# Patient Record
Sex: FEMALE | Race: WHITE | Employment: UNEMPLOYED
[De-identification: names, ages, dates, MRNs, and addresses within clinical notes are randomized per-mention and may not be internally consistent; named-entity substitution may affect disease eponyms.]

---

## 2017-02-21 ENCOUNTER — OFFICE VISIT (OUTPATIENT)
Dept: OBGYN | Facility: CLINIC | Age: 37
End: 2017-02-21

## 2017-02-21 VITALS
DIASTOLIC BLOOD PRESSURE: 96 MMHG | RESPIRATION RATE: 16 BRPM | SYSTOLIC BLOOD PRESSURE: 145 MMHG | WEIGHT: 131 LBS | BODY MASS INDEX: 19.35 KG/M2 | HEART RATE: 92 BPM

## 2017-02-21 DIAGNOSIS — Z30.09 ENCOUNTER FOR OTHER GENERAL COUNSELING OR ADVICE ON CONTRACEPTION: ICD-10-CM

## 2017-02-21 DIAGNOSIS — Z30.42 ENCOUNTER FOR DEPO-PROVERA CONTRACEPTION: ICD-10-CM

## 2017-02-21 DIAGNOSIS — Z30.013 INITIATION OF DEPO PROVERA: Primary | ICD-10-CM

## 2017-02-21 PROCEDURE — G8427 DOCREV CUR MEDS BY ELIG CLIN: HCPCS | Performed by: SPECIALIST

## 2017-02-21 PROCEDURE — 4004F PT TOBACCO SCREEN RCVD TLK: CPT | Performed by: SPECIALIST

## 2017-02-21 PROCEDURE — G8484 FLU IMMUNIZE NO ADMIN: HCPCS | Performed by: SPECIALIST

## 2017-02-21 PROCEDURE — 99213 OFFICE O/P EST LOW 20 MIN: CPT | Performed by: SPECIALIST

## 2017-02-21 PROCEDURE — G8420 CALC BMI NORM PARAMETERS: HCPCS | Performed by: SPECIALIST

## 2017-02-21 RX ORDER — CARBAMAZEPINE 200 MG/1
TABLET, EXTENDED RELEASE ORAL
Refills: 1 | Status: ON HOLD | COMMUNITY
Start: 2017-02-15 | End: 2018-09-10 | Stop reason: DRUGHIGH

## 2017-02-21 RX ORDER — PNV NO.95/FERROUS FUM/FOLIC AC 28MG-0.8MG
TABLET ORAL
Refills: 8 | COMMUNITY
Start: 2017-02-15 | End: 2017-06-06 | Stop reason: ALTCHOICE

## 2017-02-21 RX ORDER — LURASIDONE HYDROCHLORIDE 40 MG/1
TABLET, FILM COATED ORAL
Refills: 1 | COMMUNITY
Start: 2017-02-15 | End: 2017-06-06 | Stop reason: ALTCHOICE

## 2017-02-21 RX ORDER — HYDROXYZINE HYDROCHLORIDE 25 MG/1
TABLET, FILM COATED ORAL
Refills: 2 | COMMUNITY
Start: 2016-11-23 | End: 2017-06-06 | Stop reason: ALTCHOICE

## 2017-02-21 RX ORDER — NIFEDIPINE 60 MG/1
TABLET, EXTENDED RELEASE ORAL
COMMUNITY
Start: 2016-10-14 | End: 2017-06-06 | Stop reason: ALTCHOICE

## 2017-02-21 RX ORDER — CARBAMAZEPINE 400 MG/1
TABLET, EXTENDED RELEASE ORAL
Refills: 1 | COMMUNITY
Start: 2017-02-15 | End: 2017-06-06 | Stop reason: ALTCHOICE

## 2017-02-21 RX ORDER — MEDROXYPROGESTERONE ACETATE 150 MG/ML
INJECTION, SUSPENSION INTRAMUSCULAR
Refills: 2 | COMMUNITY
Start: 2017-02-08 | End: 2017-06-06 | Stop reason: ALTCHOICE

## 2017-02-21 RX ORDER — MEDROXYPROGESTERONE ACETATE 150 MG/ML
150 INJECTION, SUSPENSION INTRAMUSCULAR ONCE
Status: COMPLETED | OUTPATIENT
Start: 2017-02-21 | End: 2017-02-21

## 2017-02-21 RX ADMIN — MEDROXYPROGESTERONE ACETATE 150 MG: 150 INJECTION, SUSPENSION INTRAMUSCULAR at 18:36

## 2017-02-21 ASSESSMENT — ENCOUNTER SYMPTOMS
COUGH: 0
EYE PAIN: 0
CONSTIPATION: 0
DIARRHEA: 0
NAUSEA: 0
APNEA: 0
VOMITING: 0
ABDOMINAL DISTENTION: 0
ABDOMINAL PAIN: 0

## 2017-02-22 ENCOUNTER — TELEPHONE (OUTPATIENT)
Dept: OBGYN | Facility: CLINIC | Age: 37
End: 2017-02-22

## 2017-05-02 ENCOUNTER — TELEPHONE (OUTPATIENT)
Dept: OBGYN CLINIC | Age: 37
End: 2017-05-02

## 2017-06-06 ENCOUNTER — OFFICE VISIT (OUTPATIENT)
Dept: OBGYN CLINIC | Age: 37
End: 2017-06-06
Payer: MEDICARE

## 2017-06-06 ENCOUNTER — HOSPITAL ENCOUNTER (OUTPATIENT)
Age: 37
Setting detail: SPECIMEN
Discharge: HOME | End: 2017-06-06
Payer: MEDICARE

## 2017-06-06 VITALS
HEIGHT: 68 IN | WEIGHT: 182 LBS | HEART RATE: 98 BPM | RESPIRATION RATE: 18 BRPM | DIASTOLIC BLOOD PRESSURE: 75 MMHG | BODY MASS INDEX: 27.58 KG/M2 | SYSTOLIC BLOOD PRESSURE: 114 MMHG

## 2017-06-06 DIAGNOSIS — Z72.51 HIGH RISK SEXUAL BEHAVIOR: ICD-10-CM

## 2017-06-06 DIAGNOSIS — Z32.02 NEGATIVE PREGNANCY TEST: ICD-10-CM

## 2017-06-06 DIAGNOSIS — N90.89 VULVAL LESION: ICD-10-CM

## 2017-06-06 DIAGNOSIS — Z30.430 ENCOUNTER FOR IUD INSERTION: Primary | ICD-10-CM

## 2017-06-06 PROCEDURE — 81025 URINE PREGNANCY TEST: CPT | Performed by: SPECIALIST

## 2017-06-06 PROCEDURE — 4004F PT TOBACCO SCREEN RCVD TLK: CPT | Performed by: SPECIALIST

## 2017-06-06 PROCEDURE — 58300 INSERT INTRAUTERINE DEVICE: CPT | Performed by: SPECIALIST

## 2017-06-06 RX ORDER — BUSPIRONE HYDROCHLORIDE 30 MG/1
30 TABLET ORAL 2 TIMES DAILY
Status: ON HOLD | COMMUNITY
End: 2018-09-15 | Stop reason: HOSPADM

## 2017-06-06 RX ORDER — ZIPRASIDONE HYDROCHLORIDE 80 MG/1
80 CAPSULE ORAL 2 TIMES DAILY WITH MEALS
Status: ON HOLD | COMMUNITY
End: 2018-09-15 | Stop reason: HOSPADM

## 2017-06-06 RX ORDER — AMLODIPINE BESYLATE 5 MG/1
5 TABLET ORAL
COMMUNITY
Start: 2017-06-01 | End: 2018-07-04

## 2017-06-06 RX ORDER — MELOXICAM 15 MG/1
15 TABLET ORAL
COMMUNITY
Start: 2017-06-01 | End: 2018-05-02 | Stop reason: SDUPTHER

## 2017-06-06 ASSESSMENT — ENCOUNTER SYMPTOMS
COUGH: 0
APNEA: 0
ABDOMINAL DISTENTION: 0
EYE PAIN: 0
DIARRHEA: 0
ABDOMINAL PAIN: 0
NAUSEA: 0
CONSTIPATION: 0
VOMITING: 0

## 2017-06-07 ENCOUNTER — TELEPHONE (OUTPATIENT)
Dept: OBGYN CLINIC | Age: 37
End: 2017-06-07

## 2017-06-07 LAB
C TRACH DNA GENITAL QL NAA+PROBE: NEGATIVE
N. GONORRHOEAE DNA: NEGATIVE

## 2017-07-21 ENCOUNTER — TELEPHONE (OUTPATIENT)
Dept: OBGYN CLINIC | Age: 37
End: 2017-07-21

## 2018-05-02 ENCOUNTER — OFFICE VISIT (OUTPATIENT)
Dept: FAMILY MEDICINE CLINIC | Age: 38
End: 2018-05-02
Payer: MEDICARE

## 2018-05-02 VITALS
BODY MASS INDEX: 23.89 KG/M2 | HEART RATE: 97 BPM | DIASTOLIC BLOOD PRESSURE: 81 MMHG | HEIGHT: 68 IN | WEIGHT: 157.6 LBS | SYSTOLIC BLOOD PRESSURE: 115 MMHG

## 2018-05-02 DIAGNOSIS — F31.60 BIPOLAR DISORDER, MIXED (HCC): Chronic | ICD-10-CM

## 2018-05-02 DIAGNOSIS — E78.5 DYSLIPIDEMIA: ICD-10-CM

## 2018-05-02 DIAGNOSIS — M25.541 ARTHRALGIA OF BOTH HANDS: ICD-10-CM

## 2018-05-02 DIAGNOSIS — R03.0 ELEVATED BLOOD PRESSURE READING WITHOUT DIAGNOSIS OF HYPERTENSION: ICD-10-CM

## 2018-05-02 DIAGNOSIS — F42.9 OBSESSIVE-COMPULSIVE DISORDER, UNSPECIFIED TYPE: ICD-10-CM

## 2018-05-02 DIAGNOSIS — R53.83 FATIGUE, UNSPECIFIED TYPE: ICD-10-CM

## 2018-05-02 DIAGNOSIS — F12.10 MARIJUANA ABUSE: ICD-10-CM

## 2018-05-02 DIAGNOSIS — M47.815 SPONDYLOSIS OF THORACOLUMBAR REGION WITHOUT MYELOPATHY OR RADICULOPATHY: ICD-10-CM

## 2018-05-02 DIAGNOSIS — R73.9 HYPERGLYCEMIA: ICD-10-CM

## 2018-05-02 DIAGNOSIS — R09.89 LABILE HYPERTENSION: Primary | ICD-10-CM

## 2018-05-02 DIAGNOSIS — M25.542 ARTHRALGIA OF BOTH HANDS: ICD-10-CM

## 2018-05-02 PROCEDURE — 93784 AMBL BP MNTR W/SOFTWARE: CPT | Performed by: FAMILY MEDICINE

## 2018-05-02 PROCEDURE — 4004F PT TOBACCO SCREEN RCVD TLK: CPT | Performed by: FAMILY MEDICINE

## 2018-05-02 PROCEDURE — G8427 DOCREV CUR MEDS BY ELIG CLIN: HCPCS | Performed by: FAMILY MEDICINE

## 2018-05-02 PROCEDURE — 99204 OFFICE O/P NEW MOD 45 MIN: CPT | Performed by: FAMILY MEDICINE

## 2018-05-02 PROCEDURE — G8420 CALC BMI NORM PARAMETERS: HCPCS | Performed by: FAMILY MEDICINE

## 2018-05-02 RX ORDER — MELOXICAM 15 MG/1
15 TABLET ORAL DAILY
Qty: 30 TABLET | Refills: 1 | Status: SHIPPED | OUTPATIENT
Start: 2018-05-02 | End: 2018-06-28 | Stop reason: SDUPTHER

## 2018-05-02 ASSESSMENT — PATIENT HEALTH QUESTIONNAIRE - PHQ9
1. LITTLE INTEREST OR PLEASURE IN DOING THINGS: 0
SUM OF ALL RESPONSES TO PHQ QUESTIONS 1-9: 0
SUM OF ALL RESPONSES TO PHQ9 QUESTIONS 1 & 2: 0
2. FEELING DOWN, DEPRESSED OR HOPELESS: 0

## 2018-05-02 ASSESSMENT — ENCOUNTER SYMPTOMS
SORE THROAT: 0
SHORTNESS OF BREATH: 1
BACK PAIN: 1
DIARRHEA: 0
TROUBLE SWALLOWING: 0
COUGH: 1
NAUSEA: 0
RHINORRHEA: 0
CHEST TIGHTNESS: 0
CONSTIPATION: 0
ABDOMINAL PAIN: 0

## 2018-06-28 DIAGNOSIS — M25.542 ARTHRALGIA OF BOTH HANDS: ICD-10-CM

## 2018-06-28 DIAGNOSIS — M47.815 SPONDYLOSIS OF THORACOLUMBAR REGION WITHOUT MYELOPATHY OR RADICULOPATHY: ICD-10-CM

## 2018-06-28 DIAGNOSIS — M25.541 ARTHRALGIA OF BOTH HANDS: ICD-10-CM

## 2018-06-30 RX ORDER — MELOXICAM 15 MG/1
15 TABLET ORAL DAILY
Qty: 30 TABLET | Refills: 0 | Status: SHIPPED | OUTPATIENT
Start: 2018-06-30 | End: 2018-07-29 | Stop reason: SDUPTHER

## 2018-07-04 ENCOUNTER — HOSPITAL ENCOUNTER (EMERGENCY)
Age: 38
Discharge: HOME | End: 2018-07-04
Attending: EMERGENCY MEDICINE
Payer: MEDICARE

## 2018-07-04 VITALS
HEIGHT: 68 IN | HEART RATE: 76 BPM | OXYGEN SATURATION: 95 % | SYSTOLIC BLOOD PRESSURE: 121 MMHG | BODY MASS INDEX: 24.17 KG/M2 | DIASTOLIC BLOOD PRESSURE: 70 MMHG | WEIGHT: 159.5 LBS | TEMPERATURE: 98.2 F | RESPIRATION RATE: 16 BRPM

## 2018-07-04 DIAGNOSIS — L30.9 FACIAL DERMATITIS: Primary | ICD-10-CM

## 2018-07-04 PROCEDURE — 6370000000 HC RX 637 (ALT 250 FOR IP)

## 2018-07-04 PROCEDURE — 6360000002 HC RX W HCPCS: Performed by: EMERGENCY MEDICINE

## 2018-07-04 PROCEDURE — 99283 EMERGENCY DEPT VISIT LOW MDM: CPT

## 2018-07-04 PROCEDURE — 96372 THER/PROPH/DIAG INJ SC/IM: CPT

## 2018-07-04 RX ORDER — METHYLPREDNISOLONE SODIUM SUCCINATE 125 MG/2ML
125 INJECTION, POWDER, LYOPHILIZED, FOR SOLUTION INTRAMUSCULAR; INTRAVENOUS ONCE
Status: COMPLETED | OUTPATIENT
Start: 2018-07-04 | End: 2018-07-04

## 2018-07-04 RX ORDER — PREDNISONE 10 MG/1
TABLET ORAL
Qty: 30 TABLET | Refills: 0 | Status: SHIPPED | OUTPATIENT
Start: 2018-07-04 | End: 2018-09-10

## 2018-07-04 RX ADMIN — FLUORESCEIN SODIUM 1 STRIP: 0.6 STRIP OPHTHALMIC at 08:17

## 2018-07-04 RX ADMIN — METHYLPREDNISOLONE SODIUM SUCCINATE 125 MG: 125 INJECTION, POWDER, FOR SOLUTION INTRAMUSCULAR; INTRAVENOUS at 08:24

## 2018-07-04 ASSESSMENT — ENCOUNTER SYMPTOMS
CONSTIPATION: 0
SHORTNESS OF BREATH: 0
PHOTOPHOBIA: 1
DIARRHEA: 0
FACIAL SWELLING: 0
VOMITING: 0
ABDOMINAL PAIN: 0
EYE DISCHARGE: 0
COLOR CHANGE: 0
EYE REDNESS: 1
EYE PAIN: 1
COUGH: 0

## 2018-07-04 ASSESSMENT — VISUAL ACUITY
OS: 20/30
OD: 20/25

## 2018-07-04 ASSESSMENT — PAIN SCALES - GENERAL
PAINLEVEL_OUTOF10: 9
PAINLEVEL_OUTOF10: 9

## 2018-07-04 NOTE — ED NOTES
ASSESSMENT:   Presents to ED per self per pvt auto with c/o bilat eye irritation, blurred vision and periorbital redness. Been going on for 1 1/2 weeks after going using a tanning bed. States used their adhesive eye protectors. Eye Dr told her she's probably allergic to them  Has been to her Eye Dr twice and Urgent care once. Using eye drops and a special cleansing solution and both not helping. States her vision is a little blurred.      Sancho Flores RN  07/04/18 2224

## 2018-07-04 NOTE — ED PROVIDER NOTES
[address] [place] [address] ED  eMERGENCY dEPARTMENT eNCOUnter      Pt Name: Lianne Davison  MRN: [id number]  [place] 1980  Date of evaluation: 2018  Provider: Davonte Olea MD    [address] [place] [address]       Chief Complaint   Patient presents with    Eye Pain     bilateral         HISTORY OF PRESENT ILLNESS  (Location/Symptom, Timing/Onset, Context/Setting, Quality, Duration, Modifying Factors, Severity.)   Lianne Davison is a 42 y.o. female who presents to the emergency department For irritation of the skin around her eyes. This began about a week and a half ago after using a tanning bed and using eye shields that were held placed by glue. She went to an eye doctor and they put her on some ketorolac drops but it didn't seem to help. Symptoms are continuous and she described the burning is a 9. Nursing Notes were reviewed. ALLERGIES     Lamictal [lamotrigine]    CURRENT MEDICATIONS       Previous Medications    BUSPIRONE (BUSPAR) 10 MG TABLET    Take 30 mg by mouth nightly     CARBAMAZEPINE (TEGRETOL XR) 200 MG EXTENDED RELEASE TABLET    TK 1 T PO QD    CLONAZEPAM (KLONOPIN) 0.5 MG TABLET    Take 1 mg by mouth nightly. .    MELOXICAM (MOBIC) 15 MG TABLET    TAKE 1 TABLET BY MOUTH DAILY    ZIPRASIDONE (GEODON) 80 MG CAPSULE    Take 160 mg by mouth nightly        PAST MEDICAL HISTORY         Diagnosis Date    Bipolar 1 disorder ([address]     Depression     Gestational diabetes 2016    OCD (obsessive compulsive disorder)        SURGICAL HISTORY           Procedure Laterality Date    ABDOMEN SURGERY       SECTION  2007    LAPAROSCOPY           FAMILY HISTORY       Family History   Problem Relation Age of Onset    Diabetes Sister     No Known Problems Mother     No Known Problems Father      Family Status   Relation Status    Sister (Not Specified)    Mother Alive    Father         SOCIAL HISTORY      reports that she has been smoking.   She has been smoking about 1.00 pack There is no tenderness. Musculoskeletal: Normal range of motion. Lymphadenopathy:     She has no cervical adenopathy. Neurological: She is alert and oriented to person, place, and time. Skin: Skin is warm and dry. No rash noted. She is not diaphoretic. No erythema. Psychiatric: She has a normal mood and affect. Her behavior is normal.   Vitals reviewed. DIAGNOSTIC RESULTS     EKG: All EKG's are interpreted by the Emergency Department Physician who either signs or Co-signs this chart in the absence of a cardiologist.    Not indicated    RADIOLOGY:   Non-plain film images such as CT, Ultrasound and MRI are read by the radiologist. Plain radiographic images are visualized and preliminarily interpreted by the emergency physician with the below findings:    Not indicated    Interpretation per the Radiologist below, if available at the time of this note:        LABS:  Labs Reviewed - No data to display    All other labs were within normal range or not returned as of this dictation. EMERGENCY DEPARTMENT COURSE and DIFFERENTIAL DIAGNOSIS/MDM:   Vitals:    Vitals:    07/04/18 0755   BP: 121/70   Pulse: 76   Resp: 16   Temp: 98.2 °F (36.8 °C)   SpO2: 95%   Weight: 159 lb 8 oz (72.3 kg)   Height: 5' 8\" (1.727 m)       Orders Placed This Encounter   Medications    fluorescein 0.6 MG ophthalmic strip     Alva Huertas: cabinet override    fluorescein ophthalmic strip 1 each    methylPREDNISolone sodium (SOLU-MEDROL) injection 125 mg    predniSONE (DELTASONE) 10 MG tablet     Sig: Take 4 by mouth daily for 3 days then  3 by mouth daily for 3 days then  2 by mouth daily for 3 days then  1 by mouth daily for 3 days     Dispense:  30 tablet     Refill:  0       Medical Decision Making: There is no evidence of UV keratitis. She will be treated with IM salmeterol and prescribed prednisone. She'll follow-up with her eye physician as needed. Treatment diagnosis and follow-up were discussed with the patient.

## 2018-07-06 ENCOUNTER — HOSPITAL ENCOUNTER (EMERGENCY)
Age: 38
Discharge: HOME | End: 2018-07-06
Attending: EMERGENCY MEDICINE
Payer: MEDICARE

## 2018-07-06 VITALS
SYSTOLIC BLOOD PRESSURE: 134 MMHG | HEART RATE: 81 BPM | HEIGHT: 68 IN | DIASTOLIC BLOOD PRESSURE: 79 MMHG | BODY MASS INDEX: 24.25 KG/M2 | WEIGHT: 160 LBS | RESPIRATION RATE: 18 BRPM | OXYGEN SATURATION: 92 % | TEMPERATURE: 98.4 F

## 2018-07-06 DIAGNOSIS — H10.13 ACUTE ATOPIC CONJUNCTIVITIS OF BOTH EYES: Primary | ICD-10-CM

## 2018-07-06 DIAGNOSIS — F31.9 BIPOLAR 1 DISORDER (HCC): ICD-10-CM

## 2018-07-06 PROCEDURE — 6370000000 HC RX 637 (ALT 250 FOR IP): Performed by: EMERGENCY MEDICINE

## 2018-07-06 PROCEDURE — 99283 EMERGENCY DEPT VISIT LOW MDM: CPT

## 2018-07-06 RX ORDER — NEOMYCIN SULFATE, POLYMYXIN B SULFATE, AND DEXAMETHASONE 3.5; 10000; 1 MG/G; [USP'U]/G; MG/G
OINTMENT OPHTHALMIC ONCE
Status: COMPLETED | OUTPATIENT
Start: 2018-07-06 | End: 2018-07-06

## 2018-07-06 RX ORDER — CLONAZEPAM 1 MG/1
1 TABLET ORAL 2 TIMES DAILY PRN
Qty: 14 TABLET | Refills: 3 | Status: ON HOLD | OUTPATIENT
Start: 2018-07-06 | End: 2018-09-15 | Stop reason: HOSPADM

## 2018-07-06 RX ADMIN — NEOMYCIN, POLYMYXIN B SULFATES, DEXAMETHASONE: 1; 3.5; 1 OINTMENT OPHTHALMIC at 06:03

## 2018-07-06 ASSESSMENT — ENCOUNTER SYMPTOMS
GASTROINTESTINAL NEGATIVE: 1
EYE REDNESS: 1
EYE ITCHING: 1
RESPIRATORY NEGATIVE: 1
EYE DISCHARGE: 1

## 2018-07-06 ASSESSMENT — PAIN SCALES - GENERAL: PAINLEVEL_OUTOF10: 6

## 2018-07-06 NOTE — ED NOTES
Pt presents to the ed via private auto c/o eye pain and headache. Pt eyes appear very red and inflamed pt reports a burning sensation she states that the eye irration started after she started tanning. No drainage noted, neuro checks MAGGIE davison.      Dixie Kimball RN  07/06/18 8133

## 2018-07-06 NOTE — ED PROVIDER NOTES
  SECTION  2007    LAPAROSCOPY           FAMILY HISTORY       Family History   Problem Relation Age of Onset    Diabetes Sister     No Known Problems Mother     No Known Problems Father      Family Status   Relation Status    Sister (Not Specified)    Mother Alive    Father         SOCIAL HISTORY      reports that she has been smoking. She has been smoking about 1.00 pack per day. She has never used smokeless tobacco. She reports that she uses drugs, including Marijuana. She reports that she does not drink alcohol. REVIEW OF SYSTEMS    (2-9 systems for level 4, 10 or more for level 5)     Review of Systems   HENT: Negative. Eyes: Positive for discharge, redness and itching. Negative for visual disturbance. Respiratory: Negative. Cardiovascular: Negative. Gastrointestinal: Negative. Musculoskeletal: Negative. Skin: Positive for rash. Neurological: Negative. Hematological: Negative. Psychiatric/Behavioral: Positive for agitation and sleep disturbance. The patient is nervous/anxious. Except as noted above the remainder of the review of systems was reviewed and negative. PHYSICAL EXAM    (up to 7 for level 4, 8 or more for level 5)     Vitals:    18 0538   BP: 134/79   Pulse: 81   Resp: 18   Temp: 98.4 °F (36.9 °C)   TempSrc: Oral   SpO2: 92%   Weight: 160 lb (72.6 kg)   Height: 5' 8\" (1.727 m)       Physical exam reflects a hyperverbal female. She is afebrile with stable vital signs to include pulse ox 96% on room air. She's not hypoxic. She is alert and conversive. She is bilateral scleral injection and watery discharge. She does have some slight erythema noted. Cranial margins are distinct funduscopic exam benign. No evidence of trauma to the eyes. No evidence of discrete periorbital infection. Oropharyngeal exam without lesion. No facial swelling. Heart regular rate and rhythm normal S1 and S2 no murmurs rubs gallops.   Lungs are clear to auscultation without wheezes rales or rhonchi. She is quite hyperverbal.  Although she is cogent of conversation    DIAGNOSTIC RESULTS       EMERGENCY DEPARTMENT COURSE and DIFFERENTIAL DIAGNOSIS/MDM:   Vitals:    Vitals:    07/06/18 0538   BP: 134/79   Pulse: 81   Resp: 18   Temp: 98.4 °F (36.9 °C)   TempSrc: Oral   SpO2: 92%   Weight: 160 lb (72.6 kg)   Height: 5' 8\" (1.727 m)     Patient is evaluated. Maxitrol ophthalmic ointment is added to her current treatment regimen. Her prednisone pulse and taper has exacerbated her underlying manic bipolar phase. She is supplied with increased on Klonopin over the next several days. She will follow-up with her treating care professionals. Presentation is consistent with reevaluation of atopic conjunctivitis and bipolar disorder    CONSULTS:  None    PROCEDURES:  None    FINAL IMPRESSION      1. Acute atopic conjunctivitis of both eyes    2. Bipolar 1 disorder [facility])          DISPOSITION/PLAN   DISPOSITION Decision To Discharge 07/06/2018 05:47:01 AM      PATIENT REFERRED TO:   Ede Bullock MD  [facility], [facility] [address]  [phone]    Call   If symptoms worsen    [facility] ED  [address] [facility]  [phone]    As needed, If symptoms worsen      DISCHARGE MEDICATIONS:     New Prescriptions    CLONAZEPAM (KLONOPIN) 1 MG TABLET    Take 1 tablet by mouth 2 times daily as needed for Anxiety for up to 7 days. .    NEOMYCIN-POLYMYXIN-DEXAMETH 0.1 % OINT    Apply 0.5 inches to eye 4 times daily       Controlled Substances Monitoring:     RX Monitoring 7/6/2018   Attestation The Prescription Monitoring Report for this patient was reviewed today. Documentation Obtaining appropriate analgesic effect of treatment.        (Please note that portions of this note were completed with a voice recognition program.  Efforts were made to edit the dictations but occasionally words are

## 2018-07-29 DIAGNOSIS — M47.815 SPONDYLOSIS OF THORACOLUMBAR REGION WITHOUT MYELOPATHY OR RADICULOPATHY: ICD-10-CM

## 2018-07-29 DIAGNOSIS — M25.542 ARTHRALGIA OF BOTH HANDS: ICD-10-CM

## 2018-07-29 DIAGNOSIS — M25.541 ARTHRALGIA OF BOTH HANDS: ICD-10-CM

## 2018-07-30 RX ORDER — MELOXICAM 15 MG/1
TABLET ORAL
Qty: 30 TABLET | Refills: 0 | Status: ON HOLD | OUTPATIENT
Start: 2018-07-30 | End: 2018-09-15 | Stop reason: HOSPADM

## 2018-09-06 ENCOUNTER — HOSPITAL ENCOUNTER (EMERGENCY)
Age: 38
Discharge: HOME | End: 2018-09-06
Attending: EMERGENCY MEDICINE
Payer: MEDICARE

## 2018-09-06 VITALS
SYSTOLIC BLOOD PRESSURE: 130 MMHG | RESPIRATION RATE: 22 BRPM | BODY MASS INDEX: 22.8 KG/M2 | DIASTOLIC BLOOD PRESSURE: 89 MMHG | WEIGHT: 153.9 LBS | HEART RATE: 86 BPM | OXYGEN SATURATION: 98 % | HEIGHT: 69 IN | TEMPERATURE: 98.2 F

## 2018-09-06 DIAGNOSIS — Z71.1 NO PROBLEM, FEARED COMPLAINT UNFOUNDED: Primary | ICD-10-CM

## 2018-09-06 PROCEDURE — 99282 EMERGENCY DEPT VISIT SF MDM: CPT

## 2018-09-06 ASSESSMENT — PAIN SCALES - GENERAL: PAINLEVEL_OUTOF10: 8

## 2018-09-06 NOTE — ED PROVIDER NOTES
[address] [place] [address] ED  eMERGENCY dEPARTMENT eNCOUnter      Pt Name: Patt Milian  MRN: [id number]  [place] 1980  Date of evaluation: 9/6/2018  Provider: Man Alfaro MD    CHIEF COMPLAINT       Chief Complaint   Patient presents with    Headache    Abdominal Pain         HISTORY OF PRESENT ILLNESS  (Location/Symptom, Timing/Onset, Context/Setting, Quality, Duration, Modifying Factors, Severity.)   Patt Milian is a 34 y.o. female who presents to the emergency department And she is concerned about her blood pressure. Apparently when she was incarcerated she has hypertension. She brings with her sheet of blood pressure measurements she's taken the home setting. Her home blood pressure measurements as well as her blood pressure arrival here are all quite normal.  She is sitting eating a meal when I enter the room. She really has no other physical complaints today. Patient is known to be bipolar. She continues to be hyperverbal.  She is on psychiatric medications      Nursing Notes were reviewed. ALLERGIES     Lamictal [lamotrigine]    CURRENT MEDICATIONS       Previous Medications    BUSPIRONE (BUSPAR) 10 MG TABLET    Take 30 mg by mouth nightly     CARBAMAZEPINE (TEGRETOL XR) 200 MG EXTENDED RELEASE TABLET    TK 1 T PO QD    CLONAZEPAM (KLONOPIN) 0.5 MG TABLET    Take 1 mg by mouth nightly. Mordecai Clatsop CLONAZEPAM (KLONOPIN) 1 MG TABLET    Take 1 tablet by mouth 2 times daily as needed for Anxiety for up to 7 days. .    MELOXICAM (MOBIC) 15 MG TABLET    TAKE 1 TABLET BY MOUTH EVERY DAY    NEOMYCIN-POLYMYXIN-DEXAMETH 0.1 % OINT    Apply 0.5 inches to eye 4 times daily    PREDNISONE (DELTASONE) 10 MG TABLET    Take 4 by mouth daily for 3 days then  3 by mouth daily for 3 days then  2 by mouth daily for 3 days then  1 by mouth daily for 3 days    ZIPRASIDONE (GEODON) 80 MG CAPSULE    Take 160 mg by mouth nightly        PAST MEDICAL HISTORY         Diagnosis Date    Bipolar 1 disorder ([facility]     Depression     Gestational diabetes 2016    OCD (obsessive compulsive disorder)        SURGICAL HISTORY           Procedure Laterality Date    ABDOMEN SURGERY       SECTION  2007    LAPAROSCOPY           FAMILY HISTORY       Family History   Problem Relation Age of Onset    Diabetes Sister     No Known Problems Mother     No Known Problems Father      Family Status   Relation Status    Sister (Not Specified)    Mother Alive    Father         SOCIAL HISTORY      reports that she has been smoking. She has been smoking about 1.00 pack per day. She has never used smokeless tobacco. She reports that she uses drugs, including Marijuana. She reports that she does not drink alcohol. REVIEW OF SYSTEMS    (2-9 systems for level 4, 10 or more for level 5)     Review of Systems   All other systems reviewed and are negative. Except as noted above the remainder of the review of systems was reviewed and negative. PHYSICAL EXAM    (up to 7 for level 4, 8 or more for level 5)     Vitals:    18 0629   BP: 130/89   Pulse: 86   Resp: 22   Temp: 98.2 °F (36.8 °C)   TempSrc: Oral   SpO2: 98%   Weight: 153 lb 14.4 oz (69.8 kg)   Height: 5' 9\" (1.753 m)       Physical exam reflects a well-nourished well-hydrated female who appears quite comfortable. She is seated at the exam table eating a full meal.  She is afebrile. Blood pressure is 130/89. Vital signs otherwise stable to include pulse ox 98% on room air. She's not hypoxic. She is alert and conversive. Integument warm and dry. Cranial nerves II through XII are grossly intact. Oropharyngeal exam without lesion. She has no difficulty breathing speaking or swallowing. Visualized integument without rash or lesion. She has no gross neurovascular deficits. Heart regular rate and rhythm. Lungs are clear to auscultation.       DIAGNOSTIC RESULTS       EMERGENCY DEPARTMENT COURSE and DIFFERENTIAL DIAGNOSIS/MDM:   Vitals: Vitals:    09/06/18 0629   BP: 130/89   Pulse: 86   Resp: 22   Temp: 98.2 °F (36.8 °C)   TempSrc: Oral   SpO2: 98%   Weight: 153 lb 14.4 oz (69.8 kg)   Height: 5' 9\" (1.753 m)     Patient is evaluated on arrival.  Her issue today is that of elevated blood pressure. She is concerned as she has had high blood pressure measurements when incarcerated in the past.  Her blood pressure measurements today are all without abnormality. She has no other medical complaint. she is discharged. She is advised to continue follow-up with her treating primary care and psychiatric professionals    CONSULTS:  None    PROCEDURES:  None    FINAL IMPRESSION      1.  No problem, feared complaint unfounded          DISPOSITION/PLAN   DISPOSITION Decision To Discharge 09/06/2018 06:38:04 AM      PATIENT REFERRED TO:   Joey Dinero MD  [facility], [facility] [address]  [phone]    Call         DISCHARGE MEDICATIONS:     New Prescriptions    No medications on file           (Please note that portions of this note were completed with a voice recognition program.  Efforts were made to edit the dictations but occasionally words are mis-transcribed.)    Eladio Montesinos MD  Attending Emergency Physician         Eladio Montesinos MD  09/06/18 0146

## 2018-09-09 ENCOUNTER — HOSPITAL ENCOUNTER (EMERGENCY)
Age: 38
Discharge: HOME | End: 2018-09-09
Attending: EMERGENCY MEDICINE
Payer: MEDICARE

## 2018-09-09 VITALS
OXYGEN SATURATION: 98 % | RESPIRATION RATE: 18 BRPM | BODY MASS INDEX: 22.73 KG/M2 | SYSTOLIC BLOOD PRESSURE: 123 MMHG | DIASTOLIC BLOOD PRESSURE: 76 MMHG | WEIGHT: 150 LBS | HEART RATE: 87 BPM | HEIGHT: 68 IN | TEMPERATURE: 98.4 F

## 2018-09-09 DIAGNOSIS — M25.541 ARTHRALGIA OF BOTH HANDS: ICD-10-CM

## 2018-09-09 DIAGNOSIS — M25.542 ARTHRALGIA OF BOTH HANDS: ICD-10-CM
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
## 2018-09-09 DIAGNOSIS — M47.815 SPONDYLOSIS OF THORACOLUMBAR REGION WITHOUT MYELOPATHY OR RADICULOPATHY: ICD-10-CM

## 2018-09-09 LAB
BILIRUBIN, POC: NORMAL
BLOOD URINE, POC: NORMAL
CHP ED QC CHECK: YES
CHP ED QC CHECK: YES
CLARITY, POC: CLEAR
COLOR, POC: YELLOW
GLUCOSE URINE, POC: NORMAL
HCG, PREGNANCY URINE (POC): NEGATIVE
KETONES, POC: NORMAL
LEUKOCYTE EST, POC: NORMAL
NITRITE, POC: NORMAL
PH, POC: 7
PREGNANCY TEST URINE, POC: NORMAL
PROTEIN, POC: NORMAL
SPECIFIC GRAVITY, POC: 1.01
UROBILINOGEN, POC: 0.2

## 2018-09-09 PROCEDURE — 84703 CHORIONIC GONADOTROPIN ASSAY: CPT

## 2018-09-09 PROCEDURE — 81003 URINALYSIS AUTO W/O SCOPE: CPT

## 2018-09-09 PROCEDURE — 6370000000 HC RX 637 (ALT 250 FOR IP): Performed by: EMERGENCY MEDICINE

## 2018-09-09 PROCEDURE — 99284 EMERGENCY DEPT VISIT MOD MDM: CPT

## 2018-09-09 RX ORDER — LORAZEPAM 2 MG/ML
INJECTION INTRAMUSCULAR
Status: DISCONTINUED
Start: 2018-09-09 | End: 2018-09-09 | Stop reason: WASHOUT

## 2018-09-09 RX ORDER — HYDROXYZINE PAMOATE 50 MG/1
50 CAPSULE ORAL NIGHTLY
Status: ON HOLD | COMMUNITY
End: 2018-09-15 | Stop reason: HOSPADM

## 2018-09-09 RX ORDER — IBUPROFEN 800 MG/1
800 TABLET ORAL ONCE
Status: COMPLETED | OUTPATIENT
Start: 2018-09-09 | End: 2018-09-09

## 2018-09-09 RX ORDER — IBUPROFEN 200 MG
600 TABLET ORAL EVERY 6 HOURS PRN
Qty: 25 TABLET | Refills: 0 | Status: SHIPPED | OUTPATIENT
Start: 2018-09-09 | End: 2018-09-10

## 2018-09-09 RX ADMIN — IBUPROFEN 800 MG: 800 TABLET, FILM COATED ORAL at 05:55

## 2018-09-09 ASSESSMENT — PAIN SCALES - GENERAL: PAINLEVEL_OUTOF10: 6

## 2018-09-09 ASSESSMENT — PAIN DESCRIPTION - PAIN TYPE: TYPE: ACUTE PAIN

## 2018-09-09 NOTE — ED NOTES
Patient walking around just rambling things that come to mind.      Gypsy Thornton RN  09/09/18 6199

## 2018-09-10 ENCOUNTER — HOSPITAL ENCOUNTER (INPATIENT)
Age: 38
LOS: 5 days | Discharge: HOME | DRG: 885 | End: 2018-09-15
Attending: EMERGENCY MEDICINE | Admitting: PSYCHIATRY & NEUROLOGY
Payer: MEDICARE

## 2018-09-10 DIAGNOSIS — F31.9 BIPOLAR 1 DISORDER (HCC): Primary | ICD-10-CM

## 2018-09-10 LAB
-: ABNORMAL
AMORPHOUS: ABNORMAL
AMPHETAMINE SCREEN URINE: NEGATIVE
BACTERIA: ABNORMAL
BARBITURATE SCREEN URINE: NEGATIVE
BENZODIAZEPINE SCREEN, URINE: POSITIVE
BILIRUBIN URINE: ABNORMAL
BUPRENORPHINE URINE: ABNORMAL
CANNABINOID SCREEN URINE: POSITIVE
CASTS UA: ABNORMAL /LPF
COCAINE METABOLITE, URINE: NEGATIVE
COLOR: ABNORMAL
COMMENT UA: ABNORMAL
CRYSTALS, UA: ABNORMAL /HPF
EPITHELIAL CELLS UA: ABNORMAL /HPF
GLUCOSE URINE: NEGATIVE
HCG(URINE) PREGNANCY TEST: NEGATIVE
KETONES, URINE: NEGATIVE
LEUKOCYTE ESTERASE, URINE: NEGATIVE
MDMA URINE: ABNORMAL
METHADONE SCREEN, URINE: NEGATIVE
METHAMPHETAMINE, URINE: ABNORMAL
MUCUS: ABNORMAL
NITRITE, URINE: NEGATIVE
OPIATES, URINE: NEGATIVE
OTHER OBSERVATIONS UA: ABNORMAL
OXYCODONE SCREEN URINE: NEGATIVE
PH UA: 6 (ref 5–8)
PHENCYCLIDINE, URINE: NEGATIVE
PROPOXYPHENE, URINE: ABNORMAL
PROTEIN UA: NEGATIVE
RBC UA: ABNORMAL /HPF
RENAL EPITHELIAL, UA: ABNORMAL /HPF
SPECIFIC GRAVITY UA: 1.01 (ref 1–1.03)
TEST INFORMATION: ABNORMAL
TRICHOMONAS: ABNORMAL
TRICYCLIC ANTIDEPRESSANTS, UR: ABNORMAL
TURBIDITY: ABNORMAL
URINE HGB: NEGATIVE
UROBILINOGEN, URINE: NORMAL
WBC UA: ABNORMAL /HPF
YEAST: ABNORMAL

## 2018-09-10 PROCEDURE — 6360000002 HC RX W HCPCS: Performed by: REGISTERED NURSE

## 2018-09-10 PROCEDURE — 80307 DRUG TEST PRSMV CHEM ANLYZR: CPT

## 2018-09-10 PROCEDURE — 84703 CHORIONIC GONADOTROPIN ASSAY: CPT

## 2018-09-10 PROCEDURE — 99285 EMERGENCY DEPT VISIT HI MDM: CPT

## 2018-09-10 PROCEDURE — 1240000000 HC EMOTIONAL WELLNESS R&B

## 2018-09-10 PROCEDURE — 6360000002 HC RX W HCPCS: Performed by: NURSE PRACTITIONER

## 2018-09-10 PROCEDURE — 6370000000 HC RX 637 (ALT 250 FOR IP): Performed by: EMERGENCY MEDICINE

## 2018-09-10 PROCEDURE — 81001 URINALYSIS AUTO W/SCOPE: CPT

## 2018-09-10 RX ORDER — NICOTINE 21 MG/24HR
1 PATCH, TRANSDERMAL 24 HOURS TRANSDERMAL DAILY
Status: DISCONTINUED | OUTPATIENT
Start: 2018-09-10 | End: 2018-09-15 | Stop reason: HOSPADM

## 2018-09-10 RX ORDER — CARBAMAZEPINE 200 MG/1
200 TABLET ORAL 3 TIMES DAILY
Status: ON HOLD | COMMUNITY
End: 2018-09-15 | Stop reason: HOSPADM

## 2018-09-10 RX ORDER — ACETAMINOPHEN 325 MG/1
650 TABLET ORAL EVERY 4 HOURS PRN
Status: DISCONTINUED | OUTPATIENT
Start: 2018-09-10 | End: 2018-09-15 | Stop reason: HOSPADM

## 2018-09-10 RX ORDER — DIPHENHYDRAMINE HYDROCHLORIDE 50 MG/ML
50 INJECTION INTRAMUSCULAR; INTRAVENOUS ONCE
Status: COMPLETED | OUTPATIENT
Start: 2018-09-10 | End: 2018-09-10

## 2018-09-10 RX ORDER — CLONAZEPAM 1 MG/1
2 TABLET ORAL ONCE
Status: COMPLETED | OUTPATIENT
Start: 2018-09-10 | End: 2018-09-10

## 2018-09-10 RX ORDER — HALOPERIDOL 5 MG
5 TABLET ORAL ONCE
Status: COMPLETED | OUTPATIENT
Start: 2018-09-10 | End: 2018-09-10

## 2018-09-10 RX ORDER — MAGNESIUM HYDROXIDE/ALUMINUM HYDROXICE/SIMETHICONE 120; 1200; 1200 MG/30ML; MG/30ML; MG/30ML
30 SUSPENSION ORAL EVERY 6 HOURS PRN
Status: DISCONTINUED | OUTPATIENT
Start: 2018-09-10 | End: 2018-09-15 | Stop reason: HOSPADM

## 2018-09-10 RX ORDER — HYDROXYZINE HYDROCHLORIDE 25 MG/1
50 TABLET, FILM COATED ORAL 3 TIMES DAILY PRN
Status: DISCONTINUED | OUTPATIENT
Start: 2018-09-10 | End: 2018-09-15 | Stop reason: HOSPADM

## 2018-09-10 RX ORDER — TRAZODONE HYDROCHLORIDE 50 MG/1
50 TABLET ORAL NIGHTLY PRN
Status: DISCONTINUED | OUTPATIENT
Start: 2018-09-10 | End: 2018-09-15 | Stop reason: HOSPADM

## 2018-09-10 RX ORDER — LORAZEPAM 2 MG/ML
1 INJECTION INTRAMUSCULAR EVERY 4 HOURS PRN
Status: DISCONTINUED | OUTPATIENT
Start: 2018-09-10 | End: 2018-09-15 | Stop reason: HOSPADM

## 2018-09-10 RX ORDER — HALOPERIDOL 5 MG/ML
5 INJECTION INTRAMUSCULAR EVERY 4 HOURS PRN
Status: DISCONTINUED | OUTPATIENT
Start: 2018-09-10 | End: 2018-09-15 | Stop reason: HOSPADM

## 2018-09-10 RX ORDER — BENZTROPINE MESYLATE 1 MG/ML
2 INJECTION INTRAMUSCULAR; INTRAVENOUS 2 TIMES DAILY PRN
Status: DISCONTINUED | OUTPATIENT
Start: 2018-09-10 | End: 2018-09-15 | Stop reason: HOSPADM

## 2018-09-10 RX ADMIN — LORAZEPAM 1 MG: 2 INJECTION INTRAMUSCULAR; INTRAVENOUS at 23:19

## 2018-09-10 RX ADMIN — HALOPERIDOL LACTATE 5 MG: 5 INJECTION, SOLUTION INTRAMUSCULAR at 23:19

## 2018-09-10 RX ADMIN — DIPHENHYDRAMINE HYDROCHLORIDE 50 MG: 50 INJECTION, SOLUTION INTRAMUSCULAR; INTRAVENOUS at 23:20

## 2018-09-10 RX ADMIN — HALOPERIDOL 5 MG: 5 TABLET ORAL at 17:58

## 2018-09-10 RX ADMIN — HALOPERIDOL LACTATE 5 MG: 5 INJECTION, SOLUTION INTRAMUSCULAR at 18:54

## 2018-09-10 RX ADMIN — LORAZEPAM 1 MG: 2 INJECTION INTRAMUSCULAR; INTRAVENOUS at 18:54

## 2018-09-10 RX ADMIN — CLONAZEPAM 2 MG: 1 TABLET ORAL at 17:58

## 2018-09-10 ASSESSMENT — SLEEP AND FATIGUE QUESTIONNAIRES
SLEEP PATTERN: DIFFICULTY FALLING ASLEEP;INSOMNIA
DIFFICULTY STAYING ASLEEP: YES
DO YOU HAVE DIFFICULTY SLEEPING: YES
DIFFICULTY ARISING: NO
DIFFICULTY FALLING ASLEEP: YES
DO YOU USE A SLEEP AID: YES
AVERAGE NUMBER OF SLEEP HOURS: 2
RESTFUL SLEEP: NO

## 2018-09-10 ASSESSMENT — LIFESTYLE VARIABLES: HISTORY_ALCOHOL_USE: NO

## 2018-09-10 NOTE — TELEPHONE ENCOUNTER
Last visit:05/02/2018  Last Med refill:07/30/2018    Next Visit Date:  No future appointments.     Health Maintenance   Topic Date Due    Pneumococcal med risk (1 of 1 - PPSV23) 07/14/1997    Flu vaccine (1) 09/01/2018    Cervical cancer screen  12/06/2019    Lipid screen  05/17/2021    DTaP/Tdap/Td vaccine (2 - Td) 08/19/2026    HIV screen  Completed       No results found for: LABA1C          ( goal A1C is < 7)   No results found for: LABMICR  LDL Cholesterol (mg/dL)   Date Value   05/17/2016 35       (goal LDL is <100)   AST (U/L)   Date Value   05/17/2016 17     ALT (U/L)   Date Value   05/17/2016 11     BUN (mg/dL)   Date Value   05/17/2016 8     BP Readings from Last 3 Encounters:   09/09/18 123/76   09/06/18 130/89   07/06/18 134/79          (goal 120/80)    All Future Testing planned in [facility]  Lab Frequency Next Occurrence   CBC Auto Differential Once 10/29/2018   Comprehensive Metabolic Panel Once 89/16/3186   Hemoglobin A1C Once 10/29/2018   Lipid Panel Once 10/29/2018   TSH without Reflex Once 10/29/2018   T4, Free Once 10/29/2018   C-Reactive Protein Once 10/29/2018   MARINA Once 10/29/2018               Patient Active Problem List:     Bipolar disorder, mixed ([facility] [address]     Pregnancy     Hx CS x1 (G2-twins,36wks)     H/O miscarriage X2     Insufficient prenatal care     AMA     Tobacco abuse     OCD     Marijuana abuse     Candida albicans infection     Twin gestation, dichorionic diamniotic     Cocaine abuse      Cocaine abuse complicating pregnancy     Fetal cardiac anomaly complicating pregnancy, antepartum     Chromosomal 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, affecting management of mother, antepartum     Poor fetal growth, affecting management of mother, antepartum condition or complication     Umbilical cord complication     Tobacco use disorder complicating pregnancy, childbirth, or puerperium, antepartum     Twin B-IUGR     Twin B-Fetal cardiomegaly, pericardial effusion, VSD     Polyhydramnios (twin A-8.6cm and twin B-8.7cm)     Depression     Twin B-MCAs wnl, absent UADs     Abnl Quad (1:50 T21), elev msAFP     Hx D&C (G3-8wks, G4-8wks)     Elev 1hr gtt, normal 3hr     GBS bacteriuria     Chlamydia infection (SPENSER neg)     FHx DM      Fetal pericardial effusion affecting management of mother     Polyhydramnios, antepartum complication     Polyhydramnios, antepartum complication     IUGR (intrauterine growth restriction) affecting care of mother     Umbilical cord complication     Suspected damage to fetus from other disease in mother, affecting management of mother, antepartum condition or complication     Suspected damage to fetus from other disease in mother, affecting management of mother, antepartum condition or complication     3/90/07 RLTCS @29w2d M APG Wt /M APG Wt     Hyperglycemia

## 2018-09-10 NOTE — ED NOTES
Pt received a dinner tray. Pt is un-trusting of the dinner tray and asked this writer if the cafeteria was clean. Pt reports a history of OCD.

## 2018-09-10 NOTE — TELEPHONE ENCOUNTER
Placed call to patient and left voicemail to schedule appt. Please approve or refuse medication refill.
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
## 2018-09-10 NOTE — BH NOTE
PRN Medications    Pt. Pacing desks, verbally attacking all staff and some patients, pt. Demanding, slamming fist on desk, pt. Threw phone. Pt. Accepted PRN Injection willing went to room for staff.

## 2018-09-11 PROCEDURE — 6360000002 HC RX W HCPCS: Performed by: REGISTERED NURSE

## 2018-09-11 PROCEDURE — 6370000000 HC RX 637 (ALT 250 FOR IP): Performed by: REGISTERED NURSE

## 2018-09-11 PROCEDURE — 6370000000 HC RX 637 (ALT 250 FOR IP): Performed by: PSYCHIATRY & NEUROLOGY

## 2018-09-11 PROCEDURE — 1240000000 HC EMOTIONAL WELLNESS R&B

## 2018-09-11 PROCEDURE — 99222 1ST HOSP IP/OBS MODERATE 55: CPT | Performed by: PSYCHIATRY & NEUROLOGY

## 2018-09-11 RX ORDER — MELOXICAM 15 MG/1
TABLET ORAL
Qty: 30 TABLET | Refills: 0 | OUTPATIENT
Start: 2018-09-11

## 2018-09-11 RX ORDER — BUSPIRONE HYDROCHLORIDE 15 MG/1
15 TABLET ORAL 3 TIMES DAILY
Status: DISCONTINUED | OUTPATIENT
Start: 2018-09-11 | End: 2018-09-15 | Stop reason: HOSPADM

## 2018-09-11 RX ORDER — ZIPRASIDONE HYDROCHLORIDE 80 MG/1
80 CAPSULE ORAL 2 TIMES DAILY WITH MEALS
Status: DISCONTINUED | OUTPATIENT
Start: 2018-09-11 | End: 2018-09-15 | Stop reason: HOSPADM

## 2018-09-11 RX ORDER — DIVALPROEX SODIUM 500 MG/1
500 TABLET, EXTENDED RELEASE ORAL 2 TIMES DAILY
Status: DISCONTINUED | OUTPATIENT
Start: 2018-09-11 | End: 2018-09-15 | Stop reason: HOSPADM

## 2018-09-11 RX ORDER — CLONAZEPAM 0.5 MG/1
0.5 TABLET ORAL 2 TIMES DAILY
Status: DISCONTINUED | OUTPATIENT
Start: 2018-09-11 | End: 2018-09-12

## 2018-09-11 RX ADMIN — DIVALPROEX SODIUM 500 MG: 500 TABLET, EXTENDED RELEASE ORAL at 14:56

## 2018-09-11 RX ADMIN — LORAZEPAM 1 MG: 2 INJECTION INTRAMUSCULAR; INTRAVENOUS at 13:40

## 2018-09-11 RX ADMIN — BUSPIRONE HYDROCHLORIDE 15 MG: 15 TABLET ORAL at 21:15

## 2018-09-11 RX ADMIN — CLONAZEPAM 0.5 MG: 0.5 TABLET ORAL at 21:15

## 2018-09-11 RX ADMIN — BUSPIRONE HYDROCHLORIDE 15 MG: 15 TABLET ORAL at 14:57

## 2018-09-11 RX ADMIN — HALOPERIDOL LACTATE 5 MG: 5 INJECTION, SOLUTION INTRAMUSCULAR at 13:40

## 2018-09-11 RX ADMIN — CLONAZEPAM 0.5 MG: 0.5 TABLET ORAL at 14:56

## 2018-09-11 RX ADMIN — ZIPRASIDONE HYDROCHLORIDE 80 MG: 80 CAPSULE ORAL at 17:32

## 2018-09-11 RX ADMIN — HYDROXYZINE HYDROCHLORIDE 50 MG: 25 TABLET, FILM COATED ORAL at 21:15

## 2018-09-11 RX ADMIN — DIVALPROEX SODIUM 500 MG: 500 TABLET, EXTENDED RELEASE ORAL at 21:15

## 2018-09-11 ASSESSMENT — LIFESTYLE VARIABLES: HISTORY_ALCOHOL_USE: NO

## 2018-09-11 NOTE — BH NOTE
PRN Note:   Pt agitated aeb yelling and throwing self on floor. Redirection and talk time offered by staff. Pt offered and accepted PRN medication ordered per Dr. Mcgarry Heading 5mg IM and Ativan 1mg IM given in L gluteal. Pt agrees to stay in behavioral control.  Will continue to monitor

## 2018-09-11 NOTE — CARE COORDINATION
[facility] Biopsychosocial Assessment    Current Level of Psychosocial Functioning     Independent x  Dependent    Minimal Assist     Comments:    Psychosocial High Risk Factors (check all that apply)    Unable to obtain meds x states  took meds when he left house   Chronic illness/pain    Substance abuse   Lack of Family Support   Financial stress   Isolation   Inadequate Community Resources  Suicide attempt(s)  Not taking medications  X 1day   Victim of crime   Developmental Delay  Unable to manage personal needs    Age 70 or older   Homeless  No transportation   Readmission within 30 days  Unemployment  Traumatic Event    Comments:   Psychiatric Advanced Directives: none reported     Family to Involve in Treatment:  Edvin Nuñez is ESL, please contact cousin  Alfredo Malik @ [phone]    Sexual Orientation:  Heterosexual     Patient Strengths: med compliant, stable income and stable housing     Patient Barriers: poor insight and problem solving       Opiate Education Provided:  KENYETTA      [facility]HC/mental health history: [facility]     Plan of Care   medication management, group/individual therapies, family meetings, psycho -education, treatment team meetings to assist with stabilization    Initial Discharge Plan:  Dc to home and schedule med somatic at [facility] with Dr. John Bowers Summary:    33 yo female voluntary admission brought to ED by TPD for manic and delusional statements, per charting pt reported possible bomb In her car. Pt voluntary admission. Pt assessed on this date, AOx4, hyperverbal, tangential and continued to voice delusions (\"my  is a terrosit\"; \"people put bomb in my car they are all terrosist and work together\"). Pt had mumbled speech, had been laying in bed and asked writer \"How long have I been asleep? \". Pt endorsed bipolar hx with prince, states she has \"been the best I've ever been past 2 years with Dr. Elliott Uriarte ([facility]).  Pt endorsed racing thoughts, poor sleep, depression and anxiety. Pt states she has been off medications 1 day. Pt denied A/V hallucinations and denied S/H ideations. Pt reports stable income and living. Pt denied AOD. Pt unable to focus throughout assessment, writer will continue to engage and build rapport as sx decrease.

## 2018-09-11 NOTE — BH NOTE
Department of Psychiatry  Attending History and Physical - Adult         CHIEF COMPLAINT:  Worsening behaviors and worsening psychoses    History obtained from:  patient    HISTORY OF PRESENT ILLNESS:          The patient is a 34 y.o. female with significant past medical history of Bipolar Disorder who presents with worsening agitation, confusion and paranoid thoughts. She has not slept in days and has been off her medications. She thinks that her  wants to kill her. She is seen at [facility]. PSYCHIATRIC HISTORY:      The patient is currently receiving care for the above psychiatric illness. Past mental health outpatient care includes:  1-5 treatment centers    Past mental health hospitalizations: 1-5 admissions    Lifetime Psychiatric Review of Systems         Micaela or Hypomania:  yes -      Panic Attacks:  yes -      Phobias:  no     Obsessions and Compulsions:  no     Body or Vocal Tics:  no     Hallucinations:  yes -      Delusions:  yes -     Past psychiatric medications include:  Geodon    Adverse reactions from psychotropic medications:  none    Past Medical History:        Diagnosis Date    Bipolar 1 disorder ([facility] [address]     Depression     Gestational diabetes 2016    OCD (obsessive compulsive disorder)      Past Surgical History:        Procedure Laterality Date    ABDOMEN SURGERY       SECTION      LAPAROSCOPY       Medications Prior to Admission:   Prescriptions Prior to Admission: hydrOXYzine (VISTARIL) 50 MG capsule, Take 50 mg by mouth nightly  meloxicam (MOBIC) 15 MG tablet, TAKE 1 TABLET BY MOUTH EVERY DAY  clonazePAM (KLONOPIN) 1 MG tablet, Take 1 tablet by mouth 2 times daily as needed for Anxiety for up to 7 days. .  busPIRone (BUSPAR) 30 MG tablet, Take 30 mg by mouth 2 times daily   ziprasidone (GEODON) 80 MG capsule, Take 80 mg by mouth 2 times daily (with meals)   carBAMazepine (TEGRETOL) 200 MG tablet, Take 200 mg by mouth 3 times daily  Allergies: Lamictal [lamotrigine] and Percocet [oxycodone-acetaminophen]      Family History:   Family History   Problem Relation Age of Onset    Diabetes Sister     No Known Problems Mother     No Known Problems Father      Psychiatric Family History  Patient is unable to give family history at this time. PHYSICAL EXAM:    Vitals:  /68   Pulse 90   Temp 97.3 °F (36.3 °C) (Oral)   Resp 14   Ht 5' 8\" (1.727 m)   Wt 143 lb (64.9 kg)   LMP 08/27/2018 (Within Weeks)   SpO2 99%   BMI 21.74 kg/m²     Mental Status Examination:  Level of consciousness: Moderate dysattention (reduced clarity of awareness with impaired ability to focus, sustain, or shift attention)  Appearance:  hospital attire  Behavior/Motor:  psychomotor agitation  Attitude toward examiner:  argumentative  Speech:  rapid, loud, hyperverbal and pressured  Mood:  manic  Affect:  mood congruent  Thought processes:  flight of ideas, illogical and loose associations  Thought content:  Homocidal ideation denies  Suicidal Ideation:  denies suicidal ideation  Delusions:  paranoid, persecutory and grandiose  Perceptual Disturbance:  denies any perceptual disturbance  Cognition:  disoriented  Concentration failed 5-letter word backwards  Memory impaired immediate recall  Insight:  poor  Judgment:  poor        DSM-IV DIAGNOSIS:    Impression    (Axis I):        Bipolar I disorder; manic episode and with psychotic features      ASSESSMENT AND PLAN:    Admit to psych unit  Labs and EKG  Resume and adjust medications accordingly  Milieu therapy

## 2018-09-12 LAB
ABSOLUTE EOS #: 0.4 K/UL (ref 0–0.4)
ABSOLUTE IMMATURE GRANULOCYTE: NORMAL K/UL (ref 0–0.3)
ABSOLUTE LYMPH #: 3.9 K/UL (ref 1–4.8)
ABSOLUTE MONO #: 0.7 K/UL (ref 0.1–1.3)
ALBUMIN SERPL-MCNC: 3.9 G/DL (ref 3.5–5.2)
ALBUMIN/GLOBULIN RATIO: ABNORMAL (ref 1–2.5)
ALP BLD-CCNC: 73 U/L (ref 35–104)
ALT SERPL-CCNC: 20 U/L (ref 5–33)
ANION GAP SERPL CALCULATED.3IONS-SCNC: 11 MMOL/L (ref 9–17)
AST SERPL-CCNC: 35 U/L
BASOPHILS # BLD: 1 % (ref 0–2)
BASOPHILS ABSOLUTE: 0.1 K/UL (ref 0–0.2)
BILIRUB SERPL-MCNC: 0.33 MG/DL (ref 0.3–1.2)
BUN BLDV-MCNC: 10 MG/DL (ref 6–20)
BUN/CREAT BLD: ABNORMAL (ref 9–20)
CALCIUM SERPL-MCNC: 8.7 MG/DL (ref 8.6–10.4)
CHLORIDE BLD-SCNC: 105 MMOL/L (ref 98–107)
CHOLESTEROL/HDL RATIO: 2.3
CHOLESTEROL: 101 MG/DL
CO2: 27 MMOL/L (ref 20–31)
CREAT SERPL-MCNC: 0.65 MG/DL (ref 0.5–0.9)
DIFFERENTIAL TYPE: NORMAL
EOSINOPHILS RELATIVE PERCENT: 4 % (ref 0–4)
ESTIMATED AVERAGE GLUCOSE: 105 MG/DL
GFR AFRICAN AMERICAN: >60 ML/MIN
GFR NON-AFRICAN AMERICAN: >60 ML/MIN
GFR SERPL CREATININE-BSD FRML MDRD: ABNORMAL ML/MIN/{1.73_M2}
GFR SERPL CREATININE-BSD FRML MDRD: ABNORMAL ML/MIN/{1.73_M2}
GLUCOSE BLD-MCNC: 99 MG/DL (ref 70–99)
HBA1C MFR BLD: 5.3 % (ref 4–6)
HCT VFR BLD CALC: 41.8 % (ref 36–46)
HDLC SERPL-MCNC: 44 MG/DL
HEMOGLOBIN: 14 G/DL (ref 12–16)
IMMATURE GRANULOCYTES: NORMAL %
LDL CHOLESTEROL: 42 MG/DL (ref 0–130)
LYMPHOCYTES # BLD: 39 % (ref 24–44)
MCH RBC QN AUTO: 28.8 PG (ref 26–34)
MCHC RBC AUTO-ENTMCNC: 33.5 G/DL (ref 31–37)
MCV RBC AUTO: 86.1 FL (ref 80–100)
MONOCYTES # BLD: 7 % (ref 1–7)
NRBC AUTOMATED: NORMAL PER 100 WBC
PDW BLD-RTO: 13.8 % (ref 11.5–14.9)
PLATELET # BLD: 216 K/UL (ref 150–450)
PLATELET ESTIMATE: NORMAL
PMV BLD AUTO: 9 FL (ref 6–12)
POTASSIUM SERPL-SCNC: 3.6 MMOL/L (ref 3.7–5.3)
RBC # BLD: 4.85 M/UL (ref 4–5.2)
RBC # BLD: NORMAL 10*6/UL
SEG NEUTROPHILS: 49 % (ref 36–66)
SEGMENTED NEUTROPHILS ABSOLUTE COUNT: 4.9 K/UL (ref 1.3–9.1)
SODIUM BLD-SCNC: 143 MMOL/L (ref 135–144)
THYROXINE, FREE: 2.3 NG/DL (ref 0.93–1.7)
TOTAL PROTEIN: 5.9 G/DL (ref 6.4–8.3)
TRIGL SERPL-MCNC: 75 MG/DL
TSH SERPL DL<=0.05 MIU/L-ACNC: 1.96 MIU/L (ref 0.3–5)
VLDLC SERPL CALC-MCNC: NORMAL MG/DL (ref 1–30)
WBC # BLD: 9.9 K/UL (ref 3.5–11)
WBC # BLD: NORMAL 10*3/UL

## 2018-09-12 PROCEDURE — 80061 LIPID PANEL: CPT

## 2018-09-12 PROCEDURE — 99232 SBSQ HOSP IP/OBS MODERATE 35: CPT | Performed by: PSYCHIATRY & NEUROLOGY

## 2018-09-12 PROCEDURE — 83036 HEMOGLOBIN GLYCOSYLATED A1C: CPT

## 2018-09-12 PROCEDURE — 84439 ASSAY OF FREE THYROXINE: CPT

## 2018-09-12 PROCEDURE — 80053 COMPREHEN METABOLIC PANEL: CPT

## 2018-09-12 PROCEDURE — 6370000000 HC RX 637 (ALT 250 FOR IP): Performed by: REGISTERED NURSE

## 2018-09-12 PROCEDURE — 36415 COLL VENOUS BLD VENIPUNCTURE: CPT

## 2018-09-12 PROCEDURE — 84443 ASSAY THYROID STIM HORMONE: CPT

## 2018-09-12 PROCEDURE — 6370000000 HC RX 637 (ALT 250 FOR IP): Performed by: PSYCHIATRY & NEUROLOGY

## 2018-09-12 PROCEDURE — 85025 COMPLETE CBC W/AUTO DIFF WBC: CPT

## 2018-09-12 PROCEDURE — 1240000000 HC EMOTIONAL WELLNESS R&B

## 2018-09-12 RX ORDER — CLONAZEPAM 1 MG/1
1 TABLET ORAL 2 TIMES DAILY
Status: DISCONTINUED | OUTPATIENT
Start: 2018-09-12 | End: 2018-09-15 | Stop reason: HOSPADM

## 2018-09-12 RX ADMIN — ACETAMINOPHEN 650 MG: 325 TABLET, FILM COATED ORAL at 16:43

## 2018-09-12 RX ADMIN — HYDROXYZINE HYDROCHLORIDE 50 MG: 25 TABLET, FILM COATED ORAL at 21:41

## 2018-09-12 RX ADMIN — CLONAZEPAM 1 MG: 1 TABLET ORAL at 21:41

## 2018-09-12 RX ADMIN — DIVALPROEX SODIUM 500 MG: 500 TABLET, EXTENDED RELEASE ORAL at 07:49

## 2018-09-12 RX ADMIN — ZIPRASIDONE HYDROCHLORIDE 80 MG: 80 CAPSULE ORAL at 18:22

## 2018-09-12 RX ADMIN — DIVALPROEX SODIUM 500 MG: 500 TABLET, EXTENDED RELEASE ORAL at 21:41

## 2018-09-12 RX ADMIN — BUSPIRONE HYDROCHLORIDE 15 MG: 15 TABLET ORAL at 07:49

## 2018-09-12 RX ADMIN — CLONAZEPAM 0.5 MG: 0.5 TABLET ORAL at 07:49

## 2018-09-12 RX ADMIN — ZIPRASIDONE HYDROCHLORIDE 80 MG: 80 CAPSULE ORAL at 07:49

## 2018-09-12 RX ADMIN — BUSPIRONE HYDROCHLORIDE 15 MG: 15 TABLET ORAL at 21:41

## 2018-09-12 RX ADMIN — BUSPIRONE HYDROCHLORIDE 15 MG: 15 TABLET ORAL at 14:39

## 2018-09-12 ASSESSMENT — PAIN SCALES - GENERAL
PAINLEVEL_OUTOF10: 3
PAINLEVEL_OUTOF10: 1

## 2018-09-12 NOTE — PLAN OF CARE
Problem: Altered Mood, Manic Behavior:  Goal: Able to verbalize decrease in frequency and intensity of racing thoughts  Able to verbalize decrease in frequency and intensity of racing thoughts   Outcome: Ongoing  Pt did not participate in Leisure Skills Group at 1100 despite staff encouragement.

## 2018-09-12 NOTE — PLAN OF CARE
Problem: Altered Mood, Manic Behavior:  Goal: Able to verbalize decrease in frequency and intensity of racing thoughts  Able to verbalize decrease in frequency and intensity of racing thoughts   Outcome: Ongoing  Psychoeducation Group Note    Date: 9/12/2018  Start Time: 1430  End Time: 1515    Number Participants in Group:  10    Goal:  Patient will demonstrate increased interpersonal interaction. Topic:  Mental Health Awareness / Symptoms / Coping Skills    Discipline Responsible:   OT  AT  [facility]. X RT  Other       Participation Level:     None x Minimal    Active Listener  Interactive    Monopolizing         Participation Quality:  x Appropriate  Inappropriate          Attentive x       Intrusive          Sharing x       Resistant          Supportive        Lethargic       Affective:    Congruent x Incongruent  Blunted  Flat    Constricted  Anxious  Elated  Angry    Labile  Depressed  Other  Bright       Cognitive:  x Alert x Oriented PPTP     Concentration  G  F x P   Attention Span  G  F x P   Short-Term Memory  G x F  P   Long-Term Memory  G  F  P   ProblemSolving/  Decision Making  G x F  P   Ability to Process  Information  G x F  P      Contributing Factors             Delusional             Hallucinating             Flight of Ideas             Other:       Modes of Intervention:  x Education x Support x Exploration   x Clarifying x Problem Solving x Confrontation   x Socialization x Limit Setting x Reality Testing   x Activity  Movement  Media    Other:            Response to Learning:  x Able to verbalize current knowledge/experience    Able to verbalize/acknowledge new learning    Able to retain information    Capable of insight    Able to change behavior   x Progressing to goal    Other:        Comments:  Patient was constantly in and out of the group room.  Patient needed frequent redirection and reiteration of group guidelines due to shouting out answers when she was told work with a team and not yell out answers. Every time patient was asked if she wanted to join a team she said \"No I just want to watch\" even thought she continued to give out answers.

## 2018-09-12 NOTE — PLAN OF CARE
Problem: Altered Mood, Manic Behavior:  Goal: Ability to disclose and discuss suicidal ideas will improve  Ability to disclose and discuss suicidal ideas will improve   Outcome: Ongoing  Psychoeducation Group Note    Date: 9/12/2018  Start Time: 0845  End Time: 0915    Number Participants in Group:  9    Goal:  Patient will demonstrate increased interpersonal interaction. Topic:  Goal Setting / Community Meeting    Discipline Responsible:   OT  AT  [facility]. X RT  Other       Participation Level:     None x Minimal    Active Listener  Interactive    Monopolizing         Participation Quality:  x Appropriate  Inappropriate          Attentive        Intrusive   x       Sharing        Resistant          Supportive        Lethargic       Affective:    Congruent  Incongruent  Blunted x Flat    Constricted  Anxious  Elated  Angry    Labile  Depressed  Other  Bright       Cognitive:  x Alert x Oriented PPTP     Concentration  G x F  P   Attention Span  G  F x P   Short-Term Memory x G  F  P   Long-Term Memory  G  F  P   ProblemSolving/  Decision Making  G x F  P   Ability to Process  Information x G  F  P      Contributing Factors             Delusional             Hallucinating             Flight of Ideas             Other:       Modes of Intervention:  x Education x Support x Exploration   x Clarifying x Problem Solving x Confrontation   x Socialization x Limit Setting x Reality Testing    Activity  Movement  Media    Other:            Response to Learning:  x Able to verbalize current knowledge/experience    Able to verbalize/acknowledge new learning   x Able to retain information   x Capable of insight    Able to change behavior   x Progressing to goal    Other:        Goal for today:  Talk with doctor about discharge or being transferred to state. Patient left group after only five minutes and did not return to the room.

## 2018-09-12 NOTE — H&P
AMORPHOUS 1+ 09/10/2018       PAST MEDICAL HISTORY     Past Medical History:   Diagnosis Date    Bipolar 1 disorder ([facility] [address]     Depression     Gestational diabetes 2016    OCD (obsessive compulsive disorder)      Pt denies any history of Diabetes mellitus type 2, hypertension, stroke, heart disease, COPD, Asthma, GERD, HLD, Cancer, Seizures,Thyroid disease, Kidney Disease, Hepatitis, TB.    SURGICAL HISTORY       Past Surgical History:   Procedure Laterality Date    ABDOMEN SURGERY       SECTION  2007    LAPAROSCOPY         FAMILY HISTORY       Family History   Problem Relation Age of Onset    Diabetes Sister     No Known Problems Mother     No Known Problems Father        SOCIAL HISTORY       Social History     Social History    Marital status: Legally      Spouse name: N/A    Number of children: N/A    Years of education: N/A     Social History Main Topics    Smoking status: Current Every Day Smoker     Packs/day: 1.00    Smokeless tobacco: Never Used      Comment: NO medication ordered d/t pregnancy     Alcohol use No    Drug use: Yes     Types: Marijuana    Sexual activity: Yes     Partners: Male     Other Topics Concern    None     Social History Narrative    None           REVIEW OF SYSTEMS      Allergies   Allergen Reactions    Lamictal [Lamotrigine] Hives    Percocet [Oxycodone-Acetaminophen]      Hives        No current facility-administered medications on file prior to encounter. Current Outpatient Prescriptions on File Prior to Encounter   Medication Sig Dispense Refill    hydrOXYzine (VISTARIL) 50 MG capsule Take 50 mg by mouth nightly      meloxicam (MOBIC) 15 MG tablet TAKE 1 TABLET BY MOUTH EVERY DAY 30 tablet 0    clonazePAM (KLONOPIN) 1 MG tablet Take 1 tablet by mouth 2 times daily as needed for Anxiety for up to 7 days. . 14 tablet 3    busPIRone (BUSPAR) 30 MG tablet Take 30 mg by mouth 2 times daily       ziprasidone (GEODON) 80 MG palpable Lymphadenopathy. LOCOMOTOR, BACK AND SPINE:  Mild tenderness to cervical spine and parapsinals. ROM WNL. Able to flex and extend with no pain. Able to move head laterally (bilaterally). EXTREMITIES:  Symmetrical, no pedal edema. No calf tenderness. No discoloration or ulcerations. NEUROLOGIC:  The patient is conscious, alert, oriented, Gait and balance WNL. No apparent focal sensory deficits. No motor deficits, muscle strength equal Miguel Angel. No facial droop, tongue protrudes centrally, no slurring of the speech. PROVISIONAL DIAGNOSES:      Active Problems:    Bipolar 1 disorder (HCC)  Resolved Problems:    * No resolved hospital problems.  *      Darylene Pilsner, APRN - CNP on 9/12/2018 at 7:07 AM

## 2018-09-12 NOTE — PLAN OF CARE
Poor Recent, Poor Remote  Insight and Judgment: No  Insight and Judgment: Poor Judgment, Poor Insight, Unrealistic  Present Suicidal Ideation: No  Present Homicidal Ideation: No    Daily Assessment Last Entry:   Daily Sleep (WDL): Exceptions to WDL         Patient Currently in Pain: No  Daily Nutrition (WDL): Within Defined Limits    Patient Monitoring:  Frequency of Checks: 4 times per hour, close    Psychiatric Symptoms:   Depression Symptoms  Depression Symptoms: Increased irritability, Impaired concentration  Anxiety Symptoms  Anxiety Symptoms: Generalized  Micaela Symptoms  Micaela Symptoms: Flight of ideas, Pressured speech     Psychosis Symptoms  Delusion Type: No problems reported or observed.     Suicide Risk CSSR-S:  1) Within the past month, have you wished you were dead or wished you could go to sleep and not wake up? : NO  2) Within the past month, have you actually had any thoughts of killing yourself? : NO  6)  Have you ever done anything, started to do anything, or prepared to do anything to end your life?: NO  Change in Result:  no Change in Plan of care:  no      EDUCATION:   Learner Progress Toward Treatment Goals:   Reviewed results and recommendations of this team, Reviewed group plan and strategies, Reviewed signs, symptoms and risk of self harm and violent behavior, Reviewed goals and plan of care    Method:  small group, individual verbal education    Outcome:   Verbalized by patient but needs reinforcement to obtain goals    PATIENT GOALS:  Short term:  Discharge planning  Long term:  Pt not able to identify at this time    PLAN/TREATMENT RECOMMENDATIONS UPDATE:  continue with group therapies, increased socialization, continue planning for after discharge goals, continue with medication compliance    SHORT-TERM GOALS UPDATE:   Time frame for Short-Term Goals:  5-7 days    LONG-TERM GOALS UPDATE:   Time frame for Long-Term Goals:  6 months    Members Present in Team Meeting:     Jolly Bloch JUANCARLOS Whitehead  Goal: Able to verbalize decrease in frequency and intensity of racing thoughts  Able to verbalize decrease in frequency and intensity of racing thoughts   Outcome: Ongoing    Goal: Ability to disclose and discuss suicidal ideas will improve  Ability to disclose and discuss suicidal ideas will improve   Outcome: Ongoing

## 2018-09-12 NOTE — PROGRESS NOTES
Department of Psychiatry  Attending Progress Note      SUBJECTIVE:  Found her very anxious and manic state. She is medication complaint and responding well. Slept better last night. At times she has paranoid delusions. OBJECTIVE    Physical  VITALS:  BP (!) 134/98   Pulse 116   Temp 98.1 °F (36.7 °C) (Oral)   Resp 14   Ht 5' 8\" (1.727 m)   Wt 143 lb (64.9 kg)   LMP 08/27/2018 (Within Weeks)   SpO2 99%   BMI 21.74 kg/m²       Mental Status Examination:  Level of consciousness:   Moderate dysattention (reduced clarity of awareness with impaired ability to focus, sustain, or shift attention)  Appearance:  hospital attire  Behavior/Motor:  psychomotor agitation  Attitude toward examiner:  argumentative  Speech:  rapid, loud, hyperverbal and pressured  Mood:  manic  Affect:  mood congruent  Thought processes:  flight of ideas, illogical and loose associations  Thought content:  Homocidal ideation denies  Suicidal Ideation:  denies suicidal ideation  Delusions:  paranoid, persecutory and grandiose  Perceptual Disturbance:  denies any perceptual disturbance  Cognition:  disoriented  Concentration failed 5-letter word backwards  Memory impaired immediate recall  Insight:  poor  Judgment:  poor         Medications  Current Facility-Administered Medications: clonazePAM (KLONOPIN) tablet 1 mg, 1 mg, Oral, BID  ziprasidone (GEODON) capsule 80 mg, 80 mg, Oral, BID WC  divalproex (DEPAKOTE ER) extended release tablet 500 mg, 500 mg, Oral, BID  busPIRone (BUSPAR) tablet 15 mg, 15 mg, Oral, TID  acetaminophen (TYLENOL) tablet 650 mg, 650 mg, Oral, Q4H PRN  hydrOXYzine (ATARAX) tablet 50 mg, 50 mg, Oral, TID PRN  traZODone (DESYREL) tablet 50 mg, 50 mg, Oral, Nightly PRN  benztropine mesylate (COGENTIN) injection 2 mg, 2 mg, Intramuscular, BID PRN  magnesium hydroxide (MILK OF MAGNESIA) 400 MG/5ML suspension 30 mL, 30 mL, Oral, Daily PRN  aluminum & magnesium hydroxide-simethicone (MAALOX) 200-200-20 MG/5ML suspension 30 mL, 30 mL, Oral, Q6H PRN  nicotine (NICODERM CQ) 14 MG/24HR 1 patch, 1 patch, Transdermal, Daily  nicotine polacrilex (NICORETTE) gum 2 mg, 2 mg, Oral, Q2H PRN  haloperidol lactate (HALDOL) injection 5 mg, 5 mg, Intramuscular, Q4H PRN  LORazepam (ATIVAN) injection 1 mg, 1 mg, Intramuscular, Q4H PRN    ASSESSMENT AND PLAN    Will continue to adjust medications accordingly

## 2018-09-13 PROCEDURE — 6370000000 HC RX 637 (ALT 250 FOR IP): Performed by: PSYCHIATRY & NEUROLOGY

## 2018-09-13 PROCEDURE — 99232 SBSQ HOSP IP/OBS MODERATE 35: CPT | Performed by: PSYCHIATRY & NEUROLOGY

## 2018-09-13 PROCEDURE — 1240000000 HC EMOTIONAL WELLNESS R&B

## 2018-09-13 PROCEDURE — 6370000000 HC RX 637 (ALT 250 FOR IP): Performed by: REGISTERED NURSE

## 2018-09-13 RX ADMIN — DIVALPROEX SODIUM 500 MG: 500 TABLET, EXTENDED RELEASE ORAL at 21:00

## 2018-09-13 RX ADMIN — BUSPIRONE HYDROCHLORIDE 15 MG: 15 TABLET ORAL at 21:00

## 2018-09-13 RX ADMIN — CLONAZEPAM 1 MG: 1 TABLET ORAL at 08:27

## 2018-09-13 RX ADMIN — BUSPIRONE HYDROCHLORIDE 15 MG: 15 TABLET ORAL at 13:23

## 2018-09-13 RX ADMIN — TRAZODONE HYDROCHLORIDE 50 MG: 50 TABLET ORAL at 22:58

## 2018-09-13 RX ADMIN — HYDROXYZINE HYDROCHLORIDE 50 MG: 25 TABLET, FILM COATED ORAL at 21:00

## 2018-09-13 RX ADMIN — ZIPRASIDONE HYDROCHLORIDE 80 MG: 80 CAPSULE ORAL at 08:27

## 2018-09-13 RX ADMIN — BUSPIRONE HYDROCHLORIDE 15 MG: 15 TABLET ORAL at 08:27

## 2018-09-13 RX ADMIN — DIVALPROEX SODIUM 500 MG: 500 TABLET, EXTENDED RELEASE ORAL at 08:27

## 2018-09-13 RX ADMIN — ZIPRASIDONE HYDROCHLORIDE 80 MG: 80 CAPSULE ORAL at 18:46

## 2018-09-13 RX ADMIN — MAGNESIUM HYDROXIDE 30 ML: 400 SUSPENSION ORAL at 21:07

## 2018-09-13 RX ADMIN — CLONAZEPAM 1 MG: 1 TABLET ORAL at 21:00

## 2018-09-13 NOTE — PLAN OF CARE
Problem: Altered Mood, Manic Behavior:  Goal: Able to sleep  Able to sleep   Outcome: Ongoing  Pt. Alert and oriented, able to make needs known, Pt.  Denies all, flight of ideas continue patient presents more controled, decreased hyper verbal, states her mood is better, attends some groups, Medication complaint, behavior controlled   Goal: Able to verbalize decrease in frequency and intensity of racing thoughts  Able to verbalize decrease in frequency and intensity of racing thoughts   Outcome: Ongoing  Ongoing     Goal: Ability to disclose and discuss suicidal ideas will improve  Ability to disclose and discuss suicidal ideas will improve   Outcome: Ongoing  Denies SI at this time

## 2018-09-13 NOTE — BH NOTE
Staff observed in day room that two of the phone cords are missing from the phone. All staff intervened, one phone cord retrieved, found on chair by TV. Other phone cord is missing. Pt denies taking phone cord from phone. Room has been searched, nothing found. Angelita Domínguez has been notified. Security has been notified.

## 2018-09-13 NOTE — PLAN OF CARE
Problem: Altered Mood, Manic Behavior:  Goal: Able to verbalize decrease in frequency and intensity of racing thoughts  Able to verbalize decrease in frequency and intensity of racing thoughts   Outcome: Ongoing  Psychoeducation Group Note    Date: 9/13/18  Start Time: 1100  End Time: 1140    Number Participants in Group:  8    Goal:  Patient will demonstrate increased interpersonal interaction.    Topic:  Leisure Skills Therapeutic Group    Discipline Responsible:   OT  AT  [facility]. X RT  Other       Participation Level:     None  Minimal   X Active Listener X Interactive    Monopolizing         Participation Quality:  X Appropriate  Inappropriate   X       Attentive        Intrusive   X       Sharing        Resistant          Supportive        Lethargic       Affective:   X Congruent  Incongruent  Blunted  Flat    Constricted  Anxious  Elated  Angry    Labile  Depressed  Other  Bright       Cognitive:  X Alert X Oriented PPTP     Concentration X G  F  P   Attention Span X G  F  P   Short-Term Memory X G  F  P   Long-Term Memory X G  F  P   ProblemSolving/  Decision Making X G  F  P   Ability to Process  Information X G  F  P      Contributing Factors             Delusional             Hallucinating             Flight of Ideas             Other:       Modes of Intervention:  X Education  Support X Exploration    Clarifying X Problem Solving  Confrontation   X Socialization X Limit Setting  Reality Testing   X Activity  Movement  Media    Other:            Response to Learning:  X Able to verbalize current knowledge/experience   X Able to verbalize/acknowledge new learning   X Able to retain information   X Capable of insight    Able to change behavior   X Progressing to goal    Other:        Comments:

## 2018-09-13 NOTE — PLAN OF CARE
Problem: Altered Mood, Manic Behavior:  Goal: Able to verbalize decrease in frequency and intensity of racing thoughts  Able to verbalize decrease in frequency and intensity of racing thoughts   Outcome: Ongoing  Psychoeducation Group Note    Date: 9/13/18  Start Time: 0845  End Time: 0920    Number Participants in Group:  9    Goal:  Patient will demonstrate increased interpersonal interaction. Topic:  Goal Setting and [employer]    Discipline Responsible:   OT  AT  [facility]. X RT  Other       Participation Level:     None  Minimal   X Active Listener X Interactive    Monopolizing         Participation Quality:  X Appropriate  Inappropriate   X       Attentive        Intrusive   X       Sharing        Resistant   X       Supportive        Lethargic       Affective:   X Congruent  Incongruent  Blunted  Flat    Constricted  Anxious  Elated  Angry    Labile  Depressed  Other  Bright       Cognitive:  X Alert X Oriented PPTP     Concentration X G  F  P   Attention Span X G  F  P   Short-Term Memory X G  F  P   Long-Term Memory X G  F  P   ProblemSolving/  Decision Making X G  F  P   Ability to Process  Information X G  F  P      Contributing Factors             Delusional             Hallucinating             Flight of Ideas             Other:       Modes of Intervention:  X Education X Support X Exploration   X Clarifying X Problem Solving  Confrontation    Socialization X Limit Setting  Reality Testing    Activity  Movement  Media    Other:            Response to Learning:  X Able to verbalize current knowledge/experience   X Able to verbalize/acknowledge new learning   X Able to retain information   X Capable of insight    Able to change behavior   X Progressing to goal    Other:        Comments: Pt developed daily goal to take a shower, talk to the doctor, make phone calls.

## 2018-09-13 NOTE — FLOWSHEET NOTE
*Patient participated in the [facility]       09/13/18 1525   Encounter Summary   Services provided to: Patient   Referral/Consult From: Rounding   Continue Visiting (9/13/18)   Complexity of Encounter Low   Length of Encounter 30 minutes   Spiritual Assessment Completed Yes   Spiritual/Latter-day   Type Spiritual support   Assessment Calm   Intervention Active listening   Outcome Receptive

## 2018-09-14 PROCEDURE — 99232 SBSQ HOSP IP/OBS MODERATE 35: CPT | Performed by: PSYCHIATRY & NEUROLOGY

## 2018-09-14 PROCEDURE — 6370000000 HC RX 637 (ALT 250 FOR IP): Performed by: PSYCHIATRY & NEUROLOGY

## 2018-09-14 PROCEDURE — 1240000000 HC EMOTIONAL WELLNESS R&B

## 2018-09-14 PROCEDURE — 6370000000 HC RX 637 (ALT 250 FOR IP): Performed by: REGISTERED NURSE

## 2018-09-14 RX ADMIN — CLONAZEPAM 1 MG: 1 TABLET ORAL at 20:26

## 2018-09-14 RX ADMIN — CLONAZEPAM 1 MG: 1 TABLET ORAL at 08:20

## 2018-09-14 RX ADMIN — DIVALPROEX SODIUM 500 MG: 500 TABLET, EXTENDED RELEASE ORAL at 20:26

## 2018-09-14 RX ADMIN — HYDROXYZINE HYDROCHLORIDE 50 MG: 25 TABLET, FILM COATED ORAL at 20:26

## 2018-09-14 RX ADMIN — BUSPIRONE HYDROCHLORIDE 15 MG: 15 TABLET ORAL at 08:20

## 2018-09-14 RX ADMIN — DIVALPROEX SODIUM 500 MG: 500 TABLET, EXTENDED RELEASE ORAL at 08:20

## 2018-09-14 RX ADMIN — BUSPIRONE HYDROCHLORIDE 15 MG: 15 TABLET ORAL at 14:37

## 2018-09-14 RX ADMIN — ZIPRASIDONE HYDROCHLORIDE 80 MG: 80 CAPSULE ORAL at 17:11

## 2018-09-14 RX ADMIN — ZIPRASIDONE HYDROCHLORIDE 80 MG: 80 CAPSULE ORAL at 08:20

## 2018-09-14 NOTE — PLAN OF CARE
Problem: Altered Mood, Manic Behavior:  Goal: Able to sleep  Able to sleep   Outcome: Ongoing  Pt states sleep is improving and has only been up once throughout the night and was able to fall back to sleep. Goal: Able to verbalize decrease in frequency and intensity of racing thoughts  Able to verbalize decrease in frequency and intensity of racing thoughts   Outcome: Ongoing  Pt states she is feeling better after having a few nights of sleep. PT has flat affect but is social with select peers on unit. PT states she feels she is thinking clearer and jokes about some of the things she believed prior to coming in. Pt is taking ordered medications and is focused on being able to medications on discharge. Pt is worried that she has been kicked out of [facility] because she liked her doctor there. PT denies any thoughts of self harm. PT maintained on 15 min safety checks and rounds at irregular intervals.

## 2018-09-14 NOTE — PROGRESS NOTES
Department of Psychiatry  Attending Progress Note      SUBJECTIVE:  Found her much calmer and reported her mood fluctuations to be less intense. She was interviewed in the day area. She is medication complaint and responding well. Slept better last night. Still intermittently exhibits paranoid delusional thinking that has been decreasing in intensity and frequency. Social work and nursing are working on discharge disposition and followups    OBJECTIVE    Physical  VITALS:  BP (!) 126/98   Pulse 107   Temp 97.3 °F (36.3 °C) (Oral)   Resp 16   Ht 5' 8\" (1.727 m)   Wt 143 lb (64.9 kg)   LMP 08/27/2018 (Within Weeks)   SpO2 98%   BMI 21.74 kg/m²       Mental Status Examination:  Level of consciousness:   Moderate dysattention (reduced clarity of awareness with impaired ability to focus, sustain, or shift attention)  Appearance:  hospital attire  Behavior/Motor:  psychomotor normal  Attitude toward examiner:  attentive  Speech:  wnl  Mood:  fluctuating  Affect:  mood congruent  Thought processes:  goal orientated  Thought content:  Homicidal ideation denies  Suicidal Ideation:  denies suicidal ideation  Delusions:  paranoid( less intense)  Perceptual Disturbance:  denies any perceptual disturbance  Cognition:  disoriented  Concentration failed 5-letter word backwards  Memory impaired immediate recall  Insight:  poor  Judgment:  poor         Medications  Current Facility-Administered Medications: clonazePAM (KLONOPIN) tablet 1 mg, 1 mg, Oral, BID  ziprasidone (GEODON) capsule 80 mg, 80 mg, Oral, BID WC  divalproex (DEPAKOTE ER) extended release tablet 500 mg, 500 mg, Oral, BID  busPIRone (BUSPAR) tablet 15 mg, 15 mg, Oral, TID  acetaminophen (TYLENOL) tablet 650 mg, 650 mg, Oral, Q4H PRN  hydrOXYzine (ATARAX) tablet 50 mg, 50 mg, Oral, TID PRN  traZODone (DESYREL) tablet 50 mg, 50 mg, Oral, Nightly PRN  benztropine mesylate (COGENTIN) injection 2 mg, 2 mg, Intramuscular, BID PRN  magnesium hydroxide (MILK OF MAGNESIA) 400 MG/5ML suspension 30 mL, 30 mL, Oral, Daily PRN  aluminum & magnesium hydroxide-simethicone (MAALOX) 200-200-20 MG/5ML suspension 30 mL, 30 mL, Oral, Q6H PRN  nicotine (NICODERM CQ) 14 MG/24HR 1 patch, 1 patch, Transdermal, Daily  nicotine polacrilex (NICORETTE) gum 2 mg, 2 mg, Oral, Q2H PRN  haloperidol lactate (HALDOL) injection 5 mg, 5 mg, Intramuscular, Q4H PRN  LORazepam (ATIVAN) injection 1 mg, 1 mg, Intramuscular, Q4H PRN    ASSESSMENT AND PLAN    Will continue to adjust medications accordingly.   Unit milieu, group psychotherapy and discharge planning

## 2018-09-14 NOTE — PLAN OF CARE
Problem: Altered Mood, Manic Behavior:  Goal: Able to verbalize decrease in frequency and intensity of racing thoughts  Able to verbalize decrease in frequency and intensity of racing thoughts   Outcome: Ongoing  Psychoeducation Group Note    Date: 9/14/2018  Start Time: 1100  End Time: 6902    Number Participants in Group:  12    Goal:  Patient will demonstrate increased interpersonal interaction.    Topic:  Relating to Others / Group Discussion / Communication Skills    Discipline Responsible:   OT  AT  [facility]. X RT  Other       Participation Level:     None  Minimal   x Active Listener x Interactive    Monopolizing         Participation Quality:  x Appropriate  Inappropriate   x       Attentive        Intrusive   x       Sharing        Resistant   x       Supportive        Lethargic       Affective:   x Congruent  Incongruent  Blunted  Flat    Constricted  Anxious  Elated  Angry    Labile  Depressed  Other  Bright       Cognitive:  x Alert x Oriented PPTP     Concentration x G  F  P   Attention Span x G  F  P   Short-Term Memory x G  F  P   Long-Term Memory  G  F  P   ProblemSolving/  Decision Making x G  F  P   Ability to Process  Information x G  F  P      Contributing Factors             Delusional             Hallucinating             Flight of Ideas             Other:       Modes of Intervention:  x Education x Support x Exploration   x Clarifying x Problem Solving x Confrontation   x Socialization x Limit Setting x Reality Testing   x Activity  Movement  Media    Other:            Response to Learning:  x Able to verbalize current knowledge/experience   x Able to verbalize/acknowledge new learning   x Able to retain information   x Capable of insight    Able to change behavior   x Progressing to goal    Other:        Comments:

## 2018-09-14 NOTE — PLAN OF CARE
Problem: Altered Mood, Manic Behavior:  Goal: Ability to disclose and discuss suicidal ideas will improve  Ability to disclose and discuss suicidal ideas will improve   Outcome: Ongoing  Patient denies any thoughts of suicide at this time

## 2018-09-14 NOTE — CARE COORDINATION
Bridge Appointment completed: Reviewed Discharge Instructions with patient. Patient verbalizes understanding and agreement with the discharge plan using the teachback method.        Discharge Arrangements: Pt will be discharged with a month of medications and follow up with [address] notified: N/A  Discharge destination/address:   [address] [place] [id number]       Transported by:  Self, patient drove her own car to hospital.

## 2018-09-14 NOTE — PLAN OF CARE
Problem: Altered Mood, Manic Behavior:  Goal: Able to verbalize decrease in frequency and intensity of racing thoughts  Able to verbalize decrease in frequency and intensity of racing thoughts   Outcome: Ongoing  PSYCHOTHERAPY GROUP NOTE    Date: 9/14/18  Start Time: 10 AM  End Time: 1045 AM    Number Participants in Group:  8    Goal:  Patient will demonstrate increased interpersonal interaction   Topic: Support    Discipline Responsible:   OT  AT x SW  Nsg.  RT MHP Other       Participation Level:     None  Minimal    Active Listener x Interactive    Monopolizing         Participation Quality:  x Appropriate  Inappropriate          Attentive        Intrusive   x       Sharing        Resistant          Supportive        Lethargic       Affective:   x Congruent  Incongruent  Blunted  Flat    Constricted  Anxious  Elated  Angry    Labile  Depressed  Other         Cognitive:  x Alert x Oriented PPTP     Concentration  G x F  P   Attention Span  G x F  P   Short-Term Memory  G x F  P   Long-Term Memory  G x F  P   ProblemSolving/  Decision Making  G x F  P   Ability to Process  Information  G x F  P      Contributing Factors             Delusional             Hallucinating             Flight of Ideas             Other:       Modes of Intervention:   Education x Support  Exploration    Clarifying  Problem Solving  Confrontation    Socialization  Limit Setting  Reality Testing    Activity  Movement  Media    Other:            Response to Learning:  x Able to verbalize current knowledge/experience    Able to verbalize/acknowledge new learning    Able to retain information    Capable of insight    Able to change behavior    Progressing to goal    Other:        Comments:

## 2018-09-14 NOTE — PLAN OF CARE
Problem: Altered Mood, Manic Behavior:  Goal: Able to verbalize decrease in frequency and intensity of racing thoughts  Able to verbalize decrease in frequency and intensity of racing thoughts   Outcome: Ongoing  Psychoeducation Group Note    Date: 9/14/2018  Start Time: 1430  End Time: 1515    Number Participants in Group:  11    Goal:  Patient will demonstrate increased interpersonal interaction.    Topic:  Benefits of Listening to Music / Positive Coping Skill / Therapeutic Activity    Discipline Responsible:   OT  AT  [facility]. X RT  Other       Participation Level:     None  Minimal   x Active Listener x Interactive    Monopolizing         Participation Quality:  x Appropriate  Inappropriate   x       Attentive        Intrusive   x       Sharing        Resistant   x       Supportive        Lethargic       Affective:   x Congruent  Incongruent  Blunted  Flat    Constricted  Anxious  Elated  Angry    Labile  Depressed  Other  Bright       Cognitive:  x Alert x Oriented PPTP     Concentration x G  F  P   Attention Span x G  F  P   Short-Term Memory x G  F  P   Long-Term Memory  G  F  P   ProblemSolving/  Decision Making x G  F  P   Ability to Process  Information x G  F  P      Contributing Factors             Delusional             Hallucinating             Flight of Ideas             Other:       Modes of Intervention:  x Education x Support x Exploration   x Clarifying x Problem Solving x Confrontation   x Socialization x Limit Setting x Reality Testing   x Activity  Movement  Media    Other:            Response to Learning:  x Able to verbalize current knowledge/experience   x Able to verbalize/acknowledge new learning   x Able to retain information   x Capable of insight    Able to change behavior   x Progressing to goal    Other:        Comments:

## 2018-09-14 NOTE — PLAN OF CARE
Problem: Altered Mood, Manic Behavior:  Goal: Able to verbalize decrease in frequency and intensity of racing thoughts  Able to verbalize decrease in frequency and intensity of racing thoughts   Outcome: Ongoing  Patient continues to have difficulty organizing her thoughts  Speech is repetitive and focused on her job and follow up

## 2018-09-15 VITALS
OXYGEN SATURATION: 98 % | SYSTOLIC BLOOD PRESSURE: 125 MMHG | HEART RATE: 84 BPM | RESPIRATION RATE: 14 BRPM | HEIGHT: 68 IN | DIASTOLIC BLOOD PRESSURE: 79 MMHG | BODY MASS INDEX: 21.67 KG/M2 | TEMPERATURE: 97.3 F | WEIGHT: 143 LBS

## 2018-09-15 PROCEDURE — 99239 HOSP IP/OBS DSCHRG MGMT >30: CPT | Performed by: PSYCHIATRY & NEUROLOGY

## 2018-09-15 PROCEDURE — 6370000000 HC RX 637 (ALT 250 FOR IP): Performed by: PSYCHIATRY & NEUROLOGY

## 2018-09-15 PROCEDURE — 5130000000 HC BRIDGE APPOINTMENT

## 2018-09-15 RX ORDER — CLONAZEPAM 1 MG/1
1 TABLET ORAL 2 TIMES DAILY
Qty: 14 TABLET | Refills: 0 | Status: ON HOLD | OUTPATIENT
Start: 2018-09-15 | End: 2019-06-25

## 2018-09-15 RX ORDER — DIVALPROEX SODIUM 500 MG/1
500 TABLET, EXTENDED RELEASE ORAL 2 TIMES DAILY
Qty: 30 TABLET | Refills: 0 | Status: ON HOLD | OUTPATIENT
Start: 2018-09-15 | End: 2019-06-25 | Stop reason: HOSPADM

## 2018-09-15 RX ORDER — BUSPIRONE HYDROCHLORIDE 15 MG/1
15 TABLET ORAL 3 TIMES DAILY
Qty: 45 TABLET | Refills: 0 | Status: ON HOLD | OUTPATIENT
Start: 2018-09-15 | End: 2019-06-25 | Stop reason: HOSPADM

## 2018-09-15 RX ORDER — ZIPRASIDONE HYDROCHLORIDE 80 MG/1
80 CAPSULE ORAL 2 TIMES DAILY WITH MEALS
Qty: 60 CAPSULE | Refills: 0 | Status: ON HOLD | OUTPATIENT
Start: 2018-09-15 | End: 2019-06-25 | Stop reason: HOSPADM

## 2018-09-15 RX ADMIN — CLONAZEPAM 1 MG: 1 TABLET ORAL at 09:06

## 2018-09-15 RX ADMIN — BUSPIRONE HYDROCHLORIDE 15 MG: 15 TABLET ORAL at 09:06

## 2018-09-15 RX ADMIN — ZIPRASIDONE HYDROCHLORIDE 80 MG: 80 CAPSULE ORAL at 09:06

## 2018-09-15 RX ADMIN — DIVALPROEX SODIUM 500 MG: 500 TABLET, EXTENDED RELEASE ORAL at 09:06

## 2018-09-15 NOTE — PLAN OF CARE
Problem: Altered Mood, Manic Behavior:  Goal: Able to verbalize decrease in frequency and intensity of racing thoughts  Able to verbalize decrease in frequency and intensity of racing thoughts   Outcome: Ongoing  PSYCHOEDUCATION GROUP NOTE  Date: 9/15/2018 Start Time: 0900 End Time: 0930  Number Participants in Group: 10/22  Goal: Patient will demonstrate increased interpersonal interaction and cognition  Topic: community meeting/ goals group  Intervention: Goal Setting/ Trivia  Discipline Responsible:   OT  AT  [facility]. X RT MHP Other   ? Participation Level:    None  Minimal   X Active Listener X Interactive    Monopolizing     ? Participation Quality:  X Appropriate ? Inappropriate   X Attentive ? Intrusive   X Sharing ? Resistant   X Supportive ? Lethargic   ? Affective:   X Congruent ? Incongruent ? Blunted ? Flat   ? Constricted ? Anxious ? Elated ? Angry   ? Labile ? Depressed ? Other ? appropriate   ? Cognitive:  X Alert X Oriented PPTP     Concentration X G ? F ? P   Attention Span X G  F  P   Short-Term Memory ? G ? F ? P   Long-Term Memory ? G ? F ? P   ProblemSolving/  Decision Making X G ? F ? P   Ability to Process  Information X G ? F ? P     ? Contributing Factors   ? Delusional   ? Hallucinating   ? Flight of Ideas   ? Other:   ? Modes of Intervention:  x Education x Support x Exploration   ? Clarifying x Problem Solving ? Confrontation   x Socialization ? Limit Setting ? Reality Testing   x Activity ? Movement ? Media   ? Other:  ? ? ? ?   ? Response to Learning:  X Able to verbalize current knowledge/experience   X Able to verbalize/acknowledge new learning   X Able to retain information   X Capable of insight   ? Able to change behavior   X Progressing to goal   ? Other:    ?  Comments: PT STATED HER GOAL FOR THE DAY IS TO RELAX AFTER SHE DISCHARGES AND MAKE A TO DO LIST.

## 2018-09-15 NOTE — PLAN OF CARE
Problem: Altered Mood, Manic Behavior:  Goal: Able to verbalize decrease in frequency and intensity of racing thoughts  Able to verbalize decrease in frequency and intensity of racing thoughts   Outcome: Ongoing  PT is focused on  her  this evening and comes to desk frequently asking staff's advise on the matter. Pt did take ordered medications. PT remains flat and withdrawn. PT denies any thoughts of self harm or depression but is tearful at times. PT states she has nobody to talk to at home and is looking into starting group therapy once she leaves. PT maintained on 15 min safety checks and rounds at irregular intervals.

## 2018-09-15 NOTE — DISCHARGE SUMMARY
DISCHARGE SUMMARY  Patient ID:  Ashlie Garcia  [id number]  3 y.o.  1980    Admit date: 9/10/2018    Discharge date and time: 9/15/2018  9:59 AM     Admitting Physician: Tori Monsivais MD     Discharge Physician:  Jacy Zhang MD    Admission Diagnoses: Bipolar disorder with psychotic features [facility]) [F31.9]    Discharge Diagnoses:   Bipolar 1 disorder ([facility] [address] in early remission    Patient Active Problem List   Diagnosis Code    Pregnancy Z34.90    Hx CS x1 (G2-twins,36wks) Z98.891    H/O miscarriage X2 O09.299    Insufficient prenatal care O09.30    AMA O09.529    Tobacco abuse Z72.0    OCD F42.9    Candida albicans infection B37.9    Twin gestation, dichorionic diamniotic O30.46    Fetal cardiac anomaly complicating pregnancy, antepartum O35. 10XX0    Chromosomal 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, affecting management of mother, antepartum O35. 1XX0    Poor fetal growth, affecting management of mother, antepartum condition or complication Y47.3806    Umbilical cord complication M83. 9XX0    Tobacco use disorder complicating pregnancy, childbirth, or puerperium, antepartum O99.330    Twin B-IUGR O36.5920    Twin B-Fetal cardiomegaly, pericardial effusion, VSD O35. 8XX0    Polyhydramnios (twin A-8.6cm and twin B-8.7cm) O40. 2XX0    Depression F32.9    Twin B-MCAs wnl, absent UADs O69. 9XX0    Abnl Quad (1:50 T21), elev msAFP O35. 1XX0    Hx D&C (G3-8wks, G4-8wks) Z98.890    Elev 1hr gtt, normal 3hr R73.02    GBS bacteriuria R82.71    Chlamydia infection (SPENSER neg) O98.811, A74.9    FHx DM  Z84.89    Fetal pericardial effusion affecting management of mother S39.7197    Polyhydramnios, antepartum complication A60. 9XX0    Polyhydramnios, antepartum complication B17. 9XX0    IUGR (intrauterine growth restriction) affecting care of mother K57.2903    Umbilical cord complication U87. 9XX0    Suspected damage to fetus from other disease in mother, affecting management of mother, antepartum condition or

## 2018-09-15 NOTE — PROGRESS NOTES
Bridge Appointment completed: Reviewed Discharge Instructions with patient. Patient verbalizes understanding and agreement with the discharge plan using the teachback method.        Discharge Arrangements:  [facility] [place]  [address], [facility], [facility]  Phone: [phone]  Fax: [phone]  10/3/2018  Medication Management with Obadiah Pain at 11:00am    Guardian notified: N/A    Discharge destination/address:  [address] [place] [address]       Transported by:  Self (pt's car on premises)

## 2019-01-02 ENCOUNTER — HOSPITAL ENCOUNTER (EMERGENCY)
Age: 39
Discharge: HOME | End: 2019-01-02
Attending: EMERGENCY MEDICINE
Payer: COMMERCIAL

## 2019-01-02 VITALS
RESPIRATION RATE: 17 BRPM | DIASTOLIC BLOOD PRESSURE: 83 MMHG | HEIGHT: 68 IN | OXYGEN SATURATION: 94 % | SYSTOLIC BLOOD PRESSURE: 126 MMHG | TEMPERATURE: 98 F | BODY MASS INDEX: 21.67 KG/M2 | HEART RATE: 115 BPM | WEIGHT: 143 LBS

## 2019-01-02 DIAGNOSIS — F31.9 BIPOLAR 1 DISORDER (HCC): Primary | ICD-10-CM

## 2019-01-02 PROCEDURE — 99283 EMERGENCY DEPT VISIT LOW MDM: CPT

## 2019-01-02 PROCEDURE — 6370000000 HC RX 637 (ALT 250 FOR IP): Performed by: EMERGENCY MEDICINE

## 2019-01-02 RX ORDER — LORAZEPAM 1 MG/1
1 TABLET ORAL ONCE
Status: COMPLETED | OUTPATIENT
Start: 2019-01-02 | End: 2019-01-02

## 2019-01-02 RX ORDER — LANOLIN ALCOHOL/MO/W.PET/CERES
10 CREAM (GRAM) TOPICAL NIGHTLY PRN
Status: ON HOLD | COMMUNITY

## 2019-01-02 RX ORDER — IBUPROFEN 800 MG/1
800 TABLET ORAL ONCE
Status: COMPLETED | OUTPATIENT
Start: 2019-01-02 | End: 2019-01-02

## 2019-01-02 RX ADMIN — LORAZEPAM 1 MG: 1 TABLET ORAL at 01:56

## 2019-01-02 RX ADMIN — IBUPROFEN 800 MG: 800 TABLET ORAL at 02:26

## 2019-01-02 ASSESSMENT — SLEEP AND FATIGUE QUESTIONNAIRES
DO YOU HAVE DIFFICULTY SLEEPING: YES
DO YOU USE A SLEEP AID: YES
DIFFICULTY FALLING ASLEEP: YES
DIFFICULTY ARISING: NO
RESTFUL SLEEP: NO
DIFFICULTY STAYING ASLEEP: YES
SLEEP PATTERN: INSOMNIA

## 2019-01-02 ASSESSMENT — ENCOUNTER SYMPTOMS
ABDOMINAL PAIN: 0
SHORTNESS OF BREATH: 0
COUGH: 0

## 2019-01-02 ASSESSMENT — PAIN SCALES - GENERAL: PAINLEVEL_OUTOF10: 6

## 2019-01-05 ENCOUNTER — APPOINTMENT (OUTPATIENT)
Dept: GENERAL RADIOLOGY | Age: 39
End: 2019-01-05
Payer: COMMERCIAL

## 2019-01-05 ENCOUNTER — HOSPITAL ENCOUNTER (EMERGENCY)
Age: 39
Discharge: HOME | End: 2019-01-05
Attending: EMERGENCY MEDICINE
Payer: COMMERCIAL

## 2019-01-05 VITALS
TEMPERATURE: 99 F | SYSTOLIC BLOOD PRESSURE: 155 MMHG | HEART RATE: 102 BPM | BODY MASS INDEX: 21.98 KG/M2 | HEIGHT: 68 IN | WEIGHT: 145 LBS | RESPIRATION RATE: 18 BRPM | DIASTOLIC BLOOD PRESSURE: 97 MMHG | OXYGEN SATURATION: 97 %

## 2019-01-05 DIAGNOSIS — Z00.8 MEDICAL CLEARANCE FOR PSYCHIATRIC ADMISSION: Primary | ICD-10-CM

## 2019-01-05 LAB
ABSOLUTE EOS #: 0.25 K/UL (ref 0–0.44)
ABSOLUTE IMMATURE GRANULOCYTE: 0.03 K/UL (ref 0–0.3)
ABSOLUTE LYMPH #: 3.63 K/UL (ref 1.1–3.7)
ABSOLUTE MONO #: 0.94 K/UL (ref 0.1–1.2)
ACETAMINOPHEN LEVEL: <5 UG/ML (ref 10–30)
ALBUMIN SERPL-MCNC: 4.4 G/DL (ref 3.5–5.2)
ALBUMIN/GLOBULIN RATIO: 1.7 (ref 1–2.5)
ALP BLD-CCNC: 85 U/L (ref 35–104)
ALT SERPL-CCNC: <5 U/L (ref 5–33)
AMPHETAMINE SCREEN URINE: NEGATIVE
ANION GAP SERPL CALCULATED.3IONS-SCNC: 15 MMOL/L (ref 9–17)
AST SERPL-CCNC: 20 U/L
BARBITURATE SCREEN URINE: NEGATIVE
BASOPHILS # BLD: 0 % (ref 0–2)
BASOPHILS ABSOLUTE: 0.04 K/UL (ref 0–0.2)
BENZODIAZEPINE SCREEN, URINE: NEGATIVE
BILIRUB SERPL-MCNC: 0.37 MG/DL (ref 0.3–1.2)
BILIRUBIN URINE: NEGATIVE
BUN BLDV-MCNC: 11 MG/DL (ref 6–20)
BUN/CREAT BLD: ABNORMAL (ref 9–20)
BUPRENORPHINE URINE: ABNORMAL
CALCIUM SERPL-MCNC: 9.1 MG/DL (ref 8.6–10.4)
CANNABINOID SCREEN URINE: POSITIVE
CHLORIDE BLD-SCNC: 102 MMOL/L (ref 98–107)
CO2: 23 MMOL/L (ref 20–31)
COCAINE METABOLITE, URINE: NEGATIVE
COLOR: YELLOW
COMMENT UA: ABNORMAL
CREAT SERPL-MCNC: 0.51 MG/DL (ref 0.5–0.9)
DIFFERENTIAL TYPE: ABNORMAL
EKG ATRIAL RATE: 73 BPM
EKG P AXIS: 54 DEGREES
EKG P-R INTERVAL: 130 MS
EKG Q-T INTERVAL: 382 MS
EKG QRS DURATION: 92 MS
EKG QTC CALCULATION (BAZETT): 420 MS
EKG R AXIS: 58 DEGREES
EKG T AXIS: 63 DEGREES
EKG VENTRICULAR RATE: 73 BPM
EOSINOPHILS RELATIVE PERCENT: 2 % (ref 1–4)
ETHANOL PERCENT: <0.01 %
ETHANOL: <10 MG/DL
GFR AFRICAN AMERICAN: >60 ML/MIN
GFR NON-AFRICAN AMERICAN: >60 ML/MIN
GFR SERPL CREATININE-BSD FRML MDRD: ABNORMAL ML/MIN/{1.73_M2}
GFR SERPL CREATININE-BSD FRML MDRD: ABNORMAL ML/MIN/{1.73_M2}
GLUCOSE BLD-MCNC: 98 MG/DL (ref 70–99)
GLUCOSE URINE: NEGATIVE
HCG(URINE) PREGNANCY TEST: NEGATIVE
HCT VFR BLD CALC: 40.7 % (ref 36.3–47.1)
HEMOGLOBIN: 13.7 G/DL (ref 11.9–15.1)
IMMATURE GRANULOCYTES: 0 %
KETONES, URINE: ABNORMAL
LEUKOCYTE ESTERASE, URINE: NEGATIVE
LYMPHOCYTES # BLD: 31 % (ref 24–43)
MCH RBC QN AUTO: 28.1 PG (ref 25.2–33.5)
MCHC RBC AUTO-ENTMCNC: 33.7 G/DL (ref 28.4–34.8)
MCV RBC AUTO: 83.6 FL (ref 82.6–102.9)
MDMA URINE: ABNORMAL
METHADONE SCREEN, URINE: NEGATIVE
METHAMPHETAMINE, URINE: ABNORMAL
MONOCYTES # BLD: 8 % (ref 3–12)
NITRITE, URINE: NEGATIVE
NRBC AUTOMATED: 0 PER 100 WBC
OPIATES, URINE: NEGATIVE
OXYCODONE SCREEN URINE: NEGATIVE
PDW BLD-RTO: 13 % (ref 11.8–14.4)
PH UA: 6.5 (ref 5–8)
PHENCYCLIDINE, URINE: NEGATIVE
PLATELET # BLD: 314 K/UL (ref 138–453)
PLATELET ESTIMATE: ABNORMAL
PMV BLD AUTO: 10.9 FL (ref 8.1–13.5)
POTASSIUM SERPL-SCNC: 3.8 MMOL/L (ref 3.7–5.3)
PROPOXYPHENE, URINE: ABNORMAL
PROTEIN UA: NEGATIVE
RBC # BLD: 4.87 M/UL (ref 3.95–5.11)
RBC # BLD: ABNORMAL 10*6/UL
SALICYLATE LEVEL: <1 MG/DL (ref 3–10)
SEG NEUTROPHILS: 59 % (ref 36–65)
SEGMENTED NEUTROPHILS ABSOLUTE COUNT: 6.88 K/UL (ref 1.5–8.1)
SODIUM BLD-SCNC: 140 MMOL/L (ref 135–144)
SPECIFIC GRAVITY UA: 1 (ref 1–1.03)
TEST INFORMATION: ABNORMAL
TOTAL PROTEIN: 7 G/DL (ref 6.4–8.3)
TOXIC TRICYCLIC SC,BLOOD: NEGATIVE
TRICYCLIC ANTIDEPRESSANTS, UR: ABNORMAL
TURBIDITY: CLEAR
URINE HGB: NEGATIVE
UROBILINOGEN, URINE: NORMAL
WBC # BLD: 11.8 K/UL (ref 3.5–11.3)
WBC # BLD: ABNORMAL 10*3/UL

## 2019-01-05 PROCEDURE — 80307 DRUG TEST PRSMV CHEM ANLYZR: CPT

## 2019-01-05 PROCEDURE — 84703 CHORIONIC GONADOTROPIN ASSAY: CPT

## 2019-01-05 PROCEDURE — 80053 COMPREHEN METABOLIC PANEL: CPT

## 2019-01-05 PROCEDURE — 71046 X-RAY EXAM CHEST 2 VIEWS: CPT

## 2019-01-05 PROCEDURE — G0480 DRUG TEST DEF 1-7 CLASSES: HCPCS

## 2019-01-05 PROCEDURE — 93005 ELECTROCARDIOGRAM TRACING: CPT

## 2019-01-05 PROCEDURE — 85025 COMPLETE CBC W/AUTO DIFF WBC: CPT

## 2019-01-05 PROCEDURE — 99284 EMERGENCY DEPT VISIT MOD MDM: CPT

## 2019-01-05 PROCEDURE — 81003 URINALYSIS AUTO W/O SCOPE: CPT

## 2019-01-05 ASSESSMENT — PAIN DESCRIPTION - FREQUENCY: FREQUENCY: CONTINUOUS

## 2019-01-05 ASSESSMENT — ENCOUNTER SYMPTOMS
DIARRHEA: 0
NAUSEA: 0
COUGH: 0
SORE THROAT: 0
ABDOMINAL PAIN: 0
BACK PAIN: 0
VOMITING: 0
SHORTNESS OF BREATH: 0

## 2019-01-05 ASSESSMENT — PAIN DESCRIPTION - LOCATION: LOCATION: GENERALIZED

## 2019-01-05 ASSESSMENT — PAIN SCALES - GENERAL: PAINLEVEL_OUTOF10: 6

## 2019-01-05 ASSESSMENT — PAIN DESCRIPTION - DESCRIPTORS: DESCRIPTORS: SORE

## 2019-01-05 ASSESSMENT — PAIN DESCRIPTION - PAIN TYPE: TYPE: ACUTE PAIN

## 2019-06-21 ENCOUNTER — HOSPITAL ENCOUNTER (INPATIENT)
Age: 39
LOS: 4 days | Discharge: HOME | DRG: 885 | End: 2019-06-25
Attending: PSYCHIATRY & NEUROLOGY | Admitting: PSYCHIATRY & NEUROLOGY
Payer: MEDICAID

## 2019-06-21 DIAGNOSIS — F31.9 BIPOLAR 1 DISORDER (HCC): ICD-10-CM

## 2019-06-21 PROCEDURE — 1240000000 HC EMOTIONAL WELLNESS R&B

## 2019-06-21 RX ORDER — TRAZODONE HYDROCHLORIDE 50 MG/1
50 TABLET ORAL NIGHTLY PRN
Status: DISCONTINUED | OUTPATIENT
Start: 2019-06-21 | End: 2019-06-23

## 2019-06-21 ASSESSMENT — SLEEP AND FATIGUE QUESTIONNAIRES
DO YOU USE A SLEEP AID: NO
AVERAGE NUMBER OF SLEEP HOURS: 6
DO YOU HAVE DIFFICULTY SLEEPING: NO

## 2019-06-21 ASSESSMENT — PAIN SCALES - GENERAL: PAINLEVEL_OUTOF10: 0

## 2019-06-21 ASSESSMENT — LIFESTYLE VARIABLES: HISTORY_ALCOHOL_USE: NO

## 2019-06-22 LAB
AMPHETAMINE SCREEN URINE: NEGATIVE
BARBITURATE SCREEN URINE: NEGATIVE
BENZODIAZEPINE SCREEN, URINE: NEGATIVE
BUPRENORPHINE URINE: ABNORMAL
CANNABINOID SCREEN URINE: POSITIVE
COCAINE METABOLITE, URINE: NEGATIVE
HCG(URINE) PREGNANCY TEST: NEGATIVE
MDMA URINE: ABNORMAL
METHADONE SCREEN, URINE: NEGATIVE
METHAMPHETAMINE, URINE: ABNORMAL
OPIATES, URINE: NEGATIVE
OXYCODONE SCREEN URINE: NEGATIVE
PHENCYCLIDINE, URINE: NEGATIVE
PROPOXYPHENE, URINE: ABNORMAL
TEST INFORMATION: ABNORMAL
TRICYCLIC ANTIDEPRESSANTS, UR: ABNORMAL

## 2019-06-22 PROCEDURE — 90792 PSYCH DIAG EVAL W/MED SRVCS: CPT | Performed by: NURSE PRACTITIONER

## 2019-06-22 PROCEDURE — 6370000000 HC RX 637 (ALT 250 FOR IP): Performed by: PSYCHIATRY & NEUROLOGY

## 2019-06-22 PROCEDURE — 6370000000 HC RX 637 (ALT 250 FOR IP): Performed by: NURSE PRACTITIONER

## 2019-06-22 PROCEDURE — 81025 URINE PREGNANCY TEST: CPT

## 2019-06-22 PROCEDURE — 80307 DRUG TEST PRSMV CHEM ANLYZR: CPT

## 2019-06-22 PROCEDURE — 1240000000 HC EMOTIONAL WELLNESS R&B

## 2019-06-22 RX ORDER — MAGNESIUM HYDROXIDE/ALUMINUM HYDROXICE/SIMETHICONE 120; 1200; 1200 MG/30ML; MG/30ML; MG/30ML
30 SUSPENSION ORAL EVERY 6 HOURS PRN
Status: DISCONTINUED | OUTPATIENT
Start: 2019-06-22 | End: 2019-06-25 | Stop reason: HOSPADM

## 2019-06-22 RX ORDER — CLONAZEPAM 1 MG/1
1 TABLET ORAL 2 TIMES DAILY
Status: DISCONTINUED | OUTPATIENT
Start: 2019-06-22 | End: 2019-06-25 | Stop reason: HOSPADM

## 2019-06-22 RX ORDER — NICOTINE 21 MG/24HR
1 PATCH, TRANSDERMAL 24 HOURS TRANSDERMAL DAILY
Status: DISCONTINUED | OUTPATIENT
Start: 2019-06-22 | End: 2019-06-25 | Stop reason: HOSPADM

## 2019-06-22 RX ORDER — BENZTROPINE MESYLATE 1 MG/ML
2 INJECTION INTRAMUSCULAR; INTRAVENOUS 2 TIMES DAILY PRN
Status: DISCONTINUED | OUTPATIENT
Start: 2019-06-22 | End: 2019-06-25 | Stop reason: HOSPADM

## 2019-06-22 RX ADMIN — CARIPRAZINE 3 MG: 3 CAPSULE, GELATIN COATED ORAL at 08:42

## 2019-06-22 RX ADMIN — CLONAZEPAM 1 MG: 1 TABLET ORAL at 08:42

## 2019-06-22 RX ADMIN — CLONAZEPAM 1 MG: 1 TABLET ORAL at 20:35

## 2019-06-22 RX ADMIN — TRAZODONE HYDROCHLORIDE 50 MG: 50 TABLET ORAL at 21:47

## 2019-06-22 ASSESSMENT — LIFESTYLE VARIABLES: HISTORY_ALCOHOL_USE: NO

## 2019-06-22 ASSESSMENT — PAIN SCALES - GENERAL: PAINLEVEL_OUTOF10: 6

## 2019-06-22 NOTE — GROUP NOTE
Group Therapy Note    Date: June 22    Group Start Time: 1330  Group End Time: 1845  Group Topic: Cognitive Skills    [facility]    Chrissy Clifford, [address]      Group Therapy Note    Attendees: 10/18      Patient's Goal:  Improve coping skills /art as as coping skill     Notes:  Pt was pleasant      Status After Intervention:  unchanged    Participation Level:  Active Listener and Interactive    Participation Quality: Appropriate,       Speech:  Pressured, hyperverbal      Thought Process/Content: loose disorganized thoughts      Affective Functioning:restricted      Level of consciousness:  Alert,       Response to Learning: Able to verbalize current knowledge/experience, Able to verbalize/acknowledge new learning and Progressing to goal      Endings: None Reported    Modes of Intervention: Education, Support, Socialization and Problem-solving      Discipline Responsible: Psychoeducational Specialist      Signature:  Shelley Scherer

## 2019-06-22 NOTE — PLAN OF CARE
[facility]  Initial Interdisciplinary Treatment Plan NO      Original treatment plan Date & Time: 6/22/2019  0732    Admission Type:  Admission Type:  Involuntary    Reason for admission:   Reason for Admission: called 911, presented as manic, labile, & disorganized as well as agitated    Estimated Length of Stay:  5-7days  Estimated Discharge Date: to be determined by physician    PATIENT STRENGTHS:  Patient Strengths:Strengths: Communication, Connection to output provider  Patient Strengths and Limitations:Limitations: Inappropriate/potentially harmful leisure interests, Difficulty problem solving/relies on others to help solve problems  Addictive Behavior: Addictive Behavior  In the past 3 months, have you felt or has someone told you that you have a problem with:  : None  Do you have a history of Chemical Use?: No  Do you have a history of Alcohol Use?: No  Do you have a history of Street Drug Abuse?: Yes  Histroy of Prescripton Drug Abuse?: No  Medical Problems:  Past Medical History:   Diagnosis Date    Bipolar 1 disorder ([facility] [address] 1999    Depression     Gestational diabetes 7/22/2016    OCD (obsessive compulsive disorder) 1999    PTSD (post-traumatic stress disorder)      Status EXAM:Status and Exam  Normal: No  Facial Expression: Brightened, Worried, Exaggerated  Affect: Unstable  Level of Consciousness: Alert  Mood:Normal: No  Mood: Anxious  Motor Activity:Normal: Yes  Interview Behavior: Cooperative, Impulsive  Attention:Normal: No  Attention: Distractible, Unable to Concentrate  Thought Processes: Blocking, Loose Assoc., Tangential  Thought Content:Normal: No  Thought Content: Poverty of Content  Hallucinations: None  Delusions: Yes  Delusions: Grandeur  Memory:Normal: No  Memory: Confabulation  Insight and Judgment: No  Insight and Judgment: Poor Judgment, Poor Insight, Unrealistic  Present Suicidal Ideation: No  Present Homicidal Ideation: No    EDUCATION:   Learner Progress Toward Treatment

## 2019-06-22 NOTE — H&P
History     Socioeconomic History    Marital status: Legally      Spouse name: None    Number of children: None    Years of education: None    Highest education level: None   Occupational History    None   Social Needs    Financial resource strain: None    Food insecurity:     Worry: None     Inability: None    Transportation needs:     Medical: None     Non-medical: None   Tobacco Use    Smoking status: Current Every Day Smoker     Packs/day: 1.00    Smokeless tobacco: Never Used    Tobacco comment: NO medication ordered d/t pregnancy    Substance and Sexual Activity    Alcohol use: No     Alcohol/week: 0.0 oz    Drug use: Yes     Types: Marijuana    Sexual activity: Yes     Partners: Male   Lifestyle    Physical activity:     Days per week: None     Minutes per session: None    Stress: None   Relationships    Social connections:     Talks on phone: None     Gets together: None     Attends Buddhism service: None     Active member of club or organization: None     Attends meetings of clubs or organizations: None     Relationship status: None    Intimate partner violence:     Fear of current or ex partner: None     Emotionally abused: None     Physically abused: None     Forced sexual activity: None   Other Topics Concern    None   Social History Narrative    None           REVIEW OF SYSTEMS      Allergies   Allergen Reactions    Codeine Other (See Comments)     Micaela    Gabapentin     Lamictal [Lamotrigine] Hives    Percocet [Oxycodone-Acetaminophen]      Hives     Quetiapine Fumarate     Valproic Acid     Ziprasidone Hcl Other (See Comments)     Patient states tongue was numb and she was unable to speak. .  Patient states tongue was numb and she was unable to speak. .       No current facility-administered medications on file prior to encounter.       Current Outpatient Medications on File Prior to Encounter   Medication Sig Dispense Refill    cariprazine hcl (VRAYLAR) 3 MG CAPS

## 2019-06-22 NOTE — GROUP NOTE
Group Therapy Note    Date: June 22    Group Start Time: 0845  Group End Time: 0910  Group Topic: Community Meeting    JUANCARLOS Calle        Group Therapy Note    Attendees:7/18         Patient's Goal:  Improve decision making skills/ improve concentration      Status After Intervention:  unchanged    Participation Level: monopolizing    Participation Quality: needs reinforcement      Speech:  hyperverbal      Thought Process/Content: loose disorganized      Affective Functioning:blunted           Level of consciousness:  Alert, =      Response to Learning: needs much redirection      Endings: None Reported    Modes of Intervention: Education, Support, Socialization and Problem-solving      Discipline Responsible: Psychoeducational Specialist      Signature:  Bebeto Jackson

## 2019-06-22 NOTE — BH NOTE
Therapeutic support, empathetic care and psycho education provided greater than 20 minutes. At this time there is no safe alternative other than inpatient care. Past Psychiatric History   Patient reports current outpatient psychiatric linkage. . Reported history of psychiatric inpatient hospitalizations. Denied history of suicide attempts. History of Substance Abuse     Patient smokes 2 packs of cigarettes daily, smokes 1 pipe of marijuana daily and admits to marijuana dependency. She denies ETOH and street drugs. Family History of psychiatric disorders    Family history: positive for bipolar illness      Medical History   Allergies:  Codeine; Gabapentin; Lamictal [lamotrigine]; Percocet [oxycodone-acetaminophen]; Quetiapine fumarate; Valproic acid; and Ziprasidone hcl   Past Medical History:   Diagnosis Date    Bipolar 1 disorder ([facility] [address]     Depression     Gestational diabetes 2016    OCD (obsessive compulsive disorder)     PTSD (post-traumatic stress disorder)       Past Surgical History:   Procedure Laterality Date    ABDOMEN SURGERY       SECTION      LAPAROSCOPY         Neurologic Exam      Mental Status   Oriented to person, place, and time. Oriented to city, area, street and number. Oriented to country. Registration: recalls 3 of 3 objects. Recall at 5 minutes: recalls 3 of 3 objects. Follows 3 step commands. Attention: normal. Concentration: normal.   Speech: speech is normal   Level of consciousness: alert  Knowledge: good. Able to perform simple calculations. Able to name object. Able to read. Able to repeat. Able to write.  Normal comprehension.      Cranial Nerves   Cranial nerves II through XII intact.      Motor Exam   Muscle bulk: normal  Overall muscle tone: normal     Strength   Strength 5/5 throughout.      Sensory Exam   Light touch normal.      Gait, Coordination, and Reflexes      Normal     Coordination   Romberg: negative     Tremor   Resting tremor: absent  Intention tremor: absent  Action tremor: absent     Reflexes   Right brachioradialis: 2+  Left brachioradialis: 2+  Right biceps: 2+  Left biceps: 2+  Right triceps: 2+  Left triceps: 2+  Right patellar: 2+  Left patellar: 2+  Right achilles: 2+  Left achilles: 2+  Right : 2+  Left : 2+    SOCIAL HISTORY: Patient lives with her  and his friend. She is  and has 4 children ages 13, twin 6 y.o., and 2. The children live with Yani's family members. She has two years of college and is currently unemployed.   Social History     Socioeconomic History    Marital status: Legally      Spouse name: Not on file    Number of children: Not on file    Years of education: Not on file    Highest education level: Not on file   Occupational History    Not on file   Social Needs    Financial resource strain: Not on file    Food insecurity:     Worry: Not on file     Inability: Not on file    Transportation needs:     Medical: Not on file     Non-medical: Not on file   Tobacco Use    Smoking status: Current Every Day Smoker     Packs/day: 1.00    Smokeless tobacco: Never Used    Tobacco comment: NO medication ordered d/t pregnancy    Substance and Sexual Activity    Alcohol use: No     Alcohol/week: 0.0 oz    Drug use: Yes     Types: Marijuana    Sexual activity: Yes     Partners: Male   Lifestyle    Physical activity:     Days per week: Not on file     Minutes per session: Not on file    Stress: Not on file   Relationships    Social connections:     Talks on phone: Not on file     Gets together: Not on file     Attends Synagogue service: Not on file     Active member of club or organization: Not on file     Attends meetings of clubs or organizations: Not on file     Relationship status: Not on file    Intimate partner violence:     Fear of current or ex partner: Not on file     Emotionally abused: Not on file     Physically abused: Not on file     Forced sexual activity: Not on file   Other Topics Concern    Not on file   Social History Narrative    Not on file     Mental Status  Pt. was alert, fully oriented, and cooperative. Appearance and hygiene wereappropriate, well-groomed . Mood was euphoric. Affect was euphoric and manic Thought process was disorganized. Patient denied hallucinations and endorsed a history of paranoia. Patient denied suicidal ideations. Patient denied homicidal ideations . Patient's gross cognitive functions were intact. Insight and judgement were poor. Both recent and remote memory were intact. Psychomotor status was agitated     Diagnostic Impression    Bipolar I Disorder, Manic Episode      Medications   clonazePAM  1 mg Oral BID    cariprazine hcl  3 mg Oral Daily     benztropine mesylate, magnesium hydroxide, aluminum & magnesium hydroxide-simethicone, traZODone, nicotine polacrilex    Treatment Plan:  PLAN  · Continue inpatient psychiatric treatment  · Supportive therapy with medication management. Reviewed risks and benefits as well as potential side effects with patient. · Continue home medications. · New Order - Klonopin 1mg BID for 7 days beginning 6/22/19. · Review medications for efficacy and side effects. · Therapeutic support and empathetic care provided greater than 20 minutes. · Engage in therapeutic activities and groups. · Follow up at [facility] after symptoms stabilized.     Estimated length of stay: 5-7 days    MAURICE Bruner - ROSAS  Psychiatric Advanced Practice Nurse

## 2019-06-22 NOTE — GROUP NOTE
Group Therapy Note    Date: June 22    Group Start Time: 1430  Group End Time: 1500  Group Topic: Recreational    STCZ FELII A    Marlon Kirby LPN      Signature:  Marlon Kirby LPN

## 2019-06-22 NOTE — BH NOTE
Patient home medications verified via [employer] on [facility] ([place], [place]). Vraylar 3mg daily and Buspar 30MG bid are patient current medication prescribed.

## 2019-06-23 LAB
ABSOLUTE EOS #: 0.3 K/UL (ref 0–0.4)
ABSOLUTE IMMATURE GRANULOCYTE: ABNORMAL K/UL (ref 0–0.3)
ABSOLUTE LYMPH #: 2.7 K/UL (ref 1–4.8)
ABSOLUTE MONO #: 0.7 K/UL (ref 0.1–1.3)
ALBUMIN SERPL-MCNC: 3.7 G/DL (ref 3.5–5.2)
ALBUMIN/GLOBULIN RATIO: ABNORMAL (ref 1–2.5)
ALP BLD-CCNC: 83 U/L (ref 35–104)
ALT SERPL-CCNC: 13 U/L (ref 5–33)
ANION GAP SERPL CALCULATED.3IONS-SCNC: 12 MMOL/L (ref 9–17)
AST SERPL-CCNC: 25 U/L
BASOPHILS # BLD: 1 % (ref 0–2)
BASOPHILS ABSOLUTE: 0.1 K/UL (ref 0–0.2)
BILIRUB SERPL-MCNC: 0.16 MG/DL (ref 0.3–1.2)
BUN BLDV-MCNC: 13 MG/DL (ref 6–20)
BUN/CREAT BLD: ABNORMAL (ref 9–20)
CALCIUM SERPL-MCNC: 8.8 MG/DL (ref 8.6–10.4)
CHLORIDE BLD-SCNC: 106 MMOL/L (ref 98–107)
CHOLESTEROL/HDL RATIO: 2.5
CHOLESTEROL: 116 MG/DL
CO2: 24 MMOL/L (ref 20–31)
CREAT SERPL-MCNC: 0.61 MG/DL (ref 0.5–0.9)
DIFFERENTIAL TYPE: ABNORMAL
EOSINOPHILS RELATIVE PERCENT: 4 % (ref 0–4)
ESTIMATED AVERAGE GLUCOSE: 105 MG/DL
GFR AFRICAN AMERICAN: >60 ML/MIN
GFR NON-AFRICAN AMERICAN: >60 ML/MIN
GFR SERPL CREATININE-BSD FRML MDRD: ABNORMAL ML/MIN/{1.73_M2}
GFR SERPL CREATININE-BSD FRML MDRD: ABNORMAL ML/MIN/{1.73_M2}
GLUCOSE BLD-MCNC: 94 MG/DL (ref 70–99)
HBA1C MFR BLD: 5.3 % (ref 4–6)
HCT VFR BLD CALC: 43.6 % (ref 36–46)
HDLC SERPL-MCNC: 47 MG/DL
HEMOGLOBIN: 14.4 G/DL (ref 12–16)
IMMATURE GRANULOCYTES: ABNORMAL %
LDL CHOLESTEROL: 47 MG/DL (ref 0–130)
LYMPHOCYTES # BLD: 31 % (ref 24–44)
MCH RBC QN AUTO: 28 PG (ref 26–34)
MCHC RBC AUTO-ENTMCNC: 32.9 G/DL (ref 31–37)
MCV RBC AUTO: 85 FL (ref 80–100)
MONOCYTES # BLD: 8 % (ref 1–7)
NRBC AUTOMATED: ABNORMAL PER 100 WBC
PDW BLD-RTO: 14.4 % (ref 11.5–14.9)
PLATELET # BLD: 218 K/UL (ref 150–450)
PLATELET ESTIMATE: ABNORMAL
PMV BLD AUTO: 8.9 FL (ref 6–12)
POTASSIUM SERPL-SCNC: 4.1 MMOL/L (ref 3.7–5.3)
RBC # BLD: 5.12 M/UL (ref 4–5.2)
RBC # BLD: ABNORMAL 10*6/UL
SEG NEUTROPHILS: 56 % (ref 36–66)
SEGMENTED NEUTROPHILS ABSOLUTE COUNT: 4.8 K/UL (ref 1.3–9.1)
SODIUM BLD-SCNC: 142 MMOL/L (ref 135–144)
THYROXINE, FREE: 1.23 NG/DL (ref 0.93–1.7)
TOTAL PROTEIN: 6.1 G/DL (ref 6.4–8.3)
TRIGL SERPL-MCNC: 112 MG/DL
TSH SERPL DL<=0.05 MIU/L-ACNC: 0.92 MIU/L (ref 0.3–5)
VLDLC SERPL CALC-MCNC: NORMAL MG/DL (ref 1–30)
WBC # BLD: 8.6 K/UL (ref 3.5–11)
WBC # BLD: ABNORMAL 10*3/UL

## 2019-06-23 PROCEDURE — 80061 LIPID PANEL: CPT

## 2019-06-23 PROCEDURE — 6370000000 HC RX 637 (ALT 250 FOR IP): Performed by: NURSE PRACTITIONER

## 2019-06-23 PROCEDURE — 99232 SBSQ HOSP IP/OBS MODERATE 35: CPT | Performed by: PSYCHIATRY & NEUROLOGY

## 2019-06-23 PROCEDURE — 80053 COMPREHEN METABOLIC PANEL: CPT

## 2019-06-23 PROCEDURE — 84443 ASSAY THYROID STIM HORMONE: CPT

## 2019-06-23 PROCEDURE — 6370000000 HC RX 637 (ALT 250 FOR IP): Performed by: PSYCHIATRY & NEUROLOGY

## 2019-06-23 PROCEDURE — 36415 COLL VENOUS BLD VENIPUNCTURE: CPT

## 2019-06-23 PROCEDURE — 85025 COMPLETE CBC W/AUTO DIFF WBC: CPT

## 2019-06-23 PROCEDURE — 83036 HEMOGLOBIN GLYCOSYLATED A1C: CPT

## 2019-06-23 PROCEDURE — 1240000000 HC EMOTIONAL WELLNESS R&B

## 2019-06-23 PROCEDURE — 84439 ASSAY OF FREE THYROXINE: CPT

## 2019-06-23 RX ADMIN — Medication 2 MG: at 21:10

## 2019-06-23 RX ADMIN — CLONAZEPAM 1 MG: 1 TABLET ORAL at 08:36

## 2019-06-23 RX ADMIN — CARIPRAZINE 3 MG: 3 CAPSULE, GELATIN COATED ORAL at 08:35

## 2019-06-23 RX ADMIN — CLONAZEPAM 1 MG: 1 TABLET ORAL at 21:10

## 2019-06-23 RX ADMIN — MAGNESIUM HYDROXIDE 30 ML: 400 SUSPENSION ORAL at 14:21

## 2019-06-23 NOTE — GROUP NOTE
Group Therapy Note    Date: June 23    Group Start Time: 1330  Group End Time: 0866  Group Topic: Cognitive Skills    CZ JUANCARLOS Yanes    Patient's Goal:  Increase cognitive skills, demonstrate increased interpersonal interaction     Status After Intervention:  Improved    Participation Level:  Active Listener and Interactive    Participation Quality: Appropriate, Attentive and Sharing    Speech:  normal    Thought Process/Content: Logical    Affective Functioning: Congruent    Level of consciousness:  Alert, Oriented x4 and Attentive    Response to Learning: Able to verbalize current knowledge/experience, Capable of insight and Progressing to goal    Endings: None Reported    Modes of Intervention: Education, Socialization, Exploration and Activity    Discipline Responsible: Psychoeducational Specialist    Signature:  Cade Lei

## 2019-06-23 NOTE — PROGRESS NOTES
Writer faxed a letter reporting pt's admission to the hospital on 6/22/19 from her admission to the [facility] ED on 6/21/19 to [facility] ([facility]), and [facility] Monitoring; on behalf of pt as requested.

## 2019-06-23 NOTE — PLAN OF CARE
[facility]  Day 3 Interdisciplinary Treatment Plan NOTE    Review Date & Time: 6/23/19 0932    Admission Type:   Admission Type:  Involuntary    Reason for admission:  Reason for Admission: called 911, presented as manic, labile, & disorganized as well as agitated  Estimated Length of Stay:  5-7 days  Estimated Discharge Date Update:   to be determined by physician    PATIENT STRENGTHS:  Patient Strengths:Strengths: Connection to output provider, No significant Physical Illness, Medication Compliance  Patient Strengths and Limitations:Limitations: Difficulty problem solving/relies on others to help solve problems  Addictive Behavior:Addictive Behavior  In the past 3 months, have you felt or has someone told you that you have a problem with:  : None  Do you have a history of Chemical Use?: No  Do you have a history of Alcohol Use?: No  Do you have a history of Street Drug Abuse?: Yes  Histroy of Prescripton Drug Abuse?: No  Medical Problems:  Past Medical History:   Diagnosis Date    Bipolar 1 disorder ([facility] [address] 1999    Depression     Gestational diabetes 7/22/2016    OCD (obsessive compulsive disorder) 1999    PTSD (post-traumatic stress disorder)        Risk:  Fall RiskTotal: 53  Jairo Scale Jairo Scale Score: 22  BVC Total: 0  Change in scores:  No Changes to plan of Care:  No    Status EXAM:   Status and Exam  Normal: Yes  Facial Expression: Brightened  Affect: Appropriate  Level of Consciousness: Alert  Mood:Normal: Yes  Mood: Anxious  Motor Activity:Normal: Yes  Interview Behavior: Cooperative  Preception: Somers to Person, Somers to Time, Somers to Place, Somers to Situation  Attention:Normal: Yes  Attention: Distractible  Thought Processes: Tangential  Thought Content:Normal: Yes  Thought Content: (n/a )  Hallucinations: None  Delusions: No  Delusions: (n/a )  Memory:Normal: Yes  Memory: (n/a )  Insight and Judgment: No  Insight and Judgment: Poor Insight  Present Suicidal Ideation: No  Present

## 2019-06-23 NOTE — PLAN OF CARE
Problem: Altered Mood, Manic Behavior:  Goal: Able to verbalize decrease in frequency and intensity of racing thoughts  Description  Able to verbalize decrease in frequency and intensity of racing thoughts  6/22/2019 2043 by Ginger Schuler RN  Outcome: Ongoing   Thoughts have slowed considerably & she is calm & pleasant. Problem: Tobacco Use:  Goal: Inpatient tobacco use cessation counseling participation  Description  Inpatient tobacco use cessation counseling participation  6/22/2019 2043 by Ginger Schuler RN  Outcome: Ongoing   Declines, no intention of quitting @ this x. Problem: Pain:  Goal: Control of acute pain  Description  Control of acute pain  Outcome: Ongoing   Denies.

## 2019-06-24 PROCEDURE — 99232 SBSQ HOSP IP/OBS MODERATE 35: CPT | Performed by: PSYCHIATRY & NEUROLOGY

## 2019-06-24 PROCEDURE — 6370000000 HC RX 637 (ALT 250 FOR IP): Performed by: PSYCHIATRY & NEUROLOGY

## 2019-06-24 PROCEDURE — 6370000000 HC RX 637 (ALT 250 FOR IP): Performed by: NURSE PRACTITIONER

## 2019-06-24 PROCEDURE — 1240000000 HC EMOTIONAL WELLNESS R&B

## 2019-06-24 RX ORDER — IBUPROFEN 800 MG/1
800 TABLET ORAL NIGHTLY PRN
Status: DISCONTINUED | OUTPATIENT
Start: 2019-06-24 | End: 2019-06-25 | Stop reason: HOSPADM

## 2019-06-24 RX ORDER — IBUPROFEN 800 MG/1
800 TABLET ORAL NIGHTLY PRN
Status: DISCONTINUED | OUTPATIENT
Start: 2019-06-25 | End: 2019-06-24

## 2019-06-24 RX ADMIN — CARIPRAZINE 3 MG: 3 CAPSULE, GELATIN COATED ORAL at 09:06

## 2019-06-24 RX ADMIN — CLONAZEPAM 1 MG: 1 TABLET ORAL at 09:06

## 2019-06-24 RX ADMIN — MAGNESIUM HYDROXIDE 30 ML: 400 SUSPENSION ORAL at 17:41

## 2019-06-24 RX ADMIN — CLONAZEPAM 1 MG: 1 TABLET ORAL at 20:51

## 2019-06-24 RX ADMIN — Medication 2 MG: at 20:51

## 2019-06-24 RX ADMIN — IBUPROFEN 800 MG: 800 TABLET ORAL at 22:45

## 2019-06-24 ASSESSMENT — PAIN SCALES - GENERAL: PAINLEVEL_OUTOF10: 6

## 2019-06-24 NOTE — GROUP NOTE
Group Therapy Note    Date: June 24    Group Start Time: 1000  Group End Time: 0033  Group Topic: Psychotherapy    CZ BHI A    RHONDA Hinkle        Group Therapy Note    Attendees: 9/12         Patient's Goal:  Able to verbalize acceptance of life and situations over which he or she has no control. Focus on the here and now of their mental health. Status After Intervention:  Unchanged    Participation Level:  Active Listener and Interactive    Participation Quality: Appropriate, Attentive and Sharing      Speech:  normal      Thought Process/Content: Linear      Affective Functioning: Congruent      Mood: euthymic      Level of consciousness:  Alert, Oriented x4 and Attentive      Response to Learning: Progressing to goal      Endings: None Reported    Modes of Intervention: Education, Support, Socialization and Exploration      Discipline Responsible: /Counselor      Signature:  RHONDA Núñez

## 2019-06-24 NOTE — GROUP NOTE
Group Therapy Note    Date: June 24    Group Start Time: 2030  Group End Time: 2050  Group Topic: Wrap-Up    STCZ BHI A    [address], RN        Group Therapy Note    Attendees: 15                 Signature:  [address], RN

## 2019-06-24 NOTE — PLAN OF CARE
Pt denies SI/HI at this time. Pt denies A/V hallucinations at this time. Pt denies anxiety and depression. Encouraged to express feelings during 1:1 and as needed. Pt is med compliant. Pt attended group and participated appropriately. Pt eating appropriately. Pt sleeping appropriately. Appropriate ADLs. q15 minute safety checks maintained.

## 2019-06-25 VITALS
SYSTOLIC BLOOD PRESSURE: 125 MMHG | RESPIRATION RATE: 14 BRPM | DIASTOLIC BLOOD PRESSURE: 89 MMHG | HEIGHT: 68 IN | TEMPERATURE: 97.4 F | BODY MASS INDEX: 23.79 KG/M2 | HEART RATE: 94 BPM | WEIGHT: 157 LBS

## 2019-06-25 PROCEDURE — 6370000000 HC RX 637 (ALT 250 FOR IP): Performed by: PSYCHIATRY & NEUROLOGY

## 2019-06-25 PROCEDURE — 5130000000 HC BRIDGE APPOINTMENT

## 2019-06-25 PROCEDURE — 99239 HOSP IP/OBS DSCHRG MGMT >30: CPT | Performed by: PSYCHIATRY & NEUROLOGY

## 2019-06-25 PROCEDURE — 6370000000 HC RX 637 (ALT 250 FOR IP): Performed by: NURSE PRACTITIONER

## 2019-06-25 PROCEDURE — 1800000000 HC LEAVE OF ABSENCE

## 2019-06-25 RX ORDER — CLONAZEPAM 1 MG/1
1 TABLET ORAL 2 TIMES DAILY
Qty: 6 TABLET | Refills: 0 | Status: ON HOLD | OUTPATIENT
Start: 2019-06-25 | End: 2019-07-22 | Stop reason: HOSPADM

## 2019-06-25 RX ADMIN — CLONAZEPAM 1 MG: 1 TABLET ORAL at 08:00

## 2019-06-25 RX ADMIN — CARIPRAZINE 3 MG: 3 CAPSULE, GELATIN COATED ORAL at 08:00

## 2019-06-25 NOTE — PROGRESS NOTES
Department of Psychiatry  Attending Physician Progress Note    Chief Complaint: bipolar disorder, prince    SUBJECTIVE:     The patient was feeling less depressed. She continues to have racing thoughts but less than before. the pressured speech and irritability seem to be somewhat better today as well. She continues to have significant symptoms of prince and depression however. She reported sleeping better last night about 8 hours with melatonin. She asked for Motrin high-dose for back pain. The suicidal ideations are less. There was no major side effects. There is no safe alternative other than the hospital treatment at this time. OBJECTIVE    Physical  VITALS:    /88   Pulse 90   Temp 98.1 °F (36.7 °C) (Oral)   Resp 14   Ht 5' 8\" (1.727 m)   Wt 157 lb (71.2 kg)   BMI 23.87 kg/m²     Mental Status Examination:    Level of consciousness:  Within normal limits  Appearance: Street clothes, seated, with good grooming  Behavior/Motor: No abnormalities noted  Attitude toward examiner:  Cooperative, attentive, good eye contact  Speech:  spontaneous, normal rate, normal volume and well articulated  Mood:  Depressed   Affect:  Mood-congruent, constricted in range. Thought processes:  linear, goal-directed and coherent  Thought content:  denies homicidal ideation  Suicidal Ideation:  less suicidal ideation  Delusions:  no evidence of delusions  Perceptual Disturbance:  No visual or auditory hallucinations. Cognition:  Intact  Memory: grossly intact.   Insight & Judgement: partial       Medications  Current Facility-Administered Medications: melatonin ER tablet 2 mg, 2 mg, Oral, Nightly PRN  benztropine mesylate (COGENTIN) injection 2 mg, 2 mg, Intramuscular, BID PRN  magnesium hydroxide (MILK OF MAGNESIA) 400 MG/5ML suspension 30 mL, 30 mL, Oral, Daily PRN  aluminum & magnesium hydroxide-simethicone (MAALOX) 200-200-20 MG/5ML suspension 30 mL, 30 mL, Oral, Q6H PRN  clonazePAM (KLONOPIN) tablet 1 mg, 1 mg, Oral, BID  cariprazine hcl (VRAYLAR) capsule 3 mg, 3 mg, Oral, Daily  nicotine (NICODERM CQ) 21 MG/24HR 1 patch, 1 patch, Transdermal, Daily    ASSESSMENT     Active Problems:    Bipolar 1 disorder ([address]  Resolved Problems:    * No resolved hospital problems. *      PLAN    · Continue Cariprazine 3 mg daily to replace injectable long-acting aripiprazole. · Continue melatonin instead of trazodone. · Continue Klonopin 1 mg twice a day temporarily. · Motrin 800 mg once a day as needed for back pain  · Continue unit milieu and group psychotherapy.     Electronically Signed by Saad Jo MD , 6/24/2019 8:58 PM

## 2019-06-25 NOTE — PROGRESS NOTES
CLINICAL PHARMACY NOTE: MEDS TO [address] Select Patient?: No  Total # of Prescriptions Filled: 1   The following medications were delivered to the patient:  · Vraylar  ·   Total # of Interventions Completed: 0  Time Spent (min): 30    Additional Documentation:

## 2019-06-25 NOTE — CARE COORDINATION
Bridge Appointment completed: Reviewed Discharge Instructions with patient. Patient verbalizes understanding and agreement with the discharge plan using the teachback method.        Discharge Arrangements: Dr. Kati Robledo notified: NA   Discharge destination/address:[address] [place] [address]   Transported by:

## 2019-06-25 NOTE — DISCHARGE SUMMARY
other disease in mother, affecting management of mother, antepartum condition or complication V57. 8XX0    8/16/16 RLTCS @29w2d M APG Wt /M APG Wt Z98.891    Hyperglycemia R73.9    Bipolar 1 disorder ([facility] [address] F31.9        Admission Condition: poor    Discharged Condition: stable    Indication for Admission: threat to self    History of Present Illnes (present tense wording indicates findings from admission exam on 6/21/2019 and are not necessarily indicative of current findings):   Stella Goldsmith is a 34 y.o. female who was involuntarily admitted from the [address] [place] [address] for prince, delusions and loose associations. Patient recently had a manic episode and [employer] was called. During the event, she ran from police and was arrested. She currently has an ankle monitor and was supposed to appear in court yesterday but due to her continued prince, became anxious and called her psychiatrist for direction regarding her new medication - Vraylar. When he did not call back, she called 911 and was taken to [facility] is seen in the day room. She is dressed in hospital attire, is pacing the unit aimlessly. She has pressured speech, is hyper-verbal, loose associations, flight of ideas, poverty of thought and thought blocking. She reports racing thoughts, lack of sleep and trouble focusing. She is aware that she is having a manic episode. She reports that she saw her psychiatrist three days ago and he started Paz Melter for her current symptoms. Samples were provided. She reported as above regarding her ankle monitor and court date on 6/21/19; social work will need to call [employer] on Monday to inform them of her admission. She also has a court hearing on 7/19/19 for competency. It is unclear the full purpose of this hearing.     Rina Greene is cooperative however; her flight of ideas and lack of focus make our interview challenging. She is a fairly good historian.  Today she is denying SI, HI, hallucinations, depression and

## 2019-06-26 PROCEDURE — 1800000000 HC LEAVE OF ABSENCE

## 2019-06-27 ENCOUNTER — HOSPITAL ENCOUNTER (INPATIENT)
Age: 39
LOS: 25 days | Discharge: PSYCHIATRIC OR SUBSTANCE USE FACILITY | DRG: 885 | End: 2019-07-22
Attending: EMERGENCY MEDICINE | Admitting: PSYCHIATRY & NEUROLOGY
Payer: MEDICARE

## 2019-06-27 DIAGNOSIS — F31.9 BIPOLAR 1 DISORDER (HCC): ICD-10-CM

## 2019-06-27 DIAGNOSIS — F30.9 MANIA (HCC): Primary | ICD-10-CM

## 2019-06-27 PROBLEM — F31.12 BIPOLAR 1 DISORDER, MANIC, MODERATE (HCC): Status: ACTIVE | Noted: 2019-06-27

## 2019-06-27 PROCEDURE — 90792 PSYCH DIAG EVAL W/MED SRVCS: CPT | Performed by: PSYCHIATRY & NEUROLOGY

## 2019-06-27 PROCEDURE — 6370000000 HC RX 637 (ALT 250 FOR IP): Performed by: EMERGENCY MEDICINE

## 2019-06-27 PROCEDURE — 96372 THER/PROPH/DIAG INJ SC/IM: CPT

## 2019-06-27 PROCEDURE — 99285 EMERGENCY DEPT VISIT HI MDM: CPT

## 2019-06-27 PROCEDURE — 1240000000 HC EMOTIONAL WELLNESS R&B

## 2019-06-27 PROCEDURE — 81025 URINE PREGNANCY TEST: CPT

## 2019-06-27 PROCEDURE — 6360000002 HC RX W HCPCS: Performed by: EMERGENCY MEDICINE

## 2019-06-27 PROCEDURE — 6370000000 HC RX 637 (ALT 250 FOR IP): Performed by: PSYCHIATRY & NEUROLOGY

## 2019-06-27 PROCEDURE — 80307 DRUG TEST PRSMV CHEM ANLYZR: CPT

## 2019-06-27 RX ORDER — TRAZODONE HYDROCHLORIDE 50 MG/1
50 TABLET ORAL NIGHTLY PRN
Status: DISCONTINUED | OUTPATIENT
Start: 2019-06-27 | End: 2019-07-22 | Stop reason: HOSPADM

## 2019-06-27 RX ORDER — NICOTINE 21 MG/24HR
1 PATCH, TRANSDERMAL 24 HOURS TRANSDERMAL DAILY
Status: DISCONTINUED | OUTPATIENT
Start: 2019-06-27 | End: 2019-07-22 | Stop reason: HOSPADM

## 2019-06-27 RX ORDER — DIPHENHYDRAMINE HCL 25 MG
50 TABLET ORAL ONCE
Status: COMPLETED | OUTPATIENT
Start: 2019-06-27 | End: 2019-06-27

## 2019-06-27 RX ORDER — CLONAZEPAM 1 MG/1
1 TABLET ORAL 2 TIMES DAILY
Status: DISCONTINUED | OUTPATIENT
Start: 2019-06-27 | End: 2019-06-28

## 2019-06-27 RX ORDER — ACETAMINOPHEN 325 MG/1
650 TABLET ORAL EVERY 4 HOURS PRN
Status: DISCONTINUED | OUTPATIENT
Start: 2019-06-27 | End: 2019-07-22 | Stop reason: HOSPADM

## 2019-06-27 RX ORDER — LORAZEPAM 2 MG/ML
2 INJECTION INTRAMUSCULAR ONCE
Status: COMPLETED | OUTPATIENT
Start: 2019-06-27 | End: 2019-06-27

## 2019-06-27 RX ORDER — MAGNESIUM HYDROXIDE/ALUMINUM HYDROXICE/SIMETHICONE 120; 1200; 1200 MG/30ML; MG/30ML; MG/30ML
15 SUSPENSION ORAL EVERY 6 HOURS PRN
Status: DISCONTINUED | OUTPATIENT
Start: 2019-06-27 | End: 2019-07-22 | Stop reason: HOSPADM

## 2019-06-27 RX ORDER — LORAZEPAM 1 MG/1
2 TABLET ORAL ONCE
Status: COMPLETED | OUTPATIENT
Start: 2019-06-27 | End: 2019-06-27

## 2019-06-27 RX ORDER — LORAZEPAM 1 MG/1
2 TABLET ORAL ONCE
Status: DISCONTINUED | OUTPATIENT
Start: 2019-06-27 | End: 2019-06-28

## 2019-06-27 RX ORDER — BENZTROPINE MESYLATE 1 MG/ML
2 INJECTION INTRAMUSCULAR; INTRAVENOUS 2 TIMES DAILY PRN
Status: DISCONTINUED | OUTPATIENT
Start: 2019-06-27 | End: 2019-07-14

## 2019-06-27 RX ORDER — LORAZEPAM 2 MG/ML
2 INJECTION INTRAMUSCULAR ONCE
Status: DISCONTINUED | OUTPATIENT
Start: 2019-06-27 | End: 2019-06-28

## 2019-06-27 RX ADMIN — CARIPRAZINE 4.5 MG: 3 CAPSULE, GELATIN COATED ORAL at 11:29

## 2019-06-27 RX ADMIN — HYDROMORPHONE HYDROCHLORIDE 1 MG: 1 INJECTION, SOLUTION INTRAMUSCULAR; INTRAVENOUS; SUBCUTANEOUS at 03:01

## 2019-06-27 RX ADMIN — LORAZEPAM 2 MG: 1 TABLET ORAL at 02:32

## 2019-06-27 RX ADMIN — CLONAZEPAM 1 MG: 1 TABLET ORAL at 11:29

## 2019-06-27 RX ADMIN — DIPHENHYDRAMINE HCL 50 MG: 25 TABLET ORAL at 02:32

## 2019-06-27 RX ADMIN — Medication 1 MG: at 20:39

## 2019-06-27 RX ADMIN — Medication 1 MG: at 02:32

## 2019-06-27 RX ADMIN — CLONAZEPAM 1 MG: 1 TABLET ORAL at 20:39

## 2019-06-27 RX ADMIN — LORAZEPAM 2 MG: 2 INJECTION INTRAMUSCULAR; INTRAVENOUS at 03:01

## 2019-06-27 ASSESSMENT — SLEEP AND FATIGUE QUESTIONNAIRES
DIFFICULTY ARISING: NO
RESTFUL SLEEP: NO
DO YOU USE A SLEEP AID: YES
DO YOU HAVE DIFFICULTY SLEEPING: YES
DIFFICULTY STAYING ASLEEP: YES
SLEEP PATTERN: INSOMNIA
DIFFICULTY FALLING ASLEEP: YES

## 2019-06-27 ASSESSMENT — LIFESTYLE VARIABLES
HISTORY_ALCOHOL_USE: NO
HISTORY_ALCOHOL_USE: NO

## 2019-06-27 ASSESSMENT — PAIN SCALES - GENERAL
PAINLEVEL_OUTOF10: 0
PAINLEVEL_OUTOF10: 0
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
## 2019-06-27 NOTE — FLOWSHEET NOTE
Patient participated in [address] [place] [address] with Joselito Sol.     06/27/19 1604   Encounter Summary   Services provided to: Patient   Referral/Consult From: Rounding   Continue Visiting   (6/27/19)   Complexity of Encounter Low   Length of Encounter 30 minutes   Spiritual Assessment Completed Yes   Spiritual/Bahai   Type Spiritual support   Assessment Calm; Approachable   Intervention Active listening   Outcome Receptive

## 2019-06-27 NOTE — GROUP NOTE
Group Therapy Note    Date: June 27    Group Start Time: 1330  Group End Time: 5937  Group Topic: Group Therapy    ANTON Luna, CTRS    Patient did not attend [employer] and Cognitive Skills Group after encouragement from staff. 1:1 talk time offered but patient refused.       Signature:  Daily White

## 2019-06-27 NOTE — BH NOTE
Patient given tobacco quitline number 09952747912 at this time, refusing to call at this time, states \" I just dont want to quit now\"- patient given information as to the dangers of long term tobacco use. Continue to reinforce the importance of tobacco cessation.

## 2019-06-27 NOTE — ED PROVIDER NOTES
eMERGENCY dEPARTMENT eNCOUnter    Pt Name: Mary Dunlap  MRN: [id number]  [place] 1980  Date of evaluation: 6/27/19  CHIEF COMPLAINT       Chief Complaint   Patient presents with    Mental Health Problem     HISTORY OF PRESENT ILLNESS   HPI  HISTORY OF PRESENT ILLNESS:  Medical history of PTSD, bipolar, schizoaffective disorder presents for chief complaint of prince. Patient was found in someone's house that she does not belong in. Patient is uncooperative and has pressured speech and tangential speech. Severity is moderate. No aggravating or relieving factors. Timing is one day. Course is constant. Context is schizophrenia  -----------------------  -----------------------  REVIEW OF SYSTEMS  *see ED Caveat  ED Caveat: uncooperative  -----------------------  -----------------------  ALLERGIES  -per nursing records, reviewed    PAST MEDICAL HISTORY  -See HPI    SOCIAL HISTORY  -No daily drinking, no IV drugs  -----------------------  -----------------------  PHYSICAL EXAM  Gen: no acute distress  Skin: no rashes  Head: Normocephalic, atraumatic  Neck: no nuchal rigidity  Eye: PERRLA, normal conjunctiva  ENT: Mucous membranes moist  CV: Normal rate  Resp: Respirations unlabored  MSK: no large joint effusions  ABD: Non distended  Neuro: Alert and oriented, no focal neurological deficits observed  Psych: uncooperative, pressured speech, tangential speech  -----------------------  -----------------------  MEDICAL DECISION MAKING  Differential Diagnosis:  - Consideration is given for anti-and MDA receptor encephalitis, intoxication, sepsis, meningitis, pneumonia, uti, cellulitis, medication overdose, subarachnoid hemorrhage, hypoglycemia, electrolyte abnormality, ACS, CVA, withdrawl, cancer, rhabdo, thyroid disorder,      -  #Impression/Plan:  - Clinically patient's presentation is most consistent with prince.   Patient will be admitted for further stabilization  - ##Diagnosis:  -Micaela  -  -----------------------  -----------------------  Yenifer García MD, TATIANA  Emergency Medicine Attending  Questions? Please contact my cell phone anytime. [phone]  *This charting supersedes any ED resident or staff charting and was written using speech recognition software  PASTMEDICAL HISTORY     Past Medical History:   Diagnosis Date    Anxiety     Bipolar 1 disorder ([address]     Depression     Gestational diabetes 2016    OCD (obsessive compulsive disorder)     PTSD (post-traumatic stress disorder)     Rapid rate of speech     Schizoaffective disorder (HCC)      SURGICAL HISTORY       Past Surgical History:   Procedure Laterality Date    ABDOMEN SURGERY       SECTION      LAPAROSCOPY       CURRENT MEDICATIONS       Previous Medications    CARIPRAZINE HCL (VRAYLAR) 3 MG CAPS CAPSULE    Take 1 capsule by mouth daily    CLONAZEPAM (KLONOPIN) 1 MG TABLET    Take 1 tablet by mouth 2 times daily for 3 days. MELATONIN 3 MG TABS TABLET    Take 10 mg by mouth nightly as needed     ALLERGIES     is allergic to abilify [aripiprazole]; codeine; depakote er [divalproex sodium er]; gabapentin; haldol [haloperidol]; lamictal [lamotrigine]; percocet [oxycodone-acetaminophen]; quetiapine fumarate; tegretol [carbamazepine]; trileptal [oxcarbazepine]; valproic acid; ziprasidone hcl; and zyprexa [olanzapine]. FAMILY HISTORY     indicated that her mother is alive. She indicated that her father is . She indicated that the status of her sister is unknown.      SOCIAL HISTORY       Social History     Tobacco Use    Smoking status: Current Every Day Smoker     Packs/day: 1.00    Smokeless tobacco: Never Used    Tobacco comment: NO medication ordered d/t pregnancy    Substance Use Topics    Alcohol use: No     Alcohol/week: 0.0 oz    Drug use: Yes     Types: Marijuana     PHYSICAL EXAM     INITIAL VITALS: BP (!) 153/92   Pulse 102   Temp 98.1 °F (36.7 °C) (Oral)   Resp 16   Ht 5' 8\" (1.727 m)   Wt 157 lb (71.2 kg)   SpO2 98%   BMI 23.87 kg/m²    Physical Exam    MEDICAL DECISION MAKING:            Labs Reviewed   URINE DRUG SCREEN - Abnormal; Notable for the following components:       Result Value    Cannabinoid Scrn, Ur POSITIVE (*)     All other components within normal limits   PREGNANCY, URINE     EMERGENCY DEPARTMENTCOURSE:         Vitals:    Vitals:    06/27/19 0201   BP: (!) 153/92   Pulse: 102   Resp: 16   Temp: 98.1 °F (36.7 °C)   TempSrc: Oral   SpO2: 98%   Weight: 157 lb (71.2 kg)   Height: 5' 8\" (1.727 m)       The patient was given the following medications while in the emergency department:  Orders Placed This Encounter   Medications    LORazepam (ATIVAN) tablet 2 mg    diphenhydrAMINE (BENADRYL) tablet 50 mg    LORazepam (ATIVAN) tablet 2 mg    melatonin ER tablet 1 mg    LORazepam (ATIVAN) injection 2 mg    HYDROmorphone (DILAUDID) injection 1 mg    LORazepam (ATIVAN) injection 2 mg     CONSULTS:  None    FINAL IMPRESSION      1.  Micaela [facility])          DISPOSITION/PLAN   DISPOSITION Admitted 06/27/2019 03:53:20 AM      PATIENT REFERRED TO:  ANTIONETTE Albarran [facility]  [phone]          DISCHARGE MEDICATIONS:  New Prescriptions    No medications on file     Arianna Anton MD  Attending Emergency Physician                   Buck Ojeda MD  06/27/19 2409

## 2019-06-28 PROCEDURE — 1240000000 HC EMOTIONAL WELLNESS R&B

## 2019-06-28 PROCEDURE — 99232 SBSQ HOSP IP/OBS MODERATE 35: CPT | Performed by: PSYCHIATRY & NEUROLOGY

## 2019-06-28 PROCEDURE — 6370000000 HC RX 637 (ALT 250 FOR IP): Performed by: PSYCHIATRY & NEUROLOGY

## 2019-06-28 PROCEDURE — 6370000000 HC RX 637 (ALT 250 FOR IP): Performed by: INTERNAL MEDICINE

## 2019-06-28 PROCEDURE — 99222 1ST HOSP IP/OBS MODERATE 55: CPT | Performed by: INTERNAL MEDICINE

## 2019-06-28 PROCEDURE — 6360000002 HC RX W HCPCS

## 2019-06-28 RX ORDER — DIPHENHYDRAMINE HYDROCHLORIDE 50 MG/ML
50 INJECTION INTRAMUSCULAR; INTRAVENOUS ONCE
Status: COMPLETED | OUTPATIENT
Start: 2019-06-28 | End: 2019-06-28

## 2019-06-28 RX ORDER — DOCUSATE SODIUM 100 MG/1
100 CAPSULE, LIQUID FILLED ORAL 2 TIMES DAILY PRN
Status: DISCONTINUED | OUTPATIENT
Start: 2019-06-28 | End: 2019-07-22 | Stop reason: HOSPADM

## 2019-06-28 RX ORDER — HYDROCHLOROTHIAZIDE 25 MG/1
25 TABLET ORAL DAILY
Status: DISCONTINUED | OUTPATIENT
Start: 2019-06-28 | End: 2019-07-22 | Stop reason: HOSPADM

## 2019-06-28 RX ORDER — CLONAZEPAM 1 MG/1
1 TABLET ORAL 3 TIMES DAILY
Status: DISCONTINUED | OUTPATIENT
Start: 2019-06-28 | End: 2019-07-22 | Stop reason: HOSPADM

## 2019-06-28 RX ORDER — HALOPERIDOL 5 MG/ML
5 INJECTION INTRAMUSCULAR ONCE
Status: COMPLETED | OUTPATIENT
Start: 2019-06-28 | End: 2019-06-28

## 2019-06-28 RX ORDER — HALOPERIDOL 5 MG/ML
INJECTION INTRAMUSCULAR
Status: COMPLETED
Start: 2019-06-28 | End: 2019-06-28

## 2019-06-28 RX ORDER — DIPHENHYDRAMINE HYDROCHLORIDE 50 MG/ML
INJECTION INTRAMUSCULAR; INTRAVENOUS
Status: COMPLETED
Start: 2019-06-28 | End: 2019-06-28

## 2019-06-28 RX ADMIN — MAGNESIUM HYDROXIDE 30 ML: 400 SUSPENSION ORAL at 09:34

## 2019-06-28 RX ADMIN — CLONAZEPAM 1 MG: 1 TABLET ORAL at 08:00

## 2019-06-28 RX ADMIN — TRAZODONE HYDROCHLORIDE 50 MG: 50 TABLET ORAL at 21:40

## 2019-06-28 RX ADMIN — DIPHENHYDRAMINE HYDROCHLORIDE 50 MG: 50 INJECTION INTRAMUSCULAR; INTRAVENOUS at 03:56

## 2019-06-28 RX ADMIN — CLONAZEPAM 1 MG: 1 TABLET ORAL at 21:40

## 2019-06-28 RX ADMIN — HALOPERIDOL LACTATE 5 MG: 5 INJECTION INTRAMUSCULAR at 03:55

## 2019-06-28 RX ADMIN — DIPHENHYDRAMINE HYDROCHLORIDE 50 MG: 50 INJECTION, SOLUTION INTRAMUSCULAR; INTRAVENOUS at 03:56

## 2019-06-28 RX ADMIN — CARIPRAZINE 4.5 MG: 3 CAPSULE, GELATIN COATED ORAL at 08:00

## 2019-06-28 RX ADMIN — Medication 1 MG: at 21:40

## 2019-06-28 RX ADMIN — TRAZODONE HYDROCHLORIDE 50 MG: 50 TABLET ORAL at 03:16

## 2019-06-28 RX ADMIN — DOCUSATE SODIUM 100 MG: 100 CAPSULE, LIQUID FILLED ORAL at 21:40

## 2019-06-28 RX ADMIN — HYDROCHLOROTHIAZIDE 25 MG: 25 TABLET ORAL at 21:40

## 2019-06-28 RX ADMIN — ACETAMINOPHEN 650 MG: 325 TABLET, FILM COATED ORAL at 19:58

## 2019-06-28 RX ADMIN — ACETAMINOPHEN 650 MG: 325 TABLET, FILM COATED ORAL at 14:12

## 2019-06-28 RX ADMIN — ACETAMINOPHEN 650 MG: 325 TABLET, FILM COATED ORAL at 03:19

## 2019-06-28 RX ADMIN — DOCUSATE SODIUM 100 MG: 100 CAPSULE, LIQUID FILLED ORAL at 13:11

## 2019-06-28 RX ADMIN — ACETAMINOPHEN 650 MG: 325 TABLET, FILM COATED ORAL at 09:35

## 2019-06-28 RX ADMIN — HALOPERIDOL 5 MG: 5 INJECTION INTRAMUSCULAR at 03:55

## 2019-06-28 ASSESSMENT — PAIN DESCRIPTION - LOCATION
LOCATION: GENERALIZED
LOCATION: HEAD

## 2019-06-28 ASSESSMENT — PAIN SCALES - GENERAL
PAINLEVEL_OUTOF10: 6
PAINLEVEL_OUTOF10: 0
PAINLEVEL_OUTOF10: 3
PAINLEVEL_OUTOF10: 3
PAINLEVEL_OUTOF10: 8

## 2019-06-28 ASSESSMENT — PAIN DESCRIPTION - PAIN TYPE
TYPE: ACUTE PAIN
TYPE: ACUTE PAIN

## 2019-06-28 NOTE — GROUP NOTE
Group Therapy Note    Date: June 28    Group Start Time: 1000  Group End Time: 9189  Group Topic: Psychotherapy    [facility]    RHONDA Benavides        Group Therapy Note    Attendees: 3/8      Patient's Goal:  To increase interpersonal skills     Notes:  Pt attended group and was an active participant      Status After Intervention:  Unchanged     Participation Level: None     Participation Quality: Appropriate        Speech:  Pt did not speak out loud, murmured to herself        Thought Process/Content: Hallucinating        Affective Functioning: Flat        Mood: depressed        Level of consciousness:  Alert        Response to Learning: Progressing to goal        Endings: None Reported     Modes of Intervention: Support, Socialization, Exploration and Clarifying        Discipline Responsible: /Counselor        Signature:  RHONDA Benavides
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
## 2019-06-28 NOTE — PROGRESS NOTES
PRN  cariprazine hcl (VRAYLAR) capsule 4.5 mg, 4.5 mg, Oral, Daily  benztropine mesylate (COGENTIN) injection 2 mg, 2 mg, Intramuscular, BID PRN  magnesium hydroxide (MILK OF MAGNESIA) 400 MG/5ML suspension 30 mL, 30 mL, Oral, Daily PRN  aluminum & magnesium hydroxide-simethicone (MAALOX) 200-200-20 MG/5ML suspension 15 mL, 15 mL, Oral, Q6H PRN  acetaminophen (TYLENOL) tablet 650 mg, 650 mg, Oral, Q4H PRN    ASSESSMENT     Principal Problem:    Bipolar 1 disorder, manic, moderate (HCC)  Active Problems:    Bipolar 1 disorder (HCC)  Resolved Problems:    * No resolved hospital problems. *      PLAN    · We increased Cariprazine to 4,5 milligrams daily on admission. We will continue. · Increase clonazepam to 1 mg 3 times a day temporarily for restlessness and manic symptoms. · Continue unit milieu and group psychotherapy.     Electronically Signed by Lorena Fine MD , 6/28/2019 6:45 PM

## 2019-06-29 PROCEDURE — 6360000002 HC RX W HCPCS

## 2019-06-29 PROCEDURE — 6370000000 HC RX 637 (ALT 250 FOR IP): Performed by: INTERNAL MEDICINE

## 2019-06-29 PROCEDURE — 1240000000 HC EMOTIONAL WELLNESS R&B

## 2019-06-29 PROCEDURE — 6370000000 HC RX 637 (ALT 250 FOR IP): Performed by: PSYCHIATRY & NEUROLOGY

## 2019-06-29 PROCEDURE — 99232 SBSQ HOSP IP/OBS MODERATE 35: CPT | Performed by: NURSE PRACTITIONER

## 2019-06-29 RX ORDER — HYDROXYZINE 50 MG/1
50 TABLET, FILM COATED ORAL 3 TIMES DAILY PRN
Status: DISCONTINUED | OUTPATIENT
Start: 2019-06-29 | End: 2019-07-22 | Stop reason: HOSPADM

## 2019-06-29 RX ORDER — HALOPERIDOL 5 MG/ML
10 INJECTION INTRAMUSCULAR EVERY 12 HOURS PRN
Status: DISCONTINUED | OUTPATIENT
Start: 2019-06-29 | End: 2019-07-21

## 2019-06-29 RX ORDER — IBUPROFEN 400 MG/1
400 TABLET ORAL 3 TIMES DAILY PRN
Status: DISCONTINUED | OUTPATIENT
Start: 2019-06-29 | End: 2019-07-22 | Stop reason: HOSPADM

## 2019-06-29 RX ORDER — DIPHENHYDRAMINE HYDROCHLORIDE 50 MG/ML
INJECTION INTRAMUSCULAR; INTRAVENOUS
Status: COMPLETED
Start: 2019-06-29 | End: 2019-06-29

## 2019-06-29 RX ORDER — DIPHENHYDRAMINE HYDROCHLORIDE 50 MG/ML
50 INJECTION INTRAMUSCULAR; INTRAVENOUS EVERY 12 HOURS PRN
Status: DISCONTINUED | OUTPATIENT
Start: 2019-06-29 | End: 2019-07-21

## 2019-06-29 RX ORDER — HALOPERIDOL 5 MG/ML
INJECTION INTRAMUSCULAR
Status: COMPLETED
Start: 2019-06-29 | End: 2019-06-29

## 2019-06-29 RX ADMIN — DIPHENHYDRAMINE HYDROCHLORIDE 50 MG: 50 INJECTION, SOLUTION INTRAMUSCULAR; INTRAVENOUS at 23:53

## 2019-06-29 RX ADMIN — ACETAMINOPHEN 650 MG: 325 TABLET, FILM COATED ORAL at 21:50

## 2019-06-29 RX ADMIN — HALOPERIDOL LACTATE 10 MG: 5 INJECTION INTRAMUSCULAR at 23:53

## 2019-06-29 RX ADMIN — HYDROCHLOROTHIAZIDE 25 MG: 25 TABLET ORAL at 08:34

## 2019-06-29 RX ADMIN — Medication 1 MG: at 21:50

## 2019-06-29 RX ADMIN — HYDROXYZINE HYDROCHLORIDE 50 MG: 50 TABLET, FILM COATED ORAL at 19:23

## 2019-06-29 RX ADMIN — IBUPROFEN 400 MG: 400 TABLET ORAL at 19:23

## 2019-06-29 RX ADMIN — CLONAZEPAM 1 MG: 1 TABLET ORAL at 08:33

## 2019-06-29 RX ADMIN — ACETAMINOPHEN 650 MG: 325 TABLET, FILM COATED ORAL at 02:54

## 2019-06-29 RX ADMIN — TRAZODONE HYDROCHLORIDE 50 MG: 50 TABLET ORAL at 21:50

## 2019-06-29 RX ADMIN — CLONAZEPAM 1 MG: 1 TABLET ORAL at 21:50

## 2019-06-29 RX ADMIN — HYDROXYZINE HYDROCHLORIDE 50 MG: 50 TABLET, FILM COATED ORAL at 21:50

## 2019-06-29 RX ADMIN — CLONAZEPAM 1 MG: 1 TABLET ORAL at 14:50

## 2019-06-29 RX ADMIN — HYDROXYZINE HYDROCHLORIDE 50 MG: 50 TABLET, FILM COATED ORAL at 06:30

## 2019-06-29 RX ADMIN — CARIPRAZINE 4.5 MG: 3 CAPSULE, GELATIN COATED ORAL at 08:33

## 2019-06-29 RX ADMIN — ACETAMINOPHEN 650 MG: 325 TABLET, FILM COATED ORAL at 08:34

## 2019-06-29 ASSESSMENT — PAIN SCALES - GENERAL
PAINLEVEL_OUTOF10: 9
PAINLEVEL_OUTOF10: 3
PAINLEVEL_OUTOF10: 4
PAINLEVEL_OUTOF10: 3
PAINLEVEL_OUTOF10: 0
PAINLEVEL_OUTOF10: 3
PAINLEVEL_OUTOF10: 2
PAINLEVEL_OUTOF10: 0

## 2019-06-29 ASSESSMENT — PAIN DESCRIPTION - PAIN TYPE
TYPE: ACUTE PAIN

## 2019-06-29 ASSESSMENT — PAIN DESCRIPTION - LOCATION
LOCATION: NECK

## 2019-06-29 NOTE — CONSULTS
[facility]    CONSULTATION / HISTORY AND PHYSICAL EXAMINATION            Date:   2019  Patient name:  Rylee Mercedes  Date of admission:  2019  2:01 AM  MRN:   [id number]  Account:  [de-identified]  YOB: 1980  PCP:    Brook Palmer PA-C  Room:   Formerly named [facility]-  Code Status:    Full Code    Physician Requesting Consult: Rob Mortimer, MD    Reason for Consult:  HTN      Chief Complaint:     Chief Complaint   Patient presents with   [address] Problem       History Obtained From:     patient, electronic medical record    History of Present Illness:    history is very limited, patient has a recent thoughts and has tangential thinking,   patient admitted with bipolar disorder, she mentioned that she has history of hypertension, she claims that she was diagnosed with hypertension many years ago, was not taking any medication. She has blood pressure cuff at home, she checks her blood pressure multiple times a day, her blood pressure is bouncing up and down. Sometimes readings of blood pressure very high, she watches salt in the diet. She is a chronic smoker , has history of marijuana abuse. Past Medical History:     Past Medical History:   Diagnosis Date    Anxiety     Bipolar 1 disorder ([address]     Depression     Gestational diabetes 2016    OCD (obsessive compulsive disorder)     PTSD (post-traumatic stress disorder)     Rapid rate of speech     Schizoaffective disorder (HCC)         Past Surgical History:     Past Surgical History:   Procedure Laterality Date    ABDOMEN SURGERY       SECTION      LAPAROSCOPY          Medications Prior to Admission:     Prior to Admission medications    Medication Sig Start Date End Date Taking? Authorizing Provider   clonazePAM (KLONOPIN) 1 MG tablet Take 1 tablet by mouth 2 times daily for 3 days.  19 Yes Rob Mortimer, MD   cariprazine depression, anxiety    Physical Exam:     BP (!) 134/92   Pulse 92   Temp 98 °F (36.7 °C)   Resp 14   Ht 5' 8\" (1.727 m)   Wt 157 lb (71.2 kg)   SpO2 98%   BMI 23.87 kg/m²   Temp (24hrs), Av.6 °F (36.4 °C), Min:97.2 °F (36.2 °C), Max:98 °F (36.7 °C)    No results for input(s): POCGLU in the last 72 hours. No intake or output data in the 24 hours ending 19    General Appearance:  alert, well appearing, and in no acute distress  Mental status: oriented to person, place, and time with normal affect  Head:  normocephalic, atraumatic. Eye: no icterus, redness, pupils equal and reactive, extraocular eye movements intact, conjunctiva clear  Ear: normal external ear, no discharge, hearing intact  Nose:  no drainage noted  Mouth: mucous membranes moist  Neck: supple, no carotid bruits, thyroid not palpable  Lungs: Bilateral equal air entry, clear to ausculation, no wheezing, rales or rhonchi, normal effort  Cardiovascular: normal rate, regular rhythm, no murmur, gallop, rub. Abdomen: Soft, nontender, nondistended, normal bowel sounds, no hepatomegaly or splenomegaly  Neurologic: There are no new focal motor or sensory deficits, normal muscle tone and bulk, no abnormal sensation, normal speech, cranial nerves II through XII grossly intact  Skin: No gross lesions, rashes, bruising or bleeding on exposed skin area  Extremities:  peripheral pulses palpable, no pedal edema or calf pain with palpation  Psych: Has racing thoughts     Investigations:      Laboratory Testing:  No results found for this or any previous visit (from the past 24 hour(s)).     Imaging/Diagonstics:      Assessment :      Primary Problem  Bipolar 1 disorder, manic, moderate (HCC)    Active Hospital Problems    Diagnosis Date Noted    Depression [F32.9] 2016     Priority: Medium    Smoker [F17.200] 2016     Priority: Low    Bipolar 1 disorder, manic, moderate (HCC) [F31.12] 2019    Bipolar 1 disorder (HCC)

## 2019-06-30 PROCEDURE — 1240000000 HC EMOTIONAL WELLNESS R&B

## 2019-06-30 PROCEDURE — 6370000000 HC RX 637 (ALT 250 FOR IP): Performed by: INTERNAL MEDICINE

## 2019-06-30 PROCEDURE — 99232 SBSQ HOSP IP/OBS MODERATE 35: CPT | Performed by: NURSE PRACTITIONER

## 2019-06-30 PROCEDURE — 6370000000 HC RX 637 (ALT 250 FOR IP): Performed by: PSYCHIATRY & NEUROLOGY

## 2019-06-30 RX ADMIN — Medication 1 MG: at 22:32

## 2019-06-30 RX ADMIN — HYDROXYZINE HYDROCHLORIDE 50 MG: 50 TABLET, FILM COATED ORAL at 20:45

## 2019-06-30 RX ADMIN — CLONAZEPAM 1 MG: 1 TABLET ORAL at 14:35

## 2019-06-30 RX ADMIN — HYDROXYZINE HYDROCHLORIDE 50 MG: 50 TABLET, FILM COATED ORAL at 22:33

## 2019-06-30 RX ADMIN — HYDROCHLOROTHIAZIDE 25 MG: 25 TABLET ORAL at 08:35

## 2019-06-30 RX ADMIN — CARIPRAZINE 4.5 MG: 3 CAPSULE, GELATIN COATED ORAL at 08:35

## 2019-06-30 RX ADMIN — HYDROXYZINE HYDROCHLORIDE 50 MG: 50 TABLET, FILM COATED ORAL at 06:36

## 2019-06-30 RX ADMIN — CLONAZEPAM 1 MG: 1 TABLET ORAL at 08:35

## 2019-06-30 RX ADMIN — DOCUSATE SODIUM 100 MG: 100 CAPSULE, LIQUID FILLED ORAL at 14:50

## 2019-06-30 RX ADMIN — IBUPROFEN 400 MG: 400 TABLET ORAL at 07:36

## 2019-06-30 RX ADMIN — IBUPROFEN 400 MG: 400 TABLET ORAL at 20:45

## 2019-06-30 RX ADMIN — TRAZODONE HYDROCHLORIDE 50 MG: 50 TABLET ORAL at 22:32

## 2019-06-30 RX ADMIN — CLONAZEPAM 1 MG: 1 TABLET ORAL at 22:32

## 2019-06-30 RX ADMIN — MAGNESIUM HYDROXIDE 30 ML: 400 SUSPENSION ORAL at 14:49

## 2019-06-30 RX ADMIN — ACETAMINOPHEN 650 MG: 325 TABLET, FILM COATED ORAL at 09:43

## 2019-06-30 RX ADMIN — ACETAMINOPHEN 650 MG: 325 TABLET, FILM COATED ORAL at 22:33

## 2019-06-30 ASSESSMENT — PAIN DESCRIPTION - LOCATION: LOCATION: GENERALIZED

## 2019-06-30 ASSESSMENT — PAIN SCALES - GENERAL
PAINLEVEL_OUTOF10: 3
PAINLEVEL_OUTOF10: 2
PAINLEVEL_OUTOF10: 4
PAINLEVEL_OUTOF10: 4
PAINLEVEL_OUTOF10: 3
PAINLEVEL_OUTOF10: 9

## 2019-06-30 ASSESSMENT — PAIN DESCRIPTION - PAIN TYPE: TYPE: CHRONIC PAIN

## 2019-06-30 NOTE — BH NOTE
Ankle monitor person came out today to check her system- Monitor is functioning but not uploading d/t incomplete charge and poor signal. It is recommended when charging to sit for 1-1.5hrs BID to complete; however, if not possible, please have her sit/charge for 2 hrs prior to discharge. Tech person will pass info on to her .

## 2019-07-01 PROCEDURE — 1240000000 HC EMOTIONAL WELLNESS R&B

## 2019-07-01 PROCEDURE — 99232 SBSQ HOSP IP/OBS MODERATE 35: CPT | Performed by: PSYCHIATRY & NEUROLOGY

## 2019-07-01 PROCEDURE — 6370000000 HC RX 637 (ALT 250 FOR IP): Performed by: PSYCHIATRY & NEUROLOGY

## 2019-07-01 PROCEDURE — 6370000000 HC RX 637 (ALT 250 FOR IP): Performed by: INTERNAL MEDICINE

## 2019-07-01 RX ADMIN — IBUPROFEN 400 MG: 400 TABLET ORAL at 07:03

## 2019-07-01 RX ADMIN — DOCUSATE SODIUM 100 MG: 100 CAPSULE, LIQUID FILLED ORAL at 15:10

## 2019-07-01 RX ADMIN — Medication 1 MG: at 21:37

## 2019-07-01 RX ADMIN — HYDROXYZINE HYDROCHLORIDE 50 MG: 50 TABLET, FILM COATED ORAL at 07:03

## 2019-07-01 RX ADMIN — HYDROXYZINE HYDROCHLORIDE 50 MG: 50 TABLET, FILM COATED ORAL at 15:09

## 2019-07-01 RX ADMIN — CLONAZEPAM 1 MG: 1 TABLET ORAL at 15:10

## 2019-07-01 RX ADMIN — MAGNESIUM HYDROXIDE 30 ML: 400 SUSPENSION ORAL at 21:38

## 2019-07-01 RX ADMIN — HYDROCHLOROTHIAZIDE 25 MG: 25 TABLET ORAL at 09:25

## 2019-07-01 RX ADMIN — IBUPROFEN 400 MG: 400 TABLET ORAL at 15:09

## 2019-07-01 RX ADMIN — CARIPRAZINE 4.5 MG: 3 CAPSULE, GELATIN COATED ORAL at 09:25

## 2019-07-01 RX ADMIN — CLONAZEPAM 1 MG: 1 TABLET ORAL at 21:37

## 2019-07-01 RX ADMIN — CLONAZEPAM 1 MG: 1 TABLET ORAL at 09:25

## 2019-07-01 RX ADMIN — TRAZODONE HYDROCHLORIDE 50 MG: 50 TABLET ORAL at 21:37

## 2019-07-01 RX ADMIN — ACETAMINOPHEN 650 MG: 325 TABLET, FILM COATED ORAL at 19:24

## 2019-07-01 ASSESSMENT — PAIN SCALES - GENERAL
PAINLEVEL_OUTOF10: 3
PAINLEVEL_OUTOF10: 2
PAINLEVEL_OUTOF10: 2
PAINLEVEL_OUTOF10: 3

## 2019-07-01 ASSESSMENT — PAIN DESCRIPTION - PAIN TYPE
TYPE: CHRONIC PAIN
TYPE: ACUTE PAIN

## 2019-07-01 ASSESSMENT — PAIN DESCRIPTION - LOCATION
LOCATION: NECK
LOCATION: GENERALIZED

## 2019-07-01 NOTE — GROUP NOTE
Group Therapy Note    Date: July 1    Group Start Time: 1000  Group End Time: 2392  Group Topic: Psychotherapy    [facility]    RHONDA Jacobson        Group Therapy Note    Attendees: 8/11      Patient's Goal:  To increase interpersonal skills     Notes:  Pt attended group and was an active participant      Status After Intervention:  Unchanged     Participation Level: None     Participation Quality: Appropriate        Speech:  Pt did not speak out loud, murmured to herself        Thought Process/Content: Hallucinating        Affective Functioning: Flat        Mood: depressed        Level of consciousness:  Alert        Response to Learning: Progressing to goal        Endings: None Reported     Modes of Intervention: Support, Socialization, Exploration and Clarifying        Discipline Responsible: /Counselor        Signature:  RHONDA Jacobson

## 2019-07-02 PROCEDURE — 6370000000 HC RX 637 (ALT 250 FOR IP): Performed by: PSYCHIATRY & NEUROLOGY

## 2019-07-02 PROCEDURE — 6370000000 HC RX 637 (ALT 250 FOR IP): Performed by: INTERNAL MEDICINE

## 2019-07-02 PROCEDURE — 90833 PSYTX W PT W E/M 30 MIN: CPT | Performed by: NURSE PRACTITIONER

## 2019-07-02 PROCEDURE — 1240000000 HC EMOTIONAL WELLNESS R&B

## 2019-07-02 PROCEDURE — 99232 SBSQ HOSP IP/OBS MODERATE 35: CPT | Performed by: NURSE PRACTITIONER

## 2019-07-02 RX ADMIN — MAGNESIUM HYDROXIDE 30 ML: 400 SUSPENSION ORAL at 18:17

## 2019-07-02 RX ADMIN — IBUPROFEN 400 MG: 400 TABLET ORAL at 00:34

## 2019-07-02 RX ADMIN — CLONAZEPAM 1 MG: 1 TABLET ORAL at 14:08

## 2019-07-02 RX ADMIN — TRAZODONE HYDROCHLORIDE 50 MG: 50 TABLET ORAL at 22:35

## 2019-07-02 RX ADMIN — CLONAZEPAM 1 MG: 1 TABLET ORAL at 09:15

## 2019-07-02 RX ADMIN — CLONAZEPAM 1 MG: 1 TABLET ORAL at 22:34

## 2019-07-02 RX ADMIN — HYDROXYZINE HYDROCHLORIDE 50 MG: 50 TABLET, FILM COATED ORAL at 22:35

## 2019-07-02 RX ADMIN — ACETAMINOPHEN 650 MG: 325 TABLET, FILM COATED ORAL at 18:17

## 2019-07-02 RX ADMIN — Medication 1 MG: at 22:35

## 2019-07-02 RX ADMIN — IBUPROFEN 400 MG: 400 TABLET ORAL at 16:10

## 2019-07-02 RX ADMIN — HYDROXYZINE HYDROCHLORIDE 50 MG: 50 TABLET, FILM COATED ORAL at 00:34

## 2019-07-02 RX ADMIN — DOCUSATE SODIUM 100 MG: 100 CAPSULE, LIQUID FILLED ORAL at 22:39

## 2019-07-02 RX ADMIN — HYDROXYZINE HYDROCHLORIDE 50 MG: 50 TABLET, FILM COATED ORAL at 12:38

## 2019-07-02 RX ADMIN — HYDROCHLOROTHIAZIDE 25 MG: 25 TABLET ORAL at 09:15

## 2019-07-02 RX ADMIN — IBUPROFEN 400 MG: 400 TABLET ORAL at 22:35

## 2019-07-02 RX ADMIN — CARIPRAZINE 4.5 MG: 3 CAPSULE, GELATIN COATED ORAL at 09:14

## 2019-07-02 ASSESSMENT — PAIN SCALES - GENERAL
PAINLEVEL_OUTOF10: 1
PAINLEVEL_OUTOF10: 5
PAINLEVEL_OUTOF10: 7
PAINLEVEL_OUTOF10: 3
PAINLEVEL_OUTOF10: 7
PAINLEVEL_OUTOF10: 3

## 2019-07-02 ASSESSMENT — PAIN DESCRIPTION - PAIN TYPE: TYPE: CHRONIC PAIN

## 2019-07-02 ASSESSMENT — PAIN DESCRIPTION - LOCATION: LOCATION: GENERALIZED

## 2019-07-02 NOTE — GROUP NOTE
Group Therapy Note    Date: July 2    Group Start Time: 1600  Group End Time: 1630  Group Topic: Healthy Living/Wellness    [facility]    [facility]        Group Therapy Note    Attendees: 5/18         Patient's Goal:  Forming Good Habits    Notes:  Pt attended part of group was distracted, standing up and moving seats several time. Pt was more focused on coffee than video.  Pt was pulled out of group early by SW and did not return     Status After Intervention:  Unchanged    Participation Level: Minimal    Participation Quality: Inappropriate      Speech:  normal      Thought Process/Content: Flight of ideas      Affective Functioning: Blunted      Mood: anxious      Level of consciousness:  Preoccupied      Response to Learning: Resistant      Endings: None Reported    Modes of Intervention: Education      Discipline Responsible: [facility]      Signature:  Dariana Laguerre

## 2019-07-03 PROCEDURE — 6370000000 HC RX 637 (ALT 250 FOR IP): Performed by: INTERNAL MEDICINE

## 2019-07-03 PROCEDURE — 6370000000 HC RX 637 (ALT 250 FOR IP): Performed by: PSYCHIATRY & NEUROLOGY

## 2019-07-03 PROCEDURE — 6360000002 HC RX W HCPCS: Performed by: NURSE PRACTITIONER

## 2019-07-03 PROCEDURE — 87591 N.GONORRHOEAE DNA AMP PROB: CPT

## 2019-07-03 PROCEDURE — 6370000000 HC RX 637 (ALT 250 FOR IP): Performed by: NURSE PRACTITIONER

## 2019-07-03 PROCEDURE — 1240000000 HC EMOTIONAL WELLNESS R&B

## 2019-07-03 PROCEDURE — 90833 PSYTX W PT W E/M 30 MIN: CPT | Performed by: NURSE PRACTITIONER

## 2019-07-03 PROCEDURE — 87491 CHLMYD TRACH DNA AMP PROBE: CPT

## 2019-07-03 PROCEDURE — 99232 SBSQ HOSP IP/OBS MODERATE 35: CPT | Performed by: NURSE PRACTITIONER

## 2019-07-03 RX ORDER — ALPRAZOLAM 1 MG/1
2 TABLET ORAL ONCE
Status: COMPLETED | OUTPATIENT
Start: 2019-07-03 | End: 2019-07-03

## 2019-07-03 RX ADMIN — CLONAZEPAM 1 MG: 1 TABLET ORAL at 15:00

## 2019-07-03 RX ADMIN — HALOPERIDOL LACTATE 10 MG: 5 INJECTION INTRAMUSCULAR at 11:09

## 2019-07-03 RX ADMIN — HYDROXYZINE HYDROCHLORIDE 50 MG: 50 TABLET, FILM COATED ORAL at 21:52

## 2019-07-03 RX ADMIN — HYDROCHLOROTHIAZIDE 25 MG: 25 TABLET ORAL at 08:07

## 2019-07-03 RX ADMIN — CLONAZEPAM 1 MG: 1 TABLET ORAL at 21:52

## 2019-07-03 RX ADMIN — Medication 1 MG: at 21:52

## 2019-07-03 RX ADMIN — IBUPROFEN 400 MG: 400 TABLET ORAL at 22:43

## 2019-07-03 RX ADMIN — HYDROXYZINE HYDROCHLORIDE 50 MG: 50 TABLET, FILM COATED ORAL at 10:09

## 2019-07-03 RX ADMIN — ACETAMINOPHEN 650 MG: 325 TABLET, FILM COATED ORAL at 02:45

## 2019-07-03 RX ADMIN — ALPRAZOLAM 2 MG: 1 TABLET ORAL at 16:10

## 2019-07-03 RX ADMIN — CLONAZEPAM 1 MG: 1 TABLET ORAL at 08:08

## 2019-07-03 RX ADMIN — MAGNESIUM HYDROXIDE 30 ML: 400 SUSPENSION ORAL at 21:58

## 2019-07-03 RX ADMIN — CARIPRAZINE 6 MG: 3 CAPSULE, GELATIN COATED ORAL at 08:07

## 2019-07-03 RX ADMIN — IBUPROFEN 400 MG: 400 TABLET ORAL at 10:09

## 2019-07-03 RX ADMIN — DOCUSATE SODIUM 100 MG: 100 CAPSULE, LIQUID FILLED ORAL at 21:58

## 2019-07-03 RX ADMIN — DIPHENHYDRAMINE HYDROCHLORIDE 50 MG: 50 INJECTION, SOLUTION INTRAMUSCULAR; INTRAVENOUS at 11:09

## 2019-07-03 ASSESSMENT — PAIN SCALES - GENERAL
PAINLEVEL_OUTOF10: 4
PAINLEVEL_OUTOF10: 9
PAINLEVEL_OUTOF10: 2
PAINLEVEL_OUTOF10: 3

## 2019-07-03 ASSESSMENT — PAIN DESCRIPTION - LOCATION
LOCATION: HEAD
LOCATION: GENERALIZED

## 2019-07-03 ASSESSMENT — PAIN DESCRIPTION - PAIN TYPE
TYPE: CHRONIC PAIN
TYPE: ACUTE PAIN

## 2019-07-03 NOTE — PROGRESS NOTES
redirection  Attitude toward examiner:  Cooperative, attentive, intense eye contact  Speech:  Spontaneous, thought blocking, normal volume rapid. Mood:  Elevated, manic  Affect:  Mood-congruent, wide in range, tearful. Thought processes:  linear, goal-directed and coherent  Thought content:  denies homicidal ideation  Suicidal Ideation:  No suicidal ideation  Delusions:  no evidence of delusions  Perceptual Disturbance:  No visual or auditory hallucinations. Cognition:  Intact  Memory: grossly intact. Insight & Judgement: poor       Medications  Current Facility-Administered Medications: cariprazine hcl (VRAYLAR) capsule 6 mg, 6 mg, Oral, Daily  hydrOXYzine (ATARAX) tablet 50 mg, 50 mg, Oral, TID PRN  ibuprofen (ADVIL;MOTRIN) tablet 400 mg, 400 mg, Oral, TID PRN  diphenhydrAMINE (BENADRYL) injection 50 mg, 50 mg, Intramuscular, Q12H PRN **AND** haloperidol lactate (HALDOL) injection 10 mg, 10 mg, Intramuscular, Q12H PRN  docusate sodium (COLACE) capsule 100 mg, 100 mg, Oral, BID PRN  clonazePAM (KLONOPIN) tablet 1 mg, 1 mg, Oral, TID  hydrochlorothiazide (HYDRODIURIL) tablet 25 mg, 25 mg, Oral, Daily  traZODone (DESYREL) tablet 50 mg, 50 mg, Oral, Nightly PRN  nicotine (NICODERM CQ) 14 MG/24HR 1 patch, 1 patch, Transdermal, Daily  melatonin ER tablet 1 mg, 1 mg, Oral, Nightly PRN  benztropine mesylate (COGENTIN) injection 2 mg, 2 mg, Intramuscular, BID PRN  magnesium hydroxide (MILK OF MAGNESIA) 400 MG/5ML suspension 30 mL, 30 mL, Oral, Daily PRN  aluminum & magnesium hydroxide-simethicone (MAALOX) 200-200-20 MG/5ML suspension 15 mL, 15 mL, Oral, Q6H PRN  acetaminophen (TYLENOL) tablet 650 mg, 650 mg, Oral, Q4H PRN    ASSESSMENT     Principal Problem:    Bipolar 1 disorder, manic, moderate (HCC)  Active Problems:    Depression    Smoker    Bipolar 1 disorder (HCC)    Micaela (HCC)  Resolved Problems:    * No resolved hospital problems. *      PLAN  · Manic symptoms are slowly improving.   · Continue clonazepam 1 mg

## 2019-07-03 NOTE — GROUP NOTE
Group Start Time: 2035  Group End Time: 2100  Group Topic: Wrap-Up    ANTON Kim LPN        Group Therapy Note    Attendees: 7         Patient's Goal: discharge        Participation Level:  Active participation                 Signature:  Flynn Kim LPN                                                          Group Therapy

## 2019-07-04 PROCEDURE — 6370000000 HC RX 637 (ALT 250 FOR IP): Performed by: PSYCHIATRY & NEUROLOGY

## 2019-07-04 PROCEDURE — 99232 SBSQ HOSP IP/OBS MODERATE 35: CPT | Performed by: NURSE PRACTITIONER

## 2019-07-04 PROCEDURE — 6370000000 HC RX 637 (ALT 250 FOR IP): Performed by: INTERNAL MEDICINE

## 2019-07-04 PROCEDURE — 90833 PSYTX W PT W E/M 30 MIN: CPT | Performed by: NURSE PRACTITIONER

## 2019-07-04 PROCEDURE — 6370000000 HC RX 637 (ALT 250 FOR IP): Performed by: NURSE PRACTITIONER

## 2019-07-04 PROCEDURE — 1240000000 HC EMOTIONAL WELLNESS R&B

## 2019-07-04 RX ADMIN — CARIPRAZINE 6 MG: 3 CAPSULE, GELATIN COATED ORAL at 09:01

## 2019-07-04 RX ADMIN — Medication 1 MG: at 23:12

## 2019-07-04 RX ADMIN — HYDROXYZINE HYDROCHLORIDE 50 MG: 50 TABLET, FILM COATED ORAL at 13:10

## 2019-07-04 RX ADMIN — CLONAZEPAM 1 MG: 1 TABLET ORAL at 13:10

## 2019-07-04 RX ADMIN — CLONAZEPAM 1 MG: 1 TABLET ORAL at 23:12

## 2019-07-04 RX ADMIN — CLONAZEPAM 1 MG: 1 TABLET ORAL at 09:01

## 2019-07-04 RX ADMIN — HYDROCHLOROTHIAZIDE 25 MG: 25 TABLET ORAL at 09:01

## 2019-07-04 RX ADMIN — ACETAMINOPHEN 650 MG: 325 TABLET, FILM COATED ORAL at 02:14

## 2019-07-04 RX ADMIN — IBUPROFEN 400 MG: 400 TABLET ORAL at 23:16

## 2019-07-04 RX ADMIN — HYDROXYZINE HYDROCHLORIDE 50 MG: 50 TABLET, FILM COATED ORAL at 23:12

## 2019-07-04 ASSESSMENT — PAIN DESCRIPTION - LOCATION
LOCATION: HEAD
LOCATION: HEAD

## 2019-07-04 ASSESSMENT — PAIN SCALES - GENERAL
PAINLEVEL_OUTOF10: 0
PAINLEVEL_OUTOF10: 3

## 2019-07-04 ASSESSMENT — PAIN DESCRIPTION - PAIN TYPE
TYPE: ACUTE PAIN
TYPE: ACUTE PAIN

## 2019-07-04 ASSESSMENT — PAIN DESCRIPTION - DESCRIPTORS: DESCRIPTORS: ACHING

## 2019-07-04 NOTE — GROUP NOTE
Group Therapy Note    Date: July 4    Group Start Time: 1000  Group End Time: 1045  Group Topic: Psychoeducation    [facility]    RHONDA Nguyen        Group Therapy Note    Attendees: 11/21         Patient's Goal:  Pt participated with mental health question ball game. Was able to process questions and discuss with group.      Notes:  Pt continues to have flight of ideas, hard to redirect, intrusive     Status After Intervention:  Unchanged    Participation Level: Interactive and Monopolizing    Participation Quality: Intrusive      Speech:  pressured      Thought Process/Content: Linear  Flight of ideas      Affective Functioning: Exaggerated      Mood: anxious      Level of consciousness:  Oriented x4      Response to Learning: Progressing to goal      Endings: None Reported    Modes of Intervention: Education, Support and Socialization      Discipline Responsible: /Counselor      Signature:  RHONDA Nguyen

## 2019-07-04 NOTE — PROGRESS NOTES
Department of Psychiatry  Nurse Practitioner Progress Note    Chief Complaint: Bipolar 1 disorder, manic, moderate ([facility] [address]     SUBJECTIVE: Patient is seen in her room. She continues to be hyper-verbal and easily distracted but less than yesterday. She remains difficult to understand due to the volume and rapidness of her speech. She received Xanax 2mg PO yesterday with reduced symptoms. She is able to verbalize that she is not ready for discharge. She stated that she has significant medication allergies and remains focused on her allergy list as well as sexual dysfunction from medications. She denies SI, HI, hallucinations, depression and anxiety. She is very aware of her prince. Per social work note, [facility]  is seeking [address] placement due to history of arrests. It is felt does not feel pt is safe to dc to community and has already been found incompetent with most recent legal charges. To-date, we have not received an update. Currently patient does not have a Guardian. Patient is requesting STI testing. Order was placed on 7/3/19 but no results are available at this time. Patient denies any side effects of the medication. Chart and medications reviewed. Therapeutic support, empathetic care and psycho education provided greater than 20 minutes. At this time there is no safe alternative other than inpatient care. OBJECTIVE    Physical  VITALS:    /76   Pulse 103   Temp 98.3 °F (36.8 °C) (Oral)   Resp 14   Ht 5' 8\" (1.727 m)   Wt 157 lb (71.2 kg)   SpO2 98%   BMI 23.87 kg/m²     Mental Status Examination:    Level of consciousness:  Within normal limits  Appearance: Street clothes, seated, with good grooming  Behavior/Motor: Manic requiring redirection  Attitude toward examiner:  Cooperative, attentive, intense eye contact  Speech:  Spontaneous, thought blocking, normal volume rapid. Mood:  Elevated, manic  Affect:  Mood-congruent, wide in range, tearful.   Thought processes:  linear,

## 2019-07-05 LAB
C. TRACHOMATIS DNA ,URINE: NEGATIVE
N. GONORRHOEAE DNA, URINE: NEGATIVE
SPECIMEN DESCRIPTION: NORMAL

## 2019-07-05 PROCEDURE — 99232 SBSQ HOSP IP/OBS MODERATE 35: CPT | Performed by: NURSE PRACTITIONER

## 2019-07-05 PROCEDURE — 1240000000 HC EMOTIONAL WELLNESS R&B

## 2019-07-05 PROCEDURE — 6370000000 HC RX 637 (ALT 250 FOR IP): Performed by: PSYCHIATRY & NEUROLOGY

## 2019-07-05 PROCEDURE — 6370000000 HC RX 637 (ALT 250 FOR IP): Performed by: INTERNAL MEDICINE

## 2019-07-05 PROCEDURE — 6360000002 HC RX W HCPCS: Performed by: NURSE PRACTITIONER

## 2019-07-05 PROCEDURE — 6360000002 HC RX W HCPCS: Performed by: PSYCHIATRY & NEUROLOGY

## 2019-07-05 PROCEDURE — 6370000000 HC RX 637 (ALT 250 FOR IP): Performed by: NURSE PRACTITIONER

## 2019-07-05 RX ORDER — LORAZEPAM 2 MG/ML
2 INJECTION INTRAMUSCULAR EVERY 6 HOURS PRN
Status: DISCONTINUED | OUTPATIENT
Start: 2019-07-05 | End: 2019-07-06

## 2019-07-05 RX ADMIN — HALOPERIDOL LACTATE 10 MG: 5 INJECTION INTRAMUSCULAR at 17:03

## 2019-07-05 RX ADMIN — ACETAMINOPHEN 650 MG: 325 TABLET, FILM COATED ORAL at 08:21

## 2019-07-05 RX ADMIN — CLONAZEPAM 1 MG: 1 TABLET ORAL at 08:06

## 2019-07-05 RX ADMIN — Medication 1 MG: at 23:38

## 2019-07-05 RX ADMIN — DIPHENHYDRAMINE HYDROCHLORIDE 50 MG: 50 INJECTION, SOLUTION INTRAMUSCULAR; INTRAVENOUS at 17:04

## 2019-07-05 RX ADMIN — TRAZODONE HYDROCHLORIDE 50 MG: 50 TABLET ORAL at 23:58

## 2019-07-05 RX ADMIN — ACETAMINOPHEN 650 MG: 325 TABLET, FILM COATED ORAL at 22:50

## 2019-07-05 RX ADMIN — LORAZEPAM 2 MG: 2 INJECTION INTRAMUSCULAR at 19:43

## 2019-07-05 RX ADMIN — ACETAMINOPHEN 650 MG: 325 TABLET, FILM COATED ORAL at 15:06

## 2019-07-05 RX ADMIN — DIPHENHYDRAMINE HYDROCHLORIDE 50 MG: 50 INJECTION, SOLUTION INTRAMUSCULAR; INTRAVENOUS at 00:58

## 2019-07-05 RX ADMIN — CLONAZEPAM 1 MG: 1 TABLET ORAL at 22:50

## 2019-07-05 RX ADMIN — HYDROXYZINE HYDROCHLORIDE 50 MG: 50 TABLET, FILM COATED ORAL at 08:21

## 2019-07-05 RX ADMIN — DOCUSATE SODIUM 100 MG: 100 CAPSULE, LIQUID FILLED ORAL at 08:21

## 2019-07-05 RX ADMIN — CARIPRAZINE 6 MG: 3 CAPSULE, GELATIN COATED ORAL at 08:06

## 2019-07-05 RX ADMIN — HALOPERIDOL LACTATE 10 MG: 5 INJECTION INTRAMUSCULAR at 00:58

## 2019-07-05 RX ADMIN — CLONAZEPAM 1 MG: 1 TABLET ORAL at 14:52

## 2019-07-05 RX ADMIN — HYDROCHLOROTHIAZIDE 25 MG: 25 TABLET ORAL at 08:06

## 2019-07-05 RX ADMIN — HYDROXYZINE HYDROCHLORIDE 50 MG: 50 TABLET, FILM COATED ORAL at 23:38

## 2019-07-05 ASSESSMENT — PAIN DESCRIPTION - DESCRIPTORS
DESCRIPTORS: HEADACHE
DESCRIPTORS: ACHING;HEADACHE

## 2019-07-05 ASSESSMENT — PAIN SCALES - GENERAL
PAINLEVEL_OUTOF10: 3
PAINLEVEL_OUTOF10: 0
PAINLEVEL_OUTOF10: 3
PAINLEVEL_OUTOF10: 1
PAINLEVEL_OUTOF10: 3
PAINLEVEL_OUTOF10: 1

## 2019-07-05 ASSESSMENT — PAIN DESCRIPTION - PROGRESSION: CLINICAL_PROGRESSION: NOT CHANGED

## 2019-07-05 ASSESSMENT — PAIN DESCRIPTION - PAIN TYPE
TYPE: ACUTE PAIN
TYPE: ACUTE PAIN

## 2019-07-05 ASSESSMENT — PAIN DESCRIPTION - LOCATION
LOCATION: GENERALIZED
LOCATION: HEAD
LOCATION: HEAD

## 2019-07-05 NOTE — GROUP NOTE
Group Therapy Note    Date: July 5    Group Start Time: 1330  Group End Time: 1400  Group Topic: Cognitive Skills    CZ JARAD Prasad, CTRS        Group Therapy Note    Pt did not attend Therapeutic Recreation d/t resting in room despite staff invitation to attend. 1:1 talk time offered as alternative to group session, pt declined.
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
## 2019-07-05 NOTE — PROGRESS NOTES
Department of Psychiatry  Nurse Practitioner Progress Note    Chief Complaint: Bipolar 1 disorder, manic, moderate ([facility] [address]     SUBJECTIVE: Patient is seen in the day room today. She continues to be hyper-verbal and easily distracted with increased intrusive behaviors towards others. She is seen \"praying\" with one of the new patients not allowing him to not engage with her. She remains difficult to understand due to the volume and rapidness of her speech. She continues to have loose associations, flight of ideas and disorganized thoughts. She is able to verbalize that she is not ready for discharge. She denies SI, HI, hallucinations, depression and anxiety. She is very aware of her prince however; she believes that she is progressing in a positive manner and this is clearly not the case. Per social work note, [facility]  is seeking [address] placement due to history of arrests. It is felt does not feel pt is safe to dc to community and has already been found incompetent with most recent legal charges. To-date, we have not received an update and we are diligently trying to contact her  and/or . Currently patient does not have a Guardian. Patient denies any side effects of the medication. Chart and medications reviewed. Therapeutic support, empathetic care and psycho education provided. At this time there is no safe alternative other than inpatient care. OBJECTIVE    Physical  VITALS:    BP (!) 109/95   Pulse 113   Temp 98.2 °F (36.8 °C) (Oral)   Resp 13   Ht 5' 8\" (1.727 m)   Wt 157 lb (71.2 kg)   SpO2 98%   BMI 23.87 kg/m²     Mental Status Examination:    Level of consciousness:  Within normal limits  Appearance: Hospital clothes, standing, with fair grooming  Behavior/Motor: Manic requiring redirection  Attitude toward examiner:  Cooperative, attentive, intense eye contact  Speech:  Spontaneous, thought blocking, normal volume rapid.   Mood:  Elevated, manic  Affect:

## 2019-07-06 PROCEDURE — 6370000000 HC RX 637 (ALT 250 FOR IP): Performed by: INTERNAL MEDICINE

## 2019-07-06 PROCEDURE — 6360000002 HC RX W HCPCS: Performed by: PSYCHIATRY & NEUROLOGY

## 2019-07-06 PROCEDURE — 1240000000 HC EMOTIONAL WELLNESS R&B

## 2019-07-06 PROCEDURE — 6360000002 HC RX W HCPCS: Performed by: NURSE PRACTITIONER

## 2019-07-06 PROCEDURE — 6370000000 HC RX 637 (ALT 250 FOR IP): Performed by: NURSE PRACTITIONER

## 2019-07-06 PROCEDURE — 6370000000 HC RX 637 (ALT 250 FOR IP): Performed by: PSYCHIATRY & NEUROLOGY

## 2019-07-06 PROCEDURE — 99232 SBSQ HOSP IP/OBS MODERATE 35: CPT | Performed by: NURSE PRACTITIONER

## 2019-07-06 RX ORDER — HALOPERIDOL 5 MG/ML
10 INJECTION INTRAMUSCULAR ONCE
Status: COMPLETED | OUTPATIENT
Start: 2019-07-06 | End: 2019-07-06

## 2019-07-06 RX ORDER — LITHIUM CARBONATE 300 MG/1
300 TABLET, FILM COATED, EXTENDED RELEASE ORAL EVERY 12 HOURS SCHEDULED
Status: DISCONTINUED | OUTPATIENT
Start: 2019-07-06 | End: 2019-07-10

## 2019-07-06 RX ADMIN — CLONAZEPAM 1 MG: 1 TABLET ORAL at 22:15

## 2019-07-06 RX ADMIN — LITHIUM CARBONATE 300 MG: 300 TABLET, FILM COATED, EXTENDED RELEASE ORAL at 22:15

## 2019-07-06 RX ADMIN — HALOPERIDOL LACTATE 10 MG: 5 INJECTION INTRAMUSCULAR at 04:58

## 2019-07-06 RX ADMIN — HALOPERIDOL LACTATE 5 MG: 5 INJECTION INTRAMUSCULAR at 13:50

## 2019-07-06 RX ADMIN — CLONAZEPAM 1 MG: 1 TABLET ORAL at 14:14

## 2019-07-06 RX ADMIN — ACETAMINOPHEN 650 MG: 325 TABLET, FILM COATED ORAL at 12:53

## 2019-07-06 RX ADMIN — ACETAMINOPHEN 650 MG: 325 TABLET, FILM COATED ORAL at 18:14

## 2019-07-06 RX ADMIN — Medication 1 MG: at 22:15

## 2019-07-06 RX ADMIN — LORAZEPAM 2 MG: 2 INJECTION INTRAMUSCULAR at 01:40

## 2019-07-06 RX ADMIN — TRAZODONE HYDROCHLORIDE 50 MG: 50 TABLET ORAL at 23:19

## 2019-07-06 RX ADMIN — CARIPRAZINE 6 MG: 3 CAPSULE, GELATIN COATED ORAL at 08:33

## 2019-07-06 RX ADMIN — HYDROCHLOROTHIAZIDE 25 MG: 25 TABLET ORAL at 08:33

## 2019-07-06 RX ADMIN — HYDROXYZINE HYDROCHLORIDE 50 MG: 50 TABLET, FILM COATED ORAL at 22:15

## 2019-07-06 RX ADMIN — IBUPROFEN 400 MG: 400 TABLET ORAL at 23:05

## 2019-07-06 RX ADMIN — ACETAMINOPHEN 650 MG: 325 TABLET, FILM COATED ORAL at 23:06

## 2019-07-06 RX ADMIN — HYDROXYZINE HYDROCHLORIDE 50 MG: 50 TABLET, FILM COATED ORAL at 12:52

## 2019-07-06 RX ADMIN — DIPHENHYDRAMINE HYDROCHLORIDE 50 MG: 50 INJECTION, SOLUTION INTRAMUSCULAR; INTRAVENOUS at 04:58

## 2019-07-06 RX ADMIN — LORAZEPAM 2 MG: 2 INJECTION INTRAMUSCULAR at 13:40

## 2019-07-06 ASSESSMENT — PAIN SCALES - GENERAL
PAINLEVEL_OUTOF10: 0
PAINLEVEL_OUTOF10: 3
PAINLEVEL_OUTOF10: 7
PAINLEVEL_OUTOF10: 3
PAINLEVEL_OUTOF10: 9
PAINLEVEL_OUTOF10: 0
PAINLEVEL_OUTOF10: 5

## 2019-07-06 ASSESSMENT — PAIN DESCRIPTION - LOCATION: LOCATION: GENERALIZED

## 2019-07-06 NOTE — GROUP NOTE
Group Therapy Note    Date: July 6    Group Start Time: 1330  Group End Time: 0076  Group Topic: Healthy Living/Wellness    [address]        Group Therapy Note    Pt did not attend Therapeutic Recreation d/t resting in room despite staff invitation to attend. 1:1 talk time offered as alternative to group session, pt declined.

## 2019-07-06 NOTE — BH NOTE
Music Therapy Note     Date: July 6     Group Start Time: 1782  Group End Time: 4717  Group Topic: Music Therapy Group      [facility]    Josesito Ortez RN           Group Therapy Note     Attendees: 11/20           Patient's Goal: Patient will verbalize positive skills through music related to anxiety.      Notes:  Patient attended group and participated fully      Status After Intervention:  Improved     Participation Level:  Active Listener and Interactive     Participation Quality: Appropriate, Attentive and Sharing        Speech:  normal        Thought Process/Content: Logical        Affective Functioning: Congruent        Mood: euthymic        Level of consciousness:  Alert and Oriented x4        Response to Learning: Progressing to goal        Endings: None Reported     Modes of Intervention: Education, Socialization, Activity and Movement        Discipline Responsible: Registered Nurse         Signature:  Josesito Ortez RN
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
## 2019-07-07 PROCEDURE — 1240000000 HC EMOTIONAL WELLNESS R&B

## 2019-07-07 PROCEDURE — 6370000000 HC RX 637 (ALT 250 FOR IP): Performed by: PSYCHIATRY & NEUROLOGY

## 2019-07-07 PROCEDURE — 6370000000 HC RX 637 (ALT 250 FOR IP): Performed by: NURSE PRACTITIONER

## 2019-07-07 PROCEDURE — 6370000000 HC RX 637 (ALT 250 FOR IP): Performed by: INTERNAL MEDICINE

## 2019-07-07 PROCEDURE — 99232 SBSQ HOSP IP/OBS MODERATE 35: CPT | Performed by: NURSE PRACTITIONER

## 2019-07-07 RX ADMIN — HYDROXYZINE HYDROCHLORIDE 50 MG: 50 TABLET, FILM COATED ORAL at 22:39

## 2019-07-07 RX ADMIN — HYDROXYZINE HYDROCHLORIDE 50 MG: 50 TABLET, FILM COATED ORAL at 18:57

## 2019-07-07 RX ADMIN — LITHIUM CARBONATE 300 MG: 300 TABLET, FILM COATED, EXTENDED RELEASE ORAL at 08:24

## 2019-07-07 RX ADMIN — Medication 1 MG: at 22:36

## 2019-07-07 RX ADMIN — TRAZODONE HYDROCHLORIDE 50 MG: 50 TABLET ORAL at 22:37

## 2019-07-07 RX ADMIN — ACETAMINOPHEN 650 MG: 325 TABLET, FILM COATED ORAL at 22:37

## 2019-07-07 RX ADMIN — MAGNESIUM HYDROXIDE 30 ML: 400 SUSPENSION ORAL at 18:50

## 2019-07-07 RX ADMIN — DOCUSATE SODIUM 100 MG: 100 CAPSULE, LIQUID FILLED ORAL at 22:37

## 2019-07-07 RX ADMIN — HYDROCHLOROTHIAZIDE 25 MG: 25 TABLET ORAL at 08:24

## 2019-07-07 RX ADMIN — ACETAMINOPHEN 650 MG: 325 TABLET, FILM COATED ORAL at 10:27

## 2019-07-07 RX ADMIN — LITHIUM CARBONATE 300 MG: 300 TABLET, FILM COATED, EXTENDED RELEASE ORAL at 22:37

## 2019-07-07 RX ADMIN — CLONAZEPAM 1 MG: 1 TABLET ORAL at 08:24

## 2019-07-07 RX ADMIN — CLONAZEPAM 1 MG: 1 TABLET ORAL at 14:09

## 2019-07-07 RX ADMIN — CLONAZEPAM 1 MG: 1 TABLET ORAL at 22:37

## 2019-07-07 RX ADMIN — CARIPRAZINE 6 MG: 3 CAPSULE, GELATIN COATED ORAL at 08:24

## 2019-07-07 ASSESSMENT — PAIN SCALES - GENERAL
PAINLEVEL_OUTOF10: 2
PAINLEVEL_OUTOF10: 0
PAINLEVEL_OUTOF10: 9

## 2019-07-07 ASSESSMENT — PAIN DESCRIPTION - PAIN TYPE: TYPE: ACUTE PAIN

## 2019-07-07 ASSESSMENT — PAIN DESCRIPTION - PROGRESSION: CLINICAL_PROGRESSION: NOT CHANGED

## 2019-07-07 ASSESSMENT — PAIN DESCRIPTION - ONSET: ONSET: ON-GOING

## 2019-07-07 ASSESSMENT — PAIN DESCRIPTION - ORIENTATION: ORIENTATION: LEFT

## 2019-07-07 ASSESSMENT — PAIN DESCRIPTION - FREQUENCY: FREQUENCY: CONTINUOUS

## 2019-07-07 ASSESSMENT — PAIN DESCRIPTION - LOCATION: LOCATION: ANKLE

## 2019-07-07 ASSESSMENT — PAIN - FUNCTIONAL ASSESSMENT: PAIN_FUNCTIONAL_ASSESSMENT: PREVENTS OR INTERFERES SOME ACTIVE ACTIVITIES AND ADLS

## 2019-07-07 ASSESSMENT — PAIN DESCRIPTION - DESCRIPTORS: DESCRIPTORS: ACHING

## 2019-07-07 NOTE — PROGRESS NOTES
Department of Psychiatry  Nurse Practitioner Progress Note    Chief Complaint: Bipolar 1 disorder, manic, moderate ([address]     SUBJECTIVE:  Patient was seen 1:1 in day area and room. Continues to have some paranoid thoughts at times. Is continuing to be pressured speech, hyperverbal, often seemingly is distracted. Upon approach patient wanted to discuss multiple aspects of her previous lifestyle outside of hospital.    Patient is improving with the use of lithium. Is fearful of multiple medications due to previous perception that she had sexual side effects. Did discuss how this may be psychological but also if patient has history of hypersexuality and she believes that is normal she may not be having any sexual side effects but just a lower libido due to not being manic. Patient denies any suicidal or homicidal thoughts. Patient cannot be adequately maintained outside of controlled environment due to her inability to make appropriate choices in a less confined area/milieu    OBJECTIVE    Physical  VITALS:    /83   Pulse 109   Temp 97.8 °F (36.6 °C) (Oral)   Resp 14   Ht 5' 8\" (1.727 m)   Wt 157 lb (71.2 kg)   SpO2 98%   BMI 23.87 kg/m²     Mental Status Examination:    Level of consciousness:  Within normal limits  Appearance: Street clothes, standing, with fair grooming, spiked hair  Behavior/Motor: Manic requiring redirection  Attitude toward examiner:  Cooperative, attentive, intense eye contact  Speech:  thought blocking, volume soft, pressured, hyper-verbal  Mood:  Elevated, manic  Affect:  Mood-congruent, wide in range  Thought processes:  linear, goal-directed and coherent  Thought content:  denies homicidal ideation  Suicidal Ideation:  No suicidal ideation  Delusions:  no evidence of delusions  Perceptual Disturbance:  No visual or auditory hallucinations. Cognition:  Intact  Memory: grossly intact.   Insight & Judgement: poor       Medications  Current Facility-Administered Medications: lithium (LITHOBID) extended release tablet 300 mg, 300 mg, Oral, 2 times per day  cariprazine hcl (VRAYLAR) capsule 6 mg, 6 mg, Oral, Daily  hydrOXYzine (ATARAX) tablet 50 mg, 50 mg, Oral, TID PRN  ibuprofen (ADVIL;MOTRIN) tablet 400 mg, 400 mg, Oral, TID PRN  diphenhydrAMINE (BENADRYL) injection 50 mg, 50 mg, Intramuscular, Q12H PRN **AND** haloperidol lactate (HALDOL) injection 10 mg, 10 mg, Intramuscular, Q12H PRN  docusate sodium (COLACE) capsule 100 mg, 100 mg, Oral, BID PRN  clonazePAM (KLONOPIN) tablet 1 mg, 1 mg, Oral, TID  hydrochlorothiazide (HYDRODIURIL) tablet 25 mg, 25 mg, Oral, Daily  traZODone (DESYREL) tablet 50 mg, 50 mg, Oral, Nightly PRN  nicotine (NICODERM CQ) 14 MG/24HR 1 patch, 1 patch, Transdermal, Daily  melatonin ER tablet 1 mg, 1 mg, Oral, Nightly PRN  benztropine mesylate (COGENTIN) injection 2 mg, 2 mg, Intramuscular, BID PRN  magnesium hydroxide (MILK OF MAGNESIA) 400 MG/5ML suspension 30 mL, 30 mL, Oral, Daily PRN  aluminum & magnesium hydroxide-simethicone (MAALOX) 200-200-20 MG/5ML suspension 15 mL, 15 mL, Oral, Q6H PRN  acetaminophen (TYLENOL) tablet 650 mg, 650 mg, Oral, Q4H PRN    ASSESSMENT     Principal Problem:    Bipolar 1 disorder, manic, moderate (HCC)  Active Problems:    Depression    Smoker    Bipolar 1 disorder (HCC)    Micaela ([facility] [address]  Resolved Problems:    * No resolved hospital problems. *      PLAN  · Manic symptoms are slow to improve  · Continue clonazepam 1 mg 3 times a day temporarily. Continue to evaluate for titration. · Continue Vraylar to 6mg beginning 7/3/19. May need alternative antipsychotic  · One-time order for Haldol 10mg Inj. 7/6/19. For agitation. · New Order - Lithium 300mg BID beginning 7/6/19. · EKG ordered. TSH and kidney function tests completed 6/23/19. · Lithium Level ordered for 7/12/19. Level to be drawn every 7 days thereafter. · Continue unit milieu and group psychotherapy.   · Continue to coordinate discharge with social work and probation

## 2019-07-08 PROBLEM — F31.12 BIPOLAR 1 DISORDER, MANIC, MODERATE (HCC): Chronic | Status: ACTIVE | Noted: 2019-06-27

## 2019-07-08 PROCEDURE — 6370000000 HC RX 637 (ALT 250 FOR IP): Performed by: NURSE PRACTITIONER

## 2019-07-08 PROCEDURE — 99232 SBSQ HOSP IP/OBS MODERATE 35: CPT | Performed by: PSYCHIATRY & NEUROLOGY

## 2019-07-08 PROCEDURE — 6370000000 HC RX 637 (ALT 250 FOR IP): Performed by: INTERNAL MEDICINE

## 2019-07-08 PROCEDURE — 6370000000 HC RX 637 (ALT 250 FOR IP): Performed by: PSYCHIATRY & NEUROLOGY

## 2019-07-08 PROCEDURE — 1240000000 HC EMOTIONAL WELLNESS R&B

## 2019-07-08 RX ADMIN — TRAZODONE HYDROCHLORIDE 50 MG: 50 TABLET ORAL at 22:44

## 2019-07-08 RX ADMIN — IBUPROFEN 400 MG: 400 TABLET ORAL at 23:19

## 2019-07-08 RX ADMIN — CARIPRAZINE 6 MG: 3 CAPSULE, GELATIN COATED ORAL at 08:23

## 2019-07-08 RX ADMIN — CLONAZEPAM 1 MG: 1 TABLET ORAL at 14:24

## 2019-07-08 RX ADMIN — LITHIUM CARBONATE 300 MG: 300 TABLET, FILM COATED, EXTENDED RELEASE ORAL at 22:45

## 2019-07-08 RX ADMIN — HYDROXYZINE HYDROCHLORIDE 50 MG: 50 TABLET, FILM COATED ORAL at 12:28

## 2019-07-08 RX ADMIN — Medication 1 MG: at 22:45

## 2019-07-08 RX ADMIN — ACETAMINOPHEN 650 MG: 325 TABLET, FILM COATED ORAL at 07:10

## 2019-07-08 RX ADMIN — CLONAZEPAM 1 MG: 1 TABLET ORAL at 22:45

## 2019-07-08 RX ADMIN — ACETAMINOPHEN 650 MG: 325 TABLET, FILM COATED ORAL at 19:41

## 2019-07-08 RX ADMIN — DOCUSATE SODIUM 100 MG: 100 CAPSULE, LIQUID FILLED ORAL at 23:19

## 2019-07-08 RX ADMIN — HYDROCHLOROTHIAZIDE 25 MG: 25 TABLET ORAL at 08:23

## 2019-07-08 RX ADMIN — CLONAZEPAM 1 MG: 1 TABLET ORAL at 08:23

## 2019-07-08 RX ADMIN — LITHIUM CARBONATE 300 MG: 300 TABLET, FILM COATED, EXTENDED RELEASE ORAL at 08:23

## 2019-07-08 RX ADMIN — HYDROXYZINE HYDROCHLORIDE 50 MG: 50 TABLET, FILM COATED ORAL at 19:41

## 2019-07-08 RX ADMIN — ACETAMINOPHEN 650 MG: 325 TABLET, FILM COATED ORAL at 14:25

## 2019-07-08 RX ADMIN — HYDROXYZINE HYDROCHLORIDE 50 MG: 50 TABLET, FILM COATED ORAL at 22:44

## 2019-07-08 ASSESSMENT — PAIN DESCRIPTION - LOCATION
LOCATION: BACK
LOCATION: BACK

## 2019-07-08 ASSESSMENT — PAIN SCALES - GENERAL
PAINLEVEL_OUTOF10: 3
PAINLEVEL_OUTOF10: 0
PAINLEVEL_OUTOF10: 3

## 2019-07-08 ASSESSMENT — PAIN DESCRIPTION - PAIN TYPE: TYPE: ACUTE PAIN

## 2019-07-08 NOTE — GROUP NOTE
Group Therapy Note    Date: July 8    Group Start Time: 1330  Group End Time: 1415  Group Topic: Recreational    [facility] [place], [address]    Attendees: 12         Patient's Goal:  To demonstrate increased interpersonal skills. Notes:  Patient attended group and actively participated in task at hand. Patient conversed appropriately with peers and  Marking. Status After Intervention:  Improved    Participation Level:  Active Listener and Interactive    Participation Quality: Appropriate, Attentive and Sharing      Speech:  normal      Thought Process/Content: Logical      Affective Functioning: Congruent      Mood: euthymic      Level of consciousness:  Alert, Oriented x4 and Attentive      Response to Learning: Able to verbalize current knowledge/experience, Able to verbalize/acknowledge new learning, Able to retain information, Capable of insight and Progressing to goal      Endings: None Reported       Modes of Intervention: Socialization, Exploration, Clarifying, Problem-solving, Activity, Limit-setting and Reality-testing      Discipline Responsible: Psychoeducational Specialist      Signature:  Sherrilee Boxer

## 2019-07-08 NOTE — GROUP NOTE
Group Therapy Note    Date: July 7    Group Start Time: 0800  Group End Time: 0845  Group Topic: Wrap-Up    [facility]    Albert Oliva LPN    Patient actively participated in 2000 Wrap-up group session.      Group Therapy Note    Attendees: 16               Signature:  Albert Oliva LPN

## 2019-07-09 PROCEDURE — 1240000000 HC EMOTIONAL WELLNESS R&B

## 2019-07-09 PROCEDURE — 6370000000 HC RX 637 (ALT 250 FOR IP): Performed by: PSYCHIATRY & NEUROLOGY

## 2019-07-09 PROCEDURE — 6370000000 HC RX 637 (ALT 250 FOR IP): Performed by: INTERNAL MEDICINE

## 2019-07-09 PROCEDURE — 6370000000 HC RX 637 (ALT 250 FOR IP): Performed by: NURSE PRACTITIONER

## 2019-07-09 PROCEDURE — 99232 SBSQ HOSP IP/OBS MODERATE 35: CPT | Performed by: PSYCHIATRY & NEUROLOGY

## 2019-07-09 RX ADMIN — HYDROXYZINE HYDROCHLORIDE 50 MG: 50 TABLET, FILM COATED ORAL at 22:55

## 2019-07-09 RX ADMIN — MAGNESIUM HYDROXIDE 30 ML: 400 SUSPENSION ORAL at 02:56

## 2019-07-09 RX ADMIN — CLONAZEPAM 1 MG: 1 TABLET ORAL at 08:10

## 2019-07-09 RX ADMIN — LITHIUM CARBONATE 300 MG: 300 TABLET, FILM COATED, EXTENDED RELEASE ORAL at 22:55

## 2019-07-09 RX ADMIN — IBUPROFEN 400 MG: 400 TABLET ORAL at 22:55

## 2019-07-09 RX ADMIN — HYDROXYZINE HYDROCHLORIDE 50 MG: 50 TABLET, FILM COATED ORAL at 18:43

## 2019-07-09 RX ADMIN — ACETAMINOPHEN 650 MG: 325 TABLET, FILM COATED ORAL at 20:20

## 2019-07-09 RX ADMIN — TRAZODONE HYDROCHLORIDE 50 MG: 50 TABLET ORAL at 22:55

## 2019-07-09 RX ADMIN — DOCUSATE SODIUM 100 MG: 100 CAPSULE, LIQUID FILLED ORAL at 22:55

## 2019-07-09 RX ADMIN — CLONAZEPAM 1 MG: 1 TABLET ORAL at 22:55

## 2019-07-09 RX ADMIN — CARIPRAZINE 6 MG: 3 CAPSULE, GELATIN COATED ORAL at 08:11

## 2019-07-09 RX ADMIN — Medication 1 MG: at 22:54

## 2019-07-09 RX ADMIN — CLONAZEPAM 1 MG: 1 TABLET ORAL at 13:58

## 2019-07-09 RX ADMIN — HYDROXYZINE HYDROCHLORIDE 50 MG: 50 TABLET, FILM COATED ORAL at 08:12

## 2019-07-09 RX ADMIN — HYDROCHLOROTHIAZIDE 25 MG: 25 TABLET ORAL at 08:16

## 2019-07-09 RX ADMIN — LITHIUM CARBONATE 300 MG: 300 TABLET, FILM COATED, EXTENDED RELEASE ORAL at 08:12

## 2019-07-09 ASSESSMENT — PAIN SCALES - GENERAL
PAINLEVEL_OUTOF10: 7
PAINLEVEL_OUTOF10: 3
PAINLEVEL_OUTOF10: 3
PAINLEVEL_OUTOF10: 0
PAINLEVEL_OUTOF10: 6
PAINLEVEL_OUTOF10: 0

## 2019-07-09 ASSESSMENT — PAIN DESCRIPTION - PAIN TYPE
TYPE: ACUTE PAIN

## 2019-07-09 ASSESSMENT — PAIN DESCRIPTION - LOCATION
LOCATION: LEG
LOCATION: GENERALIZED
LOCATION: GENERALIZED

## 2019-07-09 NOTE — BH NOTE
- CNP   6 mg at 07/09/19 0811    hydrOXYzine (ATARAX) tablet 50 mg  50 mg Oral TID PRN Lorena Fine MD   50 mg at 07/09/19 0812    ibuprofen (ADVIL;MOTRIN) tablet 400 mg  400 mg Oral TID PRN Neal Delgado MD   400 mg at 07/08/19 2319    diphenhydrAMINE (BENADRYL) injection 50 mg  50 mg Intramuscular Q12H PRN Senora Dole, APRN - CNP   50 mg at 07/06/19 0458    And    haloperidol lactate (HALDOL) injection 10 mg  10 mg Intramuscular Q12H PRN Senora Dole, APRN - CNP   10 mg at 07/06/19 0458    docusate sodium (COLACE) capsule 100 mg  100 mg Oral BID PRN Lorena Fine MD   100 mg at 07/08/19 2319    clonazePAM (KLONOPIN) tablet 1 mg  1 mg Oral TID Lorena Fine MD   1 mg at 07/09/19 0810    hydrochlorothiazide (HYDRODIURIL) tablet 25 mg  25 mg Oral Daily Neal Delgado MD   25 mg at 07/09/19 0816    traZODone (DESYREL) tablet 50 mg  50 mg Oral Nightly PRN Lorena Fine MD   50 mg at 07/08/19 2244    nicotine (NICODERM CQ) 14 MG/24HR 1 patch  1 patch Transdermal Daily Lorena Fine MD   1 patch at 07/09/19 0813    melatonin ER tablet 1 mg  1 mg Oral Nightly PRN Lorena Fine MD   1 mg at 07/08/19 2245    benztropine mesylate (COGENTIN) injection 2 mg  2 mg Intramuscular BID PRN Lorena Fine MD        magnesium hydroxide (MILK OF MAGNESIA) 400 MG/5ML suspension 30 mL  30 mL Oral Daily PRN Lorena Fine MD   30 mL at 07/09/19 0256    aluminum & magnesium hydroxide-simethicone (MAALOX) 200-200-20 MG/5ML suspension 15 mL  15 mL Oral Q6H PRN Lorena Fine MD        acetaminophen (TYLENOL) tablet 650 mg  650 mg Oral Q4H PRN Lorena Fine MD   650 mg at 07/08/19 1941         lithium  300 mg Oral 2 times per day    cariprazine hcl  6 mg Oral Daily    clonazePAM  1 mg Oral TID    hydrochlorothiazide  25 mg Oral Daily    nicotine  1 patch Transdermal Daily       ASSESSMENT  Bipolar 1 disorder, manic, moderate (HCC)     Patient's Response to

## 2019-07-10 LAB
LITHIUM DATE LAST DOSE: ABNORMAL
LITHIUM DOSE AMOUNT: ABNORMAL
LITHIUM DOSE TIME: 2255
LITHIUM LEVEL: 0.4 MMOL/L (ref 0.6–1.2)

## 2019-07-10 PROCEDURE — 99232 SBSQ HOSP IP/OBS MODERATE 35: CPT | Performed by: PSYCHIATRY & NEUROLOGY

## 2019-07-10 PROCEDURE — 80178 ASSAY OF LITHIUM: CPT

## 2019-07-10 PROCEDURE — 6370000000 HC RX 637 (ALT 250 FOR IP): Performed by: PSYCHIATRY & NEUROLOGY

## 2019-07-10 PROCEDURE — 1240000000 HC EMOTIONAL WELLNESS R&B

## 2019-07-10 PROCEDURE — 36415 COLL VENOUS BLD VENIPUNCTURE: CPT

## 2019-07-10 PROCEDURE — 6370000000 HC RX 637 (ALT 250 FOR IP): Performed by: INTERNAL MEDICINE

## 2019-07-10 PROCEDURE — 6370000000 HC RX 637 (ALT 250 FOR IP): Performed by: NURSE PRACTITIONER

## 2019-07-10 RX ORDER — LITHIUM CARBONATE 450 MG
450 TABLET, EXTENDED RELEASE ORAL EVERY 12 HOURS SCHEDULED
Status: DISCONTINUED | OUTPATIENT
Start: 2019-07-10 | End: 2019-07-22 | Stop reason: HOSPADM

## 2019-07-10 RX ADMIN — CLONAZEPAM 1 MG: 1 TABLET ORAL at 14:18

## 2019-07-10 RX ADMIN — CLONAZEPAM 1 MG: 1 TABLET ORAL at 22:34

## 2019-07-10 RX ADMIN — LITHIUM CARBONATE 300 MG: 300 TABLET, FILM COATED, EXTENDED RELEASE ORAL at 09:36

## 2019-07-10 RX ADMIN — ACETAMINOPHEN 650 MG: 325 TABLET, FILM COATED ORAL at 09:36

## 2019-07-10 RX ADMIN — DOCUSATE SODIUM 100 MG: 100 CAPSULE, LIQUID FILLED ORAL at 22:34

## 2019-07-10 RX ADMIN — HYDROXYZINE HYDROCHLORIDE 50 MG: 50 TABLET, FILM COATED ORAL at 22:34

## 2019-07-10 RX ADMIN — Medication 1 MG: at 22:34

## 2019-07-10 RX ADMIN — HYDROXYZINE HYDROCHLORIDE 50 MG: 50 TABLET, FILM COATED ORAL at 06:29

## 2019-07-10 RX ADMIN — CLONAZEPAM 1 MG: 1 TABLET ORAL at 09:28

## 2019-07-10 RX ADMIN — DOCUSATE SODIUM 100 MG: 100 CAPSULE, LIQUID FILLED ORAL at 14:18

## 2019-07-10 RX ADMIN — CARIPRAZINE 6 MG: 3 CAPSULE, GELATIN COATED ORAL at 09:28

## 2019-07-10 RX ADMIN — HYDROCHLOROTHIAZIDE 25 MG: 25 TABLET ORAL at 09:28

## 2019-07-10 RX ADMIN — HYDROXYZINE HYDROCHLORIDE 50 MG: 50 TABLET, FILM COATED ORAL at 17:21

## 2019-07-10 RX ADMIN — LITHIUM CARBONATE 450 MG: 450 TABLET, EXTENDED RELEASE ORAL at 22:34

## 2019-07-10 RX ADMIN — TRAZODONE HYDROCHLORIDE 50 MG: 50 TABLET ORAL at 22:34

## 2019-07-10 RX ADMIN — ACETAMINOPHEN 650 MG: 325 TABLET, FILM COATED ORAL at 23:55

## 2019-07-10 ASSESSMENT — PAIN SCALES - GENERAL
PAINLEVEL_OUTOF10: 3
PAINLEVEL_OUTOF10: 4
PAINLEVEL_OUTOF10: 3

## 2019-07-10 ASSESSMENT — PAIN DESCRIPTION - PAIN TYPE: TYPE: ACUTE PAIN

## 2019-07-10 ASSESSMENT — PAIN DESCRIPTION - LOCATION
LOCATION: GENERALIZED
LOCATION: GENERALIZED

## 2019-07-10 NOTE — BH NOTE
Department of Psychiatry  Attending Progress Note  Chief Complaint: Bipolar 1 disorder, manic, moderate ([address]     SUBJECTIVE:    She feels that people are trying to kill her. She believes that everybody is against her and they are trying to poison her. She has pressured speech. She has flight of ideas. She had delusions of grandeur. She is restless and anxious and agitated. She has no insight. Her judgment is impaired. OBJECTIVE    Physical  BP (!) 120/90   Pulse 108   Temp 97.5 °F (36.4 °C) (Oral)   Resp 14   Ht 5' 8\" (1.727 m)   Wt 157 lb (71.2 kg)   SpO2 98%   BMI 23.87 kg/m²      Mental Status Evaluation:  Patient has a rambling speech. She is talking about several things at one time. Patient has thought blocking and thought withdrawal.    Patient has restricted mood and affect. She has poor eye contact. She has lose associations. She has derailment. She has delusions of persecution and delusions of referenece. She is responding actively to auditory hallucinations and talking back to the voices. She has poor impulse control. She has suicidal thoughts and plans to overdose on medications and die. She has no insight. Her judgement is impaired.          Recent Results (from the past 72 hour(s))   LITHIUM LEVEL    Collection Time: 07/10/19  8:35 AM   Result Value Ref Range    Lithium Lvl 0.4 (L) 0.6 - 1.2 mmol/L    Lithium Dose Amount 300 MG     Lithium Date Last Dose 014121     [place] Dose Time 2,255      Review of systems  Constitutional:  negative for chills, fevers, sweats  Respiratory:  negative for cough, dyspnea on exertion, hemoptysis, shortness of breath, wheezing  Cardiovascular:  negative for chest pain, chest pressure/discomfort, lower extremity edema, palpitations  Gastrointestinal:  negative for abdominal pain, constipation, diarrhea, nausea, vomiting  Neurological:  negative for dizziness, headache  Medications  Current Facility-Administered Medications Medication Dose Route Frequency Provider Last Rate Last Dose    lithium (LITHOBID) extended release tablet 300 mg  300 mg Oral 2 times per day MAURICE Haskins - CNP   300 mg at 07/10/19 8029    cariprazine hcl (VRAYLAR) capsule 6 mg  6 mg Oral Daily David Priscillar, APRN - CNP   6 mg at 07/10/19 0005    hydrOXYzine (ATARAX) tablet 50 mg  50 mg Oral TID PRN Douglas Augustine MD   50 mg at 07/10/19 0629    ibuprofen (ADVIL;MOTRIN) tablet 400 mg  400 mg Oral TID PRN Bindu Degroot MD   400 mg at 07/09/19 2255    diphenhydrAMINE (BENADRYL) injection 50 mg  50 mg Intramuscular Q12H PRN David Wellsr, APRN - CNP   50 mg at 07/06/19 0458    And    haloperidol lactate (HALDOL) injection 10 mg  10 mg Intramuscular Q12H PRN David Curry, APRN - CNP   10 mg at 07/06/19 0458    docusate sodium (COLACE) capsule 100 mg  100 mg Oral BID PRN Douglas Augustine MD   100 mg at 07/09/19 2255    clonazePAM (KLONOPIN) tablet 1 mg  1 mg Oral TID Douglas Augustine MD   1 mg at 07/10/19 0928    hydrochlorothiazide (HYDRODIURIL) tablet 25 mg  25 mg Oral Daily Bindu Degroot MD   25 mg at 07/10/19 0928    traZODone (DESYREL) tablet 50 mg  50 mg Oral Nightly PRN Douglas Augustine MD   50 mg at 07/09/19 2255    nicotine (NICODERM CQ) 14 MG/24HR 1 patch  1 patch Transdermal Daily Douglas Augustine MD   1 patch at 07/10/19 0932    melatonin ER tablet 1 mg  1 mg Oral Nightly PRN Douglas Augustine MD   1 mg at 07/09/19 2254    benztropine mesylate (COGENTIN) injection 2 mg  2 mg Intramuscular BID PRN Douglas Augustine MD        magnesium hydroxide (MILK OF MAGNESIA) 400 MG/5ML suspension 30 mL  30 mL Oral Daily PRN Douglas Augustine MD   30 mL at 07/09/19 0256    aluminum & magnesium hydroxide-simethicone (MAALOX) 200-200-20 MG/5ML suspension 15 mL  15 mL Oral Q6H PRN Douglas Augustine MD        acetaminophen (TYLENOL) tablet 650 mg  650 mg Oral Q4H PRN Douglas Augustine MD   650 mg at 07/10/19 5185         lithium  300 mg Oral 2 times per day    cariprazine hcl  6 mg Oral Daily    clonazePAM  1 mg Oral TID    hydrochlorothiazide  25 mg Oral Daily    nicotine  1 patch Transdermal Daily       ASSESSMENT  Bipolar 1 disorder, manic, moderate (HCC)     Patient's Response to Treatment: negative    PLAN  Dragon voice recognition software used in portions of this document. To continue current management. I am going to increase her lithium to 450 mg p.o. twice daily at the lithium levels are lower.

## 2019-07-10 NOTE — GROUP NOTE
Group Therapy Note    Date: July 10    Group Start Time: 1000  Group End Time: 1045  Group Topic: Psychotherapy    [facility]    RHONDA Vasquez      Group Therapy Note     Attendees: 5/8     Patient's Goal:  Pt will increase interpersonal skills and communicate emotions effectively     Notes:  Pt attended and actively participated in group     Status After Intervention:  Unchanged     Participation Level: None     Participation Quality: Appropriate        Speech:  Pt did not speak out loud, murmured to herself        Thought Process/Content: Hallucinating        Affective Functioning: Flat        Mood: Stable        Level of consciousness:  Alert        Response to Learning: Progressing to goal        Endings: None Reported        Modes of Intervention: Support, Socialization, Exploration and Clarifying        Discipline Responsible: /Counselor        Signature:  RHONDA Vasquez

## 2019-07-10 NOTE — GROUP NOTE
Group Therapy Note    Date: July 10    Group Start Time: 0920  Group End Time: 0935  Group Topic: Community Meeting    [facility]EDA    Terrial Nares, [place]    Attendees: 7         Patient's Goal for Today:  Discharge planning. Take medications. Notes:  Patient was intrusive multiple times throughout group requiring frequent redirection due to interrupting and talking over others. Patient was only present in group for approximately five minutes before leaving the room and not returning. Patient did not respond to redirection given.      Status After Intervention:  Unchanged    Participation Level: Interactive    Participation Quality: Sharing and Intrusive      Speech:  Slurred, rapid      Thought Process/Content: Flight of ideas      Affective Functioning: Blunted      Mood: anxious      Level of consciousness:  Alert and Oriented x4      Response to Learning: Able to verbalize current knowledge/experience and Capable of insight      Endings: None Reported       Modes of Intervention: Education, Support, Socialization, Exploration, Clarifying, Problem-solving, Confrontation, Limit-setting and Reality-testing      Discipline Responsible: Psychoeducational Specialist      Signature:  Courtney Lester

## 2019-07-11 PROCEDURE — 6370000000 HC RX 637 (ALT 250 FOR IP): Performed by: PSYCHIATRY & NEUROLOGY

## 2019-07-11 PROCEDURE — 6370000000 HC RX 637 (ALT 250 FOR IP): Performed by: NURSE PRACTITIONER

## 2019-07-11 PROCEDURE — 6370000000 HC RX 637 (ALT 250 FOR IP): Performed by: INTERNAL MEDICINE

## 2019-07-11 PROCEDURE — 99232 SBSQ HOSP IP/OBS MODERATE 35: CPT | Performed by: NURSE PRACTITIONER

## 2019-07-11 PROCEDURE — 1240000000 HC EMOTIONAL WELLNESS R&B

## 2019-07-11 PROCEDURE — 6360000002 HC RX W HCPCS: Performed by: NURSE PRACTITIONER

## 2019-07-11 RX ADMIN — CLONAZEPAM 1 MG: 1 TABLET ORAL at 08:46

## 2019-07-11 RX ADMIN — HYDROXYZINE HYDROCHLORIDE 50 MG: 50 TABLET, FILM COATED ORAL at 06:27

## 2019-07-11 RX ADMIN — HYDROCHLOROTHIAZIDE 25 MG: 25 TABLET ORAL at 08:47

## 2019-07-11 RX ADMIN — ACETAMINOPHEN 650 MG: 325 TABLET, FILM COATED ORAL at 21:21

## 2019-07-11 RX ADMIN — HYDROXYZINE HYDROCHLORIDE 50 MG: 50 TABLET, FILM COATED ORAL at 12:45

## 2019-07-11 RX ADMIN — LITHIUM CARBONATE 450 MG: 450 TABLET, EXTENDED RELEASE ORAL at 21:21

## 2019-07-11 RX ADMIN — IBUPROFEN 400 MG: 400 TABLET ORAL at 02:38

## 2019-07-11 RX ADMIN — HYDROXYZINE HYDROCHLORIDE 50 MG: 50 TABLET, FILM COATED ORAL at 21:21

## 2019-07-11 RX ADMIN — ACETAMINOPHEN 650 MG: 325 TABLET, FILM COATED ORAL at 14:15

## 2019-07-11 RX ADMIN — LITHIUM CARBONATE 450 MG: 450 TABLET, EXTENDED RELEASE ORAL at 08:46

## 2019-07-11 RX ADMIN — CARIPRAZINE 6 MG: 3 CAPSULE, GELATIN COATED ORAL at 08:46

## 2019-07-11 RX ADMIN — CLONAZEPAM 1 MG: 1 TABLET ORAL at 21:21

## 2019-07-11 RX ADMIN — DIPHENHYDRAMINE HYDROCHLORIDE 50 MG: 50 INJECTION, SOLUTION INTRAMUSCULAR; INTRAVENOUS at 14:58

## 2019-07-11 RX ADMIN — DOCUSATE SODIUM 100 MG: 100 CAPSULE, LIQUID FILLED ORAL at 08:48

## 2019-07-11 RX ADMIN — HALOPERIDOL LACTATE 10 MG: 5 INJECTION INTRAMUSCULAR at 14:57

## 2019-07-11 RX ADMIN — TRAZODONE HYDROCHLORIDE 50 MG: 50 TABLET ORAL at 21:21

## 2019-07-11 RX ADMIN — CLONAZEPAM 1 MG: 1 TABLET ORAL at 14:14

## 2019-07-11 RX ADMIN — Medication 2 MG: at 21:21

## 2019-07-11 ASSESSMENT — PAIN SCALES - GENERAL
PAINLEVEL_OUTOF10: 0
PAINLEVEL_OUTOF10: 3
PAINLEVEL_OUTOF10: 2
PAINLEVEL_OUTOF10: 9
PAINLEVEL_OUTOF10: 0

## 2019-07-11 ASSESSMENT — PAIN DESCRIPTION - DESCRIPTORS: DESCRIPTORS: ACHING

## 2019-07-11 ASSESSMENT — PAIN DESCRIPTION - LOCATION
LOCATION: GENERALIZED
LOCATION: HEAD

## 2019-07-11 ASSESSMENT — PAIN DESCRIPTION - PAIN TYPE: TYPE: ACUTE PAIN

## 2019-07-11 NOTE — BH NOTE
Pt is agitated as evidence by pacing, fidgeting, rapid breathing, crying, Staff attempted to relieve the distress by talking to pt, refocusing on new activity,administered  PRN 50 mg of Atarax Pt is currently walking around in group room.  Will continue to monitor for safety Q 15 min

## 2019-07-11 NOTE — GROUP NOTE
Group Therapy Note    Date: July 11    Group Start Time: 1000  Group End Time: 1045  Group Topic: Psychotherapy    [facility]    RHONDA Rome    Group Therapy Note     Attendees: 3/7     Patient's Goal:  Pt will increase interpersonal skills and communicate emotions effectively     Notes:  Pt attended and actively participated in group     Status After Intervention:  Unchanged     Participation Level: None     Participation Quality: Appropriate        Speech:  Pt did not speak out loud, murmured to herself        Thought Process/Content: Hallucinating        Affective Functioning: Flat        Mood: Stable        Level of consciousness:  Alert        Response to Learning: Progressing to goal        Endings: None Reported        Modes of Intervention: Support, Socialization, Exploration and Clarifying        Discipline Responsible: /Counselor        Signature:  RHONDA Rome

## 2019-07-11 NOTE — BH NOTE
patient refused to attend wellness group at 1600 after encouragement from staff.   1:1 talk time provided as alternative to group session

## 2019-07-12 LAB
LITHIUM DATE LAST DOSE: NORMAL
LITHIUM DOSE AMOUNT: 450
LITHIUM DOSE TIME: 2121
LITHIUM LEVEL: 0.6 MMOL/L (ref 0.6–1.2)

## 2019-07-12 PROCEDURE — 36415 COLL VENOUS BLD VENIPUNCTURE: CPT

## 2019-07-12 PROCEDURE — 99232 SBSQ HOSP IP/OBS MODERATE 35: CPT | Performed by: PSYCHIATRY & NEUROLOGY

## 2019-07-12 PROCEDURE — 6370000000 HC RX 637 (ALT 250 FOR IP): Performed by: PSYCHIATRY & NEUROLOGY

## 2019-07-12 PROCEDURE — 1240000000 HC EMOTIONAL WELLNESS R&B

## 2019-07-12 PROCEDURE — 6370000000 HC RX 637 (ALT 250 FOR IP): Performed by: NURSE PRACTITIONER

## 2019-07-12 PROCEDURE — 6370000000 HC RX 637 (ALT 250 FOR IP): Performed by: INTERNAL MEDICINE

## 2019-07-12 PROCEDURE — 80178 ASSAY OF LITHIUM: CPT

## 2019-07-12 RX ADMIN — LITHIUM CARBONATE 450 MG: 450 TABLET, EXTENDED RELEASE ORAL at 22:08

## 2019-07-12 RX ADMIN — CLONAZEPAM 1 MG: 1 TABLET ORAL at 13:40

## 2019-07-12 RX ADMIN — DOCUSATE SODIUM 100 MG: 100 CAPSULE, LIQUID FILLED ORAL at 12:07

## 2019-07-12 RX ADMIN — HYDROXYZINE HYDROCHLORIDE 50 MG: 50 TABLET, FILM COATED ORAL at 13:40

## 2019-07-12 RX ADMIN — CLONAZEPAM 1 MG: 1 TABLET ORAL at 22:08

## 2019-07-12 RX ADMIN — LITHIUM CARBONATE 450 MG: 450 TABLET, EXTENDED RELEASE ORAL at 09:59

## 2019-07-12 RX ADMIN — HYDROXYZINE HYDROCHLORIDE 50 MG: 50 TABLET, FILM COATED ORAL at 22:08

## 2019-07-12 RX ADMIN — CARIPRAZINE 6 MG: 3 CAPSULE, GELATIN COATED ORAL at 08:59

## 2019-07-12 RX ADMIN — MAGNESIUM HYDROXIDE 30 ML: 400 SUSPENSION ORAL at 23:12

## 2019-07-12 RX ADMIN — HYDROCHLOROTHIAZIDE 25 MG: 25 TABLET ORAL at 08:59

## 2019-07-12 RX ADMIN — HYDROXYZINE HYDROCHLORIDE 50 MG: 50 TABLET, FILM COATED ORAL at 06:49

## 2019-07-12 RX ADMIN — IBUPROFEN 400 MG: 400 TABLET ORAL at 11:50

## 2019-07-12 RX ADMIN — CLONAZEPAM 1 MG: 1 TABLET ORAL at 08:59

## 2019-07-12 RX ADMIN — ACETAMINOPHEN 650 MG: 325 TABLET, FILM COATED ORAL at 23:09

## 2019-07-12 RX ADMIN — ACETAMINOPHEN 650 MG: 325 TABLET, FILM COATED ORAL at 10:00

## 2019-07-12 RX ADMIN — Medication 2 MG: at 22:07

## 2019-07-12 ASSESSMENT — PAIN DESCRIPTION - PAIN TYPE: TYPE: ACUTE PAIN

## 2019-07-12 ASSESSMENT — PAIN DESCRIPTION - ORIENTATION: ORIENTATION: LEFT

## 2019-07-12 ASSESSMENT — PAIN SCALES - GENERAL
PAINLEVEL_OUTOF10: 7
PAINLEVEL_OUTOF10: 4
PAINLEVEL_OUTOF10: 8
PAINLEVEL_OUTOF10: 1
PAINLEVEL_OUTOF10: 9

## 2019-07-12 ASSESSMENT — PAIN DESCRIPTION - LOCATION: LOCATION: ANKLE

## 2019-07-12 ASSESSMENT — PAIN DESCRIPTION - PROGRESSION: CLINICAL_PROGRESSION: NOT CHANGED

## 2019-07-12 ASSESSMENT — PAIN - FUNCTIONAL ASSESSMENT: PAIN_FUNCTIONAL_ASSESSMENT: PREVENTS OR INTERFERES SOME ACTIVE ACTIVITIES AND ADLS

## 2019-07-12 ASSESSMENT — PAIN DESCRIPTION - FREQUENCY: FREQUENCY: CONTINUOUS

## 2019-07-12 ASSESSMENT — PAIN DESCRIPTION - ONSET: ONSET: GRADUAL

## 2019-07-12 ASSESSMENT — PAIN DESCRIPTION - DESCRIPTORS: DESCRIPTORS: ACHING

## 2019-07-12 NOTE — GROUP NOTE
Group Therapy Note    Date: July 12    Group Start Time: 1100  Group End Time: 1140  Group Topic: Recreational    [facility] Rosario    Patient's Goal: To demonstrate increased interpersonal interaction     Notes:      Status After Intervention:  Unchanged    Participation Level: Minimal    Participation Quality: Appropriate and Sharing      Speech:  pressured      Thought Process/Content: Flight of ideas      Affective Functioning: Incongruent      Mood: anxious      Level of consciousness:  Alert, Oriented x4 and Inattentive      Response to Learning: Able to verbalize current knowledge/experience, Able to verbalize/acknowledge new learning, Able to retain information and Progressing to goal      Endings: None Reported    Modes of Intervention: Education, Support, Socialization, Exploration, Clarifying, Problem-solving and Activity      Discipline Responsible: Psychoeducational Specialist      Signature:  Yari Rosario

## 2019-07-12 NOTE — BH NOTE
hydroxide (MILK OF MAGNESIA) 400 MG/5ML suspension 30 mL  30 mL Oral Daily PRN Jaswant Hogan MD   30 mL at 07/09/19 0256    aluminum & magnesium hydroxide-simethicone (MAALOX) 200-200-20 MG/5ML suspension 15 mL  15 mL Oral Q6H PRN Jaswant Hogan MD        acetaminophen (TYLENOL) tablet 650 mg  650 mg Oral Q4H PRN Jaswant Hogan MD   650 mg at 07/12/19 1000         lithium  450 mg Oral 2 times per day    cariprazine hcl  6 mg Oral Daily    clonazePAM  1 mg Oral TID    hydrochlorothiazide  25 mg Oral Daily    nicotine  1 patch Transdermal Daily       ASSESSMENT  Bipolar 1 disorder, manic, moderate (HCC)     Patient's Response to Treatment: negative    PLAN  Dragon voice recognition software used in portions of this document. To continue current management. Add Latuda 40 mg with dinner.

## 2019-07-13 PROCEDURE — 99232 SBSQ HOSP IP/OBS MODERATE 35: CPT | Performed by: PSYCHIATRY & NEUROLOGY

## 2019-07-13 PROCEDURE — 6370000000 HC RX 637 (ALT 250 FOR IP): Performed by: NURSE PRACTITIONER

## 2019-07-13 PROCEDURE — 6370000000 HC RX 637 (ALT 250 FOR IP): Performed by: INTERNAL MEDICINE

## 2019-07-13 PROCEDURE — 6370000000 HC RX 637 (ALT 250 FOR IP): Performed by: PSYCHIATRY & NEUROLOGY

## 2019-07-13 PROCEDURE — 1240000000 HC EMOTIONAL WELLNESS R&B

## 2019-07-13 RX ADMIN — DOCUSATE SODIUM 100 MG: 100 CAPSULE, LIQUID FILLED ORAL at 08:29

## 2019-07-13 RX ADMIN — CARIPRAZINE 6 MG: 3 CAPSULE, GELATIN COATED ORAL at 08:30

## 2019-07-13 RX ADMIN — HYDROXYZINE HYDROCHLORIDE 50 MG: 50 TABLET, FILM COATED ORAL at 06:24

## 2019-07-13 RX ADMIN — LURASIDONE HYDROCHLORIDE 40 MG: 40 TABLET, FILM COATED ORAL at 17:37

## 2019-07-13 RX ADMIN — IBUPROFEN 400 MG: 400 TABLET ORAL at 02:35

## 2019-07-13 RX ADMIN — CLONAZEPAM 1 MG: 1 TABLET ORAL at 14:34

## 2019-07-13 RX ADMIN — TRAZODONE HYDROCHLORIDE 50 MG: 50 TABLET ORAL at 22:23

## 2019-07-13 RX ADMIN — CLONAZEPAM 1 MG: 1 TABLET ORAL at 08:30

## 2019-07-13 RX ADMIN — CLONAZEPAM 1 MG: 1 TABLET ORAL at 21:22

## 2019-07-13 RX ADMIN — DOCUSATE SODIUM 100 MG: 100 CAPSULE, LIQUID FILLED ORAL at 22:23

## 2019-07-13 RX ADMIN — HYDROCHLOROTHIAZIDE 25 MG: 25 TABLET ORAL at 08:30

## 2019-07-13 RX ADMIN — HYDROXYZINE HYDROCHLORIDE 50 MG: 50 TABLET, FILM COATED ORAL at 21:22

## 2019-07-13 RX ADMIN — LITHIUM CARBONATE 450 MG: 450 TABLET, EXTENDED RELEASE ORAL at 08:30

## 2019-07-13 RX ADMIN — LITHIUM CARBONATE 450 MG: 450 TABLET, EXTENDED RELEASE ORAL at 21:22

## 2019-07-13 RX ADMIN — HYDROXYZINE HYDROCHLORIDE 50 MG: 50 TABLET, FILM COATED ORAL at 13:05

## 2019-07-13 ASSESSMENT — PAIN DESCRIPTION - PROGRESSION
CLINICAL_PROGRESSION: NOT CHANGED
CLINICAL_PROGRESSION: GRADUALLY WORSENING

## 2019-07-13 ASSESSMENT — PAIN SCALES - GENERAL
PAINLEVEL_OUTOF10: 9
PAINLEVEL_OUTOF10: 10
PAINLEVEL_OUTOF10: 0

## 2019-07-13 ASSESSMENT — PAIN - FUNCTIONAL ASSESSMENT
PAIN_FUNCTIONAL_ASSESSMENT: PREVENTS OR INTERFERES SOME ACTIVE ACTIVITIES AND ADLS
PAIN_FUNCTIONAL_ASSESSMENT: PREVENTS OR INTERFERES SOME ACTIVE ACTIVITIES AND ADLS

## 2019-07-13 ASSESSMENT — PAIN DESCRIPTION - PAIN TYPE
TYPE: ACUTE PAIN
TYPE: ACUTE PAIN

## 2019-07-13 ASSESSMENT — PAIN DESCRIPTION - FREQUENCY
FREQUENCY: CONTINUOUS
FREQUENCY: CONTINUOUS

## 2019-07-13 ASSESSMENT — PAIN DESCRIPTION - ONSET
ONSET: PROGRESSIVE
ONSET: ON-GOING

## 2019-07-13 ASSESSMENT — PAIN DESCRIPTION - DESCRIPTORS
DESCRIPTORS: ACHING
DESCRIPTORS: ACHING

## 2019-07-13 ASSESSMENT — PAIN DESCRIPTION - LOCATION
LOCATION: ANKLE
LOCATION: ANKLE

## 2019-07-13 ASSESSMENT — PAIN DESCRIPTION - ORIENTATION
ORIENTATION: LEFT
ORIENTATION: LEFT

## 2019-07-13 NOTE — GROUP NOTE
Group Therapy Note    Date: July 13    Group Start Time: 1030  Group End Time: 1050  Group Topic: Healthy Living/Wellness    [facility]    Radha Richmond LPN        Group Therapy Note    Attendees: 9         Patient's Goal:  To Reduce Stress through Exercise    Notes:      Status After Intervention:  Improved    Participation Level: Interactive    Participation Quality: Appropriate      Speech:  normal      Thought Process/Content: Flight of ideas      Affective Functioning: Flat      Mood: anxious      Level of consciousness:  Oriented x4      Response to Learning: Able to verbalize current knowledge/experience      Endings: Suicidality and None Reported    Modes of Intervention: Socialization      Discipline Responsible: Licensed Practical Nurse      Signature:  Inez Sommers LPN

## 2019-07-14 PROCEDURE — 6370000000 HC RX 637 (ALT 250 FOR IP): Performed by: PSYCHIATRY & NEUROLOGY

## 2019-07-14 PROCEDURE — 1240000000 HC EMOTIONAL WELLNESS R&B

## 2019-07-14 PROCEDURE — 6370000000 HC RX 637 (ALT 250 FOR IP): Performed by: NURSE PRACTITIONER

## 2019-07-14 PROCEDURE — 6370000000 HC RX 637 (ALT 250 FOR IP): Performed by: INTERNAL MEDICINE

## 2019-07-14 PROCEDURE — 99232 SBSQ HOSP IP/OBS MODERATE 35: CPT | Performed by: PSYCHIATRY & NEUROLOGY

## 2019-07-14 RX ORDER — BENZTROPINE MESYLATE 1 MG/1
1 TABLET ORAL 2 TIMES DAILY
Status: DISCONTINUED | OUTPATIENT
Start: 2019-07-14 | End: 2019-07-22 | Stop reason: HOSPADM

## 2019-07-14 RX ORDER — METRONIDAZOLE 500 MG/1
500 TABLET ORAL EVERY 12 HOURS SCHEDULED
Status: COMPLETED | OUTPATIENT
Start: 2019-07-14 | End: 2019-07-21

## 2019-07-14 RX ADMIN — Medication 2 MG: at 22:29

## 2019-07-14 RX ADMIN — BENZTROPINE MESYLATE 1 MG: 1 TABLET ORAL at 22:29

## 2019-07-14 RX ADMIN — HYDROXYZINE HYDROCHLORIDE 50 MG: 50 TABLET, FILM COATED ORAL at 14:31

## 2019-07-14 RX ADMIN — TRAZODONE HYDROCHLORIDE 50 MG: 50 TABLET ORAL at 22:30

## 2019-07-14 RX ADMIN — CARIPRAZINE 6 MG: 3 CAPSULE, GELATIN COATED ORAL at 08:18

## 2019-07-14 RX ADMIN — CLONAZEPAM 1 MG: 1 TABLET ORAL at 14:29

## 2019-07-14 RX ADMIN — LITHIUM CARBONATE 450 MG: 450 TABLET, EXTENDED RELEASE ORAL at 08:18

## 2019-07-14 RX ADMIN — ACETAMINOPHEN 650 MG: 325 TABLET, FILM COATED ORAL at 03:45

## 2019-07-14 RX ADMIN — METRONIDAZOLE 500 MG: 500 TABLET ORAL at 22:33

## 2019-07-14 RX ADMIN — DOCUSATE SODIUM 100 MG: 100 CAPSULE, LIQUID FILLED ORAL at 22:30

## 2019-07-14 RX ADMIN — HYDROXYZINE HYDROCHLORIDE 50 MG: 50 TABLET, FILM COATED ORAL at 22:30

## 2019-07-14 RX ADMIN — ACETAMINOPHEN 650 MG: 325 TABLET, FILM COATED ORAL at 22:29

## 2019-07-14 RX ADMIN — LITHIUM CARBONATE 450 MG: 450 TABLET, EXTENDED RELEASE ORAL at 22:29

## 2019-07-14 RX ADMIN — DOCUSATE SODIUM 100 MG: 100 CAPSULE, LIQUID FILLED ORAL at 08:18

## 2019-07-14 RX ADMIN — CLONAZEPAM 1 MG: 1 TABLET ORAL at 08:18

## 2019-07-14 RX ADMIN — LURASIDONE HYDROCHLORIDE 40 MG: 40 TABLET, FILM COATED ORAL at 18:30

## 2019-07-14 RX ADMIN — HYDROXYZINE HYDROCHLORIDE 50 MG: 50 TABLET, FILM COATED ORAL at 06:23

## 2019-07-14 RX ADMIN — HYDROCHLOROTHIAZIDE 25 MG: 25 TABLET ORAL at 08:18

## 2019-07-14 RX ADMIN — CLONAZEPAM 1 MG: 1 TABLET ORAL at 22:30

## 2019-07-14 ASSESSMENT — PAIN DESCRIPTION - PAIN TYPE: TYPE: ACUTE PAIN

## 2019-07-14 ASSESSMENT — PAIN SCALES - GENERAL
PAINLEVEL_OUTOF10: 0
PAINLEVEL_OUTOF10: 3
PAINLEVEL_OUTOF10: 2
PAINLEVEL_OUTOF10: 1

## 2019-07-14 ASSESSMENT — PAIN DESCRIPTION - DESCRIPTORS: DESCRIPTORS: ACHING

## 2019-07-14 ASSESSMENT — PAIN DESCRIPTION - LOCATION: LOCATION: HEAD

## 2019-07-14 NOTE — GROUP NOTE
Group Therapy Note    Date: July 14    Group Start Time: 1100  Group End Time: 1140  Group Topic: Healthy Living/Wellness    [facility] ANGELA    Jaci Nails    patient refused to attend Guided Meditation/Relaxation group at 1100 after encouragement from staff.   1:1 talk time provided as alternative to group session    Signature:  Jaci Jacques

## 2019-07-14 NOTE — GROUP NOTE
Group Therapy Note    Date: July 14    Group Start Time: 0900  Group End Time: 0920  Group Topic: Community Meeting    JUANCARLOS Cotto    Patient's Goal:  Identify daily goal, review community rules and expectations, demonstrate increased interpersonal interaction     Notes:  Pt developed daily goal to charge ankle monitor, spend time in [employer], rest.       Status After Intervention:  Improved    Participation Level:  Active Listener and Interactive    Participation Quality: Appropriate, Attentive, Sharing and Supportive    Speech:  normal    Thought Process/Content: Logical    Affective Functioning: Congruent    Level of consciousness:  Alert, Oriented x4 and Attentive    Response to Learning: Able to verbalize current knowledge/experience, Able to verbalize/acknowledge new learning, Able to retain information, Capable of insight and Progressing to goal    Endings: None Reported    Modes of Intervention: Education, Socialization, Exploration, Clarifying, Problem-solving and Limit-setting    Discipline Responsible: Psychoeducational Specialist    Signature:  Linda Rivera
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
## 2019-07-14 NOTE — GROUP NOTE
Group Therapy Note    Date: July 13    Group Start Time: 2030  Group End Time: 2105  Group Topic: Wrap-Up    ANTON Blanco        Group Therapy Note    Attendees: 15+       Patient's Goal:  b-day tomorrow     Notes:  Wants to celebrate at home    Status After Intervention:  Unchanged    Participation Level:  Active Listener    Participation Quality: Supportive      Speech:  pressured      Thought Process/Content: Flight of ideas      Affective Functioning: Blunted      Mood: anxious      Level of consciousness:  Oriented x4      Response to Learning: Resistant      Endings: None Reported    Modes of Intervention: Problem-solving      Discipline Responsible: [address]      Signature:  Marianna Blanco

## 2019-07-15 PROCEDURE — 6370000000 HC RX 637 (ALT 250 FOR IP): Performed by: PSYCHIATRY & NEUROLOGY

## 2019-07-15 PROCEDURE — 6370000000 HC RX 637 (ALT 250 FOR IP): Performed by: NURSE PRACTITIONER

## 2019-07-15 PROCEDURE — 1240000000 HC EMOTIONAL WELLNESS R&B

## 2019-07-15 PROCEDURE — 99232 SBSQ HOSP IP/OBS MODERATE 35: CPT | Performed by: PSYCHIATRY & NEUROLOGY

## 2019-07-15 PROCEDURE — 6370000000 HC RX 637 (ALT 250 FOR IP): Performed by: INTERNAL MEDICINE

## 2019-07-15 RX ADMIN — DOCUSATE SODIUM 100 MG: 100 CAPSULE, LIQUID FILLED ORAL at 08:05

## 2019-07-15 RX ADMIN — BENZTROPINE MESYLATE 1 MG: 1 TABLET ORAL at 08:05

## 2019-07-15 RX ADMIN — METRONIDAZOLE 500 MG: 500 TABLET ORAL at 08:05

## 2019-07-15 RX ADMIN — Medication 2 MG: at 23:01

## 2019-07-15 RX ADMIN — HYDROXYZINE HYDROCHLORIDE 50 MG: 50 TABLET, FILM COATED ORAL at 23:01

## 2019-07-15 RX ADMIN — LITHIUM CARBONATE 450 MG: 450 TABLET, EXTENDED RELEASE ORAL at 23:01

## 2019-07-15 RX ADMIN — LURASIDONE HYDROCHLORIDE 80 MG: 80 TABLET, FILM COATED ORAL at 17:40

## 2019-07-15 RX ADMIN — MAGNESIUM HYDROXIDE 30 ML: 400 SUSPENSION ORAL at 03:52

## 2019-07-15 RX ADMIN — HYDROXYZINE HYDROCHLORIDE 50 MG: 50 TABLET, FILM COATED ORAL at 14:29

## 2019-07-15 RX ADMIN — METRONIDAZOLE 500 MG: 500 TABLET ORAL at 23:01

## 2019-07-15 RX ADMIN — LITHIUM CARBONATE 450 MG: 450 TABLET, EXTENDED RELEASE ORAL at 08:05

## 2019-07-15 RX ADMIN — CARIPRAZINE 6 MG: 3 CAPSULE, GELATIN COATED ORAL at 08:05

## 2019-07-15 RX ADMIN — HYDROXYZINE HYDROCHLORIDE 50 MG: 50 TABLET, FILM COATED ORAL at 06:46

## 2019-07-15 RX ADMIN — BENZTROPINE MESYLATE 1 MG: 1 TABLET ORAL at 23:01

## 2019-07-15 RX ADMIN — CLONAZEPAM 1 MG: 1 TABLET ORAL at 13:10

## 2019-07-15 RX ADMIN — ACETAMINOPHEN 650 MG: 325 TABLET, FILM COATED ORAL at 13:12

## 2019-07-15 RX ADMIN — ACETAMINOPHEN 650 MG: 325 TABLET, FILM COATED ORAL at 23:01

## 2019-07-15 RX ADMIN — CLONAZEPAM 1 MG: 1 TABLET ORAL at 23:01

## 2019-07-15 RX ADMIN — CLONAZEPAM 1 MG: 1 TABLET ORAL at 08:06

## 2019-07-15 RX ADMIN — ACETAMINOPHEN 650 MG: 325 TABLET, FILM COATED ORAL at 06:45

## 2019-07-15 RX ADMIN — DOCUSATE SODIUM 100 MG: 100 CAPSULE, LIQUID FILLED ORAL at 23:01

## 2019-07-15 RX ADMIN — HYDROCHLOROTHIAZIDE 25 MG: 25 TABLET ORAL at 08:05

## 2019-07-15 RX ADMIN — IBUPROFEN 400 MG: 400 TABLET ORAL at 03:04

## 2019-07-15 RX ADMIN — TRAZODONE HYDROCHLORIDE 50 MG: 50 TABLET ORAL at 23:01

## 2019-07-15 ASSESSMENT — PAIN SCALES - GENERAL
PAINLEVEL_OUTOF10: 3
PAINLEVEL_OUTOF10: 3
PAINLEVEL_OUTOF10: 0
PAINLEVEL_OUTOF10: 3
PAINLEVEL_OUTOF10: 7

## 2019-07-15 ASSESSMENT — PAIN DESCRIPTION - PAIN TYPE: TYPE: ACUTE PAIN

## 2019-07-15 ASSESSMENT — PAIN DESCRIPTION - LOCATION
LOCATION: ANKLE
LOCATION: ANKLE

## 2019-07-15 ASSESSMENT — PAIN DESCRIPTION - ORIENTATION: ORIENTATION: LEFT

## 2019-07-15 NOTE — GROUP NOTE
Group Therapy Note    Date: July 15    Group Start Time: 1330  Group End Time: 1410  Group Topic: Cognitive Skills    STCZ BHJUANCARLOS Cates    Patient's Goal:  Increase cognitive skills, demonstrate increased interpersonal interaction    Notes:  Pt attended and participated in cognitive skills activity. Pt interacted with staff and peers appropriately. Status After Intervention:  Improved    Participation Level:  Active Listener and Interactive    Participation Quality: Appropriate, Attentive and Sharing    Speech:  normal    Thought Process/Content: Logical    Affective Functioning: Congruent    Level of consciousness:  Alert, Oriented x4 and Attentive    Response to Learning: Able to verbalize current knowledge/experience, Able to verbalize/acknowledge new learning, Able to retain information, Capable of insight and Progressing to goal    Endings: None Reported    Modes of Intervention: Education, Socialization, Exploration, Problem-solving, Activity and Limit-setting    Discipline Responsible: Psychoeducational Specialist    Signature:  Ac Tolbert [place]

## 2019-07-15 NOTE — GROUP NOTE
Group Therapy Note    Date: July 15    Group Start Time: 1100  Group End Time: 3088  Group Topic: Recreational    ANTON Ellison    patient refused to attend recreational therapy group at 1100 after encouragement from staff.   1:1 talk time provided as alternative to group session        Signature:  Omer Ellison

## 2019-07-15 NOTE — BH NOTE
Facility-Administered Medications   Medication Dose Route Frequency Provider Last Rate Last Dose    [START ON 7/16/2019] cariprazine hcl (VRAYLAR) capsule 4.5 mg  4.5 mg Oral Daily Kirby SHANKAR MD        lurasidone (LATUDA) tablet 80 mg  80 mg Oral Dinner Kirby SHANKAR MD        metroNIDAZOLE (FLAGYL) tablet 500 mg  500 mg Oral 2 times per day Iraida Medina MD   500 mg at 07/15/19 0805    benztropine (COGENTIN) tablet 1 mg  1 mg Oral BID Iraida Medina MD   1 mg at 07/15/19 0805    melatonin ER tablet 2 mg  2 mg Oral Nightly PRN MAURICE Hdz CNP   2 mg at 07/14/19 2229    lithium (ESKALITH) extended release tablet 450 mg  450 mg Oral 2 times per day Carlitos Anne MD   450 mg at 07/15/19 0805    hydrOXYzine (ATARAX) tablet 50 mg  50 mg Oral TID PRN Zara Angulo MD   50 mg at 07/15/19 0646    ibuprofen (ADVIL;MOTRIN) tablet 400 mg  400 mg Oral TID PRN Jr Colón MD   400 mg at 07/15/19 0304    diphenhydrAMINE (BENADRYL) injection 50 mg  50 mg Intramuscular Q12H PRN Carynnupur Ocampo, APRN - CNP   50 mg at 07/11/19 1458    And    haloperidol lactate (HALDOL) injection 10 mg  10 mg Intramuscular Q12H PRN Caryn Damaris, APRN - CNP   10 mg at 07/11/19 1457    docusate sodium (COLACE) capsule 100 mg  100 mg Oral BID PRN Zara Angulo MD   100 mg at 07/15/19 0805    clonazePAM (KLONOPIN) tablet 1 mg  1 mg Oral TID Zara Angulo MD   1 mg at 07/15/19 0806    hydrochlorothiazide (HYDRODIURIL) tablet 25 mg  25 mg Oral Daily Jr Colón MD   25 mg at 07/15/19 0805    traZODone (DESYREL) tablet 50 mg  50 mg Oral Nightly PRN Zara Angulo MD   50 mg at 07/14/19 2230    nicotine (NICODERM CQ) 14 MG/24HR 1 patch  1 patch Transdermal Daily Timmothy Adele MD   1 patch at 07/15/19 0805    magnesium hydroxide (MILK OF MAGNESIA) 400 MG/5ML suspension 30 mL  30 mL Oral Daily PRN Zara Angulo MD   30 mL at 07/15/19 0352    aluminum &

## 2019-07-15 NOTE — PROGRESS NOTES
PRN Lorena Fine MD   50 mg at 07/14/19 2230    nicotine (NICODERM CQ) 14 MG/24HR 1 patch  1 patch Transdermal Daily Lorena Fine MD   1 patch at 07/15/19 0805    magnesium hydroxide (MILK OF MAGNESIA) 400 MG/5ML suspension 30 mL  30 mL Oral Daily PRN Lorena Fine MD   30 mL at 07/15/19 0352    aluminum & magnesium hydroxide-simethicone (MAALOX) 200-200-20 MG/5ML suspension 15 mL  15 mL Oral Q6H PRN Lorena Fine MD        acetaminophen (TYLENOL) tablet 650 mg  650 mg Oral Q4H PRN Lorena Fine MD   650 mg at 07/15/19 0645         metroNIDAZOLE  500 mg Oral 2 times per day    benztropine  1 mg Oral BID    lurasidone  40 mg Oral Dinner    lithium  450 mg Oral 2 times per day    cariprazine hcl  6 mg Oral Daily    clonazePAM  1 mg Oral TID    hydrochlorothiazide  25 mg Oral Daily    nicotine  1 patch Transdermal Daily       ASSESSMENT  Bipolar 1 disorder, manic, moderate (HCC)     Patient's Response to Treatment: Slow    PLAN:  1. Continue inpatient treatment  2. Medication management  3. Participation in groups and therapeutic milieu  4. Lithium increased to 450 BID 7/11; lithium level 0.6 on 7/12  5. Pt received first dose of Latuda 40 mg with dinner yesterday (7/13)  6. Will start a trial of Cogentin 1 mg BID, but advised pt that it would be good to reevaluate in the future whether it is helpful (in an effort to minimize number of meds pt is taking). 7. Flagyl 500 mg BID x 10 days for presumptive bacterial vaginosis  8. Discharge planning with social work. Per note, patient is to be held until court date on 7/19 and transported by police to [facility].      Maribeth Tinajero MD  Psychiatry      Electronically signed by Bam Khan MD on 7/15/2019 at 8:31 AM.

## 2019-07-16 PROCEDURE — 6370000000 HC RX 637 (ALT 250 FOR IP): Performed by: PSYCHIATRY & NEUROLOGY

## 2019-07-16 PROCEDURE — 6360000002 HC RX W HCPCS: Performed by: NURSE PRACTITIONER

## 2019-07-16 PROCEDURE — 6370000000 HC RX 637 (ALT 250 FOR IP): Performed by: INTERNAL MEDICINE

## 2019-07-16 PROCEDURE — 99232 SBSQ HOSP IP/OBS MODERATE 35: CPT | Performed by: PSYCHIATRY & NEUROLOGY

## 2019-07-16 PROCEDURE — 1240000000 HC EMOTIONAL WELLNESS R&B

## 2019-07-16 PROCEDURE — 6370000000 HC RX 637 (ALT 250 FOR IP): Performed by: NURSE PRACTITIONER

## 2019-07-16 RX ADMIN — LITHIUM CARBONATE 450 MG: 450 TABLET, EXTENDED RELEASE ORAL at 23:00

## 2019-07-16 RX ADMIN — CARIPRAZINE 4.5 MG: 3 CAPSULE, GELATIN COATED ORAL at 08:55

## 2019-07-16 RX ADMIN — MAGNESIUM HYDROXIDE 30 ML: 400 SUSPENSION ORAL at 23:15

## 2019-07-16 RX ADMIN — BENZTROPINE MESYLATE 1 MG: 1 TABLET ORAL at 08:55

## 2019-07-16 RX ADMIN — DIPHENHYDRAMINE HYDROCHLORIDE 50 MG: 50 INJECTION, SOLUTION INTRAMUSCULAR; INTRAVENOUS at 23:51

## 2019-07-16 RX ADMIN — HYDROXYZINE HYDROCHLORIDE 50 MG: 50 TABLET, FILM COATED ORAL at 22:59

## 2019-07-16 RX ADMIN — TRAZODONE HYDROCHLORIDE 50 MG: 50 TABLET ORAL at 22:59

## 2019-07-16 RX ADMIN — METRONIDAZOLE 500 MG: 500 TABLET ORAL at 08:55

## 2019-07-16 RX ADMIN — BENZTROPINE MESYLATE 1 MG: 1 TABLET ORAL at 23:00

## 2019-07-16 RX ADMIN — CLONAZEPAM 1 MG: 1 TABLET ORAL at 13:10

## 2019-07-16 RX ADMIN — DOCUSATE SODIUM 100 MG: 100 CAPSULE, LIQUID FILLED ORAL at 22:59

## 2019-07-16 RX ADMIN — HYDROXYZINE HYDROCHLORIDE 50 MG: 50 TABLET, FILM COATED ORAL at 12:22

## 2019-07-16 RX ADMIN — ACETAMINOPHEN 650 MG: 325 TABLET, FILM COATED ORAL at 13:33

## 2019-07-16 RX ADMIN — HALOPERIDOL LACTATE 10 MG: 5 INJECTION INTRAMUSCULAR at 23:51

## 2019-07-16 RX ADMIN — HYDROCHLOROTHIAZIDE 25 MG: 25 TABLET ORAL at 08:55

## 2019-07-16 RX ADMIN — HYDROXYZINE HYDROCHLORIDE 50 MG: 50 TABLET, FILM COATED ORAL at 20:27

## 2019-07-16 RX ADMIN — CLONAZEPAM 1 MG: 1 TABLET ORAL at 23:00

## 2019-07-16 RX ADMIN — CLONAZEPAM 1 MG: 1 TABLET ORAL at 08:55

## 2019-07-16 RX ADMIN — LURASIDONE HYDROCHLORIDE 80 MG: 80 TABLET, FILM COATED ORAL at 17:29

## 2019-07-16 RX ADMIN — METRONIDAZOLE 500 MG: 500 TABLET ORAL at 23:00

## 2019-07-16 RX ADMIN — Medication 2 MG: at 23:00

## 2019-07-16 RX ADMIN — LITHIUM CARBONATE 450 MG: 450 TABLET, EXTENDED RELEASE ORAL at 08:55

## 2019-07-16 ASSESSMENT — PAIN SCALES - GENERAL
PAINLEVEL_OUTOF10: 3
PAINLEVEL_OUTOF10: 2
PAINLEVEL_OUTOF10: 3

## 2019-07-16 NOTE — GROUP NOTE
Group Therapy Note    Date: July 16    Group Start Time: 3294  Group End Time: 0920  Group Topic: Community Meeting    JUANCARLOS Aguayo    Patient's Goal:  Identify daily goal, review unit rules and expectations, demonstrate increased interpersonal interaction     Notes:  Pt developed daily goal to change monitor, go to groups. Status After Intervention:  Improved    Participation Level:  Active Listener and Interactive    Participation Quality: Appropriate, Attentive and Sharing    Speech:  normal    Thought Process/Content: Logical    Affective Functioning: Congruent    Level of consciousness:  Alert, Oriented x4 and Attentive    Response to Learning: Able to verbalize current knowledge/experience, Able to verbalize/acknowledge new learning, Able to retain information, Capable of insight and Progressing to goal    Endings: None Reported    Modes of Intervention: Education, Socialization, Exploration, Problem-solving and Limit-setting    Discipline Responsible: Psychoeducational Specialist    Signature:  Ac Leung [place]

## 2019-07-16 NOTE — BH NOTE
hydroxide (MILK OF MAGNESIA) 400 MG/5ML suspension 30 mL  30 mL Oral Daily PRN Wille Babinski, MD   30 mL at 07/15/19 0352    aluminum & magnesium hydroxide-simethicone (MAALOX) 200-200-20 MG/5ML suspension 15 mL  15 mL Oral Q6H PRN Wille Babinski, MD        acetaminophen (TYLENOL) tablet 650 mg  650 mg Oral Q4H PRN Wille Babinski, MD   650 mg at 07/15/19 2301         cariprazine hcl  4.5 mg Oral Daily    lurasidone  80 mg Oral Dinner    metroNIDAZOLE  500 mg Oral 2 times per day    benztropine  1 mg Oral BID    lithium  450 mg Oral 2 times per day    clonazePAM  1 mg Oral TID    hydrochlorothiazide  25 mg Oral Daily    nicotine  1 patch Transdermal Daily       ASSESSMENT  Bipolar 1 disorder, manic, moderate (HCC)     Patient's Response to Treatment: negative    PLAN  Dragon voice recognition software used in portions of this document. To continue current management.

## 2019-07-16 NOTE — GROUP NOTE
Group Therapy Note    Date: July 16    Group Start Time: 1100  Group End Time: 5469  Group Topic: Recreational    STCZ B[facility] A    JUANCARLOS Medina    Patient's Goal:  Demonstrate increased interpersonal interaction     Notes:  Pt attended group and participated in activity. Status After Intervention:  Improved    Participation Level:  Active Listener and Interactive    Participation Quality: Appropriate, Attentive, Sharing and Supportive    Speech:  normal    Thought Process/Content: Logical    Affective Functioning: Congruent    Level of consciousness:  Alert, Oriented x4 and Attentive    Response to Learning: Able to verbalize current knowledge/experience, Able to verbalize/acknowledge new learning, Able to retain information, Capable of insight and Progressing to goal    Endings: None Reported    Modes of Intervention: Education, Support, Socialization, Problem-solving, Activity and Limit-setting    Discipline Responsible: Psychoeducational Specialist    Signature:  Ac Medina

## 2019-07-17 PROCEDURE — 99232 SBSQ HOSP IP/OBS MODERATE 35: CPT | Performed by: PSYCHIATRY & NEUROLOGY

## 2019-07-17 PROCEDURE — 6370000000 HC RX 637 (ALT 250 FOR IP): Performed by: PSYCHIATRY & NEUROLOGY

## 2019-07-17 PROCEDURE — 6360000002 HC RX W HCPCS: Performed by: PSYCHIATRY & NEUROLOGY

## 2019-07-17 PROCEDURE — 6370000000 HC RX 637 (ALT 250 FOR IP): Performed by: NURSE PRACTITIONER

## 2019-07-17 PROCEDURE — 2580000003 HC RX 258: Performed by: PSYCHIATRY & NEUROLOGY

## 2019-07-17 PROCEDURE — 6370000000 HC RX 637 (ALT 250 FOR IP): Performed by: INTERNAL MEDICINE

## 2019-07-17 PROCEDURE — 1240000000 HC EMOTIONAL WELLNESS R&B

## 2019-07-17 RX ORDER — ZIPRASIDONE MESYLATE 20 MG/ML
20 INJECTION, POWDER, LYOPHILIZED, FOR SOLUTION INTRAMUSCULAR EVERY 12 HOURS PRN
Status: DISCONTINUED | OUTPATIENT
Start: 2019-07-17 | End: 2019-07-22 | Stop reason: HOSPADM

## 2019-07-17 RX ADMIN — METRONIDAZOLE 500 MG: 500 TABLET ORAL at 09:26

## 2019-07-17 RX ADMIN — DOCUSATE SODIUM 100 MG: 100 CAPSULE, LIQUID FILLED ORAL at 09:32

## 2019-07-17 RX ADMIN — HYDROXYZINE HYDROCHLORIDE 50 MG: 50 TABLET, FILM COATED ORAL at 07:02

## 2019-07-17 RX ADMIN — Medication 2 MG: at 21:30

## 2019-07-17 RX ADMIN — BENZTROPINE MESYLATE 1 MG: 1 TABLET ORAL at 21:30

## 2019-07-17 RX ADMIN — HYDROXYZINE HYDROCHLORIDE 50 MG: 50 TABLET, FILM COATED ORAL at 17:00

## 2019-07-17 RX ADMIN — CLONAZEPAM 1 MG: 1 TABLET ORAL at 21:30

## 2019-07-17 RX ADMIN — ACETAMINOPHEN 650 MG: 325 TABLET, FILM COATED ORAL at 21:30

## 2019-07-17 RX ADMIN — LURASIDONE HYDROCHLORIDE 80 MG: 80 TABLET, FILM COATED ORAL at 17:38

## 2019-07-17 RX ADMIN — CLONAZEPAM 1 MG: 1 TABLET ORAL at 09:26

## 2019-07-17 RX ADMIN — HYDROXYZINE HYDROCHLORIDE 50 MG: 50 TABLET, FILM COATED ORAL at 19:40

## 2019-07-17 RX ADMIN — HYDROCHLOROTHIAZIDE 25 MG: 25 TABLET ORAL at 09:26

## 2019-07-17 RX ADMIN — CARIPRAZINE 4.5 MG: 3 CAPSULE, GELATIN COATED ORAL at 09:26

## 2019-07-17 RX ADMIN — DOCUSATE SODIUM 100 MG: 100 CAPSULE, LIQUID FILLED ORAL at 21:30

## 2019-07-17 RX ADMIN — ACETAMINOPHEN 650 MG: 325 TABLET, FILM COATED ORAL at 17:38

## 2019-07-17 RX ADMIN — WATER 1.2 ML: 1 INJECTION, SOLUTION INTRAVENOUS at 20:14

## 2019-07-17 RX ADMIN — LITHIUM CARBONATE 450 MG: 450 TABLET, EXTENDED RELEASE ORAL at 09:26

## 2019-07-17 RX ADMIN — TRAZODONE HYDROCHLORIDE 50 MG: 50 TABLET ORAL at 21:30

## 2019-07-17 RX ADMIN — METRONIDAZOLE 500 MG: 500 TABLET ORAL at 21:30

## 2019-07-17 RX ADMIN — CLONAZEPAM 1 MG: 1 TABLET ORAL at 14:27

## 2019-07-17 RX ADMIN — BENZTROPINE MESYLATE 1 MG: 1 TABLET ORAL at 09:26

## 2019-07-17 RX ADMIN — ZIPRASIDONE MESYLATE 20 MG: 20 INJECTION, POWDER, LYOPHILIZED, FOR SOLUTION INTRAMUSCULAR at 20:15

## 2019-07-17 RX ADMIN — LITHIUM CARBONATE 450 MG: 450 TABLET, EXTENDED RELEASE ORAL at 21:30

## 2019-07-17 ASSESSMENT — PAIN DESCRIPTION - LOCATION: LOCATION: GENERALIZED

## 2019-07-17 ASSESSMENT — PAIN SCALES - GENERAL
PAINLEVEL_OUTOF10: 2
PAINLEVEL_OUTOF10: 0
PAINLEVEL_OUTOF10: 2
PAINLEVEL_OUTOF10: 3

## 2019-07-17 ASSESSMENT — PAIN DESCRIPTION - PAIN TYPE: TYPE: ACUTE PAIN

## 2019-07-17 NOTE — GROUP NOTE
Group Therapy Note    Date: July 17    Group Start Time: 1430  Group End Time: 1510  Group Topic: Recreational    STCZ FELII A    Brittni Olguin    Patient refused to attend cognitive skills group at 1430 after encouragement from staff. 1:1 talk time provided as alternative to group session.      Signature:  Brittni Olguin

## 2019-07-17 NOTE — GROUP NOTE
Group Therapy Note    Date: July 17    Group Start Time: 0845  Group End Time: 9704  Group Topic: [id number] [place], [place]    Patient refused to attend community meeting and goal setting group at [address] [place] [address] after encouragement from staff. 1:1 talk time offered as alternative to group session.

## 2019-07-17 NOTE — GROUP NOTE
Group Therapy Note    Date: July 17    Group Start Time: 1330  Group End Time: 4186  Group Topic: Cognitive Skills    ANTON Garcia [place], [place]    Patient's Goal:  To demonstrate increased interpersonal interaction     Notes:  PT attended and participated in group     Status After Intervention:  Improved    Participation Level:  Active Listener and Interactive    Participation Quality: Appropriate, Attentive and Sharing    Speech:  normal    Thought Process/Content: Logical    Affective Functioning: Congruent    Mood: euthymic    Level of consciousness:  Alert and Attentive    Response to Learning: Progressing to goal    Endings: None Reported    Modes of Intervention: Support, Socialization, Problem-solving, Activity, Media and Reality-testing    Discipline Responsible: Psychoeducational Specialist      Signature:  Thony Lozada

## 2019-07-18 PROCEDURE — 6370000000 HC RX 637 (ALT 250 FOR IP): Performed by: NURSE PRACTITIONER

## 2019-07-18 PROCEDURE — 6360000002 HC RX W HCPCS: Performed by: PSYCHIATRY & NEUROLOGY

## 2019-07-18 PROCEDURE — 6370000000 HC RX 637 (ALT 250 FOR IP): Performed by: PSYCHIATRY & NEUROLOGY

## 2019-07-18 PROCEDURE — 99232 SBSQ HOSP IP/OBS MODERATE 35: CPT | Performed by: PSYCHIATRY & NEUROLOGY

## 2019-07-18 PROCEDURE — 2580000003 HC RX 258: Performed by: PSYCHIATRY & NEUROLOGY

## 2019-07-18 PROCEDURE — 1240000000 HC EMOTIONAL WELLNESS R&B

## 2019-07-18 PROCEDURE — 6370000000 HC RX 637 (ALT 250 FOR IP): Performed by: INTERNAL MEDICINE

## 2019-07-18 PROCEDURE — 6360000002 HC RX W HCPCS: Performed by: NURSE PRACTITIONER

## 2019-07-18 RX ORDER — SENNA PLUS 8.6 MG/1
1 TABLET ORAL DAILY
Status: DISPENSED | OUTPATIENT
Start: 2019-07-18 | End: 2019-07-20

## 2019-07-18 RX ADMIN — CLONAZEPAM 1 MG: 1 TABLET ORAL at 21:24

## 2019-07-18 RX ADMIN — LITHIUM CARBONATE 450 MG: 450 TABLET, EXTENDED RELEASE ORAL at 21:25

## 2019-07-18 RX ADMIN — CLONAZEPAM 1 MG: 1 TABLET ORAL at 14:57

## 2019-07-18 RX ADMIN — ACETAMINOPHEN 650 MG: 325 TABLET, FILM COATED ORAL at 02:25

## 2019-07-18 RX ADMIN — BENZTROPINE MESYLATE 1 MG: 1 TABLET ORAL at 21:25

## 2019-07-18 RX ADMIN — HYDROXYZINE HYDROCHLORIDE 50 MG: 50 TABLET, FILM COATED ORAL at 02:29

## 2019-07-18 RX ADMIN — HYDROCHLOROTHIAZIDE 25 MG: 25 TABLET ORAL at 08:19

## 2019-07-18 RX ADMIN — CARIPRAZINE 6 MG: 3 CAPSULE, GELATIN COATED ORAL at 08:19

## 2019-07-18 RX ADMIN — LITHIUM CARBONATE 450 MG: 450 TABLET, EXTENDED RELEASE ORAL at 08:19

## 2019-07-18 RX ADMIN — BENZTROPINE MESYLATE 1 MG: 1 TABLET ORAL at 08:19

## 2019-07-18 RX ADMIN — ZIPRASIDONE MESYLATE 20 MG: 20 INJECTION, POWDER, LYOPHILIZED, FOR SOLUTION INTRAMUSCULAR at 09:19

## 2019-07-18 RX ADMIN — DIPHENHYDRAMINE HYDROCHLORIDE 50 MG: 50 INJECTION, SOLUTION INTRAMUSCULAR; INTRAVENOUS at 02:49

## 2019-07-18 RX ADMIN — Medication 2 MG: at 21:24

## 2019-07-18 RX ADMIN — METRONIDAZOLE 500 MG: 500 TABLET ORAL at 21:24

## 2019-07-18 RX ADMIN — TRAZODONE HYDROCHLORIDE 50 MG: 50 TABLET ORAL at 21:25

## 2019-07-18 RX ADMIN — HALOPERIDOL LACTATE 10 MG: 5 INJECTION INTRAMUSCULAR at 02:49

## 2019-07-18 RX ADMIN — METRONIDAZOLE 500 MG: 500 TABLET ORAL at 08:19

## 2019-07-18 RX ADMIN — LURASIDONE HYDROCHLORIDE 80 MG: 80 TABLET, FILM COATED ORAL at 18:18

## 2019-07-18 RX ADMIN — CLONAZEPAM 1 MG: 1 TABLET ORAL at 08:18

## 2019-07-18 RX ADMIN — HYDROXYZINE HYDROCHLORIDE 50 MG: 50 TABLET, FILM COATED ORAL at 09:06

## 2019-07-18 RX ADMIN — HYDROXYZINE HYDROCHLORIDE 50 MG: 50 TABLET, FILM COATED ORAL at 20:05

## 2019-07-18 RX ADMIN — ZIPRASIDONE MESYLATE 20 MG: 20 INJECTION, POWDER, LYOPHILIZED, FOR SOLUTION INTRAMUSCULAR at 21:46

## 2019-07-18 RX ADMIN — WATER 1.2 ML: 1 INJECTION, SOLUTION INTRAVENOUS at 21:46

## 2019-07-18 ASSESSMENT — PAIN SCALES - GENERAL
PAINLEVEL_OUTOF10: 3
PAINLEVEL_OUTOF10: 2

## 2019-07-18 ASSESSMENT — PAIN DESCRIPTION - LOCATION: LOCATION: HEAD

## 2019-07-18 ASSESSMENT — PAIN DESCRIPTION - PAIN TYPE: TYPE: ACUTE PAIN

## 2019-07-18 NOTE — GROUP NOTE
Group Therapy Note    Attendees: 8/21         Patient's Goal:  Communication Skills    Notes:  Pt was interactive and attentive through out group    Status After Intervention:  Improved    Participation Level:  Active Listener and Interactive    Participation Quality: Appropriate, Attentive and Supportive      Speech:  normal      Thought Process/Content: Logical      Affective Functioning: Congruent      Mood: Appropriate      Level of consciousness:  Alert, Oriented x4 and Attentive      Response to Learning: Progressing to goal      Endings: None Reported    Modes of Intervention: Support, Socialization and Activity      Discipline Responsible: [employer]      Signature:  Umang Barnhart
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
## 2019-07-18 NOTE — GROUP NOTE
Group Therapy Note    Date: July 18    Group Start Time: 1330  Group End Time: 3256  Group Topic: Cognitive Skills    CZ JUANCARLOS Zurita    Patient's Goal:  Improve cognitive skills, demonstrate increased interpersonal interaction     Notes:  Pt interacted with staff and peers and participated in cognitive skills activity group. Status After Intervention:  Improved    Participation Level:  Active Listener and Interactive    Participation Quality: Appropriate, Attentive and Sharing    Speech:  normal    Thought Process/Content: Logical    Affective Functioning: Congruent    Level of consciousness:  Alert, Oriented x4 and Attentive    Response to Learning: Able to verbalize current knowledge/experience, Able to verbalize/acknowledge new learning, Able to retain information, Capable of insight and Progressing to goal    Endings: None Reported    Modes of Intervention: Education, Socialization, Exploration, Problem-solving, Activity and Limit-setting    Discipline Responsible: Psychoeducational Specialist    Signature:  Ac Johns

## 2019-07-19 LAB
ABSOLUTE EOS #: 0.4 K/UL (ref 0–0.4)
ABSOLUTE IMMATURE GRANULOCYTE: ABNORMAL K/UL (ref 0–0.3)
ABSOLUTE LYMPH #: 2.3 K/UL (ref 1–4.8)
ABSOLUTE MONO #: 0.7 K/UL (ref 0.1–1.3)
ALBUMIN SERPL-MCNC: 4.1 G/DL (ref 3.5–5.2)
ALBUMIN/GLOBULIN RATIO: ABNORMAL (ref 1–2.5)
ALP BLD-CCNC: 64 U/L (ref 35–104)
ALT SERPL-CCNC: 26 U/L (ref 5–33)
ANION GAP SERPL CALCULATED.3IONS-SCNC: 9 MMOL/L (ref 9–17)
AST SERPL-CCNC: 28 U/L
BASOPHILS # BLD: 0 % (ref 0–2)
BASOPHILS ABSOLUTE: 0 K/UL (ref 0–0.2)
BILIRUB SERPL-MCNC: 0.22 MG/DL (ref 0.3–1.2)
BUN BLDV-MCNC: 20 MG/DL (ref 6–20)
BUN/CREAT BLD: ABNORMAL (ref 9–20)
CALCIUM SERPL-MCNC: 9.3 MG/DL (ref 8.6–10.4)
CHLORIDE BLD-SCNC: 103 MMOL/L (ref 98–107)
CO2: 27 MMOL/L (ref 20–31)
CREAT SERPL-MCNC: 0.81 MG/DL (ref 0.5–0.9)
DIFFERENTIAL TYPE: ABNORMAL
EOSINOPHILS RELATIVE PERCENT: 4 % (ref 0–4)
GFR AFRICAN AMERICAN: >60 ML/MIN
GFR NON-AFRICAN AMERICAN: >60 ML/MIN
GFR SERPL CREATININE-BSD FRML MDRD: ABNORMAL ML/MIN/{1.73_M2}
GFR SERPL CREATININE-BSD FRML MDRD: ABNORMAL ML/MIN/{1.73_M2}
GLUCOSE BLD-MCNC: 103 MG/DL (ref 70–99)
HCT VFR BLD CALC: 40.6 % (ref 36–46)
HEMOGLOBIN: 13.2 G/DL (ref 12–16)
IMMATURE GRANULOCYTES: ABNORMAL %
LITHIUM DATE LAST DOSE: NORMAL
LITHIUM DOSE AMOUNT: 450
LITHIUM DOSE TIME: 2125
LITHIUM LEVEL: 1 MMOL/L (ref 0.6–1.2)
LYMPHOCYTES # BLD: 21 % (ref 24–44)
MCH RBC QN AUTO: 27.8 PG (ref 26–34)
MCHC RBC AUTO-ENTMCNC: 32.6 G/DL (ref 31–37)
MCV RBC AUTO: 85.2 FL (ref 80–100)
MONOCYTES # BLD: 6 % (ref 1–7)
NRBC AUTOMATED: ABNORMAL PER 100 WBC
PDW BLD-RTO: 13.8 % (ref 11.5–14.9)
PLATELET # BLD: 275 K/UL (ref 150–450)
PLATELET ESTIMATE: ABNORMAL
PMV BLD AUTO: 8.3 FL (ref 6–12)
POTASSIUM SERPL-SCNC: 4.1 MMOL/L (ref 3.7–5.3)
RBC # BLD: 4.76 M/UL (ref 4–5.2)
RBC # BLD: ABNORMAL 10*6/UL
SEG NEUTROPHILS: 69 % (ref 36–66)
SEGMENTED NEUTROPHILS ABSOLUTE COUNT: 7.7 K/UL (ref 1.3–9.1)
SODIUM BLD-SCNC: 139 MMOL/L (ref 135–144)
TOTAL PROTEIN: 6.4 G/DL (ref 6.4–8.3)
WBC # BLD: 11.2 K/UL (ref 3.5–11)
WBC # BLD: ABNORMAL 10*3/UL

## 2019-07-19 PROCEDURE — 6370000000 HC RX 637 (ALT 250 FOR IP): Performed by: PSYCHIATRY & NEUROLOGY

## 2019-07-19 PROCEDURE — 1240000000 HC EMOTIONAL WELLNESS R&B

## 2019-07-19 PROCEDURE — 6360000002 HC RX W HCPCS: Performed by: PSYCHIATRY & NEUROLOGY

## 2019-07-19 PROCEDURE — 80178 ASSAY OF LITHIUM: CPT

## 2019-07-19 PROCEDURE — 99232 SBSQ HOSP IP/OBS MODERATE 35: CPT | Performed by: PSYCHIATRY & NEUROLOGY

## 2019-07-19 PROCEDURE — 6370000000 HC RX 637 (ALT 250 FOR IP): Performed by: INTERNAL MEDICINE

## 2019-07-19 PROCEDURE — 2580000003 HC RX 258: Performed by: PSYCHIATRY & NEUROLOGY

## 2019-07-19 PROCEDURE — 6370000000 HC RX 637 (ALT 250 FOR IP): Performed by: NURSE PRACTITIONER

## 2019-07-19 PROCEDURE — 36415 COLL VENOUS BLD VENIPUNCTURE: CPT

## 2019-07-19 PROCEDURE — 80053 COMPREHEN METABOLIC PANEL: CPT

## 2019-07-19 PROCEDURE — 85025 COMPLETE CBC W/AUTO DIFF WBC: CPT

## 2019-07-19 RX ORDER — ZIPRASIDONE HYDROCHLORIDE 20 MG/1
20 CAPSULE ORAL 2 TIMES DAILY WITH MEALS
Status: DISCONTINUED | OUTPATIENT
Start: 2019-07-19 | End: 2019-07-21

## 2019-07-19 RX ADMIN — CLONAZEPAM 1 MG: 1 TABLET ORAL at 14:31

## 2019-07-19 RX ADMIN — ACETAMINOPHEN 650 MG: 325 TABLET, FILM COATED ORAL at 23:01

## 2019-07-19 RX ADMIN — HYDROCHLOROTHIAZIDE 25 MG: 25 TABLET ORAL at 08:25

## 2019-07-19 RX ADMIN — LITHIUM CARBONATE 450 MG: 450 TABLET, EXTENDED RELEASE ORAL at 08:25

## 2019-07-19 RX ADMIN — ZIPRASIDONE HYDROCHLORIDE 20 MG: 20 CAPSULE ORAL at 21:59

## 2019-07-19 RX ADMIN — CLONAZEPAM 1 MG: 1 TABLET ORAL at 08:25

## 2019-07-19 RX ADMIN — LITHIUM CARBONATE 450 MG: 450 TABLET, EXTENDED RELEASE ORAL at 21:59

## 2019-07-19 RX ADMIN — SENNOSIDES 8.6 MG: 8.6 TABLET, FILM COATED ORAL at 08:24

## 2019-07-19 RX ADMIN — HYDROXYZINE HYDROCHLORIDE 50 MG: 50 TABLET, FILM COATED ORAL at 07:24

## 2019-07-19 RX ADMIN — LURASIDONE HYDROCHLORIDE 80 MG: 80 TABLET, FILM COATED ORAL at 17:59

## 2019-07-19 RX ADMIN — CLONAZEPAM 1 MG: 1 TABLET ORAL at 21:59

## 2019-07-19 RX ADMIN — TRAZODONE HYDROCHLORIDE 50 MG: 50 TABLET ORAL at 21:59

## 2019-07-19 RX ADMIN — CARIPRAZINE 6 MG: 3 CAPSULE, GELATIN COATED ORAL at 08:24

## 2019-07-19 RX ADMIN — METRONIDAZOLE 500 MG: 500 TABLET ORAL at 21:59

## 2019-07-19 RX ADMIN — ZIPRASIDONE MESYLATE 20 MG: 20 INJECTION, POWDER, LYOPHILIZED, FOR SOLUTION INTRAMUSCULAR at 10:20

## 2019-07-19 RX ADMIN — WATER 1.2 ML: 1 INJECTION, SOLUTION INTRAVENOUS at 10:20

## 2019-07-19 RX ADMIN — ZIPRASIDONE MESYLATE 20 MG: 20 INJECTION, POWDER, LYOPHILIZED, FOR SOLUTION INTRAMUSCULAR at 23:19

## 2019-07-19 RX ADMIN — WATER 1.2 ML: 1 INJECTION, SOLUTION INTRAVENOUS at 23:19

## 2019-07-19 RX ADMIN — BENZTROPINE MESYLATE 1 MG: 1 TABLET ORAL at 08:25

## 2019-07-19 RX ADMIN — HYDROXYZINE HYDROCHLORIDE 50 MG: 50 TABLET, FILM COATED ORAL at 15:17

## 2019-07-19 RX ADMIN — BENZTROPINE MESYLATE 1 MG: 1 TABLET ORAL at 22:00

## 2019-07-19 RX ADMIN — METRONIDAZOLE 500 MG: 500 TABLET ORAL at 08:24

## 2019-07-19 RX ADMIN — Medication 2 MG: at 21:59

## 2019-07-19 ASSESSMENT — PAIN SCALES - GENERAL
PAINLEVEL_OUTOF10: 4
PAINLEVEL_OUTOF10: 0

## 2019-07-19 NOTE — GROUP NOTE
Group Therapy Note    Date: July 19    Group Start Time: 1100  Group End Time: 1150  Group Topic: Recreational    ST[facility] BHI A    JUANCARLOS Aguayo    Patient's Goal:  Participate in creative expression/leisure skills group, demonstrate increased interpersonal interaction     Notes:  Pt attended group, participated in activity and interacted with staff/peers. Status After Intervention:  Improved    Participation Level:  Active Listener and Interactive    Participation Quality: Appropriate, Attentive, Sharing and Supportive    Speech:  normal    Thought Process/Content: Logical    Affective Functioning: Congruent    Level of consciousness:  Alert, Oriented x4 and Attentive    Response to Learning: Able to verbalize current knowledge/experience, Able to verbalize/acknowledge new learning, Able to retain information, Capable of insight and Progressing to goal    Endings: None Reported    Modes of Intervention: Education, Support, Socialization, Exploration, Activity, Media and Limit-setting    Discipline Responsible: Psychoeducational Specialist    Signature:  Brendan Maki, [address]

## 2019-07-19 NOTE — GROUP NOTE
Group Therapy Note    Date: July 19    Group Start Time: 1430  Group End Time: 0593  Group Topic: Healthy Living/Wellness    ANTON Cerda, CTRS    Patient's Goal:  Participate in group discussion about mental health benefits of exercise, identify one mental health benefit of exercise, demonstrate increased interpersonal interaction     Notes:  Pt attended group and participated in group discussion, pt identified one mental health benefit of exercise. Pt interacted with staff and peers. Status After Intervention:  Improved    Participation Level:  Active Listener and Interactive    Participation Quality: Appropriate, Attentive and Sharing    Speech:  normal    Thought Process/Content: Logical    Affective Functioning: Congruent     Level of consciousness:  Alert, Oriented x4 and Attentive    Response to Learning: Able to verbalize current knowledge/experience, Able to verbalize/acknowledge new learning, Able to retain information, Capable of insight and Progressing to goal    Endings: None Reported    Modes of Intervention: Education, Socialization, Exploration, Clarifying and Movement    Discipline Responsible: Psychoeducational Specialist    Signature:  Eamon Cerda, [address]

## 2019-07-19 NOTE — BH NOTE
Medications   Medication Dose Route Frequency Provider Last Rate Last Dose    [START ON 7/20/2019] lurasidone (LATUDA) tablet 40 mg  40 mg Oral Dinner Savannah José MD        ziprasidone (GEODON) capsule 20 mg  20 mg Oral BID WC Savannah José MD        senna (SENOKOT) tablet 8.6 mg  1 tablet Oral Daily Savannah José MD   8.6 mg at 07/19/19 0824    cariprazine hcl (VRAYLAR) capsule 6 mg  6 mg Oral Daily Savannah José MD   6 mg at 07/19/19 0824    ziprasidone (GEODON) injection 20 mg  20 mg Intramuscular Q12H PRN Lowella Av SHANKAR MD   20 mg at 07/19/19 1020    And    sterile water injection 1.2 mL  1.2 mL Intramuscular Q12H PRN Kirby SHANKAR MD   1.2 mL at 07/19/19 1020    metroNIDAZOLE (FLAGYL) tablet 500 mg  500 mg Oral 2 times per day Fidelina Morales MD   500 mg at 07/19/19 2562    benztropine (COGENTIN) tablet 1 mg  1 mg Oral BID Fidelina Morales MD   1 mg at 07/19/19 0825    melatonin ER tablet 2 mg  2 mg Oral Nightly PRN MAURICE Burgos CNP   2 mg at 07/18/19 2124    lithium (ESKALITH) extended release tablet 450 mg  450 mg Oral 2 times per day Gabby French MD   450 mg at 07/19/19 0825    hydrOXYzine (ATARAX) tablet 50 mg  50 mg Oral TID PRN Savannah José MD   50 mg at 07/19/19 1517    ibuprofen (ADVIL;MOTRIN) tablet 400 mg  400 mg Oral TID PRN Rommel Barroso MD   400 mg at 07/15/19 0304    diphenhydrAMINE (BENADRYL) injection 50 mg  50 mg Intramuscular Q12H PRN MAURICE Kirkpatrick CNP   50 mg at 07/18/19 0249    And    haloperidol lactate (HALDOL) injection 10 mg  10 mg Intramuscular Q12H PRN MAURICE Kirkpatrick CNP   10 mg at 07/18/19 0249    docusate sodium (COLACE) capsule 100 mg  100 mg Oral BID PRN Savannah José MD   100 mg at 07/17/19 2130    clonazePAM (KLONOPIN) tablet 1 mg  1 mg Oral TID Savannah José MD   1 mg at 07/19/19 1431    hydrochlorothiazide (HYDRODIURIL) tablet 25 mg  25 mg Oral Daily Delvis Connell,

## 2019-07-19 NOTE — PROGRESS NOTES
Spoke with Den Shrestha at [facility]. Bed will probably not be available until late afternoon today. Discharge plan will be for Monday provided that bed is still available. Updated SW and asked that staff be updated as well.
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
## 2019-07-20 PROCEDURE — 1240000000 HC EMOTIONAL WELLNESS R&B

## 2019-07-20 PROCEDURE — 6370000000 HC RX 637 (ALT 250 FOR IP): Performed by: PSYCHIATRY & NEUROLOGY

## 2019-07-20 PROCEDURE — 6370000000 HC RX 637 (ALT 250 FOR IP): Performed by: NURSE PRACTITIONER

## 2019-07-20 PROCEDURE — 99232 SBSQ HOSP IP/OBS MODERATE 35: CPT | Performed by: NURSE PRACTITIONER

## 2019-07-20 PROCEDURE — 6370000000 HC RX 637 (ALT 250 FOR IP): Performed by: INTERNAL MEDICINE

## 2019-07-20 RX ORDER — CLONIDINE HYDROCHLORIDE 0.1 MG/1
0.1 TABLET ORAL NIGHTLY
Status: DISCONTINUED | OUTPATIENT
Start: 2019-07-20 | End: 2019-07-22 | Stop reason: HOSPADM

## 2019-07-20 RX ADMIN — HYDROXYZINE HYDROCHLORIDE 50 MG: 50 TABLET, FILM COATED ORAL at 22:52

## 2019-07-20 RX ADMIN — CARIPRAZINE 6 MG: 3 CAPSULE, GELATIN COATED ORAL at 08:22

## 2019-07-20 RX ADMIN — TRAZODONE HYDROCHLORIDE 50 MG: 50 TABLET ORAL at 23:31

## 2019-07-20 RX ADMIN — METRONIDAZOLE 500 MG: 500 TABLET ORAL at 22:51

## 2019-07-20 RX ADMIN — CLONAZEPAM 1 MG: 1 TABLET ORAL at 14:28

## 2019-07-20 RX ADMIN — CLONAZEPAM 1 MG: 1 TABLET ORAL at 08:22

## 2019-07-20 RX ADMIN — ZIPRASIDONE HYDROCHLORIDE 20 MG: 20 CAPSULE ORAL at 18:01

## 2019-07-20 RX ADMIN — CLONIDINE HYDROCHLORIDE 0.1 MG: 0.1 TABLET ORAL at 22:52

## 2019-07-20 RX ADMIN — LURASIDONE HYDROCHLORIDE 40 MG: 40 TABLET, FILM COATED ORAL at 18:01

## 2019-07-20 RX ADMIN — LITHIUM CARBONATE 450 MG: 450 TABLET, EXTENDED RELEASE ORAL at 22:51

## 2019-07-20 RX ADMIN — Medication 2 MG: at 22:51

## 2019-07-20 RX ADMIN — ACETAMINOPHEN 650 MG: 325 TABLET, FILM COATED ORAL at 23:08

## 2019-07-20 RX ADMIN — LITHIUM CARBONATE 450 MG: 450 TABLET, EXTENDED RELEASE ORAL at 08:22

## 2019-07-20 RX ADMIN — BENZTROPINE MESYLATE 1 MG: 1 TABLET ORAL at 08:22

## 2019-07-20 RX ADMIN — HYDROXYZINE HYDROCHLORIDE 50 MG: 50 TABLET, FILM COATED ORAL at 06:15

## 2019-07-20 RX ADMIN — METRONIDAZOLE 500 MG: 500 TABLET ORAL at 08:22

## 2019-07-20 RX ADMIN — CLONAZEPAM 1 MG: 1 TABLET ORAL at 22:51

## 2019-07-20 RX ADMIN — ZIPRASIDONE HYDROCHLORIDE 20 MG: 20 CAPSULE ORAL at 08:22

## 2019-07-20 RX ADMIN — HYDROXYZINE HYDROCHLORIDE 50 MG: 50 TABLET, FILM COATED ORAL at 16:26

## 2019-07-20 RX ADMIN — BENZTROPINE MESYLATE 1 MG: 1 TABLET ORAL at 22:52

## 2019-07-20 RX ADMIN — HYDROCHLOROTHIAZIDE 25 MG: 25 TABLET ORAL at 08:22

## 2019-07-20 ASSESSMENT — PAIN DESCRIPTION - PAIN TYPE: TYPE: ACUTE PAIN

## 2019-07-20 ASSESSMENT — PAIN SCALES - GENERAL
PAINLEVEL_OUTOF10: 3
PAINLEVEL_OUTOF10: 2

## 2019-07-20 ASSESSMENT — PAIN DESCRIPTION - LOCATION: LOCATION: GENERALIZED

## 2019-07-20 NOTE — GROUP NOTE
Group Therapy Note    Date: July 20    Group Start Time: 1100  Group End Time: 1446  Group Topic: Psychoeducation    [facility]    Jaci Jacques        Group Therapy Note    Attendees: 8/20         Patient's Goal:  Participate appropriately in Psychoeducational group \"My past and future holds\" to improve insight and positive coping skills. Notes:  Pt remains pressured, manic and disorganized, needs constant redirection to stay  On group topic.     Status After Intervention:  Unchanged    Participation Level: Monopolizing    Participation Quality: Intrusive      Speech:  pressured      Thought Process/Content: Flight of ideas      Affective Functioning: Blunted      Mood: anxious      Level of consciousness:  Alert and Oriented x4      Response to Learning: Able to verbalize current knowledge/experience      Endings: None Reported    Modes of Intervention: Education, Support and Activity      Discipline Responsible: Behavorial [employer]      Signature:  Jaci Jacques

## 2019-07-21 PROCEDURE — 6370000000 HC RX 637 (ALT 250 FOR IP): Performed by: NURSE PRACTITIONER

## 2019-07-21 PROCEDURE — 6370000000 HC RX 637 (ALT 250 FOR IP): Performed by: PSYCHIATRY & NEUROLOGY

## 2019-07-21 PROCEDURE — 6370000000 HC RX 637 (ALT 250 FOR IP): Performed by: INTERNAL MEDICINE

## 2019-07-21 PROCEDURE — 99232 SBSQ HOSP IP/OBS MODERATE 35: CPT | Performed by: NURSE PRACTITIONER

## 2019-07-21 PROCEDURE — 1240000000 HC EMOTIONAL WELLNESS R&B

## 2019-07-21 RX ORDER — ZIPRASIDONE HYDROCHLORIDE 40 MG/1
40 CAPSULE ORAL 2 TIMES DAILY WITH MEALS
Status: DISCONTINUED | OUTPATIENT
Start: 2019-07-21 | End: 2019-07-22 | Stop reason: HOSPADM

## 2019-07-21 RX ADMIN — CLONAZEPAM 1 MG: 1 TABLET ORAL at 08:12

## 2019-07-21 RX ADMIN — CLONAZEPAM 1 MG: 1 TABLET ORAL at 22:45

## 2019-07-21 RX ADMIN — HYDROXYZINE HYDROCHLORIDE 50 MG: 50 TABLET, FILM COATED ORAL at 07:09

## 2019-07-21 RX ADMIN — HYDROXYZINE HYDROCHLORIDE 50 MG: 50 TABLET, FILM COATED ORAL at 14:02

## 2019-07-21 RX ADMIN — TRAZODONE HYDROCHLORIDE 50 MG: 50 TABLET ORAL at 22:44

## 2019-07-21 RX ADMIN — HYDROXYZINE HYDROCHLORIDE 50 MG: 50 TABLET, FILM COATED ORAL at 19:43

## 2019-07-21 RX ADMIN — LURASIDONE HYDROCHLORIDE 40 MG: 40 TABLET, FILM COATED ORAL at 17:05

## 2019-07-21 RX ADMIN — LITHIUM CARBONATE 450 MG: 450 TABLET, EXTENDED RELEASE ORAL at 08:12

## 2019-07-21 RX ADMIN — IBUPROFEN 400 MG: 400 TABLET ORAL at 09:25

## 2019-07-21 RX ADMIN — METRONIDAZOLE 500 MG: 500 TABLET ORAL at 08:12

## 2019-07-21 RX ADMIN — CLONAZEPAM 1 MG: 1 TABLET ORAL at 13:30

## 2019-07-21 RX ADMIN — ZIPRASIDONE HYDROCHLORIDE 40 MG: 40 CAPSULE ORAL at 17:05

## 2019-07-21 RX ADMIN — BENZTROPINE MESYLATE 1 MG: 1 TABLET ORAL at 08:13

## 2019-07-21 RX ADMIN — BENZTROPINE MESYLATE 1 MG: 1 TABLET ORAL at 22:45

## 2019-07-21 RX ADMIN — HYDROCHLOROTHIAZIDE 25 MG: 25 TABLET ORAL at 08:13

## 2019-07-21 RX ADMIN — LITHIUM CARBONATE 450 MG: 450 TABLET, EXTENDED RELEASE ORAL at 22:49

## 2019-07-21 RX ADMIN — ZIPRASIDONE HYDROCHLORIDE 20 MG: 20 CAPSULE ORAL at 08:13

## 2019-07-21 RX ADMIN — CLONIDINE HYDROCHLORIDE 0.1 MG: 0.1 TABLET ORAL at 22:45

## 2019-07-21 RX ADMIN — CARIPRAZINE 6 MG: 3 CAPSULE, GELATIN COATED ORAL at 08:12

## 2019-07-21 RX ADMIN — Medication 2 MG: at 22:45

## 2019-07-21 ASSESSMENT — PAIN SCALES - GENERAL: PAINLEVEL_OUTOF10: 5

## 2019-07-22 VITALS
HEIGHT: 68 IN | HEART RATE: 85 BPM | SYSTOLIC BLOOD PRESSURE: 111 MMHG | BODY MASS INDEX: 23.79 KG/M2 | OXYGEN SATURATION: 98 % | WEIGHT: 157 LBS | TEMPERATURE: 97.6 F | DIASTOLIC BLOOD PRESSURE: 72 MMHG | RESPIRATION RATE: 16 BRPM

## 2019-07-22 PROCEDURE — 6370000000 HC RX 637 (ALT 250 FOR IP): Performed by: PSYCHIATRY & NEUROLOGY

## 2019-07-22 PROCEDURE — 5130000000 HC BRIDGE APPOINTMENT

## 2019-07-22 PROCEDURE — 6370000000 HC RX 637 (ALT 250 FOR IP): Performed by: INTERNAL MEDICINE

## 2019-07-22 PROCEDURE — 99239 HOSP IP/OBS DSCHRG MGMT >30: CPT | Performed by: PSYCHIATRY & NEUROLOGY

## 2019-07-22 PROCEDURE — 6370000000 HC RX 637 (ALT 250 FOR IP): Performed by: NURSE PRACTITIONER

## 2019-07-22 RX ORDER — CLONIDINE HYDROCHLORIDE 0.1 MG/1
0.1 TABLET ORAL NIGHTLY
Qty: 60 TABLET | Refills: 0 | Status: ON HOLD
Start: 2019-07-22

## 2019-07-22 RX ORDER — LITHIUM CARBONATE 450 MG
450 TABLET, EXTENDED RELEASE ORAL EVERY 12 HOURS SCHEDULED
Qty: 60 TABLET | Refills: 0 | Status: ON HOLD
Start: 2019-07-22

## 2019-07-22 RX ORDER — BENZTROPINE MESYLATE 1 MG/1
1 TABLET ORAL 2 TIMES DAILY
Qty: 60 TABLET | Refills: 0 | Status: ON HOLD
Start: 2019-07-22

## 2019-07-22 RX ORDER — CLONAZEPAM 1 MG/1
1 TABLET ORAL 3 TIMES DAILY
Qty: 60 TABLET | Refills: 0 | Status: ON HOLD
Start: 2019-07-22 | End: 2019-08-01

## 2019-07-22 RX ORDER — HYDROCHLOROTHIAZIDE 25 MG/1
25 TABLET ORAL DAILY
Qty: 30 TABLET | Refills: 0 | Status: ON HOLD
Start: 2019-07-23

## 2019-07-22 RX ORDER — TRAZODONE HYDROCHLORIDE 50 MG/1
50 TABLET ORAL NIGHTLY PRN
Qty: 30 TABLET | Refills: 0 | Status: ON HOLD
Start: 2019-07-22

## 2019-07-22 RX ORDER — ZIPRASIDONE HYDROCHLORIDE 40 MG/1
40 CAPSULE ORAL 2 TIMES DAILY WITH MEALS
Qty: 60 CAPSULE | Refills: 0 | Status: ON HOLD
Start: 2019-07-22

## 2019-07-22 RX ADMIN — HYDROCHLOROTHIAZIDE 25 MG: 25 TABLET ORAL at 08:55

## 2019-07-22 RX ADMIN — ZIPRASIDONE HYDROCHLORIDE 40 MG: 40 CAPSULE ORAL at 08:55

## 2019-07-22 RX ADMIN — CLONAZEPAM 1 MG: 1 TABLET ORAL at 08:55

## 2019-07-22 RX ADMIN — LITHIUM CARBONATE 450 MG: 450 TABLET, EXTENDED RELEASE ORAL at 08:55

## 2019-07-22 RX ADMIN — BENZTROPINE MESYLATE 1 MG: 1 TABLET ORAL at 08:55

## 2019-07-22 RX ADMIN — HYDROXYZINE HYDROCHLORIDE 50 MG: 50 TABLET, FILM COATED ORAL at 06:39

## 2019-07-22 RX ADMIN — CARIPRAZINE 6 MG: 3 CAPSULE, GELATIN COATED ORAL at 08:55

## 2019-07-22 RX ADMIN — ACETAMINOPHEN 650 MG: 325 TABLET, FILM COATED ORAL at 04:41

## 2019-07-22 ASSESSMENT — PAIN DESCRIPTION - PAIN TYPE: TYPE: CHRONIC PAIN

## 2019-07-22 ASSESSMENT — PAIN DESCRIPTION - LOCATION: LOCATION: GENERALIZED

## 2019-07-22 ASSESSMENT — PAIN SCALES - GENERAL
PAINLEVEL_OUTOF10: 2
PAINLEVEL_OUTOF10: 3

## 2021-08-03 ENCOUNTER — TELEPHONE (OUTPATIENT)
Dept: OBGYN CLINIC | Age: 41
End: 2021-08-03

## 2022-08-17 ENCOUNTER — HOSPITAL ENCOUNTER (OUTPATIENT)
Dept: PHYSICAL THERAPY | Age: 42
Setting detail: THERAPIES SERIES
Discharge: HOME | End: 2022-08-17
Payer: MEDICARE

## 2022-08-17 PROCEDURE — 97110 THERAPEUTIC EXERCISES: CPT

## 2022-08-17 PROCEDURE — 97162 PT EVAL MOD COMPLEX 30 MIN: CPT
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
## 2022-08-25 NOTE — FLOWSHEET NOTE
[x] [facility]) [facility] &  Therapy  [address]  [phone]  F: [phone] [] [address]  [place] [address]   [address]  P: [phone]  F: [phone] [] [address]  [address]  P: [phone]  F: [phone] [] [address]  P: [phone]  F: [phone] [] [address]  [facility]   [address]   [place]: [phone]  F: [phone]      Physical Therapy Daily Treatment Note    Date:  2022  Patient Name:  Nicolas Dee    :  1980  MRN: [id number]  Physician: Bill Zimmerman CNP                      Insurance: [facility] Medicare BMN   Medical Diagnosis: Myalgia M79.10                          Rehab Codes: M54.59, M54.2, M25.521, M25.522, M25.531, M25.532  Onset Date: 8/10/22                                 Next 's appt: TBD  Visit# / total visits: ; Progress note for Medicare patient due at visit 10     Cancels/No Shows: 0/1    Subjective:    Pain:  [x] Yes  [] No Location: low back  Pain Rating: (0-10 scale) 4-5/10  Pain altered Tx:  [] No  [] Yes  Action:  Comments: Pt reported pain in low back, stated shoulders are feeling ok.      Objective:  Modalities:   Precautions:  Exercises:  Exercise Reps/ Time Weight/ Level Comments   Nustep 5 min  Level 4          Standing      Cervical retraction 15x     Shoulder rolls 20x     Bicep curls 20x 4 lbs    Elbow extension 2x10 blue          Supine      TA isos  15x 2-3\"    TA isos w/ marching 20x ea     Glute sets 20x 2-3\"    Bridge 20x  With glute set   LTR 5x ea     SLR 2x 10 ea     Straight leg knee press 2x10           Side lying      Hip abduction 15x ea  Add weights   Shoulder- ER 15x ea 4 lbs    Abduction 15x ea 4 lbs     HAB 15x ea 4 lbs/2 lbs Flexion 15x ea 4 lbs/2 lbs          Other:    Specific Instructions for next treatment[de-identified] Core strength include glutes and scapular stabilizers wrist and elbow strengthening. Treatment Charges: Mins Units   []  Modalities     []  Ther Exercise 55 4   []  Manual Therapy     []  Ther Activities     []  Aquatics     []  Vasocompression     []  Other     Total Treatment time 55 4       Assessment: [x] Progressing toward goals. Pt initiated treatment on nustep to increase endurance. Added standing, supine and sidelying activities to improve UE and LE strength. Pt with good tolerance to all additional exercises. In side lying pt began activities with 4 lb weight and self reduced to 2 lb weight as needed. [] No change. [] Other:  [x] Patient would continue to benefit from skilled physical therapy services in order to: improve posture, core strength,a nd joint stability to tolerate ADLs. STG/LTG  STG: (to be met in 8 treatments)  ? Pain: 4/10 neck and back pain with ADLs. ? Strength: 5/5 B elbow and wrist flexion and extension to improve joint stability. ? Function: Optimal score of 30% impaired or better to improve ADLs. Independent with Home Exercise Programs     LTG: (to be met in 12 treatments)  Patient is barbara to perform work duties without neck pain. Patient is barbara to perform static sitting for one hour without low back pain. Patient goals: Reduce body pain and imporve daily activity, learn exercise to get stronger. Pt. Education:  [x] Yes  [] No  [x] Reviewed Prior HEP/Ed  Method of Education: [x] Verbal  [x] Demo  [x] Written  Comprehension of Education:  [x] Verbalizes understanding. [x] Demonstrates understanding. [] Needs review. [x] Demonstrates/verbalizes HEP/Ed previously given. Access Code: [email or web address]  URL: [email or web address]. com/  Date: 08/17/2022  Prepared by: Phillip Molina     Exercises  Standing Cervical Retraction - 1 x daily - 4 x weekly - 3 sets - 10 reps  Standing Backward Shoulder Rolls - 1 x daily - 4 x weekly - 3 sets - 10 reps  Supine Bridge - 1 x daily - 4 x weekly - 3 sets - 10 reps  Supine Lower Trunk Rotation - 1 x daily - 4 x weekly - 3 sets - 10 reps  Standing Bicep Curls with Resistance - 1 x daily - 4 x weekly - 3 sets - 10 reps  Standing Elbow Extension with Self-Anchored Resistance - 1 x daily - 4 x weekly - 3 sets - 10 reps     Plan: [x] Continue current frequency toward long and short term goals.     [x] Specific Instructions for subsequent treatments: above      Time In:1005            Time Out: 7328    Electronically signed by:  Chance De La Rosa PTA

## 2022-08-26 ENCOUNTER — HOSPITAL ENCOUNTER (OUTPATIENT)
Dept: PHYSICAL THERAPY | Age: 42
Setting detail: THERAPIES SERIES
Discharge: HOME | End: 2022-08-26
Payer: MEDICARE

## 2022-08-26 PROCEDURE — 97110 THERAPEUTIC EXERCISES: CPT

## 2022-08-29 ENCOUNTER — HOSPITAL ENCOUNTER (OUTPATIENT)
Dept: PHYSICAL THERAPY | Age: 42
Setting detail: THERAPIES SERIES
Discharge: HOME | End: 2022-08-29
Payer: MEDICARE

## 2022-08-29 PROCEDURE — 97110 THERAPEUTIC EXERCISES: CPT

## 2022-08-29 NOTE — FLOWSHEET NOTE
[x] [facility] &  Therapy  [address]  [phone]  F: [phone] [] [address]  [facility]   [address]  P: [phone]  F: [phone] [] [address]  [address]  P: [phone]  F: [phone] [] [address]  P: [phone]  F: [phone] [] [address]  [facility]   [address]   [place]: [phone]  F: [phone]      Physical Therapy Daily Treatment Note    Date:  2022  Patient Name:  Bony Joseph    :  1980  MRN: [id number]  Physician: Darnella Curling, CNP                      Insurance: [facility] Medicare BMN   Medical Diagnosis: Myalgia M79.10                          Rehab Codes: M54.59, M54.2, M25.521, M25.522, M25.531, M25.532  Onset Date: 8/10/22                                 Next 's appt: TBD  Visit# / total visits: 3/12; Progress note for Medicare patient due at visit 10     Cancels/No Shows: 0/1    Subjective:    Pain:  [x] Yes  [] No Location: low back,neck,, nestor shldrs ,gluteals   Pain Rating: (0-10 scale) 4/10 LB/bils shldr, 8/10 neck,6/10 gluteals  Pain altered Tx:  [x] No  [] Yes  Action:  Comments:  Reports pain in multiple areas as listed above. Addressing HEP. Mild soreness after prior session>     Objective:  Modalities:   Precautions:  Exercises:  Exercise  LOW BACK Reps/ Time Weight/ Level Comments   Nustep 5 min  Level 4 UE also. Education  x  Lumbar roll, slight scap retraction with sitting, cell phone more at eye level with thick pillow under elbows. Cervical roll with one head bed pillow.   Changing positions often, not over 2 hres for one position. -   Standing      Cervical retraction 10x5\"     Cervical ROM 3x10\"  Rotation , SB: good ROM pt can just continue HEP and D/C in clinic moving forward. HEP    Retro Shoulder rolls 2x10     Wrist flex/ext stretch 3x15\"  Miguel Angel, added 8/29   Wrist PRE all planes add     Bicep curls 20x 4 lbs    tband       Triceps  2x10 blue     rows 15x blue Added 8/29   ER 15x lime Added 8/29   Hip abd w/ iso abdom 15x 1.5 lbs Miguel Angel,Added 8/29   Hip ext 15x 1.5 lbs Miguel Angel, Added 8/29         Sitting piriformis stretch. Miguel Angel  3x20\"  Added 8/29         Supine      TA isos  10x 2-3\"    TA isos w/ marching 20x ea 1.5 lbs  Added wt 8/29   Glute sets 20x 2-3\" 10x 8/29   Bridge 2x10  With glute set   LTR       SLR 2x 10 ea 1.5 lbs Added wt 8/29   Straight leg knee press             Side lying   Held all side lying 8/29 due to time constraints, will restart next session. Hip abduction    Add weights   Shoulder- ER 15x ea 4 lbs    Abduction 15x ea 4 lbs     HAB 15x ea 4 lbs/2 lbs    Flexion 15x ea 4 lbs/2 lbs          Other:    Specific Instructions for next treatment[de-identified] Core strength include glutes and scapular stabilizers wrist and elbow strengthening. Treatment Charges: Mins Units   []  Modalities     [x]  Ther Exercise  49 3   []  Manual Therapy     []  Ther Activities     []  Aquatics     []  Vasocompression     []  Other     Total Treatment time 49 3       Assessment: [x] Progressing toward goals Addressed posture/positioning with sitting and during use of cell phone (pt reports she is on phone a lot) also added education on  sleeping positions and body mechanics with supine to side ling to sitting due to poor mechanics previously observed this date. Added 2 way hip for gluteal strengthening and added weight with core strengthening. Other additions also addressed today for miguel angel wrist and periscapular strengthening as charted. Pt demonstrated fairly good understanding of full programs. Issued add'l written HEP. [] No change.      [] Other:  [x] Patient would continue to benefit from skilled physical therapy services in order to: improve posture, core strength,a nd joint stability to tolerate ADLs. STG/LTG  STG: (to be met in 8 treatments)  ? Pain: 4/10 neck and back pain with ADLs. ? Strength: 5/5 B elbow and wrist flexion and extension to improve joint stability. ? Function: Optimal score of 30% impaired or better to improve ADLs. Independent with Home Exercise Programs     LTG: (to be met in 12 treatments)  Patient is barbara to perform work duties without neck pain. Patient is barbara to perform static sitting for one hour without low back pain. Patient goals: Reduce body pain and imporve daily activity, learn exercise to get stronger. Pt. Education:  [x] Yes  [] No  [x] Reviewed Prior HEP/Ed  Method of Education: [x] Verbal  [x] Demo  [x] Written  Comprehension of Education:  [x] Verbalizes understanding. [x] Demonstrates understanding. [] Needs review. [x] Demonstrates/verbalizes HEP/Ed previously given. Access Code: [email or web address]  URL: [email or web address]. com/  Date: 08/17/2022  Prepared by: Katherine Rodriguez     Exercises  Standing Cervical Retraction - 1 x daily - 4 x weekly - 3 sets - 10 reps  Standing Backward Shoulder Rolls - 1 x daily - 4 x weekly - 3 sets - 10 reps  Supine Bridge - 1 x daily - 4 x weekly - 3 sets - 10 reps  Supine Lower Trunk Rotation - 1 x daily - 4 x weekly - 3 sets - 10 reps  Standing Bicep Curls with Resistance - 1 x daily - 4 x weekly - 3 sets - 10 reps  Standing Elbow Extension with Self-Anchored Resistance - 1 x daily - 4 x weekly - 3 sets - 10 reps  Blue tband. Access Code: [email or web address]  URL: [email or web address]/  Date: 08/29/2022  Prepared by: Dauna Reed    Exercises  Standing Wrist Flexor Stretch with Arm Bent - 2 x daily - 7 x weekly - 1 sets - 3 reps - 15 hold  Seated Wrist Extension Stretch - 2 x daily - 7 x weekly - 1 sets - 3 reps - 15 hold  Seated Cervical Rotation AROM - 2 x daily - 7 x weekly - 1 sets - 5 reps - 10 hold  Seated Cervical Sidebending Stretch - 2-3 x daily - 7 x weekly - 1 sets - 3 reps - 15 hold  Seated Table Piriformis Stretch - 2-3 x daily - 7 x weekly - 1 sets - 3 reps - 30 hold      Plan: [x] Continue current frequency toward long and short term goals.     [x] Specific Instructions for subsequent treatments: above      Time In: 9385          Time Out:  8167    Electronically signed by:  Sushil Dorman PTA

## 2022-09-06 ENCOUNTER — HOSPITAL ENCOUNTER (OUTPATIENT)
Age: 42
Setting detail: SPECIMEN
Discharge: HOME | End: 2022-09-06

## 2022-09-06 LAB
ABSOLUTE EOS #: 0.37 K/UL (ref 0–0.44)
ABSOLUTE IMMATURE GRANULOCYTE: 0.03 K/UL (ref 0–0.3)
ABSOLUTE LYMPH #: 1.76 K/UL (ref 1.1–3.7)
ABSOLUTE MONO #: 0.54 K/UL (ref 0.1–1.2)
ALT SERPL-CCNC: 22 U/L (ref 5–33)
AST SERPL-CCNC: 23 U/L
BASOPHILS # BLD: 1 % (ref 0–2)
BASOPHILS ABSOLUTE: 0.05 K/UL (ref 0–0.2)
BUN BLDV-MCNC: 9 MG/DL (ref 6–20)
CARBAMAZEPINE LEVEL: 5 UG/ML (ref 4–12)
CREAT SERPL-MCNC: 0.75 MG/DL (ref 0.5–0.9)
EOSINOPHILS RELATIVE PERCENT: 5 % (ref 1–4)
GFR AFRICAN AMERICAN: >60 ML/MIN
GFR NON-AFRICAN AMERICAN: >60 ML/MIN
GFR SERPL CREATININE-BSD FRML MDRD: NORMAL ML/MIN/{1.73_M2}
HCT VFR BLD CALC: 44.8 % (ref 36.3–47.1)
HEMOGLOBIN: 14 G/DL (ref 11.9–15.1)
IMMATURE GRANULOCYTES: 0 %
LITHIUM LEVEL: 1.1 MMOL/L (ref 0.6–1.2)
LYMPHOCYTES # BLD: 23 % (ref 24–43)
MCH RBC QN AUTO: 29.8 PG (ref 25.2–33.5)
MCHC RBC AUTO-ENTMCNC: 31.3 G/DL (ref 28.4–34.8)
MCV RBC AUTO: 95.3 FL (ref 82.6–102.9)
MONOCYTES # BLD: 7 % (ref 3–12)
NRBC AUTOMATED: 0 PER 100 WBC
PDW BLD-RTO: 12.9 % (ref 11.8–14.4)
PLATELET # BLD: 243 K/UL (ref 138–453)
PMV BLD AUTO: 11.5 FL (ref 8.1–13.5)
RBC # BLD: 4.7 M/UL (ref 3.95–5.11)
SEG NEUTROPHILS: 64 % (ref 36–65)
SEGMENTED NEUTROPHILS ABSOLUTE COUNT: 4.9 K/UL (ref 1.5–8.1)
TSH SERPL DL<=0.05 MIU/L-ACNC: 1.09 UIU/ML (ref 0.3–5)
WBC # BLD: 7.7 K/UL (ref 3.5–11.3)

## 2022-09-09 ENCOUNTER — HOSPITAL ENCOUNTER (OUTPATIENT)
Dept: PHYSICAL THERAPY | Age: 42
Setting detail: THERAPIES SERIES
Discharge: HOME | End: 2022-09-09
Payer: MEDICARE

## 2022-09-09 PROCEDURE — 97110 THERAPEUTIC EXERCISES: CPT

## 2022-09-09 NOTE — FLOWSHEET NOTE
[x] [facility]) - [facility] TWEL[facility]STEP [facility] &  Therapy  [address]  [phone]  F: [phone] [] [address]  [place] [address]   [address]  P: [phone]  F: [phone] [] [address]  [address]  P: [phone]  F: [phone] [] [address]  P: [phone]  F: [phone] [] [address]  [facility]   [address]   [place]: [phone]  F: [phone]      Physical Therapy Daily Treatment Note    Date:  2022  Patient Name:  Bessy Vazquez    :  1980  MRN: [id number]  Physician: Wilton David CNP                      Insurance: [facility] Medicare BMN   Medical Diagnosis: Myalgia M79.10                          Rehab Codes: M54.59, M54.2, M25.521, M25.522, M25.531, M25.532  Onset Date: 8/10/22                                 Next 's appt: TBD  Visit# / total visits: ; Progress note for Medicare patient due at visit 10     Cancels/No Shows: 0/3    Subjective:    Pain:  [x] Yes  [] No Location: neck, low back. Pain Rating: (0-10 scale) Neck: 8/10. Low back 6/10  Pain altered Tx:  [x] No  [] Yes  Action:  Comments:  Reports carrying three 12 pack cases of pop earlier today for 2 blocks. Stated she was tired afterward. Apologized for missing last 2 appointments - had medication adjustment done. Interested in getting started exercising at home - educated to take it slow and to build up. States she has 8 lb UE weights at home she uses without issue. Objective:  Modalities:   Precautions:  Exercises:  Exercise  LOW BACK Reps/ Time Weight/ Level Completed 22  Comments   Nustep 5 min  Level 4 x UE also.     Education  x   Lumbar roll, slight scap retraction with sitting, cell phone more at eye level with thick pillow under elbows. Cervical roll with one head bed pillow. Changing positions often, not over 2 hres for one position. -8/29   Standing       Cervical retraction 10x5\"  x    Cervical ROM 5 x  5 sec X - reviewed 9/9/22 Rotation , SB: good ROM pt can just continue HEP and D/C in clinic moving forward. HEP    Retro Shoulder rolls 2x10      Wrist flex/ext stretch 3x15\"  x Miguel Angel, added 8/29   Wrist PRE all planes add      Bicep curls 20x 4 lbs x           Resistance Band       Triceps  15 x [place] x    Lat pull down 15 x [place] x HEP 9/9/22   Overhead row 15 x [place] x HEP 9/9/22   Horizontal abduction 15 x [place] x HEP 9/9/22   Rows 15 x [place] x HEP 9/9/22   ER 15 x [place] x Added 8/29. HEP 9/9/22          Hip abd w/ iso abdom 15x [place] x Miguel Angel,Added 8/29   Hip ext 15x [place] x Miguel Angel, Added 8/29          Sitting piriformis stretch. Miguel Angel  3x20\"   Added 8/29          Supine       TA isos  10x 2-3\" x    TA isos w/ marching 20x ea 2 lb x Added wt 8/29   Glute sets 20x 2-3\" x 10x 8/29   Bridge 20 x  x With glute set   LTR    x    SLR 2x 10 ea 2 lb x Added wt 8/29   Straight leg knee press               Side lying       Hip abduction 15 x 2 lb x    Shoulder- ER 15x ea 4 lbs x    Abduction 15x ea 4 lbs  x    HAB 15x ea 4 lbs x    Flexion 15 x R  10 x L 4 lbs x 10 x left arm on 9/9/22 due to soreness/pain   Hip abd 15 x  2 lb x    Other:    Specific Instructions for next treatment[de-identified] Core strength include glutes and scapular stabilizers wrist and elbow strengthening. Treatment Charges: Mins Units   []  Modalities     [x]  Ther Exercise 55 4   []  Manual Therapy     []  Ther Activities     []  Aquatics     []  Vasocompression     []  Other     Total Treatment time 55        Assessment: [x] Progressing toward goals -- Added exs as noted with fair tolerance for all. Gave HEP of UE exs with new resistance band for LE hip abd/ext. The only exs patient struggled with was 4 lb side shoulder flexion- able to complete only 10 reps. Patient required verbal cues for which exs to complete, demo for how to complete exs, and tactile cues for positions, posture, and how to relax shoulder elevation while doing exs. [] No change. [] Other:  [x] Patient would continue to benefit from skilled physical therapy services in order to: improve posture, core strength,a nd joint stability to tolerate ADLs. STG/LTG  STG: (to be met in 8 treatments)  ? Pain: 4/10 neck and back pain with ADLs. ? Strength: 5/5 B elbow and wrist flexion and extension to improve joint stability. ? Function: Optimal score of 30% impaired or better to improve ADLs. Independent with Home Exercise Programs     LTG: (to be met in 12 treatments)  Patient is barbara to perform work duties without neck pain. Patient is barbara to perform static sitting for one hour without low back pain. Patient goals: Reduce body pain and imporve daily activity, learn exercise to get stronger. Pt. Education:  [x] Yes  [] No  [x] Reviewed Prior HEP/Ed  Method of Education: [x] Verbal  [x] Demo  [x] Written  Comprehension of Education:  [x] Verbalizes understanding. [x] Demonstrates understanding. [] Needs review. [x] Demonstrates/verbalizes HEP/Ed previously given. Access Code: [email or web address]  URL: [email or web address]/  Date: 08/17/2022  Prepared by: Vivien Mireles     Exercises  Standing Cervical Retraction - 1 x daily - 4 x weekly - 3 sets - 10 reps  Standing Backward Shoulder Rolls - 1 x daily - 4 x weekly - 3 sets - 10 reps  Supine Bridge - 1 x daily - 4 x weekly - 3 sets - 10 reps  Supine Lower Trunk Rotation - 1 x daily - 4 x weekly - 3 sets - 10 reps  Standing Bicep Curls with Resistance - 1 x daily - 4 x weekly - 3 sets - 10 reps  Standing Elbow Extension with Self-Anchored Resistance - 1 x daily - 4 x weekly - 3 sets - 10 reps  Blue tband. Access Code: [email or web address]  URL: [email or web address]/  Date: 08/29/2022  Prepared by: Francisca Romero    Exercises  Standing Wrist Flexor Stretch with Arm Bent - 2 x daily - 7 x weekly - 1 sets - 3 reps - 15 hold  Seated Wrist Extension Stretch - 2 x daily - 7 x weekly - 1 sets - 3 reps - 15 hold  Seated Cervical Rotation AROM - 2 x daily - 7 x weekly - 1 sets - 5 reps - 10 hold  Seated Cervical Sidebending Stretch - 2-3 x daily - 7 x weekly - 1 sets - 3 reps - 15 hold  Seated Table Piriformis Stretch - 2-3 x daily - 7 x weekly - 1 sets - 3 reps - 30 hold    Lat pull down HEP 9/9/22   Overhead row HEP 9/9/22   Horizontal abduction HEP 9/9/22   Rows HEP 9/9/22   ER HEP 9/9/22           Plan: [x] Continue current frequency toward long and short term goals.     [x] Specific Instructions for subsequent treatments: above      Time In: 1493         Time Out:  8293    Electronically signed by:  Stuart Andrade, PT

## 2022-09-13 ENCOUNTER — HOSPITAL ENCOUNTER (OUTPATIENT)
Dept: PHYSICAL THERAPY | Age: 42
Setting detail: THERAPIES SERIES
Discharge: HOME | End: 2022-09-13
Payer: MEDICARE

## 2022-09-13 PROCEDURE — 97110 THERAPEUTIC EXERCISES: CPT

## 2022-09-13 NOTE — FLOWSHEET NOTE
[x] [facility]) [facility] &  Therapy  [address]  [phone]  F: [phone] [] [address]  [place] [address]   [address]  P: [phone]  F: [phone] [] [address]  [address]  P: [phone]  F: [phone] [] [address]  P: [phone]  F: [phone] [] [address]  [facility]   [address]   [place]: [phone]  F: [phone]      Physical Therapy Daily Treatment Note    Date:  2022  Patient Name:  Hunter Jean Baptiste    :  1980  MRN: [id number]  Physician: Sravanthi Bird CNP                      Insurance: [facility] Medicare BMN   Medical Diagnosis: Myalgia M79.10                          Rehab Codes: M54.59, M54.2, M25.521, M25.522, M25.531, M25.532  Onset Date: 8/10/22                                 Next 's appt: TBD  Visit# / total visits: ; Progress note for Medicare patient due at visit 10     Cancels/No Shows: 0/3    Subjective:    Pain:  [x] Yes  [] No Location: neck, low back. Pain Rating: (0-10 scale) Neck: 2/10. Low back 2/10  Pain altered Tx:  [x] No  [] Yes  Action:  Comments:    Reports LB was bad 1st thing in AM but pain has decreased. (Sleep on stomach)  Addressing HEP. Objective:  Modalities:   Precautions:  Exercises:  Exercise  LB/neck/periscapular +wrist  Reps/ Time Weight/ Level Completed 22  Comments   Nustep 6 min  Level 4 x UE also. Education  x   Lumbar roll, slight scap retraction with sitting, cell phone more at eye level with thick pillow under elbows. Cervical roll with one head bed pillow.   Changing positions often, not over 2 hres for one position. -   Standing       Cervical retraction 10x5\"  x Towel behind neck   Cervical ROM  HEP Rotation , SB:      Retro Shoulder rolls 10x1 3 lbs x Added wt 9/13   Shrugs  10x1 3 lbs x Added 9/13   Shdlr abd 10x1 2 lbs x Added 9/13, incr. Wt next session   Shdlr flexion  10x1 2 lbs x Added 9/13,incr. Wt next session. Wrist flex/ext stretch 1x20\"ea  x Miguel Angel, reviewed on tech. 9/13    Wrist PRE all planes 1x10  x Added all planes 9/13, miguel angel    Bicep curls 2x15x 4 lbs x Incr. Sets 9/13          Resistance Band       Triceps  15 x [place] x    Lat pull down 15 x [place] x HEP 9/9/22   Overhead row 15 x [place]   HEP 9/9/22   Horizontal abduction 15 x [place] x HEP 9/9/22   Rows 15 x [place] x HEP 9/9/22   ER 15 x [place] x HEP 9/9/22          Hip abd w/ iso abdom 15x [place] x Miguel Angel,    Hip ext 15x [place] x Miguel Angel,           Sitting piriformis stretch. Miguel Angel  3x20\"   Verbal reminders to address w/ HEP 9/13          Supine       LTR 5x10\"  x    DKTC 3x20\"  x Added for HEP due to subjective cleopatra   TA isos  10x 3\" x    TA isos w/ marching 20x ea 2 lbs x Added wt 8/29   Dead bug 10x 2 lbs U/LE x Added 9/13   Glute sets 20x 2-3\"       Bridge 20x  x With glute set   SLR 2x 10 ea 2 lb x Added wt 8/29                  Side lying       Hip abduction 15 x 2 lb      Shoulder- ER 15x ea 4 lbs     Abduction 15x ea 4 lbs  x    HAB 15x ea 4 lbs x    Flexion 15 x R  10 x L 4 lbs      Hip abd 15 x  2 lb x    Other: alternating exercises due to volume. Specific Instructions for next treatment[de-identified] Core strength include glutes and scapular stabilizers wrist and elbow strengthening. Treatment Charges: Mins Units   []  Modalities     [x]  Ther Exercise  53 4   []  Manual Therapy     []  Ther Activities     []  Aquatics     []  Vasocompression     []  Other     Total Treatment time 53 4       Assessment: [x] Progressing toward goals Continue core, gluteal,periscapular strengthening exercises and added wrist PRE as charted. Also added standing UE PRE as charted for strengthening.   Intermittent vc for technique and cervical posture with tband exercises. No reports of increased pain during session. [] No change. [] Other:  [x] Patient would continue to benefit from skilled physical therapy services in order to: improve posture, core strength,a nd joint stability to tolerate ADLs. STG/LTG  STG: (to be met in 8 treatments)  ? Pain: 4/10 neck and back pain with ADLs. ? Strength: 5/5 B elbow and wrist flexion and extension to improve joint stability. ? Function: Optimal score of 30% impaired or better to improve ADLs. Independent with Home Exercise Programs     LTG: (to be met in 12 treatments)  Patient is barbara to perform work duties without neck pain. Patient is barbara to perform static sitting for one hour without low back pain. Patient goals: Reduce body pain and imporve daily activity, learn exercise to get stronger. Pt. Education:  [x] Yes  [] No  [x] Reviewed Prior HEP/Ed  Method of Education: [x] Verbal  [x] Demo  [x] Written  Comprehension of Education:  [x] Verbalizes understanding. [x] Demonstrates understanding. [] Needs review. [x] Demonstrates/verbalizes HEP/Ed previously given. Access Code: [email or web address]  URL: [email or web address] com/  Date: 08/17/2022  Prepared by: Timmy Combs     Exercises  Standing Cervical Retraction - 1 x daily - 4 x weekly - 3 sets - 10 reps  Standing Backward Shoulder Rolls - 1 x daily - 4 x weekly - 3 sets - 10 reps  Supine Bridge - 1 x daily - 4 x weekly - 3 sets - 10 reps  Supine Lower Trunk Rotation - 1 x daily - 4 x weekly - 3 sets - 10 reps  Standing Bicep Curls with Resistance - 1 x daily - 4 x weekly - 3 sets - 10 reps  Standing Elbow Extension with Self-Anchored Resistance - 1 x daily - 4 x weekly - 3 sets - 10 reps  Blue tband. Access Code: [email or web address]  URL: [email or web address]/  Date: 08/29/2022  Prepared by: Leni Bradley    Exercises  Standing Wrist Flexor Stretch with Arm Bent - 2 x daily - 7 x weekly - 1 sets - 3 reps - 15 hold  Seated Wrist Extension Stretch - 2 x daily - 7 x weekly - 1 sets - 3 reps - 15 hold  Seated Cervical Rotation AROM - 2 x daily - 7 x weekly - 1 sets - 5 reps - 10 hold  Seated Cervical Sidebending Stretch - 2-3 x daily - 7 x weekly - 1 sets - 3 reps - 15 hold  Seated Table Piriformis Stretch - 2-3 x daily - 7 x weekly - 1 sets - 3 reps - 30 hold    Lat pull down HEP 9/9/22   Overhead row HEP 9/9/22   Horizontal abduction HEP 9/9/22   Rows HEP 9/9/22   ER HEP 9/9/22           Plan: [x] Continue current frequency toward long and short term goals.     [x] Specific Instructions for subsequent treatments: above      Time In:   1003     Time Out:   5778    Electronically signed by:  Etta Mchugh PTA

## 2022-09-16 ENCOUNTER — HOSPITAL ENCOUNTER (OUTPATIENT)
Dept: PHYSICAL THERAPY | Age: 42
Setting detail: THERAPIES SERIES
Discharge: HOME | End: 2022-09-16
Payer: MEDICARE

## 2022-09-16 PROCEDURE — 97110 THERAPEUTIC EXERCISES: CPT

## 2022-09-16 NOTE — FLOWSHEET NOTE
Towel behind neck - performed in seated 9/16   Cervical ROM  HEP     Rotation , SB:      Retro Shoulder rolls 10x2 3 lbs x    Shrugs  10x2 3 lbs x    Shdlr abd 15x 3 lbs x Increased weight 9/16   Shdlr flexion  15x 3 lbs x Increased weight 9/16   Wrist flex/ext stretch 1x20\"ea  x Miguel Angel, reviewed on tech. 9/13    Wrist PRE all planes 1x10  x Added all planes 9/13, miguel angel    Bicep curls 2x15x 4 lbs x           Resistance Band       Triceps  15 x [place] x    Lat pull down 15 x [place] x HEP 9/9/22   Overhead row 15 x [place]   HEP 9/9/22   Horizontal abduction 15 x [place] x HEP 9/9/22   Rows 15 x [place] x HEP 9/9/22   ER 15 x [place] x HEP 9/9/22          3 way hip 15x [place] x Miguel Angel, Added flexion 9/16          Sitting piriformis stretch. Miguel Angel  3x20\"   Verbal reminders to address w/ HEP 9/13          Supine       LTR 5x10\"  x    DKTC 3x20\"  x Added for HEP    TA isos  10x 3\"     TA isos w/ marching 20x ea 2 lbs x Added wt 8/29   Dead bug 10x 2 lbs U/LE x Added 9/13   Glute sets 20x 2-3\"       Bridge 20x  x With glute set   SLR 2x 10 ea 2 lb  Added wt 8/29                  Side lying       Hip abduction 15 x 2 lb      Shoulder- ER 15x ea 4 lbs     Abduction 15x ea 4 lbs  x    HAB 15x ea 4 lbs x    Flexion 15 x R  10 x L 4 lbs      Hip abd 15 x  2 lb x    Other: alternating exercises due to volume. Specific Instructions for next treatment[de-identified] Core strength include glutes and scapular stabilizers wrist and elbow strengthening. Treatment Charges: Mins Units   []  Modalities     [x]  Ther Exercise  45 3   []  Manual Therapy     []  Ther Activities     []  Aquatics     []  Vasocompression     []  Other     Total Treatment time 45 3       Assessment: [x] Progressing toward goals Minimal progressions as noted with focus on periscapular and shoulder strengthening with good tolerance. Patient demonstrates good strength with progressions. No pain or symptoms noted throughout, minimal fatigue at conclusion of treatment. [] No change. [] Other:  [x] Patient would continue to benefit from skilled physical therapy services in order to: improve posture, core strength,a nd joint stability to tolerate ADLs. STG: (to be met in 8 treatments)  ? Pain: 4/10 neck and back pain with ADLs. ? Strength: 5/5 B elbow and wrist flexion and extension to improve joint stability. ? Function: Optimal score of 30% impaired or better to improve ADLs. Independent with Home Exercise Programs     LTG: (to be met in 12 treatments)  Patient is barbara to perform work duties without neck pain. Patient is barbara to perform static sitting for one hour without low back pain. Patient goals: Reduce body pain and imporve daily activity, learn exercise to get stronger. Pt. Education:  [x] Yes  [] No  [x] Reviewed Prior HEP/Ed  Method of Education: [x] Verbal  [x] Demo  [x] Written  Comprehension of Education:  [x] Verbalizes understanding. [x] Demonstrates understanding. [] Needs review. [x] Demonstrates/verbalizes HEP/Ed previously given. Access Code: [email or web address]  URL: [email or web address]  Date: 08/17/2022  Prepared by: Mark Ochoa     Exercises  Standing Cervical Retraction - 1 x daily - 4 x weekly - 3 sets - 10 reps  Standing Backward Shoulder Rolls - 1 x daily - 4 x weekly - 3 sets - 10 reps  Supine Bridge - 1 x daily - 4 x weekly - 3 sets - 10 reps  Supine Lower Trunk Rotation - 1 x daily - 4 x weekly - 3 sets - 10 reps  Standing Bicep Curls with Resistance - 1 x daily - 4 x weekly - 3 sets - 10 reps  Standing Elbow Extension with Self-Anchored Resistance - 1 x daily - 4 x weekly - 3 sets - 10 reps  Blue tband. Access Code: [email or web address]  URL: [email or web address]  Date: 08/29/2022  Prepared by: Vivienne Blount    Exercises  Standing Wrist Flexor Stretch with Arm Bent - 2 x daily - 7 x weekly - 1 sets - 3 reps - 15 hold  Seated Wrist Extension Stretch - 2 x daily - 7 x weekly - 1 sets - 3 reps - 15 hold  Seated Cervical Rotation AROM - 2 x daily - 7 x weekly - 1 sets - 5 reps - 10 hold  Seated Cervical Sidebending Stretch - 2-3 x daily - 7 x weekly - 1 sets - 3 reps - 15 hold  Seated Table Piriformis Stretch - 2-3 x daily - 7 x weekly - 1 sets - 3 reps - 30 hold    Lat pull down HEP 9/9/22   Overhead row HEP 9/9/22   Horizontal abduction HEP 9/9/22   Rows HEP 9/9/22   ER HEP 9/9/22           Plan: [x] Continue current frequency toward long and short term goals.     [x] Specific Instructions for subsequent treatments: above      Time In:   1000     Time Out:   1050 am    Electronically signed by:  Annette Bone PTA

## 2022-09-19 ENCOUNTER — HOSPITAL ENCOUNTER (OUTPATIENT)
Dept: PHYSICAL THERAPY | Age: 42
Setting detail: THERAPIES SERIES
Discharge: HOME | End: 2022-09-19
Payer: MEDICARE

## 2022-09-19 PROCEDURE — 97110 THERAPEUTIC EXERCISES: CPT

## 2022-09-19 NOTE — FLOWSHEET NOTE
[x] [facility]  [address]  [phone]  F: [phone] [] [address]  [place] [address]   [address]  P: [phone]  F: [phone] [] [address]  [address]  P: [phone]  F: [phone] [] [address]  P: [phone]  F: [phone] [] [address]  [facility]   [address]   [place]: [phone]  F: [phone]      Physical Therapy Daily Treatment Note    Date:  2022  Patient Name:  Lane Collins    :  1980  MRN: [id number]  Physician: Mauro Collazo CNP                      Insurance: [facility] Medicare BMN   Medical Diagnosis: Myalgia M79.10                          Rehab Codes: M54.59, M54.2, M25.521, M25.522, M25.531, M25.532  Onset Date: 8/10/22                                 Next 's appt: TBD  Visit# / total visits: ; Progress note for Medicare patient due at visit 10     Cancels/No Shows: 0/3    Subjective:    Pain:  [x] Yes  [] No Location: neck, low back. Pain Rating: (0-10 scale) Neck: 2310. Low back 3/10  Pain altered Tx:  [x] No  [] Yes  Action:  Comments:    Reports neck and LB pain was 8/10 in AM but it decreased as she gets moving. Objective:  Modalities:   Precautions:  Exercises:  Exercise  LB/neck/periscapular +wrist  Reps/ Time Weight/ Level Completed 22  Comments   Nustep 5  min  Level 4 x UE also. Education  x   Lumbar roll, slight scap retraction with sitting, cell phone more at eye level with thick pillow under elbows. Cervical roll with one head bed pillow. Changing positions often, not over 2 hres for one position. -   Cervical roll in lorie.  Education on posture, cervical spine neutral vs flexion and slight scapular toward goals  Added stretching for UT and levator muscles and progressed core strengthening due to pt complaints. Education on posture and positioning throughout session with activity and issued additional written HEP. Education again on lumbar roll and cervical roll in pillow. Good cervical AROM and stretching ROM demonstrated. [] No change. [x] Other:forward posture and periscapular weakness most likely cause of pain. [x] Patient would continue to benefit from skilled physical therapy services in order to: improve posture, core strength,a nd joint stability to tolerate ADLs. STG: (to be met in 8 treatments)  ? Pain: 4/10 neck and back pain with ADLs. ? Strength: 5/5 B elbow and wrist flexion and extension to improve joint stability. ? Function: Optimal score of 30% impaired or better to improve ADLs. Independent with Home Exercise Programs     LTG: (to be met in 12 treatments)  Patient is barbara to perform work duties without neck pain. Patient is barbara to perform static sitting for one hour without low back pain. Patient goals: Reduce body pain and imporve daily activity, learn exercise to get stronger. Pt. Education:  [x] Yes  [] No  [x] Reviewed Prior HEP/Ed  Method of Education: [x] Verbal  [x] Demo  [x] Written  Comprehension of Education:  [x] Verbalizes understanding. [x] Demonstrates understanding. [] Needs review. [x] Demonstrates/verbalizes HEP/Ed previously given. Access Code: [email or web address]  URL: [email or web address]  Date: 08/17/2022  Prepared by: Phillip Molina     Exercises  Standing Cervical Retraction - 1 x daily - 4 x weekly - 3 sets - 10 reps  Standing Backward Shoulder Rolls - 1 x daily - 4 x weekly - 3 sets - 10 reps  Supine Bridge - 1 x daily - 4 x weekly - 3 sets - 10 reps  Supine Lower Trunk Rotation - 1 x daily - 4 x weekly - 3 sets - 10 reps  Standing Bicep Curls with Resistance - 1 x daily - 4 x weekly - 3 sets - 10 reps  Standing Elbow Extension with Self-Anchored Resistance - 1 x daily - 4 x weekly - 3 sets - 10 reps  Blue tband. Access Code: [email or web address]  URL: [email or web address]  Date: 08/29/2022  Prepared by: Lurena Juliannele    Exercises  Standing Wrist Flexor Stretch with Arm Bent - 2 x daily - 7 x weekly - 1 sets - 3 reps - 15 hold  Seated Wrist Extension Stretch - 2 x daily - 7 x weekly - 1 sets - 3 reps - 15 hold  Seated Cervical Rotation AROM - 2 x daily - 7 x weekly - 1 sets - 5 reps - 10 hold  Seated Cervical Sidebending Stretch - 2-3 x daily - 7 x weekly - 1 sets - 3 reps - 15 hold  Seated Table Piriformis Stretch - 2-3 x daily - 7 x weekly - 1 sets - 3 reps - 30 hold    Lat pull down HEP 9/9/22   Overhead row HEP 9/9/22   Horizontal abduction HEP 9/9/22   Rows HEP 9/9/22   ER HEP 9/9/22     Access Code: [email or web address]  URL: [email or web address]  Date: 09/19/2022  Prepared by: Lurena Pickle    Exercises  Seated Cervical Sidebending Stretch - 2 x daily - 7 x weekly - 1 sets - 10 reps - 20 hold  Gentle Levator Scapulae Stretch - 2 x daily - 7 x weekly - 1 sets - 10 reps - 20 hold  Seated Scapular Retraction - 2 x daily - 7 x weekly - 2 sets - 10 reps - 5 hold        Plan: [x] Continue current frequency toward long and short term goals.     [x] Specific Instructions for subsequent treatments: above      Time In:   0900      Time Out:  1003    Electronically signed by:  Gilford Etienne, PTA

## 2022-10-30 ENCOUNTER — HOSPITAL ENCOUNTER (INPATIENT)
Age: 42
LOS: 8 days | Discharge: HOME | DRG: 885 | End: 2022-11-08
Attending: EMERGENCY MEDICINE | Admitting: PSYCHIATRY & NEUROLOGY
Payer: COMMERCIAL

## 2022-10-30 DIAGNOSIS — F29 PSYCHOSIS, UNSPECIFIED PSYCHOSIS TYPE (HCC): Primary | ICD-10-CM

## 2022-10-30 LAB
ABSOLUTE EOS #: 0.2 K/UL (ref 0–0.4)
ABSOLUTE LYMPH #: 1.5 K/UL (ref 1–4.8)
ABSOLUTE MONO #: 1 K/UL (ref 0.1–1.3)
AMORPHOUS: ABNORMAL
AMPHETAMINE SCREEN URINE: NEGATIVE
ANION GAP SERPL CALCULATED.3IONS-SCNC: 10 MMOL/L (ref 9–17)
BACTERIA: ABNORMAL
BARBITURATE SCREEN URINE: NEGATIVE
BASOPHILS # BLD: 1 % (ref 0–2)
BASOPHILS ABSOLUTE: 0.1 K/UL (ref 0–0.2)
BENZODIAZEPINE SCREEN, URINE: NEGATIVE
BILIRUBIN URINE: NEGATIVE
BUN BLDV-MCNC: 7 MG/DL (ref 6–20)
CALCIUM SERPL-MCNC: 10.3 MG/DL (ref 8.6–10.4)
CANNABINOID SCREEN URINE: POSITIVE
CASTS UA: ABNORMAL /LPF
CHLORIDE BLD-SCNC: 106 MMOL/L (ref 98–107)
CO2: 25 MMOL/L (ref 20–31)
COCAINE METABOLITE, URINE: NEGATIVE
COLOR: YELLOW
CREAT SERPL-MCNC: 0.96 MG/DL (ref 0.5–0.9)
EOSINOPHILS RELATIVE PERCENT: 1 % (ref 0–4)
EPITHELIAL CELLS UA: ABNORMAL /HPF
ETHANOL PERCENT: <0.01 %
ETHANOL: <10 MG/DL
FENTANYL URINE: NEGATIVE
GFR SERPL CREATININE-BSD FRML MDRD: >60 ML/MIN/1.73M2
GLUCOSE BLD-MCNC: 123 MG/DL (ref 70–99)
GLUCOSE URINE: NEGATIVE
HCG QUANTITATIVE: <1 MIU/ML
HCT VFR BLD CALC: 42.1 % (ref 36–46)
HEMOGLOBIN: 14.1 G/DL (ref 12–16)
KETONES, URINE: NEGATIVE
LEUKOCYTE ESTERASE, URINE: NEGATIVE
LYMPHOCYTES # BLD: 13 % (ref 24–44)
MCH RBC QN AUTO: 28.1 PG (ref 26–34)
MCHC RBC AUTO-ENTMCNC: 33.5 G/DL (ref 31–37)
MCV RBC AUTO: 83.9 FL (ref 80–100)
METHADONE SCREEN, URINE: NEGATIVE
MONOCYTES # BLD: 8 % (ref 1–7)
MUCUS: ABNORMAL
NITRITE, URINE: NEGATIVE
OPIATES, URINE: NEGATIVE
OXYCODONE SCREEN URINE: NEGATIVE
PDW BLD-RTO: 13.1 % (ref 11.5–14.9)
PH UA: 6 (ref 5–8)
PHENCYCLIDINE, URINE: NEGATIVE
PLATELET # BLD: 277 K/UL (ref 150–450)
PMV BLD AUTO: 8.3 FL (ref 6–12)
POTASSIUM SERPL-SCNC: 3.5 MMOL/L (ref 3.7–5.3)
PROTEIN UA: ABNORMAL
RBC # BLD: 5.02 M/UL (ref 4–5.2)
RBC UA: ABNORMAL /HPF
SEG NEUTROPHILS: 77 % (ref 36–66)
SEGMENTED NEUTROPHILS ABSOLUTE COUNT: 9.5 K/UL (ref 1.3–9.1)
SODIUM BLD-SCNC: 141 MMOL/L (ref 135–144)
SPECIFIC GRAVITY UA: 1.01 (ref 1–1.03)
TEST INFORMATION: ABNORMAL
TURBIDITY: CLEAR
URINE HGB: NEGATIVE
UROBILINOGEN, URINE: NORMAL
WBC # BLD: 12.3 K/UL (ref 3.5–11)
WBC UA: ABNORMAL /HPF

## 2022-10-30 PROCEDURE — 99285 EMERGENCY DEPT VISIT HI MDM: CPT

## 2022-10-30 PROCEDURE — 85025 COMPLETE CBC W/AUTO DIFF WBC: CPT

## 2022-10-30 PROCEDURE — 81001 URINALYSIS AUTO W/SCOPE: CPT

## 2022-10-30 PROCEDURE — 96374 THER/PROPH/DIAG INJ IV PUSH: CPT

## 2022-10-30 PROCEDURE — 6360000002 HC RX W HCPCS: Performed by: EMERGENCY MEDICINE

## 2022-10-30 PROCEDURE — 84702 CHORIONIC GONADOTROPIN TEST: CPT

## 2022-10-30 PROCEDURE — G0480 DRUG TEST DEF 1-7 CLASSES: HCPCS

## 2022-10-30 PROCEDURE — 6370000000 HC RX 637 (ALT 250 FOR IP): Performed by: EMERGENCY MEDICINE

## 2022-10-30 PROCEDURE — 36415 COLL VENOUS BLD VENIPUNCTURE: CPT

## 2022-10-30 PROCEDURE — 96372 THER/PROPH/DIAG INJ SC/IM: CPT

## 2022-10-30 PROCEDURE — 80048 BASIC METABOLIC PNL TOTAL CA: CPT

## 2022-10-30 PROCEDURE — 80307 DRUG TEST PRSMV CHEM ANLYZR: CPT

## 2022-10-30 RX ORDER — LORAZEPAM 2 MG/ML
2 INJECTION INTRAMUSCULAR ONCE
Status: COMPLETED | OUTPATIENT
Start: 2022-10-30 | End: 2022-10-30

## 2022-10-30 RX ORDER — HALOPERIDOL 5 MG/ML
5 INJECTION INTRAMUSCULAR ONCE
Status: COMPLETED | OUTPATIENT
Start: 2022-10-30 | End: 2022-10-30

## 2022-10-30 RX ORDER — DIPHENHYDRAMINE HCL 25 MG
50 TABLET ORAL ONCE
Status: COMPLETED | OUTPATIENT
Start: 2022-10-30 | End: 2022-10-30

## 2022-10-30 RX ORDER — DIPHENHYDRAMINE HCL 25 MG
25 TABLET ORAL ONCE
Status: DISCONTINUED | OUTPATIENT
Start: 2022-10-30 | End: 2022-10-30

## 2022-10-30 RX ADMIN — Medication 2 MG: at 17:37

## 2022-10-30 RX ADMIN — HALOPERIDOL 5 MG: 5 INJECTION INTRAMUSCULAR at 17:36

## 2022-10-30 RX ADMIN — LORAZEPAM 2 MG: 2 INJECTION INTRAMUSCULAR; INTRAVENOUS at 21:28

## 2022-10-30 RX ADMIN — DIPHENHYDRAMINE HCL 50 MG: 25 TABLET ORAL at 17:01

## 2022-10-30 RX ADMIN — HALOPERIDOL LACTATE 5 MG: 5 INJECTION, SOLUTION INTRAMUSCULAR at 21:29

## 2022-10-30 SDOH — ECONOMIC STABILITY: FOOD INSECURITY: WITHIN THE PAST 12 MONTHS, THE FOOD YOU BOUGHT JUST DIDN'T LAST AND YOU DIDN'T HAVE MONEY TO GET MORE.: NEVER TRUE

## 2022-10-30 ASSESSMENT — LIFESTYLE VARIABLES
HOW MANY STANDARD DRINKS CONTAINING ALCOHOL DO YOU HAVE ON A TYPICAL DAY: PATIENT DOES NOT DRINK
HOW MANY STANDARD DRINKS CONTAINING ALCOHOL DO YOU HAVE ON A TYPICAL DAY: PATIENT DOES NOT DRINK
HOW OFTEN DO YOU HAVE A DRINK CONTAINING ALCOHOL: NEVER
HOW OFTEN DO YOU HAVE A DRINK CONTAINING ALCOHOL: NEVER

## 2022-10-30 ASSESSMENT — ENCOUNTER SYMPTOMS
COLOR CHANGE: 0
TROUBLE SWALLOWING: 0
ABDOMINAL PAIN: 0
VOICE CHANGE: 0
BACK PAIN: 0
EYE PAIN: 0
VOMITING: 0
CHEST TIGHTNESS: 0
PHOTOPHOBIA: 0
SHORTNESS OF BREATH: 0
NAUSEA: 0
FACIAL SWELLING: 0

## 2022-10-30 ASSESSMENT — PATIENT HEALTH QUESTIONNAIRE - PHQ9: SUM OF ALL RESPONSES TO PHQ QUESTIONS 1-9: 15

## 2022-10-30 ASSESSMENT — PAIN - FUNCTIONAL ASSESSMENT: PAIN_FUNCTIONAL_ASSESSMENT: NONE - DENIES PAIN

## 2022-10-30 NOTE — ED NOTES
Pt being load standing on recliner touching camera on ceiling. When told she cant do that Pt started spitting at [facility]. Security called and PRN IM haldol 5mg with IM Ativan 2mg given.       Fabiano Menjivar LPN  11/68/99 7899
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
## 2022-10-30 NOTE — ED NOTES
Patient is pacing. Pacing shouted out \"fuck this. \" Patient then comes up to the desk stating \"I'm going to get violent. Is that what I have to do? \"     This writer asks patient what she needs. Patient states \"I want to sleep and the only way to do that is to get violent. \"     Writer attempts to discuss this with patient who then walks away stating \"can someone get me food. \"

## 2022-10-30 NOTE — ED NOTES
One on one and snacks provided, Pt currently sitting up at table calmly eating snacks     Marya Evans LPN  78/14/76 4626

## 2022-10-30 NOTE — ED PROVIDER NOTES
EMERGENCY DEPARTMENT ENCOUNTER    Pt Name: Adan Dee  MRN: [id number]  [place] 1980  Date of evaluation: 10/30/22  CHIEF COMPLAINT       Chief Complaint   Patient presents with    Suicidal    Homicidal     HISTORY OF PRESENT ILLNESS   64-year-old female presents the ER after being brought in by police. The patient has an underlying history of bipolar disorder. Patient denies any suicidal or homicidal ideations and states her  was trying to hurt her. Per police, the patient's  called police after she showed up to his job and was causing a scene. By the time they arrived to the area the patient was in the middle of the road where there were moving vehicles. The history is provided by the patient. Mental Health Problem  Presenting symptoms: agitation and bizarre behavior    Presenting symptoms: no hallucinations and no suicidal thoughts    Patient accompanied by:  Law enforcement  Degree of incapacity (severity): Moderate  Associated symptoms: no abdominal pain, no appetite change, no chest pain, no fatigue and no headaches          REVIEW OF SYSTEMS     Review of Systems   Constitutional:  Negative for activity change, appetite change and fatigue. HENT:  Negative for facial swelling, trouble swallowing and voice change. Eyes:  Negative for photophobia and pain. Respiratory:  Negative for chest tightness and shortness of breath. Cardiovascular:  Negative for chest pain and palpitations. Gastrointestinal:  Negative for abdominal pain, nausea and vomiting. Genitourinary:  Negative for dysuria and urgency. Musculoskeletal:  Negative for arthralgias and back pain. Skin:  Negative for color change and rash. Neurological:  Negative for dizziness, syncope and headaches. Psychiatric/Behavioral:  Positive for agitation. Negative for behavioral problems, hallucinations and suicidal ideas. The patient is hyperactive.     PASTMEDICAL HISTORY     Past Medical History: Diagnosis Date    Anxiety     Bipolar 1 disorder ([facility] [address] 1999    Depression     Gestational diabetes 7/22/2016    OCD (obsessive compulsive disorder) 1999    PTSD (post-traumatic stress disorder)     Rapid rate of speech     Schizoaffective disorder [facility])      Past Problem List  Patient Active Problem List   Diagnosis Code    Pregnancy Z34.90    Hx CS x1 (G2-twins,36wks) Z98.891    H/O miscarriage X2 O09.299    Insufficient prenatal care O09.30    AMA O09.529    Smoker F17.200    OCD F42.9    Candida albicans infection B37.9    Twin gestation, 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 BXX0    Chromosomal 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, affecting management of mother, antepartum O35.10X0    Poor fetal growth, affecting management of mother, antepartum condition or complication N75.9640    Umbilical cord complication H90. 9XX0    Tobacco use disorder complicating pregnancy, childbirth, or puerperium, antepartum O99.330    Twin B-IUGR O36.5920    Twin B-Fetal cardiomegaly, pericardial effusion, VSD O35. BXX0    Polyhydramnios (twin A-8.6cm and twin B-8.7cm) O40. 2XX0    Depression F32. A    Twin B-MCAs wnl, absent UADs O69. 9XX0    Abnl Quad (1:50 T21), elev msAFP O35.10X0    Hx D&C (G3-8wks, G4-8wks) Z98.890    Elev 1hr gtt, normal 3hr R73.09    GBS bacteriuria R82.71    Chlamydia infection (SPENSER neg) O98.811, A74.9    FHx DM  Z83.3    Fetal pericardial effusion affecting management of mother O36.8990    Polyhydramnios, antepartum complication O09. 9XX0    Polyhydramnios, antepartum complication M64. 9XX0    IUGR (intrauterine growth restriction) affecting care of mother D98.4458    Umbilical cord complication P16. 9XX0    Suspected damage to fetus from other disease in mother, affecting management of mother, antepartum condition or complication A50. 8XX0    Suspected damage to fetus from other disease in mother, affecting management of mother, antepartum condition or complication O35. 8XX0    16 ELDON @29w2d M APG Wt /M AP Wt Z98.891    Hyperglycemia R73.9    Bipolar 1 disorder (HCC) F31.9    Bipolar 1 disorder, manic, moderate (HCC) F31.12    Micaela ([address] F30.9     SURGICAL HISTORY       Past Surgical History:   Procedure Laterality Date    ABDOMINAL SURGERY       SECTION      LAPAROSCOPY       CURRENT MEDICATIONS       Previous Medications    BENZTROPINE (COGENTIN) 1 MG TABLET    Take 1 tablet by mouth 2 times daily    CARIPRAZINE HCL 6 MG CAPS    Take 6 mg by mouth daily    CLONAZEPAM (KLONOPIN) 1 MG TABLET    Take 1 tablet by mouth 3 times daily for 10 days. CLONIDINE (CATAPRES) 0.1 MG TABLET    Take 1 tablet by mouth nightly    HYDROCHLOROTHIAZIDE (HYDRODIURIL) 25 MG TABLET    Take 1 tablet by mouth daily    LITHIUM (ESKALITH) 450 MG EXTENDED RELEASE TABLET    Take 1 tablet by mouth every 12 hours    MELATONIN 3 MG TABS TABLET    Take 10 mg by mouth nightly as needed    TRAZODONE (DESYREL) 50 MG TABLET    Take 1 tablet by mouth nightly as needed (insomnia)    ZIPRASIDONE (GEODON) 40 MG CAPSULE    Take 1 capsule by mouth 2 times daily (with meals)     ALLERGIES     is allergic to abilify [aripiprazole], codeine, depakote er [divalproex sodium er], gabapentin, lamictal [lamotrigine], percocet [oxycodone-acetaminophen], quetiapine fumarate, tegretol [carbamazepine], trileptal [oxcarbazepine], valproic acid, ziprasidone hcl, and zyprexa [olanzapine]. FAMILY HISTORY     She indicated that her mother is alive. She indicated that her father is . She indicated that the status of her sister is unknown. SOCIAL HISTORY       Social History     Tobacco Use    Smoking status: Every Day     Packs/day: 1.00     Types: Cigarettes    Smokeless tobacco: Never    Tobacco comments:     NO medication ordered d/t pregnancy    Vaping Use    Vaping Use: Former    Substances: Always   Substance Use Topics    Alcohol use: No     Alcohol/week: 0.0 standard drinks    Drug use:  Yes Types: Marijuana (Weed)     PHYSICAL EXAM     INITIAL VITALS: BP (!) 137/90   Pulse 80   Temp 98.5 °F (36.9 °C) (Oral)   Resp 18   Ht 5' 8\" (1.727 m)   Wt 180 lb (81.6 kg)   SpO2 98%   BMI 27.37 kg/m²    Physical Exam  Vitals reviewed. Constitutional:       General: She is not in acute distress. Appearance: Normal appearance. She is not ill-appearing or toxic-appearing. HENT:      Head: Normocephalic and atraumatic. Right Ear: External ear normal.      Left Ear: External ear normal.      Nose: No congestion or rhinorrhea. Eyes:      Extraocular Movements: Extraocular movements intact. Pupils: Pupils are equal, round, and reactive to light. Cardiovascular:      Rate and Rhythm: Normal rate and regular rhythm. Pulses: Normal pulses. Heart sounds: Normal heart sounds. Pulmonary:      Effort: Pulmonary effort is normal. No respiratory distress. Breath sounds: Normal breath sounds. No wheezing. Abdominal:      General: Bowel sounds are normal. There is no distension. Palpations: Abdomen is soft. Tenderness: There is no abdominal tenderness. Musculoskeletal:         General: No deformity or signs of injury. Normal range of motion. Cervical back: No rigidity or tenderness. Skin:     General: Skin is warm and dry. Neurological:      Mental Status: She is alert and oriented to person, place, and time. Mental status is at baseline. Psychiatric:         Mood and Affect: Mood is anxious. Speech: Speech is rapid and pressured. Behavior: Behavior normal. Behavior is cooperative. MEDICAL DECISION MAKING:   Patient in an agitated state. Patient with an underlying history of bipolar disorder. Lab work in the ER did not display any signs of acute or normality and the patient was medically cleared for psychiatric assessment. The patient was provided with Ativan as well as Benadryl to help calm her agitation.   The patient was spitting on others in the ER. The patient was admitted after speaking with the admitting team for psychiatric assessment. Patient understands the plan. CRITICAL CARE:       PROCEDURES:    Procedures    DIAGNOSTIC RESULTS   EKG:All EKG's are interpreted by the Emergency Department Physician who either signs or Co-signs this chart in the absence of a cardiologist.        RADIOLOGY:All plain film, CT, MRI, and formal ultrasound images (except ED bedside ultrasound) are read by the radiologist, see reports below, unless otherwisenoted in MDM or here. No orders to display     LABS: All lab results were reviewed by myself, and all abnormals are listed below.   Labs Reviewed   URINALYSIS WITH REFLEX TO CULTURE - Abnormal; Notable for the following components:       Result Value    Protein, UA 1+ (*)     All other components within normal limits   URINE DRUG SCREEN - Abnormal; Notable for the following components:    Cannabinoid Scrn, Ur POSITIVE (*)     All other components within normal limits   BASIC METABOLIC PANEL - Abnormal; Notable for the following components:    Glucose 123 (*)     Creatinine 0.96 (*)     Potassium 3.5 (*)     All other components within normal limits   CBC WITH AUTO DIFFERENTIAL - Abnormal; Notable for the following components:    WBC 12.3 (*)     Seg Neutrophils 77 (*)     Lymphocytes 13 (*)     Monocytes 8 (*)     Segs Absolute 9.50 (*)     All other components within normal limits   MICROSCOPIC URINALYSIS - Abnormal; Notable for the following components:    Bacteria, UA FEW (*)     Mucus, UA 1+ (*)     Amorphous, UA 1+ (*)     All other components within normal limits   ETHANOL   HCG, QUANTITATIVE, PREGNANCY       EMERGENCY DEPARTMENTCOURSE:         Vitals:    Vitals:    10/30/22 1605   BP: (!) 137/90   Pulse: 80   Resp: 18   Temp: 98.5 °F (36.9 °C)   TempSrc: Oral   SpO2: 98%   Weight: 180 lb (81.6 kg)   Height: 5' 8\" (1.727 m)       The patient was given the following medications while in the emergency department:  Orders Placed This Encounter   Medications    DISCONTD: diphenhydrAMINE (BENADRYL) tablet 25 mg    diphenhydrAMINE (BENADRYL) tablet 50 mg    LORazepam (ATIVAN) injection 2 mg    haloperidol lactate (HALDOL) injection 5 mg     CONSULTS:  None    FINAL IMPRESSION      1. Psychosis, unspecified psychosis type [facility])          DISPOSITION/PLAN   DISPOSITION Decision To Admit 10/30/2022 05:23:45 PM      PATIENT REFERRED TO:  No follow-up provider specified. DISCHARGE MEDICATIONS:  New Prescriptions    No medications on file     The care is provided during an unprecedented national emergency due to the novel coronavirus, COVID 19.   DO Aldair Martínez DO  10/30/22 2005

## 2022-10-30 NOTE — ED NOTES
PT continues to be erratic and yelling profanity as loud as she can. Pt just spit on the floor again.      Ernesto Rosales LPN  38/12/43 [id number]

## 2022-10-31 PROBLEM — F23 ACUTE PSYCHOSIS (HCC): Status: ACTIVE | Noted: 2022-10-31

## 2022-10-31 PROBLEM — F31.64 BIPOLAR AFFECTIVE DISORDER, MIXED, SEVERE, WITH PSYCHOTIC BEHAVIOR (HCC): Status: ACTIVE | Noted: 2022-10-31

## 2022-10-31 PROCEDURE — 6370000000 HC RX 637 (ALT 250 FOR IP): Performed by: PSYCHIATRY & NEUROLOGY

## 2022-10-31 PROCEDURE — 6360000002 HC RX W HCPCS: Performed by: EMERGENCY MEDICINE

## 2022-10-31 PROCEDURE — APPSS60 APP SPLIT SHARED TIME 46-60 MINUTES: Performed by: PSYCHIATRY & NEUROLOGY

## 2022-10-31 PROCEDURE — 6370000000 HC RX 637 (ALT 250 FOR IP): Performed by: EMERGENCY MEDICINE

## 2022-10-31 PROCEDURE — 1240000000 HC EMOTIONAL WELLNESS R&B

## 2022-10-31 RX ORDER — IBUPROFEN 400 MG/1
400 TABLET ORAL EVERY 6 HOURS PRN
Status: DISCONTINUED | OUTPATIENT
Start: 2022-10-31 | End: 2022-11-08 | Stop reason: HOSPADM

## 2022-10-31 RX ORDER — HALOPERIDOL 5 MG/ML
5 INJECTION INTRAMUSCULAR EVERY 6 HOURS PRN
Status: DISCONTINUED | OUTPATIENT
Start: 2022-10-31 | End: 2022-11-08 | Stop reason: HOSPADM

## 2022-10-31 RX ORDER — LITHIUM CARBONATE 450 MG
450 TABLET, EXTENDED RELEASE ORAL EVERY 12 HOURS SCHEDULED
Status: DISCONTINUED | OUTPATIENT
Start: 2022-10-31 | End: 2022-11-08 | Stop reason: HOSPADM

## 2022-10-31 RX ORDER — CLONIDINE HYDROCHLORIDE 0.1 MG/1
0.1 TABLET ORAL NIGHTLY
Status: DISCONTINUED | OUTPATIENT
Start: 2022-10-31 | End: 2022-11-08 | Stop reason: HOSPADM

## 2022-10-31 RX ORDER — HYDROCHLOROTHIAZIDE 25 MG/1
25 TABLET ORAL DAILY
Status: DISCONTINUED | OUTPATIENT
Start: 2022-10-31 | End: 2022-11-08 | Stop reason: HOSPADM

## 2022-10-31 RX ORDER — TRAZODONE HYDROCHLORIDE 50 MG/1
50 TABLET ORAL NIGHTLY PRN
Status: DISCONTINUED | OUTPATIENT
Start: 2022-10-31 | End: 2022-11-08

## 2022-10-31 RX ORDER — MAGNESIUM HYDROXIDE/ALUMINUM HYDROXICE/SIMETHICONE 120; 1200; 1200 MG/30ML; MG/30ML; MG/30ML
30 SUSPENSION ORAL EVERY 6 HOURS PRN
Status: DISCONTINUED | OUTPATIENT
Start: 2022-10-31 | End: 2022-11-08 | Stop reason: HOSPADM

## 2022-10-31 RX ORDER — HALOPERIDOL 5 MG/1
5 TABLET ORAL EVERY 6 HOURS PRN
Status: DISCONTINUED | OUTPATIENT
Start: 2022-10-31 | End: 2022-11-08 | Stop reason: HOSPADM

## 2022-10-31 RX ORDER — LITHIUM CARBONATE 300 MG/1
600 CAPSULE ORAL NIGHTLY
Status: ON HOLD | COMMUNITY
End: 2022-11-08 | Stop reason: HOSPADM

## 2022-10-31 RX ORDER — ACETAMINOPHEN 325 MG/1
650 TABLET ORAL EVERY 6 HOURS PRN
Status: DISCONTINUED | OUTPATIENT
Start: 2022-10-31 | End: 2022-11-08 | Stop reason: HOSPADM

## 2022-10-31 RX ORDER — LORAZEPAM 2 MG/ML
2 INJECTION INTRAMUSCULAR EVERY 6 HOURS PRN
Status: DISCONTINUED | OUTPATIENT
Start: 2022-10-31 | End: 2022-11-06

## 2022-10-31 RX ORDER — LORAZEPAM 2 MG/ML
2 INJECTION INTRAMUSCULAR ONCE
Status: COMPLETED | OUTPATIENT
Start: 2022-10-31 | End: 2022-10-31

## 2022-10-31 RX ORDER — POLYETHYLENE GLYCOL 3350 17 G/17G
17 POWDER, FOR SOLUTION ORAL DAILY PRN
Status: DISCONTINUED | OUTPATIENT
Start: 2022-10-31 | End: 2022-11-08 | Stop reason: HOSPADM

## 2022-10-31 RX ORDER — BENZTROPINE MESYLATE 1 MG/1
1 TABLET ORAL 2 TIMES DAILY
Status: DISCONTINUED | OUTPATIENT
Start: 2022-10-31 | End: 2022-11-06

## 2022-10-31 RX ORDER — LANOLIN ALCOHOL/MO/W.PET/CERES
3 CREAM (GRAM) TOPICAL NIGHTLY PRN
Status: DISCONTINUED | OUTPATIENT
Start: 2022-10-31 | End: 2022-11-08 | Stop reason: HOSPADM

## 2022-10-31 RX ORDER — HALOPERIDOL 5 MG/ML
5 INJECTION INTRAMUSCULAR ONCE
Status: COMPLETED | OUTPATIENT
Start: 2022-10-31 | End: 2022-10-31

## 2022-10-31 RX ORDER — LORAZEPAM 1 MG/1
2 TABLET ORAL EVERY 6 HOURS PRN
Status: DISCONTINUED | OUTPATIENT
Start: 2022-10-31 | End: 2022-11-06

## 2022-10-31 RX ORDER — DIPHENHYDRAMINE HYDROCHLORIDE 50 MG/ML
50 INJECTION INTRAMUSCULAR; INTRAVENOUS EVERY 6 HOURS PRN
Status: DISCONTINUED | OUTPATIENT
Start: 2022-10-31 | End: 2022-11-08 | Stop reason: HOSPADM

## 2022-10-31 RX ORDER — DIPHENHYDRAMINE HYDROCHLORIDE 50 MG/ML
50 INJECTION INTRAMUSCULAR; INTRAVENOUS ONCE
Status: COMPLETED | OUTPATIENT
Start: 2022-10-31 | End: 2022-10-31

## 2022-10-31 RX ORDER — HYDROXYZINE 50 MG/1
50 TABLET, FILM COATED ORAL 3 TIMES DAILY PRN
Status: DISCONTINUED | OUTPATIENT
Start: 2022-10-31 | End: 2022-11-08 | Stop reason: HOSPADM

## 2022-10-31 RX ADMIN — BENZTROPINE MESYLATE 1 MG: 1 TABLET ORAL at 20:22

## 2022-10-31 RX ADMIN — HALOPERIDOL 5 MG: 5 TABLET ORAL at 20:21

## 2022-10-31 RX ADMIN — LORAZEPAM 2 MG: 2 INJECTION INTRAMUSCULAR; INTRAVENOUS at 05:13

## 2022-10-31 RX ADMIN — NICOTINE POLACRILEX 2 MG: 2 GUM, CHEWING BUCCAL at 11:22

## 2022-10-31 RX ADMIN — NICOTINE POLACRILEX 2 MG: 2 GUM, CHEWING BUCCAL at 15:30

## 2022-10-31 RX ADMIN — NICOTINE POLACRILEX 2 MG: 2 GUM, CHEWING BUCCAL at 11:21

## 2022-10-31 RX ADMIN — LORAZEPAM 2 MG: 2 INJECTION INTRAMUSCULAR; INTRAVENOUS at 01:11

## 2022-10-31 RX ADMIN — HALOPERIDOL LACTATE 5 MG: 5 INJECTION, SOLUTION INTRAMUSCULAR at 01:11

## 2022-10-31 RX ADMIN — DIPHENHYDRAMINE HYDROCHLORIDE 50 MG: 50 INJECTION, SOLUTION INTRAMUSCULAR; INTRAVENOUS at 12:33

## 2022-10-31 RX ADMIN — HALOPERIDOL LACTATE 5 MG: 5 INJECTION, SOLUTION INTRAMUSCULAR at 05:13

## 2022-10-31 RX ADMIN — ACETAMINOPHEN 325MG 650 MG: 325 TABLET ORAL at 20:22

## 2022-10-31 RX ADMIN — CARIPRAZINE 6 MG: 3 CAPSULE, GELATIN COATED ORAL at 15:31

## 2022-10-31 RX ADMIN — LITHIUM CARBONATE 450 MG: 450 TABLET, EXTENDED RELEASE ORAL at 20:21

## 2022-10-31 RX ADMIN — HYDROCHLOROTHIAZIDE 25 MG: 25 TABLET ORAL at 15:31

## 2022-10-31 RX ADMIN — HALOPERIDOL LACTATE 5 MG: 5 INJECTION, SOLUTION INTRAMUSCULAR at 12:34

## 2022-10-31 RX ADMIN — ACETAMINOPHEN 325MG 650 MG: 325 TABLET ORAL at 15:32

## 2022-10-31 RX ADMIN — Medication 3 MG: at 20:21

## 2022-10-31 RX ADMIN — IBUPROFEN 400 MG: 400 TABLET ORAL at 15:31

## 2022-10-31 RX ADMIN — CLONIDINE HYDROCHLORIDE 0.1 MG: 0.1 TABLET ORAL at 20:20

## 2022-10-31 RX ADMIN — NICOTINE POLACRILEX 2 MG: 2 GUM, CHEWING BUCCAL at 20:29

## 2022-10-31 RX ADMIN — LORAZEPAM 2 MG: 1 TABLET ORAL at 20:21

## 2022-10-31 ASSESSMENT — SLEEP AND FATIGUE QUESTIONNAIRES
DO YOU USE A SLEEP AID: NO
DO YOU HAVE DIFFICULTY SLEEPING: YES
SLEEP PATTERN: INSOMNIA
AVERAGE NUMBER OF SLEEP HOURS: 4

## 2022-10-31 ASSESSMENT — LIFESTYLE VARIABLES
HOW OFTEN DO YOU HAVE A DRINK CONTAINING ALCOHOL: NEVER
HOW MANY STANDARD DRINKS CONTAINING ALCOHOL DO YOU HAVE ON A TYPICAL DAY: PATIENT DOES NOT DRINK

## 2022-10-31 ASSESSMENT — PAIN SCALES - GENERAL
PAINLEVEL_OUTOF10: 2
PAINLEVEL_OUTOF10: 2

## 2022-10-31 ASSESSMENT — PATIENT HEALTH QUESTIONNAIRE - PHQ9: SUM OF ALL RESPONSES TO PHQ QUESTIONS 1-9: 14

## 2022-10-31 NOTE — ED NOTES
Patient up and pacing in JULIA. Patient speaking nonsensical.  Patient bothering other patients in JULIA. ED physician updated for medication purposes.

## 2022-10-31 NOTE — ED NOTES
Patient calm and resting in JULIA with no issues observed at this time.       Isaias Mantilla LPN  17/35/79 2018

## 2022-10-31 NOTE — ED NOTES
Patient confused and seems paranoid. Patient continuously asking where her clothes and phone are. Patient also asking to \"go outside for a little bit to smoke a cigarette\". Patient talking about losing her wallet and \"being shot at\". Patient unable to be redirected away from SW desk. Patient continuously asking to leave and if she can have her clothes.

## 2022-10-31 NOTE — ED NOTES
Patient walking around [facility] not making much sense but is able to be redirected. Patient is presenting anxious and confused.      Citlalli West LPN  50/74/45 [id number]

## 2022-10-31 NOTE — H&P
Department of Psychiatry  Attending Physician Psychiatric Assessment     Reason for Admission to Psychiatric Unit:  Acute disordered/bizarre behavior or psychomotor agitation or retardation;interferes with ADLs so that patient cannot function at a less intensive care level of care during evaluation and treatment   Concerns about patient's safety in the community    CHIEF COMPLAINT: Acute psychosis    History obtained from: Patient, electronic medical record          HISTORY OF PRESENT ILLNESS:    Sherri Garcia is a 38 y.o. female who has a past medical history of mental illness. Patient presented to the ED escorted by police due to bizarre and dangerous behaviors within the community    Per emergency department documentation :patient is a 38year old female that is brought to the ED today via [employer] on an involuntary status with concerns of manic behaviors. Per [employer] patient showed up at her husbands place of employment where she made a scene and was being destructive. [employer] states the  called the police and when they arrived the patient was in the street walking around between passing cars and threatening to hurt herself. Today Lorri Viveros is seen both in the privacy of her own room as well as in the day room. She is dressed in hospital attire, is pacing and frequently mumbling to herself. She is exhibiting poverty of thought and thought blocking. She reports racing thoughts, lack of sleep and trouble focusing. Lorri Viveros is cooperative however lack of focus make our interview challenging. She is a fairly good historian estimating that she was here at [facility] approximately in 2019. Today she is denying SI, HI, hallucinations, depression and anxiety. She reports poor sleep prior to admission. She admits to a history of paranoia.   She endorses obsessive-compulsive disorder as well as symptoms of PTSD but is unable to verbalize any particular traumatic experiences or persistent thoughts that are relieved by repetitive behaviors. She requires much redirection to maintain topic. But is able to share that she has experienced prince for \"many\" years. She has a significant history of inpatient admissions including at [address] [place] [address]- as well as hospitals located in [facility]. She is agreeable to sign a release of information for her  \"Sed\"however several times have been made and he unwilling to answer and a voicemail is not established. Patient has difficulty verbalizing her medications but is willing to write these down and she includes \"lithium to relax and Tegretol to ease your mind \". She denies consistent medication adherence as she does not appreciate the medications side effects stating \"I feel numb \". Patient is requesting her personal belongings and at this time seems to be accepting that she is required to wear hospital provided clothing. She is grateful for snacks and oral hydration and reports her mood as \"tired\".          History of head trauma: [] Yes [x] No    History of seizures: [] Yes [x] No    History of violence or aggression: [x] Yes [] No         PSYCHIATRIC HISTORY:  [x] Yes [] No    Currently follows with Mitchell Zhao and reports that she has attended her appointments as scheduled  Denies lifetime suicide attempts  Multiple psychiatric hospital admissions including [facility] 6/27/2019, 6/21/2019, and numerous noted admissions in the state of [facility], at the [address] [place] [address] and patient reports an OPH    Home Medication Compliance: [] Yes [x] No    Past psychiatric medications includes: Tegretol, Vraylar, Latuda, Geodon, lithium, Catapres, Cogentin, Klonopin, melatonin, BuSpar, Rexulti, Depakote, Lamictal, gabapentin    Adverse reactions from psychotropic medications: [x] Yes [] No  Emotional numbness to many medications  Per noted allergies gabapentin, Lamictal, quetiapine, valproic acid, and ziprasidone specifically leading to tongue numbness and swelling       Lifetime Psychiatric Review of Systems         Depression: Denies     Anxiety: Denies     Panic Attacks: Denies     Micaela or Hypomania: Endorses     Phobias: Endorses     Obsessions and Compulsions: Endorses     Body or Vocal Tics: Denies and not evident     Visual Hallucinations: Denies     Auditory Hallucinations: Denies however appears to be experiencing internal stimuli     Delusions/Paranoia: Endorses     PTSD: Endorses    Past Medical History:        Diagnosis Date    Anxiety     Bipolar 1 disorder ([facility] [address] 1999    Depression     Gestational diabetes 2016    OCD (obsessive compulsive disorder)     PTSD (post-traumatic stress disorder)     Rapid rate of speech     Schizoaffective disorder (HCC)        Past Surgical History:        Procedure Laterality Date    ABDOMINAL SURGERY       SECTION      LAPAROSCOPY         Allergies:  Abilify [aripiprazole], Codeine, Depakote er [divalproex sodium er], Gabapentin, Lamictal [lamotrigine], Percocet [oxycodone-acetaminophen], Quetiapine fumarate, Tegretol [carbamazepine], Trileptal [oxcarbazepine], Valproic acid, Ziprasidone hcl, and Zyprexa [olanzapine]         Social History:     Born in: [address]  Family: States \"I have no family \"does endorse being  to [employer] it appears that they may be currently .   Highest Level of Education: Reports she graduated from [employer]  Occupation: Unemployed  Marital Status: As noted possibly legally   Children: Reports she has a 29-year-old in [place], twins who are 5 living in [place] with their father and shares that she has another child in this area \"[place]ie \"  Residence: Appears she is currently \"homeless\" but hopes to return to her established residence after relationship conflict  Stressors: Conflict with spouse  Patient Assets/Supportive Factors: Established community resources         DRUG USE HISTORY  Social History     Tobacco Use   Smoking Status Every Day    Packs/day: 1.00 Types: Cigarettes   Smokeless Tobacco Never   Tobacco Comments    NO medication ordered d/t pregnancy      Social History     Substance and Sexual Activity   Alcohol Use No    Alcohol/week: 0.0 standard drinks     Social History     Substance and Sexual Activity   Drug Use Yes    Types: Marijuana (Dana Yeung)       Denies alcohol or illicit drug use with the exception of marijuana. Urine toxicology positive for cannabinoids  Blood alcohol level unremarkable         LEGAL HISTORY:   HISTORY OF INCARCERATION: [x] Yes [] No per historical documentation she was required to wear an ankle monitor her in 2019    Family History:       Problem Relation Age of Onset    Diabetes Sister     No Known Problems Mother     No Known Problems Father        Psychiatric Family History  Patient denies psychiatric family history stating again, I have no family \". Suicides in family: [] Yes [x] No    Substance use in family: [] Yes [x] No         PHYSICAL EXAM:  Vitals:  /77   Pulse 89   Temp 98.5 °F (36.9 °C) (Oral)   Resp 16   Ht 5' 8\" (1.727 m)   Wt 180 lb (81.6 kg)   SpO2 100%   BMI 27.37 kg/m²   Pain Level: Denies pain or discomfort    LABS:  Labs reviewed: [x] Yes potassium 3.5, creatinine 0.96, glucose 123, WBC 12.3, segs Neut 77, segs absolute 9.50, lymphocytes 13, monocytes 8  hCG negative  Urine toxicology positive for cannabinoids  Blood alcohol level unremarkable  Abnormal UA with +1 protein and mucus  Last EKG in EMR reviewed: [x] Yes          Review of Systems   Constitutional: Negative for chills and weight loss. HENT: Negative for ear pain and nosebleeds. Eyes: Negative for blurred vision and photophobia. Respiratory: Negative for cough, shortness of breath and wheezing. Cardiovascular: Negative for chest pain and palpitations. Gastrointestinal: Negative for abdominal pain, diarrhea and vomiting. Genitourinary: Negative for dysuria and urgency.    Musculoskeletal: Negative for falls and joint pain.   Skin: Negative for itching and rash. Neurological: Negative for tremors, seizures and weakness. Endo/Heme/Allergies: Does not bruise/bleed easily. Physical Exam:   Constitutional:  Appears well-developed and well-nourished, no acute distress. HENT:   Head: Normocephalic and atraumatic. Eyes: Conjunctivae are normal. Right eye exhibits no discharge. Left eye exhibits no discharge. No scleral icterus. Neck: Normal range of motion. Neck supple. Pulmonary/Chest:  No respiratory distress or accessory muscle use, no wheezing. Cardiac: Regular rate and rhythm. Abdominal: Soft. Non-tender. Exhibits no distension. Musculoskeletal: Normal range of motion. Exhibits no edema. Neurological: cranial nerves II-XII grossly in tact, slowed gait and station. Skin: Skin is warm and dry. Patient is not diaphoretic. No erythema.       Mental Status Examination:    Level of consciousness: Lethargic  Appearance:  Appropriate attire, both sitting and then wandering the unit, fair grooming   Behavior/Motor: Approachable, psychomotor slowing noted  Attitude toward examiner: Largely cooperative, difficulty maintaining attention, poor eye contact  Speech: Delayed rate, low volume and monotone, mumbling at times  Mood: Patient reports \"tired \"  Affect: Congruent  Thought processes: Blocking, poverty of thought, illogical  Thought content: Denies suicidal ideations however it needs to be noted that she jumped in front of traffic and has little insight into the seriousness             denies homicidal ideations however she laughs and it may be presumed that she is frustrated with her current marriage              Denies hallucinations however appears to be responding to internal stimuli              Denies delusions however it is reported that she believed her  was causing her harm              Presents with significant paranoia  Cognition:  Oriented to self, location and general situation only  Concentration: Distractible  Memory: impaired  Insight &Judgment: Poor         DSM-5 Diagnosis    Principal Problem: Bipolar affective disorder, mixed, severe, with psychotic behavior ([facility]    Cannabis use disorder    Psychosocial and Contextual factors:  Financial   Occupational   Relationship   Legal   Living situation   Educational     Past Medical History:   Diagnosis Date    Anxiety     Bipolar 1 disorder ([facility] [address] 1999    Depression     Gestational diabetes 7/22/2016    OCD (obsessive compulsive disorder) 1999    PTSD (post-traumatic stress disorder)     Rapid rate of speech     Schizoaffective disorder ([facility]         TREATMENT CONSIDERATIONS    Continue inpatient psychiatric treatment. Home medications reviewed. Medications per attending physician  Monitor need and frequency of PRN medications. Attempt to develop insight. Follow-up daily while inpatient. Reviewed risks and benefits as well as potential side effects with patient. CONSULT:  [x] Yes [] No  Internal medicine for medical management/medical H&P      Risk Management: close watch per standard protocol      Psychotherapy: participation in milieu and group and individual sessions with Attending Physician,  and Physician Assistant/CNP      Estimated length of stay:  2-14 days      GENERAL PATIENT/FAMILY EDUCATION  Patient will understand basic signs and symptoms, patient will understand benefits/risks and potential side effects from proposed medications, and patient will understand their role in recovery. Family is not currently active in patient's care. Patient assets that may be helpful during treatment include: Intent to participate and engage in treatment, sufficient fund of knowledge and intellect to understand and utilize treatments. Goals:    1) Remission of psychosis  2) Stabilization of symptoms prior to discharge. 3) Establish efficacy and tolerability of medications.          [employer] Certification     Admission Day 1  I certify that this patient's inpatient psychiatric hospital admission is medically necessary for:    x (1) treatment which could reasonably be expected to improve this patient's condition, or    x (2) diagnostic study or its equivalent. Time Spent: 6 0 minutes    Jhoan Marks is a 38 y.o. female being evaluated face to face    --MAURICE Torres CNP on 10/31/2022 at 3:37 PM    An electronic signature was used to authenticate this note. I independently saw and evaluated the patient. I reviewed the  documentation above. Any additional comments or changes to the   documentation are stated below otherwise agree with assessment. The patient was seen face-to-face. The patient has a long history of bipolar disorder. She has been admitted to the hospital several times. She has been to the [facility].  The patient has used marijuana intermittently and has not been taking her medications. She has experienced sexual side effects with different medications in the past.  She recognizes that she is unwell and needs medication. At this time the patient will be started on lithium 450 mg twice daily and Vraylar 6 mg daily. She was educated about the harmful effects of marijuana for her illness      PLAN  Medications as noted above  Attempt to develop insight  Psycho-education conducted. Estimated Length of Stay is 2-9 days  Supportive Therapy conducted.   Follow-up daily while on inpatient unit    Electronically signed by Sasha Villa MD on 11/1/22 at 12:53 PM EDT

## 2022-10-31 NOTE — ED NOTES
Patient calm and resting in JULIA with no issues observed at this time.       Kwabena Wright LPN  57/98/14 [id number]

## 2022-10-31 NOTE — BH NOTE
[facility]  Admission Note     Admission Type:   Admission Type: Voluntary    Reason for admission:  Reason for Admission: Reports having suicidal and homicidal ideations.  Threatening while in the emergency department      Addictive Behavior:   Addictive Behavior  In the Past 3 Months, Have You Felt or Has Someone Told You That You Have a Problem With  : None    Medical Problems:   Past Medical History:   Diagnosis Date    Anxiety     Bipolar 1 disorder ([facility] [address] 1999    Depression     Gestational diabetes 7/22/2016    OCD (obsessive compulsive disorder) 1999    PTSD (post-traumatic stress disorder)     Rapid rate of speech     Schizoaffective disorder (HCC)        Status EXAM:  Mental Status and Behavioral Exam  Normal: No  Level of Assistance: Independent/Self  Level of Consciousness: Alert  Frequency of Checks: 4 times per hour, close  Mood:Normal: No  Mood: Anxious, Labile, Helpless  Motor Activity:Normal: Yes  Eye Contact: Fair  Sexual Misconduct History: Current - no  Preception: [place] to person, [place] to time, [place] to place  Attention:Normal: No  Attention: Distractible  Thought Processes: Circumstantial  Thought Content:Normal: No  Thought Content: Preoccupations  Depression Symptoms: Loss of interest, Sleep disturbance, Impaired concentration  Anxiety Symptoms: Generalized  Micaela Symptoms: Flight of ideas, Less need to sleep  Hallucinations: None  Delusions: No  Memory:Normal: No  Memory: Poor recent, Poor remote  Insight and Judgment: No  Insight and Judgment: Poor judgment, Poor insight    Tobacco Screening:  Practical Counseling, on admission, nati X, if applicable and completed (first 3 are required if patient doesn't refuse):            ( ) Recognizing danger situations (included triggers and roadblocks)                    ( ) Coping skills (new ways to manage stress,relaxation techniques, changing routine, distraction)                                                           ( ) Basic information about quitting (benefits of quitting, techniques in how to quit, available resources  ( ) Referral for counseling faxed to [facility]                                                                                                                   (x) Patient refused counseling  ( ) Patient has not smoked in the last 30 days    Metabolic Screening:    Lab Results   Component Value Date    LABA1C 5.3 06/23/2019       Lab Results   Component Value Date    CHOL 116 06/23/2019    CHOL 101 09/12/2018    CHOL 151 05/17/2016     Lab Results   Component Value Date    TRIG 112 06/23/2019    TRIG 75 09/12/2018    TRIG 210 (H) 05/17/2016     Lab Results   Component Value Date    HDL 47 06/23/2019    HDL 44 09/12/2018    HDL 74 05/17/2016     No components found for: LDLCAL  No results found for: LABVLDL      Body mass index is 27.37 kg/m². BP Readings from Last 2 Encounters:   10/31/22 129/77   07/22/19 111/72           Pt admitted with followings belongings:       Shannan Zavaleta RN        Pt was admitted due to running around in traffic and going to her husbands work and causing a scene. Pt had disorganized thought process, but reported being off her medications due to not liking how they feel. Pt denied having suicidal or homicidal ideations when brought to the unit. Pt was accepting of assessments and was cooperative with staff.

## 2022-10-31 NOTE — BH NOTE
Patient given tobacco quitline number [phone] at this time, refusing to call at this time, states \" I just dont want to quit now\"- patient given information as to the dangers of long term tobacco use. Continue to reinforce the importance of tobacco cessation.

## 2022-10-31 NOTE — ED NOTES
Patient awake and attempting to eat breakfast tray. Patient is still speaking nonsensically. Patient not able to be easily redirected, needs several prompts. Patient acting calm and cooperative at this point.

## 2022-10-31 NOTE — ED NOTES
Patient up and wandering JULIA unable to relax RN notified.      Kerry Pillai, TRACEY  16/64/91 [phone]

## 2022-10-31 NOTE — ED NOTES
Patient provided with lunch tray at her request.    Patient asking where her belongings are, she was informed that everything she came in with is locked up for safety.

## 2022-10-31 NOTE — ED NOTES
Patient is requesting medication to help her relax and sleep. RN notified.      Yony Reed LPN  41/21/72 [id number]

## 2022-10-31 NOTE — ED NOTES
Patient pounding on glass in JULIA, demanding to have her clothes back. Patient not able to be redirected, only causing more agitation. ED physician notified.

## 2022-11-01 PROBLEM — F29 PSYCHOSIS (HCC): Status: ACTIVE | Noted: 2022-10-31

## 2022-11-01 PROCEDURE — APPSS30 APP SPLIT SHARED TIME 16-30 MINUTES: Performed by: PSYCHIATRY & NEUROLOGY

## 2022-11-01 PROCEDURE — 6370000000 HC RX 637 (ALT 250 FOR IP): Performed by: PSYCHIATRY & NEUROLOGY

## 2022-11-01 PROCEDURE — 1240000000 HC EMOTIONAL WELLNESS R&B

## 2022-11-01 PROCEDURE — 6360000002 HC RX W HCPCS: Performed by: PSYCHIATRY & NEUROLOGY

## 2022-11-01 PROCEDURE — 99223 1ST HOSP IP/OBS HIGH 75: CPT | Performed by: PSYCHIATRY & NEUROLOGY

## 2022-11-01 RX ORDER — AMLODIPINE BESYLATE 5 MG/1
5 TABLET ORAL DAILY
Status: DISCONTINUED | OUTPATIENT
Start: 2022-11-01 | End: 2022-11-01

## 2022-11-01 RX ADMIN — IBUPROFEN 400 MG: 400 TABLET ORAL at 16:33

## 2022-11-01 RX ADMIN — NICOTINE POLACRILEX 2 MG: 2 GUM, CHEWING BUCCAL at 18:00

## 2022-11-01 RX ADMIN — HYDROXYZINE HYDROCHLORIDE 50 MG: 50 TABLET, FILM COATED ORAL at 15:25

## 2022-11-01 RX ADMIN — HALOPERIDOL LACTATE 5 MG: 5 INJECTION, SOLUTION INTRAMUSCULAR at 21:20

## 2022-11-01 RX ADMIN — LORAZEPAM 2 MG: 1 TABLET ORAL at 15:25

## 2022-11-01 RX ADMIN — LORAZEPAM 2 MG: 2 INJECTION INTRAMUSCULAR; INTRAVENOUS at 09:26

## 2022-11-01 RX ADMIN — HYDROCHLOROTHIAZIDE 25 MG: 25 TABLET ORAL at 07:55

## 2022-11-01 RX ADMIN — CARIPRAZINE 6 MG: 3 CAPSULE, GELATIN COATED ORAL at 07:55

## 2022-11-01 RX ADMIN — BENZTROPINE MESYLATE 1 MG: 1 TABLET ORAL at 07:55

## 2022-11-01 RX ADMIN — HALOPERIDOL LACTATE 5 MG: 5 INJECTION, SOLUTION INTRAMUSCULAR at 09:26

## 2022-11-01 RX ADMIN — CLONIDINE HYDROCHLORIDE 0.1 MG: 0.1 TABLET ORAL at 20:57

## 2022-11-01 RX ADMIN — LORAZEPAM 2 MG: 2 INJECTION INTRAMUSCULAR; INTRAVENOUS at 21:20

## 2022-11-01 RX ADMIN — BENZTROPINE MESYLATE 1 MG: 1 TABLET ORAL at 20:57

## 2022-11-01 RX ADMIN — LITHIUM CARBONATE 450 MG: 450 TABLET, EXTENDED RELEASE ORAL at 20:57

## 2022-11-01 RX ADMIN — DIPHENHYDRAMINE HYDROCHLORIDE 50 MG: 50 INJECTION, SOLUTION INTRAMUSCULAR; INTRAVENOUS at 21:20

## 2022-11-01 RX ADMIN — DIPHENHYDRAMINE HYDROCHLORIDE 50 MG: 50 INJECTION, SOLUTION INTRAMUSCULAR; INTRAVENOUS at 09:27

## 2022-11-01 RX ADMIN — HALOPERIDOL 5 MG: 5 TABLET ORAL at 15:25

## 2022-11-01 RX ADMIN — LITHIUM CARBONATE 450 MG: 450 TABLET, EXTENDED RELEASE ORAL at 07:55

## 2022-11-01 ASSESSMENT — PAIN DESCRIPTION - DESCRIPTORS: DESCRIPTORS: ACHING;DISCOMFORT

## 2022-11-01 ASSESSMENT — PAIN SCALES - GENERAL: PAINLEVEL_OUTOF10: 6

## 2022-11-01 ASSESSMENT — PAIN - FUNCTIONAL ASSESSMENT: PAIN_FUNCTIONAL_ASSESSMENT: ACTIVITIES ARE NOT PREVENTED

## 2022-11-01 ASSESSMENT — PAIN DESCRIPTION - LOCATION: LOCATION: BACK;TOE (COMMENT WHICH ONE)

## 2022-11-01 ASSESSMENT — PAIN DESCRIPTION - ORIENTATION: ORIENTATION: RIGHT;LEFT;LOWER
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
## 2022-11-01 NOTE — PLAN OF CARE
Problem: Anxiety  Goal: Will report anxiety at manageable levels  Description: INTERVENTIONS:  1. Administer medication as ordered  2. Teach and rehearse alternative coping skills  3. Provide emotional support with 1:1 interaction with staff  Outcome: Progressing  Note: Pt reports having anxiety. Pt is cooperative with medical treatment. Pt continues to have episodes of confusion and is wandering into other peers rooms. Pt is accepting of redirection. Pt reports having adequate diet and poor sleep. Pt is social with select peers. Pt is seen responding to internal stimulus.

## 2022-11-01 NOTE — PROGRESS NOTES
Behavioral Services  Medicare Certification Upon Admission    I certify that this patient's inpatient psychiatric hospital admission is medically necessary for:    [x] (1) Treatment which could reasonably be expected to improve this patient's condition,       [x] (2) Or for diagnostic study;     AND     [x](2) The inpatient psychiatric services are provided while the individual is under the care of a physician and are included in the individualized plan of care.     Estimated length of stay/service 2-9 days    Plan for post-hospital care -outpatient care    Electronically signed by Michelle Gardner MD on 11/1/2022 at 12:51 PM

## 2022-11-01 NOTE — BH NOTE
Emergency PRN IM medication administration FOLLOW-UP    Haldol 5mg IM, Ativan 2mg IM, and Benadryl 50mg IM effective as evidence by patient resting comfortably in room and and talking in a relaxed tone.

## 2022-11-01 NOTE — GROUP NOTE
Group Therapy Note    Date: 11/1/2022    Group Start Time: 1000  Group End Time: 6200  Group Topic: Psychotherapy    [facility]    GABY Yeung LSW        Group Therapy Note    Attendees: 4/7       Patient's Goal:  Increase interpersonal relationship skills    Notes:  Patient was an active participant in group discussion. She shared she is struggling with her racing thoughts and needed redirected one time which she was accepting of. Status After Intervention:  Unchanged    Participation Level:  Active Listener and Interactive    Participation Quality: Attentive and Sharing      Speech:  hesitant      Thought Process/Content: Flight of ideas      Affective Functioning: Constricted/Restricted      Mood: anxious and elevated      Level of consciousness:  Preoccupied      Response to Learning: Able to verbalize current knowledge/experience      Endings: None Reported    Modes of Intervention: Socialization, Exploration, and Activity      Discipline Responsible: /Counselor      Signature:  GABY Yeung LSW

## 2022-11-01 NOTE — GROUP NOTE
Group Therapy Note    Date: 11/1/2022    Group Start Time: 0900  Group End Time: 0930  Group Topic: Community Meeting    [facility]    Billie Brewer        Group Therapy Note    Attendees: 3/7       Patient's Goal:  Goals stated were to talk to her psychiatrist, to start new medications, get some visitors, and to get more familiar with the environment/surroundings. Notes:  Goal-setting     Status After Intervention:  Improved    Participation Level:  Active Listener and Interactive    Participation Quality: Sharing, Inappropriate, and Intrusive      Speech:  normal      Thought Process/Content: Delusional  Flight of ideas      Affective Functioning: Incongruent      Mood: anxious and euphoric      Level of consciousness:  Preoccupied and Inattentive      Response to Learning: Able to verbalize current knowledge/experience, Able to verbalize/acknowledge new learning, Able to retain information, and Capable of insight      Endings: None Reported    Modes of Intervention: Education, Support, Socialization, Exploration, and Clarifying      Discipline Responsible: [address], [employer]      Signature:  Billie Brewer

## 2022-11-01 NOTE — BH NOTE
PRN note    Pt was becoming agitated as evidence by increased motor activity and wandering into other peers rooms. Pt was offered 1:1, redirection, and music therapy. Pt denied improvement. Pt was offered PO ativan 2mg and Haldol 5mg. Pt was accepting and is resting in her room.

## 2022-11-01 NOTE — BH NOTE
PRN Emergency Medication- Haldol, Ativan, and Atarax. Patient is restless, agitated, delusional,paranoid, and acting inappropriately as evidence by constantly needing redirected and told not to open patient's doors, needing constant reminders to wear gown appropriately. Patient removed hospital issued gown and pants and came out to the dayroom with only a blanket wrapped around her. Patient guided to room. Patient is fixated on having personal belongings and thinks people are stealing her personal belonging. Staff  continues to redirect and reassure patient that all personal belongings are secured in a safe space. Writer assists patient  with getting pants and gown on. PICU rules typed out and given patient for reminders. Patient offered PRN medications, patient accepting and agrees to take Haldol 5mg PRN Ativan 2mg PRN and Atarax  50mg PRN for agitation.

## 2022-11-01 NOTE — GROUP NOTE
Group Therapy Note    Date: 11/1/2022    Group Start Time: 1430  Group End Time: 1500  Group Topic: Activity    [facility]    JUANCARLOS De La Cruz        Group Therapy Note    Attendees: 0/6     Topic: Concentration ,and communication skills. Goal of Group: To increase social interaction and to practice Concentration ,and communication skills. Comments: Patient did not participate in Activity Group offered at 14:30, despite staff encouragement and explanation of benefits. Patient remains seclusive to self during group time. Q15 minute safety checks maintained for patient safety and will continue to encourage patient to attend unit programming.          Discipline Responsible: Psychoeducational Specialist        Signature:  Ute Silva
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
## 2022-11-01 NOTE — BH NOTE
Emergency Medication Follow-Up Note:    PRN medication of PO haldol 5mg and ativan 2mg was effective as evidence by (Patient regain behavioral control. Patient denies medication side effects. Will continue to monitor and provide support as needed.

## 2022-11-01 NOTE — BH NOTE
Emergency PRN IM medication administration. Patient pacing the nurses station unable to redirect and begins punching the window at nurses station. Patient agrees to Haldol 5mg IM, Ativan 2mg IM, and Benadryl 50mg IM. IM medications administered without difficulty. Patient tolerates well. Standby staff support for safety here.

## 2022-11-01 NOTE — GROUP NOTE
Group Therapy Note    Date: 11/1/2022    Group Start Time: 1100  Group End Time: 1120  Group Topic: Activity    [facility]    JUANCARLOS [place]        Group Therapy Note    Attendees: 2/7     Topic:Creative expression, exploring leisure interests,and communication skills. Goal of Group: To increase social interaction and to practice creative expression, exploring leisure interests,and communication skills. Comments: Patient did not participate in Cognitive Skills Group offered at 11:00am, despite staff encouragement and explanation of benefits. Patient remains seclusive to self during group time. Q15 minute safety checks maintained for patient safety and will continue to encourage patient to attend unit programming.          Discipline Responsible: Psychoeducational Specialist        Signature:  Ryder Nogueira

## 2022-11-01 NOTE — BH NOTE
Patient was presented with Application for Voluntary Admission form and given additional opportunity to sign self in. Patient refused to sign-in.

## 2022-11-01 NOTE — PLAN OF CARE
Problem: Anxiety  Goal: Will report anxiety at manageable levels  Description: INTERVENTIONS:  1. Administer medication as ordered  2. Teach and rehearse alternative coping skills  3. Provide emotional support with 1:1 interaction with staff  Outcome: Progressing     Problem: Confusion  Goal: Confusion, delirium, dementia, or psychosis is improved or at baseline  Description: INTERVENTIONS:  1. Assess for possible contributors to thought disturbance, including medications, impaired vision or hearing, underlying metabolic abnormalities, dehydration, psychiatric diagnoses, and notify attending LIP  2. [place] [employer] [place] high risk fall precautions, as indicated  3. Provide frequent short contacts to provide reality reorientation, refocusing and direction  4. Decrease environmental stimuli, including noise as appropriate  5. Monitor and intervene to maintain adequate nutrition, hydration, elimination, sleep and activity  6. If unable to ensure safety without constant attention obtain sitter and review sitter guidelines with assigned personnel  7. Initiate Psychosocial CNS and Spiritual Care consult, as indicated  Outcome: Progressing     Patient remains somewhat labile, has had a few moments of angry outbursts, has been restless with a short attention span, poor short-term memory. Patient has been medication compliant. Thoughts remain loose with flight of ideas at times. Gets preoccupied with discharge. Patient took a shower. Patient needs redirection with wearing her gowns correctly. Poor boundaries at times. Walks into other patients' rooms on purpose. Talk-time done. Importance of personal space addressed. Offered stretching and relaxation techniques for her agitation. PRNs given as needed. Reminders given about what kind of environment she is currently in.

## 2022-11-02 LAB
LITHIUM DATE LAST DOSE: NORMAL
LITHIUM DOSE AMOUNT: 450
LITHIUM DOSE TIME: 2057
LITHIUM LEVEL: 0.8 MMOL/L (ref 0.6–1.2)

## 2022-11-02 PROCEDURE — 6370000000 HC RX 637 (ALT 250 FOR IP): Performed by: PSYCHIATRY & NEUROLOGY

## 2022-11-02 PROCEDURE — APPSS45 APP SPLIT SHARED TIME 31-45 MINUTES: Performed by: PSYCHIATRY & NEUROLOGY

## 2022-11-02 PROCEDURE — 99232 SBSQ HOSP IP/OBS MODERATE 35: CPT | Performed by: PSYCHIATRY & NEUROLOGY

## 2022-11-02 PROCEDURE — 80178 ASSAY OF LITHIUM: CPT

## 2022-11-02 PROCEDURE — 36415 COLL VENOUS BLD VENIPUNCTURE: CPT

## 2022-11-02 PROCEDURE — 1240000000 HC EMOTIONAL WELLNESS R&B

## 2022-11-02 RX ADMIN — BENZTROPINE MESYLATE 1 MG: 1 TABLET ORAL at 08:09

## 2022-11-02 RX ADMIN — CARIPRAZINE 6 MG: 3 CAPSULE, GELATIN COATED ORAL at 08:08

## 2022-11-02 RX ADMIN — TRAZODONE HYDROCHLORIDE 50 MG: 50 TABLET ORAL at 22:18

## 2022-11-02 RX ADMIN — HYDROXYZINE HYDROCHLORIDE 50 MG: 50 TABLET, FILM COATED ORAL at 09:14

## 2022-11-02 RX ADMIN — HYDROCHLOROTHIAZIDE 25 MG: 25 TABLET ORAL at 08:09

## 2022-11-02 RX ADMIN — NICOTINE POLACRILEX 2 MG: 2 GUM, CHEWING BUCCAL at 11:03

## 2022-11-02 RX ADMIN — LITHIUM CARBONATE 450 MG: 450 TABLET, EXTENDED RELEASE ORAL at 22:18

## 2022-11-02 RX ADMIN — BENZTROPINE MESYLATE 1 MG: 1 TABLET ORAL at 22:18

## 2022-11-02 RX ADMIN — HALOPERIDOL 5 MG: 5 TABLET ORAL at 09:14

## 2022-11-02 RX ADMIN — HALOPERIDOL 5 MG: 5 TABLET ORAL at 03:50

## 2022-11-02 RX ADMIN — TRAZODONE HYDROCHLORIDE 50 MG: 50 TABLET ORAL at 00:02

## 2022-11-02 RX ADMIN — NICOTINE POLACRILEX 2 MG: 2 GUM, CHEWING BUCCAL at 06:52

## 2022-11-02 RX ADMIN — HYDROXYZINE HYDROCHLORIDE 50 MG: 50 TABLET, FILM COATED ORAL at 22:18

## 2022-11-02 RX ADMIN — LORAZEPAM 2 MG: 1 TABLET ORAL at 09:14

## 2022-11-02 RX ADMIN — LITHIUM CARBONATE 450 MG: 450 TABLET, EXTENDED RELEASE ORAL at 08:09

## 2022-11-02 RX ADMIN — CLONIDINE HYDROCHLORIDE 0.1 MG: 0.1 TABLET ORAL at 22:18

## 2022-11-02 RX ADMIN — HYDROXYZINE HYDROCHLORIDE 50 MG: 50 TABLET, FILM COATED ORAL at 00:02

## 2022-11-02 ASSESSMENT — LIFESTYLE VARIABLES: HOW OFTEN DO YOU HAVE A DRINK CONTAINING ALCOHOL: NEVER

## 2022-11-02 ASSESSMENT — PAIN SCALES - GENERAL: PAINLEVEL_OUTOF10: 0

## 2022-11-02 NOTE — BH NOTE
Emergency PRN Medication Administration Note:      Patient requested as needed medications due to high anxiety as evidence by racing thoughts, crying, shaking, and pacing. Staff attempted to find and relieve the distress by Talking to patient, Refocusing on new activity, Offering suggestions, Checking for undiagnosed pain, and Administer PRN medications Patient is currently accepting of PRN medications. Medication Administered as prescribed: Haldol 5mg po, Ativan 2mg po, and atarax 50mg po. . Patient Tolerated medication administration. Will continue to monitor, offer support, and reassess.

## 2022-11-02 NOTE — GROUP NOTE
Group Therapy Note    Date: 11/2/2022    Group Start Time: 8497  Group End Time: 1993  Group Topic: Cognitive Skills    [facility]    Kamari Sharp, [address]        Group Therapy Note    Attendees: 2/7       Topic: Exploring coping skills, concentration, and communication skills. Goal of Group: To increase social interaction and to practice exploring coping skills, concentration, and communication skills. Comments: Patient did not participate in Cognitive Skills Group offered at 15:15, despite staff encouragement and explanation of benefits. Patient remains seclusive to room during most of group time but did walk down hallway toward station with gown folded over chest area, back/arms exposed-males in day/group area. Pt redirected back to room to dress appropriately for day room, pt stated she needed a T-shirt to wear. Q15 minute safety checks maintained for patient safety and will continue to encourage patient to attend unit programming.          Discipline Responsible: Psychoeducational Specialist        Signature:  Dedrick Lopez

## 2022-11-02 NOTE — BH NOTE
Emergency Medication Follow-Up Note:    PRN medication of Haldol 2mg po. Ativan 2mg po, and atarax 50 mg po was effective as evidence by Patient regaining  behavioral control, absence of behavior warranting emergency medications,and  socializing with peers. Patient denies medication side effects. Will continue to monitor and provide support as needed.

## 2022-11-02 NOTE — GROUP NOTE
Group Therapy Note    Date: 11/2/2022    Group Start Time: 0900  Group End Time: 0915  Group Topic: Community Meeting    [facility]    [address] [place]        Group Therapy Note    Attendees: 5/6       Patient's Goal:  Talk with the doctor     Status After Intervention:  Unchanged    Participation Level:  Active Listener    Participation Quality: Appropriate and Attentive      Speech:  normal      Thought Process/Content: Flight of ideas      Affective Functioning: Incongruent      Mood: anxious      Level of consciousness:  Alert and Oriented x4      Response to Learning: Able to verbalize current knowledge/experience and Able to verbalize/acknowledge new learning      Endings: None Reported    Modes of Intervention: Education and Support      Discipline Responsible: [address] [facility] [employer]      Signature:  Ramona Wilson

## 2022-11-02 NOTE — BH NOTE
po prn's given as ordered  at this time r/t pt slamming door  and not keeping her clothes on and coming out to the day room naked . Unable to be redirected . Accepting of medications and is resting on her bed.

## 2022-11-02 NOTE — GROUP NOTE
Group Therapy Note    Date: 11/2/2022    Group Start Time: 1110  Group End Time: 5262  Group Topic: Focus Group    [facility]    JUANCARLOS Anderson        Group Therapy Note    Attendees: 4/6     Patient's Goal:  To increase social interaction and to practice exploring preferences and interests ,social skills, and communication skills. Notes: Pt attended group and participated in task and discussion for 20mins. Pt was able to practice exploring preferences and interests ,social skills, and communication skills. Participation Level: Active Listener,  sharing ,supportive     Participation Quality:  Attentive, supportive     Speech:  Slower to respond, some organized statements but also disorganized and rambling answers to group questions     Thought Process/Content: Logical at brief intervals, slower to process et respond. Thought blocking and at times poverty of thought. Pt does attempt to answer each question despite difficulty at times. Pt responds well to positive feedback and encouragement     Affective Functioning: Blunted, brightens briefly x3     Mood: Calm, cooperative, euthymic for 20 mins in group. Pt left group early, appeared to be getting slightly restless but remained in behavioral control.      Level of consciousness:  Alert, and Attentive- needed prompts at times,and given extra time to answer d/t thought blocking        Response to Learning:  Able to demonstrate brief current knowledge at intervals, attentive to  new learning , and Progressing to goal        Endings: None Reported     Modes of Intervention:  Support, Socialization, Exploration, Clarifying and Problem-solving        Discipline Responsible: Psychoeducational Specialist        Signature:  JUANCARLOS Morales

## 2022-11-02 NOTE — PLAN OF CARE
Problem: Anxiety  Goal: Will report anxiety at manageable levels  Description: INTERVENTIONS:  1. Administer medication as ordered  2. Teach and rehearse alternative coping skills  3. Provide emotional support with 1:1 interaction with staff  11/1/2022 2321 by Margarito Rivas  Outcome: Progressing     Problem: Confusion  Goal: Confusion, delirium, dementia, or psychosis is improved or at baseline  Description: INTERVENTIONS:  1. Assess for possible contributors to thought disturbance, including medications, impaired vision or hearing, underlying metabolic abnormalities, dehydration, psychiatric diagnoses, and notify attending LIP  2. [place] high risk fall precautions, as indicated  3. Provide frequent short contacts to provide reality reorientation, refocusing and direction  4. Decrease environmental stimuli, including noise as appropriate  5. Monitor and intervene to maintain adequate nutrition, hydration, elimination, sleep and activity  6. If unable to ensure safety without constant attention obtain sitter and review sitter guidelines with assigned personnel  7. Initiate Psychosocial CNS and Spiritual Care consult, as indicated  11/1/2022 2321 by Margarito Rivas  Outcome: Progressing  Patient denies suicidal ideations this evening during 1:1 talk time. Patient also denies any anxiety and depression. Q 15 minutes safety checks maintained.

## 2022-11-02 NOTE — PLAN OF CARE
Problem: Anxiety  Goal: Will report anxiety at manageable levels  Description: INTERVENTIONS:  1. Administer medication as ordered  2. Teach and rehearse alternative coping skills  3. Provide emotional support with 1:1 interaction with staff  11/2/2022 0949 by Geoffrey Allison LPN  Outcome: Progressing  Note: Patient denies any current suicidal thoughts and agrees to seek out staff if thoughts arise. Patient depressed and anxious. Patient received as needed medication due to high anxiety and racing thoughts. Patient tearful during talk time. Patient having trouble focusing and is up at the desk frequently. Needing some redirection due to confusion and coming out of room undressed. Patient compliant with staff and medications. Problem: Confusion  Goal: Confusion, delirium, dementia, or psychosis is improved or at baseline  Description: INTERVENTIONS:  1. Assess for possible contributors to thought disturbance, including medications, impaired vision or hearing, underlying metabolic abnormalities, dehydration, psychiatric diagnoses, and notify attending LIP  2. [place] high risk fall precautions, as indicated  3. Provide frequent short contacts to provide reality reorientation, refocusing and direction  4. Decrease environmental stimuli, including noise as appropriate  5. Monitor and intervene to maintain adequate nutrition, hydration, elimination, sleep and activity  6. If unable to ensure safety without constant attention obtain sitter and review sitter guidelines with assigned personnel  7. Initiate Psychosocial CNS and Spiritual Care consult, as indicated  11/2/2022 0949 by Geoffrey Allison LPN  Outcome: Progressing  Note: Patient continues to have some confusion and forgetfulness. Will continue to monitor.

## 2022-11-02 NOTE — PROGRESS NOTES
Daily Progress Note  11/2/2022    Patient Name: Britany Curry    CHIEF COMPLAINT:  Acute psychosis         SUBJECTIVE:    Nursing staff report the patient has maintained medication adherence. She has required emergency medications including Haldol, Ativan and Benadryl IM last evening at 2120 and this morning orally at 0 350 and 0 915. Per nursing documentation \"Patient requested as needed medications due to high anxiety as evidence by racing thoughts, crying, shaking, and pacing. \"  She is agreeable to assessment the privacy of the exam room today where she remains extremely bizarre and disorganized. She believes that she is at a local [employer] house having breakfast and at other times with family members drinking \"pop\". She agrees to participate in [facility] cognitive assessment and has much difficulty with memory recall, mathematics and facts of date time and place. She believes the year is 2024 and it is April. She scores 13/30 indicating neurocognitive concerns. Patient remains very disoriented with inability to provide family contact information. She has signed a release of release information for her sister and a message has been left. Her Sister Curly Waters is emergency contact and also has custody of patient's children. Due to patient's inability to maintain behavioral control her presentation warrants intensive psychiatric care unit at this time. , The patient is not appropriate for a lower level of care. There is risk of decompensation and patient warrants further hospitalization for safety and stabilization. Appetite:  [] Normal/Adequate/Unchanged  [] Increased  [x] Decreased      Sleep:       [] Normal/Adequate/Unchanged  [x] Fair(estimates she had not slept in more than a week prior to admission)  [] Poor      Group Attendance on Unit:   [] Yes  [] Selectively    [x] No    Medication Side Effects: Patient denies any medication side effects at the time of assessment. Mental Status Exam  Level of consciousness: Alert and awake. Appearance: Appropriate attire for setting, seated on bed, with poor grooming and hygiene. Behavior/Motor: Approachable, bizarre, some psychomotor slowing  Attitude toward examiner: Cooperative, attentive, good eye contact. Speech delayed rate, low volume, normal tone. Somewhat mumbling at times  Mood:  Patient reports \"not great\". Affect: Labile  Thought processes: slow, flight of ideas, and illogical.   Thought content: Denies homicidal ideation. Suicidal Ideation: Denies suicidal ideations however patient was walking in traffic prior to admission  Delusions: Patient presents with delusions. Endorses paranoia. Perceptual Disturbance: Patient does appear to be responding to internal stimuli. Denies auditory hallucinations. Denies visual hallucinations. Cognition: Oriented to self location and general situation. Memory: Impaired. Insight & Judgement: Poor. Data   height is 5' 8\" (1.727 m) and weight is 180 lb (81.6 kg). Her oral temperature is 98 °F (36.7 °C). Her blood pressure is 102/68 and her pulse is 76. Her respiration is 14 and oxygen saturation is 100%.    Labs:   Admission on 10/30/2022   Component Date Value Ref Range Status    Color, UA 10/30/2022 Yellow  Yellow Final    Turbidity UA 10/30/2022 Clear  Clear Final    Glucose, Ur 10/30/2022 NEGATIVE  NEGATIVE Final    Bilirubin Urine 10/30/2022 NEGATIVE  NEGATIVE Final    Ketones, Urine 10/30/2022 NEGATIVE  NEGATIVE Final    Specific [place], UA 10/30/2022 1.012  1.000 - 1.030 Final    Urine Hgb 10/30/2022 NEGATIVE  NEGATIVE Final    pH, UA 10/30/2022 6.0  5.0 - 8.0 Final    Protein, UA 10/30/2022 1+ (A)  NEGATIVE Final    Urobilinogen, Urine 10/30/2022 Normal  Normal Final    Nitrite, Urine 10/30/2022 NEGATIVE  NEGATIVE Final    Leukocyte Esterase, Urine 10/30/2022 NEGATIVE  NEGATIVE Final    Amphetamine Screen, Ur 10/30/2022 NEGATIVE  NEGATIVE Final    Comment: (Positive cutoff 1000 ng/mL)                  Barbiturate Screen, Ur 10/30/2022 NEGATIVE  NEGATIVE Final    Comment:       (Positive cutoff 200 ng/mL)                  Benzodiazepine Screen, Urine 10/30/2022 NEGATIVE  NEGATIVE Final    Comment:       (Positive cutoff 200 ng/mL)                  Cocaine Metabolite, Urine 10/30/2022 NEGATIVE  NEGATIVE Final    Comment:       (Positive cutoff 300 ng/mL)                  Methadone Screen, Urine 10/30/2022 NEGATIVE  NEGATIVE Final    Comment:       (Positive cutoff 300 ng/mL)                  Opiates, Urine 10/30/2022 NEGATIVE  NEGATIVE Final    Comment:       (Positive cutoff 300 ng/mL)                  Phencyclidine, Urine 10/30/2022 NEGATIVE  NEGATIVE Final    Comment:       (Positive cutoff 25 ng/mL)                  Cannabinoid Scrn, Ur 10/30/2022 POSITIVE (A)  NEGATIVE Final    Comment:       (Positive cutoff 50 ng/mL)                  Oxycodone Screen, Ur 10/30/2022 NEGATIVE  NEGATIVE Final    Comment:       (Positive cutoff 100 ng/mL)                  Fentanyl, Ur 10/30/2022 NEGATIVE  NEGATIVE Final    Comment:       (Positive cutoff  5 ng/ml)            Test Information 10/30/2022 Assay provides medical screening only. The absence of expected drug(s) and/or metabolite(s) may indicate diluted or adulterated urine, limitations of testing or timing of collection. Final    Comment: Testing for legal purposes should be confirmed by another method. To request confirmation   of test result, please call the lab within 7 days of sample submission. Ethanol 10/30/2022 <10  <10 mg/dL Final    Ethanol percent 10/30/2022 <0.010  % Final    hCG Quant 10/30/2022 <1  <5 mIU/mL Final    Comment:    Non-preg premeno   <=5  Postmeno           <=8  Male               <=3  If HCG results do not concur with clinical observations, additional testing to confirm   results is recommended.       Glucose 10/30/2022 123 (A)  70 - 99 mg/dL Final    BUN 10/30/2022 7  6 - 20 mg/dL Final    Creatinine 10/30/2022 0.96 (A)  0.50 - 0.90 mg/dL Final    Est, Glom Filt Rate 10/30/2022 >60  >60 mL/min/1.73m2 Final    Comment:       Effective Oct 3, 2022        These results are not intended for use in patients <25years of age. eGFR results are calculated without a race factor using the 2021 CKD-EPI equation. Careful clinical correlation is recommended, particularly when comparing to results   calculated using previous equations. The CKD-EPI equation is less accurate in patients with extremes of muscle mass, extra-renal   metabolism of creatine, excessive creatine ingestion, or following therapy that affects   renal tubular secretion.       Calcium 10/30/2022 10.3  8.6 - 10.4 mg/dL Final    Sodium 10/30/2022 141  135 - 144 mmol/L Final    Potassium 10/30/2022 3.5 (A)  3.7 - 5.3 mmol/L Final    Chloride 10/30/2022 106  98 - 107 mmol/L Final    CO2 10/30/2022 25  20 - 31 mmol/L Final    Anion Gap 10/30/2022 10  9 - 17 mmol/L Final    WBC 10/30/2022 12.3 (A)  3.5 - 11.0 k/uL Final    RBC 10/30/2022 5.02  4.0 - 5.2 m/uL Final    Hemoglobin 10/30/2022 14.1  12.0 - 16.0 g/dL Final    Hematocrit 10/30/2022 42.1  36 - 46 % Final    MCV 10/30/2022 83.9  80 - 100 fL Final    MCH 10/30/2022 28.1  26 - 34 pg Final    MCHC 10/30/2022 33.5  31 - 37 g/dL Final    RDW 10/30/2022 13.1  11.5 - 14.9 % Final    Platelets 12/68/6724 277  150 - 450 k/uL Final    MPV 10/30/2022 8.3  6.0 - 12.0 fL Final    Seg Neutrophils 10/30/2022 77 (A)  36 - 66 % Final    Lymphocytes 10/30/2022 13 (A)  24 - 44 % Final    Monocytes 10/30/2022 8 (A)  1 - 7 % Final    Eosinophils % 10/30/2022 1  0 - 4 % Final    Basophils 10/30/2022 1  0 - 2 % Final    Segs Absolute 10/30/2022 9.50 (A)  1.3 - 9.1 k/uL Final    Absolute Lymph # 10/30/2022 1.50  1.0 - 4.8 k/uL Final    Absolute Mono # 10/30/2022 1.00  0.1 - 1.3 k/uL Final    Absolute Eos # 10/30/2022 0.20  0.0 - 0.4 k/uL Final    Basophils Absolute 10/30/2022 0.10  0.0 - 0.2 k/uL Final    WBC, UA 10/30/2022 3 to 5  /HPF Final    RBC, UA 10/30/2022 0 TO 2  /HPF Final    Casts UA 10/30/2022 HYALINE  /LPF Final    Casts UA 10/30/2022 3 to 5  /LPF Final    Casts UA 10/30/2022 6 TO 9  /LPF Final    Casts UA 10/30/2022 COARSELY GRANULAR  /LPF Final    Casts UA 10/30/2022 3 to 5  /LPF Final    Epithelial Cells UA 10/30/2022 6 TO 9  /HPF Final    Bacteria, UA 10/30/2022 FEW (A)  None Final    Mucus, UA 10/30/2022 1+ (A)  None Final    Amorphous, UA 10/30/2022 1+ (A)  None Final    Lithium Lvl 11/02/2022 0.8  0.6 - 1.2 mmol/L Final    Lithium Dose Amount 11/02/2022 450   Final    Lithium Date Last Dose 11/02/2022 182893   Final    Lithium Dose Time 11/02/2022 2057   Final         Reviewed patient's current plan of care and vital signs with nursing staff.     Labs reviewed: [x] Yes  Last EKG in EMR reviewed: [x] Yes  QTc: 420    Medications  Current Facility-Administered Medications: benztropine (COGENTIN) tablet 1 mg, 1 mg, Oral, BID  cloNIDine (CATAPRES) tablet 0.1 mg, 0.1 mg, Oral, Nightly  hydroCHLOROthiazide (HYDRODIURIL) tablet 25 mg, 25 mg, Oral, Daily  lithium (ESKALITH) extended release tablet 450 mg, 450 mg, Oral, 2 times per day  melatonin tablet 3 mg, 3 mg, Oral, Nightly PRN  cariprazine hcl (VRAYLAR) capsule 6 mg, 6 mg, Oral, Daily  haloperidol lactate (HALDOL) injection 5 mg, 5 mg, IntraMUSCular, Q6H PRN **AND** LORazepam (ATIVAN) injection 2 mg, 2 mg, IntraMUSCular, Q6H PRN **AND** diphenhydrAMINE (BENADRYL) injection 50 mg, 50 mg, IntraMUSCular, Q6H PRN  acetaminophen (TYLENOL) tablet 650 mg, 650 mg, Oral, Q6H PRN  ibuprofen (ADVIL;MOTRIN) tablet 400 mg, 400 mg, Oral, Q6H PRN  hydrOXYzine HCl (ATARAX) tablet 50 mg, 50 mg, Oral, TID PRN  traZODone (DESYREL) tablet 50 mg, 50 mg, Oral, Nightly PRN  polyethylene glycol (GLYCOLAX) packet 17 g, 17 g, Oral, Daily PRN  aluminum & magnesium hydroxide-simethicone (MAALOX) 200-200-20 MG/5ML suspension 30 mL, 30 mL, Oral, Q6H PRN  nicotine polacrilex (NICORETTE) gum 2 mg, 2 mg, Oral, Q2H PRN  haloperidol (HALDOL) tablet 5 mg, 5 mg, Oral, Q6H PRN **AND** LORazepam (ATIVAN) tablet 2 mg, 2 mg, Oral, Q6H PRN    ASSESSMENT  Bipolar affective disorder, mixed, severe, with psychotic behavior ([facility] [address]         HANDOFF  Patient symptoms remain unstable and meet criteria for needed intensive care psychiatric monitoring  MoCA complete 13/30  Medications as determined by attending physician consider discontinuing Ativan as patient has been utilizing emergency medications for \"anxiety\"  Encourage participation in groups and milieu. Probable discharge is to be determined by MD    Electronically signed by MAURICE Cosby CNP on 11/2/2022 at 6:44 PM    **This report has been created using voice recognition software. It may contain minor errors which are inherent in voice recognition technology. **  I independently saw and evaluated the patient. I reviewed the  documentation above. Any additional comments or changes to the    documentation are stated below otherwise agree with assessment.      -The patient has received as needed medications last night for behavioral problems. The patient's low Score was noted above. The patient is taking high dose of regular. We will continue to monitor. No changes made to her medications today      PLAN  Medications as noted above  Attempt to develop insight  Expected Length of Stay is 2-5 days. Psycho-education conducted. Supportive Therapy conducted.   Follow-up daily while on inpatient unit    Electronically signed by Uday Coello MD on 11/2/22 at 9:34 PM EDT

## 2022-11-02 NOTE — BH NOTE
IM prn's given as ordered  at this time r/t pt going into others rooms and not keeping her clothes on and coming out to the day room naked . Unable to be redirected . Accepting of medications and is resting on her bed.

## 2022-11-03 PROCEDURE — 1240000000 HC EMOTIONAL WELLNESS R&B

## 2022-11-03 PROCEDURE — 6370000000 HC RX 637 (ALT 250 FOR IP): Performed by: PSYCHIATRY & NEUROLOGY

## 2022-11-03 PROCEDURE — 99232 SBSQ HOSP IP/OBS MODERATE 35: CPT | Performed by: PSYCHIATRY & NEUROLOGY

## 2022-11-03 PROCEDURE — 6360000002 HC RX W HCPCS: Performed by: PSYCHIATRY & NEUROLOGY

## 2022-11-03 PROCEDURE — APPSS30 APP SPLIT SHARED TIME 16-30 MINUTES: Performed by: NURSE PRACTITIONER

## 2022-11-03 RX ORDER — PALIPERIDONE 3 MG/1
3 TABLET, EXTENDED RELEASE ORAL DAILY
Status: DISCONTINUED | OUTPATIENT
Start: 2022-11-04 | End: 2022-11-04

## 2022-11-03 RX ORDER — HALOPERIDOL 5 MG/ML
5 INJECTION INTRAMUSCULAR ONCE
Status: COMPLETED | OUTPATIENT
Start: 2022-11-03 | End: 2022-11-03

## 2022-11-03 RX ORDER — LORAZEPAM 2 MG/ML
2 INJECTION INTRAMUSCULAR ONCE
Status: COMPLETED | OUTPATIENT
Start: 2022-11-03 | End: 2022-11-03

## 2022-11-03 RX ORDER — HALOPERIDOL 5 MG/ML
5 INJECTION INTRAMUSCULAR ONCE
Status: DISCONTINUED | OUTPATIENT
Start: 2022-11-03 | End: 2022-11-03

## 2022-11-03 RX ORDER — DIPHENHYDRAMINE HYDROCHLORIDE 50 MG/ML
50 INJECTION INTRAMUSCULAR; INTRAVENOUS ONCE
Status: DISCONTINUED | OUTPATIENT
Start: 2022-11-03 | End: 2022-11-03

## 2022-11-03 RX ORDER — DIPHENHYDRAMINE HYDROCHLORIDE 50 MG/ML
50 INJECTION INTRAMUSCULAR; INTRAVENOUS ONCE
Status: COMPLETED | OUTPATIENT
Start: 2022-11-03 | End: 2022-11-03

## 2022-11-03 RX ADMIN — HYDROCHLOROTHIAZIDE 25 MG: 25 TABLET ORAL at 08:31

## 2022-11-03 RX ADMIN — HYDROXYZINE HYDROCHLORIDE 50 MG: 50 TABLET, FILM COATED ORAL at 20:28

## 2022-11-03 RX ADMIN — LITHIUM CARBONATE 450 MG: 450 TABLET, EXTENDED RELEASE ORAL at 08:31

## 2022-11-03 RX ADMIN — CARIPRAZINE 6 MG: 3 CAPSULE, GELATIN COATED ORAL at 08:31

## 2022-11-03 RX ADMIN — DIPHENHYDRAMINE HYDROCHLORIDE 50 MG: 50 INJECTION, SOLUTION INTRAMUSCULAR; INTRAVENOUS at 19:07

## 2022-11-03 RX ADMIN — Medication 3 MG: at 20:28

## 2022-11-03 RX ADMIN — LITHIUM CARBONATE 450 MG: 450 TABLET, EXTENDED RELEASE ORAL at 20:28

## 2022-11-03 RX ADMIN — NICOTINE POLACRILEX 2 MG: 2 GUM, CHEWING BUCCAL at 13:10

## 2022-11-03 RX ADMIN — HALOPERIDOL 5 MG: 5 INJECTION INTRAMUSCULAR at 19:06

## 2022-11-03 RX ADMIN — IBUPROFEN 400 MG: 400 TABLET ORAL at 19:26

## 2022-11-03 RX ADMIN — LORAZEPAM 2 MG: 1 TABLET ORAL at 14:46

## 2022-11-03 RX ADMIN — BENZTROPINE MESYLATE 1 MG: 1 TABLET ORAL at 20:28

## 2022-11-03 RX ADMIN — TRAZODONE HYDROCHLORIDE 50 MG: 50 TABLET ORAL at 20:28

## 2022-11-03 RX ADMIN — IBUPROFEN 400 MG: 400 TABLET ORAL at 10:47

## 2022-11-03 RX ADMIN — ACETAMINOPHEN 325MG 650 MG: 325 TABLET ORAL at 13:13

## 2022-11-03 RX ADMIN — NICOTINE POLACRILEX 2 MG: 2 GUM, CHEWING BUCCAL at 08:40

## 2022-11-03 RX ADMIN — CLONIDINE HYDROCHLORIDE 0.1 MG: 0.1 TABLET ORAL at 20:27

## 2022-11-03 RX ADMIN — LORAZEPAM 2 MG: 2 INJECTION INTRAMUSCULAR; INTRAVENOUS at 19:06

## 2022-11-03 RX ADMIN — HALOPERIDOL 5 MG: 5 TABLET ORAL at 14:46

## 2022-11-03 RX ADMIN — BENZTROPINE MESYLATE 1 MG: 1 TABLET ORAL at 08:31

## 2022-11-03 RX ADMIN — NICOTINE POLACRILEX 2 MG: 2 GUM, CHEWING BUCCAL at 14:12

## 2022-11-03 ASSESSMENT — PAIN DESCRIPTION - ORIENTATION
ORIENTATION: LOWER
ORIENTATION: LOWER

## 2022-11-03 ASSESSMENT — PAIN SCALES - GENERAL
PAINLEVEL_OUTOF10: 4
PAINLEVEL_OUTOF10: 2
PAINLEVEL_OUTOF10: 6

## 2022-11-03 ASSESSMENT — PAIN DESCRIPTION - DESCRIPTORS
DESCRIPTORS: ACHING
DESCRIPTORS: ACHING

## 2022-11-03 ASSESSMENT — PAIN DESCRIPTION - LOCATION
LOCATION: BACK
LOCATION: BACK

## 2022-11-03 NOTE — GROUP NOTE
Group Therapy Note    Date: 11/3/2022    Group Start Time: 1430  Group End Time: 1500  Group Topic: Cognitive Skills    [facility]    JUANCARLOS Jeronimo        Group Therapy Note    Attendees: 2/5     Patient's Goal:  To increase social interaction and to practice decision making, concentration, and communication skills. Notes: Pt attended group and initially participated in task . Pt was able to practice decision making, concentration, and communication skills for a brief time. However, due to peer in day room yelling angrily, and then peer in group raising voice that his eardrums would burst, pt became frustrated by noise level. Pt appropriately attempted to explain to peer in group that when he raised his voice it made peer in day room also yell more loudly. Pt asked peer to talk very quietly, asked him \"Can you whisper? \". RT also attempted to redirect peer in group but he continued to repeat loudly that his ears would burst.     Pt left group and went to room stating she needed quiet. RT checked on pt and she was just sitting on bed calmly and told RT, I just need some quiet. Pt was offered support and positive feedback r/t appropriate communication to noisy peer in group. Pt was encouraged to take time if needed, and if able return to group when ready. Pt stated \"I could walk back and forth and if I see peer being quiet then I can come back. Pt walked for several minutes and returned to group but peer started disagreeing with RT and pt about simple rule. Pt again calmly left group. Participation Level: Active Listener,  sharing        Participation Quality:  Attentive, sharing -supportive r/t appropriate feedback to noisy peer     Speech:  Normal     Thought Process/Content: Logical , linear r/t task. However, pt had difficulty tolerating excessive noise from 2 peers, and child like behavior of 1 peer.  Pt appropriately coped with stress and communicated calmly with RT.      Affective Functioning: Blunted,brightened     Mood: Euthymic initially, remained calm but irritated/stressed by noisy peers     Level of consciousness:  Alert, and Attentive        Response to Learning:  Able to demonstrate current knowledge , able to verbalize/acknowledge  new learning , and Progressing to goal        Endings: None Reported     Modes of Intervention:  Support, Socialization, Exploration, Clarifying and Problem-solving        Discipline Responsible: Psychoeducational Specialist        Signature:  JACQUES HinojosaS

## 2022-11-03 NOTE — GROUP NOTE
Group Therapy Note    Date: 11/3/2022    Group Start Time: 1100  Group End Time: 1150  Group Topic: Cognitive Skills    [facility] [place], [address]        Group Therapy Note    Attendees: 4/7     Patient's Goal:  To increase social interaction and to practice exploring coping skills, concentration, and communication skills. Notes: Pt attended group and immediately showed interest in task. After listening to directions pt clarified verbally and started to think about positive and negative factors. Pt participated in task and discussion . Pt was able to practice exploring coping skills, concentration, and communication skills. Peers in group were loud, and at times intrusive but pt persevered with her list-at times pt had some difficulty expressing an idea clearly but would state idea to RT and then work on focusing and organizing thought more clearly herself. Pt was encouraged to identify positive things they can do to work on wellness, as well as things to stay away from, that have a negative effect. Pt had a theme of increasing her independence in self care/taking meds, and on building a healthy support system and her self esteem. Participation Level: Active Listener,  sharing      Participation Quality:  Attentive, sharing     Speech:  Normal tone, hesitant, thought blocking at times but works on organizing her thoughts verbally to RT and by herself. Thought Process/Content: Logical ideas r/t task but initially some disorganization in expression of clear ,complete thought-some thought blocking. Pt thoughts become more organized as pt verbalizes and receives positive feed back from RT. Pt identified some specific coping skills to practice at home, and negative elements to stay away from. Pt was attentive to suggestions from RT .      Affective Functioning: Blunted,brightened     Mood: Euthymic, brightens with positive feedback. Pt does not become irritated with intrusive peer (redirected by RT as needed). Pt was motivated to engage in task.      Level of consciousness:  Alert, and Attentive        Response to Learning:  Able to demonstrate current knowledge , able to verbalize/acknowledge  new learning , and Progressing to goal        Endings: None Reported     Modes of Intervention:  Support, Socialization, Exploration, Clarifying and Problem-solving        Discipline Responsible: Psychoeducational Specialist        Signature:  JACQUES ValenciaS

## 2022-11-03 NOTE — GROUP NOTE
Condition update, plan to take back to [facility] soon   Group Therapy Note    Date: 11/3/2022    Group Start Time: 9184  Group End Time: 0768  Group Topic: Group Documentation    [facility]    Bee Eaton RN        Group Therapy Note    Attendees: 4/7    Community Meeting Group Note        Date: November 3, 2022 Start Time:  8:55am   End Time:  9:09am      Number of Participants in Group & Unit Census:  4/7    Topic: Goals group    Goal of Group: To establish attainable daily goal(s)      Comments:     Patient did not participate in [employer] group, despite staff encouragement and explanation of benefits. Patient remain seclusive to self. Q15 minute safety checks maintained for patient safety and will continue to encourage patient to attend unit programming.

## 2022-11-03 NOTE — PROGRESS NOTES
Daily Progress Note  11/3/2022    Patient Name: Jhoan Marks    CHIEF COMPLAINT:  Acute psychosis         SUBJECTIVE:      Staff report that patient has required oral emergency medications in the past 24 hours for agitation. Patient has been compliant with her scheduled psychotropic medications including cariprazine, clonidine, benztropine and lithium. Patient lithium level obtained yesterday 0.08 and within therapeutic level. This is writer's first interaction with patient. She is mostly cooperative with discussion. She states that she is felt anxious and on edge today. Has difficulty engaging in linear conversation. Thought process is disorganized and thought blocking observed. She does appear to be frustrated at times with her difficulty in thought organization. We reviewed her current psychotropic regimen and she denies any side effects. Patient states that she feels overstimulated at times on this unit. At times appears internally preoccupied but denies any perceptual disturbances. Patient showing modest improvement in symptoms but continues to require emergency medications and is yet to demonstrate stability. Appetite:  [] Normal/Adequate/Unchanged  [] Increased  [x] Decreased      Sleep:       [] Normal/Adequate/Unchanged  [x] Fair(estimates she had not slept in more than a week prior to admission)  [] Poor      Group Attendance on Unit:   [] Yes  [] Selectively    [x] No    Medication Side Effects: Patient denies any medication side effects at the time of assessment. Mental Status Exam  Level of consciousness: Alert and awake. Appearance: Appropriate attire for setting, seated on bed, with poor grooming and hygiene. Behavior/Motor: Approachable, bizarre, some psychomotor slowing  Attitude toward examiner: Cooperative, attentive, good eye contact. Speech delayed rate, low volume, normal tone. Somewhat mumbling at times  Mood:  Patient reports \" overstimulated\". Affect: Labile  Thought processes: Disorganization, thought blocking  Thought content: Denies homicidal ideation. Suicidal Ideation: Denies suicidal ideations however patient was walking in traffic prior to admission  Delusions: Patient presents with delusions. Endorses paranoia. Perceptual Disturbance: Patient does appear to be responding to internal stimuli. Denies auditory hallucinations. Denies visual hallucinations. Cognition: Oriented to self location and general situation. Memory: Impaired. Insight & Judgement: Poor. Data   height is 5' 8\" (1.727 m) and weight is 180 lb (81.6 kg). Her oral temperature is 97.4 °F (36.3 °C). Her blood pressure is 104/70 and her pulse is 67. Her respiration is 14 and oxygen saturation is 100%.    Labs:   Admission on 10/30/2022   Component Date Value Ref Range Status    Color, UA 10/30/2022 Yellow  Yellow Final    Turbidity UA 10/30/2022 Clear  Clear Final    Glucose, Ur 10/30/2022 NEGATIVE  NEGATIVE Final    Bilirubin Urine 10/30/2022 NEGATIVE  NEGATIVE Final    Ketones, Urine 10/30/2022 NEGATIVE  NEGATIVE Final    Specific [employer], UA 10/30/2022 1.012  1.000 - 1.030 Final    Urine Hgb 10/30/2022 NEGATIVE  NEGATIVE Final    pH, UA 10/30/2022 6.0  5.0 - 8.0 Final    Protein, UA 10/30/2022 1+ (A)  NEGATIVE Final    Urobilinogen, Urine 10/30/2022 Normal  Normal Final    Nitrite, Urine 10/30/2022 NEGATIVE  NEGATIVE Final    Leukocyte Esterase, Urine 10/30/2022 NEGATIVE  NEGATIVE Final    Amphetamine Screen, Ur 10/30/2022 NEGATIVE  NEGATIVE Final    Comment:       (Positive cutoff 1000 ng/mL)                  Barbiturate Screen, Ur 10/30/2022 NEGATIVE  NEGATIVE Final    Comment:       (Positive cutoff 200 ng/mL)                  Benzodiazepine Screen, Urine 10/30/2022 NEGATIVE  NEGATIVE Final    Comment:       (Positive cutoff 200 ng/mL)                  Cocaine Metabolite, Urine 10/30/2022 NEGATIVE  NEGATIVE Final    Comment:       (Positive cutoff 300 ng/mL) Methadone Screen, Urine 10/30/2022 NEGATIVE  NEGATIVE Final    Comment:       (Positive cutoff 300 ng/mL)                  Opiates, Urine 10/30/2022 NEGATIVE  NEGATIVE Final    Comment:       (Positive cutoff 300 ng/mL)                  Phencyclidine, Urine 10/30/2022 NEGATIVE  NEGATIVE Final    Comment:       (Positive cutoff 25 ng/mL)                  Cannabinoid Scrn, Ur 10/30/2022 POSITIVE (A)  NEGATIVE Final    Comment:       (Positive cutoff 50 ng/mL)                  Oxycodone Screen, Ur 10/30/2022 NEGATIVE  NEGATIVE Final    Comment:       (Positive cutoff 100 ng/mL)                  Fentanyl, Ur 10/30/2022 NEGATIVE  NEGATIVE Final    Comment:       (Positive cutoff  5 ng/ml)            Test Information 10/30/2022 Assay provides medical screening only. The absence of expected drug(s) and/or metabolite(s) may indicate diluted or adulterated urine, limitations of testing or timing of collection. Final    Comment: Testing for legal purposes should be confirmed by another method. To request confirmation   of test result, please call the lab within 7 days of sample submission. Ethanol 10/30/2022 <10  <10 mg/dL Final    Ethanol percent 10/30/2022 <0.010  % Final    hCG Quant 10/30/2022 <1  <5 mIU/mL Final    Comment:    Non-preg premeno   <=5  Postmeno           <=8  Male               <=3  If HCG results do not concur with clinical observations, additional testing to confirm   results is recommended. Glucose 10/30/2022 123 (A)  70 - 99 mg/dL Final    BUN 10/30/2022 7  6 - 20 mg/dL Final    Creatinine 10/30/2022 0.96 (A)  0.50 - 0.90 mg/dL Final    Est, Glom Filt Rate 10/30/2022 >60  >60 mL/min/1.73m2 Final    Comment:       Effective Oct 3, 2022        These results are not intended for use in patients <25years of age. eGFR results are calculated without a race factor using the 2021 CKD-EPI equation.   Careful clinical correlation is recommended, particularly when comparing to results calculated using previous equations. The CKD-EPI equation is less accurate in patients with extremes of muscle mass, extra-renal   metabolism of creatine, excessive creatine ingestion, or following therapy that affects   renal tubular secretion.       Calcium 10/30/2022 10.3  8.6 - 10.4 mg/dL Final    Sodium 10/30/2022 141  135 - 144 mmol/L Final    Potassium 10/30/2022 3.5 (A)  3.7 - 5.3 mmol/L Final    Chloride 10/30/2022 106  98 - 107 mmol/L Final    CO2 10/30/2022 25  20 - 31 mmol/L Final    Anion Gap 10/30/2022 10  9 - 17 mmol/L Final    WBC 10/30/2022 12.3 (A)  3.5 - 11.0 k/uL Final    RBC 10/30/2022 5.02  4.0 - 5.2 m/uL Final    Hemoglobin 10/30/2022 14.1  12.0 - 16.0 g/dL Final    Hematocrit 10/30/2022 42.1  36 - 46 % Final    MCV 10/30/2022 83.9  80 - 100 fL Final    MCH 10/30/2022 28.1  26 - 34 pg Final    MCHC 10/30/2022 33.5  31 - 37 g/dL Final    RDW 10/30/2022 13.1  11.5 - 14.9 % Final    Platelets 45/86/4648 277  150 - 450 k/uL Final    MPV 10/30/2022 8.3  6.0 - 12.0 fL Final    Seg Neutrophils 10/30/2022 77 (A)  36 - 66 % Final    Lymphocytes 10/30/2022 13 (A)  24 - 44 % Final    Monocytes 10/30/2022 8 (A)  1 - 7 % Final    Eosinophils % 10/30/2022 1  0 - 4 % Final    Basophils 10/30/2022 1  0 - 2 % Final    Segs Absolute 10/30/2022 9.50 (A)  1.3 - 9.1 k/uL Final    Absolute Lymph # 10/30/2022 1.50  1.0 - 4.8 k/uL Final    Absolute Mono # 10/30/2022 1.00  0.1 - 1.3 k/uL Final    Absolute Eos # 10/30/2022 0.20  0.0 - 0.4 k/uL Final    Basophils Absolute 10/30/2022 0.10  0.0 - 0.2 k/uL Final    WBC, UA 10/30/2022 3 to 5  /HPF Final    RBC, UA 10/30/2022 0 TO 2  /HPF Final    Casts UA 10/30/2022 HYALINE  /LPF Final    Casts UA 10/30/2022 3 to 5  /LPF Final    Casts UA 10/30/2022 6 TO 9  /LPF Final    Casts UA 10/30/2022 COARSELY GRANULAR  /LPF Final    Casts UA 10/30/2022 3 to 5  /LPF Final    Epithelial Cells UA 10/30/2022 6 TO 9  /HPF Final    Bacteria, UA 10/30/2022 FEW (A)  None Final    Mucus, UA 10/30/2022 1+ (A)  None Final    Amorphous, UA 10/30/2022 1+ (A)  None Final    Lithium Lvl 11/02/2022 0.8  0.6 - 1.2 mmol/L Final    Lithium Dose Amount 11/02/2022 450   Final    Lithium Date Last Dose 11/02/2022 [id number]   Final    Lithium Dose Time 11/02/2022 2057   Final         Reviewed patient's current plan of care and vital signs with nursing staff.     Labs reviewed: [x] Yes  Last EKG in EMR reviewed: [x] Yes  QTc: 420    Medications  Current Facility-Administered Medications: benztropine (COGENTIN) tablet 1 mg, 1 mg, Oral, BID  cloNIDine (CATAPRES) tablet 0.1 mg, 0.1 mg, Oral, Nightly  hydroCHLOROthiazide (HYDRODIURIL) tablet 25 mg, 25 mg, Oral, Daily  lithium (ESKALITH) extended release tablet 450 mg, 450 mg, Oral, 2 times per day  melatonin tablet 3 mg, 3 mg, Oral, Nightly PRN  cariprazine hcl (VRAYLAR) capsule 6 mg, 6 mg, Oral, Daily  haloperidol lactate (HALDOL) injection 5 mg, 5 mg, IntraMUSCular, Q6H PRN **AND** LORazepam (ATIVAN) injection 2 mg, 2 mg, IntraMUSCular, Q6H PRN **AND** diphenhydrAMINE (BENADRYL) injection 50 mg, 50 mg, IntraMUSCular, Q6H PRN  acetaminophen (TYLENOL) tablet 650 mg, 650 mg, Oral, Q6H PRN  ibuprofen (ADVIL;MOTRIN) tablet 400 mg, 400 mg, Oral, Q6H PRN  hydrOXYzine HCl (ATARAX) tablet 50 mg, 50 mg, Oral, TID PRN  traZODone (DESYREL) tablet 50 mg, 50 mg, Oral, Nightly PRN  polyethylene glycol (GLYCOLAX) packet 17 g, 17 g, Oral, Daily PRN  aluminum & magnesium hydroxide-simethicone (MAALOX) 200-200-20 MG/5ML suspension 30 mL, 30 mL, Oral, Q6H PRN  nicotine polacrilex (NICORETTE) gum 2 mg, 2 mg, Oral, Q2H PRN  haloperidol (HALDOL) tablet 5 mg, 5 mg, Oral, Q6H PRN **AND** LORazepam (ATIVAN) tablet 2 mg, 2 mg, Oral, Q6H PRN    ASSESSMENT  Bipolar affective disorder, mixed, severe, with psychotic behavior ([facility] [address]         HANDOFF  Patient symptoms remain unstable and meet criteria for PICU level care  Medications as determined by attending physician  Encourage participation in groups and milieu. Probable discharge is to be determined by psychiatrist    Electronically signed by MAURICE Daly CNP on 11/3/2022 at 6:17 PM    **This report has been created using voice recognition software. It may contain minor errors which are inherent in voice recognition technology. **    I independently saw and evaluated the patient. I reviewed the  documentation above. Any additional comments or changes to the    documentation are stated below otherwise agree with assessment. The patient has continued to require as needed medications. She was calm and cooperative but without insight of the time of assessment. She is minimizing symptoms and wants to get help. The patient Cespedes Kenning has been discontinued and Invega 3 mg daily has been ordered. This could be titrated up  PLAN  Medications as noted above  Attempt to develop insight  Expected Length of Stay is 3-5 days. Psycho-education conducted. Supportive Therapy conducted.   Follow-up daily while on inpatient unit    Electronically signed by Kim Young MD on 11/3/22 at 9:09 PM EDT

## 2022-11-03 NOTE — PLAN OF CARE
Problem: Anxiety  Goal: Will report anxiety at manageable levels  Description: INTERVENTIONS:  1. Administer medication as ordered  2. Teach and rehearse alternative coping skills  3. Provide emotional support with 1:1 interaction with staff  Outcome: Progressing     Problem: Confusion  Goal: Confusion, delirium, dementia, or psychosis is improved or at baseline  Description: INTERVENTIONS:  1. Assess for possible contributors to thought disturbance, including medications, impaired vision or hearing, underlying metabolic abnormalities, dehydration, psychiatric diagnoses, and notify attending LIP  2. [place] high risk fall precautions, as indicated  3. Provide frequent short contacts to provide reality reorientation, refocusing and direction  4. Decrease environmental stimuli, including noise as appropriate  5. Monitor and intervene to maintain adequate nutrition, hydration, elimination, sleep and activity  6. If unable to ensure safety without constant attention obtain sitter and review sitter guidelines with assigned personnel  7. Initiate Psychosocial CNS and Spiritual Care consult, as indicated  Outcome: Progressing   Reports continued anxiety. Less confusion noted , still needs reoriented to her situation and where her room is .

## 2022-11-03 NOTE — BH NOTE
Emergency Medication Follow-Up Note:    PRN medication of Haldol 5 mg and Ativan 2 mg oral was effective as evidence by Patient regain behavioral control, absence of behavior warranting emergency medication, socializing with peers. Patient denies medication side effects. Will continue to monitor and provide support as needed.

## 2022-11-03 NOTE — BH NOTE
Emergency PRN Medication Administration Note:      Patient is Agitated as evidence by complaints of agitation, irritated. Staff attempted to find and relieve the distress by Talking to patient, Refocusing on new activity, and Offering suggestions Patient is currently  continuing to escalate, accepted PRN medications Haldol 5 mg, and Ativan 2 mg oral. Patient Tolerated medication administration. Will continue to monitor, offer support, and reassess.

## 2022-11-03 NOTE — PLAN OF CARE
Problem: Anxiety  Goal: Will report anxiety at manageable levels  Description: INTERVENTIONS:  1. Administer medication as ordered  2. Teach and rehearse alternative coping skills  3. Provide emotional support with 1:1 interaction with staff  11/3/2022 1014 by Sidra Gonzalez RN  Outcome: Progressing     Problem: Confusion  Goal: Confusion, delirium, dementia, or psychosis is improved or at baseline  11/3/2022 1014 by Sidra Gonzalez RN  Outcome: Progressing     Patient is open to 1:1 talk time with staff. Patient denies depression, anxiety, suicidal ideations, homicidal ideations, visual hallucinations, and auditory hallucinations. Patient is out and selectively social with peers. Patient mostly attends groups and is medication compliant.  Patient eats all of her meals

## 2022-11-04 ENCOUNTER — APPOINTMENT (OUTPATIENT)
Dept: GENERAL RADIOLOGY | Age: 42
DRG: 885 | End: 2022-11-04
Payer: COMMERCIAL

## 2022-11-04 PROCEDURE — 1240000000 HC EMOTIONAL WELLNESS R&B

## 2022-11-04 PROCEDURE — 6370000000 HC RX 637 (ALT 250 FOR IP): Performed by: PSYCHIATRY & NEUROLOGY

## 2022-11-04 PROCEDURE — 99222 1ST HOSP IP/OBS MODERATE 55: CPT | Performed by: INTERNAL MEDICINE

## 2022-11-04 PROCEDURE — APPSS30 APP SPLIT SHARED TIME 16-30 MINUTES: Performed by: NURSE PRACTITIONER

## 2022-11-04 PROCEDURE — 99232 SBSQ HOSP IP/OBS MODERATE 35: CPT | Performed by: PSYCHIATRY & NEUROLOGY

## 2022-11-04 PROCEDURE — 73630 X-RAY EXAM OF FOOT: CPT

## 2022-11-04 RX ORDER — SALIVA STIMULANT COMB. NO.3
SPRAY, NON-AEROSOL (ML) MUCOUS MEMBRANE PRN
Status: DISCONTINUED | OUTPATIENT
Start: 2022-11-04 | End: 2022-11-04 | Stop reason: RX

## 2022-11-04 RX ADMIN — HALOPERIDOL 5 MG: 5 TABLET ORAL at 00:49

## 2022-11-04 RX ADMIN — NICOTINE POLACRILEX 2 MG: 2 GUM, CHEWING BUCCAL at 14:07

## 2022-11-04 RX ADMIN — BENZTROPINE MESYLATE 1 MG: 1 TABLET ORAL at 08:12

## 2022-11-04 RX ADMIN — CLONIDINE HYDROCHLORIDE 0.1 MG: 0.1 TABLET ORAL at 21:16

## 2022-11-04 RX ADMIN — TRAZODONE HYDROCHLORIDE 50 MG: 50 TABLET ORAL at 21:16

## 2022-11-04 RX ADMIN — ACETAMINOPHEN 325MG 650 MG: 325 TABLET ORAL at 10:41

## 2022-11-04 RX ADMIN — LORAZEPAM 2 MG: 1 TABLET ORAL at 00:49

## 2022-11-04 RX ADMIN — LORAZEPAM 2 MG: 1 TABLET ORAL at 21:16

## 2022-11-04 RX ADMIN — LITHIUM CARBONATE 450 MG: 450 TABLET, EXTENDED RELEASE ORAL at 08:12

## 2022-11-04 RX ADMIN — HYDROCHLOROTHIAZIDE 25 MG: 25 TABLET ORAL at 08:12

## 2022-11-04 RX ADMIN — NICOTINE POLACRILEX 2 MG: 2 GUM, CHEWING BUCCAL at 17:51

## 2022-11-04 RX ADMIN — HYDROXYZINE HYDROCHLORIDE 50 MG: 50 TABLET, FILM COATED ORAL at 21:16

## 2022-11-04 RX ADMIN — HALOPERIDOL 5 MG: 5 TABLET ORAL at 21:16

## 2022-11-04 RX ADMIN — Medication 3 MG: at 21:16

## 2022-11-04 RX ADMIN — NICOTINE POLACRILEX 2 MG: 2 GUM, CHEWING BUCCAL at 10:41

## 2022-11-04 RX ADMIN — NICOTINE POLACRILEX 2 MG: 2 GUM, CHEWING BUCCAL at 15:57

## 2022-11-04 RX ADMIN — LITHIUM CARBONATE 450 MG: 450 TABLET, EXTENDED RELEASE ORAL at 21:16

## 2022-11-04 RX ADMIN — BENZTROPINE MESYLATE 1 MG: 1 TABLET ORAL at 21:16

## 2022-11-04 RX ADMIN — CARIPRAZINE 6 MG: 3 CAPSULE, GELATIN COATED ORAL at 14:26

## 2022-11-04 ASSESSMENT — PAIN SCALES - GENERAL: PAINLEVEL_OUTOF10: 3

## 2022-11-04 ASSESSMENT — PAIN DESCRIPTION - LOCATION: LOCATION: BACK

## 2022-11-04 NOTE — GROUP NOTE
Group Therapy Note    Date: 11/4/2022    Group Start Time: 0900  Group End Time: 0930  Group Topic: Community Meeting    [facility]    Ryder Cavazos        Group Therapy Note    Attendees: 5/7       Patient's Goal:  Speak with psychiatrist, Speak with at least two of her contacts on the phone today. Notes:  Goal-Setting    Status After Intervention:  Unchanged    Participation Level:  Active Listener and Interactive    Participation Quality: Appropriate      Speech:  normal and hesitant      Thought Process/Content: Logical      Affective Functioning: Congruent and Flat      Mood: anxious      Level of consciousness:  Alert and Preoccupied      Response to Learning: Able to verbalize current knowledge/experience and Able to verbalize/acknowledge new learning      Endings: None Reported    Modes of Intervention: Education, Support, Socialization, Exploration, and Clarifying      Discipline Responsible: [address]      Signature:  Ryder Cavazos

## 2022-11-04 NOTE — PLAN OF CARE
Problem: Anxiety  Goal: Will report anxiety at manageable levels  Description: INTERVENTIONS:  1. Administer medication as ordered  2. Teach and rehearse alternative coping skills  3. Provide emotional support with 1:1 interaction with staff  11/4/2022 1929 by Amirah Sierra  Outcome: Progressing       Problem: Confusion  Goal: Confusion, delirium, dementia, or psychosis is improved or at baseline  Description: INTERVENTIONS:  1. Assess for possible contributors to thought disturbance, including medications, impaired vision or hearing, underlying metabolic abnormalities, dehydration, psychiatric diagnoses, and notify attending LIP  2. [place] high risk fall precautions, as indicated  3. Provide frequent short contacts to provide reality reorientation, refocusing and direction  4. Decrease environmental stimuli, including noise as appropriate  5. Monitor and intervene to maintain adequate nutrition, hydration, elimination, sleep and activity  6. If unable to ensure safety without constant attention obtain sitter and review sitter guidelines with assigned personnel  7. Initiate Psychosocial CNS and Spiritual Care consult, as indicated  11/4/2022 1929 by Amirah Sierra  Outcome: Progressing  Patient denies suicidal ideations this evening. Patient also denies any depression and anxiety. Patient have been calm, cooperative and have been isolative to room. Q 15 minutes safety checks maintained.

## 2022-11-04 NOTE — H&P
[facility]    CONSULTATION / HISTORY AND PHYSICAL EXAMINATION            Date:   2022  Patient name:  Orly Warner  Date of admission:  10/30/2022  3:51 PM  MRN:   [id number]  Account:  [de-identified]  YOB: 1980  PCP:    Dee Sullivan PA-C  Room:   [facility]0113-01  Code Status:    Full Code    Physician Requesting Consult: Johnny Royal MD    Reason for Consult:  medical management    Chief Complaint:     Chief Complaint   Patient presents with    Suicidal    Homicidal       History Obtained From:     Patient medical record nursing staff    History of Present Illness:   Patient past medical history of bipolar disorder, hypertension, generalized artery disorder, OCD, admitted to [facility] floor with acute psychosis  Patient is complaining of pain in left foot, she told me her  stump over her foot and she is having pain ever since  No complaints of chest pain, shortness of breath, abdominal pain      Past Medical History:     Past Medical History:   Diagnosis Date    Anxiety     Bipolar 1 disorder ([facility] [address]     Depression     Gestational diabetes 2016    OCD (obsessive compulsive disorder)     PTSD (post-traumatic stress disorder)     Rapid rate of speech     Schizoaffective disorder ([facility] [address]         Past Surgical History:     Past Surgical History:   Procedure Laterality Date    ABDOMEN SURGERY       SECTION  2007    LAPAROSCOPY          Medications Prior to Admission:     Prior to Admission medications    Medication Sig Start Date End Date Taking? Authorizing Provider   lithium 300 MG capsule Take 600 mg by mouth at bedtime   Yes Historical Provider, MD   lurasidone (LATUDA) 40 MG TABS tablet Take 40 mg by mouth in the morning and at bedtime Ordered on 10/20/2022. Take 1 tablet by mouth twice daily for two days, then 1 tablet every morning and 2 tablets every night at bedtime.    Yes Historical Provider, MD   clonazePAM (KLONOPIN) 1 MG tablet Take 1 tablet by mouth 3 times daily for 10 days. 7/22/19 8/1/19  Mak Floyd MD   traZODone (DESYREL) 50 MG tablet Take 1 tablet by mouth nightly as needed (insomnia) 7/22/19   Mak Floyd MD   cloNIDine (CATAPRES) 0.1 MG tablet Take 1 tablet by mouth nightly 7/22/19   Mak Floyd MD   benztropine (COGENTIN) 1 MG tablet Take 1 tablet by mouth 2 times daily 7/22/19   Mak Floyd MD   cariprazine hcl 6 MG CAPS Take 6 mg by mouth daily 7/23/19   Mak Floyd MD   lithium (ESKALITH) 450 MG extended release tablet Take 1 tablet by mouth every 12 hours  Patient taking differently: Take 450 mg by mouth daily 7/22/19   Mak Floyd MD   ziprasidone (GEODON) 40 MG capsule Take 1 capsule by mouth 2 times daily (with meals) 7/22/19   Mak Floyd MD   hydrochlorothiazide (HYDRODIURIL) 25 MG tablet Take 1 tablet by mouth daily 7/23/19   Mak Floyd MD   melatonin 3 MG TABS tablet Take 10 mg by mouth nightly as needed    Historical Provider, MD        Allergies:     Abilify [aripiprazole], Codeine, Depakote er [divalproex sodium er], Gabapentin, Lamictal [lamotrigine], Percocet [oxycodone-acetaminophen], Quetiapine fumarate, Tegretol [carbamazepine], Trileptal [oxcarbazepine], Valproic acid, Ziprasidone hcl, and Zyprexa [olanzapine]    Social History:     Tobacco:    reports that she has been smoking. She has been smoking an average of 1 pack per day. She has never used smokeless tobacco.  Alcohol:      reports no history of alcohol use. Drug Use:  reports current drug use. Drug: Marijuana Riaz Lo). Family History:     Family History   Problem Relation Age of Onset    Diabetes Sister     No Known Problems Mother     No Known Problems Father        Review of Systems:     Positive and Negative as described in HPI.     CONSTITUTIONAL:  negative for fevers, chills, sweats, fatigue, weight loss  HEENT:  negative for vision, hearing changes, runny nose, throat pain  RESPIRATORY:  negative for shortness of breath, cough, congestion, wheezing. CARDIOVASCULAR:  negative for chest pain, palpitations. GASTROINTESTINAL:  negative for nausea, vomiting, diarrhea, constipation, change in bowel habits, abdominal pain   GENITOURINARY:  negative for difficulty of urination, burning with urination, frequency   INTEGUMENT:  negative for rash, skin lesions, easy bruising   HEMATOLOGIC/LYMPHATIC:  negative for swelling/edema   ALLERGIC/IMMUNOLOGIC:  negative for urticaria , itching  ENDOCRINE:  negative increase in drinking, increase in urination, hot or cold intolerance  MUSCULOSKELETAL: Pain of left foot  NEUROLOGICAL:  negative for headaches, dizziness, lightheadedness, numbness, pain, tingling extremities  BEHAVIOR/PSYCH:      Physical Exam:     /62   Pulse 71   Temp 97.4 °F (36.3 °C) (Oral)   Resp 14   Ht 5' 8\" (1.727 m)   Wt 180 lb (81.6 kg)   SpO2 100%   BMI 27.37 kg/m²   Temp (24hrs), Av.4 °F (36.3 °C), Min:97.4 °F (36.3 °C), Max:97.4 °F (36.3 °C)    No results for input(s): POCGLU in the last 72 hours. No intake or output data in the 24 hours ending 22 1520    General Appearance:  alert, well appearing, and in no acute distress  Mental status: oriented to person, place, and time with normal affect  Head:  normocephalic, atraumatic. Eye: no icterus, redness, pupils equal and reactive, extraocular eye movements intact, conjunctiva clear  Ear: normal external ear, no discharge, hearing intact  Nose:  no drainage noted  Mouth: mucous membranes moist  Neck: supple, no carotid bruits, thyroid not palpable  Lungs: Bilateral equal air entry, clear to ausculation, no wheezing, rales or rhonchi, normal effort  Cardiovascular: normal rate, regular rhythm, no murmur, gallop, rub.   Abdomen: Soft, nontender, nondistended, normal bowel sounds, no hepatomegaly or splenomegaly  Neurologic: There are no new focal motor or sensory deficits, normal muscle tone and bulk, no abnormal sensation, normal speech, cranial nerves II through XII grossly intact  Skin: No gross lesions, rashes, bruising or bleeding on exposed skin area  Extremities: Tenderness of left great toe, nail fungus present  Psych: Investigations:      Laboratory Testing:  No results found for this or any previous visit (from the past 24 hour(s)). Consultations:   IP CONSULT TO INTERNAL MEDICINE  IP CONSULT TO INTERNAL MEDICINE  Assessment :      Primary Problem  Bipolar affective disorder, mixed, severe, with psychotic behavior [facility])    Active Hospital Problems    Diagnosis Date Noted    Psychosis ([facility] [address] Rickey Keller 10/31/2022     Priority: Medium    Bipolar affective disorder, mixed, severe, with [facility] behavior [facility] [F31.64] 10/31/2022     Priority: Medium       Plan:     Patient experiencing pain in left foot, with tenderness, will order x-ray left foot to rule out fracture  Hypertension, restarted hydrochlorothiazide, blood pressure controlled  Patient has hypokalemia, slightly elevated creatinine, was on lithium, will order Sen Fonseca MD  11/4/2022  3:20 PM    Copy sent to Dr. Luzma Wisdom, PALillyC    Please note that this chart was generated using voice recognition Dragon dictation software. Although every effort was made to ensure the accuracy of this automated transcription, some errors in transcription may have occurred.

## 2022-11-04 NOTE — PROGRESS NOTES
Daily Progress Note  11/4/2022    Patient Name: Shirley Escalera    CHIEF COMPLAINT:  Acute psychosis         SUBJECTIVE:      Staff reports that patient required oral emergency medications haloperidol and lorazepam earlier this morning for agitation. At time of assessment, patient is in the day area and is cooperative with discussion. She is able to engage in somewhat linear conversation but responses are slow she appears internally preoccupied. Patient refused her oral paliperidone this morning. Explored patient's concerns. She states that she has had sexual side effects with this medication in the past and the only antipsychotic medication that she is agreeable to taking is Leola Bertha, as she believes that I will have less sexual side effects. Patient states that she wants to get some enjoyment out of life and that she does not want to be a \"zombie\". Did explain that patient was on this medication prior to admission to [facility] and was not stable in the community. Patient is argumentative and lacks insight. Reviewed other antipsychotic medications and patient verbalizes sexual side effects to all of them. Does state that she believes she did fairly well on risperidone but states that she does not like that it is \"old medication\". Staff report that patient has been fairly more redirectable today but has continued to require emergency medications and has yet to demonstrate stability. She is illogical and presents risk to self if not admitted to the hospital.  Requires inpatient hospitalization for safety and PICU level care needed for agitation.     Appetite:  [] Normal/Adequate/Unchanged  [] Increased  [x] Decreased      Sleep:       [] Normal/Adequate/Unchanged  [x] Fair(estimates she had not slept in more than a week prior to admission)  [] Poor      Group Attendance on Unit:   [] Yes  [] Selectively    [x] No    Medication Side Effects: Patient denies any medication side effects at the time of assessment. Mental Status Exam  Level of consciousness: Alert and awake. Appearance: Appropriate attire for setting, seated on bed, with poor grooming and hygiene. Behavior/Motor: Approachable, bizarre, some psychomotor slowing  Attitude toward examiner: semi-cooperative, attempts to be attentive, good eye contact. Speech delayed rate, low volume, irritable tone  Mood:  Patient reports \"fine\". Affect: incongruent, irritable  Thought processes: Somewhat more linear, illogical  Thought content: Denies homicidal ideation. Suicidal Ideation: Does not provide appropriate response  Delusions: Patient presents with delusions. Endorses paranoia. Perceptual Disturbance: Patient does appear to be responding to internal stimuli. Denies auditory hallucinations. Denies visual hallucinations. Cognition: Oriented to self location and general situation. Memory: Impaired. Insight & Judgement: Poor. Data   height is 5' 8\" (1.727 m) and weight is 180 lb (81.6 kg). Her oral temperature is 97.4 °F (36.3 °C). Her blood pressure is 101/62 and her pulse is 71. Her respiration is 14 and oxygen saturation is 100%.    Labs:   Admission on 10/30/2022   Component Date Value Ref Range Status    Color, UA 10/30/2022 Yellow  Yellow Final    Turbidity UA 10/30/2022 Clear  Clear Final    Glucose, Ur 10/30/2022 NEGATIVE  NEGATIVE Final    Bilirubin Urine 10/30/2022 NEGATIVE  NEGATIVE Final    Ketones, Urine 10/30/2022 NEGATIVE  NEGATIVE Final    Specific Igo, UA 10/30/2022 1.012  1.000 - 1.030 Final    Urine Hgb 10/30/2022 NEGATIVE  NEGATIVE Final    pH, UA 10/30/2022 6.0  5.0 - 8.0 Final    Protein, UA 10/30/2022 1+ (A)  NEGATIVE Final    Urobilinogen, Urine 10/30/2022 Normal  Normal Final    Nitrite, Urine 10/30/2022 NEGATIVE  NEGATIVE Final    Leukocyte Esterase, Urine 10/30/2022 NEGATIVE  NEGATIVE Final    Amphetamine Screen, Ur 10/30/2022 NEGATIVE  NEGATIVE Final    Comment:       (Positive cutoff 1000 ng/mL) Barbiturate Screen, Ur 10/30/2022 NEGATIVE  NEGATIVE Final    Comment:       (Positive cutoff 200 ng/mL)                  Benzodiazepine Screen, Urine 10/30/2022 NEGATIVE  NEGATIVE Final    Comment:       (Positive cutoff 200 ng/mL)                  Cocaine Metabolite, Urine 10/30/2022 NEGATIVE  NEGATIVE Final    Comment:       (Positive cutoff 300 ng/mL)                  Methadone Screen, Urine 10/30/2022 NEGATIVE  NEGATIVE Final    Comment:       (Positive cutoff 300 ng/mL)                  Opiates, Urine 10/30/2022 NEGATIVE  NEGATIVE Final    Comment:       (Positive cutoff 300 ng/mL)                  Phencyclidine, Urine 10/30/2022 NEGATIVE  NEGATIVE Final    Comment:       (Positive cutoff 25 ng/mL)                  Cannabinoid Scrn, Ur 10/30/2022 POSITIVE (A)  NEGATIVE Final    Comment:       (Positive cutoff 50 ng/mL)                  Oxycodone Screen, Ur 10/30/2022 NEGATIVE  NEGATIVE Final    Comment:       (Positive cutoff 100 ng/mL)                  Fentanyl, Ur 10/30/2022 NEGATIVE  NEGATIVE Final    Comment:       (Positive cutoff  5 ng/ml)            Test Information 10/30/2022 Assay provides medical screening only. The absence of expected drug(s) and/or metabolite(s) may indicate diluted or adulterated urine, limitations of testing or timing of collection. Final    Comment: Testing for legal purposes should be confirmed by another method. To request confirmation   of test result, please call the lab within 7 days of sample submission. Ethanol 10/30/2022 <10  <10 mg/dL Final    Ethanol percent 10/30/2022 <0.010  % Final    hCG Quant 10/30/2022 <1  <5 mIU/mL Final    Comment:    Non-preg premeno   <=5  Postmeno           <=8  Male               <=3  If HCG results do not concur with clinical observations, additional testing to confirm   results is recommended.       Glucose 10/30/2022 123 (A)  70 - 99 mg/dL Final    BUN 10/30/2022 7  6 - 20 mg/dL Final    Creatinine 10/30/2022 0.96 (A) 0.50 - 0.90 mg/dL Final    Est, Glom Filt Rate 10/30/2022 >60  >60 mL/min/1.73m2 Final    Comment:       Effective Oct 3, 2022        These results are not intended for use in patients <25years of age. eGFR results are calculated without a race factor using the 2021 CKD-EPI equation. Careful clinical correlation is recommended, particularly when comparing to results   calculated using previous equations. The CKD-EPI equation is less accurate in patients with extremes of muscle mass, extra-renal   metabolism of creatine, excessive creatine ingestion, or following therapy that affects   renal tubular secretion.       Calcium 10/30/2022 10.3  8.6 - 10.4 mg/dL Final    Sodium 10/30/2022 141  135 - 144 mmol/L Final    Potassium 10/30/2022 3.5 (A)  3.7 - 5.3 mmol/L Final    Chloride 10/30/2022 106  98 - 107 mmol/L Final    CO2 10/30/2022 25  20 - 31 mmol/L Final    Anion Gap 10/30/2022 10  9 - 17 mmol/L Final    WBC 10/30/2022 12.3 (A)  3.5 - 11.0 k/uL Final    RBC 10/30/2022 5.02  4.0 - 5.2 m/uL Final    Hemoglobin 10/30/2022 14.1  12.0 - 16.0 g/dL Final    Hematocrit 10/30/2022 42.1  36 - 46 % Final    MCV 10/30/2022 83.9  80 - 100 fL Final    MCH 10/30/2022 28.1  26 - 34 pg Final    MCHC 10/30/2022 33.5  31 - 37 g/dL Final    RDW 10/30/2022 13.1  11.5 - 14.9 % Final    Platelets 28/50/3549 277  150 - 450 k/uL Final    MPV 10/30/2022 8.3  6.0 - 12.0 fL Final    Seg Neutrophils 10/30/2022 77 (A)  36 - 66 % Final    Lymphocytes 10/30/2022 13 (A)  24 - 44 % Final    Monocytes 10/30/2022 8 (A)  1 - 7 % Final    Eosinophils % 10/30/2022 1  0 - 4 % Final    Basophils 10/30/2022 1  0 - 2 % Final    Segs Absolute 10/30/2022 9.50 (A)  1.3 - 9.1 k/uL Final    Absolute Lymph # 10/30/2022 1.50  1.0 - 4.8 k/uL Final    Absolute Mono # 10/30/2022 1.00  0.1 - 1.3 k/uL Final    Absolute Eos # 10/30/2022 0.20  0.0 - 0.4 k/uL Final    Basophils Absolute 10/30/2022 0.10  0.0 - 0.2 k/uL Final    WBC, UA 10/30/2022 3 to 5  /HPF Final    RBC, UA 10/30/2022 0 TO 2  /HPF Final    Casts UA 10/30/2022 HYALINE  /LPF Final    Casts UA 10/30/2022 3 to 5  /LPF Final    Casts UA 10/30/2022 6 TO 9  /LPF Final    Casts UA 10/30/2022 COARSELY GRANULAR  /LPF Final    Casts UA 10/30/2022 3 to 5  /LPF Final    Epithelial Cells UA 10/30/2022 6 TO 9  /HPF Final    Bacteria, UA 10/30/2022 FEW (A)  None Final    Mucus, UA 10/30/2022 1+ (A)  None Final    Amorphous, UA 10/30/2022 1+ (A)  None Final    Lithium Lvl 11/02/2022 0.8  0.6 - 1.2 mmol/L Final    Lithium Dose Amount 11/02/2022 450   Final    Lithium Date Last Dose 11/02/2022 149157   Final    Lithium Dose Time 11/02/2022 2057   Final         Reviewed patient's current plan of care and vital signs with nursing staff.     Labs reviewed: [x] Yes  Last EKG in EMR reviewed: [x] Yes  QTc: 420    Medications  Current Facility-Administered Medications: paliperidone (INVEGA) extended release tablet 3 mg, 3 mg, Oral, Daily  benztropine (COGENTIN) tablet 1 mg, 1 mg, Oral, BID  cloNIDine (CATAPRES) tablet 0.1 mg, 0.1 mg, Oral, Nightly  hydroCHLOROthiazide (HYDRODIURIL) tablet 25 mg, 25 mg, Oral, Daily  lithium (ESKALITH) extended release tablet 450 mg, 450 mg, Oral, 2 times per day  melatonin tablet 3 mg, 3 mg, Oral, Nightly PRN  haloperidol lactate (HALDOL) injection 5 mg, 5 mg, IntraMUSCular, Q6H PRN **AND** LORazepam (ATIVAN) injection 2 mg, 2 mg, IntraMUSCular, Q6H PRN **AND** diphenhydrAMINE (BENADRYL) injection 50 mg, 50 mg, IntraMUSCular, Q6H PRN  acetaminophen (TYLENOL) tablet 650 mg, 650 mg, Oral, Q6H PRN  ibuprofen (ADVIL;MOTRIN) tablet 400 mg, 400 mg, Oral, Q6H PRN  hydrOXYzine HCl (ATARAX) tablet 50 mg, 50 mg, Oral, TID PRN  traZODone (DESYREL) tablet 50 mg, 50 mg, Oral, Nightly PRN  polyethylene glycol (GLYCOLAX) packet 17 g, 17 g, Oral, Daily PRN  aluminum & magnesium hydroxide-simethicone (MAALOX) 200-200-20 MG/5ML suspension 30 mL, 30 mL, Oral, Q6H PRN  nicotine polacrilex (NICORETTE) gum 2 mg, 2

## 2022-11-04 NOTE — PLAN OF CARE
Problem: Chronic Conditions and Co-morbidities  Goal: Patient's chronic conditions and co-morbidity symptoms are monitored and maintained or improved  11/3/2022 2136 by Rani Henley RN  Outcome: Not Progressing  11/3/2022 0957 by Shreya Benitez RN  Outcome: Progressing     Problem: Anxiety  Goal: Will report anxiety at manageable levels  Description: INTERVENTIONS:  1. Administer medication as ordered  2. Teach and rehearse alternative coping skills  3. Provide emotional support with 1:1 interaction with staff  11/3/2022 2136 by Rani Henley RN  Outcome: Not Progressing  11/3/2022 1014 by Heidi Ventura RN  Outcome: Progressing  11/3/2022 0957 by Shreya Benitez RN  Outcome: Progressing     Problem: Confusion  Goal: Confusion, delirium, dementia, or psychosis is improved or at baseline  Description: INTERVENTIONS:  1. Assess for possible contributors to thought disturbance, including medications, impaired vision or hearing, underlying metabolic abnormalities, dehydration, psychiatric diagnoses, and notify attending LIP  2. [place] high risk fall precautions, as indicated  3. Provide frequent short contacts to provide reality reorientation, refocusing and direction  4. Decrease environmental stimuli, including noise as appropriate  5. Monitor and intervene to maintain adequate nutrition, hydration, elimination, sleep and activity  6. If unable to ensure safety without constant attention obtain sitter and review sitter guidelines with assigned personnel  7.  Initiate Psychosocial CNS and Spiritual Care consult, as indicated  11/3/2022 2136 by Rani Henley RN  Outcome: Not Progressing  11/3/2022 1014 by Heidi Ventura RN  Outcome: Progressing  11/3/2022 0957 by Shreya Benitez RN  Outcome: Progressing     Problem: Chronic Conditions and Co-morbidities  Goal: Patient's chronic conditions and co-morbidity symptoms are monitored and maintained or improved  11/3/2022 2136 by Rani Henley RN  Outcome: Not Progressing  11/3/2022 0957 by Ana Jackson RN  Outcome: Progressing     Problem: Anxiety  Goal: Will report anxiety at manageable levels  Description: INTERVENTIONS:  1. Administer medication as ordered  2. Teach and rehearse alternative coping skills  3. Provide emotional support with 1:1 interaction with staff  11/3/2022 2136 by Pearletha Gilford, RN  Outcome: Not Progressing  11/3/2022 1014 by Bee Eaton RN  Outcome: Progressing  11/3/2022 0957 by Ana Jackson RN  Outcome: Progressing     Problem: Confusion  Goal: Confusion, delirium, dementia, or psychosis is improved or at baseline  Description: INTERVENTIONS:  1. Assess for possible contributors to thought disturbance, including medications, impaired vision or hearing, underlying metabolic abnormalities, dehydration, psychiatric diagnoses, and notify attending LIP  2. [place] high risk fall precautions, as indicated  3. Provide frequent short contacts to provide reality reorientation, refocusing and direction  4. Decrease environmental stimuli, including noise as appropriate  5. Monitor and intervene to maintain adequate nutrition, hydration, elimination, sleep and activity  6. If unable to ensure safety without constant attention obtain sitter and review sitter guidelines with assigned personnel  7.  Initiate Psychosocial CNS and Spiritual Care consult, as indicated  11/3/2022 2136 by Pearletha Gilford, RN  Outcome: Not Progressing  11/3/2022 1014 by Bee Eaton RN  Outcome: Progressing  11/3/2022 0957 by Ana Jackson RN  Outcome: Progressing

## 2022-11-04 NOTE — PLAN OF CARE
Problem: Anxiety  Goal: Will report anxiety at manageable levels  Description: INTERVENTIONS:  1. Administer medication as ordered  2. Teach and rehearse alternative coping skills  3. Provide emotional support with 1:1 interaction with staff  Outcome: Progressing     Problem: Confusion  Goal: Confusion, delirium, dementia, or psychosis is improved or at baseline  Description: INTERVENTIONS:  1. Assess for possible contributors to thought disturbance, including medications, impaired vision or hearing, underlying metabolic abnormalities, dehydration, psychiatric diagnoses, and notify attending LIP  2. [place] high risk fall precautions, as indicated  3. Provide frequent short contacts to provide reality reorientation, refocusing and direction  4. Decrease environmental stimuli, including noise as appropriate  5. Monitor and intervene to maintain adequate nutrition, hydration, elimination, sleep and activity  6. If unable to ensure safety without constant attention obtain sitter and review sitter guidelines with assigned personnel  7. Initiate Psychosocial CNS and Spiritual Care consult, as indicated  Outcome: Progressing     Patient still has moments of poor memory and confusion. Patient denies hearing voices or having hallucinations, and denies any suicidal or homicidal ideation. Patient has been medication compliant, completed her hygiene, and has eaten her meals independently. Patient has been talkative, friendly, and cooperative. Patient still displays moments of restlessness and short attention span, but said moments are an improvement compared to previous incidents.

## 2022-11-04 NOTE — GROUP NOTE
Group Therapy Note    Date: 11/4/2022    Group Start Time: 1100  Group End Time: 1150  Group Topic: Relaxation    ANTON EDMONDI JACQUES HayesS        Group Therapy Note    Attendees: 3/7     Patient's Goal: To increase social interaction and to practice relaxation - and explore choices r/t calm vs chaos r/t art/environment/and selective interaction, concentration, and communication skills. Notes: Pt was able to practice relaxation - and explore choices r/t calm vs chaos r/t art/environment/and selective interaction, concentration, and communication skills. Participation Level: Active Listener,  sharing ,supportive     Participation Quality:  Attentive, sharing ,supportive     Speech:  Normal      Thought Process/Content: Logical ideas r/t task and able to make positive decisions r/t moving away from agitated peers (chaos), engaging in art work and positive discussion with RT and peers (calm). Pt expressed taking art classes in community, specifically [employer] (RT provided information). Pt also discussed other mental health support art groups, and non focused art groups. Pt identifies \"I agree, my therapist has told me that it can be good to do some things that I just enjoy , rather than everything being about my mental health\". Affective Functioning: Blunted,brightened     Mood: Euthymic, brightens . Pt is aware of, and respectful of peers' space and mood.     Level of consciousness:  Alert, and Attentive        Response to Learning:  Able to demonstrate current knowledge, able to verbalize/acknowledge new learning , and Progressing to goal        Endings: None Reported     Modes of Intervention:  Support, Socialization, Exploration, Clarifying and Problem-solving        Discipline Responsible: Psychoeducational Specialist        Signature:  JACQUES SimmonsS

## 2022-11-05 PROCEDURE — 6370000000 HC RX 637 (ALT 250 FOR IP): Performed by: PSYCHIATRY & NEUROLOGY

## 2022-11-05 PROCEDURE — 99232 SBSQ HOSP IP/OBS MODERATE 35: CPT | Performed by: NURSE PRACTITIONER

## 2022-11-05 PROCEDURE — 1240000000 HC EMOTIONAL WELLNESS R&B

## 2022-11-05 RX ORDER — FLUTICASONE PROPIONATE 50 MCG
1 SPRAY, SUSPENSION (ML) NASAL DAILY
Status: DISCONTINUED | OUTPATIENT
Start: 2022-11-06 | End: 2022-11-08 | Stop reason: HOSPADM

## 2022-11-05 RX ADMIN — HYDROXYZINE HYDROCHLORIDE 50 MG: 50 TABLET, FILM COATED ORAL at 20:51

## 2022-11-05 RX ADMIN — TRAZODONE HYDROCHLORIDE 50 MG: 50 TABLET ORAL at 20:52

## 2022-11-05 RX ADMIN — Medication 3 MG: at 20:51

## 2022-11-05 RX ADMIN — HALOPERIDOL 5 MG: 5 TABLET ORAL at 10:25

## 2022-11-05 RX ADMIN — CARIPRAZINE 6 MG: 3 CAPSULE, GELATIN COATED ORAL at 07:32

## 2022-11-05 RX ADMIN — IBUPROFEN 400 MG: 400 TABLET ORAL at 05:09

## 2022-11-05 RX ADMIN — BENZTROPINE MESYLATE 1 MG: 1 TABLET ORAL at 20:51

## 2022-11-05 RX ADMIN — POLYETHYLENE GLYCOL 3350 17 G: 17 POWDER, FOR SOLUTION ORAL at 06:10

## 2022-11-05 RX ADMIN — HYDROCHLOROTHIAZIDE 25 MG: 25 TABLET ORAL at 07:32

## 2022-11-05 RX ADMIN — LITHIUM CARBONATE 450 MG: 450 TABLET, EXTENDED RELEASE ORAL at 20:51

## 2022-11-05 RX ADMIN — NICOTINE POLACRILEX 2 MG: 2 GUM, CHEWING BUCCAL at 07:32

## 2022-11-05 RX ADMIN — LITHIUM CARBONATE 450 MG: 450 TABLET, EXTENDED RELEASE ORAL at 07:32

## 2022-11-05 RX ADMIN — LORAZEPAM 2 MG: 1 TABLET ORAL at 10:25

## 2022-11-05 RX ADMIN — BENZTROPINE MESYLATE 1 MG: 1 TABLET ORAL at 07:32

## 2022-11-05 RX ADMIN — CLONIDINE HYDROCHLORIDE 0.1 MG: 0.1 TABLET ORAL at 20:51

## 2022-11-05 ASSESSMENT — PAIN SCALES - GENERAL: PAINLEVEL_OUTOF10: 1

## 2022-11-05 NOTE — BH NOTE
Emergency Medication Follow-Up Note:    PRN medication of Ativan 2mg PO, Haldol 5mg PO was effective as evidence by patient regaining behavioral control. Patient denies medication side effects. Will continue to monitor and provide support as needed.

## 2022-11-05 NOTE — BH NOTE
Emergency PRN Medication Administration Note:      Patient is Agitated as evidence by Patient verbalizing racing thoughts and pressured speech. Staff attempted to find and relieve the distress by Administer PRN medications Patient is currently in behavioral control, accepted PRN medications. Medication Administered as prescribed: Haldol 5 mg P.O. and Ativan 2 mg P.O. Patient Tolerated medication administration. Will continue to monitor, offer support, and reassess.

## 2022-11-05 NOTE — BH NOTE
Emergency PRN Medication Administration Note:      Patient is Agitated and Disruptive as evidence by escalating behaviors, and patient stating she is having extreme anxiety and agitation. Staff attempted to find and relieve the distress by Talking to patient, Refocusing on new activity, and Administer PRN medications Patient is currently   accepting of prn medications. Medication Administered as prescribed: PO Ativan 2mg, Haldol 5mg. Patient Tolerated medication administration. Will continue to monitor, offer support, and reassess.

## 2022-11-05 NOTE — GROUP NOTE
Group Therapy Note    Date: 11/5/2022    Group Start Time: 1430  Group End Time: 1500  Group Topic: Recreational    [facility]    Emily Ortez        Group Therapy Note    Attendees: 2/7       Patient's Goal:  Open recreation and 1:1 activities offered. Demonstrate positive use of time; Increase rapport with staff; Increase sense of community; Increase socialization;     Notes:  Patient declined group, but asked for individual leisure activities like books, easy sodoku puzzles, and a guide to drawing different kinds of flowers. Patient was pleasant.  Provided requested materials and patient was thankful    Signature:  Emily Ortez

## 2022-11-05 NOTE — PLAN OF CARE
Problem: Anxiety  Goal: Will report anxiety at manageable levels  Description: INTERVENTIONS:  1. Administer medication as ordered  2. Teach and rehearse alternative coping skills  3. Provide emotional support with 1:1 interaction with staff  11/5/2022 0843 by Trish Santos LPN  Outcome: Progressing   Patient is medication compliant. Patient maintains behavioral control. Problem: Confusion  Goal: Confusion, delirium, dementia, or psychosis is improved or at baseline  Description: INTERVENTIONS:  1. Assess for possible contributors to thought disturbance, including medications, impaired vision or hearing, underlying metabolic abnormalities, dehydration, psychiatric diagnoses, and notify attending LIP  2. [place] high risk fall precautions, as indicated  3. Provide frequent short contacts to provide reality reorientation, refocusing and direction  4. Decrease environmental stimuli, including noise as appropriate  5. Monitor and intervene to maintain adequate nutrition, hydration, elimination, sleep and activity  6. If unable to ensure safety without constant attention obtain sitter and review sitter guidelines with assigned personnel  7. Initiate Psychosocial CNS and Spiritual Care consult, as indicated  11/5/2022 0843 by Trish Santos LPN  Outcome: Progressing   Every 15 min checks maintained for pt safety.

## 2022-11-06 LAB
ANION GAP SERPL CALCULATED.3IONS-SCNC: 9 MMOL/L (ref 9–17)
BUN BLDV-MCNC: 15 MG/DL (ref 6–20)
CALCIUM SERPL-MCNC: 9.8 MG/DL (ref 8.6–10.4)
CHLORIDE BLD-SCNC: 104 MMOL/L (ref 98–107)
CO2: 24 MMOL/L (ref 20–31)
CREAT SERPL-MCNC: 0.71 MG/DL (ref 0.5–0.9)
GFR SERPL CREATININE-BSD FRML MDRD: >60 ML/MIN/1.73M2
GLUCOSE BLD-MCNC: 108 MG/DL (ref 70–99)
POTASSIUM SERPL-SCNC: 4.2 MMOL/L (ref 3.7–5.3)
SODIUM BLD-SCNC: 137 MMOL/L (ref 135–144)

## 2022-11-06 PROCEDURE — 80048 BASIC METABOLIC PNL TOTAL CA: CPT

## 2022-11-06 PROCEDURE — 6370000000 HC RX 637 (ALT 250 FOR IP): Performed by: PSYCHIATRY & NEUROLOGY

## 2022-11-06 PROCEDURE — 36415 COLL VENOUS BLD VENIPUNCTURE: CPT

## 2022-11-06 PROCEDURE — 6370000000 HC RX 637 (ALT 250 FOR IP): Performed by: NURSE PRACTITIONER

## 2022-11-06 PROCEDURE — 1240000000 HC EMOTIONAL WELLNESS R&B

## 2022-11-06 RX ORDER — BENZTROPINE MESYLATE 0.5 MG/1
0.5 TABLET ORAL 2 TIMES DAILY
Status: DISCONTINUED | OUTPATIENT
Start: 2022-11-06 | End: 2022-11-08 | Stop reason: HOSPADM

## 2022-11-06 RX ORDER — NICOTINE 21 MG/24HR
1 PATCH, TRANSDERMAL 24 HOURS TRANSDERMAL DAILY
Status: DISCONTINUED | OUTPATIENT
Start: 2022-11-06 | End: 2022-11-08 | Stop reason: HOSPADM

## 2022-11-06 RX ADMIN — TRAZODONE HYDROCHLORIDE 50 MG: 50 TABLET ORAL at 23:03

## 2022-11-06 RX ADMIN — ACETAMINOPHEN 325MG 650 MG: 325 TABLET ORAL at 05:55

## 2022-11-06 RX ADMIN — CLONIDINE HYDROCHLORIDE 0.1 MG: 0.1 TABLET ORAL at 21:51

## 2022-11-06 RX ADMIN — HYDROXYZINE HYDROCHLORIDE 50 MG: 50 TABLET, FILM COATED ORAL at 21:51

## 2022-11-06 RX ADMIN — CARIPRAZINE 6 MG: 3 CAPSULE, GELATIN COATED ORAL at 07:24

## 2022-11-06 RX ADMIN — BENZTROPINE MESYLATE 1 MG: 1 TABLET ORAL at 07:24

## 2022-11-06 RX ADMIN — BENZTROPINE MESYLATE 0.5 MG: 0.5 TABLET ORAL at 21:51

## 2022-11-06 RX ADMIN — HYDROXYZINE HYDROCHLORIDE 50 MG: 50 TABLET, FILM COATED ORAL at 07:24

## 2022-11-06 RX ADMIN — ACETAMINOPHEN 325MG 650 MG: 325 TABLET ORAL at 21:54

## 2022-11-06 RX ADMIN — HYDROCHLOROTHIAZIDE 25 MG: 25 TABLET ORAL at 07:24

## 2022-11-06 RX ADMIN — LITHIUM CARBONATE 450 MG: 450 TABLET, EXTENDED RELEASE ORAL at 07:24

## 2022-11-06 RX ADMIN — LITHIUM CARBONATE 450 MG: 450 TABLET, EXTENDED RELEASE ORAL at 21:51

## 2022-11-06 RX ADMIN — FLUTICASONE PROPIONATE 1 SPRAY: 50 SPRAY, METERED NASAL at 07:24

## 2022-11-06 ASSESSMENT — PAIN DESCRIPTION - LOCATION
LOCATION: BACK;FOOT;NECK
LOCATION: HEAD

## 2022-11-06 ASSESSMENT — PAIN SCALES - GENERAL
PAINLEVEL_OUTOF10: 3
PAINLEVEL_OUTOF10: 6

## 2022-11-06 ASSESSMENT — PAIN DESCRIPTION - DESCRIPTORS
DESCRIPTORS: ACHING;THROBBING
DESCRIPTORS: ACHING

## 2022-11-06 NOTE — BH NOTE
Transfer Note:         Pt transferred to stepdown unit per Dr. Miles Nuñez. Belongings sent with patient  Report called to RN, stepdown staff. Pt oriented to unit policies and procedures. Pt denies thoughts of self harm at time of transfer and verbalizes understanding of transfer.

## 2022-11-06 NOTE — PLAN OF CARE
Problem: Chronic Conditions and Co-morbidities  Goal: Patient's chronic conditions and co-morbidity symptoms are monitored and maintained or improved  Outcome: Progressing  Note: Patient is up at the desk constantly. She is focused on discharge and where she will be living. She is very anxious and needs redirection at times. She denies hallucination and any thoughts of self harm. She is encouraged to utilized her coping skills. Problem: Anxiety  Goal: Will report anxiety at manageable levels  Description: INTERVENTIONS:  1. Administer medication as ordered  2. Teach and rehearse alternative coping skills  3. Provide emotional support with 1:1 interaction with staff  Outcome: Progressing  Flowsheets (Taken 11/6/2022 4784)  Will report anxiety at manageable levels:   Administer medication as ordered   Provide emotional support with 1:1 interaction with staff   Teach and rehearse alternative coping skills  Note: Patient is up at the desk constantly. She is focused on discharge and where she will be living. She is very anxious and needs redirection at times. She denies hallucination and any thoughts of self harm. She is encouraged to utilized her coping skills. Problem: Confusion  Goal: Confusion, delirium, dementia, or psychosis is improved or at baseline  Description: INTERVENTIONS:  1. Assess for possible contributors to thought disturbance, including medications, impaired vision or hearing, underlying metabolic abnormalities, dehydration, psychiatric diagnoses, and notify attending LIP  2. [place] high risk fall precautions, as indicated  3. Provide frequent short contacts to provide reality reorientation, refocusing and direction  4. Decrease environmental stimuli, including noise as appropriate  5. Monitor and intervene to maintain adequate nutrition, hydration, elimination, sleep and activity  6.  If unable to ensure safety without constant attention obtain sitter and review sitter guidelines with assigned personnel  7. Initiate Psychosocial CNS and Spiritual Care consult, as indicated  Outcome: Progressing  Flowsheets (Taken 11/6/2022 4938)  Effect of thought disturbance (confusion, delirium, dementia, or psychosis) are managed with adequate functional status:   Assess for contributors to thought disturbance, including medications, impaired vision or hearing, underlying metabolic abnormalities, dehydration, psychiatric diagnoses, notify [place] high risk fall precautions, as indicated  Note: Patient is up at the desk constantly. She is focused on discharge and where she will be living. She is very anxious and needs redirection at times. She denies hallucination and any thoughts of self harm. She is encouraged to utilized her coping skills.

## 2022-11-06 NOTE — BH NOTE
SAFETY DRILL completed on unit. All areas of unit checked. No safety issues noted in pts room. Pt denies any safety concerns at this time.

## 2022-11-06 NOTE — PROGRESS NOTES
Daily Progress Note  11/6/2022    Patient Name: Kinza Freeman    CHIEF COMPLAINT:  Acute psychosis         SUBJECTIVE:    Nursing staff report the patient has maintained medication adherence and did require emergency medications last evening without noted documentation. Patient has consistently been utilizing Haldol and Ativan however staff feels that it may be related to peers on the unit that are causing excessive anxiety. Delfina Armendariz has shown ability to maintain behaviors today and has been transferred off the psychiatric intensive care unit. She is agreeable to assessment in her room where she presents more logically and is able to provide day, date and year. She continues to report some blurred vision and we discuss decreasing Cogentin. Writer encouraged patient to attend groups on the unit. At this time, the patient is not appropriate for a lower level of care. There is risk of decompensation and patient warrants further hospitalization for safety and stabilization. Appetite:  [x] Normal/Adequate/Unchanged  [] Increased  [] Decreased      Sleep:       [] Normal/Adequate/Unchanged  [x] Fair  [] Poor      Group Attendance on Unit:   [] Yes  [x] Selectively    [] No    Medication Side Effects: Patient has reported some visual changes. Mental Status Exam  Level of consciousness: Alert and awake. Appearance: Appropriate attire for setting, seated on bed, with fair  grooming and hygiene. Behavior/Motor: Approachable, no psychomotor abnormalities. Attitude toward examiner: Cooperative, attentive, good eye contact. Speech: Normal rate, normal volume, normal tone. Mood:  Patient reports \" a little better\". Affect: Blunted  Thought processes: Linear, coherent, and slow. Thought content: Denies homicidal ideation. Suicidal Ideation: Denies suicidal ideations  Delusions: Patient presents with delusions. Endorses paranoia.   Perceptual Disturbance: Patient does appear to be responding to internal stimuli. Denies auditory hallucinations. Denies visual hallucinations. Cognition: Oriented to self, situation day and year which is improved from reporting it was 2024 several days ago  Memory: Showing improvement. Insight & Judgement: Poor. Data   height is 5' 8\" (1.727 m) and weight is 180 lb (81.6 kg). Her temperature is 97.3 °F (36.3 °C). Her blood pressure is 117/71 and her pulse is 115 (abnormal). Her respiration is 14 and oxygen saturation is 100%.    Labs:   Admission on 10/30/2022   Component Date Value Ref Range Status    Color, UA 10/30/2022 Yellow  Yellow Final    Turbidity UA 10/30/2022 Clear  Clear Final    Glucose, Ur 10/30/2022 NEGATIVE  NEGATIVE Final    Bilirubin Urine 10/30/2022 NEGATIVE  NEGATIVE Final    Ketones, Urine 10/30/2022 NEGATIVE  NEGATIVE Final    Specific [place], UA 10/30/2022 1.012  1.000 - 1.030 Final    Urine Hgb 10/30/2022 NEGATIVE  NEGATIVE Final    pH, UA 10/30/2022 6.0  5.0 - 8.0 Final    Protein, UA 10/30/2022 1+ (A)  NEGATIVE Final    Urobilinogen, Urine 10/30/2022 Normal  Normal Final    Nitrite, Urine 10/30/2022 NEGATIVE  NEGATIVE Final    Leukocyte Esterase, Urine 10/30/2022 NEGATIVE  NEGATIVE Final    Amphetamine Screen, Ur 10/30/2022 NEGATIVE  NEGATIVE Final    Comment:       (Positive cutoff 1000 ng/mL)                  Barbiturate Screen, Ur 10/30/2022 NEGATIVE  NEGATIVE Final    Comment:       (Positive cutoff 200 ng/mL)                  Benzodiazepine Screen, Urine 10/30/2022 NEGATIVE  NEGATIVE Final    Comment:       (Positive cutoff 200 ng/mL)                  Cocaine Metabolite, Urine 10/30/2022 NEGATIVE  NEGATIVE Final    Comment:       (Positive cutoff 300 ng/mL)                  Methadone Screen, Urine 10/30/2022 NEGATIVE  NEGATIVE Final    Comment:       (Positive cutoff 300 ng/mL)                  Opiates, Urine 10/30/2022 NEGATIVE  NEGATIVE Final    Comment:       (Positive cutoff 300 ng/mL)                  Phencyclidine, Urine 10/30/2022 NEGATIVE  NEGATIVE Final    Comment:       (Positive cutoff 25 ng/mL)                  Cannabinoid Scrn, Ur 10/30/2022 POSITIVE (A)  NEGATIVE Final    Comment:       (Positive cutoff 50 ng/mL)                  Oxycodone Screen, Ur 10/30/2022 NEGATIVE  NEGATIVE Final    Comment:       (Positive cutoff 100 ng/mL)                  Fentanyl, Ur 10/30/2022 NEGATIVE  NEGATIVE Final    Comment:       (Positive cutoff  5 ng/ml)            Test Information 10/30/2022 Assay provides medical screening only. The absence of expected drug(s) and/or metabolite(s) may indicate diluted or adulterated urine, limitations of testing or timing of collection. Final    Comment: Testing for legal purposes should be confirmed by another method. To request confirmation   of test result, please call the lab within 7 days of sample submission. Ethanol 10/30/2022 <10  <10 mg/dL Final    Ethanol percent 10/30/2022 <0.010  % Final    hCG Quant 10/30/2022 <1  <5 mIU/mL Final    Comment:    Non-preg premeno   <=5  Postmeno           <=8  Male               <=3  If HCG results do not concur with clinical observations, additional testing to confirm   results is recommended. Glucose 10/30/2022 123 (A)  70 - 99 mg/dL Final    BUN 10/30/2022 7  6 - 20 mg/dL Final    Creatinine 10/30/2022 0.96 (A)  0.50 - 0.90 mg/dL Final    Est, Glom Filt Rate 10/30/2022 >60  >60 mL/min/1.73m2 Final    Comment:       Effective Oct 3, 2022        These results are not intended for use in patients <25years of age. eGFR results are calculated without a race factor using the 2021 CKD-EPI equation. Careful clinical correlation is recommended, particularly when comparing to results   calculated using previous equations. The CKD-EPI equation is less accurate in patients with extremes of muscle mass, extra-renal   metabolism of creatine, excessive creatine ingestion, or following therapy that affects   renal tubular secretion.       Calcium 10/30/2022 10.3  8.6 - 10.4 mg/dL Final    Sodium 10/30/2022 141  135 - 144 mmol/L Final    Potassium 10/30/2022 3.5 (A)  3.7 - 5.3 mmol/L Final    Chloride 10/30/2022 106  98 - 107 mmol/L Final    CO2 10/30/2022 25  20 - 31 mmol/L Final    Anion Gap 10/30/2022 10  9 - 17 mmol/L Final    WBC 10/30/2022 12.3 (A)  3.5 - 11.0 k/uL Final    RBC 10/30/2022 5.02  4.0 - 5.2 m/uL Final    Hemoglobin 10/30/2022 14.1  12.0 - 16.0 g/dL Final    Hematocrit 10/30/2022 42.1  36 - 46 % Final    MCV 10/30/2022 83.9  80 - 100 fL Final    MCH 10/30/2022 28.1  26 - 34 pg Final    MCHC 10/30/2022 33.5  31 - 37 g/dL Final    RDW 10/30/2022 13.1  11.5 - 14.9 % Final    Platelets 60/61/5406 277  150 - 450 k/uL Final    MPV 10/30/2022 8.3  6.0 - 12.0 fL Final    Seg Neutrophils 10/30/2022 77 (A)  36 - 66 % Final    Lymphocytes 10/30/2022 13 (A)  24 - 44 % Final    Monocytes 10/30/2022 8 (A)  1 - 7 % Final    Eosinophils % 10/30/2022 1  0 - 4 % Final    Basophils 10/30/2022 1  0 - 2 % Final    Segs Absolute 10/30/2022 9.50 (A)  1.3 - 9.1 k/uL Final    Absolute Lymph # 10/30/2022 1.50  1.0 - 4.8 k/uL Final    Absolute Mono # 10/30/2022 1.00  0.1 - 1.3 k/uL Final    Absolute Eos # 10/30/2022 0.20  0.0 - 0.4 k/uL Final    Basophils Absolute 10/30/2022 0.10  0.0 - 0.2 k/uL Final    WBC, UA 10/30/2022 3 to 5  /HPF Final    RBC, UA 10/30/2022 0 TO 2  /HPF Final    Casts UA 10/30/2022 HYALINE  /LPF Final    Casts UA 10/30/2022 3 to 5  /LPF Final    Casts UA 10/30/2022 6 TO 9  /LPF Final    Casts UA 10/30/2022 COARSELY GRANULAR  /LPF Final    Casts UA 10/30/2022 3 to 5  /LPF Final    Epithelial Cells UA 10/30/2022 6 TO 9  /HPF Final    Bacteria, UA 10/30/2022 FEW (A)  None Final    Mucus, UA 10/30/2022 1+ (A)  None Final    Amorphous, UA 10/30/2022 1+ (A)  None Final    Lithium Lvl 11/02/2022 0.8  0.6 - 1.2 mmol/L Final    Lithium Dose Amount 11/02/2022 450   Final    Lithium Date Last Dose 11/02/2022 990146   Final    Lithium Dose Time 11/02/2022 205 Final    Glucose 11/06/2022 108 (A)  70 - 99 mg/dL Final    BUN 11/06/2022 15  6 - 20 mg/dL Final    Creatinine 11/06/2022 0.71  0.50 - 0.90 mg/dL Final    Est, Glom Filt Rate 11/06/2022 >60  >60 mL/min/1.73m2 Final    Comment:       Effective Oct 3, 2022        These results are not intended for use in patients <25years of age. eGFR results are calculated without a race factor using the 2021 CKD-EPI equation. Careful clinical correlation is recommended, particularly when comparing to results   calculated using previous equations. The CKD-EPI equation is less accurate in patients with extremes of muscle mass, extra-renal   metabolism of creatine, excessive creatine ingestion, or following therapy that affects   renal tubular secretion. Calcium 11/06/2022 9.8  8.6 - 10.4 mg/dL Final    Sodium 11/06/2022 137  135 - 144 mmol/L Final    Potassium 11/06/2022 4.2  3.7 - 5.3 mmol/L Final    Chloride 11/06/2022 104  98 - 107 mmol/L Final    CO2 11/06/2022 24  20 - 31 mmol/L Final    Anion Gap 11/06/2022 9  9 - 17 mmol/L Final         Reviewed patient's current plan of care and vital signs with nursing staff.     Labs reviewed: [x] Yes  Last EKG in EMR reviewed: [x] Yes  QTc: 420    Medications  Current Facility-Administered Medications: benztropine (COGENTIN) tablet 0.5 mg, 0.5 mg, Oral, BID  nicotine (NICODERM CQ) 14 MG/24HR 1 patch, 1 patch, TransDERmal, Daily  fluticasone (FLONASE) 50 MCG/ACT nasal spray 1 spray, 1 spray, Each Nostril, Daily  cariprazine hcl (VRAYLAR) capsule 6 mg, 6 mg, Oral, Daily  cloNIDine (CATAPRES) tablet 0.1 mg, 0.1 mg, Oral, Nightly  hydroCHLOROthiazide (HYDRODIURIL) tablet 25 mg, 25 mg, Oral, Daily  lithium (ESKALITH) extended release tablet 450 mg, 450 mg, Oral, 2 times per day  melatonin tablet 3 mg, 3 mg, Oral, Nightly PRN  haloperidol lactate (HALDOL) injection 5 mg, 5 mg, IntraMUSCular, Q6H PRN **AND** LORazepam (ATIVAN) injection 2 mg, 2 mg, IntraMUSCular, Q6H PRN **AND** diphenhydrAMINE (BENADRYL) injection 50 mg, 50 mg, IntraMUSCular, Q6H PRN  acetaminophen (TYLENOL) tablet 650 mg, 650 mg, Oral, Q6H PRN  ibuprofen (ADVIL;MOTRIN) tablet 400 mg, 400 mg, Oral, Q6H PRN  hydrOXYzine HCl (ATARAX) tablet 50 mg, 50 mg, Oral, TID PRN  traZODone (DESYREL) tablet 50 mg, 50 mg, Oral, Nightly PRN  polyethylene glycol (GLYCOLAX) packet 17 g, 17 g, Oral, Daily PRN  aluminum & magnesium hydroxide-simethicone (MAALOX) 200-200-20 MG/5ML suspension 30 mL, 30 mL, Oral, Q6H PRN  haloperidol (HALDOL) tablet 5 mg, 5 mg, Oral, Q6H PRN **AND** LORazepam (ATIVAN) tablet 2 mg, 2 mg, Oral, Q6H PRN    ASSESSMENT  Bipolar affective disorder, mixed, severe, with psychotic behavior ([facility] [address]         PLAN:  Patient symptoms are: Showing modest improvement  Discontinue Ativan as it appears patient may be over utilizing emergency medications  Titrate Cogentin 0.5 mg twice daily  Encourage participation in groups and milieu. Probable discharge is to be determined by MD    Electronically signed by MAURICE Flores CNP on 11/6/2022 at 1:25 PM    **This report has been created using voice recognition software. It may contain minor errors which are inherent in voice recognition technology. **

## 2022-11-06 NOTE — GROUP NOTE
Group Therapy Note    Date: 11/5/2022    Group Start Time: 2030  Group End Time: 2050  Group Topic: Wrap-Up    ANTON Busch RN        Group Therapy Note    Attendees: 3/7       Patient's Goal:  Go to new unit        Status After Intervention:  Improved    Participation Level: Minimal    Participation Quality: Intrusive      Speech:  pressured      Thought Process/Content: Logical      Affective Functioning: Flat      Mood: anxious and depressed      Level of consciousness:  Alert      Response to Learning: Able to verbalize current knowledge/experience      Endings: None Reported    Modes of Intervention: Education and Support      Discipline Responsible: Registered Nurse      Signature:  Hortensia Vaz RN

## 2022-11-06 NOTE — PROGRESS NOTES
Daily Progress Note  11/5/2022    Patient Name: Yesenia Mars    CHIEF COMPLAINT:  Acute psychosis         SUBJECTIVE:      Staff reports that patient required oral emergency medications haloperidol and lorazepam earlier this morning for agitation. Noted that there was a disturbance on the unit and other patients Felicity Nelson exhibiting agitation and this was overwhelming for SAV Coolbrenda. She requested oral as needed emergency medications to help her calm down. At time of assessment, patient is cooperative. Does appear somewhat on edge, but states that the acuity of the unit is stressful for her. She has been displaying better behavioral control today and expressed possibility of transitioning to stepdown unit if able to maintain stability through tomorrow. Patient started on Vraylar 6 mg yesterday and denies any side effects or concerns. Tolerating medication well. She remains hyperverbal but thought processes more linear. She describes her mood is \"really anxious\". She also notes concern regarding \"memory loss\". She describes difficulty engaging in conversation and believes that she loses her train of thought, often forgetting what she is talking about. Did explain that this is a symptom of psychosis called thought blocking. We will continue to monitor for improvement. Patient accepting of this education and is hopeful that she sees reduction in thought blocking with use of Vraylar. Patient has yet to demonstrate stability and requires inpatient hospitalization at this time. Appetite:  [] Normal/Adequate/Unchanged  [] Increased  [x] Decreased      Sleep:       [] Normal/Adequate/Unchanged  [x] Fair(estimates she had not slept in more than a week prior to admission)  [] Poor      Group Attendance on Unit:   [] Yes  [] Selectively    [x] No    Medication Side Effects: Patient denies any medication side effects at the time of assessment.          Mental Status Exam  Level of consciousness: Alert and awake. Appearance: Appropriate attire for setting, seated on bed, with poor grooming and hygiene. Behavior/Motor: Approachable, bizarre  Attitude toward examiner: cooperative, attempts to be attentive, good eye contact. Speech normal rate, rapid at times, normal volume and good articulation  Mood:  Patient reports \"really anxious\". Affect: mood-congruent  Thought processes: Somewhat more linear, rapid, thought blocking  Thought content: Denies homicidal ideation. Suicidal Ideation: Denies suicidal ideation  Delusions: Patient presents with delusions. Endorses paranoia. Perceptual Disturbance: Patient does appear to be responding to internal stimuli. Denies auditory hallucinations. Denies visual hallucinations. Cognition: Oriented to self location and general situation. Memory: Impaired. Insight & Judgement:poor but improving    Data   height is 5' 8\" (1.727 m) and weight is 180 lb (81.6 kg). Her oral temperature is 97.5 °F (36.4 °C). Her blood pressure is 122/78 and her pulse is 107 (abnormal). Her respiration is 14 and oxygen saturation is 100%.    Labs:   Admission on 10/30/2022   Component Date Value Ref Range Status    Color, UA 10/30/2022 Yellow  Yellow Final    Turbidity UA 10/30/2022 Clear  Clear Final    Glucose, Ur 10/30/2022 NEGATIVE  NEGATIVE Final    Bilirubin Urine 10/30/2022 NEGATIVE  NEGATIVE Final    Ketones, Urine 10/30/2022 NEGATIVE  NEGATIVE Final    Specific [place], UA 10/30/2022 1.012  1.000 - 1.030 Final    Urine Hgb 10/30/2022 NEGATIVE  NEGATIVE Final    pH, UA 10/30/2022 6.0  5.0 - 8.0 Final    Protein, UA 10/30/2022 1+ (A)  NEGATIVE Final    Urobilinogen, Urine 10/30/2022 Normal  Normal Final    Nitrite, Urine 10/30/2022 NEGATIVE  NEGATIVE Final    Leukocyte Esterase, Urine 10/30/2022 NEGATIVE  NEGATIVE Final    Amphetamine Screen, Ur 10/30/2022 NEGATIVE  NEGATIVE Final    Comment:       (Positive cutoff 1000 ng/mL)                  Barbiturate Screen, Ur 10/30/2022 NEGATIVE NEGATIVE Final    Comment:       (Positive cutoff 200 ng/mL)                  Benzodiazepine Screen, Urine 10/30/2022 NEGATIVE  NEGATIVE Final    Comment:       (Positive cutoff 200 ng/mL)                  Cocaine Metabolite, Urine 10/30/2022 NEGATIVE  NEGATIVE Final    Comment:       (Positive cutoff 300 ng/mL)                  Methadone Screen, Urine 10/30/2022 NEGATIVE  NEGATIVE Final    Comment:       (Positive cutoff 300 ng/mL)                  Opiates, Urine 10/30/2022 NEGATIVE  NEGATIVE Final    Comment:       (Positive cutoff 300 ng/mL)                  Phencyclidine, Urine 10/30/2022 NEGATIVE  NEGATIVE Final    Comment:       (Positive cutoff 25 ng/mL)                  Cannabinoid Scrn, Ur 10/30/2022 POSITIVE (A)  NEGATIVE Final    Comment:       (Positive cutoff 50 ng/mL)                  Oxycodone Screen, Ur 10/30/2022 NEGATIVE  NEGATIVE Final    Comment:       (Positive cutoff 100 ng/mL)                  Fentanyl, Ur 10/30/2022 NEGATIVE  NEGATIVE Final    Comment:       (Positive cutoff  5 ng/ml)            Test Information 10/30/2022 Assay provides medical screening only. The absence of expected drug(s) and/or metabolite(s) may indicate diluted or adulterated urine, limitations of testing or timing of collection. Final    Comment: Testing for legal purposes should be confirmed by another method. To request confirmation   of test result, please call the lab within 7 days of sample submission. Ethanol 10/30/2022 <10  <10 mg/dL Final    Ethanol percent 10/30/2022 <0.010  % Final    hCG Quant 10/30/2022 <1  <5 mIU/mL Final    Comment:    Non-preg premeno   <=5  Postmeno           <=8  Male               <=3  If HCG results do not concur with clinical observations, additional testing to confirm   results is recommended.       Glucose 10/30/2022 123 (A)  70 - 99 mg/dL Final    BUN 10/30/2022 7  6 - 20 mg/dL Final    Creatinine 10/30/2022 0.96 (A)  0.50 - 0.90 mg/dL Final    Est, Glom Filt Rate 10/30/2022 >60  >60 mL/min/1.73m2 Final    Comment:       Effective Oct 3, 2022        These results are not intended for use in patients <25years of age. eGFR results are calculated without a race factor using the 2021 CKD-EPI equation. Careful clinical correlation is recommended, particularly when comparing to results   calculated using previous equations. The CKD-EPI equation is less accurate in patients with extremes of muscle mass, extra-renal   metabolism of creatine, excessive creatine ingestion, or following therapy that affects   renal tubular secretion.       Calcium 10/30/2022 10.3  8.6 - 10.4 mg/dL Final    Sodium 10/30/2022 141  135 - 144 mmol/L Final    Potassium 10/30/2022 3.5 (A)  3.7 - 5.3 mmol/L Final    Chloride 10/30/2022 106  98 - 107 mmol/L Final    CO2 10/30/2022 25  20 - 31 mmol/L Final    Anion Gap 10/30/2022 10  9 - 17 mmol/L Final    WBC 10/30/2022 12.3 (A)  3.5 - 11.0 k/uL Final    RBC 10/30/2022 5.02  4.0 - 5.2 m/uL Final    Hemoglobin 10/30/2022 14.1  12.0 - 16.0 g/dL Final    Hematocrit 10/30/2022 42.1  36 - 46 % Final    MCV 10/30/2022 83.9  80 - 100 fL Final    MCH 10/30/2022 28.1  26 - 34 pg Final    MCHC 10/30/2022 33.5  31 - 37 g/dL Final    RDW 10/30/2022 13.1  11.5 - 14.9 % Final    Platelets 14/93/7178 277  150 - 450 k/uL Final    MPV 10/30/2022 8.3  6.0 - 12.0 fL Final    Seg Neutrophils 10/30/2022 77 (A)  36 - 66 % Final    Lymphocytes 10/30/2022 13 (A)  24 - 44 % Final    Monocytes 10/30/2022 8 (A)  1 - 7 % Final    Eosinophils % 10/30/2022 1  0 - 4 % Final    Basophils 10/30/2022 1  0 - 2 % Final    Segs Absolute 10/30/2022 9.50 (A)  1.3 - 9.1 k/uL Final    Absolute Lymph # 10/30/2022 1.50  1.0 - 4.8 k/uL Final    Absolute Mono # 10/30/2022 1.00  0.1 - 1.3 k/uL Final    Absolute Eos # 10/30/2022 0.20  0.0 - 0.4 k/uL Final    Basophils Absolute 10/30/2022 0.10  0.0 - 0.2 k/uL Final    WBC, UA 10/30/2022 3 to 5  /HPF Final    RBC, UA 10/30/2022 0 TO 2  /HPF Final Casts UA 10/30/2022 HYALINE  /LPF Final    Casts UA 10/30/2022 3 to 5  /LPF Final    Casts UA 10/30/2022 6 TO 9  /LPF Final    Casts UA 10/30/2022 COARSELY GRANULAR  /LPF Final    Casts UA 10/30/2022 3 to 5  /LPF Final    Epithelial Cells UA 10/30/2022 6 TO 9  /HPF Final    Bacteria, UA 10/30/2022 FEW (A)  None Final    Mucus, UA 10/30/2022 1+ (A)  None Final    Amorphous, UA 10/30/2022 1+ (A)  None Final    Lithium Lvl 11/02/2022 0.8  0.6 - 1.2 mmol/L Final    Lithium Dose Amount 11/02/2022 450   Final    Lithium Date Last Dose 11/02/2022 202399   Final    [place] Dose Time 11/02/2022 2057   Final         Reviewed patient's current plan of care and vital signs with nursing staff.     Labs reviewed: [x] Yes  Last EKG in EMR reviewed: [x] Yes  QTc: 420    Medications  Current Facility-Administered Medications: [START ON 11/6/2022] fluticasone (FLONASE) 50 MCG/ACT nasal spray 1 spray, 1 spray, Each Nostril, Daily  cariprazine hcl (VRAYLAR) capsule 6 mg, 6 mg, Oral, Daily  benztropine (COGENTIN) tablet 1 mg, 1 mg, Oral, BID  cloNIDine (CATAPRES) tablet 0.1 mg, 0.1 mg, Oral, Nightly  hydroCHLOROthiazide (HYDRODIURIL) tablet 25 mg, 25 mg, Oral, Daily  lithium (ESKALITH) extended release tablet 450 mg, 450 mg, Oral, 2 times per day  melatonin tablet 3 mg, 3 mg, Oral, Nightly PRN  haloperidol lactate (HALDOL) injection 5 mg, 5 mg, IntraMUSCular, Q6H PRN **AND** LORazepam (ATIVAN) injection 2 mg, 2 mg, IntraMUSCular, Q6H PRN **AND** diphenhydrAMINE (BENADRYL) injection 50 mg, 50 mg, IntraMUSCular, Q6H PRN  acetaminophen (TYLENOL) tablet 650 mg, 650 mg, Oral, Q6H PRN  ibuprofen (ADVIL;MOTRIN) tablet 400 mg, 400 mg, Oral, Q6H PRN  hydrOXYzine HCl (ATARAX) tablet 50 mg, 50 mg, Oral, TID PRN  traZODone (DESYREL) tablet 50 mg, 50 mg, Oral, Nightly PRN  polyethylene glycol (GLYCOLAX) packet 17 g, 17 g, Oral, Daily PRN  aluminum & magnesium hydroxide-simethicone (MAALOX) 200-200-20 MG/5ML suspension 30 mL, 30 mL, Oral, Q6H PRN  nicotine polacrilex (NICORETTE) gum 2 mg, 2 mg, Oral, Q2H PRN  haloperidol (HALDOL) tablet 5 mg, 5 mg, Oral, Q6H PRN **AND** LORazepam (ATIVAN) tablet 2 mg, 2 mg, Oral, Q6H PRN    ASSESSMENT  Bipolar affective disorder, mixed, severe, with psychotic behavior ([facility] [address]         PLAN  Patient symptoms remain unstable and meet criteria for PICU level care  Medication changes: Observe on recent changes  Determine need for as needed emergency medications  Supportive therapy conducted  Encourage participation in groups and milieu.   Probable discharge is undetermined at this time  Follow-up daily while inpatient    Electronically signed by MAURICE Diamond CNP on 11/5/2022 at 9:21 PM

## 2022-11-07 LAB
LITHIUM DATE LAST DOSE: NORMAL
LITHIUM DOSE AMOUNT: 450
LITHIUM DOSE TIME: 2151
LITHIUM LEVEL: 0.9 MMOL/L (ref 0.6–1.2)

## 2022-11-07 PROCEDURE — 36415 COLL VENOUS BLD VENIPUNCTURE: CPT

## 2022-11-07 PROCEDURE — 99232 SBSQ HOSP IP/OBS MODERATE 35: CPT | Performed by: PSYCHIATRY & NEUROLOGY

## 2022-11-07 PROCEDURE — 80178 ASSAY OF LITHIUM: CPT

## 2022-11-07 PROCEDURE — APPSS30 APP SPLIT SHARED TIME 16-30 MINUTES: Performed by: PSYCHIATRY & NEUROLOGY

## 2022-11-07 PROCEDURE — 6370000000 HC RX 637 (ALT 250 FOR IP): Performed by: PSYCHIATRY & NEUROLOGY

## 2022-11-07 PROCEDURE — 1240000000 HC EMOTIONAL WELLNESS R&B

## 2022-11-07 PROCEDURE — 99232 SBSQ HOSP IP/OBS MODERATE 35: CPT | Performed by: INTERNAL MEDICINE

## 2022-11-07 RX ADMIN — ACETAMINOPHEN 325MG 650 MG: 325 TABLET ORAL at 23:02

## 2022-11-07 RX ADMIN — HYDROCHLOROTHIAZIDE 25 MG: 25 TABLET ORAL at 08:25

## 2022-11-07 RX ADMIN — CLONIDINE HYDROCHLORIDE 0.1 MG: 0.1 TABLET ORAL at 22:03

## 2022-11-07 RX ADMIN — Medication 3 MG: at 22:03

## 2022-11-07 RX ADMIN — BENZTROPINE MESYLATE 0.5 MG: 0.5 TABLET ORAL at 08:25

## 2022-11-07 RX ADMIN — LITHIUM CARBONATE 450 MG: 450 TABLET, EXTENDED RELEASE ORAL at 08:25

## 2022-11-07 RX ADMIN — HYDROXYZINE HYDROCHLORIDE 50 MG: 50 TABLET, FILM COATED ORAL at 22:03

## 2022-11-07 RX ADMIN — TRAZODONE HYDROCHLORIDE 50 MG: 50 TABLET ORAL at 22:02

## 2022-11-07 RX ADMIN — LITHIUM CARBONATE 450 MG: 450 TABLET, EXTENDED RELEASE ORAL at 22:03

## 2022-11-07 RX ADMIN — FLUTICASONE PROPIONATE 1 SPRAY: 50 SPRAY, METERED NASAL at 08:25

## 2022-11-07 RX ADMIN — BENZTROPINE MESYLATE 0.5 MG: 0.5 TABLET ORAL at 22:03

## 2022-11-07 RX ADMIN — CARIPRAZINE 6 MG: 3 CAPSULE, GELATIN COATED ORAL at 08:25

## 2022-11-07 ASSESSMENT — PAIN DESCRIPTION - LOCATION: LOCATION: FOOT

## 2022-11-07 ASSESSMENT — PAIN DESCRIPTION - ORIENTATION: ORIENTATION: RIGHT;LEFT

## 2022-11-07 ASSESSMENT — PAIN SCALES - GENERAL: PAINLEVEL_OUTOF10: 10

## 2022-11-07 ASSESSMENT — PAIN DESCRIPTION - DESCRIPTORS: DESCRIPTORS: ACHING

## 2022-11-07 NOTE — PROGRESS NOTES
Pharmacy Med Education Group Note    Date: 11/7/22  Start Time: 1430  End Time: 1500    Number Participants in Group:  6    Goal:  Patient will demonstrate an understanding of the medications intended purpose and possible adverse effects  Topic: [place] for Pharmacy Med Ed Group    Discipline Responsible:     OT  AT  [facility].  RT     X Other       Participation Level:     None  Minimal      X Active Listener    X Interactive    Monopolizing         Participation Quality:    X Appropriate  Inappropriate     X       Attentive        Intrusive          Sharing        Resistant          Supportive        Lethargic       Affective:     X Congruent  Incongruent  Blunted  Flat    Constricted  Anxious  Elated  Angry    Labile  Depressed  Other         Cognitive:    X Alert  Oriented PPTP     Concentration   X G  F  P   Attention Span   X G  F  P   Short-Term Memory   X G  F  P   Long-Term Memory  G  F  P   ProblemSolving/  Decision Making  G  F  P   Ability to Process  Information   X G  F  P      Contributing Factors             Delusional             Hallucinating             Flight of Ideas             Other:       Modes of Intervention:    X Education   X Support  Exploration    Clarifying  Problem Solving  Confrontation    Socialization  Limit Setting  Reality Testing    Activity  Movement  Media    Other:            Response to Learning:    X Able to verbalize current knowledge/experience    Able to verbalize/acknowledge new learning    Able to retain information    Capable of insight    Able to change behavior    Progressing to goal    Other:        Comments:     Stephany Barroso, Nahid, BCPS, BCPP  11/7/2022 3:10 PM  [phone]

## 2022-11-07 NOTE — GROUP NOTE
Group Therapy Note    Date: 11/7/2022    Group Start Time: 1330  Group End Time: 1646  Group Topic: Psychoeducation    ANTON Conroy, CTRS        Group Therapy Note    Attendees: 6/13       Patient's Goal:  To increase interpersonal interactions with peers. To communicate appropriately with peers. To increase self- awareness    Notes:  Patient attended group and actively participated. Patient communicated with peers appropriately. Patient was able to increase self-awareness through answering questions about themself.     Status After Intervention:  Improved    Participation Level: Interactive    Participation Quality: Appropriate      Speech:  hyperverbal, fast talking      Thought Process/Content: Flight of ideas, difficulty focusing      Affective Functioning: Flat      Mood: anxious      Level of consciousness:  Alert and Attentive      Response to Learning: Progressing to goal      Endings: None Reported    Modes of Intervention: Socialization, Exploration, Clarifying, and Reality-testing      Discipline Responsible: Psychoeducational Specialist      Signature:  Griselda Cam

## 2022-11-07 NOTE — GROUP NOTE
Group Therapy Note    Date: 11/7/2022    Group Start Time: 1000  Group End Time: 3932  Group Topic: Psychotherapy    ANTON SAUCEDA    GABY Fajardo Cap, LSW        Group Therapy Note    Attendees: 7/12       Patient's Goal:  Increase interpersonal relationship skills    Notes:  Patient was an active participant in group discussion    Status After Intervention:  Improved    Participation Level:  Active Listener and Interactive    Participation Quality: Appropriate, Attentive, and Sharing      Speech:  normal      Thought Process/Content: Logical  Flight of ideas      Affective Functioning: Congruent      Mood: euthymic      Level of consciousness:  Alert, Oriented x4, and Attentive      Response to Learning: Able to verbalize current knowledge/experience, Able to verbalize/acknowledge new learning, and Able to retain information      Endings: None Reported    Modes of Intervention: Support, Socialization, and Exploration      Discipline Responsible: /Counselor      Signature:  GABY Fajardo Cap, LSW

## 2022-11-07 NOTE — GROUP NOTE
Group Therapy Note    Date: 11/7/2022    Group Start Time: 1100  Group End Time: 1150  Group Topic: Cognitive Skills    Ac Ashley    Cognitive Skills Group Note        Date: November 7, 2022 Start Time: 11am End Time:  1150 am      Number of Participants in Group & Unit Census:  8/13    Topic: Cognitive skills, leisure    Goal of Group: To increase cognitive thinking through decision making and turn taking abilities. To increase concentration and focus to task. Comments:     Patient did not participate in Cognitive Skills group, despite staff encouragement and explanation of benefits. Patient remain seclusive to self. Q15 minute safety checks maintained for patient safety and will continue to encourage patient to attend unit programming.         Signature:  Ac Cerda

## 2022-11-07 NOTE — PLAN OF CARE
Problem: Chronic Conditions and Co-morbidities  Goal: Patient's chronic conditions and co-morbidity symptoms are monitored and maintained or improved  11/6/2022 2301 by Chandler Hutson LPN  Outcome: Progressing     Problem: Anxiety  Goal: Will report anxiety at manageable levels  Description: INTERVENTIONS:  1. Administer medication as ordered  2. Teach and rehearse alternative coping skills  3. Provide emotional support with 1:1 interaction with staff  11/6/2022 2301 by Chandler Hutson LPN  Outcome: Progressing  Note: Patient admits to anxiety this shift. Patient out pacing unit this shift. Patient has many requests for staff, coming up to nurses station multiple times through out shift. Patient educated and agrees to come to staff if needed this shift. Patient monitored every 15 minutes with environmental safety checks. Problem: Confusion  Goal: Confusion, delirium, dementia, or psychosis is improved or at baseline  Description: INTERVENTIONS:  1. Assess for possible contributors to thought disturbance, including medications, impaired vision or hearing, underlying metabolic abnormalities, dehydration, psychiatric diagnoses, and notify attending LIP  2. [place] high risk fall precautions, as indicated  3. Provide frequent short contacts to provide reality reorientation, refocusing and direction  4. Decrease environmental stimuli, including noise as appropriate  5. Monitor and intervene to maintain adequate nutrition, hydration, elimination, sleep and activity  6. If unable to ensure safety without constant attention obtain sitter and review sitter guidelines with assigned personnel  7. Initiate Psychosocial CNS and Spiritual Care consult, as indicated  11/6/2022 2301 by Chandler Hutson LPN  Outcome: Progressing  Note: Patient confused and disoriented this shift. Patient up at nurses station with many requests this shift. Patient needs redirection. Patient educated to come to staff if needed this shift.  Patient monitored every 15 minutes with environmental safety checks.
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
## 2022-11-07 NOTE — PROGRESS NOTES
[facility] Internal Medicine    CONSULTATION / HISTORY AND PHYSICAL EXAMINATION            Date:   2022  Patient name:  Kinza Freeman  Date of admission:  10/30/2022  3:51 PM  MRN:   [id number]  Account:  [de-identified]  YOB: 1980  PCP:    Alex Gonzales PA-C  Room:   01070107-01  Code Status:    Full Code    Physician Requesting Consult: Silva Wood MD    Reason for Consult:  medical management    Chief Complaint:     Chief Complaint   Patient presents with    Suicidal    Homicidal       History Obtained From:     Patient medical record nursing staff    History of Present Illness:   Patient past medical history of bipolar disorder, hypertension, generalized artery disorder, OCD, admitted to [facility] floor with acute psychosis  Patient is complaining of pain in left foot, she told me her  stump over her foot and she is having pain ever since  No complaints of chest pain, shortness of breath, abdominal pain      Past Medical History:     Past Medical History:   Diagnosis Date    Anxiety     Bipolar 1 disorder ([facility] [address]     Depression     Gestational diabetes 2016    OCD (obsessive compulsive disorder)     PTSD (post-traumatic stress disorder)     Rapid rate of speech     Schizoaffective disorder ([facility] [address]         Past Surgical History:     Past Surgical History:   Procedure Laterality Date    ABDOMEN SURGERY       SECTION  2007    LAPAROSCOPY          Medications Prior to Admission:     Prior to Admission medications    Medication Sig Start Date End Date Taking? Authorizing Provider   lithium 300 MG capsule Take 600 mg by mouth at bedtime   Yes Historical Provider, MD   lurasidone (LATUDA) 40 MG TABS tablet Take 40 mg by mouth in the morning and at bedtime Ordered on 10/20/2022. Take 1 tablet by mouth twice daily for two days, then 1 tablet every morning and 2 tablets every night at bedtime.    Yes Historical Provider, MD   clonazePAM tone and bulk, no abnormal sensation, normal speech, cranial nerves II through XII grossly intact  Skin: No gross lesions, rashes, bruising or bleeding on exposed skin area  Extremities: Tenderness of left great toe, nail fungus present  Psych: Investigations:      Laboratory Testing:  Recent Results (from the past 24 hour(s))   Lithium Level    Collection Time: 11/07/22  6:49 AM   Result Value Ref Range    Lithium Lvl 0.9 0.6 - 1.2 mmol/L    Lithium Dose Amount 450     Lithium Date Last Dose 204841     Lithium Dose Time 2151            Consultations:   [address] [place] [address] CONSULT TO INTERNAL MEDICINE  IP CONSULT TO PODIATRY  Assessment :      Primary Problem  Bipolar affective disorder, mixed, severe, with psychotic behavior [facility])    Active Hospital Problems    Diagnosis Date Noted    Psychosis ([facility] [address] Rickey Krishna 10/31/2022     Priority: Medium    Bipolar affective disorder, mixed, severe, with psychotic behavior ([facility] [address] [F31.64] 10/31/2022     Priority: Medium       Plan:     Patient experiencing pain in left foot, with tenderness, will order x-ray left foot to rule out fracture  Hypertension, restarted hydrochlorothiazide, blood pressure controlled  Patient has hypokalemia, slightly elevated creatinine, was on lithium, will order BMP    11/7   X-ray foot is negative  Potassium is improved  Slightly elevated blood pressure readings  Changing diet to low-salt diet  We will sign off, please call with questions      Anne Worthington MD  11/7/2022  3:02 PM    Copy sent to SUSIE KlineC    Please note that this chart was generated using voice recognition Dragon dictation software. Although every effort was made to ensure the accuracy of this automated transcription, some errors in transcription may have occurred.

## 2022-11-08 VITALS
BODY MASS INDEX: 27.28 KG/M2 | DIASTOLIC BLOOD PRESSURE: 82 MMHG | HEIGHT: 68 IN | SYSTOLIC BLOOD PRESSURE: 128 MMHG | WEIGHT: 180 LBS | RESPIRATION RATE: 14 BRPM | TEMPERATURE: 98.2 F | HEART RATE: 100 BPM | OXYGEN SATURATION: 100 %

## 2022-11-08 PROCEDURE — 99239 HOSP IP/OBS DSCHRG MGMT >30: CPT | Performed by: PSYCHIATRY & NEUROLOGY

## 2022-11-08 PROCEDURE — 6370000000 HC RX 637 (ALT 250 FOR IP): Performed by: PSYCHIATRY & NEUROLOGY

## 2022-11-08 RX ORDER — BENZTROPINE MESYLATE 0.5 MG/1
0.5 TABLET ORAL 2 TIMES DAILY
Qty: 60 TABLET | Refills: 3 | Status: ON HOLD | OUTPATIENT
Start: 2022-11-08 | End: 2022-11-21 | Stop reason: HOSPADM

## 2022-11-08 RX ORDER — HYDROCHLOROTHIAZIDE 25 MG/1
25 TABLET ORAL DAILY
Qty: 30 TABLET | Refills: 0 | Status: ON HOLD | OUTPATIENT
Start: 2022-11-08

## 2022-11-08 RX ORDER — CLONIDINE HYDROCHLORIDE 0.1 MG/1
0.1 TABLET ORAL NIGHTLY
Qty: 60 TABLET | Refills: 0 | Status: ON HOLD | OUTPATIENT
Start: 2022-11-08 | End: 2022-11-21 | Stop reason: HOSPADM

## 2022-11-08 RX ORDER — HYDROXYZINE 50 MG/1
50 TABLET, FILM COATED ORAL 3 TIMES DAILY PRN
Qty: 30 TABLET | Refills: 0 | Status: ON HOLD | OUTPATIENT
Start: 2022-11-08 | End: 2022-11-21

## 2022-11-08 RX ORDER — LITHIUM CARBONATE 450 MG
450 TABLET, EXTENDED RELEASE ORAL EVERY 12 HOURS SCHEDULED
Qty: 60 TABLET | Refills: 0 | Status: ON HOLD | OUTPATIENT
Start: 2022-11-08 | End: 2022-11-21 | Stop reason: HOSPADM

## 2022-11-08 RX ORDER — FLUTICASONE PROPIONATE 50 MCG
1 SPRAY, SUSPENSION (ML) NASAL DAILY
Qty: 16 G | Refills: 3 | Status: ON HOLD | OUTPATIENT
Start: 2022-11-09

## 2022-11-08 RX ADMIN — POLYETHYLENE GLYCOL 3350 17 G: 17 POWDER, FOR SOLUTION ORAL at 00:21

## 2022-11-08 RX ADMIN — LITHIUM CARBONATE 450 MG: 450 TABLET, EXTENDED RELEASE ORAL at 09:18

## 2022-11-08 RX ADMIN — BENZTROPINE MESYLATE 0.5 MG: 0.5 TABLET ORAL at 09:18

## 2022-11-08 RX ADMIN — HYDROXYZINE HYDROCHLORIDE 50 MG: 50 TABLET, FILM COATED ORAL at 14:50

## 2022-11-08 RX ADMIN — ACETAMINOPHEN 325MG 650 MG: 325 TABLET ORAL at 11:33

## 2022-11-08 RX ADMIN — FLUTICASONE PROPIONATE 1 SPRAY: 50 SPRAY, METERED NASAL at 09:17

## 2022-11-08 RX ADMIN — HYDROXYZINE HYDROCHLORIDE 50 MG: 50 TABLET, FILM COATED ORAL at 03:20

## 2022-11-08 RX ADMIN — HYDROCHLOROTHIAZIDE 25 MG: 25 TABLET ORAL at 09:18

## 2022-11-08 RX ADMIN — CARIPRAZINE 6 MG: 3 CAPSULE, GELATIN COATED ORAL at 09:18

## 2022-11-08 ASSESSMENT — PAIN DESCRIPTION - LOCATION: LOCATION: FOOT

## 2022-11-08 ASSESSMENT — PAIN SCALES - GENERAL: PAINLEVEL_OUTOF10: 6

## 2022-11-08 ASSESSMENT — PAIN DESCRIPTION - ORIENTATION: ORIENTATION: LEFT;RIGHT

## 2022-11-08 ASSESSMENT — PAIN DESCRIPTION - DESCRIPTORS: DESCRIPTORS: DISCOMFORT

## 2022-11-08 ASSESSMENT — PAIN - FUNCTIONAL ASSESSMENT: PAIN_FUNCTIONAL_ASSESSMENT: ACTIVITIES ARE NOT PREVENTED

## 2022-11-08 NOTE — GROUP NOTE
Group Therapy Note    Date: 11/8/2022    Group Start Time: 1010  Group End Time: 9387  Group Topic: Psychotherapy    [facility]    SOM Yeung        Group Therapy Note    Attendees: 6/8       Patient's Goal:  Increase interpersonal relationship skills    Notes:  Patient initially sat down for group then when she saw the group topic \"relationship green flags\" she stated \"oh I can't do this\" and immediately left group. Later she arrived back to join group and finished the remainder of the time. Status After Intervention:  Improved    Participation Level:  Active Listener and Interactive    Participation Quality: Appropriate, Attentive, and Sharing      Speech:  normal      Thought Process/Content: Logical  Linear      Affective Functioning: Congruent      Mood: euthymic      Level of consciousness:  Alert, Oriented x4, and Attentive      Response to Learning: Able to verbalize current knowledge/experience and Able to verbalize/acknowledge new learning      Endings: None Reported    Modes of Intervention: Support, Socialization, and Exploration      Discipline Responsible: /Counselor      Signature:  SOM Yeung

## 2022-11-08 NOTE — PLAN OF CARE
Problem: Chronic Conditions and Co-morbidities  Goal: Patient's chronic conditions and co-morbidity symptoms are monitored and maintained or improved  Outcome: Progressing     Problem: Anxiety  Goal: Will report anxiety at manageable levels  Description: INTERVENTIONS:  1. Administer medication as ordered  2. Teach and rehearse alternative coping skills  3. Provide emotional support with 1:1 interaction with staff  Outcome: Progressing  Note:    Patient anxiety 8/10 at this time. Patient educated and agrees to come to staff with any concerns. Patient continues to be monitored q15 minutes per environmental safety check protocol. Problem: Confusion  Goal: Confusion, delirium, dementia, or psychosis is improved or at baseline  Description: INTERVENTIONS:  1. Assess for possible contributors to thought disturbance, including medications, impaired vision or hearing, underlying metabolic abnormalities, dehydration, psychiatric diagnoses, and notify attending LIP  2. [place] high risk fall precautions, as indicated  3. Provide frequent short contacts to provide reality reorientation, refocusing and direction  4. Decrease environmental stimuli, including noise as appropriate  5. Monitor and intervene to maintain adequate nutrition, hydration, elimination, sleep and activity  6. If unable to ensure safety without constant attention obtain sitter and review sitter guidelines with assigned personnel  7. Initiate Psychosocial CNS and Spiritual Care consult, as indicated  Outcome: Progressing  Note: Patient does has flights of ideas. Loose association when  talking with staff. Pleasant and cooperative during conversation. Patient educated and agrees to come to staff with any concerns. Patient monitored q15 minutes per environmental safety checks.

## 2022-11-08 NOTE — BH NOTE
Patient given tobacco quitline number [phone] at this time, refusing to call at this time, states \" I just dont want to quit now\"- patient given information as to the dangers of long term tobacco use. Continue to reinforce the importance of tobacco cessation.

## 2022-11-08 NOTE — BH NOTE
Emergency Medication Follow-Up Note:    PRN medication of Atarax 50mg PO was effective as evidence by Patient regain behavioral control, absence of behavior warranting emergency medication, resting quietly in room. Patient denies medication side effects. Will continue to monitor and provide support as needed.

## 2022-11-08 NOTE — BH NOTE
Information:  Medications:   Medication summary provided   I understand that I should take only the medications on my list.     -why and when I need to take each medicine.     -which side effects to watch for.     -that I should carry my medication information at all times in case of     Emergency situations. I will take all of my medicines to follow up appointments.     -check with my physician or pharmacist before taking any new    Medication, over the counter product or drink alcohol.    -Ask about food, drug or dietary supplement interactions.    -discard old lists and update records with medication providers. Notify Physician:  Notify physician if you notice:   Always call 911 if you feel your life is in danger  In case of an emergency call 911 immediately! If 911 is not available call your local emergency medical system for help    Behavioral Health Follow Up:  Original Referral Source:JULIA  Discharge Diagnosis: Acute psychosis ([facility] [F23]  Psychosis, unspecified psychosis type ([facility] [F29]  Recommendations for Level of Care: continuation of medications and follow-up with community behavioral health center  Patient status at discharge: stable,  patient denies suicidal and homicidal ideations. [facility]   Aftercare plan faxed: Yes   -faxed by: Nurse   -date: 11/8   -time: 1700  Prescriptions: [facility] on [address] and [address]. PATIENT VERBALIZES UNDERSTAND OF AVS, FOLLOW-UP APPOINTMENT, AND WHAT PHARMACY TO PICK-UP MEDICATIONS. PATIENT IS TRANSPORTED BY [employer] TO PHARMACY AND THEN TO HOME. PATIENT DENIES SUICIDAL AND HOMICIDAL IDEATIONS UPON DISCHARGE.        General Information:   Questions regarding your bill: Call HELP program [phone]     [facility] Hotline ([facility])  ([phone]      Recovery Help line- [phone]      To obtain results of pending studies call Medical Records at: [phone]     For emergencies and 24 hour/7 days a week contact information:  [phone]

## 2022-11-08 NOTE — DISCHARGE INSTRUCTIONS
Information:  Medications:   Medication summary provided   I understand that I should take only the medications on my list.     -why and when I need to take each medicine.     -which side effects to watch for.     -that I should carry my medication information at all times in case of     Emergency situations. I will take all of my medicines to follow up appointments.     -check with my physician or pharmacist before taking any new    Medication, over the counter product or drink alcohol.    -Ask about food, drug or dietary supplement interactions.    -discard old lists and update records with medication providers. Notify Physician:  Notify physician if you notice:   Always call 911 if you feel your life is in danger  In case of an emergency call 911 immediately! If 911 is not available call your local emergency medical system for help    Behavioral Health Follow Up:  [facility]/Progress toward goals: medications and group therapy   Recommendations for Level of Care: continuation of medications and follow-up with community behavioral health center   Patient status at discharge: stable, patient denies thoughts of suicidal and homicidal ideations. My hospital  was: [facility]  Aftercare plan faxed:    -faxed by: Nurse   -date: 11/8   -time: [id number]  Prescriptions: [facility] on [address]     Smoking: Quit Smoking. Call the [employer]'s smoking quitline at 5-736-55C-QUIT  Know the signs of a heart attack   If you have any of the following symptoms call 911 immediately, do not wait more    Than five minutes. 1. Pressure, fullness and/ or squeezing in the center of the chest spreading to    The jaw, neck or shoulder. 2. Chest discomfort with light headedness, fainting, sweating, nausea or    Shortness of breath. 3. Upper abdominal pressure or discomfort. 4. Lower chest pain, back pain, unusual fatigue, shortness of breath, nausea   Or dizziness.      General Information:   Questions regarding your bill: Call HELP program [phone]     Suicide Hotline (rescue crisis) (667) [phone]

## 2022-11-08 NOTE — PLAN OF CARE
Problem: Chronic Conditions and Co-morbidities  Goal: Patient's chronic conditions and co-morbidity symptoms are monitored and maintained or improved  Outcome: Completed     Problem: Anxiety  Goal: Will report anxiety at manageable levels  Description: INTERVENTIONS:  1. Administer medication as ordered  2. Teach and rehearse alternative coping skills  3. Provide emotional support with 1:1 interaction with staff  Outcome: Completed     Problem: Confusion  Goal: Confusion, delirium, dementia, or psychosis is improved or at baseline  Description: INTERVENTIONS:  1. Assess for possible contributors to thought disturbance, including medications, impaired vision or hearing, underlying metabolic abnormalities, dehydration, psychiatric diagnoses, and notify attending LIP  2. [place] high risk fall precautions, as indicated  3. Provide frequent short contacts to provide reality reorientation, refocusing and direction  4. Decrease environmental stimuli, including noise as appropriate  5. Monitor and intervene to maintain adequate nutrition, hydration, elimination, sleep and activity  6. If unable to ensure safety without constant attention obtain sitter and review sitter guidelines with assigned personnel  7.  Initiate Psychosocial CNS and Spiritual Care consult, as indicated  Outcome: Completed

## 2022-11-08 NOTE — BH NOTE
Writer went over patient belongings form with patient. Writer stated to patient that she came in with cross earrings and 1 matt earring. Patient stated, \"I don't have those anymore. I traded them for these (patient pointed to 2 silicone bracelets on wrist) when I was on the other side (PICU). \"

## 2022-11-08 NOTE — PROGRESS NOTES
Behavioral Services                                              Medicare Re-Certification    I certify that the inpatient psychiatric hospital services furnished since the previous certification/re-certification were, and continue to be, medically necessary for;    [x] (1) Treatment which could reasonably be expected to improve the patient's condition,    [x] (2) Or for diagnostic study. Estimated length of stay/service 1 to 3 days    Plan for post-hospital care outpatient care    This patient continues to need, on a daily basis, active treatment furnished directly by or requiring the supervision of inpatient psychiatric personnel.     Electronically signed by Jerome Morel MD on 11/7/2022 at 9:06 PM

## 2022-11-08 NOTE — PROGRESS NOTES
11/08/22 1459   Encounter Summary   Encounter Overview/Reason  Spiritual/Emotional Needs   Service Provided For: Patient   Referral/Consult From: Satish   Last Encounter  11/08/22   Complexity of Encounter Moderate   Begin Time 0130   End Time  0215   Total Time Calculated 45 min   Spiritual/Emotional needs   Type Spiritual Support   Behavioral Health    Type  Spirituality Group   Assessment/Intervention/Outcome   Assessment Calm   Intervention Active listening;Sustaining Presence/Ministry of presence;Nurtured Hope   Outcome Receptive; Expressed Gratitude;Engaged in conversation

## 2022-11-08 NOTE — BH NOTE
Emergency PRN Medication Administration Note:      Patient is agitated, disruptive, and anxious as evidence by pacing room, restlessness, and increased anxiety. Staff attempted to find and relieve the distress by Talking to patient, Offering suggestions, and Administer PRN medications Patient is currently continuing to escalate, unable to redirect, and accepting of PRN medications. Medication Administered as prescribed: Atarax 50mg. Patient Tolerated medication administration. Will continue to monitor, offer support, and reassess.

## 2022-11-08 NOTE — DISCHARGE SUMMARY
DISCHARGE SUMMARY      Patient ID:  Gabby Baig  [id number]  83 y.o.  1980    Admit date: 10/30/2022    Discharge date and time: 11/8/2022    Disposition: Home      Admitting Physician: Nichole Grijalva MD     Discharge Physician: Dr Wing Pauline SUE    Admission Diagnoses: Acute psychosis ([facility] [F23]  Psychosis, unspecified psychosis type ([facility] [F29]    Admission Condition: poor    Discharged Condition: stable    Admission Circumstance:  Gabby Baig is a 38 y.o. female who has a past medical history of mental illness. Patient presented to the ED escorted by police due to bizarre and dangerous behaviors within the community     Per emergency department documentation :patient is a 38year old female that is brought to the ED today via [employer] on an involuntary status with concerns of manic behaviors. Per [employer] patient showed up at her husbands place of employment where she made a scene and was being destructive. [employer] states the  called the police and when they arrived the patient was in the street walking around between passing cars and threatening to hurt herself. Today Uil Fry is seen both in the privacy of her own room as well as in the day room. She is dressed in hospital attire, is pacing and frequently mumbling to herself. She is exhibiting poverty of thought and thought blocking. She reports racing thoughts, lack of sleep and trouble focusing. Uli Fry is cooperative however lack of focus make our interview challenging. She is a fairly good historian estimating that she was here at [facility] approximately in 2019. Today she is denying SI, HI, hallucinations, depression and anxiety. She reports poor sleep prior to admission. She admits to a history of paranoia. She endorses obsessive-compulsive disorder as well as symptoms of PTSD but is unable to verbalize any particular traumatic experiences or persistent thoughts that are relieved by repetitive behaviors.   She requires much redirection to maintain topic. But is able to share that she has experienced prince for \"many\" years. She has a significant history of inpatient admissions including at [address] [place] [address]- as well as hospitals located in [facility]. She is agreeable to sign a release of information for her  \"Sed\"however several times have been made and he unwilling to answer and a voicemail is not established. Patient has difficulty verbalizing her medications but is willing to write these down and she includes \"lithium to relax and Tegretol to ease your mind \". She denies consistent medication adherence as she does not appreciate the medications side effects stating \"I feel numb \". Patient is requesting her personal belongings and at this time seems to be accepting that she is required to wear hospital provided clothing. She is grateful for snacks and oral hydration and reports her mood as \"tired\". PAST MEDICAL/PSYCHIATRIC HISTORY:   Past Medical History:   Diagnosis Date    Anxiety     Bipolar 1 disorder ([facility] 1999    Depression     Gestational diabetes 7/22/2016    OCD (obsessive compulsive disorder) 1999    PTSD (post-traumatic stress disorder)     Rapid rate of speech     Schizoaffective disorder (HCC)        FAMILY/SOCIAL HISTORY:  Family History   Problem Relation Age of Onset    Diabetes Sister     No Known Problems Mother     No Known Problems Father      Social History     Socioeconomic History    Marital status: Legally      Spouse name: Not on file    Number of children: Not on file    Years of education: Not on file    Highest education level: Not on file   Occupational History    Not on file   Tobacco Use    Smoking status: Every Day     Packs/day: 1.00     Types: Cigarettes    Smokeless tobacco: Never    Tobacco comments:     NO medication ordered d/t pregnancy    Vaping Use    Vaping Use: Former    Substances: Always   Substance and Sexual Activity    Alcohol use:  No Alcohol/week: 0.0 standard drinks    Drug use: Yes     Types: Marijuana Marie Almaguer    Sexual activity: Yes     Partners: Male   Other Topics Concern    Not on file   Social History Narrative    Not on file     Social Determinants of Health     Financial Resource Strain: Not on file   Food Insecurity: Not on file   Transportation Needs: Not on file   Physical Activity: Not on file   Stress: Not on file   Social Connections: Not on file   Intimate Partner Violence: Not on file   Housing Stability: Not on file       MEDICATIONS:    Current Facility-Administered Medications:     benztropine (COGENTIN) tablet 0.5 mg, 0.5 mg, Oral, BID, Daily Farris, APRN - CNP, 0.5 mg at 11/08/22 0918    nicotine (NICODERM CQ) 14 MG/24HR 1 patch, 1 patch, TransDERmal, Daily, Lizandro Schumacher MD, 1 patch at 11/08/22 0918    fluticasone (FLONASE) 50 MCG/ACT nasal spray 1 spray, 1 spray, Each Nostril, Daily, Denzel Tan APRN - CNP, 1 spray at 11/08/22 0639    cariprazine hcl (VRAYLAR) capsule 6 mg, 6 mg, Oral, Daily, Lizandro Schumacher MD, 6 mg at 11/08/22 3385    cloNIDine (CATAPRES) tablet 0.1 mg, 0.1 mg, Oral, Nightly, Lizandro Schumacher MD, 0.1 mg at 11/07/22 2203    hydroCHLOROthiazide (HYDRODIURIL) tablet 25 mg, 25 mg, Oral, Daily, Lizandro Schumacher MD, 25 mg at 11/08/22 0918    lithium (ESKALITH) extended release tablet 450 mg, 450 mg, Oral, 2 times per day, Lizandro Schumacher MD, 450 mg at 11/08/22 0918    melatonin tablet 3 mg, 3 mg, Oral, Nightly PRN, Lizandro Schumacher MD, 3 mg at 11/07/22 2203    haloperidol lactate (HALDOL) injection 5 mg, 5 mg, IntraMUSCular, Q6H PRN, 5 mg at 11/01/22 2120 **AND** [DISCONTINUED] LORazepam (ATIVAN) injection 2 mg, 2 mg, IntraMUSCular, Q6H PRN, 2 mg at 11/01/22 2120 **AND** diphenhydrAMINE (BENADRYL) injection 50 mg, 50 mg, IntraMUSCular, Q6H PRN, Lizandro Schumacher MD, 50 mg at 11/01/22 2120    acetaminophen (TYLENOL) tablet 650 mg, 650 mg, Oral, Q6H PRN, Lizandro Schumacher MD, 650 mg at 11/07/22 2302    ibuprofen (ADVIL;MOTRIN) tablet 400 mg, 400 mg, Oral, Q6H PRN, Lizandro Schumacher MD, 400 mg at 11/05/22 0509    hydrOXYzine HCl (ATARAX) tablet 50 mg, 50 mg, Oral, TID PRN, Lizandro Schumacher MD, 50 mg at 11/08/22 0320    polyethylene glycol (GLYCOLAX) packet 17 g, 17 g, Oral, Daily PRN, Lizandro Schumacher MD, 17 g at 11/08/22 0021    aluminum & magnesium hydroxide-simethicone (MAALOX) 200-200-20 MG/5ML suspension 30 mL, 30 mL, Oral, Q6H PRN, Lizandro Schumacher MD    haloperidol (HALDOL) tablet 5 mg, 5 mg, Oral, Q6H PRN, 5 mg at 11/05/22 1025 **AND** [DISCONTINUED] LORazepam (ATIVAN) tablet 2 mg, 2 mg, Oral, Q6H PRN, Lizandro Schumacher MD, 2 mg at 11/05/22 1025    Examination:  BP (!) 148/101   Pulse 100   Temp 98.2 °F (36.8 °C)   Resp 14   Ht 5' 8\" (1.727 m)   Wt 180 lb (81.6 kg)   SpO2 100%   BMI 27.37 kg/m²   Gait - steady    HOSPITAL COURSE[de-identified]  Following admission to the hospital, patient had a complete physical exam and blood work up. The patient was referred to Internal Medicine. Patient was monitored closely with suicide precaution  The patient was started on Brenda Hedger which is her preferred medication along with lithium which she claims to have been taking prior to admission. The patient declines other medications including Abilify or Latuda and Invega because of a history of sexual side effects on different medications. The patient maintained behavioral control when she was seen but required as needed medications several times. Towards the end of her admission she was using hydroxyzine. Her as needed trazodone was discontinued for concerns that it may be overstimulating. Was encouraged to participate in group and other milieu activity  Patient started to feel better with this combination of treatment. Significant progress in the symptoms since admission.     Mood is improved  The patient denies AVH or paranoid thoughts  The patient denies any hopelessness or worthlessness  No active SI/HI  Appetite:  [x] Normal  [] Increased [] Decreased    Sleep:       [x] Normal  [] Fair       [] Poor            Energy:    [x] Normal  [] Increased  [] Decreased     SI [] Present  [x] Absent  HI  []Present  [x] Absent   Aggression:  [] yes  [] no  Patient is [x] able  [] unable to CONTRACT FOR SAFETY   Medication side effects(SE):  [x] None(Psych. Meds.) [] Other      Mental Status Examination on discharge:    Level of consciousness:  within normal limits   Appearance:  well-appearing  Behavior/Motor:  no abnormalities noted  Attitude toward examiner:  attentive and good eye contact  Speech:  spontaneous, normal rate and normal volume   Mood: euthymic  Affect:  mood congruent  Thought processes:  linear, goal directed, and coherent   Thought content:  Suicidal Ideation:  denies suicidal ideation  Delusions:  no evidence of delusions  Perceptual Disturbance:  denies any perceptual disturbance  Cognition:  oriented to person, place, and time   Concentration intact  Memory intact  Insight partial  Judgement fair   Fund of Knowledge adequate      ASSESSMENT:  Patient symptoms are:  [x] Well controlled  [x] Improving  [] Worsening  [] No change      Diagnosis:  Principal Problem:    Bipolar affective disorder, mixed, severe, with psychotic behavior ([facility]  Active Problems:    [facility]  Resolved Problems:    * No resolved hospital problems. *      LABS:    No results for input(s): WBC, HGB, PLT in the last 72 hours. Recent Labs     11/06/22  0635      K 4.2      CO2 24   BUN 15   CREATININE 0.71   GLUCOSE 108*     No results for input(s): BILITOT, ALKPHOS, AST, ALT in the last 72 hours.   Lab Results   Component Value Date/Time    BARBSCNU NEGATIVE 10/30/2022 04:28 PM    LABBENZ NEGATIVE 10/30/2022 04:28 PM    LABMETH NEGATIVE 10/30/2022 04:28 PM    PPXUR NOT REPORTED 06/27/2019 03:38 AM     Lab Results   Component Value Date/Time    TSH 1.09 09/06/2022 10:39 AM     Lab Results   Component Value Date    LITHIUM 0.9 11/07/2022     Lab Results   Component Value Date    CBMZ 5.0 09/06/2022       RISK ASSESSMENT AT DISCHARGE: Low risk for suicide and homicide. Treatment Plan:  Reviewed current Medications with the patient. Education provided on the complaince with treatment. Risks, benefits, side effects, drug-to-drug interactions and alternatives to treatment were discussed. Encourage patient to attend outpatient follow up appointment and therapy. Patient was advised to call the outpatient provider, visit the nearest ED or call 911 if symptoms are not manageable. Medication List        START taking these medications      fluticasone 50 MCG/ACT nasal spray  Commonly known as: FLONASE  1 spray by Each Nostril route daily  Start taking on: November 9, 2022     hydrOXYzine HCl 50 MG tablet  Commonly known as: ATARAX  Take 1 tablet by mouth 3 times daily as needed for Anxiety            CHANGE how you take these medications      benztropine 0.5 MG tablet  Commonly known as: COGENTIN  Take 1 tablet by mouth 2 times daily  What changed:   medication strength  how much to take     lithium 450 MG extended release tablet  Commonly known as: ESKALITH  Take 1 tablet by mouth every 12 hours  What changed:   when to take this  Another medication with the same name was removed. Continue taking this medication, and follow the directions you see here.             CONTINUE taking these medications      Cariprazine HCl 6 MG Caps capsule  Commonly known as: VRAYLAR  Take 1 capsule by mouth daily  Start taking on: November 9, 2022     cloNIDine 0.1 MG tablet  Commonly known as: CATAPRES  Take 1 tablet by mouth nightly     hydroCHLOROthiazide 25 MG tablet  Commonly known as: HYDRODIURIL  Take 1 tablet by mouth daily     melatonin 3 MG Tabs tablet            STOP taking these medications      clonazePAM 1 MG tablet  Commonly known as: KLONOPIN     lurasidone 40 MG Tabs tablet  Commonly known as: LATUDA     traZODone 50 MG tablet  Commonly known as: DESYREL     ziprasidone 40 MG capsule  Commonly known as: GEODON               Where to Get Your Medications        These medications were sent to [facility] [address], [address] [place] [phone] - F [phone]  [place] [address]      Phone: [phone]   benztropine 0.5 MG tablet  Cariprazine HCl 6 MG Caps capsule  cloNIDine 0.1 MG tablet  fluticasone 50 MCG/ACT nasal spray  hydroCHLOROthiazide 25 MG tablet  hydrOXYzine HCl 50 MG tablet  lithium 450 MG extended release tablet               Core Measures statement:   Not applicable      TIME SPENT - 35 MINUTES TO COMPLETE THE EVALUATION, DISCHARGE SUMMARY, MEDICATION RECONCILIATION AND FOLLOW UP CARE                                         Neyda Amaya is a 38 y.o. female being evaluated Mirza Stockton MD on 11/8/2022 at 10:22 AM    An electronic signature was used to authenticate this note. **This report has been created using voice recognition software. It may contain minor errors which are inherent in voice recognition technology. **

## 2022-11-08 NOTE — GROUP NOTE
Group Therapy Note    Date: 11/8/2022    Group Start Time: 1100  Group End Time: 1130  Group Topic: Cognitive Skills    Ac Krishnamurthy    Cognitive Skills Group Note        Date: November 8, 2022 Start Time: 11am End Time: 11:30am      Number of Participants in Group & Unit Census:  3/9    Topic: Cognitive skills, leisure exploration    Goal of Group: To increase cognitive thinking through concentration and turn taking. To increase interpersonal interactions with peers. Comments:     Patient did not participate in Cognitive Skills group, despite staff encouragement and explanation of benefits. Patient remain seclusive to self. Q15 minute safety checks maintained for patient safety and will continue to encourage patient to attend unit programming.         Signature:  Ac Kennedy

## 2022-11-09 NOTE — CARE COORDINATION
Name: Roxanne Lane    : 1980    Discharge Date: 22    Primary Auth/Cert #: [id number]    Destination: Private residence    Discharge Medications:      Medication List        START taking these medications      fluticasone 50 MCG/ACT nasal spray  Commonly known as: FLONASE  1 spray by Each Nostril route daily  Notes to patient: Take as directed     hydrOXYzine HCl 50 MG tablet  Commonly known as: ATARAX  Take 1 tablet by mouth 3 times daily as needed for Anxiety  Notes to patient: For anxiety            CHANGE how you take these medications      benztropine 0.5 MG tablet  Commonly known as: COGENTIN  Take 1 tablet by mouth 2 times daily  What changed:   medication strength  how much to take  Notes to patient: Improve thoughts     lithium 450 MG extended release tablet  Commonly known as: ESKALITH  Take 1 tablet by mouth every 12 hours  What changed:   when to take this  Another medication with the same name was removed. Continue taking this medication, and follow the directions you see here.   Notes to patient: Improve thoughts/mood            CONTINUE taking these medications      Cariprazine HCl 6 MG Caps capsule  Commonly known as: VRAYLAR  Take 1 capsule by mouth daily  Notes to patient: Improve thoughts/mood     cloNIDine 0.1 MG tablet  Commonly known as: CATAPRES  Take 1 tablet by mouth nightly  Notes to patient: As directed     hydroCHLOROthiazide 25 MG tablet  Commonly known as: HYDRODIURIL  Take 1 tablet by mouth daily  Notes to patient: Take as directed     melatonin 3 MG Tabs tablet  Notes to patient: For sleep            STOP taking these medications      clonazePAM 1 MG tablet  Commonly known as: KLONOPIN     lurasidone 40 MG Tabs tablet  Commonly known as: LATUDA     traZODone 50 MG tablet  Commonly known as: DESYREL     ziprasidone 40 MG capsule  Commonly known as: GEODON               Where to Get Your Medications        These medications were sent to [facility] #[id number] [facility] [address] [place] [address],  [address] Neel Crandall [phone]  [address] [address]      Phone: [phone]   benztropine 0.5 MG tablet  Cariprazine HCl 6 MG Caps capsule  cloNIDine 0.1 MG tablet  fluticasone 50 MCG/ACT nasal spray  hydroCHLOROthiazide 25 MG tablet  hydrOXYzine HCl 50 MG tablet  lithium 450 MG extended release tablet         Follow Up Appointment: [address] [place] [address] [address] [facility] [address] [phone]    Go on 11/9/2022  You have an appointment at 2pm    Please Discharge patient to:  [address]    [facility] [address]  Follow up on 11/8/2022  If patient does not have a ride please send her by [facility]

## 2022-11-12 ENCOUNTER — HOSPITAL ENCOUNTER (INPATIENT)
Age: 42
LOS: 10 days | Discharge: HOME | DRG: 885 | End: 2022-11-22
Attending: EMERGENCY MEDICINE | Admitting: PSYCHIATRY & NEUROLOGY
Payer: COMMERCIAL

## 2022-11-12 DIAGNOSIS — F32.A DEPRESSION WITH SUICIDAL IDEATION: Primary | ICD-10-CM

## 2022-11-12 DIAGNOSIS — R45.851 DEPRESSION WITH SUICIDAL IDEATION: Primary | ICD-10-CM

## 2022-11-12 LAB
ABSOLUTE EOS #: 0.5 K/UL (ref 0–0.4)
ABSOLUTE LYMPH #: 3.1 K/UL (ref 1–4.8)
ABSOLUTE MONO #: 0.9 K/UL (ref 0.1–1.3)
AMPHETAMINE SCREEN URINE: NEGATIVE
ANION GAP SERPL CALCULATED.3IONS-SCNC: 12 MMOL/L (ref 9–17)
BACTERIA: ABNORMAL
BARBITURATE SCREEN URINE: NEGATIVE
BASOPHILS # BLD: 1 % (ref 0–2)
BASOPHILS ABSOLUTE: 0.1 K/UL (ref 0–0.2)
BENZODIAZEPINE SCREEN, URINE: NEGATIVE
BILIRUBIN URINE: NEGATIVE
BUN BLDV-MCNC: 8 MG/DL (ref 6–20)
CALCIUM SERPL-MCNC: 9.3 MG/DL (ref 8.6–10.4)
CANNABINOID SCREEN URINE: POSITIVE
CASTS UA: ABNORMAL /LPF
CHLORIDE BLD-SCNC: 104 MMOL/L (ref 98–107)
CO2: 23 MMOL/L (ref 20–31)
COCAINE METABOLITE, URINE: NEGATIVE
COLOR: YELLOW
CREAT SERPL-MCNC: 0.66 MG/DL (ref 0.5–0.9)
EOSINOPHILS RELATIVE PERCENT: 4 % (ref 0–4)
EPITHELIAL CELLS UA: ABNORMAL /HPF
FENTANYL URINE: NEGATIVE
GFR SERPL CREATININE-BSD FRML MDRD: >60 ML/MIN/1.73M2
GLUCOSE BLD-MCNC: 116 MG/DL (ref 70–99)
GLUCOSE URINE: NEGATIVE
HCG(URINE) PREGNANCY TEST: NEGATIVE
HCT VFR BLD CALC: 41.5 % (ref 36–46)
HEMOGLOBIN: 13.5 G/DL (ref 12–16)
KETONES, URINE: NEGATIVE
LEUKOCYTE ESTERASE, URINE: ABNORMAL
LITHIUM DATE LAST DOSE: NORMAL
LITHIUM DOSE AMOUNT: NORMAL
LITHIUM DOSE TIME: NORMAL
LITHIUM LEVEL: 0.6 MMOL/L (ref 0.6–1.2)
LYMPHOCYTES # BLD: 26 % (ref 24–44)
MAGNESIUM: 2 MG/DL (ref 1.6–2.6)
MCH RBC QN AUTO: 27.2 PG (ref 26–34)
MCHC RBC AUTO-ENTMCNC: 32.4 G/DL (ref 31–37)
MCV RBC AUTO: 84.1 FL (ref 80–100)
METHADONE SCREEN, URINE: NEGATIVE
MONOCYTES # BLD: 7 % (ref 1–7)
NITRITE, URINE: NEGATIVE
OPIATES, URINE: NEGATIVE
OXYCODONE SCREEN URINE: NEGATIVE
PDW BLD-RTO: 13 % (ref 11.5–14.9)
PH UA: 6.5 (ref 5–8)
PHENCYCLIDINE, URINE: NEGATIVE
PLATELET # BLD: 311 K/UL (ref 150–450)
PMV BLD AUTO: 8.3 FL (ref 6–12)
POTASSIUM SERPL-SCNC: 3.4 MMOL/L (ref 3.7–5.3)
PROTEIN UA: NEGATIVE
RBC # BLD: 4.94 M/UL (ref 4–5.2)
RBC UA: ABNORMAL /HPF
REASON FOR REJECTION: NORMAL
SEG NEUTROPHILS: 62 % (ref 36–66)
SEGMENTED NEUTROPHILS ABSOLUTE COUNT: 7.4 K/UL (ref 1.3–9.1)
SODIUM BLD-SCNC: 139 MMOL/L (ref 135–144)
SPECIFIC GRAVITY UA: 1.01 (ref 1–1.03)
TEST INFORMATION: ABNORMAL
TURBIDITY: CLEAR
URINE HGB: NEGATIVE
UROBILINOGEN, URINE: NORMAL
WBC # BLD: 11.9 K/UL (ref 3.5–11)
WBC UA: ABNORMAL /HPF
ZZ NTE CLEAN UP: ORDERED TEST: NORMAL
ZZ NTE WITH NAME CLEAN UP: SPECIMEN SOURCE: NORMAL

## 2022-11-12 PROCEDURE — 36415 COLL VENOUS BLD VENIPUNCTURE: CPT

## 2022-11-12 PROCEDURE — 80307 DRUG TEST PRSMV CHEM ANLYZR: CPT

## 2022-11-12 PROCEDURE — 80178 ASSAY OF LITHIUM: CPT

## 2022-11-12 PROCEDURE — 81025 URINE PREGNANCY TEST: CPT

## 2022-11-12 PROCEDURE — 1240000000 HC EMOTIONAL WELLNESS R&B

## 2022-11-12 PROCEDURE — 6370000000 HC RX 637 (ALT 250 FOR IP)

## 2022-11-12 PROCEDURE — 99223 1ST HOSP IP/OBS HIGH 75: CPT | Performed by: INTERNAL MEDICINE

## 2022-11-12 PROCEDURE — 83735 ASSAY OF MAGNESIUM: CPT

## 2022-11-12 PROCEDURE — 80048 BASIC METABOLIC PNL TOTAL CA: CPT

## 2022-11-12 PROCEDURE — 6370000000 HC RX 637 (ALT 250 FOR IP): Performed by: PSYCHIATRY & NEUROLOGY

## 2022-11-12 PROCEDURE — 6360000002 HC RX W HCPCS

## 2022-11-12 PROCEDURE — 6370000000 HC RX 637 (ALT 250 FOR IP): Performed by: INTERNAL MEDICINE

## 2022-11-12 PROCEDURE — 81001 URINALYSIS AUTO W/SCOPE: CPT

## 2022-11-12 PROCEDURE — 99285 EMERGENCY DEPT VISIT HI MDM: CPT

## 2022-11-12 PROCEDURE — 85025 COMPLETE CBC W/AUTO DIFF WBC: CPT

## 2022-11-12 PROCEDURE — APPSS60 APP SPLIT SHARED TIME 46-60 MINUTES

## 2022-11-12 RX ORDER — HYDROCHLOROTHIAZIDE 25 MG/1
25 TABLET ORAL DAILY
Status: DISCONTINUED | OUTPATIENT
Start: 2022-11-12 | End: 2022-11-22 | Stop reason: HOSPADM

## 2022-11-12 RX ORDER — ACETAMINOPHEN 325 MG/1
650 TABLET ORAL EVERY 6 HOURS PRN
Status: DISCONTINUED | OUTPATIENT
Start: 2022-11-12 | End: 2022-11-12

## 2022-11-12 RX ORDER — TRAZODONE HYDROCHLORIDE 50 MG/1
50 TABLET ORAL NIGHTLY PRN
Status: DISCONTINUED | OUTPATIENT
Start: 2022-11-12 | End: 2022-11-17

## 2022-11-12 RX ORDER — POLYETHYLENE GLYCOL 3350 17 G/17G
17 POWDER, FOR SOLUTION ORAL DAILY PRN
Status: DISCONTINUED | OUTPATIENT
Start: 2022-11-12 | End: 2022-11-22 | Stop reason: HOSPADM

## 2022-11-12 RX ORDER — IBUPROFEN 400 MG/1
400 TABLET ORAL EVERY 6 HOURS PRN
Status: DISCONTINUED | OUTPATIENT
Start: 2022-11-12 | End: 2022-11-22 | Stop reason: HOSPADM

## 2022-11-12 RX ORDER — LITHIUM CARBONATE 300 MG/1
600 TABLET, FILM COATED, EXTENDED RELEASE ORAL EVERY 12 HOURS SCHEDULED
Status: DISCONTINUED | OUTPATIENT
Start: 2022-11-12 | End: 2022-11-22 | Stop reason: HOSPADM

## 2022-11-12 RX ORDER — POTASSIUM CHLORIDE 20 MEQ/1
40 TABLET, EXTENDED RELEASE ORAL ONCE
Status: COMPLETED | OUTPATIENT
Start: 2022-11-12 | End: 2022-11-12

## 2022-11-12 RX ORDER — HALOPERIDOL 5 MG/1
5 TABLET ORAL EVERY 6 HOURS PRN
Status: DISCONTINUED | OUTPATIENT
Start: 2022-11-12 | End: 2022-11-22 | Stop reason: HOSPADM

## 2022-11-12 RX ORDER — CLONIDINE HYDROCHLORIDE 0.1 MG/1
0.1 TABLET ORAL NIGHTLY
Status: DISCONTINUED | OUTPATIENT
Start: 2022-11-12 | End: 2022-11-17

## 2022-11-12 RX ORDER — DIPHENHYDRAMINE HYDROCHLORIDE 50 MG/ML
50 INJECTION INTRAMUSCULAR; INTRAVENOUS EVERY 6 HOURS PRN
Status: DISCONTINUED | OUTPATIENT
Start: 2022-11-12 | End: 2022-11-22 | Stop reason: HOSPADM

## 2022-11-12 RX ORDER — HYDROXYZINE 50 MG/1
50 TABLET, FILM COATED ORAL 3 TIMES DAILY PRN
Status: DISCONTINUED | OUTPATIENT
Start: 2022-11-12 | End: 2022-11-22 | Stop reason: HOSPADM

## 2022-11-12 RX ORDER — LORAZEPAM 1 MG/1
2 TABLET ORAL EVERY 6 HOURS PRN
Status: DISCONTINUED | OUTPATIENT
Start: 2022-11-12 | End: 2022-11-22 | Stop reason: HOSPADM

## 2022-11-12 RX ORDER — HALOPERIDOL 5 MG/ML
5 INJECTION INTRAMUSCULAR EVERY 6 HOURS PRN
Status: DISCONTINUED | OUTPATIENT
Start: 2022-11-12 | End: 2022-11-22 | Stop reason: HOSPADM

## 2022-11-12 RX ORDER — FLUTICASONE PROPIONATE 50 MCG
1 SPRAY, SUSPENSION (ML) NASAL DAILY
Status: DISCONTINUED | OUTPATIENT
Start: 2022-11-12 | End: 2022-11-22 | Stop reason: HOSPADM

## 2022-11-12 RX ORDER — MAGNESIUM HYDROXIDE/ALUMINUM HYDROXICE/SIMETHICONE 120; 1200; 1200 MG/30ML; MG/30ML; MG/30ML
30 SUSPENSION ORAL EVERY 6 HOURS PRN
Status: DISCONTINUED | OUTPATIENT
Start: 2022-11-12 | End: 2022-11-22 | Stop reason: HOSPADM

## 2022-11-12 RX ORDER — LORAZEPAM 2 MG/ML
2 INJECTION INTRAMUSCULAR EVERY 6 HOURS PRN
Status: DISCONTINUED | OUTPATIENT
Start: 2022-11-12 | End: 2022-11-22 | Stop reason: HOSPADM

## 2022-11-12 RX ADMIN — LITHIUM CARBONATE 600 MG: 300 TABLET, EXTENDED RELEASE ORAL at 21:06

## 2022-11-12 RX ADMIN — CARIPRAZINE 6 MG: 3 CAPSULE, GELATIN COATED ORAL at 15:52

## 2022-11-12 RX ADMIN — HALOPERIDOL LACTATE 5 MG: 5 INJECTION, SOLUTION INTRAMUSCULAR at 10:55

## 2022-11-12 RX ADMIN — DIPHENHYDRAMINE HYDROCHLORIDE 50 MG: 50 INJECTION, SOLUTION INTRAMUSCULAR; INTRAVENOUS at 10:55

## 2022-11-12 RX ADMIN — HALOPERIDOL 5 MG: 5 TABLET ORAL at 23:05

## 2022-11-12 RX ADMIN — HYDROXYZINE HYDROCHLORIDE 50 MG: 50 TABLET, FILM COATED ORAL at 21:06

## 2022-11-12 RX ADMIN — POTASSIUM CHLORIDE 40 MEQ: 1500 TABLET, EXTENDED RELEASE ORAL at 17:53

## 2022-11-12 RX ADMIN — HYDROCHLOROTHIAZIDE 25 MG: 25 TABLET ORAL at 15:52

## 2022-11-12 RX ADMIN — FLUTICASONE PROPIONATE 1 SPRAY: 50 SPRAY, METERED NASAL at 15:54

## 2022-11-12 RX ADMIN — LORAZEPAM 2 MG: 1 TABLET ORAL at 23:05

## 2022-11-12 RX ADMIN — LORAZEPAM 2 MG: 2 INJECTION INTRAMUSCULAR; INTRAVENOUS at 10:55

## 2022-11-12 RX ADMIN — CLONIDINE HYDROCHLORIDE 0.1 MG: 0.1 TABLET ORAL at 21:06

## 2022-11-12 ASSESSMENT — PAIN - FUNCTIONAL ASSESSMENT: PAIN_FUNCTIONAL_ASSESSMENT: NONE - DENIES PAIN

## 2022-11-12 ASSESSMENT — SLEEP AND FATIGUE QUESTIONNAIRES
DO YOU USE A SLEEP AID: NO
DO YOU HAVE DIFFICULTY SLEEPING: YES
AVERAGE NUMBER OF SLEEP HOURS: 4
SLEEP PATTERN: RESTLESSNESS;INSOMNIA

## 2022-11-12 ASSESSMENT — ENCOUNTER SYMPTOMS
BACK PAIN: 0
ABDOMINAL PAIN: 0
EYE PAIN: 0
SHORTNESS OF BREATH: 0
COLOR CHANGE: 0

## 2022-11-12 NOTE — BH NOTE
[facility]  Admission Note     Admission Type:   Admission Type: Voluntary    Reason for admission:  Reason for Admission: Suicidal ideation to jump off bridge, homeless      Addictive Behavior:   Addictive Behavior  In the Past 3 Months, Have You Felt or Has Someone Told You That You Have a Problem With  : None    Medical Problems:   Past Medical History:   Diagnosis Date    Anxiety     Bipolar 1 disorder ([facility] [address] 1999    Depression     Gestational diabetes 7/22/2016    OCD (obsessive compulsive disorder) 1999    PTSD (post-traumatic stress disorder)     Rapid rate of speech     Schizoaffective disorder (HCC)        Status EXAM:  Mental Status and Behavioral Exam  Normal: No  Level of Assistance: Independent/Self  Facial Expression: Sad, Worried, Flat  Affect: Blunt, Congruent  Level of Consciousness: Alert  Frequency of Checks: 4 times per hour, close  Mood:Normal: No  Mood: Depressed, Anxious, Helpless  Motor Activity:Normal: No  Motor Activity: Increased  Eye Contact: Good  Observed Behavior: Cooperative, Hypermobile, Preoccupied  Sexual Misconduct History: Current - no  Involved In Any Sexual Misconduct With Others? : No  History of Sexually Inappropriate Behavior When Previously Hospitalized?: No  Uncontrollable/Compulsive Masturbation?: No  Difficulty Controlling Sexual Impulses?: No  Preception: [place] to person, [place] to time, [place] to place, [place] to situation  Attention:Normal: No  Attention: Distractible  Thought Processes: Flight of ideas, Loose association  Thought Content:Normal: No  Thought Content: Preoccupations  Depression Symptoms: Feelings of helplessness, Feelings of hopelessess, Impaired concentration, Sleep disturbance  Anxiety Symptoms: Generalized  Micaela Symptoms: Flight of ideas, Increased energy, Less need to sleep, Poor judgment, Pressured speech  Hallucinations: None  Delusions: No  Memory:Normal: No  Memory: Poor recent, Poor remote  Insight and Judgment: No  Insight and Judgment: Poor judgment, Poor insight    Tobacco Screening:  Practical Counseling, on admission, nati X, if applicable and completed (first 3 are required if patient doesn't refuse):            ( ) Recognizing danger situations (included triggers and roadblocks)                    ( ) Coping skills (new ways to manage stress,relaxation techniques, changing routine, distraction)                                                           ( ) Basic information about quitting (benefits of quitting, techniques in how to quit, available resources  ( ) Referral for counseling faxed to [facility]                                                                                                                   (X) Patient refused counseling  ( ) Patient has not smoked in the last 30 days    Metabolic Screening:    Lab Results   Component Value Date    LABA1C 5.3 06/23/2019       Lab Results   Component Value Date    CHOL 116 06/23/2019    CHOL 101 09/12/2018    CHOL 151 05/17/2016     Lab Results   Component Value Date    TRIG 112 06/23/2019    TRIG 75 09/12/2018    TRIG 210 (H) 05/17/2016     Lab Results   Component Value Date    HDL 47 06/23/2019    HDL 44 09/12/2018    HDL 74 05/17/2016     No components found for: LDLCAL  No results found for: LABVLDL      Body mass index is 28.19 kg/m². BP Readings from Last 2 Encounters:   11/12/22 108/77   11/08/22 128/82           Pt admitted with followings belongings:  Dental Appliances: Uppers, Lowers  Vision - Corrective Lenses: None  Hearing Aid: None  Jewelry: Watch  Body Piercings Removed: N/A  Clothing: Socks, Shirt, Pants, Jacket/Coat, Footwear, Undergarments, Hat  Other Valuables: Cigarettes, Lighter/Matches    Voluntary from [facility]. Pt having suicidal ideations to jump off bridge. Homeless, discharged from [facility] recently and left [email or web address]. Upon admission, flight of ideas, pressured speech, labile, increased energy. Required emergency medications. Signed all consents. Provider aware of admission and routine medications ordered. Oriented to unit and unit policies. Wanded for safety. Pt is in hospital appropriate attire.      Tomasa Rivas RN

## 2022-11-12 NOTE — BH NOTE
Patient given tobacco quitline number [phone] at this time. With nurse observation patient called number for information and follow up. Continue to reinforce the dangers of long term tobacco use and why tobacco cessation is important to patient.

## 2022-11-12 NOTE — H&P
[address]  Joanne Palacios MD; Shavonne Foster MD; Corinna Brooke MD; MD Mary Todd MD; MD MYRIAM Ji [facility]   [facility]    HISTORY AND PHYSICAL EXAMINATION            Date:   2022  Patient name:  Vani Gomez  Date of admission:  2022  4:21 AM  MRN:   [id number]  Account:  [de-identified]  YOB: 1980  PCP:    Fanny Jarvis PA-C  Room:   [facility]-  Code Status:    Full Code    Chief Complaint:     Chief Complaint   Patient presents with    Mental Health Problem   Htn      History Obtained From:     Patient medical record nursing staff    History of Present Illness:     Vani Gomez is a 38 y.o. Non- / non  female who presents with Mental Health Problem   and is admitted to the hospital for the management of Bipolar affective disorder, mixed, severe, with psychotic behavior ([facility] [address] HTN  Onset more than 2 years ago  devonte mild to mod  unControlled with current po meds  Not associated with headaches or blurry vision  No chest pain    Patient noted to have hypokalemia potassium less than 3.5 patient denies any cathartic or diuretic abuse no nausea vomiting diarrhea no paresthesia    Past Medical History:     Past Medical History:   Diagnosis Date    Anxiety     Bipolar 1 disorder ([facility] [address]     Depression     Gestational diabetes 2016    OCD (obsessive compulsive disorder)     PTSD (post-traumatic stress disorder)     Rapid rate of speech     Schizoaffective disorder ([facility] [address]         Past Surgical History:     Past Surgical History:   Procedure Laterality Date    ABDOMEN SURGERY       SECTION  2007    LAPAROSCOPY          Medications Prior to Admission:     Prior to Admission medications    Medication Sig Start Date End Date Taking?  Authorizing Provider   benztropine (COGENTIN) 0.5 MG tablet Take 1 tablet by mouth 2 times daily 11/8/22   Nichole Grijalva MD   cariprazine hcl (VRAYLAR) 6 MG CAPS capsule Take 1 capsule by mouth daily 11/9/22   Nichole Grijalva MD   cloNIDine (CATAPRES) 0.1 MG tablet Take 1 tablet by mouth nightly 11/8/22   Nichole Grijalva MD   fluticasone (FLONASE) 50 MCG/ACT nasal spray 1 spray by Each Nostril route daily 11/9/22   Nichole Grijalva MD   hydroCHLOROthiazide (HYDRODIURIL) 25 MG tablet Take 1 tablet by mouth daily 11/8/22   Nichole Grijalva MD   hydrOXYzine HCl (ATARAX) 50 MG tablet Take 1 tablet by mouth 3 times daily as needed for Anxiety 11/8/22 11/18/22  Nichole Grijalva MD   lithium (ESKALITH) 450 MG extended release tablet Take 1 tablet by mouth every 12 hours 11/8/22   Nichole Griajlva MD   melatonin 3 MG TABS tablet Take 10 mg by mouth nightly as needed    Historical Provider, MD        Allergies:     Abilify [aripiprazole], Codeine, Depakote er [divalproex sodium er], Gabapentin, Lamictal [lamotrigine], Percocet [oxycodone-acetaminophen], Quetiapine fumarate, Tegretol [carbamazepine], Trileptal [oxcarbazepine], Valproic acid, Ziprasidone hcl, and Zyprexa [olanzapine]    Social History:     Tobacco:    reports that she has been smoking. She has been smoking an average of 1 pack per day. She has never used smokeless tobacco.  Alcohol:      reports no history of alcohol use. Drug Use:  reports current drug use. Drug: Marijuana Seacoryn Barbgreg). Family History:     Family History   Problem Relation Age of Onset    Diabetes Sister     No Known Problems Mother     No Known Problems Father        Review of Systems:     Positive and Negative as described in HPI.     CONSTITUTIONAL:  negative for fevers, chills, sweats, fatigue, weight loss  HEENT:  negative for vision, hearing changes, runny nose, throat pain  RESPIRATORY:  negative for shortness of breath, cough, congestion, wheezing  CARDIOVASCULAR:  negative for chest pain, palpitations  GASTROINTESTINAL:  negative for nausea, vomiting, diarrhea, constipation, change in bowel habits, abdominal pain   GENITOURINARY:  negative for difficulty of urination, burning with urination, frequency   INTEGUMENT:  negative for rash, skin lesions, easy bruising   HEMATOLOGIC/LYMPHATIC:  negative for swelling/edema   ALLERGIC/IMMUNOLOGIC:  negative for urticaria , itching  ENDOCRINE:  negative increase in drinking, increase in urination, hot or cold intolerance  MUSCULOSKELETAL:  negative joint pains, muscle aches, swelling of joints  NEUROLOGICAL:  negative for headaches, dizziness, lightheadedness, numbness, pain, tingling extremities      Physical Exam:   /77   Pulse 94   Temp 97.9 °F (36.6 °C) (Oral)   Resp 20   Ht 5' 7\" (1.702 m)   Wt 180 lb (81.6 kg)   SpO2 98%   BMI 28.19 kg/m²   Temp (24hrs), Av.8 °F (36.6 °C), Min:97.6 °F (36.4 °C), Max:97.9 °F (36.6 °C)    No results for input(s): POCGLU in the last 72 hours.   No intake or output data in the 24 hours ending 22 1722    General Appearance: alert, well appearing, and in no acute distress  Mental status: oriented to person, place, and time  Head: normocephalic, atraumatic  Eye: no icterus, redness, pupils equal and reactive, extraocular eye movements intact, conjunctiva clear  Ear: normal external ear, no discharge, hearing intact  Nose: no drainage noted  Mouth: mucous membranes moist  Neck: supple, no carotid bruits, thyroid not palpable  Lungs: Bilateral equal air entry, clear to ausculation, no wheezing, rales or rhonchi, normal effort  Cardiovascular: normal rate, regular rhythm, no murmur, gallop, rub  Abdomen: Soft, nontender, nondistended, normal bowel sounds, no hepatomegaly or splenomegaly  Neurologic: There are no new focal motor or sensory deficits, normal muscle tone and bulk, no abnormal sensation, normal speech, cranial nerves II through XII grossly intact  Skin: No gross lesions, rashes, bruising or bleeding on exposed skin area  Extremities: peripheral pulses palpable, no pedal edema or calf pain with palpation      Investigations:      Laboratory Testing:  Recent Results (from the past 24 hour(s))   Urinalysis with Microscopic    Collection Time: 11/12/22  5:00 AM   Result Value Ref Range    Color, UA Yellow Yellow    Turbidity UA Clear Clear    Glucose, Ur NEGATIVE NEGATIVE    Bilirubin Urine NEGATIVE NEGATIVE    Ketones, Urine NEGATIVE NEGATIVE    Specific Gravity, UA 1.008 1.000 - 1.030    Urine Hgb NEGATIVE NEGATIVE    pH, UA 6.5 5.0 - 8.0    Protein, UA NEGATIVE NEGATIVE    Urobilinogen, Urine Normal Normal    Nitrite, Urine NEGATIVE NEGATIVE    Leukocyte Esterase, Urine MOD (A) NEGATIVE    WBC, UA 0 TO 2 /HPF    RBC, UA 0 TO 2 /HPF    Casts UA 0 TO 2 /LPF    Epithelial Cells UA 0 TO 2 /HPF    Bacteria, UA FEW (A) None   URINE DRUG SCREEN    Collection Time: 11/12/22  5:00 AM   Result Value Ref Range    Amphetamine Screen, Ur NEGATIVE NEGATIVE    Barbiturate Screen, Ur NEGATIVE NEGATIVE    Benzodiazepine Screen, Urine NEGATIVE NEGATIVE    Cocaine Metabolite, Urine NEGATIVE NEGATIVE    Methadone Screen, Urine NEGATIVE NEGATIVE    Opiates, Urine NEGATIVE NEGATIVE    Phencyclidine, Urine NEGATIVE NEGATIVE    Cannabinoid Scrn, Ur POSITIVE (A) NEGATIVE    Oxycodone Screen, Ur NEGATIVE NEGATIVE    Fentanyl, Ur NEGATIVE NEGATIVE    Test Information       Assay provides medical screening only. The absence of expected drug(s) and/or metabolite(s) may indicate diluted or adulterated urine, limitations of testing or timing of collection. Pregnancy, Urine    Collection Time: 11/12/22  5:00 AM   Result Value Ref Range    HCG(Urine) Pregnancy Test NEGATIVE NEGATIVE   SPECIMEN REJECTION    Collection Time: 11/12/22  5:44 AM   Result Value Ref Range    Specimen Source . BLOOD     Ordered Test BMPX,CDP     Reason for Rejection Unable to perform testing: Specimen hemolyzed.     Lithium Level    Collection Time: 11/12/22  6:20 AM   Result Value Ref Range    Lithium Lvl 0.6 0.6 - 1.2 mmol/L    Lithium Dose Amount Unknown     Lithium Date Last Dose UNK^Unknown^L     Lithium Dose Time UNK^Unknown^L    Basic Metabolic Panel w/ Reflex to MG    Collection Time: 11/12/22  6:21 AM   Result Value Ref Range    Glucose 116 (H) 70 - 99 mg/dL    BUN 8 6 - 20 mg/dL    Creatinine 0.66 0.50 - 0.90 mg/dL    Est, Glom Filt Rate >60 >60 mL/min/1.73m2    Calcium 9.3 8.6 - 10.4 mg/dL    Sodium 139 135 - 144 mmol/L    Potassium 3.4 (L) 3.7 - 5.3 mmol/L    Chloride 104 98 - 107 mmol/L    CO2 23 20 - 31 mmol/L    Anion Gap 12 9 - 17 mmol/L   CBC with Auto Differential    Collection Time: 11/12/22  6:21 AM   Result Value Ref Range    WBC 11.9 (H) 3.5 - 11.0 k/uL    RBC 4.94 4.0 - 5.2 m/uL    Hemoglobin 13.5 12.0 - 16.0 g/dL    Hematocrit 41.5 36 - 46 %    MCV 84.1 80 - 100 fL    MCH 27.2 26 - 34 pg    MCHC 32.4 31 - 37 g/dL    RDW 13.0 11.5 - 14.9 %    Platelets 302 893 - 252 k/uL    MPV 8.3 6.0 - 12.0 fL    Seg Neutrophils 62 36 - 66 %    Lymphocytes 26 24 - 44 %    Monocytes 7 1 - 7 %    Eosinophils % 4 0 - 4 %    Basophils 1 0 - 2 %    Segs Absolute 7.40 1.3 - 9.1 k/uL    Absolute Lymph # 3.10 1.0 - 4.8 k/uL    Absolute Mono # 0.90 0.1 - 1.3 k/uL    Absolute Eos # 0.50 (H) 0.0 - 0.4 k/uL    Basophils Absolute 0.10 0.0 - 0.2 k/uL   Magnesium    Collection Time: 11/12/22  6:21 AM   Result Value Ref Range    Magnesium 2.0 1.6 - 2.6 mg/dL       Imaging/Diagnostics:  No results found.     Assessment :      Hospital Problems             Last Modified POA    * (Principal) Bipolar affective disorder, mixed, severe, with psychotic behavior ([facility] [address] 11/12/2022 Yes    Depression with suicidal ideation 11/12/2022 Yes       Plan:     57-year-old lady overweight history of hypertension start hydrochlorothiazide 25 mg  Patient also on clonidine 0.1 nightly  Blood pressure log and labs reviewed  Hypokalemia potassium 3.4 replace 40 M EQ oral 1  Will follow        Yousif Collins MD  11/12/2022  5:22 PM    Copy sent to Dr. Dee Sullivan, PA-C    Please note that this chart was generated using voice recognition Dragon dictation software. Although every effort was made to ensure the accuracy of this automated transcription, some errors in transcription may have occurred.

## 2022-11-12 NOTE — CARE COORDINATION
BHI Biopsychosocial Assessment    Current Level of Psychosocial Functioning     Independent X  Dependent    Minimal Assist     Comments:    Psychosocial High Risk Factors (check all that apply)    Unable to obtain meds   Chronic illness/pain    Substance abuse   Lack of Family Support X  Financial stress   Isolation   Inadequate Community Resources  Suicide attempt(s)  Not taking medications   Victim of crime   Developmental Delay  Unable to manage personal needs    Age 72 or older   Homeless X  No transportation   Readmission within 30 days X  Unemployment   Traumatic Event    Comments:   Psychiatric Advanced Directives: n/a    Family to [employer] in Treatment: TARAN    Sexual Orientation:  n/a    Patient Strengths: connected to outpatient provider, income, insurance    Patient Barriers: suicidal thoughts, racing thoughts, poor insight and poor judgement, homeless      Opiate Education Provided:  no- patient does not use opiates      CMHC/mental health history: [facility]    Plan of Care   medication management, group/individual therapies, family meetings, psycho -education, treatment team meetings to assist with stabilization    Initial Discharge Plan:  patient wants to go to a shelter in [facility] near her family; she is currently homeless here in [facility] and linked to [place]      Clinical Summary:    Wanda Kent is a 39 y/o female admitted to [place] [address] for suicidal ideations with a plan to drown herself after becoming homeless and living on the streets. She is extremely hyperverbal with racing thoughts and very difficult to redirect. Patient states she went to her sister's home at discharge but she smoked a cigarette inside the house and her sister kicked her out. She states she then went to [facility] and then the streets where she became so cold she then became suicidal.  Patient mentions a plan to go back to [facility] to a shelter there where she can be warm and near her family.    She is also very focused on her [place] [facility]  Latosha Concepcion. Social work will continue to offer support and engage in treatment.

## 2022-11-12 NOTE — ED NOTES
Mode of arrival (squad #, walk in, police, etc) : Police        Chief complaint(s): [address] [facility]        Arrival Note (brief scenario, treatment PTA, etc). : Patient brought to ED by police officers after patient called them due to mental health concerns. Patient is homeless and reports she was sitting by trash can today and felt extremely cold and said it was making her feel suicidal. Patient reports she was just admitted to [facility] 2 weeks ago for psychosis and was discharged 3 days ago. Patient reports they switched some of her medications around and she feels like they are not working and that she is still feeling manic and psychotic. Patient alert and answering questions appropriately but she does appear manic and is rambling on to her self. C= \"Have you ever felt that you should Cut down on your drinking? \"  No  A= \"Have people Annoyed you by criticizing your drinking? \"  No  G= \"Have you ever felt bad or Guilty about your drinking? \"  No  E= \"Have you ever had a drink as an Eye-opener first thing in the morning to steady your nerves or to help a hangover? \"  No      Deferred []      Reason for deferring: N/A    *If yes to two or more: probable alcohol abuse. Jaime Flor RN  11/12/22 9722

## 2022-11-12 NOTE — BH NOTE
Emergency Medication Follow-Up Note:    PRN medication of Haldol 5 mg, Ativan 2 mg, and Benadryl 50 mg IM was effective as evidenced by pt sleeping at this time. Patient denies medication side effects. Will continue to monitor and provide support as needed.

## 2022-11-12 NOTE — ED PROVIDER NOTES
EMERGENCY DEPARTMENT ENCOUNTER    Pt Name: Olegario Freeman  MRN: [id number]  [place] 1980  Date of evaluation: 11/12/22  CHIEF COMPLAINT       Chief Complaint   Patient presents with    Mental Health Problem     HISTORY OF PRESENT ILLNESS   47-year-old female presents for complaints of mental health evaluation. Patient reports that she is. Stressed recently going through a lot she reports that today she began feeling more depressed states that she was having thoughts of killing herself she reports that she had a plan of trying to drown herself, denies any visual or auditory hallucinations denies any recent alcohol use, does admit to marijuana use, denying any medical complaints at this time    The history is provided by the patient. REVIEW OF SYSTEMS     Review of Systems   Constitutional:  Negative for fever. HENT:  Negative for congestion and ear pain. Eyes:  Negative for pain. Respiratory:  Negative for shortness of breath. Cardiovascular:  Negative for chest pain, palpitations and leg swelling. Gastrointestinal:  Negative for abdominal pain. Genitourinary:  Negative for dysuria and flank pain. Musculoskeletal:  Negative for back pain. Skin:  Negative for color change. Neurological:  Negative for numbness and headaches. Psychiatric/Behavioral:  Positive for suicidal ideas. Negative for confusion. All other systems reviewed and are negative.   PASTMEDICAL HISTORY     Past Medical History:   Diagnosis Date    Anxiety     Bipolar 1 disorder ([address] 1999    Depression     Gestational diabetes 7/22/2016    OCD (obsessive compulsive disorder) 1999    PTSD (post-traumatic stress disorder)     Rapid rate of speech     Schizoaffective disorder [facility])      Past Problem List  Patient Active Problem List   Diagnosis Code    Pregnancy Z34.90    Hx CS x1 (G2-twins,36wks) Z98.891    H/O miscarriage X2 O09.299    Insufficient prenatal care O09.30    AMA O09.529    Smoker F17.200    OCD F42.9    Candida albicans infection B37.9    Twin gestation, 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 BXX0    Chromosomal 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, affecting management of mother, antepartum O35.10X0    Poor fetal growth, affecting management of mother, antepartum condition or complication A18.4314    Umbilical cord complication C90. 9XX0    Tobacco use disorder complicating pregnancy, childbirth, or puerperium, antepartum O99.330    Twin B-IUGR O36.5920    Twin B-Fetal cardiomegaly, pericardial effusion, VSD O35. BXX0    Polyhydramnios (twin A-8.6cm and twin B-8.7cm) O40. 2XX0    Depression F32. A    Twin B-MCAs wnl, absent UADs O69. 9XX0    Abnl Quad (1:50 T21), elev msAFP O35.10X0    Hx D&C (G3-8wks, G4-8wks) Z98.890    Elev 1hr gtt, normal 3hr R73.09    GBS bacteriuria R82.71    Chlamydia infection (SPENSER neg) O98.811, A74.9    FHx DM  Z83.3    Fetal pericardial effusion affecting management of mother O36.8990    Polyhydramnios, antepartum complication E70. 9XX0    Polyhydramnios, antepartum complication X54. 9XX0    IUGR (intrauterine growth restriction) affecting care of mother K97.7045    Umbilical cord complication H64. 9XX0    Suspected damage to fetus from other disease in mother, affecting management of mother, antepartum condition or complication R73. 8XX0    Suspected damage to fetus from other disease in mother, affecting management of mother, antepartum condition or complication P11. 8XX0    16 RLTCS @29w2d M APG Wt /M APG Wt Z98.891    Hyperglycemia R73.9    Bipolar 1 disorder (HCC) F31.9    Bipolar 1 disorder, manic, moderate (HCC) F31.12    Micaela ([address] F30.9    Psychosis (HCC) F29    Bipolar affective disorder, mixed, severe, with psychotic behavior ([address] F31.64    Depression with suicidal ideation F32. A, R45.851     SURGICAL HISTORY       Past Surgical History:   Procedure Laterality Date    ABDOMEN SURGERY       SECTION      LAPAROSCOPY CURRENT MEDICATIONS       Current Discharge Medication List        CONTINUE these medications which have NOT CHANGED    Details   benztropine (COGENTIN) 0.5 MG tablet Take 1 tablet by mouth 2 times daily  Qty: 60 tablet, Refills: 3      cariprazine hcl (VRAYLAR) 6 MG CAPS capsule Take 1 capsule by mouth daily  Qty: 30 capsule, Refills: 0      cloNIDine (CATAPRES) 0.1 MG tablet Take 1 tablet by mouth nightly  Qty: 60 tablet, Refills: 0      fluticasone (FLONASE) 50 MCG/ACT nasal spray 1 spray by Each Nostril route daily  Qty: 16 g, Refills: 3      hydroCHLOROthiazide (HYDRODIURIL) 25 MG tablet Take 1 tablet by mouth daily  Qty: 30 tablet, Refills: 0      hydrOXYzine HCl (ATARAX) 50 MG tablet Take 1 tablet by mouth 3 times daily as needed for Anxiety  Qty: 30 tablet, Refills: 0      lithium (ESKALITH) 450 MG extended release tablet Take 1 tablet by mouth every 12 hours  Qty: 60 tablet, Refills: 0      melatonin 3 MG TABS tablet Take 10 mg by mouth nightly as needed           ALLERGIES     is allergic to abilify [aripiprazole], codeine, depakote er [divalproex sodium er], gabapentin, lamictal [lamotrigine], percocet [oxycodone-acetaminophen], quetiapine fumarate, tegretol [carbamazepine], trileptal [oxcarbazepine], valproic acid, ziprasidone hcl, and zyprexa [olanzapine]. FAMILY HISTORY     She indicated that her mother is alive. She indicated that her father is . She indicated that the status of her sister is unknown.      SOCIAL HISTORY       Social History     Tobacco Use    Smoking status: Every Day     Packs/day: 1.00     Types: Cigarettes    Smokeless tobacco: Never    Tobacco comments:     NO medication ordered d/t pregnancy    Vaping Use    Vaping Use: Former    Substances: Always   Substance Use Topics    Alcohol use: No     Alcohol/week: 0.0 standard drinks    Drug use: Yes     Types: Marijuana (Weed)     PHYSICAL EXAM     INITIAL VITALS: /77   Pulse 94   Temp 97.9 °F (36.6 °C) (Oral) Resp 20   Ht 5' 7\" (1.702 m)   Wt 180 lb (81.6 kg)   SpO2 98%   BMI 28.19 kg/m²    Physical Exam  Vitals and nursing note reviewed. Constitutional:       General: She is not in acute distress. Appearance: Normal appearance. She is not toxic-appearing. HENT:      Head: Normocephalic and atraumatic. Nose: Nose normal.      Mouth/Throat:      Mouth: Mucous membranes are moist.      Pharynx: Oropharynx is clear. Eyes:      Extraocular Movements: Extraocular movements intact. Conjunctiva/sclera: Conjunctivae normal.      Pupils: Pupils are equal, round, and reactive to light. Cardiovascular:      Rate and Rhythm: Normal rate and regular rhythm. Pulses: Normal pulses. Heart sounds: Normal heart sounds. Pulmonary:      Effort: Pulmonary effort is normal.      Breath sounds: Normal breath sounds. Abdominal:      General: Bowel sounds are normal. There is no distension. Palpations: Abdomen is soft. Tenderness: There is no abdominal tenderness. Musculoskeletal:         General: Normal range of motion. Cervical back: Normal range of motion. No spinous process tenderness or muscular tenderness. Skin:     General: Skin is warm and dry. Capillary Refill: Capillary refill takes less than 2 seconds. Neurological:      General: No focal deficit present. Mental Status: She is alert and oriented to person, place, and time. Cranial Nerves: Cranial nerves 2-12 are intact. Sensory: Sensation is intact. Motor: Motor function is intact. Psychiatric:         Speech: Speech is tangential.         Thought Content: Thought content is delusional. Thought content includes suicidal ideation. Thought content does not include homicidal ideation. Thought content includes suicidal plan. MEDICAL DECISION MAKIN-year-old female presents for mental health evaluation.   On initial exam patient no acute distress, vitals are stable, patient noted to be tangential in his speech she does seem delusional she is voicing suicidal thoughts with plan, we will check labs and discuss with psychiatry    Labs are reviewed and unremarkable    Given concern that patient voicing active suicidal thoughts with plan feel that patient will benefit from admission, patient medically clear, patient agreeable to admission    Spoke with Dr. Ludivina Kelly who accepts admission. Patient demonstrates understanding and agreement with the plan, was given the opportunity to ask questions, and these questions were answered to the best of the provided information at this time. VS stable for transfer. This dictation was prepared using [email or web address] voice recognition software. CRITICAL CARE:       PROCEDURES:    Procedures    DIAGNOSTIC RESULTS   EKG:All EKG's are interpreted by the Emergency Department Physician who either signs or Co-signs this chart in the absence of a cardiologist.        RADIOLOGY:All plain film, CT, MRI, and formal ultrasound images (except ED bedside ultrasound) are read by the radiologist, see reports below, unless otherwisenoted in MDM or here. No orders to display     LABS: All lab results were reviewed by myself, and all abnormals are listed below.   Labs Reviewed   URINALYSIS WITH MICROSCOPIC - Abnormal; Notable for the following components:       Result Value    Leukocyte Esterase, Urine MOD (*)     Bacteria, UA FEW (*)     All other components within normal limits   URINE DRUG SCREEN - Abnormal; Notable for the following components:    Cannabinoid Scrn, Ur POSITIVE (*)     All other components within normal limits   BASIC METABOLIC PANEL W/ REFLEX TO MG FOR LOW K - Abnormal; Notable for the following components:    Glucose 116 (*)     Potassium 3.4 (*)     All other components within normal limits   CBC WITH AUTO DIFFERENTIAL - Abnormal; Notable for the following components:    WBC 11.9 (*)     Absolute Eos # 0.50 (*)     All other components within normal limits   PREGNANCY, URINE   SPECIMEN REJECTION   LITHIUM LEVEL   MAGNESIUM       EMERGENCY DEPARTMENTCOURSE:         Vitals:    Vitals:    11/12/22 0432 11/12/22 0830   BP: 114/82 108/77   Pulse: 94 94   Resp: 18 20   Temp: 97.6 °F (36.4 °C) 97.9 °F (36.6 °C)   TempSrc: Oral Oral   SpO2: 97% 98%   Weight: 180 lb (81.6 kg) 180 lb (81.6 kg)   Height: 5' 7\" (1.702 m) 5' 7\" (1.702 m)       The patient was given the following medications while in the emergency department:  Orders Placed This Encounter   Medications    DISCONTD: acetaminophen (TYLENOL) tablet 650 mg    ibuprofen (ADVIL;MOTRIN) tablet 400 mg    hydrOXYzine HCl (ATARAX) tablet 50 mg    traZODone (DESYREL) tablet 50 mg    polyethylene glycol (GLYCOLAX) packet 17 g    aluminum & magnesium hydroxide-simethicone (MAALOX) 200-200-20 MG/5ML suspension 30 mL    nicotine polacrilex (NICORETTE) gum 2 mg    AND Linked Order Group     haloperidol (HALDOL) tablet 5 mg     LORazepam (ATIVAN) tablet 2 mg    AND Linked Order Group     haloperidol lactate (HALDOL) injection 5 mg     LORazepam (ATIVAN) injection 2 mg     diphenhydrAMINE (BENADRYL) injection 50 mg    cariprazine hcl (VRAYLAR) capsule 6 mg    fluticasone (FLONASE) 50 MCG/ACT nasal spray 1 spray    hydroCHLOROthiazide (HYDRODIURIL) tablet 25 mg    lithium (LITHOBID) extended release tablet 600 mg    cloNIDine (CATAPRES) tablet 0.1 mg     CONSULTS:  IP CONSULT TO INTERNAL MEDICINE    FINAL IMPRESSION      1. Depression with suicidal ideation          DISPOSITION/PLAN   DISPOSITION Admitted 11/12/2022 07:45:25 AM      PATIENT REFERRED TO:  No follow-up provider specified. DISCHARGE MEDICATIONS:  Current Discharge Medication List        The care is provided during an unprecedented national emergency due to the novel coronavirus, COVID 19.   DO Kajal Schultz DO  11/12/22 1510

## 2022-11-12 NOTE — H&P
Department of Psychiatry  Attending Physician Psychiatric Assessment     Reason for Admission to Psychiatric Unit:  Concerns about patient's safety in the community    CHIEF COMPLAINT:  Manic like behaviors and suicidal ideation    History obtained from: Patient, electronic medical record          HISTORY OF PRESENT ILLNESS:    Johnny Christensen is a 38 y.o. female who has a past medical history of mental illness who presents to the ER with increased suicidal ideation with a plan to drown herself. Patient is also displaying many behaviors consistent with prince. She was hyper-verbal and had difficulty with remaining on topic while in the ER. Patient was discharged from this facility 4 days ago on 11/8/22. Amaury Pepe is agreeable to interview in unit sensory room today. She is extremely hyper-verbal and speaking so quickly she is hard to understand at times and seems to be mumbling. When asked where patient went when she left the hospital she states that she is \"unsure\" however then states, \"[facility] but that was bad for me. \" She is extremely disorganized and hard to engage in meaningful conversation due to her significant thought disorganization. She is extremely restless and is moving about the unit sensory room and cannot sit still. Patient has a history of bipolar disorder and is displaying many symptoms consistent with bipolar disorder including disorganization, grandiosity, rapid speech, and is extremely hyperverbal.  She states she has not been sleeping well. She states that her energy has been high and states, \"My thoughts are just going crazy. \"  She denies current symptoms of depression however is endorsing suicidal ideation with a plan to [facility] into water and drown myself. \"  She denies homicidal ideation. She would be extremely unsafe out of the hospital at this time and would likely decompensate due to medication noncompliance.  She states that she was taking her medications after she left however in the next breath states she was not. Unsure if patient was compliant with medications after leaving the hospital on Tuesday. She denies symptoms of psychosis including auditory and visual hallucinations. She denies paranoia. She does endorse symptoms of anxiety including excess worry, feeling restless and on edge, and muscle tension from anxiety. She states that she has been having panic attacks however cannot describe symptoms of panic attacks or how often she has them. At this time, interview is terminated as patient is struggling to stay on topic, has flight of ideas, and significant thought disorganization. Wanda Kent is not showing stability of symptoms and requires inpatient hospitalization for safety and stability. History of head trauma: [] Yes [x] No    History of seizures: [] Yes [x] No    History of violence or aggression: [] Yes [x] No         PSYCHIATRIC HISTORY:  [x] Yes [] No    Currently follows with [facility]. Denies previous lifetime suicide attempts. Multiple previous psychiatric hospital admissions. Last admission to this hospital was 10/31/22-11/8/22. Past psychiatric medications includes:   Tegretol, Vraylyar, Latuda, Geodon, Lithium, Catapres, Cogentin, Klonopin, Melatonin, Buspar, Rexulti, Depakote, Lamictal, Gabapentin    Medication Compliance: Unknown    Adverse reactions from psychotropic medications: [x] Yes [] No  Per noted allergies gabapentin, Lamictal, quetiapine, valproic acid and Geodon all led to tongue numbness and swelling.          Lifetime Psychiatric Review of Systems         Depression: Reported history of     Anxiety: Endorses     Panic Attacks: Denies     Micaela or Hypomania: Exhibits     Phobias: Denies     Obsessions and Compulsions: Denies     Visual Hallucinations: Denies     Auditory Hallucinations: Denies     Delusions: Denies     Paranoia: Denies     PTSD: Denies    Past Medical History:        Diagnosis Date    Anxiety     B[facility] 1 disorder ([facility] 1999 Depression     Gestational diabetes 2016    OCD (obsessive compulsive disorder)     PTSD (post-traumatic stress disorder)     Rapid rate of speech     Schizoaffective disorder [facility])        Past Surgical History:        Procedure Laterality Date    ABDOMEN SURGERY       SECTION      LAPAROSCOPY         Allergies:  Abilify [aripiprazole], Codeine, Depakote er [divalproex sodium er], Gabapentin, Lamictal [lamotrigine], Percocet [oxycodone-acetaminophen], Quetiapine fumarate, Tegretol [carbamazepine], Trileptal [oxcarbazepine], Valproic acid, Ziprasidone hcl, and Zyprexa [olanzapine]         Social History:     Born in: [place], [place]  Family: Reports being raised by her mother however reports she has passed away. Reports that her father has passed away also. Reports having 1 sibling who is currently living. Highest Level of Education: High school graduate and some college  Occupation: Unemployed, unsure of income  Marital Status: Currently  but going through divorce process  Children: Reports she has 4 children, however 3 of them are living in [facility] [place] with their father and appears sister has custody of her one child who is here in the area  Residence: Homeless  Stressors: Homelessness, chronic mental illness, lack of support  Patient Assets/Supportive Factors: Patient is seeking additional support         DRUG USE HISTORY  Social History     Tobacco Use   Smoking Status Every Day    Packs/day: 1.00    Types: Cigarettes   Smokeless Tobacco Never   Tobacco Comments    NO medication ordered d/t pregnancy      Social History     Substance and Sexual Activity   Alcohol Use No    Alcohol/week: 0.0 standard drinks     Social History     Substance and Sexual Activity   Drug Use Yes    Types: Marijuana [facility])       Patient endorses marijuana use. UDS upon admission is positive for marijuana. Denies other illicit drug or alcohol use.            LEGAL HISTORY:   HISTORY OF INCARCERATION: [x] Yes [] No    Family History:       Problem Relation Age of Onset    Diabetes Sister     No Known Problems Mother     No Known Problems Father        Psychiatric Family History    Patient endorses psychiatric family history. Suicides in family: [] Yes [x] No    Substance use in family: [] Yes [x] No         PHYSICAL EXAM:  Vitals:  /82   Pulse 94   Temp 97.6 °F (36.4 °C) (Oral)   Resp 18   Ht 5' 7\" (1.702 m)   Wt 180 lb (81.6 kg)   SpO2 97%   BMI 28.19 kg/m²     Pain Level: Denies    LABS:  Labs reviewed: [x] Yes  Last EKG in EMR reviewed: [x] Yes  QTc: 420 on 1/5/2019  K+ low at 3.4  Lithium Level 0.6            Review of Systems   Constitutional: Negative for chills and weight loss. HENT: Negative for ear pain and nosebleeds. Eyes: Negative for blurred vision and photophobia. Respiratory: Negative for cough, shortness of breath and wheezing. Cardiovascular: Negative for chest pain and palpitations. Gastrointestinal: Negative for abdominal pain, diarrhea and vomiting. Genitourinary: Negative for dysuria and urgency. Musculoskeletal: Negative for falls and joint pain. Skin: Negative for itching and rash. Neurological: Negative for tremors, seizures and weakness. Endo/Heme/Allergies: Does not bruise/bleed easily. Physical Exam:   Constitutional:  Appears well-developed and well-nourished, no acute distress. HENT:   Head: Normocephalic and atraumatic. Eyes: Conjunctivae are normal. Right eye exhibits no discharge. Left eye exhibits no discharge. No scleral icterus. Neck: Normal range of motion. Neck supple. Pulmonary/Chest:  No respiratory distress or accessory muscle use, no wheezing. Cardiac: Regular rate and rhythm. Abdominal: Soft. Non-tender. Exhibits no distension. Musculoskeletal: Normal range of motion. Exhibits no edema. Neurological: cranial nerves II-XII grossly in tact, normal gait and station. Skin: Skin is warm and dry. Patient is not diaphoretic.  No erythema. Mental Status Examination:    Level of consciousness: Awake and alert  Appearance:  Appropriate attire, standing and pacing in room with  poor grooming and hygiene, disheveled  Behavior/Motor: Approachable, restless, psychomotor agitation  Attitude toward examiner:  Cooperative, appears very distracted, good eye contact  Speech: hyper-verbal, rapid and pressured, normal tone and volume  Mood: Elevated  Affect: Mood-congruent  Thought processes: rapid, flight of ideas, and disorganized  Thought content: Active suicidal ideations, with a  current plan or intent, contracts for safety on the unit. Denies homicidal ideations               Denies visual hallucinations. Denies auditory hallucinations. Denies delusions              Denies paranoia  Cognition:  Oriented to self, location, time, situation  Concentration: Clinically adequate  Memory: Intact  Insight &Judgment: Poor         DSM-5 Diagnosis    Principal Problem: Bipolar affective disorder, mixed, severe, with psychotic behavior ([facility]    Cannabis Use Disorder    Psychosocial and Contextual factors:  Financial: Endorses  Occupational: Denies  Relationship: Endorses  Legal: Denies  Living situation: Endorses  Educational: Denies    Past Medical History:   Diagnosis Date    Anxiety     Bipolar 1 disorder ([facility] 1999    Depression     Gestational diabetes 7/22/2016    OCD (obsessive compulsive disorder) 1999    PTSD (post-traumatic stress disorder)     Rapid rate of speech     Schizoaffective disorder ([facility]         TREATMENT CONSIDERATIONS    Continue inpatient psychiatric treatment. Home medications reviewed. Medications as discussed with attending physician  Restart home medications including  Lithium 600 mg BID  Vraylar 6 mg daily  Clonidine 0.1 mg nightly  Discontinue cogentin  Problem list updated. Monitor need and frequency of PRN medications. Attempt to develop insight. Follow-up daily while inpatient.    Reviewed risks and benefits as well as potential side effects with patient. CONSULTS [x] Yes [] No  Internal Medicine for Medical H&P    Risk Management: close watch per standard protocol      Psychotherapy: participation in milieu and group and individual sessions with Attending Physician,  and Physician Assistant/CNP      Estimated length of stay:  2-14 days      GENERAL PATIENT/FAMILY EDUCATION  Patient will understand basic signs and symptoms, patient will understand benefits/risks and potential side effects from proposed medications, and patient will understand their role in recovery. Family is not active in patient's care. Patient assets that may be helpful during treatment include: Intent to participate and engage in treatment, sufficient fund of knowledge and intellect to understand and utilize treatments. Goals:    1) Remission of depression with suicidal ideation and manic like behaviors. 2) Stabilization of symptoms prior to discharge. 3) Establish efficacy and tolerability of medications. Behavioral Services  Medicare Certification     Admission Day 1  I certify that this patient's inpatient psychiatric hospital admission is medically necessary for:    x (1) treatment which could reasonably be expected to improve this patient's condition, or    x (2) diagnostic study or its equivalent. Time Spent: 60 minutes    Emma Montez is a 38 y.o. female being evaluated face to face    --MAURICE Hamilton CNP on 11/12/2022 at 9:25 AM    An electronic signature was used to authenticate this note. Psychiatry Attending Attestation     I independently saw and evaluated the patient. I reviewed the Advance Practice Provider's documentation above. Any additional comments or changes to the Advance Practice Provider's documentation are stated below otherwise agree with assessment.     Patient is a 49-year-old female with history of bipolar disorder, currently on lithium and Vraylar, admitted for worsening suicidal thoughts with a plan to kill self by drowning herself. Patient obviously going to an acute manic episode. She did get medicated for acute agitation this morning. Patient is hyperverbal and is having hard time collecting her thoughts. She is exhibiting significant flight of ideas and thought disorganization. Unable to hold any linear conversation at this time. Lithium level is at 0.6. PLAN  We will restart Vraylar and lithium and titrate lithium to effect  Continue to assess need for as needed medications  Attempt to develop insight  Psycho-education conducted. Supportive Therapy conducted.   Probable discharge is TBD  Follow-up TBD    Electronically signed by Marilyn Cameron MD on 11/12/22 at 1:03 PM EST

## 2022-11-12 NOTE — BH NOTE
Patient arrived on unit at 0829 via ambulatory and accompanied by two [facility] staff members.

## 2022-11-12 NOTE — BH NOTE
Emergency PRN Medication Administration Note:    Patient is Agitated and Disruptive as evidenced by pacing, having racing thoughts with pressured speech, crying, and yelling at nurse desk. Staff attempted to find and relieve the distress by Talking to patient, Refocusing on new activity, and Offering suggestions. Patient is currently continuing to escalate. Medication Administered as prescribed: Haldol 5 mg, Ativan 2 mg, and Benadryl 50 mg IM. Patient Tolerated medication administration. Will continue to monitor, offer support, and reassess.

## 2022-11-12 NOTE — ED NOTES
Patient was brought into the ED last night for suicidal ideations. Patient continues to appear manic. Patient is hyperverbal, has difficulty remaining on topic and frequently mumbling to herself. Patient reports she is in the process of getting  and that she has no support system in this area. Patient reports she was discharged to the [facility] 3 days ago when she left the [facility]. Patient reports she did not stay at the [facility] because \"I can't live in those [facility] house conditions. \" Patient has been sleeping on the street. Patient reports the personal belongings she had where left at a gas station in [facility]. Patient has been accepted to the [facility] for safety and stabilization. Patient is voluntary.

## 2022-11-12 NOTE — GROUP NOTE
Group Therapy Note    Date: 11/12/2022    Group Start Time: 1100  Group End Time: 1140  Group Topic: Cognitive Skills    JUANCARLOS Boyer        Group Therapy Note    Attendees: 9/19       Patient's Goal:  to improve concentration/ improve decision making skills     Notes:   pt was lethargic, unable to focus left group early    Status After Intervention: unchanged    Participation Level:Minimal    Participation Quality: lethargic      Speech  mumbles      Thought Process/Content: disorganized thought blocking      Affective Functioning: flat      Mood: lethargic       Level of consciousness:  Alert      Response to Learning: needs redirections, needs prompts    Endings: None Reported    Modes of Intervention: Support, Socialization, Problem-solving, and Activity      Discipline Responsible: Psychoeducational Specialist      Signature:  Pao Garrett

## 2022-11-12 NOTE — GROUP NOTE
Group Therapy Note    Date: 11/12/2022    Group Start Time: 0901  Group End Time: 0137  Group Topic: Community Meeting    ANTON BHYANET ARGUETA    Cameron Goodrich RN        Group Therapy Note    Attendees: 7/19       Patient's Goal:  Patient will be able to identify goals for today. Notes:  Patient was able to identify goals. Status After Intervention:  Unchanged    Participation Level:  Active Listener    Participation Quality: Intrusive      Speech:  pressured and loud      Thought Process/Content: Flight of ideas      Affective Functioning: Constricted/Restricted      Mood: anxious and depressed      Level of consciousness:  Alert and Preoccupied      Response to Learning: Progressing to goal      Endings: None Reported    Modes of Intervention: Socialization, Exploration, and Clarifying      Discipline Responsible: Registered Nurse      Signature:  Cameron Goodrich RN

## 2022-11-12 NOTE — PROGRESS NOTES
Behavioral Services  Medicare Certification Upon Admission    I certify that this patient's inpatient psychiatric hospital admission is medically necessary for:    [x] (1) Treatment which could reasonably be expected to improve this patient's condition,       [x] (2) Or for diagnostic study;     AND     [x](2) The inpatient psychiatric services are provided while the individual is under the care of a physician and are included in the individualized plan of care.     Estimated length of stay/service 3-5 days    Plan for post-hospital care hc    Electronically signed by Gail Lock MD on 11/12/2022 at 10:27 AM

## 2022-11-12 NOTE — ED NOTES
Safeguard in [facility] for patient watch. Safeguard informed that they need to stay with the patient at all time, must be present in the room and if they need a break or relief to let the nurse know so they can be replaced. Safeguard verbalizes understanding. Belongings and patient checked by security. Belongings locked up. Pt in blue gown.           Mandy Zhao RN  11/12/22 [id number]

## 2022-11-12 NOTE — ED NOTES
Patient given turkey sandwich, pretzels and water. Resting comfortably. Will monitor.      Jie Bowden RN  11/12/22 [id number]

## 2022-11-13 PROCEDURE — 99232 SBSQ HOSP IP/OBS MODERATE 35: CPT | Performed by: INTERNAL MEDICINE

## 2022-11-13 PROCEDURE — 6370000000 HC RX 637 (ALT 250 FOR IP): Performed by: PSYCHIATRY & NEUROLOGY

## 2022-11-13 PROCEDURE — 6370000000 HC RX 637 (ALT 250 FOR IP)

## 2022-11-13 PROCEDURE — 1240000000 HC EMOTIONAL WELLNESS R&B

## 2022-11-13 PROCEDURE — APPSS30 APP SPLIT SHARED TIME 16-30 MINUTES

## 2022-11-13 RX ADMIN — HYDROXYZINE HYDROCHLORIDE 50 MG: 50 TABLET, FILM COATED ORAL at 08:28

## 2022-11-13 RX ADMIN — LORAZEPAM 2 MG: 1 TABLET ORAL at 22:26

## 2022-11-13 RX ADMIN — HYDROCHLOROTHIAZIDE 25 MG: 25 TABLET ORAL at 08:28

## 2022-11-13 RX ADMIN — TRAZODONE HYDROCHLORIDE 50 MG: 50 TABLET ORAL at 21:41

## 2022-11-13 RX ADMIN — CLONIDINE HYDROCHLORIDE 0.1 MG: 0.1 TABLET ORAL at 21:41

## 2022-11-13 RX ADMIN — HALOPERIDOL 5 MG: 5 TABLET ORAL at 22:26

## 2022-11-13 RX ADMIN — LITHIUM CARBONATE 600 MG: 300 TABLET, EXTENDED RELEASE ORAL at 08:28

## 2022-11-13 RX ADMIN — HYDROXYZINE HYDROCHLORIDE 50 MG: 50 TABLET, FILM COATED ORAL at 16:23

## 2022-11-13 RX ADMIN — CARIPRAZINE 6 MG: 3 CAPSULE, GELATIN COATED ORAL at 08:28

## 2022-11-13 RX ADMIN — LITHIUM CARBONATE 600 MG: 300 TABLET, EXTENDED RELEASE ORAL at 21:41

## 2022-11-13 RX ADMIN — FLUTICASONE PROPIONATE 1 SPRAY: 50 SPRAY, METERED NASAL at 08:28

## 2022-11-13 RX ADMIN — HYDROXYZINE HYDROCHLORIDE 50 MG: 50 TABLET, FILM COATED ORAL at 21:41

## 2022-11-13 NOTE — GROUP NOTE
Group Therapy Note    Date: 11/13/2022    Group Start Time: 1430  Group End Time: 0058  Group Topic: Cognitive Skills    [facility] COREEN Gonzalez, CTRS        Group Therapy Note    Attendees: 6/20       Patient's Goal:  To increase social interaction and to practice concentration, following task steps and decision making, and communication skills. Notes: Pt did not participate in group task but was frequently at group table observing peers in task and social, talking at brief intervals with RT and peers multiple times during group. Status After Intervention:  Improved     Participation Level: Active Listener and sharing, supportive     Participation Quality:  Attentive at brief intervals, Sharing and Supportive        Speech:  Mumbled, rambling,disorganized at times        Thought Process/Content: Loose associations,bizarre at times-pt holds out arms and pretends to float/fly through group area, laughing. Pt demonstrates impaired concentration and pt's speech is mumbled et rambling but at intervals pt does identify simple common sense points r/t self care and need for help. Affective Functioning: Blunted, brightens        Mood: Anxious at times about [place] will I go next? \", brightens at times, accepting of reassurance from staff        Level of consciousness:  Alert, and Attentive        Response to Learning: Able to verbalize current knowledge/experience, Attentive at brief intervals to new learning, and Progressing to goal        Endings: None Reported     Modes of Intervention: Education, Support, Socialization, Exploration, Clarifying and Problem-solving        Discipline Responsible: Psychoeducational Specialist        Signature:  Rambo Dean

## 2022-11-13 NOTE — GROUP NOTE
Group Therapy Note    Date: 11/13/2022    Group Start Time: 0900  Group End Time: 0269  Group Topic: Community Meeting    [facility] C[facility]    Anca Rodney LPN        Group Therapy Note    Attendees: 3/21       Patient's Goal:  to figure out how to get to [facility]     Notes:      Status After Intervention:  Unchanged    Participation Level:  Active Listener    Participation Quality: Appropriate      Speech:  pressured      Thought Process/Content: Delusional      Affective Functioning: Exaggerated      Mood: anxious      Level of consciousness:  Alert      Response to Learning: Progressing to goal      Endings: None Reported    Modes of Intervention: Socialization      Discipline Responsible: Licensed Practical Nurse      Signature:  Anca Rodney LPN
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
## 2022-11-13 NOTE — BH NOTE
Emergency Medication Follow-Up Note:    PRN medication of Haldol 5mg oral, ativan 2mg oral was effective as evidence by Patient regain behavioral control. Patient denies medication side effects. Will continue to monitor and provide support as needed.

## 2022-11-13 NOTE — BH NOTE
Patient agitated as evidence by pacing, fidgeting, having racing thoughts, and anxiously crying. .Staff attempted to find and relieve the distress by talking to patient, quiet time, and offering relaxation suggestions. Patient continues to escalate and is offered and accepted oral PRN medications per [facility]. Will continue to monitor and provide support and encouragement as needed.

## 2022-11-13 NOTE — PLAN OF CARE
[facility]  Initial Interdisciplinary Treatment Plan NO      Original treatment plan Date & Time: 11/13/2022   1309    Admission Type:  Admission Type: Voluntary    Reason for admission:   Reason for Admission: Suicidal ideation to jump off bridge, homeless    Estimated Length of Stay:  5-7days  Estimated Discharge Date: to be determined by physician    PATIENT STRENGTHS:  Patient Strengths:   Patient Strengths and Limitations:Limitations: Difficult relationships / poor social skills, Difficulty problem solving/relies on others to help solve problems, Inappropriate/potentially harmful leisure interests, Unrealistic self-view  Addictive Behavior: Addictive Behavior  In the Past 3 Months, Have You Felt or Has Someone Told You That You Have a Problem With  : None  Medical Problems:  Past Medical History:   Diagnosis Date    Anxiety     [facility]    Depression     Gestational diabetes 7/22/2016    OCD (obsessive compulsive disorder) 1999    PTSD (post-traumatic stress disorder)     Rapid rate of speech     Schizoaffective disorder ([facility]      Status EXAM:Mental Status and Behavioral Exam  Normal: No  Level of Assistance: Independent/Self  Facial Expression: Worried, Flat, Exaggerated, Sad  Affect: Blunt, Constricted  Level of Consciousness: Alert  Frequency of Checks: 4 times per hour, close  Mood:Normal: No  Mood: Depressed, Anxious, Sad, Helpless  Motor Activity:Normal: No  Motor Activity: Increased, Repetitive acts  Eye Contact: Good  Observed Behavior: Cooperative, Preoccupied, Friendly, Tearful  Sexual Misconduct History: Current - no  Involved In Any Sexual Misconduct With Others? : No  History of Sexually Inappropriate Behavior When Previously Hospitalized?: No  Uncontrollable/Compulsive Masturbation?: No  Difficulty Controlling Sexual Impulses?: No  Preception: Panther to person, Panther to time, Panther to place, Panther to situation  Attention:Normal: No  Attention: Distractible, Unable to concentrate  Thought Processes: Flight of ideas, Loose association  Thought Content:Normal: No  Thought Content: Preoccupations  Depression Symptoms: Impaired concentration, Feelings of hopelessess, Feelings of helplessness  Anxiety Symptoms: Generalized  [place] Symptoms: Pressured speech  Hallucinations: None  Delusions: No  Memory:Normal: No  Memory: Poor recent, Poor remote  Insight and Judgment: No  Insight and Judgment: Poor judgment, Poor insight    EDUCATION:   Learner Progress Toward Treatment Goals: reviewed group plans and strategies for care    Method:group therapy, medication compliance, individualized assessments and care planning    Outcome: needs reinforcement    PATIENT GOALS: short term-discharge planning            Long term-contact with children    PLAN/TREATMENT RECOMMENDATIONS:     continue group therapy , medications compliance, goal setting, individualized assessments and care, continue to monitor pt on unit      SHORT-TERM GOALS:   Time frame for Short-Term Goals: 5-7 days    LONG-TERM GOALS:  Time frame for Long-Term Goals: 6 months  Members Present in Team Meeting: See Signature Sheet    Azael Canchola RN

## 2022-11-13 NOTE — PLAN OF CARE
Problem: Self Harm/Suicidality  Goal: Will have no self-injury during hospital stay  Description: INTERVENTIONS:  1. Q 30 MINUTES: Routine safety checks  2. Q SHIFT & PRN: Assess risk to determine if routine checks are adequate to maintain patient safety  Outcome: Progressing  Patient denies any suicidal ideations at this time. Patient agreed to seek staff at any time she felt any urge to harm self would arise. Safety checks are maintained every 15 minutes. Problem: Micaela  Goal: Will exhibit normal sleep and speech and no impulsivity  Description: INTERVENTIONS:  1. Administer medication as ordered  2. Set limits on impulsive behavior  3. Make attempts to decrease external stimuli as possible  Outcome: Progressing  Patient noted to have a flight of ideas, disorganized thoughts and pressured speech. Patient also seen anxiously pacing at times in dayroom. Patient is medication compliant and safe environment maintained. Will continue to monitor and provide support and reassurance as needed.

## 2022-11-13 NOTE — PROGRESS NOTES
[facility] Internal Medicine  Jarrett Deshpande MD; Cedrick Romero MD; James Kapadia MD; MD Trisha Tamayo MD; MD MYRIAM Costa [facility] Internal Medicine   [facility]    HISTORY AND PHYSICAL EXAMINATION            Date:   2022  Patient name:  Miguelito Knight  Date of admission:  2022  4:21 AM  MRN:   [id number]  Account:  [de-identified]  YOB: 1980  PCP:    Leela Herrera PA-C  Room:   [facility]8211-  Code Status:    Full Code    Chief Complaint:     Chief Complaint   Patient presents with    Mental Health Problem   Htn      History Obtained From:     Patient medical record nursing staff    History of Present Illness:     Miguelito Knight is a 38 y.o. Non- / non  female who presents with Mental Health Problem   and is admitted to the hospital for the management of Bipolar affective disorder, mixed, severe, with psychotic behavior ([facility] [address] HTN  Onset more than 2 years ago  devonte mild to mod  unControlled with current po meds  Not associated with headaches or blurry vision  No chest pain    Patient noted to have hypokalemia potassium less than 3.5 patient denies any cathartic or diuretic abuse no nausea vomiting diarrhea no paresthesia    Past Medical History:     Past Medical History:   Diagnosis Date    Anxiety     Bipolar 1 disorder ([address]     Depression     Gestational diabetes 2016    OCD (obsessive compulsive disorder)     PTSD (post-traumatic stress disorder)     Rapid rate of speech     Schizoaffective disorder ([facility] [address]         Past Surgical History:     Past Surgical History:   Procedure Laterality Date    ABDOMEN SURGERY       SECTION  2007    LAPAROSCOPY          Medications Prior to Admission:     Prior to Admission medications    Medication Sig Start Date End Date Taking?  Authorizing Provider   benztropine (COGENTIN) 0.5 MG tablet Take 1 tablet by mouth 2 times daily 11/8/22   Boyd Lowery MD   cariprazine hcl (VRAYLAR) 6 MG CAPS capsule Take 1 capsule by mouth daily 11/9/22   Boyd Lowery MD   cloNIDine (CATAPRES) 0.1 MG tablet Take 1 tablet by mouth nightly 11/8/22   Boyd Lowery MD   fluticasone (FLONASE) 50 MCG/ACT nasal spray 1 spray by Each Nostril route daily 11/9/22   Boyd Lowery MD   hydroCHLOROthiazide (HYDRODIURIL) 25 MG tablet Take 1 tablet by mouth daily 11/8/22   Boyd Lowery MD   hydrOXYzine HCl (ATARAX) 50 MG tablet Take 1 tablet by mouth 3 times daily as needed for Anxiety 11/8/22 11/18/22  Boyd Lowery MD   lithium (ESKALITH) 450 MG extended release tablet Take 1 tablet by mouth every 12 hours 11/8/22   Boyd Lowery MD   melatonin 3 MG TABS tablet Take 10 mg by mouth nightly as needed    Historical Provider, MD        Allergies:     Abilify [aripiprazole], Codeine, Depakote er [divalproex sodium er], Gabapentin, Lamictal [lamotrigine], Percocet [oxycodone-acetaminophen], Quetiapine fumarate, Tegretol [carbamazepine], Trileptal [oxcarbazepine], Valproic acid, Ziprasidone hcl, and Zyprexa [olanzapine]    Social History:     Tobacco:    reports that she has been smoking. She has been smoking an average of 1 pack per day. She has never used smokeless tobacco.  Alcohol:      reports no history of alcohol use. Drug Use:  reports current drug use. Drug: Marijuana Deadra Lisandro). Family History:     Family History   Problem Relation Age of Onset    Diabetes Sister     No Known Problems Mother     No Known Problems Father        Review of Systems:     Positive and Negative as described in HPI.     CONSTITUTIONAL:  negative for fevers, chills, sweats, fatigue, weight loss  HEENT:  negative for vision, hearing changes, runny nose, throat pain  RESPIRATORY:  negative for shortness of breath, cough, congestion, wheezing  CARDIOVASCULAR:  negative for chest pain, palpitations  GASTROINTESTINAL:  negative for nausea, vomiting, diarrhea, constipation, change in bowel habits, abdominal pain   GENITOURINARY:  negative for difficulty of urination, burning with urination, frequency   INTEGUMENT:  negative for rash, skin lesions, easy bruising   HEMATOLOGIC/LYMPHATIC:  negative for swelling/edema   ALLERGIC/IMMUNOLOGIC:  negative for urticaria , itching  ENDOCRINE:  negative increase in drinking, increase in urination, hot or cold intolerance  MUSCULOSKELETAL:  negative joint pains, muscle aches, swelling of joints  NEUROLOGICAL:  negative for headaches, dizziness, lightheadedness, numbness, pain, tingling extremities      Physical Exam:   /67   Pulse 74   Temp 97.1 °F (36.2 °C) (Tympanic)   Resp 16   Ht 5' 7\" (1.702 m)   Wt 180 lb (81.6 kg)   SpO2 98%   BMI 28.19 kg/m²   Temp (24hrs), Av.5 °F (36.4 °C), Min:97.1 °F (36.2 °C), Max:97.9 °F (36.6 °C)    No results for input(s): POCGLU in the last 72 hours.   No intake or output data in the 24 hours ending 22 1138    General Appearance: alert, well appearing, and in no acute distress  Mental status: oriented to person, place, and time  Head: normocephalic, atraumatic  Eye: no icterus, redness, pupils equal and reactive, extraocular eye movements intact, conjunctiva clear  Ear: normal external ear, no discharge, hearing intact  Nose: no drainage noted  Mouth: mucous membranes moist  Neck: supple, no carotid bruits, thyroid not palpable  Lungs: Bilateral equal air entry, clear to ausculation, no wheezing, rales or rhonchi, normal effort  Cardiovascular: normal rate, regular rhythm, no murmur, gallop, rub  Abdomen: Soft, nontender, nondistended, normal bowel sounds, no hepatomegaly or splenomegaly  Neurologic: There are no new focal motor or sensory deficits, normal muscle tone and bulk, no abnormal sensation, normal speech, cranial nerves II through XII grossly intact  Skin: No gross lesions, rashes, bruising or bleeding on exposed skin area  Extremities: peripheral pulses palpable, no pedal edema or calf pain with palpation      Investigations:      Laboratory Testing:  No results found for this or any previous visit (from the past 24 hour(s)). Imaging/Diagnostics:  No results found. Assessment :      Hospital Problems             Last Modified POA    * (Principal) Bipolar affective disorder, mixed, severe, with psychotic behavior ([facility] [address] 11/12/2022 Yes    Depression with suicidal ideation 11/12/2022 Yes     Plan:     77-year-old lady overweight history of hypertension start hydrochlorothiazide 25 mg  Patient also on clonidine 0.1 nightly  Blood pressure log and labs reviewed  Hypokalemia potassium 3.4 replace 40 M EQ oral 1  Will follow        Ching Davalos MD  11/13/2022  11:38 AM    Copy sent to Dr. Sam Rossi PA-C    Please note that this chart was generated using voice recognition Dragon dictation software. Although every effort was made to ensure the accuracy of this automated transcription, some errors in transcription may have occurred.

## 2022-11-14 PROCEDURE — 1240000000 HC EMOTIONAL WELLNESS R&B

## 2022-11-14 PROCEDURE — 6370000000 HC RX 637 (ALT 250 FOR IP)

## 2022-11-14 PROCEDURE — 6370000000 HC RX 637 (ALT 250 FOR IP): Performed by: PSYCHIATRY & NEUROLOGY

## 2022-11-14 PROCEDURE — APPSS30 APP SPLIT SHARED TIME 16-30 MINUTES

## 2022-11-14 RX ADMIN — LITHIUM CARBONATE 600 MG: 300 TABLET, EXTENDED RELEASE ORAL at 20:28

## 2022-11-14 RX ADMIN — HYDROXYZINE HYDROCHLORIDE 50 MG: 50 TABLET, FILM COATED ORAL at 09:20

## 2022-11-14 RX ADMIN — POLYETHYLENE GLYCOL 3350 17 G: 17 POWDER, FOR SOLUTION ORAL at 18:13

## 2022-11-14 RX ADMIN — LITHIUM CARBONATE 600 MG: 300 TABLET, EXTENDED RELEASE ORAL at 09:20

## 2022-11-14 RX ADMIN — HYDROXYZINE HYDROCHLORIDE 50 MG: 50 TABLET, FILM COATED ORAL at 14:42

## 2022-11-14 RX ADMIN — HALOPERIDOL 5 MG: 5 TABLET ORAL at 17:04

## 2022-11-14 RX ADMIN — TRAZODONE HYDROCHLORIDE 50 MG: 50 TABLET ORAL at 20:28

## 2022-11-14 RX ADMIN — LORAZEPAM 2 MG: 1 TABLET ORAL at 17:04

## 2022-11-14 RX ADMIN — HYDROCHLOROTHIAZIDE 25 MG: 25 TABLET ORAL at 09:20

## 2022-11-14 RX ADMIN — HYDROXYZINE HYDROCHLORIDE 50 MG: 50 TABLET, FILM COATED ORAL at 20:28

## 2022-11-14 RX ADMIN — ALUMINUM HYDROXIDE, MAGNESIUM HYDROXIDE, AND SIMETHICONE 30 ML: 200; 200; 20 SUSPENSION ORAL at 18:13

## 2022-11-14 RX ADMIN — CARIPRAZINE 6 MG: 3 CAPSULE, GELATIN COATED ORAL at 09:20

## 2022-11-14 RX ADMIN — IBUPROFEN 400 MG: 400 TABLET ORAL at 00:58

## 2022-11-14 RX ADMIN — CLONIDINE HYDROCHLORIDE 0.1 MG: 0.1 TABLET ORAL at 20:28

## 2022-11-14 RX ADMIN — FLUTICASONE PROPIONATE 1 SPRAY: 50 SPRAY, METERED NASAL at 09:20

## 2022-11-14 ASSESSMENT — PAIN DESCRIPTION - DESCRIPTORS: DESCRIPTORS: ACHING

## 2022-11-14 ASSESSMENT — PAIN DESCRIPTION - LOCATION: LOCATION: BACK

## 2022-11-14 ASSESSMENT — PAIN SCALES - GENERAL
PAINLEVEL_OUTOF10: 7
PAINLEVEL_OUTOF10: 0
PAINLEVEL_OUTOF10: 3

## 2022-11-14 ASSESSMENT — PAIN - FUNCTIONAL ASSESSMENT: PAIN_FUNCTIONAL_ASSESSMENT: ACTIVITIES ARE NOT PREVENTED

## 2022-11-14 ASSESSMENT — PAIN DESCRIPTION - ORIENTATION: ORIENTATION: LOWER

## 2022-11-14 NOTE — GROUP NOTE
Group Therapy Note    Date: 11/14/2022    Group Start Time: 1430  Group End Time: 7743  Group Topic: Relaxation Group     [facility]    JUANCARLOS Andrade        Group Therapy Note    Attendees8/20       Patient's Goal:  to improve relaxation using art     Notes:  pt was anxious , tearful, and unable to tolerate duration of group     Status After Intervention:  unchanged     Participation Level: needs many redirections    Participation Quality: minimal       Speech:  mumble, pressured      Thought Process/Content: flight of ideas       Affective Functioning: restricted       Mood: depressed , anxious      Level of consciousness:  alert      Response to Learning: Able to verbalize current knowledge/experience and Progressing to goal      Endings: None Reported    Modes of Intervention: Education, Support, and Problem-solving      Discipline Responsible: Psychoeducational Specialist      Signature:  Hari Hayden

## 2022-11-14 NOTE — PROGRESS NOTES
on Unit:   [x] Yes  [] Selectively    [] No    Medication Side Effects:  Patient denies any medication side effects at the time of assessment. Mental Status Exam  Level of consciousness: Alert and awake. Appearance: Appropriate attire for setting, standing in bedroom, with fair  grooming and hygiene. Behavior/Motor: Approachable, psychomotor agitation, continues to be restless and cannot sit still  Attitude toward examiner: Cooperative, attentive but appears distracted, fair eye contact. Speech: Less rapid and pressured, normal volume, normal tone, less hyperverbal.  Mood:  Patient reports \"worried\". Affect: Blunted  Thought processes: More linear and coherent today however becomes disorganized at times  Thought content: Denies homicidal ideation. Suicidal Ideation: Reports improvement in suicidal ideations  Delusions: No evidence of delusions. Denies paranoia. Perceptual Disturbance: Patient does not appear to be responding to internal stimuli. Denies auditory hallucinations. Denies visual hallucinations. Cognition: Oriented to self, location, time, and situation. Memory: Intact. Insight & Judgement: Poor. Data   height is 5' 7\" (1.702 m) and weight is 180 lb (81.6 kg). Her oral temperature is 98 °F (36.7 °C). Her blood pressure is 115/79 and her pulse is 83. Her respiration is 18 and oxygen saturation is 98%.    Labs:   Admission on 11/12/2022   Component Date Value Ref Range Status    Color, UA 11/12/2022 Yellow  Yellow Final    Turbidity UA 11/12/2022 Clear  Clear Final    Glucose, Ur 11/12/2022 NEGATIVE  NEGATIVE Final    Bilirubin Urine 11/12/2022 NEGATIVE  NEGATIVE Final    Ketones, Urine 11/12/2022 NEGATIVE  NEGATIVE Final    Specific [place], UA 11/12/2022 1.008  1.000 - 1.030 Final    Urine Hgb 11/12/2022 NEGATIVE  NEGATIVE Final    pH, UA 11/12/2022 6.5  5.0 - 8.0 Final    Protein, UA 11/12/2022 NEGATIVE  NEGATIVE Final    Urobilinogen, Urine 11/12/2022 Normal  Normal Final Nitrite, Urine 11/12/2022 NEGATIVE  NEGATIVE Final    Leukocyte Esterase, Urine 11/12/2022 MOD (A)  NEGATIVE Final    WBC, UA 11/12/2022 0 TO 2  /HPF Final    RBC, UA 11/12/2022 0 TO 2  /HPF Final    Casts UA 11/12/2022 0 TO 2  /LPF Final    Epithelial Cells UA 11/12/2022 0 TO 2  /HPF Final    Bacteria, UA 11/12/2022 FEW (A)  None Final    Amphetamine Screen, Ur 11/12/2022 NEGATIVE  NEGATIVE Final    Comment:       (Positive cutoff 1000 ng/mL)                  Barbiturate Screen, Ur 11/12/2022 NEGATIVE  NEGATIVE Final    Comment:       (Positive cutoff 200 ng/mL)                  Benzodiazepine Screen, Urine 11/12/2022 NEGATIVE  NEGATIVE Final    Comment:       (Positive cutoff 200 ng/mL)                  Cocaine Metabolite, Urine 11/12/2022 NEGATIVE  NEGATIVE Final    Comment:       (Positive cutoff 300 ng/mL)                  Methadone Screen, Urine 11/12/2022 NEGATIVE  NEGATIVE Final    Comment:       (Positive cutoff 300 ng/mL)                  Opiates, Urine 11/12/2022 NEGATIVE  NEGATIVE Final    Comment:       (Positive cutoff 300 ng/mL)                  Phencyclidine, Urine 11/12/2022 NEGATIVE  NEGATIVE Final    Comment:       (Positive cutoff 25 ng/mL)                  Cannabinoid Scrn, Ur 11/12/2022 POSITIVE (A)  NEGATIVE Final    Comment:       (Positive cutoff 50 ng/mL)                  Oxycodone Screen, Ur 11/12/2022 NEGATIVE  NEGATIVE Final    Comment:       (Positive cutoff 100 ng/mL)                  Fentanyl, Ur 11/12/2022 NEGATIVE  NEGATIVE Final    Comment:       (Positive cutoff  5 ng/ml)            Test Information 11/12/2022 Assay provides medical screening only. The absence of expected drug(s) and/or metabolite(s) may indicate diluted or adulterated urine, limitations of testing or timing of collection. Final    Comment: Testing for legal purposes should be confirmed by another method. To request confirmation   of test result, please call the lab within 7 days of sample submission. HCG(Urine) Pregnancy Test 11/12/2022 NEGATIVE  NEGATIVE Final    Comment: Specimens with hCG levels near the threshold of the test (25 mIU/mL) may give a negative or   indeterminate result. In such cases, another test should be performed with a new specimen   in 48-72 hours. If early pregnancy is suspected clinically in this setting, correlation   with quantitative serum b-hCG level is suggested. Specimen Source 11/12/2022 . BLOOD   Final    Ordered Test 11/12/2022 BMPX,CDP   Final    Reason for Rejection 11/12/2022 Unable to perform testing: Specimen hemolyzed. Final    Unable to perform testing: Specimen quantity not sufficient. Glucose 11/12/2022 116 (A)  70 - 99 mg/dL Final    BUN 11/12/2022 8  6 - 20 mg/dL Final    Creatinine 11/12/2022 0.66  0.50 - 0.90 mg/dL Final    Est, Glom Filt Rate 11/12/2022 >60  >60 mL/min/1.73m2 Final    Comment:       Effective Oct 3, 2022        These results are not intended for use in patients <25years of age. eGFR results are calculated without a race factor using the 2021 CKD-EPI equation. Careful clinical correlation is recommended, particularly when comparing to results   calculated using previous equations. The CKD-EPI equation is less accurate in patients with extremes of muscle mass, extra-renal   metabolism of creatine, excessive creatine ingestion, or following therapy that affects   renal tubular secretion.       Calcium 11/12/2022 9.3  8.6 - 10.4 mg/dL Final    Sodium 11/12/2022 139  135 - 144 mmol/L Final    Potassium 11/12/2022 3.4 (A)  3.7 - 5.3 mmol/L Final    Chloride 11/12/2022 104  98 - 107 mmol/L Final    CO2 11/12/2022 23  20 - 31 mmol/L Final    Anion Gap 11/12/2022 12  9 - 17 mmol/L Final    WBC 11/12/2022 11.9 (A)  3.5 - 11.0 k/uL Final    RBC 11/12/2022 4.94  4.0 - 5.2 m/uL Final    Hemoglobin 11/12/2022 13.5  12.0 - 16.0 g/dL Final    Hematocrit 11/12/2022 41.5  36 - 46 % Final    MCV 11/12/2022 84.1  80 - 100 fL Final    [facility] 11/12/2022 27.2  26 - 34 pg Final    MCHC 11/12/2022 32.4  31 - 37 g/dL Final    RDW 11/12/2022 13.0  11.5 - 14.9 % Final    Platelets 70/61/2329 311  150 - 450 k/uL Final    MPV 11/12/2022 8.3  6.0 - 12.0 fL Final    Seg Neutrophils 11/12/2022 62  36 - 66 % Final    Lymphocytes 11/12/2022 26  24 - 44 % Final    Monocytes 11/12/2022 7  1 - 7 % Final    Eosinophils % 11/12/2022 4  0 - 4 % Final    Basophils 11/12/2022 1  0 - 2 % Final    Segs Absolute 11/12/2022 7.40  1.3 - 9.1 k/uL Final    Absolute Lymph # 11/12/2022 3.10  1.0 - 4.8 k/uL Final    Absolute Mono # 11/12/2022 0.90  0.1 - 1.3 k/uL Final    Absolute Eos # 11/12/2022 0.50 (A)  0.0 - 0.4 k/uL Final    Basophils Absolute 11/12/2022 0.10  0.0 - 0.2 k/uL Final    Lithium Lvl 11/12/2022 0.6  0.6 - 1.2 mmol/L Final    Lithium Dose Amount 11/12/2022 Unknown   Final    Lithium Date Last Dose 11/12/2022 UNK^Unknown^L   Final    Lithium Dose Time 11/12/2022 UNK^Unknown^L   Final    Magnesium 11/12/2022 2.0  1.6 - 2.6 mg/dL Final         Reviewed patient's current plan of care and vital signs with nursing staff.     Labs reviewed: [x] Yes  Last EKG in EMR reviewed: [x] Yes  QTc: 420    Medications  Current Facility-Administered Medications: ibuprofen (ADVIL;MOTRIN) tablet 400 mg, 400 mg, Oral, Q6H PRN  hydrOXYzine HCl (ATARAX) tablet 50 mg, 50 mg, Oral, TID PRN  traZODone (DESYREL) tablet 50 mg, 50 mg, Oral, Nightly PRN  polyethylene glycol (GLYCOLAX) packet 17 g, 17 g, Oral, Daily PRN  aluminum & magnesium hydroxide-simethicone (MAALOX) 200-200-20 MG/5ML suspension 30 mL, 30 mL, Oral, Q6H PRN  nicotine polacrilex (NICORETTE) gum 2 mg, 2 mg, Oral, Q2H PRN  haloperidol (HALDOL) tablet 5 mg, 5 mg, Oral, Q6H PRN **AND** LORazepam (ATIVAN) tablet 2 mg, 2 mg, Oral, Q6H PRN  haloperidol lactate (HALDOL) injection 5 mg, 5 mg, IntraMUSCular, Q6H PRN **AND** LORazepam (ATIVAN) injection 2 mg, 2 mg, IntraMUSCular, Q6H PRN **AND** diphenhydrAMINE (BENADRYL) injection 50 mg, 50 mg, IntraMUSCular, Q6H PRN  cariprazine hcl (VRAYLAR) capsule 6 mg, 6 mg, Oral, Daily  fluticasone (FLONASE) 50 MCG/ACT nasal spray 1 spray, 1 spray, Each Nostril, Daily  hydroCHLOROthiazide (HYDRODIURIL) tablet 25 mg, 25 mg, Oral, Daily  lithium (LITHOBID) extended release tablet 600 mg, 600 mg, Oral, 2 times per day  cloNIDine (CATAPRES) tablet 0.1 mg, 0.1 mg, Oral, Nightly    ASSESSMENT  Bipolar affective disorder, mixed, severe, with psychotic behavior ([facility] [address]         HANDOFF  Patient symptoms are: Remains Unstable. Medications as determined by attending physician  Consider obtaining Lithium level in the morning  Monitor need and frequency of PRN medications. Encourage participation in groups and milieu. Probable discharge is to be determined by MD.     Electronically signed by MAURICE Ramsey CNP on 11/14/2022 at 2:25 PM    **This report has been created using voice recognition software. It may contain minor errors which are inherent in voice recognition technology. **                                         Psychiatry Attending Attestation     I independently saw and evaluated the patient. I reviewed the Advance Practice Provider's documentation above. Any additional comments or changes to the Advance Practice Provider's documentation are stated below otherwise agree with assessment. Minimal improvement noticed. Continues to have trouble falling asleep and staying asleep. Continues to exhibit rambling speech with flight of ideas. Appears to be in a manic state at this time. Continues to report suicidal thoughts and feeling very helpless and hopeless. We will consider adding Zyprexa after getting a lithium level tomorrow     PLAN  Patient s symptoms show no change  Will obtain Li level surekha and plan on adding zyprexa  Attempt to develop insight  Psycho-education conducted. Supportive Therapy conducted.   Probable discharge is tbd  Follow-up TBD    Electronically signed by hTea Lira MD on 11/14/22 at 4:43 PM EST

## 2022-11-14 NOTE — PROGRESS NOTES
11/14/22 1521   Encounter Summary   Encounter Overview/Reason  Spiritual/Emotional Needs   Service Provided For: Patient   Referral/Consult From: Satish   Last Encounter  11/14/22   Complexity of Encounter Moderate   Begin Time 0130   End Time  0200   Total Time Calculated 30 min   Spiritual/Emotional needs   Type Spiritual Support   Behavioral Health    Type  Spirituality Group   Assessment/Intervention/Outcome   Assessment Anxious; Compromised coping;Concerns with suffering; Despair;Fearful;Loneliness; Powerlessness;Sad;Stress overload;Social isolation   Intervention Active listening;Sustaining Presence/Ministry of presence; Explored/Affirmed feelings, thoughts, concerns; Discussed belief system/Episcopal practices/bishop;Discussed relationship with God   Outcome Acceptance;Comfort; Connection/Belonging;Engaged in conversation;Expressed feelings, needs, and concerns;Receptive;Venting emotion  (Patient was sad and somewhat engaged.)

## 2022-11-14 NOTE — GROUP NOTE
Group Therapy Note    Date: 11/14/2022    Group Start Time: 0900  Group End Time: 0915  Group Topic: Group Therapy    [facility]    Tessa Canseco LPN        Group Therapy Note    Attendees: 3       Patient's Goal:  to have a better day then yesterday    Notes:  Brightened, and support of peers     Status After Intervention:  Improved    Participation Level:  Active Listener    Participation Quality: Attentive, Sharing, and Supportive      Speech:  hesitant      Thought Process/Content: Linear      Affective Functioning: Blunted      Mood: elevated      Level of consciousness:  Alert      Response to Learning: Able to verbalize current knowledge/experience and Progressing to goal      Endings: None Reported    Modes of Intervention: Support and Activity      Discipline Responsible: Licensed Practical Nurse      Signature:  Tessa Canseco LPN Hyperlipidemia

## 2022-11-14 NOTE — PLAN OF CARE
Problem: Self Harm/Suicidality  Goal: Will have no self-injury during hospital stay  Description: INTERVENTIONS:  1. Q 15 MINUTES: Routine safety checks  2. Q SHIFT & PRN: Assess risk to determine if routine checks are adequate to maintain patient safety  Outcome: Progressing  Note: No violent or negative behaviors noted at this time. Patient denies having thoughts of self harm, no thoughts of harming others. Patient reports that she wishes she was doing better so that she can be with family. Instructed on seeking help from staff when needed. Instructed on participating in programming. Support and reassurance given as needed. Will cont to provide safe, calm, environment and use verbal de-escalation techniques when needed. Problem: Micaela  Goal: Will exhibit normal sleep and speech and no impulsivity  Description: INTERVENTIONS:  1. Administer medication as ordered  2. Set limits on impulsive behavior  3. Make attempts to decrease external stimuli as possible  Outcome: Not Progressing  Note: Patient reports having trouble sleeping, reports having dreams that wake her up at intervals. Patient is thought blocked and preoccupied. Patient has poor impulse control, increase activity, paces the unit, and requires redirections at times.

## 2022-11-14 NOTE — PLAN OF CARE
Problem: Self Harm/Suicidality  Goal: Will have no self-injury during hospital stay  Description: INTERVENTIONS:  1. Q 30 MINUTES: Routine safety checks  2. Q SHIFT & PRN: Assess risk to determine if routine checks are adequate to maintain patient safety  11/13/2022 2056 by Jaye Infante RN  Outcome: Progressing     Problem: Micaela  Goal: Will exhibit normal sleep and speech and no impulsivity  Description: INTERVENTIONS:  1. Administer medication as ordered  2. Set limits on impulsive behavior  3. Make attempts to decrease external stimuli as possible  11/13/2022 2056 by Jaye Infante RN  Outcome: Progressing     Patient denies suicidal ideations. Patient encouraged to seek out staff if thoughts of self harm arise. A safe environment and Q 15 min checks maintained. Patient remains in behavorial control. Patient is coherent but illogical/disorganized and tangential thought process. Patient encouraged to participate in unit programming. Writer will continue to provide emotional support, presence and reassurance.

## 2022-11-14 NOTE — GROUP NOTE
Group Therapy Note    Date: 11/14/2022    Group Start Time: 4681  Group End Time: 8024  Group Topic: Psychoeducation    [facility]    Adelso Muse        Group Therapy Note    Attendees 5/23       Patient's Goal:   PT will demonstrate increased interpersonal interaction and a clear understanding on multiple types of coping skills relating to the here-and-now therapeutic practice. Notes: Pt had difficulty focusing and left group early. Status After Intervention:  Unchanged    Participation Level: Minimal    Participation Quality: Resistant      Speech:  normal      Thought Process/Content: Flight of ideas      Affective Functioning: Flat      Mood: anxious      Level of consciousness:  Alert and Preoccupied      Response to Learning: Progressing to goal      Endings: None Reported    Modes of Intervention: Support, Socialization, Exploration, Clarifying, and Problem-solving      Discipline Responsible: /Counselor      Signature:   Adelso Muse

## 2022-11-14 NOTE — BH NOTE
Emergency Medication Follow-Up Note:    PRN medication of oral Haldol 5 mg and Ativan 2 mg was effective as evidence by Patient regain behavioral control, socializing with peers. Patient denies medication side effects. Will continue to monitor and provide support as needed.

## 2022-11-14 NOTE — BH NOTE
Emergency PRN Medication Administration Note:      Patient is Agitated as evidence by pacing the unit, slamming things in room, patient needs frequent redirection, unable to maintain behavior control . Staff attempted to find and relieve the distress by Talking to patient, Refocusing on new activity, and Offering suggestions Patient is currently  tearful, stating \" why isn't my medication not working, I can't take it\". Patient offered PRN medication, accepted PRN medications as ordered. Haldol 5mg, ativan 2 oral.  Medication Administered as prescribed. Patient Tolerated medication administration. Will continue to monitor, offer support, and reassess.

## 2022-11-14 NOTE — GROUP NOTE
Group Therapy Note    Date: 11/14/2022    Group Start Time: 2501  Group End Time: 5727  Group Topic: Psychoeducation    [facility]    Rachelle Caldwell        Group Therapy Note    Attendees: 5/23     Patient refused to attend psychotherapy group after encouragement from staff. 1:1 talk time offered but refused. Signature:   Rachelle Caldwell

## 2022-11-14 NOTE — GROUP NOTE
Group Therapy Note    Date: 11/14/2022    Group Start Time: 1100  Group End Time: 3524  Group Topic: Cognitive Skills    [facility]    Myrna Colon, CTRS    Cognitive Skills Group Note        Date: November 14, 2022 Start Time: 11am  End Time:  1140am      Number of Participants in Group & Unit Census:  5/23    Topic: cognitive skills     Goal of Group: to improve decision making skills, improve socialization       Comments:     Patient did not participate in Cognitive Skills group, despite staff encouragement and explanation of benefits. Patient remain seclusive to self. Q15 minute safety checks maintained for patient safety and will continue to encourage patient to attend unit programming.    1140a,         Signature:  Rayna Price

## 2022-11-14 NOTE — BH NOTE
Emergency PRN Medication Administration Note:      Patient is Agitated as evidence by racing thoughts, crying, fidgeting, pacing and anxious. Staff attempted to find and relieve the distress by Talking to patient, Refocusing on new activity, Offering suggestions, and Checking for undiagnosed pain. Patient is currently continuing to escalate and accepting of PRN medications. Medication Administered as prescribed: Haldol 5 mg PO and Ativan 2 mg PO. Patient Tolerated medication administration. Will continue to monitor, offer support, and reassess.

## 2022-11-14 NOTE — BH NOTE
Emergency Medication Follow-Up Note:    PRN medication of Haldol 5mg, ativan 2mg oral was effective as evidence by patient in room resting. Absence of behavior warranting emergency medication. Patient denies medication side effects. Will continue to monitor and provide support as needed.

## 2022-11-15 PROCEDURE — 6370000000 HC RX 637 (ALT 250 FOR IP)

## 2022-11-15 PROCEDURE — APPSS30 APP SPLIT SHARED TIME 16-30 MINUTES

## 2022-11-15 PROCEDURE — 6370000000 HC RX 637 (ALT 250 FOR IP): Performed by: PSYCHIATRY & NEUROLOGY

## 2022-11-15 PROCEDURE — 1240000000 HC EMOTIONAL WELLNESS R&B

## 2022-11-15 RX ADMIN — LORAZEPAM 2 MG: 1 TABLET ORAL at 02:07

## 2022-11-15 RX ADMIN — CARIPRAZINE 6 MG: 3 CAPSULE, GELATIN COATED ORAL at 08:03

## 2022-11-15 RX ADMIN — HYDROXYZINE HYDROCHLORIDE 50 MG: 50 TABLET, FILM COATED ORAL at 20:46

## 2022-11-15 RX ADMIN — LITHIUM CARBONATE 600 MG: 300 TABLET, EXTENDED RELEASE ORAL at 20:46

## 2022-11-15 RX ADMIN — LITHIUM CARBONATE 600 MG: 300 TABLET, EXTENDED RELEASE ORAL at 08:03

## 2022-11-15 RX ADMIN — HALOPERIDOL 5 MG: 5 TABLET ORAL at 02:08

## 2022-11-15 RX ADMIN — FLUTICASONE PROPIONATE 1 SPRAY: 50 SPRAY, METERED NASAL at 08:04

## 2022-11-15 RX ADMIN — LURASIDONE HYDROCHLORIDE 20 MG: 40 TABLET, FILM COATED ORAL at 18:44

## 2022-11-15 RX ADMIN — TRAZODONE HYDROCHLORIDE 50 MG: 50 TABLET ORAL at 20:46

## 2022-11-15 RX ADMIN — IBUPROFEN 400 MG: 400 TABLET ORAL at 03:40

## 2022-11-15 RX ADMIN — CLONIDINE HYDROCHLORIDE 0.1 MG: 0.1 TABLET ORAL at 20:46

## 2022-11-15 RX ADMIN — HYDROCHLOROTHIAZIDE 25 MG: 25 TABLET ORAL at 08:02

## 2022-11-15 RX ADMIN — IBUPROFEN 400 MG: 400 TABLET ORAL at 13:08

## 2022-11-15 RX ADMIN — BISACODYL 5 MG: 5 TABLET, COATED ORAL at 21:06

## 2022-11-15 ASSESSMENT — PAIN SCALES - GENERAL: PAINLEVEL_OUTOF10: 4

## 2022-11-15 NOTE — PLAN OF CARE
Problem: Micaela  Goal: Will exhibit normal sleep and speech and no impulsivity  Description: INTERVENTIONS:  1. Administer medication as ordered  2. Set limits on impulsive behavior  3. Make attempts to decrease external stimuli as possible  11/14/2022 2019 by Fuentes Boo  Outcome: Progressing     Problem: Self Harm/Suicidality  Goal: Will have no self-injury during hospital stay  Description: INTERVENTIONS:  1. Q 30 MINUTES: Routine safety checks  2. Q SHIFT & PRN: Assess risk to determine if routine checks are adequate to maintain patient safety  11/14/2022 2019 by Fuentes Boo  Outcome: Progressing     Problem: Micaela  Goal: Will exhibit normal sleep and speech and no impulsivity  Description: INTERVENTIONS:  1. Administer medication as ordered  2. Set limits on impulsive behavior  3. Make attempts to decrease external stimuli as possible  11/14/2022 2019 by Fuentes Boo  Outcome: Progressing  11/14/2022 1112 by Sofía May LPN  Outcome: Not Progressing  Note: Patient reports having trouble sleeping, reports having dreams that wake her up at intervals. Patient is thought blocked and preoccupied. Patient has poor impulse control, increase activity, paces the unit, and requires redirections at times. Patient present in dayroom. Patient is cooperative with vitals and interview. Patient is out in dayroom and social with peers. Patient denies all thoughts of self-harm, suicidal ideation, homicidal ideation, or hallucinations. Patient denies depression and anxiety. Patient denies any specific triggers or stressors for their depression and anxiety. Patient stated she hasn't been getting enough sleep and is going to try to sleep tonight. Support and active listening provided to patient. Patient agrees to seek out staff should thoughts of self-harm start to arise. Safety maintained with 15-minute purposeful rounds and additional checks and monitoring as needed.

## 2022-11-15 NOTE — GROUP NOTE
Group Therapy Note    Date: 11/15/2022    Group Start Time: 0900  Group End Time: 2917  Group Topic: Group Documentation    [facility]    Radha aMck RN        Group Therapy Note    Attendees: 5/20       Patient's Goal:  Get sleep because she is very tired today, talk to the doctor when he rounds    Notes:  Goals group    Status After Intervention:  Unchanged    Participation Level: Interactive    Participation Quality: Appropriate, Attentive, and Sharing      Speech:  slurred      Thought Process/Content: Flight of ideas      Affective Functioning: Congruent      Mood: anxious      Level of consciousness:  Alert, attentive, drowsy      Response to Learning: Able to verbalize current knowledge/experience and Able to verbalize/acknowledge new learning      Endings: None Reported    Modes of Intervention: Support, Socialization, and Exploration      Discipline Responsible: Behavorial Health Tech      Signature:  Radha Mack RN

## 2022-11-15 NOTE — PLAN OF CARE
[address] [place] [address]  Day 3 Interdisciplinary Treatment Plan NOTE    Review Date & Time: 11/15/2022   1:35 pm    Admission Type:   Admission Type: Voluntary    Reason for admission:  Reason for Admission: Suicidal ideation to jump off bridge, homeless  Estimated Length of Stay: 5-7 days  Estimated Discharge Date Update: to be determined by physician    PATIENT STRENGTHS:  Patient Strengths    Patient Strengths and Limitations:Limitations: Difficult relationships / poor social skills, Difficulty problem solving/relies on others to help solve problems, Inappropriate/potentially harmful leisure interests, Unrealistic self-view  Addictive Behavior:Addictive Behavior  In the Past 3 Months, Have You Felt or Has Someone Told You That You Have a Problem With  : None  Medical Problems:  Past Medical History:   Diagnosis Date    Anxiety     Bipolar 1 disorder ([facility] 1999    Depression     Gestational diabetes 7/22/2016    OCD (obsessive compulsive disorder) 1999    PTSD (post-traumatic stress disorder)     Rapid rate of speech     [facility]        Risk:  Fall Risk   Jairo Scale Jairo Scale Score: 22  BVC    Change in scores no Changes to plan of Care no    Status EXAM:   Mental Status and Behavioral Exam  Normal: No  Level of Assistance: Independent/Self  Facial Expression: Flat, Expressionless  Affect: Blunt  Level of Consciousness: Alert  Frequency of Checks: 4 times per hour, close  Mood:Normal: No  Mood: Anxious  Motor Activity:Normal: Yes  Motor Activity: Decreased  Eye Contact: Fair  Observed Behavior: Friendly, Cooperative  Sexual Misconduct History: Current - no  Involved In Any Sexual Misconduct With Others? : No  History of Sexually Inappropriate Behavior When Previously Hospitalized?: No  Uncontrollable/Compulsive Masturbation?: No  Difficulty Controlling Sexual Impulses?: No  Preception: [place] to person, [place] to time, [place] to place, [place] to situation  Attention:Normal: No  Attention: Distractible  Thought Processes: Flight of ideas, Loose association, Tangential  Thought Content:Normal: No  Thought Content: Preoccupations  Depression Symptoms: No problems reported or observed. Anxiety Symptoms: Generalized  Micaela Symptoms: No problems reported or observed. Hallucinations: None  Delusions: No  Memory:Normal: Yes  Memory: Poor recent, Poor remote  Insight and Judgment: No  Insight and Judgment: Poor judgment, Poor insight    Daily Assessment Last Entry:   Daily Sleep (WDL): Exceptions to WDL (poor sleep)            Daily Nutrition (WDL): Within Defined Limits  Level of Assistance: Independent/Self    Patient Monitoring:  Frequency of Checks: 4 times per hour, close    Psychiatric Symptoms:   Depression Symptoms  Depression Symptoms: No problems reported or observed. Anxiety Symptoms  Anxiety Symptoms: Generalized  Micaela Symptoms  Micaela Symptoms: No problems reported or observed. Suicide Risk CSSR-S:  1) Within the past month, have you wished you were dead or wished you could go to sleep and not wake up? : Yes  2) Have you actually had any thoughts of killing yourself? : Yes  3) Have you been thinking about how you might kill yourself? : Yes  5) Have you started to work out or worked out the details of how to kill yourself?  Do you intend to carry out this plan? : No  6) Have you ever done anything, started to do anything, or prepared to do anything to end your life?: No  Change in Result no Change in Plan of care no      EDUCATION:   EDUCATION:   Learner Progress Toward Treatment Goals: Reviewed results and recommendations of this team, Reviewed group plan and strategies, Reviewed signs, symptoms and risk of self harm and violent behavior, Reviewed goals and plan of care    Method:small group, individual verbal education    Outcome:verbalized by patient, but needs reinforcement to obtain goals    PATIENT GOALS:  Short term: Medication adjustments for sleep  Long term: Stay compliant with medication and get a job.      PLAN/TREATMENT RECOMMENDATIONS UPDATE: continue with group therapies, increased socialization, continue planning for after discharge goals, continue with medication compliance    SHORT-TERM GOALS UPDATE:   Time frame for Short-Term Goals: 5-7 days    LONG-TERM GOALS UPDATE:   Time frame for Long-Term Goals: 6 months  Members Present in Team Meeting: See Signature Sheet    Zenobia Wood RN

## 2022-11-15 NOTE — BH NOTE
Patient came out talking in slurred and rapid speech. Patient states she is coming more and more anxious because of her mind going through rapid and a lot of thoughts. Patient given talk time, as well as offered a snack and some pages to stay busy. Patient just kept focusing back to listening to music. Explained to patient that we will put it on for her at [place].  Patient then called and said she cant calm down and then was offered medication. Patient took willingly.

## 2022-11-15 NOTE — GROUP NOTE
Group Therapy Note    Date: 11/15/2022    Group Start Time: 1100  Group End Time: 6891  Group Topic: Cognitive Skills    [facility] Hathaway, CTRS        Group Therapy Note    Attendees: 8/20     Patient's Goal:  To increase social interaction and to practice concentration, problem solving,and communication skills. Notes: Pt attended group briefly (10 mins) but became anxious and appeared overwhelmed by talking r/t task and left group early. Status After Intervention:  Unchanged     Participation Level: Active Listener briefly     Participation Quality: Attentive briefly       Speech:  minimal ,apologized for leaving group        Thought Process/Content: Impaired concentration, appeared overwhelmed by talking (volume/amount) r/t task in group.      Affective Functioning: Blunted, anxious, holding ears        Mood: anxious        Level of consciousness:  Alert, and Attentive briefly        Response to Learning: Attentive to new learning- briefly, and Progressing to goal        Endings: None Reported     Modes of Intervention: Education, Support, Socialization, Exploration, Clarifying and Problem-solving        Discipline Responsible: Psychoeducational Specialist        Signature:  Angely Foster

## 2022-11-15 NOTE — PLAN OF CARE
Problem: Chronic Conditions and Co-morbidities  Goal: Patient's chronic conditions and co-morbidity symptoms are monitored and maintained or improved  11/15/2022 1649 by Janice Owusu  Outcome: Progressing  Note: Pt was med compliant and behavioral controlled. 11/15/2022 1334 by Kae Petersen RN  Outcome: Progressing     Problem: Self Harm/Suicidality  Goal: Will have no self-injury during hospital stay  Description: INTERVENTIONS:  1. Q 30 MINUTES: Routine safety checks  2. Q SHIFT & PRN: Assess risk to determine if routine checks are adequate to maintain patient safety  11/15/2022 1651 by Janice Owusu  Note: Pt denies SI or self harm. Pt was happy to talk about discharge and showed improved mood when discussing plans to go to [facility]. 11/15/2022 1649 by Janice Owusu  Outcome: Progressing  11/15/2022 1334 by Kae Petersen RN  Outcome: Progressing     Problem: Micaela  Goal: Will exhibit normal sleep and speech and no impulsivity  Description: INTERVENTIONS:  1. Administer medication as ordered  2. Set limits on impulsive behavior  3. Make attempts to decrease external stimuli as possible  11/15/2022 1651 by Janice Owusu  Note: Pt did not exhibit any manic symptoms for the duration of the shift. 11/15/2022 1649 by Janice Owusu  Outcome: Progressing  11/15/2022 1334 by Kae Petersen RN  Outcome: Progressing     Problem: Pain  Goal: Verbalizes/displays adequate comfort level or baseline comfort level  11/15/2022 1651 by Janice Owusu  Note: Pt expressed abdominal discomfort and has not had a bowel movement since admission. Note was sent to doctor for review.    11/15/2022 1649 by Janice Owusu  Outcome: Progressing  11/15/2022 1334 by Kae Petersen RN  Outcome: Progressing

## 2022-11-15 NOTE — BH NOTE
Patient still up with racing thoughts at this time. Medication does not seem to be working at this time.

## 2022-11-15 NOTE — GROUP NOTE
Group Therapy Note    Date: 11/15/2022    Group Start Time: 3009  Group End Time: 0082  Group Topic: Cognitive Skills    [facility] COREEN Stevenson, CTRS        Group Therapy Note    Attendees: 7/20     Topic: Exploring positive personal growth in 6 life domains , sharing feelings, and communication skills. Goal of Group: To increase social interaction and to explore positive personal growth in 6 life domains , sharing feelings, and communication skills through creative expression and individual and group discussion. Comments: Patient came to group and gathered materials to engage in task but did not stay and returned to room. Pt did not participate in Cognitive Skills Group offered at 13:40 , despite staff encouragement and explanation of benefits. Patient remains seclusive to self during group time. Q15 minute safety checks maintained for patient safety and will continue to encourage patient to attend unit programming.          Discipline Responsible: Psychoeducational Specialist        Signature:  Prabhu Benz

## 2022-11-15 NOTE — PROGRESS NOTES
Daily Progress Note  11/15/2022    Patient Name: Ham Singh    CHIEF COMPLAINT:  Manic like behaviors and suicidal ideation          SUBJECTIVE:      Patient is seen today for a follow up assessment. Jori Galindo is compliant with scheduled medications. She continues to require as needed oral emergency Haldol and Ativan and has received it twice in the past 24 hours. She last received it early this morning 11/15 around 2 AM.  Per nursing documentation patient came out talking in slurred and rapid speech. She states she is becoming more anxious because of her mind going through rapid and a lot of thoughts. Jori Galindo is agreeable to assessment in unit sensory room today. She does appear much less rapid and pressured in her speech today. She reports that she feels her mood is \"in the middle. \"  She does continue to report racing thoughts, especially at night and states that she \"kept waking up\" last night. She is not sleeping well and this is evident by receiving emergency medications at 2 am. She reports improvement in suicidal ideation. She denies homicidal ideation. She denies perceptual disturbances however continues to have racing thoughts. She does continue to be disorganized at times. She continues to seem to lack insight into the severity of her mental illness and at this time would be unsafe out of the hospital.  She continues to report that she wants to go to [facility] however states she would like to return to a shelter first to get her plans into order with her  and payee. She denies any medication side effects at this time. Appetite:  [x] Normal/Adequate/Unchanged  [] Increased  [] Decreased      Sleep:       [] Normal/Adequate/Unchanged  [] Fair  [x] Poor      Group Attendance on Unit:   [x] Yes  [] Selectively    [] No    Medication Side Effects:  Patient denies any medication side effects at the time of assessment.          Mental Status Exam  Level of consciousness: Alert and awake. Appearance: Appropriate attire for setting, seated in chair, with fair  grooming and hygiene. Behavior/Motor: Approachable, restless  Attitude toward examiner: Cooperative, mostly attentive, fair eye contact. Speech: Less rapid and pressured, normal volume, normal tone  Mood:  Patient reports \"in the middle\". Affect: Blunted  Thought processes: More linear and coherent today however becomes disorganized at times  Thought content: Denies homicidal ideation. Suicidal Ideation: Reports improvement in suicidal ideations  Delusions: No evidence of delusions. Denies paranoia. Perceptual Disturbance: Patient does not appear to be responding to internal stimuli. Denies auditory hallucinations. Denies visual hallucinations. Cognition: Oriented to self, location, time, and situation. Memory: Intact. Insight & Judgement: Poor. Data   height is 5' 7\" (1.702 m) and weight is 180 lb (81.6 kg). Her temporal temperature is 97.7 °F (36.5 °C). Her blood pressure is 115/73 and her pulse is 80. Her respiration is 14 and oxygen saturation is 98%.    Labs:   Admission on 11/12/2022   Component Date Value Ref Range Status    Color, UA 11/12/2022 Yellow  Yellow Final    Turbidity UA 11/12/2022 Clear  Clear Final    Glucose, Ur 11/12/2022 NEGATIVE  NEGATIVE Final    Bilirubin Urine 11/12/2022 NEGATIVE  NEGATIVE Final    Ketones, Urine 11/12/2022 NEGATIVE  NEGATIVE Final    Specific Fowler, UA 11/12/2022 1.008  1.000 - 1.030 Final    Urine Hgb 11/12/2022 NEGATIVE  NEGATIVE Final    pH, UA 11/12/2022 6.5  5.0 - 8.0 Final    Protein, UA 11/12/2022 NEGATIVE  NEGATIVE Final    Urobilinogen, Urine 11/12/2022 Normal  Normal Final    Nitrite, Urine 11/12/2022 NEGATIVE  NEGATIVE Final    Leukocyte Esterase, Urine 11/12/2022 MOD (A)  NEGATIVE Final    WBC, UA 11/12/2022 0 TO 2  /HPF Final    RBC, UA 11/12/2022 0 TO 2  /HPF Final    Casts UA 11/12/2022 0 TO 2  /LPF Final    Epithelial Cells UA 11/12/2022 0 TO 2  /HPF Final Bacteria, UA 11/12/2022 FEW (A)  None Final    Amphetamine Screen, Ur 11/12/2022 NEGATIVE  NEGATIVE Final    Comment:       (Positive cutoff 1000 ng/mL)                  Barbiturate Screen, Ur 11/12/2022 NEGATIVE  NEGATIVE Final    Comment:       (Positive cutoff 200 ng/mL)                  Benzodiazepine Screen, Urine 11/12/2022 NEGATIVE  NEGATIVE Final    Comment:       (Positive cutoff 200 ng/mL)                  Cocaine Metabolite, Urine 11/12/2022 NEGATIVE  NEGATIVE Final    Comment:       (Positive cutoff 300 ng/mL)                  Methadone Screen, Urine 11/12/2022 NEGATIVE  NEGATIVE Final    Comment:       (Positive cutoff 300 ng/mL)                  Opiates, Urine 11/12/2022 NEGATIVE  NEGATIVE Final    Comment:       (Positive cutoff 300 ng/mL)                  Phencyclidine, Urine 11/12/2022 NEGATIVE  NEGATIVE Final    Comment:       (Positive cutoff 25 ng/mL)                  Cannabinoid Scrn, Ur 11/12/2022 POSITIVE (A)  NEGATIVE Final    Comment:       (Positive cutoff 50 ng/mL)                  Oxycodone Screen, Ur 11/12/2022 NEGATIVE  NEGATIVE Final    Comment:       (Positive cutoff 100 ng/mL)                  Fentanyl, Ur 11/12/2022 NEGATIVE  NEGATIVE Final    Comment:       (Positive cutoff  5 ng/ml)            Test Information 11/12/2022 Assay provides medical screening only. The absence of expected drug(s) and/or metabolite(s) may indicate diluted or adulterated urine, limitations of testing or timing of collection. Final    Comment: Testing for legal purposes should be confirmed by another method. To request confirmation   of test result, please call the lab within 7 days of sample submission. HCG(Urine) Pregnancy Test 11/12/2022 NEGATIVE  NEGATIVE Final    Comment: Specimens with hCG levels near the threshold of the test (25 mIU/mL) may give a negative or   indeterminate result. In such cases, another test should be performed with a new specimen   in 48-72 hours.   If early pregnancy is suspected clinically in this setting, correlation   with quantitative serum b-hCG level is suggested. Specimen Source 11/12/2022 . BLOOD   Final    Ordered Test 11/12/2022 BMPX,CDP   Final    Reason for Rejection 11/12/2022 Unable to perform testing: Specimen hemolyzed. Final    Unable to perform testing: Specimen quantity not sufficient. Glucose 11/12/2022 116 (A)  70 - 99 mg/dL Final    BUN 11/12/2022 8  6 - 20 mg/dL Final    Creatinine 11/12/2022 0.66  0.50 - 0.90 mg/dL Final    Est, Glom Filt Rate 11/12/2022 >60  >60 mL/min/1.73m2 Final    Comment:       Effective Oct 3, 2022        These results are not intended for use in patients <25years of age. eGFR results are calculated without a race factor using the 2021 CKD-EPI equation. Careful clinical correlation is recommended, particularly when comparing to results   calculated using previous equations. The CKD-EPI equation is less accurate in patients with extremes of muscle mass, extra-renal   metabolism of creatine, excessive creatine ingestion, or following therapy that affects   renal tubular secretion.       Calcium 11/12/2022 9.3  8.6 - 10.4 mg/dL Final    Sodium 11/12/2022 139  135 - 144 mmol/L Final    Potassium 11/12/2022 3.4 (A)  3.7 - 5.3 mmol/L Final    Chloride 11/12/2022 104  98 - 107 mmol/L Final    CO2 11/12/2022 23  20 - 31 mmol/L Final    Anion Gap 11/12/2022 12  9 - 17 mmol/L Final    WBC 11/12/2022 11.9 (A)  3.5 - 11.0 k/uL Final    RBC 11/12/2022 4.94  4.0 - 5.2 m/uL Final    Hemoglobin 11/12/2022 13.5  12.0 - 16.0 g/dL Final    Hematocrit 11/12/2022 41.5  36 - 46 % Final    MCV 11/12/2022 84.1  80 - 100 fL Final    MCH 11/12/2022 27.2  26 - 34 pg Final    MCHC 11/12/2022 32.4  31 - 37 g/dL Final    RDW 11/12/2022 13.0  11.5 - 14.9 % Final    Platelets 71/42/8108 311  150 - 450 k/uL Final    MPV 11/12/2022 8.3  6.0 - 12.0 fL Final    Seg Neutrophils 11/12/2022 62  36 - 66 % Final    Lymphocytes 11/12/2022 26  24 - 44 % Final    Monocytes 11/12/2022 7  1 - 7 % Final    Eosinophils % 11/12/2022 4  0 - 4 % Final    Basophils 11/12/2022 1  0 - 2 % Final    Segs Absolute 11/12/2022 7.40  1.3 - 9.1 k/uL Final    Absolute Lymph # 11/12/2022 3.10  1.0 - 4.8 k/uL Final    Absolute Mono # 11/12/2022 0.90  0.1 - 1.3 k/uL Final    Absolute Eos # 11/12/2022 0.50 (A)  0.0 - 0.4 k/uL Final    Basophils Absolute 11/12/2022 0.10  0.0 - 0.2 k/uL Final    Lithium Lvl 11/12/2022 0.6  0.6 - 1.2 mmol/L Final    Lithium Dose Amount 11/12/2022 Unknown   Final    Lithium Date Last Dose 11/12/2022 UNK^Unknown^L   Final    Lithium Dose Time 11/12/2022 UNK^Unknown^L   Final    Magnesium 11/12/2022 2.0  1.6 - 2.6 mg/dL Final         Reviewed patient's current plan of care and vital signs with nursing staff.     Labs reviewed: [x] Yes  Last EKG in EMR reviewed: [x] Yes  QTc: 420    Medications  Current Facility-Administered Medications: ibuprofen (ADVIL;MOTRIN) tablet 400 mg, 400 mg, Oral, Q6H PRN  hydrOXYzine HCl (ATARAX) tablet 50 mg, 50 mg, Oral, TID PRN  traZODone (DESYREL) tablet 50 mg, 50 mg, Oral, Nightly PRN  polyethylene glycol (GLYCOLAX) packet 17 g, 17 g, Oral, Daily PRN  aluminum & magnesium hydroxide-simethicone (MAALOX) 200-200-20 MG/5ML suspension 30 mL, 30 mL, Oral, Q6H PRN  nicotine polacrilex (NICORETTE) gum 2 mg, 2 mg, Oral, Q2H PRN  haloperidol (HALDOL) tablet 5 mg, 5 mg, Oral, Q6H PRN **AND** LORazepam (ATIVAN) tablet 2 mg, 2 mg, Oral, Q6H PRN  haloperidol lactate (HALDOL) injection 5 mg, 5 mg, IntraMUSCular, Q6H PRN **AND** LORazepam (ATIVAN) injection 2 mg, 2 mg, IntraMUSCular, Q6H PRN **AND** diphenhydrAMINE (BENADRYL) injection 50 mg, 50 mg, IntraMUSCular, Q6H PRN  cariprazine hcl (VRAYLAR) capsule 6 mg, 6 mg, Oral, Daily  fluticasone (FLONASE) 50 MCG/ACT nasal spray 1 spray, 1 spray, Each Nostril, Daily  hydroCHLOROthiazide (HYDRODIURIL) tablet 25 mg, 25 mg, Oral, Daily  lithium (LITHOBID) extended release tablet 600 mg, 600 mg, Oral, 2 times per day  cloNIDine (CATAPRES) tablet 0.1 mg, 0.1 mg, Oral, Nightly    ASSESSMENT  Bipolar affective disorder, mixed, severe, with psychotic behavior ([facility] [address]         HANDOFF  Patient symptoms are: Remains Unstable. Medications as determined by attending physician  Lithium level was not obtained today so will place order for tomorrow  Will start Latuda 20 mg nightly with dinner  Monitor need and frequency of PRN medications. Encourage participation in groups and milieu. Probable discharge is to be determined by MD.     Electronically signed by MAURICE Amado CNP on 11/15/2022 at 12:13 PM    **This report has been created using voice recognition software. It may contain minor errors which are inherent in voice recognition technology. **                                             Psychiatry Attending Attestation     I independently saw and evaluated the patient. I reviewed the Advance Practice Provider's documentation above. Any additional comments or changes to the Advance Practice Provider's documentation are stated below otherwise agree with assessment. Patient received emergency medications last night. Continues to have very poor sleep. Discussed with her initially about adding Zyprexa however she was allergic to Zyprexa. Discussed with her about adding Latuda and mention to her that we will cross titrate her off of Mechele Douse and she is agreeable to the plan. More than 16 mins of the session was spent doing supportive psychotherapy. Session lasted over 30 mins today  PLAN  Patient s symptoms are worsening  Will add Latuda   Attempt to develop insight  Psycho-education conducted. Supportive Therapy conducted.   Probable discharge is tbd  Follow-up TBD    Electronically signed by Mishel Echols MD on 11/15/22 at 5:37 PM EST

## 2022-11-15 NOTE — GROUP NOTE
Group Therapy Note    Date: 11/15/2022    Group Start Time: 1000  Group End Time: 1030  Group Topic: Psychotherapy    [facility]    Negra Neumann        Group Therapy Note    Attendees4/20       Patient's Goal: PT will demonstrate increased interpersonal interaction and a clear understanding on multiple types of coping skills relating to the here-and-now therapeutic practice. Notes: Patient is making progress, AEB participating in group discussion, actively listening, and supporting other group members. PT participates in group and encourages others to participate     Status After Intervention:  Improved    Participation Level: Active Listener and Interactive    Participation Quality: Appropriate, Attentive, and Sharing      Speech:  normal      Thought Process/Content: Logical      Affective Functioning: Flat      Mood: anxious      Level of consciousness:  Alert and Attentive      Response to Learning: Able to verbalize/acknowledge new learning and Progressing to goal      Endings: None Reported    Modes of Intervention: Support, Socialization, Exploration, Clarifying, and Problem-solving      Discipline Responsible: /Counselor      Signature:   Negra Neumann

## 2022-11-16 LAB
LITHIUM DATE LAST DOSE: NORMAL
LITHIUM DOSE AMOUNT: 600
LITHIUM DOSE TIME: 2046
LITHIUM LEVEL: 1.1 MMOL/L (ref 0.6–1.2)

## 2022-11-16 PROCEDURE — 36415 COLL VENOUS BLD VENIPUNCTURE: CPT

## 2022-11-16 PROCEDURE — APPSS30 APP SPLIT SHARED TIME 16-30 MINUTES

## 2022-11-16 PROCEDURE — 6370000000 HC RX 637 (ALT 250 FOR IP)

## 2022-11-16 PROCEDURE — 1240000000 HC EMOTIONAL WELLNESS R&B

## 2022-11-16 PROCEDURE — 6370000000 HC RX 637 (ALT 250 FOR IP): Performed by: PSYCHIATRY & NEUROLOGY

## 2022-11-16 PROCEDURE — 80178 ASSAY OF LITHIUM: CPT

## 2022-11-16 RX ADMIN — LURASIDONE HYDROCHLORIDE 20 MG: 40 TABLET, FILM COATED ORAL at 17:56

## 2022-11-16 RX ADMIN — ALUMINUM HYDROXIDE, MAGNESIUM HYDROXIDE, AND SIMETHICONE 30 ML: 200; 200; 20 SUSPENSION ORAL at 03:22

## 2022-11-16 RX ADMIN — CLONIDINE HYDROCHLORIDE 0.1 MG: 0.1 TABLET ORAL at 21:19

## 2022-11-16 RX ADMIN — HYDROXYZINE HYDROCHLORIDE 50 MG: 50 TABLET, FILM COATED ORAL at 21:19

## 2022-11-16 RX ADMIN — LITHIUM CARBONATE 600 MG: 300 TABLET, EXTENDED RELEASE ORAL at 07:57

## 2022-11-16 RX ADMIN — LITHIUM CARBONATE 600 MG: 300 TABLET, EXTENDED RELEASE ORAL at 21:20

## 2022-11-16 RX ADMIN — FLUTICASONE PROPIONATE 1 SPRAY: 50 SPRAY, METERED NASAL at 07:59

## 2022-11-16 RX ADMIN — HYDROCHLOROTHIAZIDE 25 MG: 25 TABLET ORAL at 07:57

## 2022-11-16 RX ADMIN — TRAZODONE HYDROCHLORIDE 50 MG: 50 TABLET ORAL at 21:19

## 2022-11-16 RX ADMIN — CARIPRAZINE 6 MG: 3 CAPSULE, GELATIN COATED ORAL at 07:57

## 2022-11-16 ASSESSMENT — PAIN SCALES - GENERAL: PAINLEVEL_OUTOF10: 0

## 2022-11-16 ASSESSMENT — PAIN DESCRIPTION - LOCATION: LOCATION: ABDOMEN

## 2022-11-16 NOTE — PROGRESS NOTES
Daily Progress Note  11/16/2022    Patient Name: Delmi Mcneil    CHIEF COMPLAINT:  Manic like behaviors and suicidal ideation          SUBJECTIVE:      Patient is seen today for a follow up assessment. Patient has not required emergency medications since morning of 1115/22. Patient has been compliant scheduled medication at this time. Impression interview patient presents with some linear speech however overall rapid and pressured with tangential flight of ideas and loose associations. Patient states that she feels her prince improving however later states Latuda did not start working yet and asked to switch meds again. Patient was informed that she only took 1 dose of the medication and to give it time, to which she acutely switched to being happy and agreeable. Patient had difficulty explaining her reasons for admission to this writer related to tangential flight of ideas. Patient shows poor insight into mental health illness. Patient has yet to show stability of symptoms and would benefit from further hospitalization for safety and stabilization. Appetite:  [x] Normal/Adequate/Unchanged  [] Increased  [] Decreased      Sleep:       [] Normal/Adequate/Unchanged  [x] Fair  [] Poor      Group Attendance on Unit:   [x] Yes  [] Selectively    [] No    Medication Side Effects:  Patient denies any medication side effects at the time of assessment. Mental Status Exam  Level of consciousness: Alert and awake. Appearance: Appropriate attire for setting, seated in chair, with fair  grooming and hygiene. Behavior/Motor: Approachable, restless  Attitude toward examiner: Cooperative, mostly attentive, good eye contact. Speech: rapid and pressured, normal volume, normal tone  Mood:  Patient reports \"good\".    Affect: Reactive, labile  Thought processes: Some linear thought, however mostly tangential with flight of ideas and loose associations  Thought content: Denies homicidal ideation. Suicidal Ideation: Denies suicidal ideations  Delusions: No evidence of delusions. Denies paranoia. Perceptual Disturbance: Patient does not appear to be responding to internal stimuli. Denies auditory hallucinations. Denies visual hallucinations. Cognition: Oriented to self, location, time, and situation. Memory: Intact. Insight & Judgement: Poor. Data   height is 5' 7\" (1.702 m) and weight is 180 lb (81.6 kg). Her oral temperature is 98 °F (36.7 °C). Her blood pressure is 127/86 and her pulse is 84. Her respiration is 14 and oxygen saturation is 98%.    Labs:   Admission on 11/12/2022   Component Date Value Ref Range Status    Color, UA 11/12/2022 Yellow  Yellow Final    Turbidity UA 11/12/2022 Clear  Clear Final    Glucose, Ur 11/12/2022 NEGATIVE  NEGATIVE Final    Bilirubin Urine 11/12/2022 NEGATIVE  NEGATIVE Final    Ketones, Urine 11/12/2022 NEGATIVE  NEGATIVE Final    Specific Roach, UA 11/12/2022 1.008  1.000 - 1.030 Final    Urine Hgb 11/12/2022 NEGATIVE  NEGATIVE Final    pH, UA 11/12/2022 6.5  5.0 - 8.0 Final    Protein, UA 11/12/2022 NEGATIVE  NEGATIVE Final    Urobilinogen, Urine 11/12/2022 Normal  Normal Final    Nitrite, Urine 11/12/2022 NEGATIVE  NEGATIVE Final    Leukocyte Esterase, Urine 11/12/2022 MOD (A)  NEGATIVE Final    WBC, UA 11/12/2022 0 TO 2  /HPF Final    RBC, UA 11/12/2022 0 TO 2  /HPF Final    Casts UA 11/12/2022 0 TO 2  /LPF Final    Epithelial Cells UA 11/12/2022 0 TO 2  /HPF Final    Bacteria, UA 11/12/2022 FEW (A)  None Final    Amphetamine Screen, Ur 11/12/2022 NEGATIVE  NEGATIVE Final    Comment:       (Positive cutoff 1000 ng/mL)                  Barbiturate Screen, Ur 11/12/2022 NEGATIVE  NEGATIVE Final    Comment:       (Positive cutoff 200 ng/mL)                  Benzodiazepine Screen, Urine 11/12/2022 NEGATIVE  NEGATIVE Final    Comment:       (Positive cutoff 200 ng/mL)                  Cocaine Metabolite, Urine 11/12/2022 NEGATIVE  NEGATIVE Final Comment:       (Positive cutoff 300 ng/mL)                  Methadone Screen, Urine 11/12/2022 NEGATIVE  NEGATIVE Final    Comment:       (Positive cutoff 300 ng/mL)                  Opiates, Urine 11/12/2022 NEGATIVE  NEGATIVE Final    Comment:       (Positive cutoff 300 ng/mL)                  Phencyclidine, Urine 11/12/2022 NEGATIVE  NEGATIVE Final    Comment:       (Positive cutoff 25 ng/mL)                  Cannabinoid Scrn, Ur 11/12/2022 POSITIVE (A)  NEGATIVE Final    Comment:       (Positive cutoff 50 ng/mL)                  Oxycodone Screen, Ur 11/12/2022 NEGATIVE  NEGATIVE Final    Comment:       (Positive cutoff 100 ng/mL)                  Fentanyl, Ur 11/12/2022 NEGATIVE  NEGATIVE Final    Comment:       (Positive cutoff  5 ng/ml)            Test Information 11/12/2022 Assay provides medical screening only. The absence of expected drug(s) and/or metabolite(s) may indicate diluted or adulterated urine, limitations of testing or timing of collection. Final    Comment: Testing for legal purposes should be confirmed by another method. To request confirmation   of test result, please call the lab within 7 days of sample submission. HCG(Urine) Pregnancy Test 11/12/2022 NEGATIVE  NEGATIVE Final    Comment: Specimens with hCG levels near the threshold of the test (25 mIU/mL) may give a negative or   indeterminate result. In such cases, another test should be performed with a new specimen   in 48-72 hours. If early pregnancy is suspected clinically in this setting, correlation   with quantitative serum b-hCG level is suggested. Specimen Source 11/12/2022 . BLOOD   Final    Ordered Test 11/12/2022 BMPX,CDP   Final    Reason for Rejection 11/12/2022 Unable to perform testing: Specimen hemolyzed. Final    Unable to perform testing: Specimen quantity not sufficient.     Glucose 11/12/2022 116 (A)  70 - 99 mg/dL Final    BUN 11/12/2022 8  6 - 20 mg/dL Final    Creatinine 11/12/2022 0.66  0.50 - 0.90 mg/dL Final    Est, Glom Filt Rate 11/12/2022 >60  >60 mL/min/1.73m2 Final    Comment:       Effective Oct 3, 2022        These results are not intended for use in patients <25years of age. eGFR results are calculated without a race factor using the 2021 CKD-EPI equation. Careful clinical correlation is recommended, particularly when comparing to results   calculated using previous equations. The CKD-EPI equation is less accurate in patients with extremes of muscle mass, extra-renal   metabolism of creatine, excessive creatine ingestion, or following therapy that affects   renal tubular secretion.       Calcium 11/12/2022 9.3  8.6 - 10.4 mg/dL Final    Sodium 11/12/2022 139  135 - 144 mmol/L Final    Potassium 11/12/2022 3.4 (A)  3.7 - 5.3 mmol/L Final    Chloride 11/12/2022 104  98 - 107 mmol/L Final    CO2 11/12/2022 23  20 - 31 mmol/L Final    Anion Gap 11/12/2022 12  9 - 17 mmol/L Final    WBC 11/12/2022 11.9 (A)  3.5 - 11.0 k/uL Final    RBC 11/12/2022 4.94  4.0 - 5.2 m/uL Final    Hemoglobin 11/12/2022 13.5  12.0 - 16.0 g/dL Final    Hematocrit 11/12/2022 41.5  36 - 46 % Final    MCV 11/12/2022 84.1  80 - 100 fL Final    MCH 11/12/2022 27.2  26 - 34 pg Final    MCHC 11/12/2022 32.4  31 - 37 g/dL Final    RDW 11/12/2022 13.0  11.5 - 14.9 % Final    Platelets 64/96/0156 311  150 - 450 k/uL Final    MPV 11/12/2022 8.3  6.0 - 12.0 fL Final    Seg Neutrophils 11/12/2022 62  36 - 66 % Final    Lymphocytes 11/12/2022 26  24 - 44 % Final    Monocytes 11/12/2022 7  1 - 7 % Final    Eosinophils % 11/12/2022 4  0 - 4 % Final    Basophils 11/12/2022 1  0 - 2 % Final    Segs Absolute 11/12/2022 7.40  1.3 - 9.1 k/uL Final    Absolute Lymph # 11/12/2022 3.10  1.0 - 4.8 k/uL Final    Absolute Mono # 11/12/2022 0.90  0.1 - 1.3 k/uL Final    Absolute Eos # 11/12/2022 0.50 (A)  0.0 - 0.4 k/uL Final    Basophils Absolute 11/12/2022 0.10  0.0 - 0.2 k/uL Final    Lithium Lvl 11/12/2022 0.6  0.6 - 1.2 mmol/L Final Lithium Dose Amount 11/12/2022 Unknown   Final    Lithium Date Last Dose 11/12/2022 UNK^Unknown^L   Final    Lithium Dose Time 11/12/2022 UNK^Unknown^L   Final    Magnesium 11/12/2022 2.0  1.6 - 2.6 mg/dL Final    Lithium Lvl 11/16/2022 1.1  0.6 - 1.2 mmol/L Final    Lithium Dose Amount 11/16/2022 600   Final    Lithium Date Last Dose 11/16/2022 [id number]   Final    Lithium Dose Time 11/16/2022 2046   Final         Reviewed patient's current plan of care and vital signs with nursing staff.     Labs reviewed: [x] Yes  Last EKG in EMR reviewed: [x] Yes  QTc: 420    Medications  Current Facility-Administered Medications: lurasidone (LATUDA) tablet 20 mg, 20 mg, Oral, Dinner  bisacodyl (DULCOLAX) EC tablet 5 mg, 5 mg, Oral, Daily PRN  ibuprofen (ADVIL;MOTRIN) tablet 400 mg, 400 mg, Oral, Q6H PRN  hydrOXYzine HCl (ATARAX) tablet 50 mg, 50 mg, Oral, TID PRN  traZODone (DESYREL) tablet 50 mg, 50 mg, Oral, Nightly PRN  polyethylene glycol (GLYCOLAX) packet 17 g, 17 g, Oral, Daily PRN  aluminum & magnesium hydroxide-simethicone (MAALOX) 200-200-20 MG/5ML suspension 30 mL, 30 mL, Oral, Q6H PRN  nicotine polacrilex (NICORETTE) gum 2 mg, 2 mg, Oral, Q2H PRN  haloperidol (HALDOL) tablet 5 mg, 5 mg, Oral, Q6H PRN **AND** LORazepam (ATIVAN) tablet 2 mg, 2 mg, Oral, Q6H PRN  haloperidol lactate (HALDOL) injection 5 mg, 5 mg, IntraMUSCular, Q6H PRN **AND** LORazepam (ATIVAN) injection 2 mg, 2 mg, IntraMUSCular, Q6H PRN **AND** diphenhydrAMINE (BENADRYL) injection 50 mg, 50 mg, IntraMUSCular, Q6H PRN  cariprazine hcl (VRAYLAR) capsule 6 mg, 6 mg, Oral, Daily  fluticasone (FLONASE) 50 MCG/ACT nasal spray 1 spray, 1 spray, Each Nostril, Daily  hydroCHLOROthiazide (HYDRODIURIL) tablet 25 mg, 25 mg, Oral, Daily  lithium (LITHOBID) extended release tablet 600 mg, 600 mg, Oral, 2 times per day  cloNIDine (CATAPRES) tablet 0.1 mg, 0.1 mg, Oral, Nightly    ASSESSMENT  Bipolar affective disorder, mixed, severe, with psychotic behavior ([facility]) HANDOFF  Patient symptoms: Remain unstable  Consultation with attending physician for medication  Encourage participation in groups and milieu. Probable discharge is to be determined by MD    Electronically signed by MAURICE Villalpando CNP on 11/16/2022 at 4:29 PM    **This report has been created using voice recognition software. It may contain minor errors which are inherent in voice recognition technology. **                                         Psychiatry Attending Attestation     I independently saw and evaluated the patient. I reviewed the Advance Practice Provider's documentation above. Any additional comments or changes to the Advance Practice Provider's documentation are stated below otherwise agree with assessment. Patient was dealing with vivid nightmares that kept her up last night. Did not receive any emergency medications and tolerating Latuda well. Minimal improvement noticed with her suicidal thoughts however her prince significantly improved. We will continue to titrate Latuda. Patient is actively working with her  and payee to get an apartment. More than 16 mins of the session was spent doing supportive psychotherapy. Session lasted over 30 mins today  PLAN  Patient s symptoms show no change  Will continue to titrate Latuda  Attempt to develop insight  Psycho-education conducted. Supportive Therapy conducted.   Probable discharge is tbd  Follow-up TBD    Electronically signed by Simone Winston MD on 11/16/22 at 6:19 PM EST

## 2022-11-16 NOTE — PLAN OF CARE
Problem: Self Harm/Suicidality  Goal: Will have no self-injury during hospital stay  Description: INTERVENTIONS:  1. Q 30 MINUTES: Routine safety checks  2. Q SHIFT & PRN: Assess risk to determine if routine checks are adequate to maintain patient safety  Outcome: Progressing  Note: Ms. BADILLO reports feeling better today. She is in good spirits. She is pleasant during interactions and discharged focused. She denies suicidal ideation and thoughts of harm to self and others. She denies auditory and visual hallucinations. Ms. BADILLO is social with her peers in the day area. She passively attends some therapeutic groups. Ms. BADILLO reported having nightmares at night, MD notified. She is pleasant and cooperative upon approach. Safety rounds continued.

## 2022-11-16 NOTE — GROUP NOTE
Group Therapy Note    Date: 11/16/2022    Group Start Time: 1000  Group End Time: 4023  Group Topic: Psychotherapy    [facility]    Amy Mendoza        Group Therapy Note    Attendees 6/22       Patient's Goal:PT will demonstrate increased interpersonal interaction and a clear understanding on multiple types of coping skills relating to the here-and-now therapeutic practice. Notes:Patient is making progress, AEB participating in group discussion, actively listening, and supporting other group members. Status After Intervention:  Improved    Participation Level: Active Listener and Interactive    Participation Quality: Appropriate, Attentive, and Sharing      Speech:  pressured      Thought Process/Content: Flight of ideas      Affective Functioning: Flat      Mood: anxious      Level of consciousness:  Alert and Attentive      Response to Learning: Able to verbalize/acknowledge new learning and Progressing to goal      Endings: None Reported    Modes of Intervention: Support, Socialization, Exploration, Clarifying, and Problem-solving      Discipline Responsible: /Counselor      Signature:   Amy Mendoza

## 2022-11-16 NOTE — PLAN OF CARE
Problem: Self Harm/Suicidality  Goal: Will have no self-injury during hospital stay  Description: INTERVENTIONS:  1. Q 30 MINUTES: Routine safety checks  2. Q SHIFT & PRN: Assess risk to determine if routine checks are adequate to maintain patient safety  Outcome: Progressing  Note: Ms. Christian Anders reports feeling better today. She is in good spirits. She is pleasant during interactions and discharged focused. She denies suicidal ideation and thoughts of harm to self and others. She denies auditory and visual hallucinations. Ms. Christian Anders is social with her peers in the day area. She passively attends some therapeutic groups. Ms. Christian Anders reports having nightmares at night. MD was notified.

## 2022-11-16 NOTE — PLAN OF CARE
Problem: Self Harm/Suicidality  Goal: Will have no self-injury during hospital stay  Description: INTERVENTIONS:  1. Q 30 MINUTES: Routine safety checks  2. Q SHIFT & PRN: Assess risk to determine if routine checks are adequate to maintain patient safety  11/15/2022 2234 by Alexandrea Johnson  Outcome: Progressing  Note: Patient remains free from self harm at this time. Pt denies thoughts of self harm and is agreeable to seeking out should thoughts of self harm arise. Safe environment maintained. Q15 minute checks for safety cont per unit policy. Will cont to monitor for safety and provides support and reassurance as needed. Problem: Micaela  Goal: Will exhibit normal sleep and speech and no impulsivity  Description: INTERVENTIONS:  1. Administer medication as ordered  2. Set limits on impulsive behavior  3. Make attempts to decrease external stimuli as possible  11/15/2022 2234 by Alexandrea Johnson  Outcome: Progressing  Note: Patient was out in the milieu, social with peers. Brightened. In control of behavior. Compliant with all prescribed medications for this shift. Safety checks maintained q15min and irregular rounding. Problem: Pain  Goal: Verbalizes/displays adequate comfort level or baseline comfort level  11/15/2022 2234 by Alexandrea Johnson  Outcome: Progressing  Flowsheets (Taken 11/15/2022 2234)  Verbalizes/displays adequate comfort level or baseline comfort level: Assess pain using appropriate pain scale  Note: Patient denies having pain at this time. Out in the milieu, cooperative, social with peers.

## 2022-11-16 NOTE — PROGRESS NOTES
11/16/22 1424   Encounter Summary   Encounter Overview/Reason  Spiritual/Emotional Needs   Service Provided For: Patient   Referral/Consult From: Satish   Last Encounter  11/16/22   Complexity of Encounter Moderate   Begin Time 0130   End Time  0200   Total Time Calculated 30 min   Spiritual/Emotional needs   Type Spiritual Support   Behavioral Health    Type  [facility]   Assessment/Intervention/Outcome   Assessment Calm   Intervention Active listening;Prayer (assurance of)/[place];Read/Provided Scripture;Sustaining Presence/Ministry of presence   Outcome Receptive; Expressed Gratitude;Engaged in conversation

## 2022-11-16 NOTE — GROUP NOTE
Group Therapy Note    Date: 11/15/2022    Group Start Time:   Group End Time:   Group Topic: Wrap-Up    [facility] CORE[facility]      Group Therapy Note    Attendees:          Patient's Goal:  ***    Notes:  ***    Status After Intervention:  {Status After Intervention:285937636}    Participation Level: {Participation Level:767037825}    Participation Quality: {[facility] PARTICIPATION QUALITY:353843724}      Speech:  {ED  CD_SPEECH:32593}      Thought Process/Content: {Thought Process/Content:585050647}      Affective Functioning: {Affective Functionin}      Mood: {Mood:085288641}      Level of consciousness:  {Level of consciousness:553476699}      Response to Learnin Tamar [facility] Responses to Learnin}      Endings: {[facility] Endings:65684}    Modes of Intervention: {[facility] Modes of Intervention:756890401}      Discipline Responsible: Juanita Sotelo YANET Multidisciplinary:703336006}      Signature:  Libra Ortiz

## 2022-11-16 NOTE — GROUP NOTE
Group Therapy Note    Date: 11/15/2022    Group Start Time: 2030  Group End Time: 2045  Group Topic: Wrap-Up    [facility] Christina Stacie Landau    Group Therapy Note    Attendees: 11/20       Patient's Goal: Patient will verbalize today's goals as well as for post discharge. Patient will also offer supportive listening and interact with peers to clarify and understand clearly set goals. Notes: Patient is making progress AEB participating in group discussion, actively listening, and supporting other group members. Status After Intervention: Improved      Participation Level:  Active Listener and Interactive      Participation Quality: Appropriate, Attentive, Sharing, and Supportive      Speech: normal      Thought Process/Content: Logical, Linear      Affective Functioning: Congruent      Mood: anxious      Level of consciousness: Oriented x4      Response to Learning: Able to verbalize current knowledge/experience, Able to verbalize/acknowledge new learning,      Able to retain information, and Capable of insight      Endings: None Reported      Modes of Intervention: Education, Support, Socialization, and Problem-solving         Discipline Responsible: Behavorial Health Tech      Signature: Loren Stein

## 2022-11-16 NOTE — PROGRESS NOTES
Pharmacy Med Education Group Note    Date: 11/16/22  Start Time: 1430  End Time: 1500    Number Participants in Group:  8/17    Goal:  Patient will demonstrate an understanding of the medications intended purpose and possible adverse effects  Topic: [place] for Pharmacy Med Ed Group    Discipline Responsible:     OT  AT  [facility].  RT     X Other       Participation Level:     None  Minimal      X Active Listener    X Interactive    Monopolizing         Participation Quality:    X Appropriate  Inappropriate     X       Attentive        Intrusive          Sharing        Resistant          Supportive        Lethargic       Affective:     X Congruent  Incongruent  Blunted  Flat    Constricted  Anxious  Elated  Angry    Labile  Depressed  Other         Cognitive:    X Alert  Oriented PPTP     Concentration   X G  F  P   Attention Span   X G  F  P   Short-Term Memory   X G  F  P   Long-Term Memory  G  F  P   ProblemSolving/  Decision Making  G  F  P   Ability to Process  Information   X G  F  P      Contributing Factors             Delusional             Hallucinating             Flight of Ideas             Other:       Modes of Intervention:    X Education   X Support  Exploration    Clarifying  Problem Solving  Confrontation    Socialization  Limit Setting  Reality Testing    Activity  Movement  Media    Other:            Response to Learning:    X Able to verbalize current knowledge/experience    Able to verbalize/acknowledge new learning    Able to retain information    Capable of insight    Able to change behavior    Progressing to goal    Other:        Comments: Patient left group early.     Stephany Barroso, Nahid, BCPS, BCPP  11/16/2022 3:29 PM  [phone]

## 2022-11-16 NOTE — GROUP NOTE
Group Therapy Note    Date: 11/15/2022    Group Start Time:   Group End Time:   Group Topic: Wrap-Up    [facility]EL[facility] COREEN Oliver    Wrap-Up Group Note      Date: 2022            Start Time: 8:30pm                 End Time: 8:45m      Number of Participants in Group & Unit Census:      Topic: HS Goal Wrap-Up     Goal of Group:Identify and discuss daily goals with group attendees. Comments:   Patient did not participate in Wrap-Up group, despite staff encouragement and explanation of benefits. Patient remain seclusive to self. Q15 minute safety checks maintained for patient safety and will continue to encourage patient to attend unit programming.        Patient's Goal:  ***    Notes:  ***    Status After Intervention:  {Status After Intervention:237911311}    Participation Level: {Participation Level:073272467}    Participation Quality: {[facility] PARTICIPATION QUALITY:068476005}      Speech:  {ED  CD_SPEECH:49069}      Thought Process/Content: {Thought Process/Content:862861596}      Affective Functioning: {Affective Functionin}      Mood: {Mood:973253550}      Level of consciousness:  {Level of consciousness:962309851}      Response to LearninPastor Sotelo [facility] Responses to Learnin}      Endings: {[facility] Endings:72630}    Modes of Intervention: {[facility] Modes of Intervention:032713952}      Discipline Responsible: Juanita ABRAHAM Multidisciplinary:311134381}      Signature:  Long Oliver

## 2022-11-16 NOTE — GROUP NOTE
Group Therapy Note    Date: 11/16/2022    Group Start Time: 1100  Group End Time: 1150  Group Topic: Psychoeducation    [facility]    JACQUES CoronadoS        Group Therapy Note    Attendees: 6/20       Patient's Goal:  To increase interpersonal interactions with peers. To increase communication skills. To increase self-awareness and understanding of oneself. Notes:  Patient arrived to group approximately senior living through; however, fully participated when present. Patient communicated appropriately with peers. Patient was pleasant and appropriate. Status After Intervention:  Improved    Participation Level:  Active Listener and Interactive    Participation Quality: Appropriate but distracted      Speech:  hyper verbal when talking but willing to take turns      Thought Process/Content: Logical      Affective Functioning: Flat      Mood: euthymic      Level of consciousness:  Alert and Attentive      Response to Learning: Progressing to goal      Endings: None Reported    Modes of Intervention: Socialization, Exploration, Activity, and Reality-testing      Discipline Responsible: Psychoeducational Specialist      Signature:  Miguel Díaz

## 2022-11-16 NOTE — GROUP NOTE
Group Therapy Note    Date: 11/16/2022    Group Start Time: 0900  Group End Time: 0930  Group Topic: Community Meeting    [facility]    Carlos May LPN; Robb Alberto LPN        Group Therapy Note    Attendees: 8/22       Patient's Goal:  goals group        Status After Intervention:  Unchanged    Participation Level:  Active Listener and Interactive    Participation Quality: Appropriate, Attentive, Sharing, and Supportive      Speech:  normal      Thought Process/Content: Logical      Affective Functioning: Congruent      Mood: anxious      Level of consciousness:  Alert, Oriented x4, and Attentive      Response to Learning: Able to verbalize current knowledge/experience      Endings: None Reported    Modes of Intervention: Education, Support, and Socialization      Discipline Responsible: Licensed Practical Nurse      Signature:  Carlos May LPN

## 2022-11-17 PROCEDURE — 1240000000 HC EMOTIONAL WELLNESS R&B

## 2022-11-17 PROCEDURE — 6370000000 HC RX 637 (ALT 250 FOR IP): Performed by: PSYCHIATRY & NEUROLOGY

## 2022-11-17 PROCEDURE — APPSS30 APP SPLIT SHARED TIME 16-30 MINUTES

## 2022-11-17 PROCEDURE — 6370000000 HC RX 637 (ALT 250 FOR IP)

## 2022-11-17 RX ORDER — LANOLIN ALCOHOL/MO/W.PET/CERES
1.5 CREAM (GRAM) TOPICAL NIGHTLY PRN
Status: DISCONTINUED | OUTPATIENT
Start: 2022-11-17 | End: 2022-11-19

## 2022-11-17 RX ORDER — PRAZOSIN HYDROCHLORIDE 1 MG/1
1 CAPSULE ORAL NIGHTLY
Status: DISCONTINUED | OUTPATIENT
Start: 2022-11-17 | End: 2022-11-22 | Stop reason: HOSPADM

## 2022-11-17 RX ADMIN — Medication 1.5 MG: at 21:09

## 2022-11-17 RX ADMIN — LITHIUM CARBONATE 600 MG: 300 TABLET, EXTENDED RELEASE ORAL at 21:09

## 2022-11-17 RX ADMIN — IBUPROFEN 400 MG: 400 TABLET ORAL at 03:15

## 2022-11-17 RX ADMIN — POLYETHYLENE GLYCOL 3350 17 G: 17 POWDER, FOR SOLUTION ORAL at 09:26

## 2022-11-17 RX ADMIN — LITHIUM CARBONATE 600 MG: 300 TABLET, EXTENDED RELEASE ORAL at 09:26

## 2022-11-17 RX ADMIN — PRAZOSIN HYDROCHLORIDE 1 MG: 1 CAPSULE ORAL at 21:10

## 2022-11-17 RX ADMIN — LURASIDONE HYDROCHLORIDE 50 MG: 60 TABLET, FILM COATED ORAL at 17:09

## 2022-11-17 RX ADMIN — BISACODYL 5 MG: 5 TABLET, COATED ORAL at 21:11

## 2022-11-17 RX ADMIN — ALUMINUM HYDROXIDE, MAGNESIUM HYDROXIDE, AND SIMETHICONE 30 ML: 200; 200; 20 SUSPENSION ORAL at 04:44

## 2022-11-17 RX ADMIN — HYDROXYZINE HYDROCHLORIDE 50 MG: 50 TABLET, FILM COATED ORAL at 09:26

## 2022-11-17 RX ADMIN — FLUTICASONE PROPIONATE 1 SPRAY: 50 SPRAY, METERED NASAL at 09:26

## 2022-11-17 RX ADMIN — HYDROXYZINE HYDROCHLORIDE 50 MG: 50 TABLET, FILM COATED ORAL at 21:09

## 2022-11-17 RX ADMIN — HYDROCHLOROTHIAZIDE 25 MG: 25 TABLET ORAL at 09:26

## 2022-11-17 RX ADMIN — IBUPROFEN 400 MG: 400 TABLET ORAL at 23:58

## 2022-11-17 RX ADMIN — CARIPRAZINE 6 MG: 3 CAPSULE, GELATIN COATED ORAL at 09:26

## 2022-11-17 ASSESSMENT — PAIN SCALES - GENERAL
PAINLEVEL_OUTOF10: 6
PAINLEVEL_OUTOF10: 8

## 2022-11-17 ASSESSMENT — PAIN - FUNCTIONAL ASSESSMENT
PAIN_FUNCTIONAL_ASSESSMENT: NONE - DENIES PAIN
PAIN_FUNCTIONAL_ASSESSMENT: ACTIVITIES ARE NOT PREVENTED

## 2022-11-17 ASSESSMENT — PAIN DESCRIPTION - LOCATION
LOCATION: GENERALIZED;OTHER (COMMENT)
LOCATION: BACK;NECK

## 2022-11-17 ASSESSMENT — PAIN DESCRIPTION - DESCRIPTORS
DESCRIPTORS: ACHING
DESCRIPTORS: ACHING

## 2022-11-17 NOTE — PLAN OF CARE
Problem: Micaela  Goal: Will exhibit normal sleep and speech and no impulsivity  Description: INTERVENTIONS:  1. Administer medication as ordered  2. Set limits on impulsive behavior  3. Make attempts to decrease external stimuli as possible  11/16/2022 2006 by Jeremiah Cochran  Outcome: Progressing  Note: Patient is not hyperverbal or showing symptoms of miacela at this time. Problem: Pain  Goal: Verbalizes/displays adequate comfort level or baseline comfort level  11/16/2022 2006 by Jeremiah Cochran  Outcome: Progressing  Flowsheets (Taken 11/16/2022 2006)  Verbalizes/displays adequate comfort level or baseline comfort level: Assess pain using appropriate pain scale  Note: Patient denies having pain at this time. Pt denies thoughts of self harm and is agreeable to seeking out should thoughts of self harm arise. Safe environment maintained. Q15 minute checks for safety cont per unit policy. Will cont to monitor for safety and provides support and reassurance as needed.

## 2022-11-17 NOTE — CARE COORDINATION
Social work met with patient yesterday to offer support for discharge planning needs. She exhibited flight of ideas but noted it has improved since admission. Patient continues to express desire to return to [facility] to be near her children but does not have insight into how she will establish plans such as housing, outpatient treatment, and other needs. She states she will call her sister for assistance with this and will let social work know what the outcome is. Patient says that last resort options would be to rent a motel room here in the [place] area since she has the financial means to do so. Phone call was completed with patient's [employer] FACT FATOUMATA [place]ina [employer] [phone] who shares that patient's family is in fact estranged from her in [facility] that patient's plan is not reality based and that this would not be a good option of patient does choose this plan. Latosha Concepcion reports her and patient previously looked at a room at a group home she would rent as an option that was just over $400 a month that is still available to patient if she is interested. Will discuss with patient today and continue care coordination with Latosha Concepcion. Physician was informed.

## 2022-11-17 NOTE — GROUP NOTE
Group Therapy Note    Date: 11/17/2022    Group Start Time: 0300  Group End Time: 0330  Group Topic: Psychoeducation    [facility]    Maria L Perez        Group Therapy Note    Attendees: 6/18       Patient's Goal:  Engaging in Conversations with Peers. Notes:  What Would You Do scenarios    Status After Intervention:  Improved    Participation Level:  Active Listener and Interactive    Participation Quality: Appropriate, Attentive, Sharing, and Supportive      Speech:  normal and pressured      Thought Process/Content: Logical  Linear  Flight of ideas      Affective Functioning: Congruent and Blunted      Mood:  Eager, Positive      Level of consciousness:  Alert, Oriented x4, and Attentive      Response to Learning: Able to verbalize current knowledge/experience, Able to verbalize/acknowledge new learning, and Able to retain information      Endings: None Reported    Modes of Intervention: Education, Support, Socialization, and Exploration      Discipline Responsible: [address] [facility] [employer]      Signature:  Maria L Perez

## 2022-11-17 NOTE — GROUP NOTE
Group Therapy Note    Date: 11/17/2022    Group Start Time: 0900  Group End Time: 0930  Group Topic: Community Meeting    [facility]    Eugenie Gonzalez        Group Therapy Note    Attendees: 10/19       Patient's Goal:  To be discharged. To text her children and keep communication with them open. Notes:  Goal-setting    Status After Intervention:  Unchanged    Participation Level:  Active Listener and Interactive    Participation Quality: Appropriate, Attentive, Sharing, and Supportive      Speech:  normal and pressured      Thought Process/Content: Logical  Flight of ideas      Affective Functioning: Congruent and Blunted      Mood: anxious, elevated, and euphoric      Level of consciousness:  Alert, Oriented x4, and Attentive      Response to Learning: Able to verbalize current knowledge/experience, Able to verbalize/acknowledge new learning, and Able to retain information      Endings: None Reported    Modes of Intervention: Education, Support, Socialization, Exploration, and Clarifying      Discipline Responsible: [address] Tech      Signature:  Eugenie Gonzalez

## 2022-11-17 NOTE — GROUP NOTE
Group Therapy Note    Date: 11/17/2022    Group Start Time: 4511  Group End Time: 1378  Group Topic: Relaxation    [facility]        Group Therapy Note    Attendees: 10/18       Patient's Goal:  To increase awareness of stressors and symptoms of stress. To identify healthy and unhealthy coping skills. To improve healthy coping skills. Notes: Patient attended group and actively participated. Patient identified stressors in their life. Patient identified coping skills that they can utilize upon discharge. Status After Intervention:  Improved    Participation Level:  Active Listener and Interactive    Participation Quality: Appropriate and Attentive      Speech:  normal      Thought Process/Content: Logical      Affective Functioning: Flat      Mood: anxious and euthymic      Level of consciousness:  Alert and Attentive      Response to Learning: Progressing to goal      Endings: None Reported    Modes of Intervention: Education, Support, Exploration, Problem-solving, and Reality-testing      Discipline Responsible: Psychoeducational Specialist      Signature:  Milly Ppo

## 2022-11-17 NOTE — PROGRESS NOTES
Daily Progress Note  11/17/2022    Patient Name: Piyush Stevens    CHIEF COMPLAINT:  Manic like behaviors and suicidal ideation          SUBJECTIVE:      Patient is seen today for a follow up assessment. Patient has been compliant with scheduled medications at this time and has not required emergency medications in the past 24 hours. When approached for interview patient states that she is feeling \"great\" today. Patient does continue to present as hypomanic however speech is somewhat less rapid and pressured. Patient is overall linear however presents with some flight of ideas and reduction in concentration. Patient reports difficulty focusing and states she tried to read however is not able to at this time. Plan to continue titrating Latuda. Patient states that her sleep has been poor. Plan to discontinue trazodone and start melatonin. Patient requests prazosin for difficulty sleeping and nightmares. Plan to discontinue clonidine and start prazosin. When discussing paranoia patient reports thoughts that others were talking about her twice yesterday she states she confronted other patients. Patient reports the issue being resolved after confronting them. Patient denies auditory or visual hallucinations and denies any thoughts of self-harm or homicidal ideation. When discussing medication side effects patient reports a rash that started yesterday on her inner thighs and on her chest.  Patient states she had this rash once prior on her chest and it \"went away on its own\". Plan to follow up with internal medicine for rash. Appetite:  [x] Normal/Adequate/Unchanged  [] Increased  [] Decreased      Sleep:       [] Normal/Adequate/Unchanged  [] Fair  [x] Poor      Group Attendance on Unit:   [x] Yes  [] Selectively    [] No    Medication Side Effects:  Patient denies any medication side effects at the time of assessment. Mental Status Exam  Level of consciousness: Alert and awake. Appearance: Appropriate attire for setting, seated in chair, with fair  grooming and hygiene. Behavior/Motor: Approachable, restless  Attitude toward examiner: Cooperative, mostly attentive, good eye contact. Speech: less rapid and pressured, normal volume, normal tone  Mood:  Patient reports \"great\". Affect: Reactive, less labile  Thought processes: Some linear thought, continued loose associations  Thought content: Denies homicidal ideation. Suicidal Ideation: Denies suicidal ideations  Delusions: No evidence of delusions. Denies paranoia. Perceptual Disturbance: Patient does not appear to be responding to internal stimuli. Denies auditory hallucinations. Denies visual hallucinations. Cognition: Oriented to self, location, time, and situation. Memory: Intact. Insight & Judgement: Poor. Data   height is 5' 7\" (1.702 m) and weight is 180 lb (81.6 kg). Her oral temperature is 97.5 °F (36.4 °C). Her blood pressure is 118/83 and her pulse is 78. Her respiration is 14 and oxygen saturation is 98%.    Labs:   Admission on 11/12/2022   Component Date Value Ref Range Status    Color, UA 11/12/2022 Yellow  Yellow Final    Turbidity UA 11/12/2022 Clear  Clear Final    Glucose, Ur 11/12/2022 NEGATIVE  NEGATIVE Final    Bilirubin Urine 11/12/2022 NEGATIVE  NEGATIVE Final    Ketones, Urine 11/12/2022 NEGATIVE  NEGATIVE Final    Specific [place], UA 11/12/2022 1.008  1.000 - 1.030 Final    Urine Hgb 11/12/2022 NEGATIVE  NEGATIVE Final    pH, UA 11/12/2022 6.5  5.0 - 8.0 Final    Protein, UA 11/12/2022 NEGATIVE  NEGATIVE Final    Urobilinogen, Urine 11/12/2022 Normal  Normal Final    Nitrite, Urine 11/12/2022 NEGATIVE  NEGATIVE Final    Leukocyte Esterase, Urine 11/12/2022 MOD (A)  NEGATIVE Final    WBC, UA 11/12/2022 0 TO 2  /HPF Final    RBC, UA 11/12/2022 0 TO 2  /HPF Final    Casts UA 11/12/2022 0 TO 2  /LPF Final    Epithelial Cells UA 11/12/2022 0 TO 2  /HPF Final    Bacteria, UA 11/12/2022 FEW (A)  None Final    Amphetamine Screen, Ur 11/12/2022 NEGATIVE  NEGATIVE Final    Comment:       (Positive cutoff 1000 ng/mL)                  Barbiturate Screen, Ur 11/12/2022 NEGATIVE  NEGATIVE Final    Comment:       (Positive cutoff 200 ng/mL)                  Benzodiazepine Screen, Urine 11/12/2022 NEGATIVE  NEGATIVE Final    Comment:       (Positive cutoff 200 ng/mL)                  Cocaine Metabolite, Urine 11/12/2022 NEGATIVE  NEGATIVE Final    Comment:       (Positive cutoff 300 ng/mL)                  Methadone Screen, Urine 11/12/2022 NEGATIVE  NEGATIVE Final    Comment:       (Positive cutoff 300 ng/mL)                  Opiates, Urine 11/12/2022 NEGATIVE  NEGATIVE Final    Comment:       (Positive cutoff 300 ng/mL)                  Phencyclidine, Urine 11/12/2022 NEGATIVE  NEGATIVE Final    Comment:       (Positive cutoff 25 ng/mL)                  Cannabinoid Scrn, Ur 11/12/2022 POSITIVE (A)  NEGATIVE Final    Comment:       (Positive cutoff 50 ng/mL)                  Oxycodone Screen, Ur 11/12/2022 NEGATIVE  NEGATIVE Final    Comment:       (Positive cutoff 100 ng/mL)                  Fentanyl, Ur 11/12/2022 NEGATIVE  NEGATIVE Final    Comment:       (Positive cutoff  5 ng/ml)            Test Information 11/12/2022 Assay provides medical screening only. The absence of expected drug(s) and/or metabolite(s) may indicate diluted or adulterated urine, limitations of testing or timing of collection. Final    Comment: Testing for legal purposes should be confirmed by another method. To request confirmation   of test result, please call the lab within 7 days of sample submission. HCG(Urine) Pregnancy Test 11/12/2022 NEGATIVE  NEGATIVE Final    Comment: Specimens with hCG levels near the threshold of the test (25 mIU/mL) may give a negative or   indeterminate result. In such cases, another test should be performed with a new specimen   in 48-72 hours.   If early pregnancy is suspected clinically in this setting, correlation   with quantitative serum b-hCG level is suggested. Specimen Source 11/12/2022 . BLOOD   Final    Ordered Test 11/12/2022 BMPX,CDP   Final    Reason for Rejection 11/12/2022 Unable to perform testing: Specimen hemolyzed. Final    Unable to perform testing: Specimen quantity not sufficient. Glucose 11/12/2022 116 (A)  70 - 99 mg/dL Final    BUN 11/12/2022 8  6 - 20 mg/dL Final    Creatinine 11/12/2022 0.66  0.50 - 0.90 mg/dL Final    Est, Glom Filt Rate 11/12/2022 >60  >60 mL/min/1.73m2 Final    Comment:       Effective Oct 3, 2022        These results are not intended for use in patients <25years of age. eGFR results are calculated without a race factor using the 2021 CKD-EPI equation. Careful clinical correlation is recommended, particularly when comparing to results   calculated using previous equations. The CKD-EPI equation is less accurate in patients with extremes of muscle mass, extra-renal   metabolism of creatine, excessive creatine ingestion, or following therapy that affects   renal tubular secretion.       Calcium 11/12/2022 9.3  8.6 - 10.4 mg/dL Final    Sodium 11/12/2022 139  135 - 144 mmol/L Final    Potassium 11/12/2022 3.4 (A)  3.7 - 5.3 mmol/L Final    Chloride 11/12/2022 104  98 - 107 mmol/L Final    CO2 11/12/2022 23  20 - 31 mmol/L Final    Anion Gap 11/12/2022 12  9 - 17 mmol/L Final    WBC 11/12/2022 11.9 (A)  3.5 - 11.0 k/uL Final    RBC 11/12/2022 4.94  4.0 - 5.2 m/uL Final    Hemoglobin 11/12/2022 13.5  12.0 - 16.0 g/dL Final    Hematocrit 11/12/2022 41.5  36 - 46 % Final    MCV 11/12/2022 84.1  80 - 100 fL Final    MCH 11/12/2022 27.2  26 - 34 pg Final    MCHC 11/12/2022 32.4  31 - 37 g/dL Final    RDW 11/12/2022 13.0  11.5 - 14.9 % Final    Platelets 81/88/6296 311  150 - 450 k/uL Final    MPV 11/12/2022 8.3  6.0 - 12.0 fL Final    Seg Neutrophils 11/12/2022 62  36 - 66 % Final    Lymphocytes 11/12/2022 26  24 - 44 % Final    Monocytes 11/12/2022 7  1 - 7 % Final    Eosinophils % 11/12/2022 4  0 - 4 % Final    Basophils 11/12/2022 1  0 - 2 % Final    Segs Absolute 11/12/2022 7.40  1.3 - 9.1 k/uL Final    Absolute Lymph # 11/12/2022 3.10  1.0 - 4.8 k/uL Final    Absolute Mono # 11/12/2022 0.90  0.1 - 1.3 k/uL Final    Absolute Eos # 11/12/2022 0.50 (A)  0.0 - 0.4 k/uL Final    Basophils Absolute 11/12/2022 0.10  0.0 - 0.2 k/uL Final    Lithium Lvl 11/12/2022 0.6  0.6 - 1.2 mmol/L Final    Lithium Dose Amount 11/12/2022 Unknown   Final    Lithium Date Last Dose 11/12/2022 UNK^Unknown^L   Final    Lithium Dose Time 11/12/2022 UNK^Unknown^L   Final    Magnesium 11/12/2022 2.0  1.6 - 2.6 mg/dL Final    Lithium Lvl 11/16/2022 1.1  0.6 - 1.2 mmol/L Final    Lithium Dose Amount 11/16/2022 600   Final    Lithium Date Last Dose 11/16/2022 [id number]   Final    Lithium Dose Time 11/16/2022 2046   Final         Reviewed patient's current plan of care and vital signs with nursing staff.     Labs reviewed: [x] Yes  Last EKG in EMR reviewed: [x] Yes  QTc: 420    Medications  Current Facility-Administered Medications: melatonin tablet 1.5 mg, 1.5 mg, Oral, Nightly PRN  prazosin (MINIPRESS) capsule 1 mg, 1 mg, Oral, Nightly  lurasidone (LATUDA) tablet 50 mg, 50 mg, Oral, Dinner  bisacodyl (DULCOLAX) EC tablet 5 mg, 5 mg, Oral, Daily PRN  ibuprofen (ADVIL;MOTRIN) tablet 400 mg, 400 mg, Oral, Q6H PRN  hydrOXYzine HCl (ATARAX) tablet 50 mg, 50 mg, Oral, TID PRN  polyethylene glycol (GLYCOLAX) packet 17 g, 17 g, Oral, Daily PRN  aluminum & magnesium hydroxide-simethicone (MAALOX) 200-200-20 MG/5ML suspension 30 mL, 30 mL, Oral, Q6H PRN  nicotine polacrilex (NICORETTE) gum 2 mg, 2 mg, Oral, Q2H PRN  haloperidol (HALDOL) tablet 5 mg, 5 mg, Oral, Q6H PRN **AND** LORazepam (ATIVAN) tablet 2 mg, 2 mg, Oral, Q6H PRN  haloperidol lactate (HALDOL) injection 5 mg, 5 mg, IntraMUSCular, Q6H PRN **AND** LORazepam (ATIVAN) injection 2 mg, 2 mg, IntraMUSCular, Q6H PRN **AND** diphenhydrAMINE (BENADRYL) injection 50 mg, 50 mg, IntraMUSCular, Q6H PRN  cariprazine hcl (VRAYLAR) capsule 6 mg, 6 mg, Oral, Daily  fluticasone (FLONASE) 50 MCG/ACT nasal spray 1 spray, 1 spray, Each Nostril, Daily  hydroCHLOROthiazide (HYDRODIURIL) tablet 25 mg, 25 mg, Oral, Daily  lithium (LITHOBID) extended release tablet 600 mg, 600 mg, Oral, 2 times per day    ASSESSMENT  Bipolar affective disorder, mixed, severe, with psychotic behavior ([facility] [address]         HANDOFF  Patient symptoms: Remain unstable  Consultation with attending physician for medication  Modify order: Titrate Latuda to 50 mg nightly  Discontinue order: Trazodone  Discontinue order: Clonidine  New order: Prazosin 1 mg nightly  New order: Melatonin 1.5 mg nightly as needed  Encourage participation in groups and milieu. Probable discharge is to be determined by MD    Electronically signed by MAURICE Mckeon CNP on 11/17/2022 at 3:27 PM    **This report has been created using voice recognition software. It may contain minor errors which are inherent in voice recognition technology. **                                         Psychiatry Attending Attestation     I independently saw and evaluated the patient. I reviewed the Advance Practice Provider's documentation above. Any additional comments or changes to the Advance Practice Provider's documentation are stated below otherwise agree with assessment. Patient continues to be extremely labile. Reports very poor sleep again last night. Reports that nightmares kept her up all night. Discussed with her about discontinuing clonidine and trying prazosin today. Continue to exhibit significant thought disorganization and elevated mood today. Concern for ongoing manic episode. We were trying to discuss about possibly going to a apartment established by her case management from [place]. Patient continues to make several plans about other going to [facility], living in the [place] or going to this apartment. Unable to come up with a good safety plan at this time. Minimal improvement noticed even on 2 different antipsychotics. We will continue to titrate Latuda at this time. PLAN  Patient s symptoms show no change  Medications as discussed above  Attempt to develop insight  Psycho-education conducted. Supportive Therapy conducted.   Probable discharge is tbd  Follow-up TBD    Electronically signed by Elsie Fields MD on 11/17/22 at 7:21 PM EST

## 2022-11-17 NOTE — GROUP NOTE
Group Therapy Note    Date: 11/17/2022    Group Start Time: 1330  Group End Time: 6901  Group Topic: Cognitive Skills    [facility]    JUANCARLOS Muñoz        Group Therapy Note    Attendees: 10/18       Patient's Goal:  To improve cognitive thinking through concentration, decision making and turn taking during group. To increase interpersonal interactions with peers    Notes:  Patient arrived to group approximately ten minutes late. Patient fully participated when present. Patient was pleasant and appropriate. Status After Intervention:  Improved    Participation Level:  Active Listener and Interactive    Participation Quality: Appropriate and Attentive      Speech:  normal      Thought Process/Content: Logical      Affective Functioning: Flat      Mood: euthymic      Level of consciousness:  Alert and Attentive      Response to Learning: Progressing to goal      Endings: None Reported    Modes of Intervention: Socialization, Problem-solving, Activity, and Reality-testing      Discipline Responsible: Psychoeducational Specialist      Signature:  Dani Cancino

## 2022-11-17 NOTE — PLAN OF CARE
Problem: Self Harm/Suicidality  Goal: Will have no self-injury during hospital stay  Description: INTERVENTIONS:  1. Q 15 MINUTES: Routine safety checks  2. Q SHIFT & PRN: Assess risk to determine if routine checks are adequate to maintain patient safety  Outcome: Progressing  Note: Patient is brightened upon approach. Patient reports decrease in racing thoughts. Patient reports trouble sleeping, waking up intermittently throughout the night. Patient is compliant with current medication regimen. Pt denies thoughts of self harm and is agreeable to seeking out should thoughts of self harm arise. Safe environment maintained. 15 minute checks for safety cont per unit policy. Will cont to monitor for safety and provides support and reassurance as needed.

## 2022-11-18 PROCEDURE — 1240000000 HC EMOTIONAL WELLNESS R&B

## 2022-11-18 PROCEDURE — 6370000000 HC RX 637 (ALT 250 FOR IP): Performed by: INTERNAL MEDICINE

## 2022-11-18 PROCEDURE — 6370000000 HC RX 637 (ALT 250 FOR IP)

## 2022-11-18 PROCEDURE — 6370000000 HC RX 637 (ALT 250 FOR IP): Performed by: PSYCHIATRY & NEUROLOGY

## 2022-11-18 PROCEDURE — 99223 1ST HOSP IP/OBS HIGH 75: CPT | Performed by: INTERNAL MEDICINE

## 2022-11-18 PROCEDURE — APPSS30 APP SPLIT SHARED TIME 16-30 MINUTES

## 2022-11-18 RX ORDER — PREDNISONE 20 MG/1
20 TABLET ORAL DAILY
Status: COMPLETED | OUTPATIENT
Start: 2022-11-18 | End: 2022-11-22

## 2022-11-18 RX ORDER — DIPHENHYDRAMINE HCL 25 MG
25 TABLET ORAL EVERY 6 HOURS PRN
Status: DISCONTINUED | OUTPATIENT
Start: 2022-11-18 | End: 2022-11-22 | Stop reason: HOSPADM

## 2022-11-18 RX ADMIN — CARIPRAZINE 6 MG: 3 CAPSULE, GELATIN COATED ORAL at 08:55

## 2022-11-18 RX ADMIN — HYDROCHLOROTHIAZIDE 25 MG: 25 TABLET ORAL at 08:57

## 2022-11-18 RX ADMIN — FLUTICASONE PROPIONATE 1 SPRAY: 50 SPRAY, METERED NASAL at 08:56

## 2022-11-18 RX ADMIN — LITHIUM CARBONATE 600 MG: 300 TABLET, EXTENDED RELEASE ORAL at 08:55

## 2022-11-18 RX ADMIN — HYDROXYZINE HYDROCHLORIDE 50 MG: 50 TABLET, FILM COATED ORAL at 22:56

## 2022-11-18 RX ADMIN — PRAZOSIN HYDROCHLORIDE 1 MG: 1 CAPSULE ORAL at 22:56

## 2022-11-18 RX ADMIN — LITHIUM CARBONATE 600 MG: 300 TABLET, EXTENDED RELEASE ORAL at 22:56

## 2022-11-18 RX ADMIN — PREDNISONE 20 MG: 20 TABLET ORAL at 15:27

## 2022-11-18 RX ADMIN — HALOPERIDOL 5 MG: 5 TABLET ORAL at 23:11

## 2022-11-18 RX ADMIN — Medication 1.5 MG: at 22:56

## 2022-11-18 RX ADMIN — LORAZEPAM 2 MG: 1 TABLET ORAL at 23:11

## 2022-11-18 NOTE — GROUP NOTE
Group Therapy Note    Date: 11/18/2022    Group Start Time: 0900  Group End Time: 0940  Group Topic: Community Meeting    ANTON Gonzales        Group Therapy Note    Attendees: 11/20       Patient's Goal:  Talk with my  about going to [facility] to see my boys    Notes:  controled    Status After Intervention:  Unchanged    Participation Level: Active Listener and Interactive    Participation Quality: Appropriate and Attentive      Speech:  normal      Thought Process/Content: Logical      Affective Functioning: Congruent      Mood: anxious      Level of consciousness:  Oriented x4      Response to Learning: Able to verbalize current knowledge/experience and Progressing to goal      Endings: None Reported    Modes of Intervention: Education, Support, and Socialization      Discipline Responsible: Behavorial Health Tech      Signature:   Gomez Gonzales

## 2022-11-18 NOTE — PROGRESS NOTES
Daily Progress Note  11/18/2022    Patient Name: Jairo Can    CHIEF COMPLAINT:  Manic like behaviors and suicidal ideation          SUBJECTIVE:      Patient is seen today for a follow up assessment. Patient has been compliant with scheduled medications at this time and has not required emergency medications in the past 24 hours. When approached interview patient continues to present with rapid pressured speech, tangential thought process and labile mood. Patient was observed crying during interview. Patient was able to have mostly linear conversation however presented with loose associations at times. Patient reports prazosin and melatonin improved her sleep and she did not have nightmares last night. Patient continues to ruminate about leaving for [facility] and states it makes her sad thinking about leaving her son. Patient endorses paranoia and states that she feels she has not \"good enough\" and relates this to her age and to her continued to struggle. Patient denies medication side effects and states that she has not noticed benefit. Patient reports that she has been crying more today. Appetite:  [x] Normal/Adequate/Unchanged  [] Increased  [] Decreased      Sleep:       [] Normal/Adequate/Unchanged  [x] Fair  [] Poor      Group Attendance on Unit:   [x] Yes  [] Selectively    [] No    Medication Side Effects:  Patient denies any medication side effects at the time of assessment. Mental Status Exam  Level of consciousness: Alert and awake. Appearance: Appropriate attire for setting, seated in chair, with fair  grooming and hygiene. Behavior/Motor: Approachable, restless  Attitude toward examiner: Cooperative, mostly attentive, good eye contact. Speech: less rapid and pressured, normal volume, normal tone  Mood:  Patient reports \"sad\".    Affect: Reactive, labile  Thought processes: Some linear thought, continued loose associations  Thought content: Denies homicidal ideation. Suicidal Ideation: Denies suicidal ideations  Delusions: No evidence of delusions. Endorses paranoia. Perceptual Disturbance: Patient does not appear to be responding to internal stimuli. Denies auditory hallucinations. Denies visual hallucinations. Cognition: Oriented to self, location, time, and situation. Memory: Intact. Insight & Judgement: Poor. Data   height is 5' 7\" (1.702 m) and weight is 180 lb (81.6 kg). Her oral temperature is 98.1 °F (36.7 °C). Her blood pressure is 142/77 (abnormal) and her pulse is 79. Her respiration is 14 and oxygen saturation is 98%.    Labs:   Admission on 11/12/2022   Component Date Value Ref Range Status    Color, UA 11/12/2022 Yellow  Yellow Final    Turbidity UA 11/12/2022 Clear  Clear Final    Glucose, Ur 11/12/2022 NEGATIVE  NEGATIVE Final    Bilirubin Urine 11/12/2022 NEGATIVE  NEGATIVE Final    Ketones, Urine 11/12/2022 NEGATIVE  NEGATIVE Final    Specific [place], UA 11/12/2022 1.008  1.000 - 1.030 Final    Urine Hgb 11/12/2022 NEGATIVE  NEGATIVE Final    pH, UA 11/12/2022 6.5  5.0 - 8.0 Final    Protein, UA 11/12/2022 NEGATIVE  NEGATIVE Final    Urobilinogen, Urine 11/12/2022 Normal  Normal Final    Nitrite, Urine 11/12/2022 NEGATIVE  NEGATIVE Final    Leukocyte Esterase, Urine 11/12/2022 MOD (A)  NEGATIVE Final    WBC, UA 11/12/2022 0 TO 2  /HPF Final    RBC, UA 11/12/2022 0 TO 2  /HPF Final    Casts UA 11/12/2022 0 TO 2  /LPF Final    Epithelial Cells UA 11/12/2022 0 TO 2  /HPF Final    Bacteria, UA 11/12/2022 FEW (A)  None Final    Amphetamine Screen, Ur 11/12/2022 NEGATIVE  NEGATIVE Final    Comment:       (Positive cutoff 1000 ng/mL)                  Barbiturate Screen, Ur 11/12/2022 NEGATIVE  NEGATIVE Final    Comment:       (Positive cutoff 200 ng/mL)                  Benzodiazepine Screen, Urine 11/12/2022 NEGATIVE  NEGATIVE Final    Comment:       (Positive cutoff 200 ng/mL)                  Cocaine Metabolite, Urine 11/12/2022 NEGATIVE NEGATIVE Final    Comment:       (Positive cutoff 300 ng/mL)                  Methadone Screen, Urine 11/12/2022 NEGATIVE  NEGATIVE Final    Comment:       (Positive cutoff 300 ng/mL)                  Opiates, Urine 11/12/2022 NEGATIVE  NEGATIVE Final    Comment:       (Positive cutoff 300 ng/mL)                  Phencyclidine, Urine 11/12/2022 NEGATIVE  NEGATIVE Final    Comment:       (Positive cutoff 25 ng/mL)                  Cannabinoid Scrn, Ur 11/12/2022 POSITIVE (A)  NEGATIVE Final    Comment:       (Positive cutoff 50 ng/mL)                  Oxycodone Screen, Ur 11/12/2022 NEGATIVE  NEGATIVE Final    Comment:       (Positive cutoff 100 ng/mL)                  Fentanyl, Ur 11/12/2022 NEGATIVE  NEGATIVE Final    Comment:       (Positive cutoff  5 ng/ml)            Test Information 11/12/2022 Assay provides medical screening only. The absence of expected drug(s) and/or metabolite(s) may indicate diluted or adulterated urine, limitations of testing or timing of collection. Final    Comment: Testing for legal purposes should be confirmed by another method. To request confirmation   of test result, please call the lab within 7 days of sample submission. HCG(Urine) Pregnancy Test 11/12/2022 NEGATIVE  NEGATIVE Final    Comment: Specimens with hCG levels near the threshold of the test (25 mIU/mL) may give a negative or   indeterminate result. In such cases, another test should be performed with a new specimen   in 48-72 hours. If early pregnancy is suspected clinically in this setting, correlation   with quantitative serum b-hCG level is suggested. Specimen Source 11/12/2022 . BLOOD   Final    Ordered Test 11/12/2022 BMPX,CDP   Final    Reason for Rejection 11/12/2022 Unable to perform testing: Specimen hemolyzed. Final    Unable to perform testing: Specimen quantity not sufficient.     Glucose 11/12/2022 116 (A)  70 - 99 mg/dL Final    BUN 11/12/2022 8  6 - 20 mg/dL Final    Creatinine 11/12/2022 0. 66  0.50 - 0.90 mg/dL Final    Est, Glom Filt Rate 11/12/2022 >60  >60 mL/min/1.73m2 Final    Comment:       Effective Oct 3, 2022        These results are not intended for use in patients <25years of age. eGFR results are calculated without a race factor using the 2021 CKD-EPI equation. Careful clinical correlation is recommended, particularly when comparing to results   calculated using previous equations. The CKD-EPI equation is less accurate in patients with extremes of muscle mass, extra-renal   metabolism of creatine, excessive creatine ingestion, or following therapy that affects   renal tubular secretion.       Calcium 11/12/2022 9.3  8.6 - 10.4 mg/dL Final    Sodium 11/12/2022 139  135 - 144 mmol/L Final    Potassium 11/12/2022 3.4 (A)  3.7 - 5.3 mmol/L Final    Chloride 11/12/2022 104  98 - 107 mmol/L Final    CO2 11/12/2022 23  20 - 31 mmol/L Final    Anion Gap 11/12/2022 12  9 - 17 mmol/L Final    WBC 11/12/2022 11.9 (A)  3.5 - 11.0 k/uL Final    RBC 11/12/2022 4.94  4.0 - 5.2 m/uL Final    Hemoglobin 11/12/2022 13.5  12.0 - 16.0 g/dL Final    Hematocrit 11/12/2022 41.5  36 - 46 % Final    MCV 11/12/2022 84.1  80 - 100 fL Final    MCH 11/12/2022 27.2  26 - 34 pg Final    MCHC 11/12/2022 32.4  31 - 37 g/dL Final    RDW 11/12/2022 13.0  11.5 - 14.9 % Final    Platelets 27/58/7252 311  150 - 450 k/uL Final    MPV 11/12/2022 8.3  6.0 - 12.0 fL Final    Seg Neutrophils 11/12/2022 62  36 - 66 % Final    Lymphocytes 11/12/2022 26  24 - 44 % Final    Monocytes 11/12/2022 7  1 - 7 % Final    Eosinophils % 11/12/2022 4  0 - 4 % Final    Basophils 11/12/2022 1  0 - 2 % Final    Segs Absolute 11/12/2022 7.40  1.3 - 9.1 k/uL Final    Absolute Lymph # 11/12/2022 3.10  1.0 - 4.8 k/uL Final    Absolute Mono # 11/12/2022 0.90  0.1 - 1.3 k/uL Final    Absolute Eos # 11/12/2022 0.50 (A)  0.0 - 0.4 k/uL Final    Basophils Absolute 11/12/2022 0.10  0.0 - 0.2 k/uL Final    Lithium Lvl 11/12/2022 0.6  0.6 - 1.2 mmol/L Final    Lithium Dose Amount 11/12/2022 Unknown   Final    Lithium Date Last Dose 11/12/2022 UNK^Unknown^L   Final    Lithium Dose Time 11/12/2022 UNK^Unknown^L   Final    Magnesium 11/12/2022 2.0  1.6 - 2.6 mg/dL Final    Lithium Lvl 11/16/2022 1.1  0.6 - 1.2 mmol/L Final    Lithium Dose Amount 11/16/2022 600   Final    Lithium Date Last Dose 11/16/2022 [id number]   Final    Lithium Dose Time 11/16/2022 2046   Final         Reviewed patient's current plan of care and vital signs with nursing staff.     Labs reviewed: [x] Yes  Last EKG in EMR reviewed: [x] Yes  QTc: 420    Medications  Current Facility-Administered Medications: predniSONE (DELTASONE) tablet 20 mg, 20 mg, Oral, Daily  diphenhydrAMINE (BENADRYL) tablet 25 mg, 25 mg, Oral, Q6H PRN  melatonin tablet 1.5 mg, 1.5 mg, Oral, Nightly PRN  prazosin (MINIPRESS) capsule 1 mg, 1 mg, Oral, Nightly  lurasidone (LATUDA) tablet 50 mg, 50 mg, Oral, Dinner  bisacodyl (DULCOLAX) EC tablet 5 mg, 5 mg, Oral, Daily PRN  ibuprofen (ADVIL;MOTRIN) tablet 400 mg, 400 mg, Oral, Q6H PRN  hydrOXYzine HCl (ATARAX) tablet 50 mg, 50 mg, Oral, TID PRN  polyethylene glycol (GLYCOLAX) packet 17 g, 17 g, Oral, Daily PRN  aluminum & magnesium hydroxide-simethicone (MAALOX) 200-200-20 MG/5ML suspension 30 mL, 30 mL, Oral, Q6H PRN  nicotine polacrilex (NICORETTE) gum 2 mg, 2 mg, Oral, Q2H PRN  haloperidol (HALDOL) tablet 5 mg, 5 mg, Oral, Q6H PRN **AND** LORazepam (ATIVAN) tablet 2 mg, 2 mg, Oral, Q6H PRN  haloperidol lactate (HALDOL) injection 5 mg, 5 mg, IntraMUSCular, Q6H PRN **AND** LORazepam (ATIVAN) injection 2 mg, 2 mg, IntraMUSCular, Q6H PRN **AND** diphenhydrAMINE (BENADRYL) injection 50 mg, 50 mg, IntraMUSCular, Q6H PRN  cariprazine hcl (VRAYLAR) capsule 6 mg, 6 mg, Oral, Daily  fluticasone (FLONASE) 50 MCG/ACT nasal spray 1 spray, 1 spray, Each Nostril, Daily  hydroCHLOROthiazide (HYDRODIURIL) tablet 25 mg, 25 mg, Oral, Daily  lithium (LITHOBID) extended release tablet 600 mg, 600 mg, Oral, 2 times per day    ASSESSMENT  Bipolar affective disorder, mixed, severe, with psychotic behavior ([facility] [address]         HANDOFF  Patient symptoms: Remain unstable  Consultation with attending physician for medication  Encourage participation in groups and milieu. Probable discharge is to be determined by MD    Electronically signed by MAURICE Thakur CNP on 11/18/2022 at 4:42 PM    **This report has been created using voice recognition software. It may contain minor errors which are inherent in voice recognition technology. **                                         Psychiatry Attending Attestation     I independently saw and evaluated the patient. I reviewed the Advance Practice Provider's documentation above. Any additional comments or changes to the Advance Practice Provider's documentation are stated below otherwise agree with assessment. Patient continues to have elevated mood and some expansive affect. Reports that her sleep continues to be broken. Patient was started on prednisone by internal medicine. This can worsen her prince. We will keep a close watch on this. Notes that suicidal thoughts still cross her mind. She continues to debate going to [facility] versus staying at [facility]. Possible discharge early next week. PLAN  Patient s symptoms show no change  Will continue to titrate Latuda over the weekend  Attempt to develop insight  Psycho-education conducted. Supportive Therapy conducted.   Probable discharge is Monday or tuesday  Follow-up TBD    Electronically signed by Marilyn Cameron MD on 11/18/22 at 7:51 PM EST

## 2022-11-18 NOTE — GROUP NOTE
Group Therapy Note    Date: 11/18/2022    Group Start Time: 1430  Group End Time: 1549  Group Topic: Relaxation    [facility]    [place] Ronnie Nunez        Group Therapy Note    Attendees: 12/16       Patient's Goal:  relaxation video and discussion    Notes:  talkative during discussion    Status After Intervention:  Unchanged    Participation Level: Active Listener    Participation Quality: Appropriate      Speech:  normal      Thought Process/Content: Logical      Affective Functioning: Congruent      Mood:  controlled      Level of consciousness:  Oriented x4      Response to Learning: Able to verbalize current knowledge/experience and Progressing to goal      Endings: None Reported    Modes of Intervention: Education, Support, and Media      Discipline Responsible: Behavorial Health Tech      Signature:   Sabina Reyna

## 2022-11-18 NOTE — GROUP NOTE
Group Therapy Note    Date: 11/18/2022    Group Start Time: 1100  Group End Time: 1130  Group Topic: Cognitive Skills    [facility]    JUANCARLOS Dos Santos        Group Therapy Note    Attendees: 12/18       Patient's Goal:  to improve coping skills/ improve socialization     Notes:  pt was pleasant and participated well     Status After Intervention:  Improved    Participation Level:  Active Listener and Interactive    Participation Quality: Appropriate, Sharing, and Supportive      Speech:  normal      Thought Process/Content: Flight of ideas      Affective Functioning: Constricted/Restricted      Mood: anxious      Level of consciousness:  Alert      Response to Learning: Able to verbalize current knowledge/experience and Progressing to goal      Endings: None Reported    Modes of Intervention: Support, Socialization, and Problem-solving      Discipline Responsible: Psychoeducational Specialist      Signature:  Marissa Gibbs

## 2022-11-18 NOTE — GROUP NOTE
Group Therapy Note    Date: 11/18/2022    Group Start Time: 1330  Group End Time: 5551  Group Topic: Recreation Group     [facility]    Chaitanya Rocha, CTRS        Group Therapy Note    Attendees: 11/16       Patient's Goal:  to improve leisure awareness/ to improve social skills       Status After Intervention:  Improved    Participation Level:  Active Listener and Interactive    Participation Quality: Appropriate,       Speech:  normal      Thought Process/Content: logical       Affective Functioning: congruent      Mood: anxious      Level of consciousness:  Alert      Response to Learning: Able to verbalize current knowledge/experience and Progressing to goal      Endings: None Reported    Modes of Intervention: Support, Socialization, and Problem-solving      Discipline Responsible: Psychoeducational Specialist      Signature:  Christiana Meredith

## 2022-11-18 NOTE — CONSULTS
cloNIDine (CATAPRES) 0.1 MG tablet Take 1 tablet by mouth nightly 22   Inez Correa MD   fluticasone (FLONASE) 50 MCG/ACT nasal spray 1 spray by Each Nostril route daily 22   Inez Correa MD   hydroCHLOROthiazide (HYDRODIURIL) 25 MG tablet Take 1 tablet by mouth daily 22   Inez Correa MD   hydrOXYzine HCl (ATARAX) 50 MG tablet Take 1 tablet by mouth 3 times daily as needed for Anxiety 22  Inez Correa MD   lithium (ESKALITH) 450 MG extended release tablet Take 1 tablet by mouth every 12 hours 22   Inez Correa MD   melatonin 3 MG TABS tablet Take 10 mg by mouth nightly as needed    Historical Provider, MD        Allergies:     Abilify [aripiprazole], Codeine, Depakote er [divalproex sodium er], Gabapentin, Lamictal [lamotrigine], Percocet [oxycodone-acetaminophen], Quetiapine fumarate, Tegretol [carbamazepine], Trileptal [oxcarbazepine], Valproic acid, Ziprasidone hcl, and Zyprexa [olanzapine]    Social History:     Tobacco:    reports that she has been smoking. She has been smoking an average of 1 pack per day. She has never used smokeless tobacco.  Alcohol:      reports no history of alcohol use. Drug Use:  reports current drug use. Drug: Marijuana Timmy Garcia). Family History:     Family History   Problem Relation Age of Onset    Diabetes Sister     No Known Problems Mother     No Known Problems Father        Review of Systems:     Positive and Negative as described in HPI. CONSTITUTIONAL:  negative for fevers, chills, sweats, fatigue, weight loss  HEENT:  negative for vision, hearing changes, runny nose, throat pain  RESPIRATORY:  negative for shortness of breath, cough, congestion, wheezing. CARDIOVASCULAR:  negative for chest pain, palpitations.   GASTROINTESTINAL:  negative for nausea, vomiting, diarrhea, constipation, change in bowel habits, abdominal pain   GENITOURINARY:  negative for difficulty of urination, burning with urination, frequency   INTEGUMENT: Rash in multiple places HEMATOLOGIC/LYMPHATIC:  negative for swelling/edema   ALLERGIC/IMMUNOLOGIC:  negative for urticaria , itching  ENDOCRINE:  negative increase in drinking, increase in urination, hot or cold intolerance  MUSCULOSKELETAL:  negative joint pains, muscle aches, swelling of joints  NEUROLOGICAL:  negative for headaches, dizziness, lightheadedness, numbness, pain, tingling extremities      Physical Exam:     BP (!) 142/77   Pulse 79   Temp 98.1 °F (36.7 °C) (Oral)   Resp 14   Ht 5' 7\" (1.702 m)   Wt 180 lb (81.6 kg)   SpO2 98%   BMI 28.19 kg/m²   Temp (24hrs), Av.1 °F (36.7 °C), Min:98 °F (36.7 °C), Max:98.1 °F (36.7 °C)    No results for input(s): POCGLU in the last 72 hours. No intake or output data in the 24 hours ending 22 1301    General Appearance:  alert, well appearing, and in no acute distress  Mental status: oriented to person, place, and time with normal affect  Head:  normocephalic, atraumatic. Eye: no icterus, redness, pupils equal and reactive, extraocular eye movements intact, conjunctiva clear  Ear: normal external ear, no discharge, hearing intact  Nose:  no drainage noted  Mouth: mucous membranes moist  Neck: supple, no carotid bruits, thyroid not palpable  Lungs: Bilateral equal air entry, clear to ausculation, no wheezing, rales or rhonchi, normal effort  Cardiovascular: normal rate, regular rhythm, no murmur, gallop, rub.   Abdomen: Soft, nontender, nondistended, normal bowel sounds, no hepatomegaly or splenomegaly  Neurologic: There are no new focal motor or sensory deficits, normal muscle tone and bulk, no abnormal sensation, normal speech, cranial nerves II through XII grossly intact  Skin: Macular red rash noted in the inner side of the thighs armpits  No lymphadenopathy noted  Extremities:  peripheral pulses palpable, no pedal edema or calf pain with palpation    Laboratory Testing:  No results found for this or any previous visit (from the past 24 hour(s)). Imaging/Diagonstics:  No results found. Assessment :      Hospital Problems             Last Modified POA    * (Principal) Bipolar affective disorder, mixed, severe, with psychotic behavior ([address] 11/12/2022 Yes    Depression with suicidal ideation 11/12/2022 Yes       Plan:     49-year-old lady with history of for hypertension hypokalemia complains of rash in multiple places including groin bilateral armpits is itchy denies any fever chills unsure was the cause she thinks it could be the soap  Patient examined in the presence of the nurse Read Loyda rash in multiple places likely allergic in nature  Possible soap  Advised to shower without soap we will do a trial of steroid and Benadryl symptomatic treatment        Consultations:   IP CONSULT TO INTERNAL MEDICINE  IP CONSULT TO INTERNAL MEDICINE      Aden Daley MD  11/18/2022  1:01 PM    Copy sent to Dr. Jovi Rocha, PALillyC    Please note that this chart was generated using voice recognition Dragon dictation software. Although every effort was made to ensure the accuracy of this automated transcription, some errors in transcription may have occurred.

## 2022-11-18 NOTE — PLAN OF CARE
Problem: Self Harm/Suicidality  Goal: Will have no self-injury during hospital stay  Description: INTERVENTIONS:  1. Q 15 MINUTES: Routine safety checks  2. Q SHIFT & PRN: Assess risk to determine if routine checks are adequate to maintain patient safety  Outcome: Progressing  Note: Pt denies thoughts of self harm and is agreeable to seeking out should thoughts of self harm arise. Safe environment maintained. Every 15 minute checks for safety cont per unit policy. Will cont to monitor for safety and provides support and reassurance as needed.

## 2022-11-19 PROCEDURE — 6370000000 HC RX 637 (ALT 250 FOR IP)

## 2022-11-19 PROCEDURE — 6370000000 HC RX 637 (ALT 250 FOR IP): Performed by: INTERNAL MEDICINE

## 2022-11-19 PROCEDURE — 99232 SBSQ HOSP IP/OBS MODERATE 35: CPT

## 2022-11-19 PROCEDURE — 1240000000 HC EMOTIONAL WELLNESS R&B

## 2022-11-19 PROCEDURE — 99232 SBSQ HOSP IP/OBS MODERATE 35: CPT | Performed by: INTERNAL MEDICINE

## 2022-11-19 PROCEDURE — 6370000000 HC RX 637 (ALT 250 FOR IP): Performed by: PSYCHIATRY & NEUROLOGY

## 2022-11-19 RX ORDER — LANOLIN ALCOHOL/MO/W.PET/CERES
6 CREAM (GRAM) TOPICAL NIGHTLY PRN
Status: DISCONTINUED | OUTPATIENT
Start: 2022-11-19 | End: 2022-11-22 | Stop reason: HOSPADM

## 2022-11-19 RX ADMIN — LITHIUM CARBONATE 600 MG: 300 TABLET, EXTENDED RELEASE ORAL at 22:38

## 2022-11-19 RX ADMIN — DIPHENHYDRAMINE HCL 25 MG: 25 TABLET ORAL at 11:41

## 2022-11-19 RX ADMIN — PREDNISONE 20 MG: 20 TABLET ORAL at 08:48

## 2022-11-19 RX ADMIN — HYDROCHLOROTHIAZIDE 25 MG: 25 TABLET ORAL at 08:48

## 2022-11-19 RX ADMIN — DIPHENHYDRAMINE HCL 25 MG: 25 TABLET ORAL at 18:33

## 2022-11-19 RX ADMIN — FLUTICASONE PROPIONATE 1 SPRAY: 50 SPRAY, METERED NASAL at 08:49

## 2022-11-19 RX ADMIN — CARIPRAZINE 6 MG: 3 CAPSULE, GELATIN COATED ORAL at 08:48

## 2022-11-19 RX ADMIN — LITHIUM CARBONATE 600 MG: 300 TABLET, EXTENDED RELEASE ORAL at 08:48

## 2022-11-19 RX ADMIN — LURASIDONE HYDROCHLORIDE 50 MG: 60 TABLET, FILM COATED ORAL at 18:39

## 2022-11-19 RX ADMIN — HYDROXYZINE HYDROCHLORIDE 50 MG: 50 TABLET, FILM COATED ORAL at 22:38

## 2022-11-19 RX ADMIN — Medication 6 MG: at 22:38

## 2022-11-19 RX ADMIN — PRAZOSIN HYDROCHLORIDE 1 MG: 1 CAPSULE ORAL at 22:38

## 2022-11-19 NOTE — GROUP NOTE
Group Therapy Note    Date: 11/19/2022    Group Start Time: 1330  Group End Time: 1430  Group Topic: Cognitive Skills    [facility]    JUANCARLOS Burroughs      Group Therapy Note    Attendees 10/20       Patient's Goal:  to improve coping skills using leisure , improve socialization     Notes:   pt was anxious and unable to tolerate duration of group     Status After Intervention:  unchanged    Participation Level:  Active Listener and Interactive    Participation Quality:  needs redirections      Speech:  pressured       Thought Process/Content: disorganized       Affective Functioning: restricted       Mood: anxious      Level of consciousness:  Alert      Response to Learning: Able to verbalize current knowledge/experience, Able to verbalize/acknowledge new learning, and Progressing to goal      Endings: None Reported    Modes of Intervention: Support, Socialization, and Activity      Discipline Responsible: Psychoeducational Specialist      Signature:  Sujatha Dumont

## 2022-11-19 NOTE — PROGRESS NOTES
Emergency PRN Medication Administration Note:      Patient is Agitated as evidence by crying and requesting medicaton to slow down her prince. Staff attempted to find and relieve the distress by Talking to patient , offering suggestions and 1:1 time. Patient is currently continuing to escalate. Medication administered as prescribed: (haldol 5 mg, ativan 2 mg, oral). Patient Tolerated medication administration. Will continue to monitor, offer support, and reassess.

## 2022-11-19 NOTE — PROGRESS NOTES
Emergency Medication Follow-Up Note:    PRN medication of was effective as evidence by patient resting quietly in bed. Patient denies medication side effects. Will continue to monitor and provide support as needed.

## 2022-11-19 NOTE — PLAN OF CARE
Problem: Self Harm/Suicidality  Goal: Will have no self-injury during hospital stay  Description: INTERVENTIONS:  1. Q 30 MINUTES: Routine safety checks  2. Q SHIFT & PRN: Assess risk to determine if routine checks are adequate to maintain patient safety  11/18/2022 2058 [facility]  Outcome: Progressing  Patient denies depression and anxiety during 1:1 talk time. Patient also denies suicidal ideations and self harm. Patient have been out social with peers and have been calm, cooperative. Q 15 minutes safety checks maintained.

## 2022-11-19 NOTE — PLAN OF CARE
Problem: Micaela  Goal: Will exhibit normal sleep and speech and no impulsivity  Description: INTERVENTIONS:  1. Administer medication as ordered  2. Set limits on impulsive behavior  3. Make attempts to decrease external stimuli as possible  Outcome: Progressing  Note: Ms. Latesha Walsh reports feeling tired today, she continues to be restless in that she has not been able to lay down and remain in bed to sleep. She remains in the day area most of the shift. She has attended some groups. She takes her meds as prescribed. Problem: Self Harm/Suicidality  Goal: Will have no self-injury during hospital stay  Description: INTERVENTIONS:  1. Q 30 MINUTES: Routine safety checks  2. Q SHIFT & PRN: Assess risk to determine if routine checks are adequate to maintain patient safety  Outcome: Progressing  Note: Ms. Latesha Walsh denies suicidal ideation and thoughts of harm to self and others.

## 2022-11-19 NOTE — PROGRESS NOTES
Behavioral Services                                              Medicare Re-Certification    I certify that the inpatient psychiatric hospital services furnished since the previous certification/re-certification were, and continue to be, medically necessary for;    [x] (1) Treatment which could reasonably be expected to improve the patient's condition,    [x] (2) Or for diagnostic study. Estimated length of stay/service 3-5 days    Plan for post-hospital care [facility]    This patient continues to need, on a daily basis, active treatment furnished directly by or requiring the supervision of inpatient psychiatric personnel.     Electronically signed by Vivi Kasper MD on 11/18/2022 at 7:52 PM

## 2022-11-20 PROCEDURE — 6360000002 HC RX W HCPCS

## 2022-11-20 PROCEDURE — 6370000000 HC RX 637 (ALT 250 FOR IP): Performed by: INTERNAL MEDICINE

## 2022-11-20 PROCEDURE — 99232 SBSQ HOSP IP/OBS MODERATE 35: CPT | Performed by: INTERNAL MEDICINE

## 2022-11-20 PROCEDURE — 6370000000 HC RX 637 (ALT 250 FOR IP): Performed by: PSYCHIATRY & NEUROLOGY

## 2022-11-20 PROCEDURE — 6370000000 HC RX 637 (ALT 250 FOR IP)

## 2022-11-20 PROCEDURE — 1240000000 HC EMOTIONAL WELLNESS R&B

## 2022-11-20 RX ADMIN — FLUTICASONE PROPIONATE 1 SPRAY: 50 SPRAY, METERED NASAL at 09:53

## 2022-11-20 RX ADMIN — HYDROXYZINE HYDROCHLORIDE 50 MG: 50 TABLET, FILM COATED ORAL at 22:37

## 2022-11-20 RX ADMIN — LORAZEPAM 2 MG: 2 INJECTION INTRAMUSCULAR; INTRAVENOUS at 18:20

## 2022-11-20 RX ADMIN — DIPHENHYDRAMINE HYDROCHLORIDE 50 MG: 50 INJECTION, SOLUTION INTRAMUSCULAR; INTRAVENOUS at 18:26

## 2022-11-20 RX ADMIN — DIPHENHYDRAMINE HCL 25 MG: 25 TABLET ORAL at 04:24

## 2022-11-20 RX ADMIN — CARIPRAZINE 6 MG: 3 CAPSULE, GELATIN COATED ORAL at 09:47

## 2022-11-20 RX ADMIN — LITHIUM CARBONATE 600 MG: 300 TABLET, EXTENDED RELEASE ORAL at 09:47

## 2022-11-20 RX ADMIN — HYDROCHLOROTHIAZIDE 25 MG: 25 TABLET ORAL at 09:47

## 2022-11-20 RX ADMIN — IBUPROFEN 400 MG: 400 TABLET ORAL at 23:42

## 2022-11-20 RX ADMIN — POLYETHYLENE GLYCOL 3350 17 G: 17 POWDER, FOR SOLUTION ORAL at 09:46

## 2022-11-20 RX ADMIN — LURASIDONE HYDROCHLORIDE 60 MG: 60 TABLET, FILM COATED ORAL at 18:00

## 2022-11-20 RX ADMIN — LITHIUM CARBONATE 600 MG: 300 TABLET, EXTENDED RELEASE ORAL at 22:37

## 2022-11-20 RX ADMIN — Medication 6 MG: at 22:37

## 2022-11-20 RX ADMIN — PREDNISONE 20 MG: 20 TABLET ORAL at 09:47

## 2022-11-20 RX ADMIN — HALOPERIDOL LACTATE 5 MG: 5 INJECTION, SOLUTION INTRAMUSCULAR at 18:26

## 2022-11-20 RX ADMIN — PRAZOSIN HYDROCHLORIDE 1 MG: 1 CAPSULE ORAL at 22:37

## 2022-11-20 ASSESSMENT — PAIN DESCRIPTION - LOCATION: LOCATION: HEAD

## 2022-11-20 NOTE — PROGRESS NOTES
Daily Progress Note  11/20/2022    Patient Name: Alma Rosa Boyle    CHIEF COMPLAINT:  Manic like behaviors and suicidal ideation          SUBJECTIVE:      Patient is seen today for a follow up assessment. Patient has been compliant with scheduled medications at this time and has required emergency medications in the past 24 hours. Patient continues to be hyperverbal but linear on approach. She endorses improvement in anxiety however reports being down and depressed today due to thinking about returning to [facility] to see 2 sons who she has not seen in 2 years. But she does voice controversy of unable to see 10year-old son was in the custody of his sister if moved to [facility]. Also she reports being an irritable upon approach however is calm, and pleasant upon approach. Nursing staff confirms the patient has not been irritable and has not required emergency medication or redirection. Writer is concerned if symptoms are caused by prednisone for rash. Nursing was instructed to assess rash and see if improvement and follow-up with internal medicine if can be titrated off of it and possibly started on topical steroid. Lastly, she reports improvement in sleep and denies nightmares since started on Minipress. Patient warrants further hospitalization due to instability of symptoms and safety. Appetite:  [x] Normal/Adequate/Unchanged  [] Increased  [] Decreased      Sleep:       [] Normal/Adequate/Unchanged  [x] Improved  [] Poor      Group Attendance on Unit:   [x] Yes  [] Selectively    [] No    Medication Side Effects:  Patient denies any medication side effects at the time of assessment. Mental Status Exam  Level of consciousness: Alert and awake. Appearance: Appropriate attire for setting, seated in chair, with fair  grooming and hygiene. Behavior/Motor: Approachable, restless  Attitude toward examiner: Cooperative, mostly attentive, good eye contact.   Speech: less rapid and pressured, normal volume, normal tone  Mood:  Patient reports \"down and depressed\". Affect: Reactive, labile  Thought processes: Improvement and linear thought, tangential, hyperverbal  Thought content: Denies homicidal ideation. Suicidal Ideation: Denies suicidal ideations  Delusions: No evidence of delusions. Reports improvement in paranoia. Perceptual Disturbance: Patient does not appear to be responding to internal stimuli. Denies auditory hallucinations. Denies visual hallucinations. Cognition: Oriented to self, location, time, and situation. Memory: Intact. Insight & Judgement: Poor/improving    Data   height is 5' 7\" (1.702 m) and weight is 180 lb (81.6 kg). Her oral temperature is 97.8 °F (36.6 °C). Her blood pressure is 132/75 and her pulse is 81. Her respiration is 14 and oxygen saturation is 98%.    Labs:   Admission on 11/12/2022   Component Date Value Ref Range Status    Color, UA 11/12/2022 Yellow  Yellow Final    Turbidity UA 11/12/2022 Clear  Clear Final    Glucose, Ur 11/12/2022 NEGATIVE  NEGATIVE Final    Bilirubin Urine 11/12/2022 NEGATIVE  NEGATIVE Final    Ketones, Urine 11/12/2022 NEGATIVE  NEGATIVE Final    Specific [place], UA 11/12/2022 1.008  1.000 - 1.030 Final    Urine Hgb 11/12/2022 NEGATIVE  NEGATIVE Final    pH, UA 11/12/2022 6.5  5.0 - 8.0 Final    Protein, UA 11/12/2022 NEGATIVE  NEGATIVE Final    Urobilinogen, Urine 11/12/2022 Normal  Normal Final    Nitrite, Urine 11/12/2022 NEGATIVE  NEGATIVE Final    Leukocyte Esterase, Urine 11/12/2022 MOD (A)  NEGATIVE Final    WBC, UA 11/12/2022 0 TO 2  /HPF Final    RBC, UA 11/12/2022 0 TO 2  /HPF Final    Casts UA 11/12/2022 0 TO 2  /LPF Final    Epithelial Cells UA 11/12/2022 0 TO 2  /HPF Final    Bacteria, UA 11/12/2022 FEW (A)  None Final    Amphetamine Screen, Ur 11/12/2022 NEGATIVE  NEGATIVE Final    Comment:       (Positive cutoff 1000 ng/mL)                  Barbiturate Screen, Ur 11/12/2022 NEGATIVE  NEGATIVE Final    Comment: (Positive cutoff 200 ng/mL)                  Benzodiazepine Screen, Urine 11/12/2022 NEGATIVE  NEGATIVE Final    Comment:       (Positive cutoff 200 ng/mL)                  Cocaine Metabolite, Urine 11/12/2022 NEGATIVE  NEGATIVE Final    Comment:       (Positive cutoff 300 ng/mL)                  Methadone Screen, Urine 11/12/2022 NEGATIVE  NEGATIVE Final    Comment:       (Positive cutoff 300 ng/mL)                  Opiates, Urine 11/12/2022 NEGATIVE  NEGATIVE Final    Comment:       (Positive cutoff 300 ng/mL)                  Phencyclidine, Urine 11/12/2022 NEGATIVE  NEGATIVE Final    Comment:       (Positive cutoff 25 ng/mL)                  Cannabinoid Scrn, Ur 11/12/2022 POSITIVE (A)  NEGATIVE Final    Comment:       (Positive cutoff 50 ng/mL)                  Oxycodone Screen, Ur 11/12/2022 NEGATIVE  NEGATIVE Final    Comment:       (Positive cutoff 100 ng/mL)                  Fentanyl, Ur 11/12/2022 NEGATIVE  NEGATIVE Final    Comment:       (Positive cutoff  5 ng/ml)            Test Information 11/12/2022 Assay provides medical screening only. The absence of expected drug(s) and/or metabolite(s) may indicate diluted or adulterated urine, limitations of testing or timing of collection. Final    Comment: Testing for legal purposes should be confirmed by another method. To request confirmation   of test result, please call the lab within 7 days of sample submission. HCG(Urine) Pregnancy Test 11/12/2022 NEGATIVE  NEGATIVE Final    Comment: Specimens with hCG levels near the threshold of the test (25 mIU/mL) may give a negative or   indeterminate result. In such cases, another test should be performed with a new specimen   in 48-72 hours. If early pregnancy is suspected clinically in this setting, correlation   with quantitative serum b-hCG level is suggested. Specimen Source 11/12/2022 . BLOOD   Final    Ordered Test 11/12/2022 BMPX,CDP   Final    Reason for Rejection 11/12/2022 Unable to perform testing: Specimen hemolyzed. Final    Unable to perform testing: Specimen quantity not sufficient. Glucose 11/12/2022 116 (A)  70 - 99 mg/dL Final    BUN 11/12/2022 8  6 - 20 mg/dL Final    Creatinine 11/12/2022 0.66  0.50 - 0.90 mg/dL Final    Est, Glom Filt Rate 11/12/2022 >60  >60 mL/min/1.73m2 Final    Comment:       Effective Oct 3, 2022        These results are not intended for use in patients <25years of age. eGFR results are calculated without a race factor using the 2021 CKD-EPI equation. Careful clinical correlation is recommended, particularly when comparing to results   calculated using previous equations. The CKD-EPI equation is less accurate in patients with extremes of muscle mass, extra-renal   metabolism of creatine, excessive creatine ingestion, or following therapy that affects   renal tubular secretion.       Calcium 11/12/2022 9.3  8.6 - 10.4 mg/dL Final    Sodium 11/12/2022 139  135 - 144 mmol/L Final    Potassium 11/12/2022 3.4 (A)  3.7 - 5.3 mmol/L Final    Chloride 11/12/2022 104  98 - 107 mmol/L Final    CO2 11/12/2022 23  20 - 31 mmol/L Final    Anion Gap 11/12/2022 12  9 - 17 mmol/L Final    WBC 11/12/2022 11.9 (A)  3.5 - 11.0 k/uL Final    RBC 11/12/2022 4.94  4.0 - 5.2 m/uL Final    Hemoglobin 11/12/2022 13.5  12.0 - 16.0 g/dL Final    Hematocrit 11/12/2022 41.5  36 - 46 % Final    MCV 11/12/2022 84.1  80 - 100 fL Final    MCH 11/12/2022 27.2  26 - 34 pg Final    MCHC 11/12/2022 32.4  31 - 37 g/dL Final    RDW 11/12/2022 13.0  11.5 - 14.9 % Final    Platelets 09/86/7987 311  150 - 450 k/uL Final    MPV 11/12/2022 8.3  6.0 - 12.0 fL Final    Seg Neutrophils 11/12/2022 62  36 - 66 % Final    Lymphocytes 11/12/2022 26  24 - 44 % Final    Monocytes 11/12/2022 7  1 - 7 % Final    Eosinophils % 11/12/2022 4  0 - 4 % Final    Basophils 11/12/2022 1  0 - 2 % Final    Segs Absolute 11/12/2022 7.40  1.3 - 9.1 k/uL Final    Absolute Lymph # 11/12/2022 3.10  1.0 - 4.8 k/uL Final    Absolute Mono # 11/12/2022 0.90  0.1 - 1.3 k/uL Final    Absolute Eos # 11/12/2022 0.50 (A)  0.0 - 0.4 k/uL Final    Basophils Absolute 11/12/2022 0.10  0.0 - 0.2 k/uL Final    Lithium Lvl 11/12/2022 0.6  0.6 - 1.2 mmol/L Final    Lithium Dose Amount 11/12/2022 Unknown   Final    Lithium Date Last Dose 11/12/2022 UNK^Unknown^L   Final    Lithium Dose Time 11/12/2022 UNK^Unknown^L   Final    Magnesium 11/12/2022 2.0  1.6 - 2.6 mg/dL Final    Lithium Lvl 11/16/2022 1.1  0.6 - 1.2 mmol/L Final    Lithium Dose Amount 11/16/2022 600   Final    Lithium Date Last Dose 11/16/2022 [id number]   Final    Lithium Dose Time 11/16/2022 2046   Final         Reviewed patient's current plan of care and vital signs with nursing staff.     Labs reviewed: [x] Yes  Last EKG in EMR reviewed: [x] Yes  QTc: 420    Medications  Current Facility-Administered Medications: lurasidone (LATUDA) tablet 60 mg, 60 mg, Oral, Dinner  melatonin tablet 6 mg, 6 mg, Oral, Nightly PRN  predniSONE (DELTASONE) tablet 20 mg, 20 mg, Oral, Daily  diphenhydrAMINE (BENADRYL) tablet 25 mg, 25 mg, Oral, Q6H PRN  prazosin (MINIPRESS) capsule 1 mg, 1 mg, Oral, Nightly  bisacodyl (DULCOLAX) EC tablet 5 mg, 5 mg, Oral, Daily PRN  ibuprofen (ADVIL;MOTRIN) tablet 400 mg, 400 mg, Oral, Q6H PRN  hydrOXYzine HCl (ATARAX) tablet 50 mg, 50 mg, Oral, TID PRN  polyethylene glycol (GLYCOLAX) packet 17 g, 17 g, Oral, Daily PRN  aluminum & magnesium hydroxide-simethicone (MAALOX) 200-200-20 MG/5ML suspension 30 mL, 30 mL, Oral, Q6H PRN  nicotine polacrilex (NICORETTE) gum 2 mg, 2 mg, Oral, Q2H PRN  haloperidol (HALDOL) tablet 5 mg, 5 mg, Oral, Q6H PRN **AND** LORazepam (ATIVAN) tablet 2 mg, 2 mg, Oral, Q6H PRN  haloperidol lactate (HALDOL) injection 5 mg, 5 mg, IntraMUSCular, Q6H PRN **AND** LORazepam (ATIVAN) injection 2 mg, 2 mg, IntraMUSCular, Q6H PRN **AND** diphenhydrAMINE (BENADRYL) injection 50 mg, 50 mg, IntraMUSCular, Q6H PRN  cariprazine hcl (VRAYLAR) capsule 6 mg, 6 mg, Oral, Daily  fluticasone (FLONASE) 50 MCG/ACT nasal spray 1 spray, 1 spray, Each Nostril, Daily  hydroCHLOROthiazide (HYDRODIURIL) tablet 25 mg, 25 mg, Oral, Daily  lithium (LITHOBID) extended release tablet 600 mg, 600 mg, Oral, 2 times per day    ASSESSMENT  Bipolar affective disorder, mixed, severe, with psychotic behavior ([facility] [address]         PLAN  Patient symptoms: Showing some improvement  Continue to monitor recently titrated Latuda to 60 mg nightly. Follow-up with internal medicine need to titrate off of prednisone, consider topical instead. Monitor need and frequency of PRN medications. Encourage participation in groups and milieu. Attempt to develop insight. Psycho-education conducted. Supportive Therapy conducted. Probable discharge is per attending physician. Follow-up daily while inpatient. Patient continues to be monitored in the inpatient psychiatric facility at [facility] for safety and stabilization. Patient continues to need, on a daily basis, active treatment furnished directly by or requiring the supervision of inpatient psychiatric personnel. Electronically signed by MAURICE Adler CNP on 11/20/2022 at 4:05 PM    **This report has been created using voice recognition software. It may contain minor errors which are inherent in voice recognition technology. **

## 2022-11-20 NOTE — GROUP NOTE
Group Therapy Note    Date: 11/20/2022    Group Start Time: 1400  Group End Time: 0951  Group Topic: Psychoeducation    ANTON Huggins, CTRS        Group Therapy Note    Attendees: 13/20       Patient's Goal:  To improve interpersonal skills and self-expression through collaborating with peers and responding to prompts. Notes:  Patient partially attended group. Patient entered and left group space throughout group time. While in group, patient collaborated with peers, responded to prompts and was pleasant and cooperative.     Status After Intervention:  Improved    Participation Level: Minimal -  Interactive while in group with prompting from RT     Participation Quality: Appropriate and Sharing - with prompting      Speech:  normal      Thought Process/Content: Logical with prompting       Affective Functioning: Constricted/Restricted      Mood: anxious      Level of consciousness:  Preoccupied      Response to Learning: Capable of insight and Progressing to goal      Endings: None Reported    Modes of Intervention: Socialization, Exploration, and Activity      Discipline Responsible: Psychoeducational Specialist      Signature:  Harish Huggins, [address]

## 2022-11-20 NOTE — PROGRESS NOTES
Daily Progress Note  11/19/2022    Patient Name: Michael Thapa    CHIEF COMPLAINT:  Manic like behaviors and suicidal ideation          SUBJECTIVE:      Patient is seen today for a follow up assessment. Patient has been compliant with scheduled medications at this time and has required emergency medications in the past 24 hours. When approached for interview patient states that she \"knew what to say\" to get emergency medication in hopes that it would help her sleep last night. Patient reports feeling groggy this morning and verbalizes understanding that Haldol and Ativan are not sleeping medications. Patient reports that melatonin is not effective at 1.5 mg and at home she was taking 9 mg before seeing positive effects. Plan to titrate melatonin to 6 mg and observe for tonight. Additionally patient presents as jittery, hyperverbal however linear speech is improved. Plan to continue titrating Latuda to 60 mg starting tomorrow. Patient reports depression is improving at this time. Patient is currently denying suicidal ideation. Patient would benefit from further hospitalization for safety and stabilization. Appetite:  [x] Normal/Adequate/Unchanged  [] Increased  [] Decreased      Sleep:       [] Normal/Adequate/Unchanged  [x] Fair  [] Poor      Group Attendance on Unit:   [x] Yes  [] Selectively    [] No    Medication Side Effects:  Patient denies any medication side effects at the time of assessment. Mental Status Exam  Level of consciousness: Alert and awake. Appearance: Appropriate attire for setting, seated in chair, with fair  grooming and hygiene. Behavior/Motor: Approachable, restless  Attitude toward examiner: Cooperative, mostly attentive, good eye contact. Speech: less rapid and pressured, normal volume, normal tone  Mood:  Patient reports \"tired, sad, lonely, however in the last 20 minutes, great\".    Affect: Reactive, labile  Thought processes: Improvement and linear ng/mL)                  Cocaine Metabolite, Urine 11/12/2022 NEGATIVE  NEGATIVE Final    Comment:       (Positive cutoff 300 ng/mL)                  Methadone Screen, Urine 11/12/2022 NEGATIVE  NEGATIVE Final    Comment:       (Positive cutoff 300 ng/mL)                  Opiates, Urine 11/12/2022 NEGATIVE  NEGATIVE Final    Comment:       (Positive cutoff 300 ng/mL)                  Phencyclidine, Urine 11/12/2022 NEGATIVE  NEGATIVE Final    Comment:       (Positive cutoff 25 ng/mL)                  Cannabinoid Scrn, Ur 11/12/2022 POSITIVE (A)  NEGATIVE Final    Comment:       (Positive cutoff 50 ng/mL)                  Oxycodone Screen, Ur 11/12/2022 NEGATIVE  NEGATIVE Final    Comment:       (Positive cutoff 100 ng/mL)                  Fentanyl, Ur 11/12/2022 NEGATIVE  NEGATIVE Final    Comment:       (Positive cutoff  5 ng/ml)            Test Information 11/12/2022 Assay provides medical screening only. The absence of expected drug(s) and/or metabolite(s) may indicate diluted or adulterated urine, limitations of testing or timing of collection. Final    Comment: Testing for legal purposes should be confirmed by another method. To request confirmation   of test result, please call the lab within 7 days of sample submission. HCG(Urine) Pregnancy Test 11/12/2022 NEGATIVE  NEGATIVE Final    Comment: Specimens with hCG levels near the threshold of the test (25 mIU/mL) may give a negative or   indeterminate result. In such cases, another test should be performed with a new specimen   in 48-72 hours. If early pregnancy is suspected clinically in this setting, correlation   with quantitative serum b-hCG level is suggested. Specimen Source 11/12/2022 . BLOOD   Final    Ordered Test 11/12/2022 BMPX,CDP   Final    Reason for Rejection 11/12/2022 Unable to perform testing: Specimen hemolyzed. Final    Unable to perform testing: Specimen quantity not sufficient.     Glucose 11/12/2022 116 (A)  70 - 99 mg/dL Final    BUN 11/12/2022 8  6 - 20 mg/dL Final    Creatinine 11/12/2022 0.66  0.50 - 0.90 mg/dL Final    Est, Glom Filt Rate 11/12/2022 >60  >60 mL/min/1.73m2 Final    Comment:       Effective Oct 3, 2022        These results are not intended for use in patients <25years of age. eGFR results are calculated without a race factor using the 2021 CKD-EPI equation. Careful clinical correlation is recommended, particularly when comparing to results   calculated using previous equations. The CKD-EPI equation is less accurate in patients with extremes of muscle mass, extra-renal   metabolism of creatine, excessive creatine ingestion, or following therapy that affects   renal tubular secretion.       Calcium 11/12/2022 9.3  8.6 - 10.4 mg/dL Final    Sodium 11/12/2022 139  135 - 144 mmol/L Final    Potassium 11/12/2022 3.4 (A)  3.7 - 5.3 mmol/L Final    Chloride 11/12/2022 104  98 - 107 mmol/L Final    CO2 11/12/2022 23  20 - 31 mmol/L Final    Anion Gap 11/12/2022 12  9 - 17 mmol/L Final    WBC 11/12/2022 11.9 (A)  3.5 - 11.0 k/uL Final    RBC 11/12/2022 4.94  4.0 - 5.2 m/uL Final    Hemoglobin 11/12/2022 13.5  12.0 - 16.0 g/dL Final    Hematocrit 11/12/2022 41.5  36 - 46 % Final    MCV 11/12/2022 84.1  80 - 100 fL Final    MCH 11/12/2022 27.2  26 - 34 pg Final    MCHC 11/12/2022 32.4  31 - 37 g/dL Final    RDW 11/12/2022 13.0  11.5 - 14.9 % Final    Platelets 91/07/5641 311  150 - 450 k/uL Final    MPV 11/12/2022 8.3  6.0 - 12.0 fL Final    Seg Neutrophils 11/12/2022 62  36 - 66 % Final    Lymphocytes 11/12/2022 26  24 - 44 % Final    Monocytes 11/12/2022 7  1 - 7 % Final    Eosinophils % 11/12/2022 4  0 - 4 % Final    Basophils 11/12/2022 1  0 - 2 % Final    Segs Absolute 11/12/2022 7.40  1.3 - 9.1 k/uL Final    Absolute Lymph # 11/12/2022 3.10  1.0 - 4.8 k/uL Final    Absolute Mono # 11/12/2022 0.90  0.1 - 1.3 k/uL Final    Absolute Eos # 11/12/2022 0.50 (A)  0.0 - 0.4 k/uL Final    Basophils Absolute 11/12/2022 0. 10  0.0 - 0.2 k/uL Final    Lithium Lvl 11/12/2022 0.6  0.6 - 1.2 mmol/L Final    Lithium Dose Amount 11/12/2022 Unknown   Final    Lithium Date Last Dose 11/12/2022 UNK^Unknown^L   Final    Lithium Dose Time 11/12/2022 UNK^Unknown^L   Final    Magnesium 11/12/2022 2.0  1.6 - 2.6 mg/dL Final    Lithium Lvl 11/16/2022 1.1  0.6 - 1.2 mmol/L Final    Lithium Dose Amount 11/16/2022 600   Final    Lithium Date Last Dose 11/16/2022 [id number]   Final    Lithium Dose Time 11/16/2022 2046   Final         Reviewed patient's current plan of care and vital signs with nursing staff.     Labs reviewed: [x] Yes  Last EKG in EMR reviewed: [x] Yes  QTc: 420    Medications  Current Facility-Administered Medications: [START ON 11/20/2022] lurasidone (LATUDA) tablet 60 mg, 60 mg, Oral, Dinner  melatonin tablet 6 mg, 6 mg, Oral, Nightly PRN  predniSONE (DELTASONE) tablet 20 mg, 20 mg, Oral, Daily  diphenhydrAMINE (BENADRYL) tablet 25 mg, 25 mg, Oral, Q6H PRN  prazosin (MINIPRESS) capsule 1 mg, 1 mg, Oral, Nightly  bisacodyl (DULCOLAX) EC tablet 5 mg, 5 mg, Oral, Daily PRN  ibuprofen (ADVIL;MOTRIN) tablet 400 mg, 400 mg, Oral, Q6H PRN  hydrOXYzine HCl (ATARAX) tablet 50 mg, 50 mg, Oral, TID PRN  polyethylene glycol (GLYCOLAX) packet 17 g, 17 g, Oral, Daily PRN  aluminum & magnesium hydroxide-simethicone (MAALOX) 200-200-20 MG/5ML suspension 30 mL, 30 mL, Oral, Q6H PRN  nicotine polacrilex (NICORETTE) gum 2 mg, 2 mg, Oral, Q2H PRN  haloperidol (HALDOL) tablet 5 mg, 5 mg, Oral, Q6H PRN **AND** LORazepam (ATIVAN) tablet 2 mg, 2 mg, Oral, Q6H PRN  haloperidol lactate (HALDOL) injection 5 mg, 5 mg, IntraMUSCular, Q6H PRN **AND** LORazepam (ATIVAN) injection 2 mg, 2 mg, IntraMUSCular, Q6H PRN **AND** diphenhydrAMINE (BENADRYL) injection 50 mg, 50 mg, IntraMUSCular, Q6H PRN  cariprazine hcl (VRAYLAR) capsule 6 mg, 6 mg, Oral, Daily  fluticasone (FLONASE) 50 MCG/ACT nasal spray 1 spray, 1 spray, Each Nostril, Daily  hydroCHLOROthiazide (HYDRODIURIL) tablet 25 mg, 25 mg, Oral, Daily  lithium (LITHOBID) extended release tablet 600 mg, 600 mg, Oral, 2 times per day    ASSESSMENT  Bipolar affective disorder, mixed, severe, with psychotic behavior ([facility] [address]         PLAN  Patient symptoms: Showing some improvement  Modify order: Titrate Latuda to 60 mg nightly  Modify order: Titrate melatonin to 6 mg nightly as needed  Monitor need and frequency of PRN medications. Encourage participation in groups and milieu. Attempt to develop insight. Psycho-education conducted. Supportive Therapy conducted. Probable discharge is per attending physician. Follow-up daily while inpatient. Patient continues to be monitored in the inpatient psychiatric facility at [facility] for safety and stabilization. Patient continues to need, on a daily basis, active treatment furnished directly by or requiring the supervision of inpatient psychiatric personnel. Electronically signed by MAURICE Simpson CNP on 11/19/2022 at 8:44 PM    **This report has been created using voice recognition software. It may contain minor errors which are inherent in voice recognition technology. **

## 2022-11-20 NOTE — BH NOTE
Emergency Medication Follow-Up Note:    PRN medication of Haldol 5 mg, Ativan 2 mg, Benadryl 50 mg IM was effective as evidence by absence of behavior warranting emergency medication. Patient denies medication side effects. Will continue to monitor and provide support as needed.

## 2022-11-20 NOTE — CONSULTS
[address] [facility] Internal Medicine  Andrés Moore MD; Ca Resendiz MD; Pradip Grant MD; MD Suraj Arrieta MD; MD [facility]   [address] / HISTORY AND PHYSICAL EXAMINATION            Date:   2022  Patient name:  Odin Juarez  Date of admission:  2022  4:21 AM  MRN:   [id number]  Account:  [de-identified]  YOB: 1980  PCP:    Luzma Wisdom PA-C  Room:   [address] [place] [address]  Code Status:    Full Code    Physician Requesting Consult: Jorge A Aviles, *    Reason for Consult:  rash      Chief Complaint:     Chief Complaint   Patient presents with    Mental Health Problem   Rash in multiple places    History Obtained From:     Patient medical record nursing    History of Present Illness:     40-year-old lady with history of for hypertension hypokalemia complains of rash in multiple places including groin bilateral armpits is itchy denies any fever chills unsure was the cause she thinks it could be the soap  Patient examined in the presence of the nurse Linda Edouard    Past Medical History:     Past Medical History:   Diagnosis Date    Anxiety     Bipolar 1 disorder ([facility] [address]     Depression     Gestational diabetes 2016    OCD (obsessive compulsive disorder)     PTSD (post-traumatic stress disorder)     Rapid rate of speech     Schizoaffective disorder ([facility] [address]         Past Surgical History:     Past Surgical History:   Procedure Laterality Date    ABDOMEN SURGERY       SECTION  2007    LAPAROSCOPY          Medications Prior to Admission:     Prior to Admission medications    Medication Sig Start Date End Date Taking?  Authorizing Provider   benztropine (COGENTIN) 0.5 MG tablet Take 1 tablet by mouth 2 times daily 22   Barbi Powell MD   cariprazine hcl (VRAYLAR) 6 MG CAPS capsule Take 1 capsule by mouth daily 22   Barbi Powell MD cloNIDine (CATAPRES) 0.1 MG tablet Take 1 tablet by mouth nightly 11/8/22   Barbi Powell MD   fluticasone (FLONASE) 50 MCG/ACT nasal spray 1 spray by Each Nostril route daily 11/9/22   Barbi Powell MD   hydroCHLOROthiazide (HYDRODIURIL) 25 MG tablet Take 1 tablet by mouth daily 11/8/22   Barbi Powell MD   lithium (ESKALITH) 450 MG extended release tablet Take 1 tablet by mouth every 12 hours 11/8/22   Barbi Powell MD   melatonin 3 MG TABS tablet Take 10 mg by mouth nightly as needed    Historical Provider, MD        Allergies:     Abilify [aripiprazole], Codeine, Depakote er [divalproex sodium er], Gabapentin, Lamictal [lamotrigine], Percocet [oxycodone-acetaminophen], Quetiapine fumarate, Tegretol [carbamazepine], Trileptal [oxcarbazepine], Valproic acid, Ziprasidone hcl, and Zyprexa [olanzapine]    Social History:     Tobacco:    reports that she has been smoking. She has been smoking an average of 1 pack per day. She has never used smokeless tobacco.  Alcohol:      reports no history of alcohol use. Drug Use:  reports current drug use. Drug: Marijuana Dietra Due). Family History:     Family History   Problem Relation Age of Onset    Diabetes Sister     No Known Problems Mother     No Known Problems Father        Review of Systems:     Positive and Negative as described in HPI. CONSTITUTIONAL:  negative for fevers, chills, sweats, fatigue, weight loss  HEENT:  negative for vision, hearing changes, runny nose, throat pain  RESPIRATORY:  negative for shortness of breath, cough, congestion, wheezing. CARDIOVASCULAR:  negative for chest pain, palpitations.   GASTROINTESTINAL:  negative for nausea, vomiting, diarrhea, constipation, change in bowel habits, abdominal pain   GENITOURINARY:  negative for difficulty of urination, burning with urination, frequency   INTEGUMENT: Rash in multiple places HEMATOLOGIC/LYMPHATIC:  negative for swelling/edema   ALLERGIC/IMMUNOLOGIC:  negative for urticaria , itching  ENDOCRINE:  negative increase in drinking, increase in urination, hot or cold intolerance  MUSCULOSKELETAL:  negative joint pains, muscle aches, swelling of joints  NEUROLOGICAL:  negative for headaches, dizziness, lightheadedness, numbness, pain, tingling extremities      Physical Exam:     /75   Pulse 81   Temp 97.8 °F (36.6 °C) (Oral)   Resp 14   Ht 5' 7\" (1.702 m)   Wt 180 lb (81.6 kg)   SpO2 98%   BMI 28.19 kg/m²   Temp (24hrs), Av.8 °F (36.6 °C), Min:97.8 °F (36.6 °C), Max:97.8 °F (36.6 °C)    No results for input(s): POCGLU in the last 72 hours. No intake or output data in the 24 hours ending 22 1057    General Appearance:  alert, well appearing, and in no acute distress  Mental status: oriented to person, place, and time with normal affect  Head:  normocephalic, atraumatic. Eye: no icterus, redness, pupils equal and reactive, extraocular eye movements intact, conjunctiva clear  Ear: normal external ear, no discharge, hearing intact  Nose:  no drainage noted  Mouth: mucous membranes moist  Neck: supple, no carotid bruits, thyroid not palpable  Lungs: Bilateral equal air entry, clear to ausculation, no wheezing, rales or rhonchi, normal effort  Cardiovascular: normal rate, regular rhythm, no murmur, gallop, rub. Abdomen: Soft, nontender, nondistended, normal bowel sounds, no hepatomegaly or splenomegaly  Neurologic: There are no new focal motor or sensory deficits, normal muscle tone and bulk, no abnormal sensation, normal speech, cranial nerves II through XII grossly intact  Skin: Macular red rash noted in the inner side of the thighs armpits  No lymphadenopathy noted  Extremities:  peripheral pulses palpable, no pedal edema or calf pain with palpation    Laboratory Testing:  No results found for this or any previous visit (from the past 24 hour(s)). Imaging/Diagonstics:  No results found.     Assessment :      Hospital Problems             Last Modified POA    * (Principal) Bipolar affective disorder, mixed, severe, with psychotic behavior ([facility] [address] 11/12/2022 Yes    Depression with suicidal ideation 11/12/2022 Yes     Plan:     47-year-old lady with history of for hypertension hypokalemia complains of rash in multiple places including groin bilateral armpits is itchy denies any fever chills unsure was the cause she thinks it could be the soap  Patient examined in the presence of the nurse Damaris Wedgefield rash in multiple places likely allergic in nature  Possible soap  Advised to shower without soap we will do a trial of steroid and Benadryl symptomatic treatment    Nov 20  Pt claims rash better  Better itching    Consultations:   IP CONSULT TO INTERNAL MEDICINE  IP CONSULT TO INTERNAL MEDICINE      Nikkie Hernandez MD  11/20/2022  10:57 AM    Copy sent to Dr. Karla Epperson PALillyC    Please note that this chart was generated using voice recognition Dragon dictation software. Although every effort was made to ensure the accuracy of this automated transcription, some errors in transcription may have occurred.

## 2022-11-20 NOTE — GROUP NOTE
Group Therapy Note    Date: 2022    Group Start Time: 915  Group End Time: 940  Group Topic: Assignment Group Discussion    ANTON Rivas RN        Group Therapy Note    Attendees:     Patient participated in group discussion on short and long term goals.           Patient's Goal:  ***    Notes:  ***    Status After Intervention:  {Status After Intervention:594735209}    Participation Level: {Participation Level:922998570}    Participation Quality: {[facility] PARTICIPATION QUALITY:050501800}      Speech:  {[facility] CD_SPEECH:43738}      Thought Process/Content: {Thought Process/Content:794507681}      Affective Functioning: {Affective Functionin}      Mood: {Mood:497892369}      Level of consciousness:  {Level of consciousness:465940887}      Response to LearninPastor EDMOND Responses to Learnin}      Endings: {[facility] Endings:17202}    Modes of Intervention: {[facility] BHI Modes of Intervention:794438338}      Discipline Responsible: Juanita Sotelo YANET Multidisciplinary:[id number]}      Signature:  Gautam Rivas RN

## 2022-11-20 NOTE — PLAN OF CARE
Problem: Self Harm/Suicidality  Goal: Will have no self-injury during hospital stay  Description: INTERVENTIONS:  1. Q 30 MINUTES: Routine safety checks  2. Q SHIFT & PRN: Assess risk to determine if routine checks are adequate to maintain patient safety  11/19/2022 2113 by Lorna Ruiz LPN  Note: Denies suicidal thoughts, hallucinations, depression, and anxiety. Patient pacing throughout dayroom and social with selective peers. Calm and cooperative. Q15 min safety check. Problem: Micaela  Goal: Will exhibit normal sleep and speech and no impulsivity  Description: INTERVENTIONS:  1. Administer medication as ordered  2. Set limits on impulsive behavior  3. Make attempts to decrease external stimuli as possible  11/19/2022 2113 by Lorna Ruiz LPN  Note: Patient did not report any symptoms of micaela, nor writer did not observe those behaviors. Patient is pacing and isolative to self. Out in dayroom watching movies. Calm and cooperative. Q15 min safety check.

## 2022-11-20 NOTE — PLAN OF CARE
[address] [place] [address] Interdisciplinary Treatment Plan Note     Review Date & Time: 11/20/2022   1306    Admission Type:   Admission Type: Voluntary    Reason for admission:  Reason for Admission: Suicidal ideation to jump off bridge, homeless      Estimated Discharge Date Update: to be determined by physician    PATIENT STRENGTHS:  Patient Strengths:   Patient Strengths and Limitations:Limitations: Difficult relationships / poor social skills, Difficulty problem solving/relies on others to help solve problems, Inappropriate/potentially harmful leisure interests, Unrealistic self-view  Addictive Behavior:Addictive Behavior  In the Past 3 Months, Have You Felt or Has Someone Told You That You Have a Problem With  : None  Medical Problems:   Past Medical History:   Diagnosis Date    Anxiety     Bipolar 1 disorder ([facility] 1999    Depression     Gestational diabetes 7/22/2016    OCD (obsessive compulsive disorder) 1999    PTSD (post-traumatic stress disorder)     Rapid rate of speech     Schizoaffective disorder ([facility]        Risk:  Fall Risk   Jairo Scale Jairo Scale Score: 22  BVC    Change in scores no.  Changes to plan of Care  no    Status EXAM:   Mental Status and Behavioral Exam  Normal: Yes  Level of Assistance: Independent/Self  Facial Expression: Flat  Affect: Appropriate  Level of Consciousness: Alert  Frequency of Checks: 4 times per hour, close  Mood:Normal: Yes  Mood: Depressed, Anxious  Motor Activity:Normal: Yes  Motor Activity: Increased  Eye Contact: Good  Observed Behavior: Cooperative, Friendly  Sexual Misconduct History: Current - no  Involved In Any Sexual Misconduct With Others? : No  History of Sexually Inappropriate Behavior When Previously Hospitalized?: No  Uncontrollable/Compulsive Masturbation?: No  Difficulty Controlling Sexual Impulses?: No  Preception: [place] to time, [place] to place, [place] to situation, [place] to person  Attention:Normal: No  Attention: Distractible  Thought Processes: Circumstantial  Thought Content:Normal: No  Thought Content: Preoccupations  Depression Symptoms: No problems reported or observed. Anxiety Symptoms: Generalized  Micaela Symptoms: No problems reported or observed. Hallucinations: None  Delusions: No  Delusions: Paranoid  Memory:Normal: No  Memory: Poor recent  Insight and Judgment: No  Insight and Judgment: Poor insight, Poor judgment    Daily Assessment Last Entry:   Daily Sleep (WDL): Within Defined Limits            Daily Nutrition (WDL): Within Defined Limits  Level of Assistance: Independent/Self    Patient Monitoring:  Frequency of Checks: 4 times per hour, close    Psychiatric Symptoms:   Depression Symptoms  Depression Symptoms: No problems reported or observed. Anxiety Symptoms  Anxiety Symptoms: Generalized  Micaela Symptoms  Micaela Symptoms: No problems reported or observed. Suicide Risk CSSR-S:  1) Within the past month, have you wished you were dead or wished you could go to sleep and not wake up? : Yes  2) Have you actually had any thoughts of killing yourself? : Yes  3) Have you been thinking about how you might kill yourself? : Yes  5) Have you started to work out or worked out the details of how to kill yourself?  Do you intend to carry out this plan? : No  6) Have you ever done anything, started to do anything, or prepared to do anything to end your life?: No  Change in Result no Change in Plan of care no    EDUCATION:   Learner Progress Toward Treatment Goals: Reviewed goals and plan of care    Method: Small group    Outcome: Verbalized understanding    PATIENT GOALS: short term-unable to attend            Long term-    PLAN/TREATMENT RECOMMENDATIONS UPDATE:   COPING SKILLS CONTINUE WITH GROUP THERAPIES POSITIVE INTERACTIONS, GOAL SETTING    SHORT-TERM GOALS UPDATE:  Time frame for Short-Term Goals: 1-2 WEEKS     LONG-TERM GOALS UPDATE:  Time frame for Long-Term Goals: 6 MONTHS  Members Present in Team Meeting: See Signature Sheet    Tima Myers RN

## 2022-11-20 NOTE — CONSULTS
[facility] Internal Medicine  Umer Jo MD; Rosette John MD; Letty Hugo MD; Reese Boon, MD Carollynn Duane, MD; MD MYRIAM [facility] Internal Medicine   [address] / HISTORY AND PHYSICAL EXAMINATION            Date:   2022  Patient name:  Britany Curry  Date of admission:  2022  4:21 AM  MRN:   [id number]  Account:  [de-identified]  YOB: 1980  PCP:    Km Joy PA-C  Room:   [address] [place] [address]  Code Status:    Full Code    Physician Requesting Consult: Loni Sue, *    Reason for Consult:  rash      Chief Complaint:     Chief Complaint   Patient presents with    Mental Health Problem   Rash in multiple places    History Obtained From:     Patient medical record nursing    History of Present Illness:     53-year-old lady with history of for hypertension hypokalemia complains of rash in multiple places including groin bilateral armpits is itchy denies any fever chills unsure was the cause she thinks it could be the soap  Patient examined in the presence of the nurse Clinton Angel    Past Medical History:     Past Medical History:   Diagnosis Date    Anxiety     Bipolar 1 disorder ([facility] [address]     Depression     Gestational diabetes 2016    OCD (obsessive compulsive disorder)     PTSD (post-traumatic stress disorder)     Rapid rate of speech     Schizoaffective disorder ([facility] [address]         Past Surgical History:     Past Surgical History:   Procedure Laterality Date    ABDOMEN SURGERY       SECTION  2007    LAPAROSCOPY          Medications Prior to Admission:     Prior to Admission medications    Medication Sig Start Date End Date Taking?  Authorizing Provider   benztropine (COGENTIN) 0.5 MG tablet Take 1 tablet by mouth 2 times daily 22   Kandace Stark MD   cariprazine hcl (VRAYLAR) 6 MG CAPS capsule Take 1 capsule by mouth daily 22   Kandace Stark MD cloNIDine (CATAPRES) 0.1 MG tablet Take 1 tablet by mouth nightly 11/8/22   Juan Andres MD   fluticasone (FLONASE) 50 MCG/ACT nasal spray 1 spray by Each Nostril route daily 11/9/22   Juan Andres MD   hydroCHLOROthiazide (HYDRODIURIL) 25 MG tablet Take 1 tablet by mouth daily 11/8/22   Juan Andres MD   lithium (ESKALITH) 450 MG extended release tablet Take 1 tablet by mouth every 12 hours 11/8/22   Juan Andres MD   melatonin 3 MG TABS tablet Take 10 mg by mouth nightly as needed    Historical Provider, MD        Allergies:     Abilify [aripiprazole], Codeine, Depakote er [divalproex sodium er], Gabapentin, Lamictal [lamotrigine], Percocet [oxycodone-acetaminophen], Quetiapine fumarate, Tegretol [carbamazepine], Trileptal [oxcarbazepine], Valproic acid, Ziprasidone hcl, and Zyprexa [olanzapine]    Social History:     Tobacco:    reports that she has been smoking. She has been smoking an average of 1 pack per day. She has never used smokeless tobacco.  Alcohol:      reports no history of alcohol use. Drug Use:  reports current drug use. Drug: Marijuana Tuan Calamity). Family History:     Family History   Problem Relation Age of Onset    Diabetes Sister     No Known Problems Mother     No Known Problems Father        Review of Systems:     Positive and Negative as described in HPI. CONSTITUTIONAL:  negative for fevers, chills, sweats, fatigue, weight loss  HEENT:  negative for vision, hearing changes, runny nose, throat pain  RESPIRATORY:  negative for shortness of breath, cough, congestion, wheezing. CARDIOVASCULAR:  negative for chest pain, palpitations.   GASTROINTESTINAL:  negative for nausea, vomiting, diarrhea, constipation, change in bowel habits, abdominal pain   GENITOURINARY:  negative for difficulty of urination, burning with urination, frequency   INTEGUMENT: Rash in multiple places HEMATOLOGIC/LYMPHATIC:  negative for swelling/edema   ALLERGIC/IMMUNOLOGIC:  negative for urticaria , itching  ENDOCRINE:  negative increase in drinking, increase in urination, hot or cold intolerance  MUSCULOSKELETAL:  negative joint pains, muscle aches, swelling of joints  NEUROLOGICAL:  negative for headaches, dizziness, lightheadedness, numbness, pain, tingling extremities      Physical Exam:     /78   Pulse 98   Temp 97.9 °F (36.6 °C) (Temporal)   Resp 14   Ht 5' 7\" (1.702 m)   Wt 180 lb (81.6 kg)   SpO2 98%   BMI 28.19 kg/m²   Temp (24hrs), Av.1 °F (36.7 °C), Min:97.9 °F (36.6 °C), Max:98.3 °F (36.8 °C)    No results for input(s): POCGLU in the last 72 hours. No intake or output data in the 24 hours ending 22    General Appearance:  alert, well appearing, and in no acute distress  Mental status: oriented to person, place, and time with normal affect  Head:  normocephalic, atraumatic. Eye: no icterus, redness, pupils equal and reactive, extraocular eye movements intact, conjunctiva clear  Ear: normal external ear, no discharge, hearing intact  Nose:  no drainage noted  Mouth: mucous membranes moist  Neck: supple, no carotid bruits, thyroid not palpable  Lungs: Bilateral equal air entry, clear to ausculation, no wheezing, rales or rhonchi, normal effort  Cardiovascular: normal rate, regular rhythm, no murmur, gallop, rub. Abdomen: Soft, nontender, nondistended, normal bowel sounds, no hepatomegaly or splenomegaly  Neurologic: There are no new focal motor or sensory deficits, normal muscle tone and bulk, no abnormal sensation, normal speech, cranial nerves II through XII grossly intact  Skin: Macular red rash noted in the inner side of the thighs armpits  No lymphadenopathy noted  Extremities:  peripheral pulses palpable, no pedal edema or calf pain with palpation    Laboratory Testing:  No results found for this or any previous visit (from the past 24 hour(s)). Imaging/Diagonstics:  No results found.     Assessment :      Hospital Problems             Last Modified POA    * (Principal) Bipolar affective disorder, mixed, severe, with psychotic behavior ([address] 11/12/2022 Yes    Depression with suicidal ideation 11/12/2022 Yes     Plan:     75-year-old lady with history of for hypertension hypokalemia complains of rash in multiple places including groin bilateral armpits is itchy denies any fever chills unsure was the cause she thinks it could be the soap  Patient examined in the presence of the nurse Pratik Weinstein rash in multiple places likely allergic in nature  Possible soap  Advised to shower without soap we will do a trial of steroid and Benadryl symptomatic treatment    Nov 19  Pt claims rash better  Better itching    Consultations:   IP CONSULT TO INTERNAL MEDICINE  IP CONSULT TO INTERNAL MEDICINE      Seb Aguilar MD  11/19/2022  7:09 PM    Copy sent to Dr. Cleveland Pineda PALillyC    Please note that this chart was generated using voice recognition Dragon dictation software. Although every effort was made to ensure the accuracy of this automated transcription, some errors in transcription may have occurred.
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
## 2022-11-20 NOTE — PLAN OF CARE
Problem: Micaela  Goal: Will exhibit normal sleep and speech and no impulsivity  Description: INTERVENTIONS:  1. Administer medication as ordered  2. Set limits on impulsive behavior  3. Make attempts to decrease external stimuli as possible  11/20/2022 1401 by Nancy Krause RN  Outcome: Progressing  Note: Ms. Genoveva Garcia demonstrates disorganized thinking and is unable to engage in meaningful conversation in an organized manor. She rapidly discusses thoughts about things she has to take care of here in [place], and in the same breath about wanting to move to [facility] and that \"I don't really need a drivers license\". Writer discussed making a list of tasks she needs to take care of in [place] and provided her some paper. Ms. Genoveva Garcia enthusiastically agreed and took the paper in her room. She re-appeared moments later and began to talk with peers and pace unit again. She is friendly and social. She becomes tearful while talking of her children. She adheres to her medication as prescribed. Denies suicidal ideation and thoughts of harm to self and others.

## 2022-11-21 PROCEDURE — 6370000000 HC RX 637 (ALT 250 FOR IP)

## 2022-11-21 PROCEDURE — 1240000000 HC EMOTIONAL WELLNESS R&B

## 2022-11-21 PROCEDURE — 99232 SBSQ HOSP IP/OBS MODERATE 35: CPT | Performed by: INTERNAL MEDICINE

## 2022-11-21 PROCEDURE — APPSS30 APP SPLIT SHARED TIME 16-30 MINUTES: Performed by: NURSE PRACTITIONER

## 2022-11-21 PROCEDURE — 6370000000 HC RX 637 (ALT 250 FOR IP): Performed by: INTERNAL MEDICINE

## 2022-11-21 PROCEDURE — 6370000000 HC RX 637 (ALT 250 FOR IP): Performed by: PSYCHIATRY & NEUROLOGY

## 2022-11-21 RX ORDER — LITHIUM CARBONATE 300 MG/1
600 TABLET, FILM COATED, EXTENDED RELEASE ORAL EVERY 12 HOURS SCHEDULED
Qty: 120 TABLET | Refills: 0 | Status: ON HOLD | OUTPATIENT
Start: 2022-11-21

## 2022-11-21 RX ORDER — PRAZOSIN HYDROCHLORIDE 1 MG/1
1 CAPSULE ORAL NIGHTLY
Qty: 30 CAPSULE | Refills: 0 | Status: ON HOLD | OUTPATIENT
Start: 2022-11-21

## 2022-11-21 RX ORDER — PREDNISONE 20 MG/1
20 TABLET ORAL DAILY
Qty: 1 TABLET | Refills: 0 | Status: SHIPPED | OUTPATIENT
Start: 2022-11-22 | End: 2022-11-23

## 2022-11-21 RX ORDER — HYDROXYZINE 50 MG/1
50 TABLET, FILM COATED ORAL 3 TIMES DAILY PRN
Qty: 30 TABLET | Refills: 0 | Status: ON HOLD | OUTPATIENT
Start: 2022-11-21 | End: 2022-12-01

## 2022-11-21 RX ADMIN — HYDROCHLOROTHIAZIDE 25 MG: 25 TABLET ORAL at 10:12

## 2022-11-21 RX ADMIN — HYDROXYZINE HYDROCHLORIDE 50 MG: 50 TABLET, FILM COATED ORAL at 10:28

## 2022-11-21 RX ADMIN — LITHIUM CARBONATE 600 MG: 300 TABLET, EXTENDED RELEASE ORAL at 10:11

## 2022-11-21 RX ADMIN — LURASIDONE HYDROCHLORIDE 60 MG: 60 TABLET, FILM COATED ORAL at 18:17

## 2022-11-21 RX ADMIN — PRAZOSIN HYDROCHLORIDE 1 MG: 1 CAPSULE ORAL at 20:58

## 2022-11-21 RX ADMIN — Medication 6 MG: at 21:01

## 2022-11-21 RX ADMIN — CARIPRAZINE 6 MG: 3 CAPSULE, GELATIN COATED ORAL at 13:32

## 2022-11-21 RX ADMIN — LITHIUM CARBONATE 600 MG: 300 TABLET, EXTENDED RELEASE ORAL at 20:58

## 2022-11-21 RX ADMIN — IBUPROFEN 400 MG: 400 TABLET ORAL at 04:59

## 2022-11-21 RX ADMIN — FLUTICASONE PROPIONATE 1 SPRAY: 50 SPRAY, METERED NASAL at 10:11

## 2022-11-21 RX ADMIN — HYDROXYZINE HYDROCHLORIDE 50 MG: 50 TABLET, FILM COATED ORAL at 19:22

## 2022-11-21 RX ADMIN — PREDNISONE 20 MG: 20 TABLET ORAL at 10:12

## 2022-11-21 ASSESSMENT — PAIN DESCRIPTION - LOCATION: LOCATION: HEAD

## 2022-11-21 ASSESSMENT — PAIN SCALES - GENERAL: PAINLEVEL_OUTOF10: 0

## 2022-11-21 NOTE — GROUP NOTE
Group Therapy Note    Date: 11/21/2022    Group Start Time: 0915  Group End Time: 0945  Group Topic: Group Documentation    [facility] D    Mark Portillo LPN        Group Therapy Note    Attendees: 12/19 goal group       Patient's Goal:  discharge    Notes:  tolerated well    Status After Intervention:  Improved    Participation Level:  Active Listener    Participation Quality: Appropriate and Attentive      Speech:  normal      Thought Process/Content: Logical      Affective Functioning: Congruent      Mood: euthymic      Level of consciousness:  Alert and Oriented x4      Response to Learning: Able to verbalize current knowledge/experience and Able to verbalize/acknowledge new learning      Endings: None Reported    Modes of Intervention: Education, Support, and Socialization      Discipline Responsible: Licensed Practical Nurse      Signature:  Mark Portillo LPN

## 2022-11-21 NOTE — CARE COORDINATION
Social work calls [phone] with [employer]- to inform of patient's decision to go to the group home they have secured. Left message for Anitha to return the call to coordinate care for patient.

## 2022-11-21 NOTE — GROUP NOTE
Group Therapy Note    Date: 11/21/2022    Group Start Time: 1430  Group End Time: 9294  Group Topic: Cognitive Skills    [facility] JUANCARLOS Neff        Group Therapy Note    Attendees: 12/16     Patient's Goal:  To increase social interaction and to explore relaxation techniques/resources, positive coping skills r/t stress management, sharing feelings, and communication skills. Notes: Pt participated partially in group d/t being pulled from group to talk with provider and was then distracted and focused on making calls. Pt started but did not finish creative expression and did not share individually/or in group discussion. Pt was supportive of peers     Status After Intervention:  Unchanged         Participation Level:  Active Listener and Interactive briefly     Participation Quality: Attentive, and Supportive while in group        Speech:  preoccupied with making phone calls        Thought Process/Content: Impaired concentration, focused on making calls      Affective Functioning: Blunted        Mood: Restless, in behavioral control        Level of consciousness:  Alert, Oriented x4 and Attentive until removed from group        Response to Learning: Attentive to task for 15 mins only, and Progressing to goal        Endings: None Reported     Modes of Intervention: Education, Support, Socialization, Exploration, Clarifying and Problem-solving        Discipline Responsible: Psychoeducational Specialist        Signature:  JUANCARLOS Odonnell

## 2022-11-21 NOTE — PLAN OF CARE
Problem: Self Harm/Suicidality  Goal: Will have no self-injury during hospital stay  Description: INTERVENTIONS:  1. Q 30 MINUTES: Routine safety checks  2. Q SHIFT & PRN: Assess risk to determine if routine checks are adequate to maintain patient safety  11/20/2022 2309 by Don Colin LPN  Note: Patient denies suicidal thoughts, hallucinations, anxiety, and depression. Isolative in room, sleeping. Comes out for needs, but is in room asleep. Q15 min safety check. Problem: Pain  Goal: Verbalizes/displays adequate comfort level or baseline comfort level  11/20/2022 2309 by Don Colin LPN  Note: Patient denies pain at this time. Vitals for tonight were within normal limits. Isolative in room, asleep. Q15 min safety check.

## 2022-11-21 NOTE — PROGRESS NOTES
Daily Progress Note  11/21/2022    Patient Name: Olegario Freeman    CHIEF COMPLAINT:  Manic like behaviors and suicidal ideation          SUBJECTIVE:      Staff reports that patient required emergency IM medications yesterday for psychomotor agitation. Noted that there was an outburst and patient threw a cup in the day area, and was yelling at peers and staff secondary to not being able to listen to the radio when other patients were watching football. Patient seen face-to-face for follow-up assessment. She is cooperative with discussion. Evasive in describing events that led to her requiring emergency medications. She displays very poor insight and impulsivity control. Today, patient is focused on getting her children back in her custody. She also is requesting to have bupropion started to help with her mood. Explained to that this medication is contraindicated with patient's history of prince and continued outbursts. She verbalizes understanding. She has been compliant with her scheduled psychotropic medications which include prazosin, Vraylar, Latuda and lithium. Patient denies any side effects to her regimen at this time. Patient has yet to demonstrate stability. Continues to struggle with behavioral control and agitation. She is showing modest improvement and plan to discharge soon if stable through tomorrow. Appetite:  [x] Normal/Adequate/Unchanged  [] Increased  [] Decreased      Sleep:       [] Normal/Adequate/Unchanged  [x] Improved  [] Poor      Group Attendance on Unit:   [x] Yes  [] Selectively    [] No    Medication Side Effects:  Patient denies any medication side effects at the time of assessment. Mental Status Exam  Level of consciousness: Alert and awake. Appearance: Appropriate attire for setting, seated in chair, with fair  grooming and hygiene.    Behavior/Motor: Approachable, restless  Attitude toward examiner: Cooperative, mostly attentive, good eye contact. Speech: less rapid and pressured, normal volume, normal tone  Mood:  Patient reports \"really angry this morning\". Affect: blunted currently  Thought processes: Improvement and linear thought, tangential, hyperverbal  Thought content: Denies homicidal ideation. Suicidal Ideation: Denies suicidal ideations  Delusions: No evidence of delusions. Reports improvement in paranoia. Perceptual Disturbance: Patient does not appear to be responding to internal stimuli. Denies auditory hallucinations. Denies visual hallucinations. Cognition: Oriented to self, location, time, and situation. Memory: Intact. Insight & Judgement: Poor/improving    Data   height is 5' 7\" (1.702 m) and weight is 180 lb (81.6 kg). Her oral temperature is 97.9 °F (36.6 °C). Her blood pressure is 130/79 and her pulse is 87. Her respiration is 12 and oxygen saturation is 98%.    Labs:   Admission on 11/12/2022   Component Date Value Ref Range Status    Color, UA 11/12/2022 Yellow  Yellow Final    Turbidity UA 11/12/2022 Clear  Clear Final    Glucose, Ur 11/12/2022 NEGATIVE  NEGATIVE Final    Bilirubin Urine 11/12/2022 NEGATIVE  NEGATIVE Final    Ketones, Urine 11/12/2022 NEGATIVE  NEGATIVE Final    Specific [place], UA 11/12/2022 1.008  1.000 - 1.030 Final    Urine Hgb 11/12/2022 NEGATIVE  NEGATIVE Final    pH, UA 11/12/2022 6.5  5.0 - 8.0 Final    Protein, UA 11/12/2022 NEGATIVE  NEGATIVE Final    Urobilinogen, Urine 11/12/2022 Normal  Normal Final    Nitrite, Urine 11/12/2022 NEGATIVE  NEGATIVE Final    Leukocyte Esterase, Urine 11/12/2022 MOD (A)  NEGATIVE Final    WBC, UA 11/12/2022 0 TO 2  /HPF Final    RBC, UA 11/12/2022 0 TO 2  /HPF Final    Casts UA 11/12/2022 0 TO 2  /LPF Final    Epithelial Cells UA 11/12/2022 0 TO 2  /HPF Final    Bacteria, UA 11/12/2022 FEW (A)  None Final    Amphetamine Screen, Ur 11/12/2022 NEGATIVE  NEGATIVE Final    Comment:       (Positive cutoff 1000 ng/mL)                  Barbiturate Screen, Ur 11/12/2022 NEGATIVE  NEGATIVE Final    Comment:       (Positive cutoff 200 ng/mL)                  Benzodiazepine Screen, Urine 11/12/2022 NEGATIVE  NEGATIVE Final    Comment:       (Positive cutoff 200 ng/mL)                  Cocaine Metabolite, Urine 11/12/2022 NEGATIVE  NEGATIVE Final    Comment:       (Positive cutoff 300 ng/mL)                  Methadone Screen, Urine 11/12/2022 NEGATIVE  NEGATIVE Final    Comment:       (Positive cutoff 300 ng/mL)                  Opiates, Urine 11/12/2022 NEGATIVE  NEGATIVE Final    Comment:       (Positive cutoff 300 ng/mL)                  Phencyclidine, Urine 11/12/2022 NEGATIVE  NEGATIVE Final    Comment:       (Positive cutoff 25 ng/mL)                  Cannabinoid Scrn, Ur 11/12/2022 POSITIVE (A)  NEGATIVE Final    Comment:       (Positive cutoff 50 ng/mL)                  Oxycodone Screen, Ur 11/12/2022 NEGATIVE  NEGATIVE Final    Comment:       (Positive cutoff 100 ng/mL)                  Fentanyl, Ur 11/12/2022 NEGATIVE  NEGATIVE Final    Comment:       (Positive cutoff  5 ng/ml)            Test Information 11/12/2022 Assay provides medical screening only. The absence of expected drug(s) and/or metabolite(s) may indicate diluted or adulterated urine, limitations of testing or timing of collection. Final    Comment: Testing for legal purposes should be confirmed by another method. To request confirmation   of test result, please call the lab within 7 days of sample submission. HCG(Urine) Pregnancy Test 11/12/2022 NEGATIVE  NEGATIVE Final    Comment: Specimens with hCG levels near the threshold of the test (25 mIU/mL) may give a negative or   indeterminate result. In such cases, another test should be performed with a new specimen   in 48-72 hours. If early pregnancy is suspected clinically in this setting, correlation   with quantitative serum b-hCG level is suggested. Specimen Source 11/12/2022 . BLOOD   Final    Ordered Test 11/12/2022 BMPX,CDP Final    Reason for Rejection 11/12/2022 Unable to perform testing: Specimen hemolyzed. Final    Unable to perform testing: Specimen quantity not sufficient. Glucose 11/12/2022 116 (A)  70 - 99 mg/dL Final    BUN 11/12/2022 8  6 - 20 mg/dL Final    Creatinine 11/12/2022 0.66  0.50 - 0.90 mg/dL Final    Est, Glom Filt Rate 11/12/2022 >60  >60 mL/min/1.73m2 Final    Comment:       Effective Oct 3, 2022        These results are not intended for use in patients <25years of age. eGFR results are calculated without a race factor using the 2021 CKD-EPI equation. Careful clinical correlation is recommended, particularly when comparing to results   calculated using previous equations. The CKD-EPI equation is less accurate in patients with extremes of muscle mass, extra-renal   metabolism of creatine, excessive creatine ingestion, or following therapy that affects   renal tubular secretion.       Calcium 11/12/2022 9.3  8.6 - 10.4 mg/dL Final    Sodium 11/12/2022 139  135 - 144 mmol/L Final    Potassium 11/12/2022 3.4 (A)  3.7 - 5.3 mmol/L Final    Chloride 11/12/2022 104  98 - 107 mmol/L Final    CO2 11/12/2022 23  20 - 31 mmol/L Final    Anion Gap 11/12/2022 12  9 - 17 mmol/L Final    WBC 11/12/2022 11.9 (A)  3.5 - 11.0 k/uL Final    RBC 11/12/2022 4.94  4.0 - 5.2 m/uL Final    Hemoglobin 11/12/2022 13.5  12.0 - 16.0 g/dL Final    Hematocrit 11/12/2022 41.5  36 - 46 % Final    MCV 11/12/2022 84.1  80 - 100 fL Final    MCH 11/12/2022 27.2  26 - 34 pg Final    MCHC 11/12/2022 32.4  31 - 37 g/dL Final    RDW 11/12/2022 13.0  11.5 - 14.9 % Final    Platelets 48/43/9194 311  150 - 450 k/uL Final    MPV 11/12/2022 8.3  6.0 - 12.0 fL Final    Seg Neutrophils 11/12/2022 62  36 - 66 % Final    Lymphocytes 11/12/2022 26  24 - 44 % Final    Monocytes 11/12/2022 7  1 - 7 % Final    Eosinophils % 11/12/2022 4  0 - 4 % Final    Basophils 11/12/2022 1  0 - 2 % Final    Segs Absolute 11/12/2022 7.40  1.3 - 9.1 k/uL Final Absolute Lymph # 11/12/2022 3.10  1.0 - 4.8 k/uL Final    Absolute Mono # 11/12/2022 0.90  0.1 - 1.3 k/uL Final    Absolute Eos # 11/12/2022 0.50 (A)  0.0 - 0.4 k/uL Final    Basophils Absolute 11/12/2022 0.10  0.0 - 0.2 k/uL Final    Lithium Lvl 11/12/2022 0.6  0.6 - 1.2 mmol/L Final    Lithium Dose Amount 11/12/2022 Unknown   Final    Lithium Date Last Dose 11/12/2022 UNK^Unknown^L   Final    Lithium Dose Time 11/12/2022 UNK^Unknown^L   Final    Magnesium 11/12/2022 2.0  1.6 - 2.6 mg/dL Final    Lithium Lvl 11/16/2022 1.1  0.6 - 1.2 mmol/L Final    Lithium Dose Amount 11/16/2022 600   Final    Lithium Date Last Dose 11/16/2022 [id number]   Final    Lithium Dose Time 11/16/2022 2046   Final         Reviewed patient's current plan of care and vital signs with nursing staff.     Labs reviewed: [x] Yes  Last EKG in EMR reviewed: [x] Yes  QTc: 420    Medications  Current Facility-Administered Medications: lurasidone (LATUDA) tablet 60 mg, 60 mg, Oral, Dinner  melatonin tablet 6 mg, 6 mg, Oral, Nightly PRN  predniSONE (DELTASONE) tablet 20 mg, 20 mg, Oral, Daily  diphenhydrAMINE (BENADRYL) tablet 25 mg, 25 mg, Oral, Q6H PRN  prazosin (MINIPRESS) capsule 1 mg, 1 mg, Oral, Nightly  bisacodyl (DULCOLAX) EC tablet 5 mg, 5 mg, Oral, Daily PRN  ibuprofen (ADVIL;MOTRIN) tablet 400 mg, 400 mg, Oral, Q6H PRN  hydrOXYzine HCl (ATARAX) tablet 50 mg, 50 mg, Oral, TID PRN  polyethylene glycol (GLYCOLAX) packet 17 g, 17 g, Oral, Daily PRN  aluminum & magnesium hydroxide-simethicone (MAALOX) 200-200-20 MG/5ML suspension 30 mL, 30 mL, Oral, Q6H PRN  nicotine polacrilex (NICORETTE) gum 2 mg, 2 mg, Oral, Q2H PRN  haloperidol (HALDOL) tablet 5 mg, 5 mg, Oral, Q6H PRN **AND** LORazepam (ATIVAN) tablet 2 mg, 2 mg, Oral, Q6H PRN  haloperidol lactate (HALDOL) injection 5 mg, 5 mg, IntraMUSCular, Q6H PRN **AND** LORazepam (ATIVAN) injection 2 mg, 2 mg, IntraMUSCular, Q6H PRN **AND** diphenhydrAMINE (BENADRYL) injection 50 mg, 50 mg, IntraMUSCular, Q6H PRN  cariprazine hcl (VRAYLAR) capsule 6 mg, 6 mg, Oral, Daily  fluticasone (FLONASE) 50 MCG/ACT nasal spray 1 spray, 1 spray, Each Nostril, Daily  hydroCHLOROthiazide (HYDRODIURIL) tablet 25 mg, 25 mg, Oral, Daily  lithium (LITHOBID) extended release tablet 600 mg, 600 mg, Oral, 2 times per day    ASSESSMENT  Bipolar affective disorder, mixed, severe, with psychotic behavior ([facility] [address]         HANDOFF  Patient symptoms: Showing some improvement  Medication changes per psychiatrist  Monitor need and frequency of PRN medications. Probable discharge is per attending physician. Follow-up daily while inpatient. Patient continues to be monitored in the inpatient psychiatric facility at [facility] for safety and stabilization. Patient continues to need, on a daily basis, active treatment furnished directly by or requiring the supervision of inpatient psychiatric personnel. Electronically signed by MAURICE Townsend CNP on 11/21/2022 at 4:01 PM    **This report has been created using voice recognition software. It may contain minor errors which are inherent in voice recognition technology. **                                         Psychiatry Attending Attestation     I independently saw and evaluated the patient. I reviewed the Advance Practice Provider's documentation above. Any additional comments or changes to the Advance Practice Provider's documentation are stated below otherwise agree with assessment. Patient received emergency medications yesterday and appears to be very distressed. Notes that she is distressed about being here. Plan to discharge tomorrow if she continues to improve. PLAN  Patient s symptoms are improving  Will continue same medication today and observe  Attempt to develop insight  Psycho-education conducted. Supportive Therapy conducted.   Probable discharge is tomorrow  Follow-up TBD    Electronically signed by Clinton Garsia MD on 11/21/22 at 4:35 PM EST

## 2022-11-21 NOTE — DISCHARGE INSTRUCTIONS
Information:  Medications:   Medication summary provided   I understand that I should take only the medications on my list.     -why and when I need to take each medicine.     -which side effects to watch for.     -that I should carry my medication information at all times in case of     Emergency situations. I will take all of my medicines to follow up appointments.     -check with my physician or pharmacist before taking any new    Medication, over the counter product or drink alcohol.    -Ask about food, drug or dietary supplement interactions.    -discard old lists and update records with medication providers. Notify Physician:  Notify physician if you notice:   Always call 911 if you feel your life is in danger  In case of an emergency call 911 immediately! If 911 is not available call your local emergency medical system for help    Behavioral Health Follow Up:  Original Referral Source:[facility]  Discharge Diagnosis: Depression with suicidal ideation [F32. A, R45.851]  Recommendations for Level of Care: follow up   Patient status at discharge: Alert and oriented , denies thoughts to harm self. My hospital  was: [facility]  Aftercare plan faxed: [facility]   -faxed by: [facility]   -date: 11/21/22   -time: 1100  Prescriptions: filled at [facility] and taken with Pt, [place] sent to [address] on [address]    Smoking: Quit Smoking. Call the [employer]'s smoking quitline at 5-877-75E-QUIT  Know the signs of a heart attack   If you have any of the following symptoms call 911 immediately, do not wait more    Than five minutes. 1. Pressure, fullness and/ or squeezing in the center of the chest spreading to    The jaw, neck or shoulder. 2. Chest discomfort with light headedness, fainting, sweating, nausea or    Shortness of breath. 3. Upper abdominal pressure or discomfort. 4. Lower chest pain, back pain, unusual fatigue, shortness of breath, nausea   Or dizziness.      General Information:   Questions regarding your bill: Call HELP program [phone]     Suicide Hotline ([place])  (797) 910-2240      Recovery Help line- 315.631.1782      To obtain results of pending studies call Medical Records at: [phone]     For emergencies and 24 hour/7 days a week contact information:  [phone]        Learning About COVID-19 and Flu Symptoms  How can you tell COVID-19 from the flu? COVID-19 and the flu have similar symptoms. The two can be hard to tell apart. The only way to know for sure which illness you have is to be tested. If you have questions about COVID-19 testing, ask your doctor or go to cdc.gov to use the COVID-19 Viral Testing Tool. Since the symptoms are so alike, it makes sense to act as if you have COVID-19 until your test results come back. This means staying home and limiting contact with people in your home. You'll need to wash your hands often and disinfect surfaces that you touch. And be sure to wear a mask when you're around other people. This is also good advice if you think you have the flu. COVID-19 and the flu have these symptoms in common:  Fever or chills  Cough  Shortness of breath  Fatigue (tiredness)  Sore throat  Runny or stuffy nose  Muscle and body aches  Headache  Vomiting and diarrhea (more common in children than adults)  COVID-19 has another symptom that also may occur:  New loss of taste or smell  COVID-19 symptoms may appear from 2 to 14 days after infection. Flu symptoms usually appear 1 to 4 days after infection. Why should you get the flu vaccine? It's important to get your yearly flu vaccine. Both the flu and COVID-19 can be active at the same time. You can get sick with both infections at once. And having both may make you more sick than getting just one. The flu vaccine won't protect you from COVID-19. But it can help prevent the flu or reduce its symptoms.  If fewer people get very ill with the flu, this will help free up medical resources that are needed for people who need urgent care, such as those suffering from COVID-19, heart attacks, and injuries from accidents. Where can you learn more? Go to [email or web address] and sign in to your [employer] account. Enter C123 in the [employer] box to learn more about \"Learning About COVID-19 and Flu Symptoms. \"     If you do not have an account, please click on the \"Sign Up Now\" link. Current as of: July 28, 2022               Content Version: 13.4  © 5026-4684 Healthwise, Incorporated. Care instructions adapted under license by [facility] [place] ([facility]). If you have questions about a medical condition or this instruction, always ask your healthcare professional. [address] any warranty or liability for your use of this information.

## 2022-11-21 NOTE — PLAN OF CARE
Problem: Self Harm/Suicidality  Goal: Will have no self-injury during hospital stay  Description: INTERVENTIONS:  1. Q 30 MINUTES: Routine safety checks  2. Q SHIFT & PRN: Assess risk to determine if routine checks are adequate to maintain patient safety  Outcome: Progressing     Problem: Micaela  Goal: Will exhibit normal sleep and speech and no impulsivity  Description: INTERVENTIONS:  1. Administer medication as ordered  2. Set limits on impulsive behavior  3. Make attempts to decrease external stimuli as possible  Outcome: Progressing   Pt denies thoughts of self harm and is agreeable to seeking out should thoughts of self harm arise. Safe environment maintained. Q15 minute checks for safety continued per unit policy. Will continue to monitor for safety and provide support and reassurance as needed.

## 2022-11-21 NOTE — GROUP NOTE
Wrap-Up Group Note      Date: November 20, 2022 Start Time: 8pm  End Time: 8:45pm    Number of Participants in Group & Unit Census: 11/18   Topic: HS Goal Wrap-Up    Goal of Group: Identify and discuss daily goals with group attendees. Comments:   Patient did not participate in Wrap-Up group, despite staff encouragement and explanation of benefits. Patient remain seclusive to self. Q15 minute safety checks maintained for patient safety and will continue to encourage patient to attend unit programming.

## 2022-11-21 NOTE — PROGRESS NOTES
CLINICAL PHARMACY NOTE: MEDS TO BEDS    Total # of Prescriptions Filled: 5   The following medications were delivered to the patient:  Hydroxyzine HCL 25mg  Prazosin HCL 1mg  Vraylar 6mg  Latuda 60mg  Prednisone 20mg    Additional Documentation:  Delivered Medication to Nurses Station     [place]  [facility]    - Transferred to [facility] (Lan: Gabriela Hall)    Changed Hydroxyzine 50mg - 25mg    - 50mg supply issues

## 2022-11-21 NOTE — CARE COORDINATION
Social work received phone call from [employer] indicating she will  patient at 9:15 AM to take her to the group home. She also provided a follow up appointment for patient.

## 2022-11-22 VITALS
RESPIRATION RATE: 14 BRPM | OXYGEN SATURATION: 98 % | HEART RATE: 81 BPM | BODY MASS INDEX: 28.25 KG/M2 | DIASTOLIC BLOOD PRESSURE: 91 MMHG | WEIGHT: 180 LBS | SYSTOLIC BLOOD PRESSURE: 124 MMHG | TEMPERATURE: 97.9 F | HEIGHT: 67 IN

## 2022-11-22 PROCEDURE — 6370000000 HC RX 637 (ALT 250 FOR IP): Performed by: PSYCHIATRY & NEUROLOGY

## 2022-11-22 PROCEDURE — 6370000000 HC RX 637 (ALT 250 FOR IP)

## 2022-11-22 PROCEDURE — 6370000000 HC RX 637 (ALT 250 FOR IP): Performed by: INTERNAL MEDICINE

## 2022-11-22 RX ADMIN — CARIPRAZINE 6 MG: 3 CAPSULE, GELATIN COATED ORAL at 07:58

## 2022-11-22 RX ADMIN — LITHIUM CARBONATE 600 MG: 300 TABLET, EXTENDED RELEASE ORAL at 07:58

## 2022-11-22 RX ADMIN — FLUTICASONE PROPIONATE 1 SPRAY: 50 SPRAY, METERED NASAL at 07:58

## 2022-11-22 RX ADMIN — IBUPROFEN 400 MG: 400 TABLET ORAL at 04:58

## 2022-11-22 RX ADMIN — HYDROCHLOROTHIAZIDE 25 MG: 25 TABLET ORAL at 07:59

## 2022-11-22 RX ADMIN — PREDNISONE 20 MG: 20 TABLET ORAL at 07:59

## 2022-11-22 ASSESSMENT — PAIN DESCRIPTION - DESCRIPTORS: DESCRIPTORS: THROBBING

## 2022-11-22 ASSESSMENT — PAIN SCALES - GENERAL: PAINLEVEL_OUTOF10: 7

## 2022-11-22 ASSESSMENT — PAIN - FUNCTIONAL ASSESSMENT
PAIN_FUNCTIONAL_ASSESSMENT: 0-10
PAIN_FUNCTIONAL_ASSESSMENT: ACTIVITIES ARE NOT PREVENTED
PAIN_FUNCTIONAL_ASSESSMENT: 0-10

## 2022-11-22 ASSESSMENT — PAIN DESCRIPTION - LOCATION: LOCATION: HEAD

## 2022-11-22 ASSESSMENT — PAIN DESCRIPTION - ORIENTATION: ORIENTATION: INNER

## 2022-11-22 NOTE — PLAN OF CARE
Problem: Self Harm/Suicidality  Goal: Will have no self-injury during hospital stay  Description: INTERVENTIONS:  1. Q 30 MINUTES: Routine safety checks  2. Q SHIFT & PRN: Assess risk to determine if routine checks are adequate to maintain patient safety  11/21/2022 1930 by Heather Marin LPN  Outcome: Progressing  Patient denies suicidal ideations at this time. Patient safety checks are maintained every 15 minutes. Problem: Micaela  Goal: Will exhibit normal sleep and speech and no impulsivity  Description: INTERVENTIONS:  1. Administer medication as ordered  2. Set limits on impulsive behavior  3. Make attempts to decrease external stimuli as possible  11/21/2022 1930 by Heather Marin LPN  Outcome: Progressing  Patient is calm, cooperative, medication compliant and behavior is controlled. Patient is absent of manic symptoms this shift. Problem: Pain  Goal: Verbalizes/displays adequate comfort level or baseline comfort level  11/21/2022 1930 by Heather Marin LPN  Outcome: Progressing  No pain or discomfort voiced at this time.

## 2022-11-22 NOTE — BH NOTE
[facility]  Discharge Note    Pt discharged with followings belongings:   Dental Appliances: Uppers, Lowers  Vision - Corrective Lenses: None  Hearing Aid: None  Jewelry: Watch  Body Piercings Removed: N/A  Clothing: Socks, Shirt, Pants, Jacket/Coat, Footwear, Undergarments, Hat  Other Valuables: Cigarettes, Lighter/Matches   Valuables sent home with Patient or returned to patient. Patient educated on aftercare instructions: yes  Information faxed to [facility] by Olivia Meyer  at 9:19 AM .Patient verbalize understanding of AVS:  yes. Status EXAM upon discharge:  Mental Status and Behavioral Exam  Normal: No  Level of Assistance: Independent/Self  Facial Expression: Worried, Flat  Affect: Normal  Level of Consciousness: Alert  Frequency of Checks: 4 times per hour, close  Mood:Normal: No  Mood: Anxious  Motor Activity:Normal: No  Motor Activity: Increased  Eye Contact: Good  Observed Behavior: Cooperative, Preoccupied  Sexual Misconduct History: Current - no  Involved In Any Sexual Misconduct With Others? : No  History of Sexually Inappropriate Behavior When Previously Hospitalized?: No  Uncontrollable/Compulsive Masturbation?: No  Difficulty Controlling Sexual Impulses?: No  Preception: [place] to person, [place] to time, [place] to place, [place] to situation  Attention:Normal: No  Attention: Distractible  Thought Processes: Circumstantial  Thought Content:Normal: No  Thought Content: Preoccupations  Depression Symptoms: No problems reported or observed. Anxiety Symptoms: Generalized  Micaela Symptoms: No problems reported or observed.   Hallucinations: None  Delusions: No  Delusions: Paranoid  Memory:Normal: No  Memory: Confabulation, Poor recent  Insight and Judgment: Yes  Insight and Judgment: Poor judgment, Unrealistic    Tobacco Screening:  Practical Counseling, on admission, nati X, if applicable and completed (first 3 are required if patient doesn't refuse):            ( ) Recognizing danger situations (included triggers and roadblocks)                    ( ) Coping skills (new ways to manage stress,relaxation techniques, changing routine, distraction)                                                           ( ) Basic information about quitting (benefits of quitting, techniques in how to quit, available resources  ( ) Referral for counseling faxed to [facility]                                                                                                                   ( ) Patient refused counseling  ( X  ) Patient refused referral  ( ) Patient refused prescription upon discharge  ( ) Patient has not smoked in the last 30 days    Metabolic Screening:    Lab Results   Component Value Date    LABA1C 5.3 06/23/2019       Lab Results   Component Value Date    CHOL 116 06/23/2019    CHOL 101 09/12/2018    CHOL 151 05/17/2016     Lab Results   Component Value Date    TRIG 112 06/23/2019    TRIG 75 09/12/2018    TRIG 210 (H) 05/17/2016     Lab Results   Component Value Date    HDL 47 06/23/2019    HDL 44 09/12/2018    HDL 74 05/17/2016     No components found for: LDLCAL  No results found for: Tonia Valiente LPN    Patient picked up by  and d/c to home, walked out by staff.

## 2022-11-22 NOTE — CARE COORDINATION
Name: Orly Warner    : 1980    Discharge Date: 2022    Primary Auth/Cert #: [id number]    Destination: home    Discharge Medications:      Medication List        START taking these medications      lurasidone 60 MG Tabs tablet  Commonly known as: LATUDA  Take 1 tablet by mouth Daily with supper  Notes to patient: Mood stabilization     prazosin 1 MG capsule  Commonly known as: MINIPRESS  Take 1 capsule by mouth nightly  Notes to patient: High blood pressure     predniSONE 20 MG tablet  Commonly known as: DELTASONE  Take 1 tablet by mouth daily for 1 dose  Notes to patient: steroid            CHANGE how you take these medications      lithium 300 MG extended release tablet  Commonly known as: LITHOBID  Take 2 tablets by mouth every 12 hours  What changed:   medication strength  how much to take  Notes to patient: Mood stabilization            CONTINUE taking these medications      Cariprazine HCl 6 MG Caps capsule  Commonly known as: VRAYLAR  Take 1 capsule by mouth daily  Notes to patient: Mood stabilization     fluticasone 50 MCG/ACT nasal spray  Commonly known as: FLONASE  1 spray by Each Nostril route daily  Notes to patient: allergies     hydroCHLOROthiazide 25 MG tablet  Commonly known as: HYDRODIURIL  Take 1 tablet by mouth daily  Notes to patient: High blood pressure     hydrOXYzine HCl 50 MG tablet  Commonly known as: ATARAX  Take 1 tablet by mouth 3 times daily as needed for Anxiety  Notes to patient: anxiety     melatonin 3 MG Tabs tablet  Notes to patient: Sleep aid            STOP taking these medications      benztropine 0.5 MG tablet  Commonly known as: COGENTIN     cloNIDine 0.1 MG tablet  Commonly known as: CATAPRES               Where to Get Your Medications        These medications were sent to [facility] [place]   [address], [address] [address]      Phone: [phone]   Cariprazine HCl 6 MG Caps capsule  hydrOXYzine HCl 50 MG tablet  lithium 300 MG extended release tablet  lurasidone 60 MG Tabs tablet  prazosin 1 MG capsule  predniSONE 20 MG tablet         [facility] Up App[facility]ment[facility] [address] [facility] [facility] [phone]    Follow up on 11/30/2022  You are scheduled at 3:15 PM

## 2022-11-22 NOTE — BH NOTE
Patient given tobacco quitline number [phone] at this time, refusing to call at this time, states \" I just dont want to quit now\"- patient given information as to the dangers of long term tobacco use. Continue to reinforce the importance of tobacco cessation.

## 2022-11-22 NOTE — DISCHARGE SUMMARY
Provider Discharge Summary     Patient ID:  Adan Dee  [id number]  16 y.o.  1980    Admit date: 11/12/2022    Discharge date and time: 11/22/2022  2:07 PM     Admitting Physician: Clinton Garsia MD     Discharge Physician: Clinton Garsia MD    Admission Diagnoses: Depression with suicidal ideation [F32. Juice Patino    Discharge Diagnoses:      Bipolar affective disorder, mixed, severe, with psychotic behavior [facility])     Patient Active Problem List   Diagnosis Code    Pregnancy Z34.90    Hx CS x1 (G2-twins,36wks) Z98.891    H/O miscarriage X2 O09.299    Insufficient prenatal care O09.30    AMA O09.529    Smoker F17.200    OCD F42.9    Candida albicans infection B37.9    Twin gestation, 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 BXX0    Chromosomal 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, affecting management of mother, antepartum O35.10X0    Poor fetal growth, affecting management of mother, antepartum condition or complication F03.0812    Umbilical cord complication T47. 9XX0    Tobacco use disorder complicating pregnancy, childbirth, or puerperium, antepartum O99.330    Twin B-IUGR O36.5920    Twin B-Fetal cardiomegaly, pericardial effusion, VSD O35. BXX0    Polyhydramnios (twin A-8.6cm and twin B-8.7cm) O40. 2XX0    Depression F32. A    Twin B-MCAs wnl, absent UADs O69. 9XX0    Abnl Quad (1:50 T21), elev msAFP O35.10X0    Hx D&C (G3-8wks, G4-8wks) Z98.890    Elev 1hr gtt, normal 3hr R73.09    GBS bacteriuria R82.71    Chlamydia infection (SPENSER neg) O98.811, A74.9    FHx DM  Z83.3    Fetal pericardial effusion affecting management of mother O36.8990    Polyhydramnios, antepartum complication U91. 9XX0    Polyhydramnios, antepartum complication Y39. 9XX0    IUGR (intrauterine growth restriction) affecting care of mother V79.3407    Umbilical cord complication W99. 9XX0    Suspected damage to fetus from other disease in mother, affecting management of mother, antepartum condition or complication Q41. 8XX0    Suspected damage to fetus from other disease in mother, affecting management of mother, antepartum condition or complication L77. 8XX0    8/16/16 RLTCS @29w2d M APG Wt /M APG Wt Z98.891    Hyperglycemia R73.9    Bipolar 1 disorder (HCC) F31.9    Bipolar 1 disorder, manic, moderate (HCC) F31.12    Micaela ([address] F30.9    Psychosis (HCC) F29    Bipolar affective disorder, mixed, severe, with psychotic behavior ([address] F31.64    Depression with suicidal ideation F32. A, R45.851        Admission Condition: poor    Discharged Condition: stable    Indication for Admission: threat to self    History of Present Illnes (present tense wording is of findings from admission exam and are not necessarily indicative of current findings):   Kinza Freeman is a 38 y.o. female who has a past medical history of mental illness who presents to the ER with increased suicidal ideation with a plan to drown herself. Patient is also displaying many behaviors consistent with micaela. She was hyper-verbal and had difficulty with remaining on topic while in the ER. Patient was discharged from this facility 4 days ago on 11/8/22. Delfina Armendariz is agreeable to interview in unit sensory room today. She is extremely hyper-verbal and speaking so quickly she is hard to understand at times and seems to be mumbling. When asked where patient went when she left the hospital she states that she is \"unsure\" however then states, \"[place] but that was bad for me. \" She is extremely disorganized and hard to engage in meaningful conversation due to her significant thought disorganization. She is extremely restless and is moving about the unit sensory room and cannot sit still. Patient has a history of bipolar disorder and is displaying many symptoms consistent with bipolar disorder including disorganization, grandiosity, rapid speech, and is extremely hyperverbal.  She states she has not been sleeping well.   She states that her energy has been high and states, \"My thoughts are just going crazy. \"  She denies current symptoms of depression however is endorsing suicidal ideation with a plan to [facility] into water and drown myself. \"  She denies homicidal ideation. She would be extremely unsafe out of the hospital at this time and would likely decompensate due to medication noncompliance. She states that she was taking her medications after she left however in the next breath states she was not. Unsure if patient was compliant with medications after leaving the hospital on Tuesday. She denies symptoms of psychosis including auditory and visual hallucinations. She denies paranoia. She does endorse symptoms of anxiety including excess worry, feeling restless and on edge, and muscle tension from anxiety. She states that she has been having panic attacks however cannot describe symptoms of panic attacks or how often she has them. At this time, interview is terminated as patient is struggling to stay on topic, has flight of ideas, and significant thought disorganization. Nathalie Cunningham is not showing stability of symptoms and requires inpatient hospitalization for safety and stability. Hospital Course:   Upon admission, Kimberly Brown was provided a safe secure environment, introduced to unit milieu. Patient participated in groups and individual therapies. Meds were adjusted as noted below. After few days of hospital care, patient began to feel improvement. Depression lifted, thoughts to harm self ceased. Sleep improved, appetite was good. On morning rounds 11/22/2022, Kimberly Brown endorses feeling ready for discharge. Patient denies suicidal or homicidal ideations, denies hallucinations or delusions. Denies SE's from meds. It was decided that maximum benefit from hospital care had been achieved and patient can be discharged.      Consults:   none    Significant Diagnostic Studies: Routine labs and diagnostics    Treatments: Psychotropic medications, therapy with group, milieu, and 1:1 with nurses, social workers, PAMARY GRACE/CNP, and Attending physician. Discharge Medications:  Discharge Medication List as of 11/22/2022  7:37 AM        START taking these medications    Details   prazosin (MINIPRESS) 1 MG capsule Take 1 capsule by mouth nightly, Disp-30 capsule, R-0Normal      lurasidone (LATUDA) 60 MG TABS tablet Take 1 tablet by mouth Daily with supper, Disp-30 tablet, R-0Normal      predniSONE (DELTASONE) 20 MG tablet Take 1 tablet by mouth daily for 1 dose, Disp-1 tablet, R-0Normal           CONTINUE these medications which have CHANGED    Details   hydrOXYzine HCl (ATARAX) 50 MG tablet Take 1 tablet by mouth 3 times daily as needed for Anxiety, Disp-30 tablet, R-0Normal      cariprazine hcl (VRAYLAR) 6 MG CAPS capsule Take 1 capsule by mouth daily, Disp-30 capsule, R-0Normal      lithium (LITHOBID) 300 MG extended release tablet Take 2 tablets by mouth every 12 hours, Disp-120 tablet, R-0Normal           CONTINUE these medications which have NOT CHANGED    Details   fluticasone (FLONASE) 50 MCG/ACT nasal spray 1 spray by Each Nostril route daily, Disp-16 g, R-3Normal      hydroCHLOROthiazide (HYDRODIURIL) 25 MG tablet Take 1 tablet by mouth daily, Disp-30 tablet, R-0Normal      melatonin 3 MG TABS tablet Take 10 mg by mouth nightly as neededHistorical Med           STOP taking these medications       benztropine (COGENTIN) 0.5 MG tablet Comments:   Reason for Stopping:         cloNIDine (CATAPRES) 0.1 MG tablet Comments:   Reason for Stopping:                Core Measures statement: This patient has orders for more than one antipsychotic medication for the following reason: The patient is being transitioned to a new antipsychotic medication and there will be a period of \"cross-over\" until the new medication dose is at a therapeutic dosage.     Discharge Exam:  Level of consciousness:  Within normal limits  Appearance: Street clothes, seated, with good grooming  Behavior/Motor: No abnormalities noted  Attitude toward examiner:  Cooperative, attentive, good eye contact  Speech:  spontaneous, normal rate, normal volume and well articulated  Mood:  euthymic  Affect:  Full range  Thought processes:  linear, goal directed and coherent  Thought content:  denies homicidal ideation  Suicidal Ideation:  denies suicidal ideation  Delusions:  no evidence of delusions  Perceptual Disturbance:  denies any perceptual disturbance  Cognition:  Intact  Memory: age appropriate  Insight & Judgement: fair  Medication side effects: denies     Disposition: home    Patient Instructions: Activity: activity as tolerated  1. Patient instructed to take medications regularly and follow up with outpatient appointments. Follow-up as scheduled with [facility]       Signed:    Electronically signed by Yu Candelaria MD on 11/22/22 at 2:07 PM EST    Time Spent on discharge is more than 35 minutes in the examination, evaluation, counseling and review of medications and discharge plan.

## 2022-11-22 NOTE — CONSULTS
[facility] Internal Medicine  Susan Momin MD; Yakov Hernandez MD; Nila Monte MD; MD Bassam Salazar MD; MD [facility]   [address] / HISTORY AND PHYSICAL EXAMINATION            Date:   2022  Patient name:  Adan Dee  Date of admission:  2022  4:21 AM  MRN:   [id number]  Account:  [de-identified]  YOB: 1980  PCP:    Min Rojo PA-C  Room:   [address] [place] [address]  Code Status:    Full Code    Physician Requesting Consult: Clinton Garsia, *    Reason for Consult:  rash      Chief Complaint:     Chief Complaint   Patient presents with    Mental Health Problem   Rash in multiple places    History Obtained From:     Patient medical record nursing    History of Present Illness:     42-year-old lady with history of for hypertension hypokalemia complains of rash in multiple places including groin bilateral armpits is itchy denies any fever chills unsure was the cause she thinks it could be the soap  Patient examined in the presence of the nurse Bam Angulo    Past Medical History:     Past Medical History:   Diagnosis Date    Anxiety     Bipolar 1 disorder ([facility]     Depression     Gestational diabetes 2016    OCD (obsessive compulsive disorder)     PTSD (post-traumatic stress disorder)     Rapid rate of speech     Schizoaffective disorder ([facility]         Past Surgical History:     Past Surgical History:   Procedure Laterality Date    ABDOMEN SURGERY       SECTION  2007    LAPAROSCOPY          Medications Prior to Admission:     Prior to Admission medications    Medication Sig Start Date End Date Taking?  Authorizing Provider   hydrOXYzine HCl (ATARAX) 50 MG tablet Take 1 tablet by mouth 3 times daily as needed for Anxiety 22 Yes Clinton Garsia MD   prazosin (MINIPRESS) 1 MG capsule Take 1 capsule by mouth nightly 11/21/22  Yes Micheline Hutchinson MD   cariprazine hcl (VRAYLAR) 6 MG CAPS capsule Take 1 capsule by mouth daily 11/21/22  Yes Micheline Hutchinson MD   lithium (LITHOBID) 300 MG extended release tablet Take 2 tablets by mouth every 12 hours 11/21/22  Yes Micheline Hutchinson MD   lurasidone (LATUDA) 60 MG TABS tablet Take 1 tablet by mouth Daily with supper 11/21/22  Yes Micheline Hutchinson MD   predniSONE (DELTASONE) 20 MG tablet Take 1 tablet by mouth daily for 1 dose 11/22/22 11/23/22 Yes Micheline Hutchinson MD   fluticasone (FLONASE) 50 MCG/ACT nasal spray 1 spray by Each Nostril route daily 11/9/22   Susan Odonnell MD   hydroCHLOROthiazide (HYDRODIURIL) 25 MG tablet Take 1 tablet by mouth daily 11/8/22   Susan Odonnell MD   melatonin 3 MG TABS tablet Take 10 mg by mouth nightly as needed    Historical Provider, MD        Allergies:     Abilify [aripiprazole], Codeine, Depakote er [divalproex sodium er], Gabapentin, Lamictal [lamotrigine], Percocet [oxycodone-acetaminophen], Quetiapine fumarate, Tegretol [carbamazepine], Trileptal [oxcarbazepine], Valproic acid, Ziprasidone hcl, and Zyprexa [olanzapine]    Social History:     Tobacco:    reports that she has been smoking. She has been smoking an average of 1 pack per day. She has never used smokeless tobacco.  Alcohol:      reports no history of alcohol use. Drug Use:  reports current drug use. Drug: Marijuana Kenard Snooks). Family History:     Family History   Problem Relation Age of Onset    Diabetes Sister     No Known Problems Mother     No Known Problems Father        Review of Systems:     Positive and Negative as described in HPI. CONSTITUTIONAL:  negative for fevers, chills, sweats, fatigue, weight loss  HEENT:  negative for vision, hearing changes, runny nose, throat pain  RESPIRATORY:  negative for shortness of breath, cough, congestion, wheezing. CARDIOVASCULAR:  negative for chest pain, palpitations.   GASTROINTESTINAL:  negative for nausea, vomiting, diarrhea, constipation, change in bowel habits, abdominal pain   GENITOURINARY:  negative for difficulty of urination, burning with urination, frequency   INTEGUMENT: Rash in multiple places HEMATOLOGIC/LYMPHATIC:  negative for swelling/edema   ALLERGIC/IMMUNOLOGIC:  negative for urticaria , itching  ENDOCRINE:  negative increase in drinking, increase in urination, hot or cold intolerance  MUSCULOSKELETAL:  negative joint pains, muscle aches, swelling of joints  NEUROLOGICAL:  negative for headaches, dizziness, lightheadedness, numbness, pain, tingling extremities      Physical Exam:     /79   Pulse 87   Temp 97.9 °F (36.6 °C) (Oral)   Resp 12   Ht 5' 7\" (1.702 m)   Wt 180 lb (81.6 kg)   SpO2 98%   BMI 28.19 kg/m²   Temp (24hrs), Av.9 °F (36.6 °C), Min:97.9 °F (36.6 °C), Max:97.9 °F (36.6 °C)    No results for input(s): POCGLU in the last 72 hours. No intake or output data in the 24 hours ending 22    General Appearance:  alert, well appearing, and in no acute distress  Mental status: oriented to person, place, and time with normal affect  Head:  normocephalic, atraumatic. Eye: no icterus, redness, pupils equal and reactive, extraocular eye movements intact, conjunctiva clear  Ear: normal external ear, no discharge, hearing intact  Nose:  no drainage noted  Mouth: mucous membranes moist  Neck: supple, no carotid bruits, thyroid not palpable  Lungs: Bilateral equal air entry, clear to ausculation, no wheezing, rales or rhonchi, normal effort  Cardiovascular: normal rate, regular rhythm, no murmur, gallop, rub.   Abdomen: Soft, nontender, nondistended, normal bowel sounds, no hepatomegaly or splenomegaly  Neurologic: There are no new focal motor or sensory deficits, normal muscle tone and bulk, no abnormal sensation, normal speech, cranial nerves II through XII grossly intact  Skin: Macular red rash noted in the inner side of the thighs armpits  No lymphadenopathy noted  Extremities:  peripheral pulses palpable, no pedal edema or calf pain with palpation    Laboratory Testing:  No results found for this or any previous visit (from the past 24 hour(s)). Imaging/Diagonstics:  No results found. Assessment :      Hospital Problems             Last Modified POA    * (Principal) Bipolar affective disorder, mixed, severe, with psychotic behavior ([facility] [address] 11/12/2022 Yes    Depression with suicidal ideation 11/12/2022 Yes   Plan:     57-year-old lady with history of for hypertension hypokalemia complains of rash in multiple places including groin bilateral armpits is itchy denies any fever chills unsure was the cause she thinks it could be the soap  Patient examined in the presence of the nurse Moni Massed rash in multiple places likely allergic in nature  Possible soap  Advised to shower without soap we will do a trial of steroid and Benadryl symptomatic treatment    Nov 21  Pt claims rash better  Better itching  Will sign off please call as needed  Consultations:   06 Siria Morgan MD  11/21/2022  7:23 PM    Copy sent to Dr. Tosin Rizo PALillyC    Please note that this chart was generated using voice recognition Dragon dictation software. Although every effort was made to ensure the accuracy of this automated transcription, some errors in transcription may have occurred.

## 2022-11-26 ENCOUNTER — APPOINTMENT (OUTPATIENT)
Dept: CT IMAGING | Age: 42
End: 2022-11-26
Payer: COMMERCIAL

## 2022-11-26 ENCOUNTER — APPOINTMENT (OUTPATIENT)
Dept: GENERAL RADIOLOGY | Age: 42
End: 2022-11-26
Payer: COMMERCIAL

## 2022-11-26 ENCOUNTER — HOSPITAL ENCOUNTER (EMERGENCY)
Age: 42
Discharge: HOME | End: 2022-11-26
Attending: EMERGENCY MEDICINE
Payer: COMMERCIAL

## 2022-11-26 VITALS
RESPIRATION RATE: 14 BRPM | SYSTOLIC BLOOD PRESSURE: 141 MMHG | HEART RATE: 85 BPM | DIASTOLIC BLOOD PRESSURE: 89 MMHG | OXYGEN SATURATION: 97 % | TEMPERATURE: 98.3 F

## 2022-11-26 DIAGNOSIS — R42 DIZZINESS: Primary | ICD-10-CM

## 2022-11-26 DIAGNOSIS — N39.0 URINARY TRACT INFECTION WITHOUT HEMATURIA, SITE UNSPECIFIED: ICD-10-CM

## 2022-11-26 LAB
ABSOLUTE EOS #: 0.4 K/UL (ref 0–0.4)
ABSOLUTE LYMPH #: 1.8 K/UL (ref 1–4.8)
ABSOLUTE MONO #: 0.6 K/UL (ref 0.1–1.3)
ALBUMIN SERPL-MCNC: 4.5 G/DL (ref 3.5–5.2)
ALP BLD-CCNC: 121 U/L (ref 35–104)
ALT SERPL-CCNC: 27 U/L (ref 5–33)
ANION GAP SERPL CALCULATED.3IONS-SCNC: 10 MMOL/L (ref 9–17)
AST SERPL-CCNC: 21 U/L
BACTERIA: NORMAL
BASOPHILS # BLD: 0 % (ref 0–2)
BASOPHILS ABSOLUTE: 0.1 K/UL (ref 0–0.2)
BILIRUB SERPL-MCNC: 0.2 MG/DL (ref 0.3–1.2)
BILIRUBIN DIRECT: <0.1 MG/DL
BILIRUBIN URINE: NEGATIVE
BILIRUBIN, INDIRECT: ABNORMAL MG/DL (ref 0–1)
BUN BLDV-MCNC: 10 MG/DL (ref 6–20)
CALCIUM SERPL-MCNC: 9.9 MG/DL (ref 8.6–10.4)
CASTS UA: NORMAL /LPF
CHLORIDE BLD-SCNC: 106 MMOL/L (ref 98–107)
CO2: 26 MMOL/L (ref 20–31)
COLOR: YELLOW
CREAT SERPL-MCNC: 0.69 MG/DL (ref 0.5–0.9)
EOSINOPHILS RELATIVE PERCENT: 3 % (ref 0–4)
EPITHELIAL CELLS UA: NORMAL /HPF
GFR SERPL CREATININE-BSD FRML MDRD: >60 ML/MIN/1.73M2
GLUCOSE BLD-MCNC: 98 MG/DL (ref 70–99)
GLUCOSE URINE: NEGATIVE
HCG QUANTITATIVE: <1 MIU/ML
HCT VFR BLD CALC: 41.3 % (ref 36–46)
HEMOGLOBIN: 13.1 G/DL (ref 12–16)
INFLUENZA A: NOT DETECTED
INFLUENZA B: NOT DETECTED
INR BLD: 0.9
KETONES, URINE: NEGATIVE
LEUKOCYTE ESTERASE, URINE: ABNORMAL
LIPASE: 74 U/L (ref 13–60)
LYMPHOCYTES # BLD: 15 % (ref 24–44)
MAGNESIUM: 2.3 MG/DL (ref 1.6–2.6)
MCH RBC QN AUTO: 27.2 PG (ref 26–34)
MCHC RBC AUTO-ENTMCNC: 31.7 G/DL (ref 31–37)
MCV RBC AUTO: 85.6 FL (ref 80–100)
MONOCYTES # BLD: 5 % (ref 1–7)
NITRITE, URINE: NEGATIVE
PARTIAL THROMBOPLASTIN TIME: 28 SEC (ref 24–36)
PDW BLD-RTO: 14.2 % (ref 11.5–14.9)
PH UA: 7.5 (ref 5–8)
PHOSPHORUS: 3.5 MG/DL (ref 2.6–4.5)
PLATELET # BLD: 307 K/UL (ref 150–450)
PMV BLD AUTO: 8.5 FL (ref 6–12)
POTASSIUM SERPL-SCNC: 4 MMOL/L (ref 3.7–5.3)
PROTEIN UA: NEGATIVE
PROTHROMBIN TIME: 12.2 SEC (ref 11.8–14.6)
RBC # BLD: 4.83 M/UL (ref 4–5.2)
RBC UA: NORMAL /HPF
SARS-COV-2 RNA, RT PCR: NOT DETECTED
SEG NEUTROPHILS: 77 % (ref 36–66)
SEGMENTED NEUTROPHILS ABSOLUTE COUNT: 9.6 K/UL (ref 1.3–9.1)
SODIUM BLD-SCNC: 142 MMOL/L (ref 135–144)
SOURCE: NORMAL
SPECIFIC GRAVITY UA: 1.01 (ref 1–1.03)
SPECIMEN DESCRIPTION: NORMAL
TOTAL PROTEIN: 7 G/DL (ref 6.4–8.3)
TROPONIN, HIGH SENSITIVITY: 7 NG/L (ref 0–14)
TROPONIN, HIGH SENSITIVITY: 8 NG/L (ref 0–14)
TURBIDITY: CLEAR
URINE HGB: NEGATIVE
UROBILINOGEN, URINE: NORMAL
WBC # BLD: 12.5 K/UL (ref 3.5–11)
WBC UA: NORMAL /HPF

## 2022-11-26 PROCEDURE — 84484 ASSAY OF TROPONIN QUANT: CPT

## 2022-11-26 PROCEDURE — 80048 BASIC METABOLIC PNL TOTAL CA: CPT

## 2022-11-26 PROCEDURE — 73620 X-RAY EXAM OF FOOT: CPT

## 2022-11-26 PROCEDURE — 84100 ASSAY OF PHOSPHORUS: CPT

## 2022-11-26 PROCEDURE — 85025 COMPLETE CBC W/AUTO DIFF WBC: CPT

## 2022-11-26 PROCEDURE — 87086 URINE CULTURE/COLONY COUNT: CPT

## 2022-11-26 PROCEDURE — 81001 URINALYSIS AUTO W/SCOPE: CPT

## 2022-11-26 PROCEDURE — 83690 ASSAY OF LIPASE: CPT

## 2022-11-26 PROCEDURE — 85610 PROTHROMBIN TIME: CPT

## 2022-11-26 PROCEDURE — 99285 EMERGENCY DEPT VISIT HI MDM: CPT

## 2022-11-26 PROCEDURE — 93005 ELECTROCARDIOGRAM TRACING: CPT | Performed by: EMERGENCY MEDICINE

## 2022-11-26 PROCEDURE — 80076 HEPATIC FUNCTION PANEL: CPT

## 2022-11-26 PROCEDURE — 87636 SARSCOV2 & INF A&B AMP PRB: CPT

## 2022-11-26 PROCEDURE — 70450 CT HEAD/BRAIN W/O DYE: CPT

## 2022-11-26 PROCEDURE — 84702 CHORIONIC GONADOTROPIN TEST: CPT

## 2022-11-26 PROCEDURE — 36415 COLL VENOUS BLD VENIPUNCTURE: CPT

## 2022-11-26 PROCEDURE — 85730 THROMBOPLASTIN TIME PARTIAL: CPT

## 2022-11-26 PROCEDURE — 83735 ASSAY OF MAGNESIUM: CPT

## 2022-11-26 PROCEDURE — 71045 X-RAY EXAM CHEST 1 VIEW: CPT

## 2022-11-26 RX ORDER — CEPHALEXIN 500 MG/1
500 CAPSULE ORAL 2 TIMES DAILY
Qty: 8 CAPSULE | Refills: 0 | Status: ON HOLD | OUTPATIENT
Start: 2022-11-26 | End: 2022-12-02 | Stop reason: HOSPADM

## 2022-11-26 ASSESSMENT — ENCOUNTER SYMPTOMS
EYE PAIN: 0
VISUAL CHANGE: 1
CHEST TIGHTNESS: 0
COLOR CHANGE: 0
VOMITING: 0
ABDOMINAL PAIN: 0
BACK PAIN: 0
SHORTNESS OF BREATH: 0
NAUSEA: 1
FACIAL SWELLING: 0
TROUBLE SWALLOWING: 0
VOICE CHANGE: 0
PHOTOPHOBIA: 0

## 2022-11-26 ASSESSMENT — PAIN - FUNCTIONAL ASSESSMENT: PAIN_FUNCTIONAL_ASSESSMENT: NONE - DENIES PAIN

## 2022-11-26 ASSESSMENT — LIFESTYLE VARIABLES
HOW MANY STANDARD DRINKS CONTAINING ALCOHOL DO YOU HAVE ON A TYPICAL DAY: PATIENT DOES NOT DRINK
HOW OFTEN DO YOU HAVE A DRINK CONTAINING ALCOHOL: NEVER

## 2022-11-26 NOTE — ED PROVIDER NOTES
EMERGENCY DEPARTMENT ENCOUNTER    Pt Name: Emperatriz Monsivais  MRN: [id number]  [place] 1980  Date of evaluation: 11/26/22  CHIEF COMPLAINT       Chief Complaint   Patient presents with    Dizziness     HISTORY OF PRESENT ILLNESS   68-year-old female presenting to the ER complaining of dizziness and nausea. Patient states that symptoms started today when she went for a walk to [place]. Patient is also complaining of a headache. Patient also states that her  stepped on both of her feet and is worried about an injury to her toes. The history is provided by the patient. Dizziness  Quality:  Head spinning  Severity:  Moderate  Onset quality:  Gradual  Timing:  Constant  Chronicity:  New  Associated symptoms: headaches, nausea and vision changes    Associated symptoms: no chest pain, no palpitations, no shortness of breath and no vomiting          REVIEW OF SYSTEMS     Review of Systems   Constitutional:  Negative for activity change, appetite change and fatigue. HENT:  Negative for facial swelling, trouble swallowing and voice change. Eyes:  Negative for photophobia and pain. Respiratory:  Negative for chest tightness and shortness of breath. Cardiovascular:  Negative for chest pain and palpitations. Gastrointestinal:  Positive for nausea. Negative for abdominal pain and vomiting. Genitourinary:  Negative for dysuria and urgency. Musculoskeletal:  Positive for arthralgias (1st digit of both feet). Negative for back pain. Skin:  Negative for color change and rash. Neurological:  Positive for dizziness and headaches. Negative for syncope. Psychiatric/Behavioral:  Negative for behavioral problems and hallucinations.     PASTMEDICAL HISTORY     Past Medical History:   Diagnosis Date    Anxiety     Bipolar 1 disorder ([facility] 1999    Depression     Gestational diabetes 7/22/2016    OCD (obsessive compulsive disorder) 1999    PTSD (post-traumatic stress disorder)     Rapid rate of speech     Schizoaffective disorder [facility])      Past Problem List  Patient Active Problem List   Diagnosis Code    Pregnancy Z34.90    Hx CS x1 (G2-twins,36wks) Z98.891    H/O miscarriage X2 O09.299    Insufficient prenatal care O09.30    AMA O09.529    Smoker F17.200    OCD F42.9    Candida albicans infection B37.9    Twin gestation, 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 BXX0    Chromosomal 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, affecting management of mother, antepartum O35.10X0    Poor fetal growth, affecting management of mother, antepartum condition or complication X41.8648    Umbilical cord complication W07. 9XX0    Tobacco use disorder complicating pregnancy, childbirth, or puerperium, antepartum O99.330    Twin B-IUGR O36.5920    Twin B-Fetal cardiomegaly, pericardial effusion, VSD O35. BXX0    Polyhydramnios (twin A-8.6cm and twin B-8.7cm) O40. 2XX0    Depression F32. A    Twin B-MCAs wnl, absent UADs O69. 9XX0    Abnl Quad (1:50 T21), elev msAFP O35.10X0    Hx D&C (G3-8wks, G4-8wks) Z98.890    Elev 1hr gtt, normal 3hr R73.09    GBS bacteriuria R82.71    Chlamydia infection (SPENSER neg) O98.811, A74.9    FHx DM  Z83.3    Fetal pericardial effusion affecting management of mother O36.8990    Polyhydramnios, antepartum complication Q82. 9XX0    Polyhydramnios, antepartum complication F17. 9XX0    IUGR (intrauterine growth restriction) affecting care of mother R07.9239    Umbilical cord complication L92. 9XX0    Suspected damage to fetus from other disease in mother, affecting management of mother, antepartum condition or complication J43. 8XX0    Suspected damage to fetus from other disease in mother, affecting management of mother, antepartum condition or complication G61. 8XX0    8/16/16 RLTCS @29w2d M APG Wt /M APG Wt Z98.891    Hyperglycemia R73.9    Bipolar 1 disorder (HCC) F31.9    Bipolar 1 disorder, manic, moderate ([address] F31.12    Micaela ([address] F30.9    Psychosis ([facility] F29 Bipolar affective disorder, mixed, severe, with psychotic behavior ([facility] F31.64    Depression with suicidal ideation F32. A, R45.851     SURGICAL HISTORY       Past Surgical History:   Procedure Laterality Date    ABDOMEN SURGERY       SECTION      LAPAROSCOPY       CURRENT MEDICATIONS       Discharge Medication List as of 2022  3:31 PM        CONTINUE these medications which have NOT CHANGED    Details   hydrOXYzine HCl (ATARAX) 50 MG tablet Take 1 tablet by mouth 3 times daily as needed for Anxiety, Disp-30 tablet, R-0Normal      prazosin (MINIPRESS) 1 MG capsule Take 1 capsule by mouth nightly, Disp-30 capsule, R-0Normal      cariprazine hcl (VRAYLAR) 6 MG CAPS capsule Take 1 capsule by mouth daily, Disp-30 capsule, R-0Normal      lithium (LITHOBID) 300 MG extended release tablet Take 2 tablets by mouth every 12 hours, Disp-120 tablet, R-0Normal      lurasidone (LATUDA) 60 MG TABS tablet Take 1 tablet by mouth Daily with supper, Disp-30 tablet, R-0Normal      fluticasone (FLONASE) 50 MCG/ACT nasal spray 1 spray by Each Nostril route daily, Disp-16 g, R-3Normal      hydroCHLOROthiazide (HYDRODIURIL) 25 MG tablet Take 1 tablet by mouth daily, Disp-30 tablet, R-0Normal      melatonin 3 MG TABS tablet Take 10 mg by mouth nightly as neededHistorical Med           ALLERGIES     is allergic to abilify [aripiprazole], codeine, depakote er [divalproex sodium er], gabapentin, lamictal [lamotrigine], percocet [oxycodone-acetaminophen], quetiapine fumarate, tegretol [carbamazepine], trileptal [oxcarbazepine], valproic acid, ziprasidone hcl, and zyprexa [olanzapine]. FAMILY HISTORY     She indicated that her mother is alive. She indicated that her father is . She indicated that the status of her sister is unknown.      SOCIAL HISTORY       Social History     Tobacco Use    Smoking status: Every Day     Packs/day: 1.00     Types: Cigarettes    Smokeless tobacco: Never    Tobacco comments:     NO medication ordered d/t pregnancy    Vaping Use    Vaping Use: Former    Substances: Always   Substance Use Topics    Alcohol use: No     Alcohol/week: 0.0 standard drinks    Drug use: Yes     Types: Marijuana (Weed)     PHYSICAL EXAM     INITIAL VITALS: BP (!) 141/89   Pulse 85   Temp 98.3 °F (36.8 °C) (Oral)   Resp 14   SpO2 97%    Physical Exam  Vitals reviewed. Constitutional:       General: She is not in acute distress. Appearance: Normal appearance. She is not ill-appearing or toxic-appearing. HENT:      Head: Normocephalic and atraumatic. Right Ear: External ear normal.      Left Ear: External ear normal.      Nose: No congestion or rhinorrhea. Eyes:      Extraocular Movements: Extraocular movements intact. Pupils: Pupils are equal, round, and reactive to light. Cardiovascular:      Rate and Rhythm: Normal rate and regular rhythm. Pulses: Normal pulses. Heart sounds: Normal heart sounds. Pulmonary:      Effort: Pulmonary effort is normal. No respiratory distress. Breath sounds: Normal breath sounds. No wheezing. Abdominal:      General: Bowel sounds are normal. There is no distension. Palpations: Abdomen is soft. Tenderness: There is no abdominal tenderness. Musculoskeletal:         General: No deformity or signs of injury. Normal range of motion. Cervical back: No rigidity or tenderness. Skin:     General: Skin is warm and dry. Neurological:      Mental Status: She is alert and oriented to person, place, and time. Mental status is at baseline. Psychiatric:         Mood and Affect: Mood normal.         Behavior: Behavior normal.       MEDICAL DECISION MAKING:   Patient with dizziness and nausea. Cardiac work-up in the ER did not display positive signs of acute cardiac ischemia. EKG was reviewed and interpreted by myself, ED physician. CT imaging in the ER did not display signs of acute abnormality.   The patient was instructed to follow-up with the primary care physician within 2 days and to return to the ER immediately if symptoms worsen or change. Patient understands and agrees with the plan. Patient with signs of a urinary tract infection. Patient provided with a prescription for antibiotic use. CRITICAL CARE:       PROCEDURES:    Procedures    DIAGNOSTIC RESULTS   EKG:All EKG's are interpreted by the Emergency Department Physician who either signs or Co-signs this chart in the absence of a cardiologist.        RADIOLOGY:All plain film, CT, MRI, and formal ultrasound images (except ED bedside ultrasound) are read by the radiologist, see reports below, unless otherwisenoted in MDM or here. CT HEAD WO CONTRAST   Final Result   No acute intracranial abnormality. XR FOOT RIGHT (2 VIEWS)   Final Result   No acute osseous abnormality in the right foot. XR FOOT LEFT (2 VIEWS)   Final Result   No acute osseous abnormality in the left foot. XR CHEST PORTABLE   Final Result   No acute intrathoracic process. LABS: All lab results were reviewed by myself, and all abnormals are listed below.   Labs Reviewed   CBC WITH AUTO DIFFERENTIAL - Abnormal; Notable for the following components:       Result Value    WBC 12.5 (*)     Seg Neutrophils 77 (*)     Lymphocytes 15 (*)     Segs Absolute 9.60 (*)     All other components within normal limits   HEPATIC FUNCTION PANEL - Abnormal; Notable for the following components:    Alkaline Phosphatase 121 (*)     Total Bilirubin 0.2 (*)     All other components within normal limits   LIPASE - Abnormal; Notable for the following components:    Lipase 74 (*)     All other components within normal limits   URINALYSIS WITH REFLEX TO CULTURE - Abnormal; Notable for the following components:    Leukocyte Esterase, Urine SMALL (*)     All other components within normal limits   COVID-19 & INFLUENZA COMBO   CULTURE, URINE   TROPONIN   TROPONIN   APTT   PROTIME-INR   PHOSPHORUS MAGNESIUM   BASIC METABOLIC PANEL   HCG, QUANTITATIVE, PREGNANCY   MICROSCOPIC URINALYSIS       EMERGENCY DEPARTMENTCOURSE:         Vitals:    Vitals:    11/26/22 1121 11/26/22 1601   BP: 121/74 (!) 141/89   Pulse: 80 85   Resp: 17 14   Temp: 98.3 °F (36.8 °C)    TempSrc: Oral    SpO2: 100% 97%       The patient was given the following medications while in the emergency department:  Orders Placed This Encounter   Medications    cephALEXin (KEFLEX) 500 MG capsule     Sig: Take 1 capsule by mouth 2 times daily for 4 days     Dispense:  8 capsule     Refill:  0     CONSULTS:  None    FINAL IMPRESSION      1. Dizziness    2. Urinary tract infection without hematuria, site unspecified          DISPOSITION/PLAN   DISPOSITION Decision To Discharge 11/26/2022 03:30:28 PM      PAT[facility]:  [facility]  [phone]    Schedule an appointment as soon as possible for a visit in 2 days      [facility] ED  [address] [address]  [phone]  Go to   As needed, If symptoms worsen  DISCHARGE MEDICATIONS:  Discharge Medication List as of 11/26/2022  3:31 PM        START taking these medications    Details   cephALEXin (KEFLEX) 500 MG capsule Take 1 capsule by mouth 2 times daily for 4 days, Disp-8 capsule, R-0Print           The care is provided during an unprecedented national emergency due to the novel coronavirus, COVID 19.   DO Aldair Martínez DO  11/26/22 7410

## 2022-11-27 ENCOUNTER — HOSPITAL ENCOUNTER (EMERGENCY)
Age: 42
Discharge: HOME | End: 2022-11-28
Attending: EMERGENCY MEDICINE
Payer: COMMERCIAL

## 2022-11-27 ENCOUNTER — HOSPITAL ENCOUNTER (EMERGENCY)
Age: 42
Discharge: HOME | End: 2022-11-27
Attending: EMERGENCY MEDICINE
Payer: COMMERCIAL

## 2022-11-27 VITALS
WEIGHT: 175 LBS | TEMPERATURE: 97.9 F | DIASTOLIC BLOOD PRESSURE: 75 MMHG | SYSTOLIC BLOOD PRESSURE: 142 MMHG | HEART RATE: 95 BPM | RESPIRATION RATE: 16 BRPM | BODY MASS INDEX: 27.47 KG/M2 | OXYGEN SATURATION: 97 % | HEIGHT: 67 IN

## 2022-11-27 DIAGNOSIS — Z71.1 FEARED CONDITION NOT DEMONSTRATED: Primary | ICD-10-CM

## 2022-11-27 DIAGNOSIS — F32.9 MAJOR DEPRESSIVE DISORDER WITH CURRENT ACTIVE EPISODE, UNSPECIFIED DEPRESSION EPISODE SEVERITY, UNSPECIFIED WHETHER RECURRENT: Primary | ICD-10-CM

## 2022-11-27 LAB
CULTURE: NORMAL
SPECIMEN DESCRIPTION: NORMAL

## 2022-11-27 PROCEDURE — 99283 EMERGENCY DEPT VISIT LOW MDM: CPT

## 2022-11-27 PROCEDURE — 99285 EMERGENCY DEPT VISIT HI MDM: CPT
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
## 2022-11-27 NOTE — ED TRIAGE NOTES
Mode of arrival (squad #, walk in, police, etc) : walk in         Chief complaint(s): SI         Arrival Note (brief scenario, treatment PTA, etc). : states she is off her meds and has SI         C= \"Have you ever felt that you should Cut down on your drinking? \"  No  A= \"Have people Annoyed you by criticizing your drinking? \"  No  G= \"Have you ever felt bad or Guilty about your drinking? \"  No  E= \"Have you ever had a drink as an Eye-opener first thing in the morning to steady your nerves or to help a hangover? \"  No      Deferred []      Reason for deferring: N/A    *If yes to two or more: probable alcohol abuse. *

## 2022-11-28 ENCOUNTER — HOSPITAL ENCOUNTER (EMERGENCY)
Age: 42
Discharge: PSYCHIATRIC OR SUBSTANCE USE FACILITY | End: 2022-11-28
Attending: EMERGENCY MEDICINE
Payer: COMMERCIAL

## 2022-11-28 ENCOUNTER — HOSPITAL ENCOUNTER (INPATIENT)
Age: 42
LOS: 4 days | Discharge: HOME | End: 2022-12-02
Attending: PSYCHIATRY & NEUROLOGY | Admitting: PSYCHIATRY & NEUROLOGY
Payer: COMMERCIAL

## 2022-11-28 VITALS
HEART RATE: 81 BPM | SYSTOLIC BLOOD PRESSURE: 115 MMHG | TEMPERATURE: 98.4 F | RESPIRATION RATE: 14 BRPM | OXYGEN SATURATION: 94 % | DIASTOLIC BLOOD PRESSURE: 77 MMHG

## 2022-11-28 VITALS
RESPIRATION RATE: 18 BRPM | SYSTOLIC BLOOD PRESSURE: 128 MMHG | HEART RATE: 66 BPM | OXYGEN SATURATION: 99 % | TEMPERATURE: 97.5 F | DIASTOLIC BLOOD PRESSURE: 84 MMHG

## 2022-11-28 DIAGNOSIS — R45.851 SUICIDAL IDEATION: Primary | ICD-10-CM

## 2022-11-28 PROBLEM — F23 ACUTE PSYCHOSIS (HCC): Status: ACTIVE | Noted: 2022-11-28

## 2022-11-28 PROBLEM — F31.63 BIPOLAR 1 DISORDER, MIXED, SEVERE (HCC): Status: ACTIVE | Noted: 2022-11-28

## 2022-11-28 LAB
ABSOLUTE EOS #: 0.46 K/UL (ref 0–0.44)
ABSOLUTE IMMATURE GRANULOCYTE: 0.03 K/UL (ref 0–0.3)
ABSOLUTE LYMPH #: 3 K/UL (ref 1.1–3.7)
ABSOLUTE MONO #: 0.79 K/UL (ref 0.1–1.2)
ACETAMINOPHEN LEVEL: <5 UG/ML (ref 10–30)
AMPHETAMINE SCREEN URINE: NEGATIVE
ANION GAP SERPL CALCULATED.3IONS-SCNC: 7 MMOL/L (ref 9–17)
BARBITURATE SCREEN URINE: NEGATIVE
BASOPHILS # BLD: 1 % (ref 0–2)
BASOPHILS ABSOLUTE: 0.06 K/UL (ref 0–0.2)
BENZODIAZEPINE SCREEN, URINE: NEGATIVE
BUN BLDV-MCNC: 13 MG/DL (ref 6–20)
CALCIUM SERPL-MCNC: 9.5 MG/DL (ref 8.6–10.4)
CANNABINOID SCREEN URINE: POSITIVE
CHLORIDE BLD-SCNC: 106 MMOL/L (ref 98–107)
CO2: 27 MMOL/L (ref 20–31)
COCAINE METABOLITE, URINE: NEGATIVE
CREAT SERPL-MCNC: 0.74 MG/DL (ref 0.5–0.9)
EKG ATRIAL RATE: 63 BPM
EKG P AXIS: 37 DEGREES
EKG P-R INTERVAL: 154 MS
EKG Q-T INTERVAL: 404 MS
EKG QRS DURATION: 98 MS
EKG QTC CALCULATION (BAZETT): 413 MS
EKG R AXIS: 33 DEGREES
EKG T AXIS: 54 DEGREES
EKG VENTRICULAR RATE: 63 BPM
EOSINOPHILS RELATIVE PERCENT: 5 % (ref 1–4)
ETHANOL PERCENT: <0.01 %
ETHANOL: <10 MG/DL
FENTANYL URINE: NEGATIVE
GFR SERPL CREATININE-BSD FRML MDRD: >60 ML/MIN/1.73M2
GLUCOSE BLD-MCNC: 119 MG/DL (ref 70–99)
HCG(URINE) PREGNANCY TEST: NEGATIVE
HCT VFR BLD CALC: 38.9 % (ref 36.3–47.1)
HEMOGLOBIN: 12 G/DL (ref 11.9–15.1)
IMMATURE GRANULOCYTES: 0 %
LITHIUM LEVEL: 0.6 MMOL/L (ref 0.6–1.2)
LYMPHOCYTES # BLD: 29 % (ref 24–43)
MCH RBC QN AUTO: 27.8 PG (ref 25.2–33.5)
MCHC RBC AUTO-ENTMCNC: 30.8 G/DL (ref 28.4–34.8)
MCV RBC AUTO: 90.3 FL (ref 82.6–102.9)
METHADONE SCREEN, URINE: NEGATIVE
MONOCYTES # BLD: 8 % (ref 3–12)
NRBC AUTOMATED: 0 PER 100 WBC
OPIATES, URINE: NEGATIVE
OXYCODONE SCREEN URINE: NEGATIVE
PDW BLD-RTO: 14.4 % (ref 11.8–14.4)
PHENCYCLIDINE, URINE: NEGATIVE
PLATELET # BLD: 259 K/UL (ref 138–453)
PMV BLD AUTO: 10.4 FL (ref 8.1–13.5)
POTASSIUM SERPL-SCNC: 4 MMOL/L (ref 3.7–5.3)
RBC # BLD: 4.31 M/UL (ref 3.95–5.11)
SALICYLATE LEVEL: 1 MG/DL (ref 3–10)
SEG NEUTROPHILS: 57 % (ref 36–65)
SEGMENTED NEUTROPHILS ABSOLUTE COUNT: 5.93 K/UL (ref 1.5–8.1)
SODIUM BLD-SCNC: 140 MMOL/L (ref 135–144)
TEST INFORMATION: ABNORMAL
TOXIC TRICYCLIC SC,BLOOD: NEGATIVE
WBC # BLD: 10.3 K/UL (ref 3.5–11.3)

## 2022-11-28 PROCEDURE — 80307 DRUG TEST PRSMV CHEM ANLYZR: CPT

## 2022-11-28 PROCEDURE — 80178 ASSAY OF LITHIUM: CPT

## 2022-11-28 PROCEDURE — 80048 BASIC METABOLIC PNL TOTAL CA: CPT

## 2022-11-28 PROCEDURE — 99222 1ST HOSP IP/OBS MODERATE 55: CPT | Performed by: INTERNAL MEDICINE

## 2022-11-28 PROCEDURE — 6370000000 HC RX 637 (ALT 250 FOR IP): Performed by: INTERNAL MEDICINE

## 2022-11-28 PROCEDURE — APPSS60 APP SPLIT SHARED TIME 46-60 MINUTES: Performed by: NURSE PRACTITIONER

## 2022-11-28 PROCEDURE — 80179 DRUG ASSAY SALICYLATE: CPT

## 2022-11-28 PROCEDURE — 6370000000 HC RX 637 (ALT 250 FOR IP): Performed by: PSYCHIATRY & NEUROLOGY

## 2022-11-28 PROCEDURE — 85025 COMPLETE CBC W/AUTO DIFF WBC: CPT

## 2022-11-28 PROCEDURE — 99223 1ST HOSP IP/OBS HIGH 75: CPT | Performed by: PSYCHIATRY & NEUROLOGY

## 2022-11-28 PROCEDURE — 81025 URINE PREGNANCY TEST: CPT

## 2022-11-28 PROCEDURE — 80143 DRUG ASSAY ACETAMINOPHEN: CPT

## 2022-11-28 PROCEDURE — 1240000000 HC EMOTIONAL WELLNESS R&B

## 2022-11-28 PROCEDURE — G0480 DRUG TEST DEF 1-7 CLASSES: HCPCS

## 2022-11-28 RX ORDER — IBUPROFEN 400 MG/1
400 TABLET ORAL EVERY 6 HOURS PRN
Status: DISCONTINUED | OUTPATIENT
Start: 2022-11-28 | End: 2022-12-02 | Stop reason: HOSPADM

## 2022-11-28 RX ORDER — FLUTICASONE PROPIONATE 50 MCG
1 SPRAY, SUSPENSION (ML) NASAL DAILY
Status: DISCONTINUED | OUTPATIENT
Start: 2022-11-28 | End: 2022-12-02 | Stop reason: HOSPADM

## 2022-11-28 RX ORDER — LITHIUM CARBONATE 300 MG/1
600 TABLET, FILM COATED, EXTENDED RELEASE ORAL EVERY 12 HOURS SCHEDULED
Status: DISCONTINUED | OUTPATIENT
Start: 2022-11-28 | End: 2022-12-02 | Stop reason: HOSPADM

## 2022-11-28 RX ORDER — PALIPERIDONE 3 MG/1
3 TABLET, EXTENDED RELEASE ORAL DAILY
Status: DISCONTINUED | OUTPATIENT
Start: 2022-11-28 | End: 2022-12-02 | Stop reason: HOSPADM

## 2022-11-28 RX ORDER — ACETAMINOPHEN 325 MG/1
650 TABLET ORAL EVERY 6 HOURS PRN
Status: DISCONTINUED | OUTPATIENT
Start: 2022-11-28 | End: 2022-12-02 | Stop reason: HOSPADM

## 2022-11-28 RX ORDER — NITROFURANTOIN 25; 75 MG/1; MG/1
100 CAPSULE ORAL EVERY 12 HOURS SCHEDULED
Status: COMPLETED | OUTPATIENT
Start: 2022-11-28 | End: 2022-12-01

## 2022-11-28 RX ORDER — PRAZOSIN HYDROCHLORIDE 1 MG/1
1 CAPSULE ORAL NIGHTLY
Status: DISCONTINUED | OUTPATIENT
Start: 2022-11-28 | End: 2022-12-02 | Stop reason: HOSPADM

## 2022-11-28 RX ORDER — MAGNESIUM HYDROXIDE/ALUMINUM HYDROXICE/SIMETHICONE 120; 1200; 1200 MG/30ML; MG/30ML; MG/30ML
30 SUSPENSION ORAL EVERY 6 HOURS PRN
Status: DISCONTINUED | OUTPATIENT
Start: 2022-11-28 | End: 2022-12-02 | Stop reason: HOSPADM

## 2022-11-28 RX ORDER — NICOTINE 21 MG/24HR
1 PATCH, TRANSDERMAL 24 HOURS TRANSDERMAL DAILY
Status: DISCONTINUED | OUTPATIENT
Start: 2022-11-28 | End: 2022-12-02 | Stop reason: HOSPADM

## 2022-11-28 RX ORDER — HALOPERIDOL 5 MG/ML
5 INJECTION INTRAMUSCULAR EVERY 6 HOURS PRN
Status: DISCONTINUED | OUTPATIENT
Start: 2022-11-28 | End: 2022-12-02 | Stop reason: HOSPADM

## 2022-11-28 RX ORDER — DIPHENHYDRAMINE HYDROCHLORIDE 50 MG/ML
50 INJECTION INTRAMUSCULAR; INTRAVENOUS EVERY 6 HOURS PRN
Status: DISCONTINUED | OUTPATIENT
Start: 2022-11-28 | End: 2022-12-02 | Stop reason: HOSPADM

## 2022-11-28 RX ORDER — LORAZEPAM 2 MG/ML
2 INJECTION INTRAMUSCULAR EVERY 6 HOURS PRN
Status: DISCONTINUED | OUTPATIENT
Start: 2022-11-28 | End: 2022-12-02 | Stop reason: HOSPADM

## 2022-11-28 RX ORDER — HYDROCHLOROTHIAZIDE 25 MG/1
25 TABLET ORAL DAILY
Status: DISCONTINUED | OUTPATIENT
Start: 2022-11-28 | End: 2022-12-02 | Stop reason: HOSPADM

## 2022-11-28 RX ORDER — TRAZODONE HYDROCHLORIDE 50 MG/1
50 TABLET ORAL NIGHTLY PRN
Status: DISCONTINUED | OUTPATIENT
Start: 2022-11-28 | End: 2022-11-29

## 2022-11-28 RX ORDER — HYDROXYZINE 50 MG/1
50 TABLET, FILM COATED ORAL 3 TIMES DAILY PRN
Status: DISCONTINUED | OUTPATIENT
Start: 2022-11-28 | End: 2022-12-02 | Stop reason: HOSPADM

## 2022-11-28 RX ORDER — POLYETHYLENE GLYCOL 3350 17 G/17G
17 POWDER, FOR SOLUTION ORAL DAILY PRN
Status: DISCONTINUED | OUTPATIENT
Start: 2022-11-28 | End: 2022-12-02 | Stop reason: HOSPADM

## 2022-11-28 RX ADMIN — HYDROXYZINE HYDROCHLORIDE 50 MG: 50 TABLET, FILM COATED ORAL at 12:46

## 2022-11-28 RX ADMIN — NITROFURANTOIN MONOHYDRATE/MACROCRYSTALLINE 100 MG: 25; 75 CAPSULE ORAL at 21:11

## 2022-11-28 RX ADMIN — IBUPROFEN 400 MG: 400 TABLET ORAL at 12:46

## 2022-11-28 RX ADMIN — PALIPERIDONE 3 MG: 3 TABLET, EXTENDED RELEASE ORAL at 12:46

## 2022-11-28 RX ADMIN — HYDROXYZINE HYDROCHLORIDE 50 MG: 50 TABLET, FILM COATED ORAL at 21:12

## 2022-11-28 RX ADMIN — IBUPROFEN 400 MG: 400 TABLET ORAL at 19:19

## 2022-11-28 RX ADMIN — HYDROCHLOROTHIAZIDE 25 MG: 25 TABLET ORAL at 12:46

## 2022-11-28 RX ADMIN — LITHIUM CARBONATE 600 MG: 300 TABLET, FILM COATED, EXTENDED RELEASE ORAL at 21:12

## 2022-11-28 RX ADMIN — TRAZODONE HYDROCHLORIDE 50 MG: 50 TABLET ORAL at 21:12

## 2022-11-28 RX ADMIN — PRAZOSIN HYDROCHLORIDE 1 MG: 1 CAPSULE ORAL at 21:11

## 2022-11-28 ASSESSMENT — ENCOUNTER SYMPTOMS
CONSTIPATION: 0
VOMITING: 0
CHEST TIGHTNESS: 0
ABDOMINAL PAIN: 0
RHINORRHEA: 0
NAUSEA: 0
SORE THROAT: 0
SHORTNESS OF BREATH: 0
SORE THROAT: 0
DIARRHEA: 0
SHORTNESS OF BREATH: 0
NAUSEA: 0
VOMITING: 0
DIARRHEA: 0
COUGH: 0
ABDOMINAL PAIN: 0
CONSTIPATION: 0
RHINORRHEA: 0

## 2022-11-28 ASSESSMENT — PATIENT HEALTH QUESTIONNAIRE - PHQ9: SUM OF ALL RESPONSES TO PHQ QUESTIONS 1-9: 0

## 2022-11-28 ASSESSMENT — LIFESTYLE VARIABLES
HOW MANY STANDARD DRINKS CONTAINING ALCOHOL DO YOU HAVE ON A TYPICAL DAY: PATIENT DOES NOT DRINK
HOW OFTEN DO YOU HAVE A DRINK CONTAINING ALCOHOL: NEVER
HOW OFTEN DO YOU HAVE A DRINK CONTAINING ALCOHOL: NEVER
HOW MANY STANDARD DRINKS CONTAINING ALCOHOL DO YOU HAVE ON A TYPICAL DAY: PATIENT DOES NOT DRINK

## 2022-11-28 ASSESSMENT — PAIN SCALES - GENERAL
PAINLEVEL_OUTOF10: 1
PAINLEVEL_OUTOF10: 4
PAINLEVEL_OUTOF10: 3
PAINLEVEL_OUTOF10: 0
PAINLEVEL_OUTOF10: 8

## 2022-11-28 ASSESSMENT — SLEEP AND FATIGUE QUESTIONNAIRES
DO YOU HAVE DIFFICULTY SLEEPING: NO
AVERAGE NUMBER OF SLEEP HOURS: 4
DO YOU USE A SLEEP AID: NO
SLEEP PATTERN: NORMAL

## 2022-11-28 ASSESSMENT — PAIN DESCRIPTION - LOCATION
LOCATION: BACK;HEAD
LOCATION: BACK

## 2022-11-28 ASSESSMENT — PAIN DESCRIPTION - DESCRIPTORS: DESCRIPTORS: ACHING

## 2022-11-28 NOTE — ED PROVIDER NOTES
[facility] [facility]  Emergency Department  Emergency Medicine Resident Sign-out     Care of Oswaldo Enamorado was assumed from Dr. Pao Hawkins and is being seen for No chief complaint on file. .  The patient's initial evaluation and plan have been discussed with the prior provider who initially evaluated the patient. EMERGENCY DEPARTMENT COURSE / MEDICAL DECISION MAKING:       MEDICATIONS GIVEN:  No orders of the defined types were placed in this encounter. LABS / RADIOLOGY:     Labs Reviewed   CBC WITH AUTO DIFFERENTIAL - Abnormal; Notable for the following components:       Result Value    Eosinophils % 5 (*)     Absolute Eos # 0.46 (*)     All other components within normal limits   BASIC METABOLIC PANEL - Abnormal; Notable for the following components:    Glucose 119 (*)     Anion Gap 7 (*)     All other components within normal limits   TOX SCR, BLD, ED - Abnormal; Notable for the following components:    Acetaminophen Level <5 (*)     Salicylate Lvl 1 (*)     All other components within normal limits   URINE DRUG SCREEN - Abnormal; Notable for the following components:    Cannabinoid Scrn, Ur POSITIVE (*)     All other components within normal limits   PREGNANCY, URINE       XR FOOT LEFT (2 VIEWS)    Result Date: 11/26/2022  EXAMINATION: TWO XRAY VIEWS OF THE LEFT FOOT 11/26/2022 12:29 pm COMPARISON: 11/04/2022 HISTORY: ORDERING SYSTEM PROVIDED HISTORY: injury to 1st digit TECHNOLOGIST PROVIDED HISTORY: injury to 1st digit Reason for Exam: Pt states her  stomped on her feet x 2 weeks ago FINDINGS: Soft tissues are within normal limits. There is no acute fracture or dislocation. Joint spaces are relatively well preserved. No bony erosion. Small plantar calcaneal spur. No acute osseous abnormality in the left foot.      XR FOOT RIGHT (2 VIEWS)    Result Date: 11/26/2022  EXAMINATION: TWO XRAY VIEWS OF THE RIGHT FOOT 11/26/2022 12:28 pm COMPARISON: None HISTORY: ORDERING SYSTEM PROVIDED HISTORY: foot injury TECHNOLOGIST PROVIDED HISTORY: foot injury Reason for Exam: Pt states her  stomped on her feet x 2 weeks ago FINDINGS: Soft tissues are within normal limits. There is no evidence of acute fracture or dislocation. Joint spaces are relatively well preserved. No bony erosion. No acute osseous abnormality in the right foot. XR FOOT LEFT (MIN 3 VIEWS)    Result Date: 11/4/2022  EXAMINATION: THREE XRAY VIEWS OF THE LEFT FOOT 11/4/2022 8:51 pm COMPARISON: None. HISTORY: ORDERING SYSTEM PROVIDED HISTORY: Rule out fracture TECHNOLOGIST PROVIDED HISTORY: Rule out fracture Reason for Exam: Pt. states pain in 1st digit of left foot. Pt. states her foot was stepped on FINDINGS: No fracture or malalignment identified. The joint spaces are maintained. No discrete soft tissue abnormality identified. Small plantar calcaneal spur. No acute osseous abnormality identified. CT HEAD WO CONTRAST    Result Date: 11/26/2022  EXAMINATION: CT OF THE HEAD WITHOUT CONTRAST  11/26/2022 1:13 pm TECHNIQUE: CT of the head was performed without the administration of intravenous contrast. Automated exposure control, iterative reconstruction, and/or weight based adjustment of the mA/kV was utilized to reduce the radiation dose to as low as reasonably achievable. COMPARISON: None. HISTORY: ORDERING SYSTEM PROVIDED HISTORY: dizziness TECHNOLOGIST PROVIDED HISTORY: dizziness Decision Support Exception - unselect if not a suspected or confirmed emergency medical condition->Emergency Medical Condition (MA) Is the patient pregnant?->No Reason for Exam: dizziness FINDINGS: BRAIN/VENTRICLES: There is no acute intracranial hemorrhage, mass effect or midline shift. No abnormal extra-axial fluid collection. The gray-white differentiation is maintained without evidence of an acute infarct. There is no evidence of hydrocephalus.  ORBITS: The visualized portion of the orbits demonstrate no acute abnormality. SINUSES: The visualized paranasal sinuses and mastoid air cells demonstrate no acute abnormality. SOFT TISSUES/SKULL:  No acute abnormality of the visualized skull or soft tissues. No acute intracranial abnormality. XR CHEST PORTABLE    Result Date: 11/26/2022  EXAMINATION: ONE XRAY VIEW OF THE CHEST 11/26/2022 12:28 pm COMPARISON: 01/05/2019 HISTORY: ORDERING SYSTEM PROVIDED HISTORY: dizziness TECHNOLOGIST PROVIDED HISTORY: dizziness Reason for Exam: Dizziness overall not feeling well FINDINGS: Cardiomediastinal silhouette and pulmonary vasculature are within normal limits. No focal airspace consolidation, pneumothorax, or pleural effusion. No free air beneath the diaphragm. No acute osseous abnormality. No acute intrathoracic process. RECENT VITALS:     Temp: 97.5 °F (36.4 °C),  Heart Rate: 66, Resp: 18, BP: 128/84, SpO2: 99 %    This patient is a 38 y.o. Female with depression initially. Patient discharged from the emergency department, was waiting in the lobby when she became erratic, ran to the street, brought back in by police. Obtained on second visit patient now complaining she has suicidal, had plan to walk into cold water and freeze to death. Not homicidal.  Patient medically clear. Social work has evaluated, patient is been accepted to [facility]. Awaiting transport. OUTSTANDING TASKS / RECOMMENDATIONS:    Transported to [facility]     FINAL IMPRESSION:     1. Suicidal ideation        DISPOSITION:         DISPOSITION:  []  Discharge   [x]  Transfer - [facility]   []  Admission -     []  Against Medical Advice   []  Eloped   FOLLOW-UP: No follow-up provider specified.    DISCHARGE MEDICATIONS: Discharge Medication List as of 11/28/2022  8:41 AM              Renee Valdez DO  Emergency Medicine Resident  [place], [place]  Resident  11/28/22 2131

## 2022-11-28 NOTE — PROGRESS NOTES
Behavioral Services  Medicare Certification Upon Admission    I certify that this patient's inpatient psychiatric hospital admission is medically necessary for:    [x] (1) Treatment which could reasonably be expected to improve this patient's condition,       [x] (2) Or for diagnostic study;     AND     [x](2) The inpatient psychiatric services are provided while the individual is under the care of a physician and are included in the individualized plan of care.     Estimated length of stay/service 2-9 days    Plan for post-hospital care -outpatient care    Electronically signed by Donnell Tamez MD on 11/28/2022 at 11:57 AM Discharge Summary    CHIEF COMPLAINT ON ADMISSION  Chief Complaint   Patient presents with   • Syncope       Reason for Admission  EMS     Admission Date  5/3/2019    CODE STATUS  Prior    HPI & HOSPITAL COURSE  This is a 74 y.o. female here with above medical issues.  Patient presented with syncope after working out hard in the morning then giving blood and then working out again after that on minimal food intake.  She was admitted to our hospital and the syncopal work-up was negative and likely is vasovagal related to dehydration.  She returned to her baseline echocardiogram is unremarkable and there is no evidence of arrhythmias overnight.  I informed the patient that she needs to maintain good hydration status and relax a little bit.  She has close follow-up with her primary care physician and cardiologist in the near future.       Therefore, she is discharged in fair and stable condition to home with close outpatient follow-up.        Discharge Date  5/4/2019    FOLLOW UP ITEMS POST DISCHARGE  As noted above    DISCHARGE DIAGNOSES  Principal Problem (Resolved):    Syncope POA: Yes  Active Problems:    Severe mitral regurgitation POA: Yes    Dyslipidemia POA: Yes    Sleep apnea POA: Yes  Resolved Problems:    Cerebrovascular accident (CVA) due to vascular occlusion (HCC) POA: Yes    HTN (hypertension), malignant POA: Yes    Hypokalemia POA: Yes      FOLLOW UP  Future Appointments  Date Time Provider Department Center   6/19/2019 9:35 AM [facility] BD 1 [address]   7/31/2019 12:30 PM Lynette Laurent M.D. HUGO None   8/1/2019 9:15 AM [facility] EXAM 9 ECHO [facility]   8/14/2019 8:30 AM Pascale Daniels M.D. HE None   8/14/2019 9:20 AM SULEIMAN Tompkins None   9/26/2019 9:00 AM Travon Jaramillo M.D. [facility] None     Bethany Crane M.D.  [address] [place] [phone]  [phone]    In 2 days  Please call to schedule your follow up appointment, office closed for the weekend thank you        MEDICATIONS ON DISCHARGE     Medication List      CONTINUE taking these medications      Instructions   aspirin 81 MG Chew chewable tablet  Commonly known as:  ASA   Take 1 Tab by mouth every day.  Dose:  81 mg     Biotin 57101 MCG Tabs   Take 1 Tab by mouth every day.  Dose:  1 Tab     calcium carbonate 500 MG Tabs  Commonly known as:  OS-CRISTOFER 500   Take 1,000 mg by mouth every day.  Dose:  1000 mg     ICAPS AREDS 2 PO   Take 2 Tabs by mouth every day.  Dose:  2 Tab     LIPITOR 80 MG tablet  Generic drug:  atorvastatin   Take 80 mg by mouth every evening.  Dose:  80 mg     MULTIVITAMINS PO   Take 1 Tab by mouth every day.  Dose:  1 Tab     MYRBETRIQ 25 MG Tb24  Generic drug:  Mirabegron ER   Take 1 Tab by mouth every day.  Dose:  1 Tab     potassium chloride SA 10 MEQ Tbcr  Commonly known as:  K-DUR   Take 10 mEq by mouth every day.  Dose:  10 mEq     valACYclovir 500 MG Tabs  Commonly known as:  VALTREX   Take 500 mg by mouth every day.  Dose:  500 mg     VITAMIN D3 PO   Take 2,000 Units by mouth every day.  Dose:  2000 Units        STOP taking these medications    Amlodipine-Olmesartan 5-40 MG Tabs     hydroCHLOROthiazide 25 MG Tabs  Commonly known as:  HYDRODIURIL     VITAMIN B-12 PO            Allergies  No Known Allergies    DIET  Low-fat low sugar heart healthy diet    ACTIVITY  As tolerated.  Weight bearing as tolerated    CONSULTATIONS  None    PROCEDURES  EC-ECHOCARDIOGRAM COMPLETE W/O CONT   Final Result      CT-HEAD W/O   Final Result         NO ACUTE ABNORMALITIES ARE NOTED ON CT SCAN OF THE HEAD.      Right-sided encephalomalacia is noted.        ECHO-  Prior echocardiogram 6/14/2018.  Normal left ventricular systolic function.   Mild to moderate eccentric mitral regurgitation.  Compared to the images of the study done - there has been slight   improvement in mitral regurgitation.    LABORATORY  Lab Results   Component Value Date    SODIUM 139 05/03/2019    POTASSIUM 3.4 (L) 05/03/2019    CHLORIDE  106 05/03/2019    CO2 23 05/03/2019    GLUCOSE 132 (H) 05/03/2019    BUN 24 (H) 05/03/2019    CREATININE 0.85 05/03/2019        Lab Results   Component Value Date    WBC 5.5 05/03/2019    HEMOGLOBIN 11.2 (L) 05/03/2019    HEMATOCRIT 34.3 (L) 05/03/2019    PLATELETCT 201 05/03/2019        Total time of the discharge process exceeds 34 minutes.

## 2022-11-28 NOTE — ED NOTES
Reviewed patient case with on call psychiatrist who finds that patient meets criteria for inpatient psychiatric admission. Pt to be admitted to the [facility] by Doctor Jeffery Sue with a diagnosis of depression with SI. Pt admitted voluntary and assigned room # is 232. ETA for [facility] is 0830. All necessary forms faxed.      RN to RN report # is  [phone] Stacy Charles, [facility]  11/28/22 0532       Sesar Garber, [facility]  11/28/22 500 Rub[address] Echols, [facility]  11/28/22 [address] Echols, [facility]  11/28/22 6038

## 2022-11-28 NOTE — ED NOTES
Crystal consulted by Dr. Christina Johnson for transportation assistance for discharged pt. Jose James.  Trip # [id number]     RHONDA Acharya  11/28/22 0207

## 2022-11-28 NOTE — PROGRESS NOTES
Patient given tobacco quitline number [phone] at this time, refusing to call at this time, states \" I just dont want to quit now\"- patient given information as to the dangers of long term tobacco use. Continue to reinforce the importance of tobacco cessation.
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
## 2022-11-28 NOTE — PROGRESS NOTES
Pharmacy Medication History Note      List of current medications patient is taking is complete. Patient was discharged from [facility] on 11/22. No change to medication list from [facility].    Patient was started on Keflex 500 mg BID for 4 days on 11/26. Please let me know if you have any questions about this encounter. Thank you!     Electronically signed by Radha Ibarra [facility] on 11/28/2022 at 9:51 AM
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
## 2022-11-28 NOTE — GROUP NOTE
Group Therapy Note    Date: 11/28/2022    Group Start Time: 0900  Group End Time: 1000  Group Topic: Community Meeting    [address] [place] [address]    Ryder Cavazos        Group Therapy Note    Attendees: 3/12       Patient's Goal:  To get comfortable and to relax. Status After Intervention:  Improved    Participation Level:  Active Listener    Participation Quality: Appropriate, Attentive, Sharing, and Supportive      Speech:  normal, pressured, and hesitant      Thought Process/Content: Logical  Flight of ideas      Affective Functioning: Incongruent, Blunted, and Flat      Mood: anxious      Level of consciousness:  Alert, Oriented x4, Attentive, and Preoccupied      Response to Learning: Able to verbalize current knowledge/experience      Endings: None Reported    Modes of Intervention: Education, Support, Socialization, Exploration, and Clarifying      Discipline Responsible: [address], [facility]      Signature:  Ryder Cavazos

## 2022-11-28 NOTE — ED PROVIDER NOTES
[address] ED  Emergency Department  Faculty Attestation       I performed a history and physical examination of the patient and discussed management with the resident. I reviewed the residents note and agree with the documented findings including all diagnostic interpretations and plan of care. Any areas of disagreement are noted on the chart. I was personally present for the key portions of any procedures. I have documented in the chart those procedures where I was not present during the key portions. I have reviewed the emergency nurses triage note. I agree with the chief complaint, past medical history, past surgical history, allergies, medications, social and family history as documented unless otherwise noted below. Documentation of the HPI, Physical Exam and Medical Decision Making performed by scribjj is based on my personal performance of the HPI, PE and MDM. For Physician Assistant/ Nurse Practitioner cases/documentation I have personally evaluated this patient and have completed at least one if not all key elements of the E/M (history, physical exam, and MDM). Additional findings are as noted. Pertinent Comments     Primary Care Physician: Nehal Hyde PA-C    History: This is a 38 y.o. female who presents to the Emergency Department with suicidal ideations with no specific plan . Patient was educated any help. Patient was just seen earlier, for depression. Lithium level was checked at that time. Patient denies any homicidal ideations. Patient denies hallucinations. Denies alcohol use today. .  Denies any illicit drug use . No medical complaints at this time including no cough, shortness of breath or fever. Patient is  voluntary for psychiatric admission at this time.      Physical:    ED Triage Vitals [11/28/22 0319]   BP Temp Temp Source Heart Rate Resp SpO2 Height Weight   125/81 97.9 °F (36.6 °C) Oral 78 16 98 % -- --       Constitutional:  Normal appearance in no acute distress, not ill-appearing  HENT: Normocephalic, atraumatic  Eyes: EOM intact with no conjunctival injection. Neck: Supple with full ROM  Cardiovascular: Regular rate and rhythm with no rubs, murmurs, or gallops  Respiratory: Talking in full sentences, clear to auscultation bilaterally with no rales, rhonchi, or wheezes   Abdomen: soft, nontender with no guarding   Musculoskeletal: No obvious injury and normal ROM  Skin: No significant rash or jaundice in exposed areas. Neuro: Alert, no focal deficits   Psych: Behavior: shy, Speech: faint, Thought content: suicidal, Affect: flat. MDM/Plan:   Suicidal ideations with no specific plan . These ideations did start while she was in the lobby after being seen for depression.   Given that patient is progressing with her complaints, and is now complaining of some suicidal ideation, social work consult psych  No medical complaints  Medically cleared at this time  Labs per protocol for [facility]   SW consult to facilitate possible admission     Critical Care Time: None     Yahaira Santiago MD  Attending Emergency Physician        Yahaira Santiago MD  11/28/22 0243
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
## 2022-11-28 NOTE — H&P
[facility]    HISTORY AND PHYSICAL EXAMINATION/ CONSULT NOTE            Date:   2022  Patient name:  Sinai Rendon  Date of admission:  2022  8:59 AM  MRN:   [id number]  Account:  [de-identified]  YOB: 1980  PCP:    Ginna Espinal PA-C  Room:   [facility]5732-  Code Status:    Full Code    Physician Requesting Consult: Gabriele Nguyen MD    Reason for Consult: History and physical, medical management    Chief Complaint:     No chief complaint on file. Medical management    History Obtained From:     Patient, EMR, nursing staff    History of Present Illness:     57-year-old female admitted for acute psychosis with suicidal ideation    No significant medical history    Reports urinary urgency, frequency and dysuria  Few days  No fevers  No hematuria  No chronic  issues  Was give Rx for abx OP but never picked up  Moderate sym  No agg/rel factors    Patient denies any chest pain palpitation cough difficulty breathing nausea vomiting diarrhea   Past Medical History:     Past Medical History:   Diagnosis Date    Anxiety     Bipolar 1 disorder ([facility] [address]     Depression     Gestational diabetes 2016    OCD (obsessive compulsive disorder)     PTSD (post-traumatic stress disorder)     Rapid rate of speech     Schizoaffective disorder ([facility] [address]         Past Surgical History:     Past Surgical History:   Procedure Laterality Date    ABDOMEN SURGERY       SECTION      LAPAROSCOPY          Medications Prior to Admission:     Prior to Admission medications    Medication Sig Start Date End Date Taking?  Authorizing Provider   cephALEXin (KEFLEX) 500 MG capsule Take 1 capsule by mouth 2 times daily for 4 days 22  Aldair Bernardo DO   hydrOXYzine HCl (ATARAX) 50 MG tablet Take 1 tablet by mouth 3 times daily as needed for Anxiety 22  Mishel Echols MD   prazosin (MINIPRESS) 1 MG capsule Take 1 capsule by mouth nightly 22   Kelsi Bunn MD   cariprazine hcl (VRAYLAR) 6 MG CAPS capsule Take 1 capsule by mouth daily 22   Kelsi Bunn MD   lithium (LITHOBID) 300 MG extended release tablet Take 2 tablets by mouth every 12 hours 22   Kelsi Bunn MD   lurasidone (LATUDA) 60 MG TABS tablet Take 1 tablet by mouth Daily with supper 22   Kelsi Bunn MD   fluticasone (FLONASE) 50 MCG/ACT nasal spray 1 spray by Each Nostril route daily 22   Nichole Grijalva MD   hydroCHLOROthiazide (HYDRODIURIL) 25 MG tablet Take 1 tablet by mouth daily 22   Nichole Grijalva MD   melatonin 3 MG TABS tablet Take 10 mg by mouth nightly as needed    Historical Provider, MD        Allergies:     Abilify [aripiprazole], Codeine, Depakote er [divalproex sodium er], Gabapentin, Lamictal [lamotrigine], Percocet [oxycodone-acetaminophen], Quetiapine fumarate, Tegretol [carbamazepine], Trileptal [oxcarbazepine], Valproic acid, Ziprasidone hcl, and Zyprexa [olanzapine]    Social History:     Tobacco:    reports that she has been smoking cigarettes. She has been smoking an average of 1 pack per day. She has never used smokeless tobacco.  Alcohol:      reports no history of alcohol use. Drug Use:  reports current drug use. Drug: Marijuana Tirso Palma). Family History:     Family History   Problem Relation Age of Onset    Diabetes Sister     No Known Problems Mother     No Known Problems Father        Review of Systems:     Positive and Negative as described in HPI. Denies any shortness of breath or cough  Denies chest pain or palpitations  Denies abdominal pain, diarrhea vomiting  Denies any new numbness tremors or weakness.     10 point review of systems was negative other than mentioned above    Physical Exam:     /89   Pulse 78   Temp 98.2 °F (36.8 °C) (Oral)   Resp 14   Ht 5' 7\" (1.702 m)   Wt 180 lb (81.6 kg)   BMI 28.19 kg/m²   Temp (24hrs), Av.9 °F (36.6 °C), Min:97.5 °F (36.4 °C), Max:98.4 °F (36.9 °C)    No results for input(s): POCGLU in the last 72 hours. No intake or output data in the 24 hours ending 11/28/22 1341    General Appearance:  alert, well appearing, and in no acute distress  Head:  normocephalic, atraumatic. Eye: no icterus, redness, pupils equal and reactive, extraocular eye movements intact, conjunctiva clear  Ear: normal external ear, no discharge, hearing intact  Nose:  no drainage noted  Mouth: mucous membranes moist  Neck: supple, no carotid bruits, thyroid not palpable  Lungs: Bilateral equal air entry, clear to ausculation, no wheezing, rales or rhonchi, normal effort  Cardiovascular: normal rate, regular rhythm, no murmur, gallop, rub.   Abdomen: Soft, nontender, nondistended, normal bowel sounds, no hepatomegaly or splenomegaly  Neurologic: There are no new focal motor or sensory deficits, normal muscle tone and bulk, no abnormal sensation, normal speech, cranial nerves II through XII grossly intact  Skin: No gross lesions, rashes, bruising or bleeding on exposed skin area  Extremities:  No joint swelling, no pedal edema or calf pain with palpation      Investigations:      Laboratory Testing:  Recent Results (from the past 24 hour(s))   Lithium Level    Collection Time: 11/28/22 12:43 AM   Result Value Ref Range    Lithium Lvl 0.6 0.6 - 1.2 mmol/L   CBC with Auto Differential    Collection Time: 11/28/22  3:48 AM   Result Value Ref Range    WBC 10.3 3.5 - 11.3 k/uL    RBC 4.31 3.95 - 5.11 m/uL    Hemoglobin 12.0 11.9 - 15.1 g/dL    Hematocrit 38.9 36.3 - 47.1 %    MCV 90.3 82.6 - 102.9 fL    MCH 27.8 25.2 - 33.5 pg    MCHC 30.8 28.4 - 34.8 g/dL    RDW 14.4 11.8 - 14.4 %    Platelets 701 804 - 614 k/uL    MPV 10.4 8.1 - 13.5 fL    NRBC Automated 0.0 0.0 per 100 WBC    Seg Neutrophils 57 36 - 65 %    Lymphocytes 29 24 - 43 %    Monocytes 8 3 - 12 %    Eosinophils % 5 (H) 1 - 4 %    Basophils 1 0 - 2 %    Immature Granulocytes 0 0 % Segs Absolute 5.93 1.50 - 8.10 k/uL    Absolute Lymph # 3.00 1. 10 - 3.70 k/uL    Absolute Mono # 0.79 0.10 - 1.20 k/uL    Absolute Eos # 0.46 (H) 0.00 - 0.44 k/uL    Basophils Absolute 0.06 0.00 - 0.20 k/uL    Absolute Immature Granulocyte 0.03 0.00 - 0.30 k/uL   BMP    Collection Time: 11/28/22  3:48 AM   Result Value Ref Range    Glucose 119 (H) 70 - 99 mg/dL    BUN 13 6 - 20 mg/dL    Creatinine 0.74 0.50 - 0.90 mg/dL    Est, Glom Filt Rate >60 >60 mL/min/1.73m2    Calcium 9.5 8.6 - 10.4 mg/dL    Sodium 140 135 - 144 mmol/L    Potassium 4.0 3.7 - 5.3 mmol/L    Chloride 106 98 - 107 mmol/L    CO2 27 20 - 31 mmol/L    Anion Gap 7 (L) 9 - 17 mmol/L   TOX SCR, BLD, ED    Collection Time: 11/28/22  3:48 AM   Result Value Ref Range    Acetaminophen Level <5 (L) 10 - 30 ug/mL    Ethanol <10 <10 mg/dL    Ethanol percent <5.505 <4.551 %    Salicylate Lvl 1 (L) 3 - 10 mg/dL    Toxic Tricyclic Sc,Blood NEGATIVE NEGATIVE   Urine Preg (Lab)    Collection Time: 11/28/22  3:49 AM   Result Value Ref Range    HCG(Urine) Pregnancy Test NEGATIVE NEGATIVE   DRUG SCREEN MULTI URINE    Collection Time: 11/28/22  3:49 AM   Result Value Ref Range    Amphetamine Screen, Ur NEGATIVE NEGATIVE    Barbiturate Screen, Ur NEGATIVE NEGATIVE    Benzodiazepine Screen, Urine NEGATIVE NEGATIVE    Cocaine Metabolite, Urine NEGATIVE NEGATIVE    Methadone Screen, Urine NEGATIVE NEGATIVE    Opiates, Urine NEGATIVE NEGATIVE    Phencyclidine, Urine NEGATIVE NEGATIVE    Cannabinoid Scrn, Ur POSITIVE (A) NEGATIVE    Oxycodone Screen, Ur NEGATIVE NEGATIVE    Fentanyl, Ur NEGATIVE NEGATIVE    Test Information       Assay provides medical screening only. The absence of expected drug(s) and/or metabolite(s) may indicate diluted or adulterated urine, limitations of testing or timing of collection.        Imaging/Diagonstics:  Recent data reviewed    Assessment :      Primary Problem  Acute psychosis [facility])    Active Hospital Problems    Diagnosis Date Noted Acute psychosis ([facility] [F23] 11/28/2022     Priority: Medium       Plan:     Reason for consult: General medical management     Acute psychosis, suicidal ideation-management per psychiatry  HTN- resume HCTZ  Labs and vitals reviewed and satisfactory-mild elevation of alk phos and lipase  Cystitis sympt- UA no bacteria, small leuk est, no nitrite- will do 3 days of macrobid. Recent TSH normal  DVT prophylaxis-not required, patient is mobile    Consultations:   [address] [facility] Mira Prince MD  11/28/2022  1:41 PM    Copy sent to Km Joy PA-C    Please note that this chart was generated using voice recognition Dragon dictation software. Although every effort was made to ensure the accuracy of this automated transcription, some errors in transcription may have occurred.

## 2022-11-28 NOTE — PROGRESS NOTES
11/28/22 1411   Encounter Summary   Encounter Overview/Reason  Spiritual/Emotional Needs   Service Provided For: Patient   Referral/Consult From: Satish   Last Encounter  11/28/22   Complexity of Encounter Moderate   Begin Time 0130   End Time  0140   Total Time Calculated 10 min   Spiritual/Emotional needs   Type Spiritual Support   Behavioral Health    Type  Spirituality Group   Assessment/Intervention/Outcome   Assessment Anxious   Intervention Active listening   Outcome Receptive

## 2022-11-28 NOTE — GROUP NOTE
Group Therapy Note    Date: 11/28/2022    Group Start Time: 1100  Group End Time: 1150  Group Topic: Cognitive Skills    ANTON ANGELA    Caretha Friday, CTRS        Group Therapy Note    Attendees: 4/11       Patient's Goal:  To increase social interaction and to practice concentration on multiple items, following task steps, learning task strategy, and communication skills         Notes: Pt participated fully in group task when in group but took brief breaks at intervals x3, and was pulled from group to speak with provider . Pt was able to practice concentration on multiple items, following task steps, remembered/familiar with task strategy, and communication skills for periods of 10-15 mins at a time independently. Status After Intervention:  Improved     Participation Level: Active Listener and Interactive, sharing, supportive     Participation Quality: Appropriate, Attentive, Sharing and Supportive        Speech:  Mumbling, rambling at times - states \"too much, too much\" before taking break from task but then returns shortly afterwards with encouragement from peer. Thought Process/Content: Logical ,linear r/t task for 10-15 mins at a time but then takes a break and sits in day room, before returning to group. Pt is preoccupied but does not discuss in group. Affective Functioning: Blunted        Mood: Restless, hypomanic -identifies \"difficult for me that the task does not go faster\" but acknowledge that peers are learning steps.         Level of consciousness:  Alert, and Attentive        Response to Learning: Able to verbalize current knowledge/experience, able to acknowledge Marlo [place] new learning, and Progressing to goal        Endings: None Reported     Modes of Intervention: Education, Support, Socialization, Exploration, Clarifying and Problem-solving        Discipline Responsible: Psychoeducational Specialist Signature:  Terrie Osorio

## 2022-11-28 NOTE — ED NOTES
Cab ride was canceled with [facility] due to patient checking back in after being discharged. Per triage pt began crying in waiting room and stating she needed to go to [facility]. Per triage and [employer] pt said she didn't want to live anymore and began to run out the doors. Pt was escorted back into the building by [employer].       RHONDA Benjamin  11/28/22 0321

## 2022-11-28 NOTE — ED NOTES
SW consulted for outpatient [address] resources. Patient is a 38year old female who presents to the ED reporting depression. Patient denies experiencing hallucinations, SI and HI. Pt has prior diagnosis of Bipolar Disorder and Acute Psychosis. Pt reports depression as baseline for her but came to the ED because she took two instead of one of her psychotropic medications to \"calm down\". Pt reports since being on her mediations she wakes up crying and denies feelings of SI. Pt was recently hospitalized inpatient for [address] concerns from 10/30/22-11/8/22 and 11/12/22-11/22/22. Pt reports she doesn't really feel like her medications are helping her. Patient was also assessed on 11/27/22 at [facility] with erratic behaviors but SW reported this as pt's baseline and she was discharged home. Patient appears tired and well groomed and was observed eating a boxed lunch provided to her by ED staff. She was cooperative in the interview process, calm , and answering questions sensibly. Pt reports current linkage with [facility] and stated she has an appointment later this morning at the [facility] on [address]. With pt's permission SW contacted the on call worker at [facility] and spoke to a Tori Levine who stated that they would follow-up with patient in the morning. Pt appears to not meet inpatient criteria at this time and pt reports she does not feel she needs inpatient care at this time. Pt reports she lives on [address] [place] [address] Road and denied dissatisfaction with living there or not feeling safe to this writer. Pt reports she is her own guardian. Crystal discussed with Doctor Jeff Neville and Дмитрий Mendoza who plans to discharge patient with outpatient [address] resources once medically cleared. Pt to go to appointment at [facility] in the morning/ walk-in clinic. SW  provided patient with walk in clinic times as well as [facility] contact phone number. Pt to return to ED if symptoms increase or if pt begins to experience SI/HI.  Pt in agreement with POC and feels safe with discharge plan.       RHONDA Mendoza  11/28/22 7750

## 2022-11-28 NOTE — DISCHARGE INSTRUCTIONS
Please take your medications as prescribed. Please follow-up with your psychiatric provider. Please return to the ED if you develop suicidal intention or plan.
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
## 2022-11-28 NOTE — ED PROVIDER NOTES
[facility] ED  Emergency Department Encounter  Emergency Medicine Resident     Pt Mitchell Oliveira  MRN: [id number]  [place] 1980  Date of evaluation: 22  PCP:  Km Joy PA-C      CHIEF COMPLAINT       Depression and suicidal ideation      HISTORY OF PRESENT ILLNESS  (Location/Symptom, Timing/Onset, Context/Setting, Quality, Duration, Modifying Factors, Severity.)      Britany Curry is a 38 y.o. female who presents with depression and suicidal ideation. Patient was seen less than 6 hours prior for depression without suicidal ideation. Please see prior note for further information about prior visit. Patient has been sitting in waiting room waiting for a cab for the past 2 hours since being discharged. Patient reportedly told waiting room staff that she had suicidal ideation then bolted from the ED running out the front doors. Police retrieved patient and guided her back into the ED. Patient now reporting she does not want to live like this anymore and wants to return to [facility]. Patient reports she does not want to live with her roommate anymore. Patient reports she was planning to walk into cold water to kill herself. Patient has been in waiting room since previous visit and has not had any intake of alcohol or recreational drugs. Patient does acknowledge mild right-sided headache. PAST MEDICAL / SURGICAL / SOCIAL / FAMILY HISTORY      has a past medical history of Anxiety, Bipolar 1 disorder ([address] Depression, Gestational diabetes, OCD (obsessive compulsive disorder), PTSD (post-traumatic stress disorder), Rapid rate of speech, and Schizoaffective disorder ([facility] [address] has a past surgical history that includes Abdomen surgery;  section (); and laparoscopy.       Social History     Socioeconomic History    Marital status: Legally      Spouse name: Not on file    Number of children: Not on file    Years of education: Not on file    Highest education level: Not on file   Occupational History    Not on file   Tobacco Use    Smoking status: Every Day     Packs/day: 1.00     Types: Cigarettes    Smokeless tobacco: Never    Tobacco comments:     NO medication ordered d/t pregnancy    Vaping Use    Vaping Use: Former    Substances: Always   Substance and Sexual Activity    Alcohol use: No     Alcohol/week: 0.0 standard drinks    Drug use: Yes     Types: Marijuana Dietra Due)    Sexual activity: Yes     Partners: Male   Other Topics Concern    Not on file   Social History Narrative    Not on file     Social Determinants of Health     Financial Resource Strain: Not on file   Food Insecurity: Not on file   Transportation Needs: Not on file   Physical Activity: Not on file   Stress: Not on file   Social Connections: Not on file   Intimate Partner Violence: Not on file   Housing Stability: Not on file       Family History   Problem Relation Age of Onset    Diabetes Sister     No Known Problems Mother     No Known Problems Father        Allergies:  Abilify [aripiprazole], Codeine, Depakote er [divalproex sodium er], Gabapentin, Lamictal [lamotrigine], Percocet [oxycodone-acetaminophen], Quetiapine fumarate, Tegretol [carbamazepine], Trileptal [oxcarbazepine], Valproic acid, Ziprasidone hcl, and Zyprexa [olanzapine]    Home Medications:  Prior to Admission medications    Medication Sig Start Date End Date Taking?  Authorizing Provider   cephALEXin (KEFLEX) 500 MG capsule Take 1 capsule by mouth 2 times daily for 4 days 11/26/22 11/30/22  Aldair Bernardo DO   hydrOXYzine HCl (ATARAX) 50 MG tablet Take 1 tablet by mouth 3 times daily as needed for Anxiety 11/21/22 12/1/22  Jorge A Aviles MD   prazosin (MINIPRESS) 1 MG capsule Take 1 capsule by mouth nightly 11/21/22   Jorge A Aviles MD   cariprazine hcl (VRAYLAR) 6 MG CAPS capsule Take 1 capsule by mouth daily 11/21/22   Jorge A Aviles MD   lithium (LITHOBID) 300 MG extended release tablet Take 2 tablets by mouth every 12 hours 11/21/22   Ottoniel Mcintosh MD   lurasidone (LATUDA) 60 MG TABS tablet Take 1 tablet by mouth Daily with supper 11/21/22   Ottoniel Mcintosh MD   fluticasone (FLONASE) 50 MCG/ACT nasal spray 1 spray by Each Nostril route daily 11/9/22   Tal Diaz MD   hydroCHLOROthiazide (HYDRODIURIL) 25 MG tablet Take 1 tablet by mouth daily 11/8/22   Tal Diaz MD   melatonin 3 MG TABS tablet Take 10 mg by mouth nightly as needed    Historical Provider, MD       REVIEW OF SYSTEMS    (2-9 systems for level 4, 10 or more for level 5)      Review of Systems   Constitutional:  Positive for activity change and fatigue. Negative for appetite change, chills and fever. HENT:  Negative for rhinorrhea and sore throat. Eyes:  Negative for visual disturbance. Respiratory:  Negative for cough and shortness of breath. Cardiovascular:  Negative for chest pain. Gastrointestinal:  Negative for abdominal pain, constipation, diarrhea, nausea and vomiting. Genitourinary:  Positive for dysuria and frequency. Skin:  Negative for rash. Neurological:  Positive for headaches. Negative for dizziness, weakness, light-headedness and numbness. Psychiatric/Behavioral:  Positive for sleep disturbance and suicidal ideas. The patient is nervous/anxious. PHYSICAL EXAM   (up to 7 for level 4, 8 or more for level 5)      INITIAL VITALS:   /63   Pulse 65   Temp 97.5 °F (36.4 °C) (Oral)   Resp 17   SpO2 100%     Physical Exam  Vitals reviewed. Constitutional:       General: She is not in acute distress. Appearance: She is normal weight. She is not ill-appearing, toxic-appearing or diaphoretic.    HENT:      Head:      Comments: Bruising to right temple     Right Ear: External ear normal.      Left Ear: External ear normal.      Nose: Nose normal.      Mouth/Throat:      Mouth: Mucous membranes are moist.   Eyes:      Extraocular Movements: Extraocular movements intact. Pupils: Pupils are equal, round, and reactive to light. Cardiovascular:      Rate and Rhythm: Normal rate and regular rhythm. Heart sounds: Normal heart sounds. Pulmonary:      Effort: Pulmonary effort is normal. No respiratory distress. Breath sounds: Normal breath sounds. Abdominal:      General: There is no distension. Palpations: Abdomen is soft. Tenderness: There is no abdominal tenderness. Musculoskeletal:         General: Normal range of motion. Skin:     General: Skin is warm and dry. Findings: Bruising (Right arm) present. Neurological:      General: No focal deficit present. Mental Status: She is alert and oriented to person, place, and time. Motor: No weakness. Psychiatric:         Attention and Perception: Attention and perception normal.         Mood and Affect: Mood is anxious and depressed. Affect is flat and tearful. Speech: Speech normal.         Behavior: Behavior is cooperative. Thought Content: Thought content includes suicidal ideation. Thought content includes suicidal plan. Cognition and Memory: Cognition and memory normal.         Judgment: Judgment is impulsive. DIFFERENTIAL  DIAGNOSIS     PLAN (LABS / IMAGING / EKG):  Orders Placed This Encounter   Procedures    CBC with Auto Differential    BMP    Urine Preg (Lab)    TOX SCR, BLD, ED    DRUG SCREEN MULTI URINE       MEDICATIONS ORDERED:  No orders of the defined types were placed in this encounter.       DDX: Suicidal ideation, depression, anxiety, bipolar disorder    DIAGNOSTIC RESULTS / EMERGENCY DEPARTMENT COURSE / MDM   LAB RESULTS:  Results for orders placed or performed during the hospital encounter of 11/28/22   CBC with Auto Differential   Result Value Ref Range    WBC 10.3 3.5 - 11.3 k/uL    RBC 4.31 3.95 - 5.11 m/uL    Hemoglobin 12.0 11.9 - 15.1 g/dL    Hematocrit 38.9 36.3 - 47.1 %    MCV 90.3 82.6 - 102.9 fL    MCH 27.8 25.2 - 33.5 pg    MCHC 30.8 28.4 - 34.8 g/dL    RDW 14.4 11.8 - 14.4 %    Platelets 758 497 - 892 k/uL    MPV 10.4 8.1 - 13.5 fL    NRBC Automated 0.0 0.0 per 100 WBC    Seg Neutrophils 57 36 - 65 %    Lymphocytes 29 24 - 43 %    Monocytes 8 3 - 12 %    Eosinophils % 5 (H) 1 - 4 %    Basophils 1 0 - 2 %    Immature Granulocytes 0 0 %    Segs Absolute 5.93 1.50 - 8.10 k/uL    Absolute Lymph # 3.00 1. 10 - 3.70 k/uL    Absolute Mono # 0.79 0.10 - 1.20 k/uL    Absolute Eos # 0.46 (H) 0.00 - 0.44 k/uL    Basophils Absolute 0.06 0.00 - 0.20 k/uL    Absolute Immature Granulocyte 0.03 0.00 - 0.30 k/uL   BMP   Result Value Ref Range    Glucose 119 (H) 70 - 99 mg/dL    BUN 13 6 - 20 mg/dL    Creatinine 0.74 0.50 - 0.90 mg/dL    Est, Glom Filt Rate >60 >60 mL/min/1.73m2    Calcium 9.5 8.6 - 10.4 mg/dL    Sodium 140 135 - 144 mmol/L    Potassium 4.0 3.7 - 5.3 mmol/L    Chloride 106 98 - 107 mmol/L    CO2 27 20 - 31 mmol/L    Anion Gap 7 (L) 9 - 17 mmol/L   Urine Preg (Lab)   Result Value Ref Range    HCG(Urine) Pregnancy Test NEGATIVE NEGATIVE   TOX SCR, BLD, ED   Result Value Ref Range    Acetaminophen Level <5 (L) 10 - 30 ug/mL    Ethanol <10 <10 mg/dL    Ethanol percent <4.807 <7.881 %    Salicylate Lvl 1 (L) 3 - 10 mg/dL    Toxic Tricyclic Sc,Blood NEGATIVE NEGATIVE   DRUG SCREEN MULTI URINE   Result Value Ref Range    Amphetamine Screen, Ur NEGATIVE NEGATIVE    Barbiturate Screen, Ur NEGATIVE NEGATIVE    Benzodiazepine Screen, Urine NEGATIVE NEGATIVE    Cocaine Metabolite, Urine NEGATIVE NEGATIVE    Methadone Screen, Urine NEGATIVE NEGATIVE    Opiates, Urine NEGATIVE NEGATIVE    Phencyclidine, Urine NEGATIVE NEGATIVE    Cannabinoid Scrn, Ur POSITIVE (A) NEGATIVE    Oxycodone Screen, Ur NEGATIVE NEGATIVE    Fentanyl, Ur NEGATIVE NEGATIVE    Test Information       Assay provides medical screening only.   The absence of expected drug(s) and/or metabolite(s) may indicate diluted or adulterated urine, limitations of testing or timing

## 2022-11-28 NOTE — PROGRESS NOTES
[facility]  Admission Note     Admission Type:   Admission Type: Voluntary    Reason for admission:  Reason for Admission: suicidal thoughts et manic behaviors      Addictive Behavior:   Addictive Behavior  In the Past 3 Months, Have You Felt or Has Someone Told You That You Have a Problem With  : None    Medical Problems:   Past Medical History:   Diagnosis Date    Anxiety     Bipolar 1 disorder ([facility] [address] 1999    Depression     Gestational diabetes 7/22/2016    OCD (obsessive compulsive disorder) 1999    PTSD (post-traumatic stress disorder)     Rapid rate of speech     Schizoaffective disorder (HCC)        Status EXAM:  Mental Status and Behavioral Exam  Normal: No  Level of Assistance: Independent/Self  Facial Expression: Flat  Affect: Appropriate  Level of Consciousness: Alert  Frequency of Checks: 4 times per hour, close  Mood:Normal: No  Mood: Anxious  Motor Activity:Normal: Yes  Eye Contact: Fair  Observed Behavior: Cooperative  Sexual Misconduct History: Current - no  Involved In Any Sexual Misconduct With Others? : No  History of Sexually Inappropriate Behavior When Previously Hospitalized?: No  Uncontrollable/Compulsive Masturbation?: No  Difficulty Controlling Sexual Impulses?: No  Preception: Others (comment)  Attention:Normal: No  Attention: Distractible  Thought Processes: Flight of ideas  Thought Content:Normal: No  Thought Content: Preoccupations  Depression Symptoms: No problems reported or observed. Anxiety Symptoms: Generalized  Micaela Symptoms: No problems reported or observed.   Hallucinations: None  Delusions: No  Memory:Normal: No  Memory: Poor recent  Insight and Judgment: No  Insight and Judgment: Poor judgment, Poor insight    Tobacco Screening:  Practical Counseling, on admission, nati X, if applicable and completed (first 3 are required if patient doesn't refuse):            ( ) Recognizing danger situations (included triggers and roadblocks)                    ( ) Coping skills (new ways to manage stress,relaxation techniques, changing routine, distraction)                                                           ( ) Basic information about quitting (benefits of quitting, techniques in how to quit, available resources  ( ) Referral for counseling faxed to [facility]                                                                                                                   ( X) Patient refused counseling  ( ) Patient has not smoked in the last 30 days    Metabolic Screening:    Lab Results   Component Value Date    LABA1C 5.3 06/23/2019       Lab Results   Component Value Date    CHOL 116 06/23/2019    CHOL 101 09/12/2018    CHOL 151 05/17/2016     Lab Results   Component Value Date    TRIG 112 06/23/2019    TRIG 75 09/12/2018    TRIG 210 (H) 05/17/2016     Lab Results   Component Value Date    HDL 47 06/23/2019    HDL 44 09/12/2018    HDL 74 05/17/2016     No components found for: LDLCAL  No results found for: LABVLDL      Body mass index is 28.19 kg/m². BP Readings from Last 2 Encounters:   11/28/22 127/89   11/28/22 128/84           Pt admitted with followings belongings:  Dental Appliances: None  Vision - Corrective Lenses: None  Hearing Aid: None  Jewelry: None  Body Piercings Removed: No  Clothing:  Footwear, Pants, [address], Undergarments  Other Valuables: Daily Carmona RN

## 2022-11-28 NOTE — GROUP NOTE
Group Therapy Note    Date: 11/28/2022    Group Start Time: 1430  Group End Time: 2396  Group Topic: Relaxation    JACQUES LopezS        Group Therapy Note    Attendees: 8/12     Patient's Goal:  To increase social interaction and to practice relaxation and concentration through creative expression,discussion, and communication skills         Notes: Pt participated partially in group task . Pt was able to practice relaxation and concentration through creative expression for brief intervals but would get up and pace in day room for 10 mins, pt is polite on approach by peers but does not engage in group discussion. Status After Intervention:  Unchanged     Participation Level:  Interactive at brief intervals     Participation Quality:  Attentive to task at brief intervals      Speech:  Minimal        Thought Process/Content: Impaired concentration to task, pt c/o hands feeling shaky but with encouragement from RT pt does engage in task at brief intervals with understanding from RT that if pt needs to get up and pace she may, and may return to group.      Affective Functioning: Blunted        Mood: Restless, anxious at times        Level of consciousness:  Alert, and Attentive        Response to Learning: Attentive to creative expression task at intervals, unable to remain focused to engage in discussion, and Progressing to goal        Endings: None Reported     Modes of Intervention: Education, Support, Socialization, Exploration, Clarifying and Problem-solving        Discipline Responsible: Psychoeducational Specialist        Signature:  JUANCARLOS Cho

## 2022-11-28 NOTE — ED NOTES
The following labs were labeled with patient stickers & tubed to lab;    [x]Lavender   []On Ice  []Blue  [x]Green/ Yellow  []Green/ Black []On Ice  []Pink  []Red  []Yellow    []COVID-19 Swab []Rapid  []COVID-19 Swab []PCR    []Urine Sample  [x]Pelvic Cultures    []Blood Cultures       Abigail Haq RN  11/28/22 1996

## 2022-11-28 NOTE — ED NOTES
[] Juancarlos    [] [facility]    [x]  [facility] ASSESSMENT      Y  N     [x] [] In the past two weeks have you had thoughts of hurting yourself in any way? [x] [] In the past two weeks have you had thoughts that you would be better off dead? [] [x] Have you made a suicide attempt in the past two months? [] [x] Do you have a plan for hurting yourself or suicide? [] [x] Presence of hallucinations/voices related to hurting himself or herself or someone else. SUICIDE/SECURITY WATCH PRECAUTION CHECKLIST     Orders    [x]  Suicide/Security Watch Precautions initiated as checked below:   11/28/22 3:24 AM EST BH31/BH31A    [x] Notified physician:  No att. providers found  11/28/22 3:24 AM EST    [x] Orders obtained as appropriate:     [x] 1:1 Observer     [] Psych Consult     [] Psych Consult    Name:  Date:  Time:    [x] 1:1 Observer, Notified by:  Isra Whitehead RN    Contact Nurse Supervisor    [x] Remove all personal clothes from room and place in snap/paper gown/pants. Slipper only    [x] Remove all personal belongings from room and secured away from patient. Documentation    [x] Initiate Suicide/Security Watch Precaution Flow Sheet    [x] Initiate individualized Care Plan/Problem    [x] Document why precautions initiated on flow sheet (Initiate Nursing Care Plan/Problem)    [x] 1:1 Observer in place; instructions provided. Suicide precautions require observer be within arms length. [x] Nurse-Observer Communication Hand-off initiated by RN, reviewed with Observer. Subsequently used as Hand Off between Observers. [x] Initiate every 15 minute observations per observer as delegated by the RN.     [x] Initiate RN assessment and documentation    Environmental Scan  Search Criteria and Process: OPTIONAL, see Search Policy    [] Reason for search:    [] Nursing in presence of second person to search patient    [x] Patient notified of reason for body assessment and belongings search:     Persons present during search:   Results of search and disposition:       Searchers Name:MPD  These items or items similar should be removed from the room:   [x] Chairs   [x] Telephone   [x] Trash cans and liners   [x] Plastic utensils (order Patient Safety tray)   [x] Empty or remove Sharps containers   [x] All personal clothing/belongings removed   [x] All unnecessary lead wires, electrical cords, draw cords, etc.   [x] Flowers and plants   [x] Double check for lighters, matches, razors, any glass items etc that can be used as weapons. Person completing Checklist: Ochoa Bob RN       GENERAL INFORMATION     Y  N     [x] [] Has the patient been informed that they are on a watch and what that means? [x] [] Can the patient get out of Bed without nursing assistance? [] [x] Can the patient use the restroom without nursing assistance? [x] [] Can the patient walk the halls to [place] their legs? \"   [] [x] Does the patient have metal utensils? [x] [] Have the patient's belongings been placed out of control of the patient? [x] [] Have the patient and his/her belongings been checked for contraband? [x] [] Is the patient under any visitor restrictions? If Yes, explain:   [] [x] Is the patient under an alias? Alias Name:   Authorized visitors (no more than two are to be on the list)   Name/Relationship:   Name/Relationship:    Name of Staff member that you  Received this information from?:Self  General Description:    Kirsten Burkett BH31/BH31A female 38 y.o. Admission weight:      Race: []  [x] Black  []   []   [] [place] [] Other  Facial Hair:  [] Yes  [] No  If yes, please describe: Identifying Marks (i.e. Visible tattoos, scars, etc... ):     NURSING CARE PLAN    Nursing Diagnosis: Risk of Self Directed Harm  [] Actual  [x] Potential  Date Started: 11/28/22      Etiological Factors: (related to)  [x] Expressed or implied suicidal ideation/behavior  [x] Depression  [] Suicide attempt      [] Low self-esteem  [] Hallucinations      [] Feeling of Hopelessness  [] Substance abuse or withdrawal    [] Dysfunctional family  [] Major traumatic event, eg., divorce, etc   [] Excessive stress/anxiety    11/28/22    Expected Outcomes    Patient will:   [x] Patient will remain safe for the duration of their stay   [x] Patient's environment will be safe, eg. Free of potential suicide weapons   [] Verbalize Recovery from suicidal episode and improvement in self-worth   [x] Discuss feeling that precipitated suicide attempt/thoughts/behavior   [] Will describe available resources for crisis prevention and management   [] Will verbalize positive coping skills     Nursing Intervention   [x] Assessment and Observations hourly   [x] Suicide Precautions implemented with patient, should be 1:1 observation   [x] Document observation [id number] and RN assessment hourly   [] Consult physician for:    [] Psychiatric consult    [] Pharmacological therapy    [] Other:    [x] Patient search completed by security   [x] Initiated appropriate safety protocols by removing from the patient's environment anything that could be used to inflict self injury, eg. Order safe tray, snap gown, etc   [x] Maintain open, warm, caring, non-judgmental attitude/manner towards patient   [] Discuss advantages and disadvantages of existing coping methods/skills   [x] Assist and educate patient with identifying present strengths and coping skills   [x] Keep patient informed regarding plan of care and provide clear concise explanations. Provide the patient/family education information as well as telephone numbers and other information about crisis centers, hot lines, and counselors.     Discharge Planning:   [] Referral  [] Groups [] Health agencies  [] Other:          Deysi Wang RN  11/28/22 [id number]

## 2022-11-28 NOTE — ED PROVIDER NOTES
FACULTY SIGN-OUT  ADDENDUM     Care of this patient was assumed from previous attending physician. The patient's initial evaluation and plan have been discussed with the prior provider who initially evaluated the patient. Attestation  I was available and discussed any additional care issues that arose and coordinated the management plans with the resident(s) caring for the patient during my duty period. Any areas of disagreement with resident's documentation of care or procedures are noted on the chart. I was personally present for the key portions of any/all procedures, during my duty period. I have documented in the chart those procedures where I was not present during the key portions. ED COURSE      The patient was given the following medications:  No orders of the defined types were placed in this encounter. RECENT VITALS:   Temp: 97.5 °F (36.4 °C), Heart Rate: 66, Resp: 18, BP: 128/84    MEDICAL DECISION MAKING        Shellie De Jesus is a 38 y.o. female who presents to the Emergency Department with complaints of suicidal ideation. Await tx to [facility].       Kurtis Craft MD  Attending Emergency Physician    (Please note that portions of this note were completed with a voice recognition program.  Efforts were made to edit the dictations but occasionally words are mis-transcribed.)          Kurtis Craft MD  11/28/22 9331

## 2022-11-28 NOTE — PLAN OF CARE
5 [facility]  Initial Interdisciplinary Treatment Plan NO      Original treatment plan Date & Time: 11/28/2022   1308    Admission Type:  Admission Type: Voluntary    Reason for admission:   Reason for Admission: suicidal thoughts et manic behaviors    Estimated Length of Stay:  5-7days  Estimated Discharge Date: to be determined by physician    PATIENT STRENGTHS:  Patient Strengths:   Patient Strengths and Limitations:Limitations: Difficult relationships / poor social skills, Difficulty problem solving/relies on others to help solve problems  Addictive Behavior: Addictive Behavior  In the Past 3 Months, Have You Felt or Has Someone Told You That You Have a Problem With  : None  Medical Problems:  Past Medical History:   Diagnosis Date    Anxiety     Bipolar 1 disorder ([facility] [address] 1999    Depression     Gestational diabetes 7/22/2016    OCD (obsessive compulsive disorder) 1999    PTSD (post-traumatic stress disorder)     Rapid rate of speech     Schizoaffective disorder ([facility] [place] [facility]      Status EXAM:Mental Status and Behavioral Exam  Normal: No  Level of Assistance: Independent/Self  Facial Expression: Flat  Affect: Appropriate  Level of Consciousness: Alert  Frequency of Checks: 4 times per hour, close  Mood:Normal: No  Mood: Anxious  Motor Activity:Normal: Yes  Eye Contact: Fair  Observed Behavior: Cooperative  Sexual Misconduct History: Current - no  Involved In Any Sexual Misconduct With Others? : No  History of Sexually Inappropriate Behavior When Previously Hospitalized?: No  Uncontrollable/Compulsive Masturbation?: No  Difficulty Controlling Sexual Impulses?: No  Preception: Others (comment)  Attention:Normal: No  Attention: Distractible  Thought Processes: Flight of ideas  Thought Content:Normal: No  Thought Content: Preoccupations  Depression Symptoms: No problems reported or observed. Anxiety Symptoms: Generalized  Micaela Symptoms: No problems reported or observed.   Hallucinations: None  Delusions: No  Memory:Normal: No  Memory: Poor recent  Insight and Judgment: No  Insight and Judgment: Poor judgment, Poor insight    EDUCATION:   Learner Progress Toward Treatment Goals: reviewed group plans and strategies for care    Method:group therapy, medication compliance, individualized assessments and care planning    Outcome: needs reinforcement    PATIENT GOALS: short term-get started on long acting shot            Long term-follow up with [facility] and get my own place in [facility].     PLAN/TREATMENT RECOMMENDATIONS:     continue group therapy , medications compliance, goal setting, individualized assessments and care, continue to monitor pt on unit      SHORT-TERM GOALS:   Time frame for Short-Term Goals: 5-7 days    LONG-TERM GOALS:  Time frame for Long-Term Goals: 6 months  Members Present in Team Meeting: See Signature Sheet    Clarke Bear RN

## 2022-11-29 LAB
CHOLESTEROL/HDL RATIO: 2.1
CHOLESTEROL: 133 MG/DL
ESTIMATED AVERAGE GLUCOSE: 114 MG/DL
HBA1C MFR BLD: 5.6 % (ref 4–6)
HDLC SERPL-MCNC: 63 MG/DL
LDL CHOLESTEROL: 46 MG/DL (ref 0–130)
TRIGL SERPL-MCNC: 122 MG/DL

## 2022-11-29 PROCEDURE — 36415 COLL VENOUS BLD VENIPUNCTURE: CPT

## 2022-11-29 PROCEDURE — 80061 LIPID PANEL: CPT

## 2022-11-29 PROCEDURE — 99232 SBSQ HOSP IP/OBS MODERATE 35: CPT | Performed by: PSYCHIATRY & NEUROLOGY

## 2022-11-29 PROCEDURE — 6370000000 HC RX 637 (ALT 250 FOR IP): Performed by: INTERNAL MEDICINE

## 2022-11-29 PROCEDURE — 83036 HEMOGLOBIN GLYCOSYLATED A1C: CPT

## 2022-11-29 PROCEDURE — 6370000000 HC RX 637 (ALT 250 FOR IP): Performed by: PSYCHIATRY & NEUROLOGY

## 2022-11-29 PROCEDURE — 1240000000 HC EMOTIONAL WELLNESS R&B

## 2022-11-29 RX ADMIN — IBUPROFEN 400 MG: 400 TABLET ORAL at 12:42

## 2022-11-29 RX ADMIN — FLUTICASONE PROPIONATE 1 SPRAY: 50 SPRAY, METERED NASAL at 08:31

## 2022-11-29 RX ADMIN — LITHIUM CARBONATE 600 MG: 300 TABLET, FILM COATED, EXTENDED RELEASE ORAL at 08:32

## 2022-11-29 RX ADMIN — IBUPROFEN 400 MG: 400 TABLET ORAL at 06:09

## 2022-11-29 RX ADMIN — PRAZOSIN HYDROCHLORIDE 1 MG: 1 CAPSULE ORAL at 20:43

## 2022-11-29 RX ADMIN — POLYETHYLENE GLYCOL 3350 17 G: 17 POWDER, FOR SOLUTION ORAL at 20:46

## 2022-11-29 RX ADMIN — LITHIUM CARBONATE 600 MG: 300 TABLET, FILM COATED, EXTENDED RELEASE ORAL at 20:43

## 2022-11-29 RX ADMIN — PALIPERIDONE 3 MG: 3 TABLET, EXTENDED RELEASE ORAL at 08:32

## 2022-11-29 RX ADMIN — ACETAMINOPHEN 650 MG: 325 TABLET ORAL at 03:13

## 2022-11-29 RX ADMIN — IBUPROFEN 400 MG: 400 TABLET ORAL at 20:45

## 2022-11-29 RX ADMIN — NITROFURANTOIN MONOHYDRATE/MACROCRYSTALLINE 100 MG: 25; 75 CAPSULE ORAL at 08:32

## 2022-11-29 RX ADMIN — HYDROCHLOROTHIAZIDE 25 MG: 25 TABLET ORAL at 08:32

## 2022-11-29 RX ADMIN — NITROFURANTOIN MONOHYDRATE/MACROCRYSTALLINE 100 MG: 25; 75 CAPSULE ORAL at 20:43

## 2022-11-29 RX ADMIN — ACETAMINOPHEN 650 MG: 325 TABLET ORAL at 18:57

## 2022-11-29 ASSESSMENT — PAIN - FUNCTIONAL ASSESSMENT
PAIN_FUNCTIONAL_ASSESSMENT: ACTIVITIES ARE NOT PREVENTED
PAIN_FUNCTIONAL_ASSESSMENT: 0-10
PAIN_FUNCTIONAL_ASSESSMENT: ACTIVITIES ARE NOT PREVENTED

## 2022-11-29 ASSESSMENT — PAIN DESCRIPTION - ORIENTATION
ORIENTATION: LOWER
ORIENTATION: ANTERIOR

## 2022-11-29 ASSESSMENT — PAIN DESCRIPTION - LOCATION
LOCATION: BACK
LOCATION: NECK;BACK
LOCATION: BACK;NECK
LOCATION: BACK
LOCATION: BACK;NECK

## 2022-11-29 ASSESSMENT — PAIN SCALES - GENERAL
PAINLEVEL_OUTOF10: 10
PAINLEVEL_OUTOF10: 0
PAINLEVEL_OUTOF10: 4
PAINLEVEL_OUTOF10: 7
PAINLEVEL_OUTOF10: 7
PAINLEVEL_OUTOF10: 3
PAINLEVEL_OUTOF10: 2

## 2022-11-29 ASSESSMENT — PAIN DESCRIPTION - DESCRIPTORS
DESCRIPTORS: ACHING
DESCRIPTORS: THROBBING
DESCRIPTORS: ACHING
DESCRIPTORS: ACHING

## 2022-11-29 NOTE — PLAN OF CARE
Problem: Chronic Conditions and Co-morbidities  Goal: Patient's chronic conditions and co-morbidity symptoms are monitored and maintained or improved  11/28/2022 2224 by Destiney Mahoney LPN  Outcome: Progressing  Flowsheets (Taken 11/28/2022 2220)  Care Plan - Patient's Chronic Conditions and Co-Morbidity Symptoms are Monitored and Maintained or Improved: Monitor and assess patient's chronic conditions and comorbid symptoms for stability, deterioration, or improvement  11/28/2022 1305 by Poornima Xavier RN  Outcome: Progressing     Problem: Pain  Goal: Verbalizes/displays adequate comfort level or baseline comfort level  11/28/2022 2224 by Destiney Mahoney LPN  Outcome: Progressing  11/28/2022 1305 by Poornima Xavier RN  Outcome: Progressing     Problem: Self Harm/Suicidality  Goal: Will have no self-injury during hospital stay  Description: INTERVENTIONS:  1. Q 30 MINUTES: Routine safety checks  2. Q SHIFT & PRN: Assess risk to determine if routine checks are adequate to maintain patient safety  11/28/2022 2224 by Destiney Mahoney LPN  Outcome: Progressing  11/28/2022 1305 by Poornima Xavier RN  Outcome: Progressing     Problem: Micaela  Goal: Will exhibit normal sleep and speech and no impulsivity  Description: INTERVENTIONS:  1. Administer medication as ordered  2. Set limits on impulsive behavior  3. Make attempts to decrease external stimuli as possible  11/28/2022 2224 by Destiney Mahoney LPN  Outcome: Progressing  11/28/2022 1305 by Poornima Xavier RN  Outcome: Progressing    Patient denies SI and HI. Denies hallucinations, delusions and depression. States she is sad and has generalized anxiety; anxiety treated per patient's request. Patient states the reason she is said is because she misses her children who live in [facility]. She also reports being sad do to a recent break up. States she doesn't have a support system. Patient is friendly and cooperative. Interacts well with peers.  Patient is in bed asleep at this time. Call light is in reach, and bed in lowest position. Q15 minute rounds continue. Will continue with plan of care.

## 2022-11-29 NOTE — GROUP NOTE
Group Therapy Note    Date: 11/29/2022    Group Start Time: 1100  Group End Time: 1130  Group Topic: Cognitive Skills    ANTON Colon, CTRS        Group Therapy Note    Attendees: 4/15       Patient's Goal:  to improve social interaction and improve concentration    Notes:   pt was anxious and unable to tolerate duration of group     Status After Intervention:  unchanged     Participation Level: needs redirections    Participation Quality: needs prompts      Speech:  pressured      Thought Process/Content: flight of idea      Affective Functioning: restricted       Mood anxious      Level of consciousness:  Alert     Response to Learning: Progressing to goal      Endings: None Reported    Modes of Intervention: Education, Socialization, Problem-solving, and Activity      Discipline Responsible: Psychoeducational Specialist      Signature:  TOBY Robledo

## 2022-11-29 NOTE — GROUP NOTE
Group Therapy Note    Date: 11/29/2022    Group Start Time: 1330  Group End Time: 5124  Group Topic: Relaxation Group     [facility]        Group Therapy Note    Attendees: 6/15       Patient's Goal:  pt will demonstrate relaxation using creative expression     Notes:   pt was pleasant and participated well     Status After Intervention:  Improved    Participation Level:  Active Listener and Interactive    Participation Quality: Appropriate and Supportive      Speech:  normal      Thought Process/Content: flight of ideas      Affective Functioning: restricted      Mood: anxious      Level of consciousness:  Alert     Response to Learning: Progressing to goal      Endings: None Reported    Modes of Intervention: Education, Socialization, Problem-solving, and Activity      Discipline Responsible: Psychoeducational Specialist      Signature:  TOBY Wing

## 2022-11-29 NOTE — GROUP NOTE
Group Therapy Note    Date: 11/29/2022    Group Start Time: 0900  Group End Time: 3562  Group Topic: Community Meeting    [address] [place] [address] COREEN    Crescencio Alonzo LPN        Group Therapy Note    Attendees: 4/16        Patient's Goal:  speak with doctor and have an treatment plan, and to start going to gym once discharged.     Status After Intervention:  Unchanged    Participation Level: Interactive    Participation Quality: Attentive      Speech:  normal      Thought Process/Content: Flight of ideas      Affective Functioning: Constricted/Restricted      Mood: anxious      Level of consciousness:  Oriented x4      Response to Learning: Able to verbalize/acknowledge new learning      Endings: None Reported    Modes of Intervention: Socialization      Discipline Responsible: Licensed Practical Nurse      Signature:  Crescencio Alonzo LPN

## 2022-11-29 NOTE — ED PROVIDER NOTES
[facility]   Emergency Department  Faculty Attestation       I performed a history and physical examination of the patient and discussed management with the resident. I reviewed the residents note and agree with the documented findings including all diagnostic interpretations and plan of care. Any areas of disagreement are noted on the chart. I was personally present for the key portions of any procedures. I have documented in the chart those procedures where I was not present during the key portions. I have reviewed the emergency nurses triage note. I agree with the chief complaint, past medical history, past surgical history, allergies, medications, social and family history as documented unless otherwise noted below. Documentation of the HPI, Physical Exam and Medical Decision Making performed by scribes is based on my personal performance of the HPI, PE and MDM. For Physician Assistant/ Nurse Practitioner cases/documentation I have personally evaluated this patient and have completed at least one if not all key elements of the E/M (history, physical exam, and MDM). Additional findings are as noted. Pertinent Comments     Primary Care Physician: Tosin Rizo PA-C      ED Triage Vitals [11/27/22 2348]   BP Temp Temp Source Heart Rate Resp SpO2 Height Weight   115/77 98.4 °F (36.9 °C) Oral 83 18 97 % -- --          This is a 38 y.o. female who presents to the Emergency Department for depression and tearfulness. Patient states that she has a history of depression and is on medications. States that tonight she did take double of some of her psychiatric medications because she felt they were not working appropriately. She was not trying to injure herself or harm herself at that time. She is uncertain which ones of her medication she took. Patient states that she is just been feeling tired and fatigued. Otherwise no medical complaints.   Of note had an extensive lab work-up done approximately 36 hours ago. On exam she was sleeping but awakened to voice. She does appear tearful, but is able to have full conversation is no distress. Heart sounds are regular lungs auscultation. Abdomen is soft nontender nondistended. She is alert and oriented. She is depressed with tearful affect, but is not suicidal homicidal.  Does appear to be caring for herself  Given the patient took extra of her medications we will check a lithium level. Otherwise she has no symptoms and exam is unremarkable from a medical standpoint. Patient currently is not suicidal and has no concerns for harming herself or others. Therefore will get resources for outpatient but not believe that she needs inpatient admission at this time. ADDENDUM -please note that patient was waiting in the waiting room for a cab, and then she did start to endorse some suicidal thoughts at that time. This was several hours after initial evaluation. However patient did check back in at which time, patient was determined to go to psychiatric facility.   Please see documentation from separate encounter      Critical Care: None     Cheri Arnold MD  Attending Emergency Physician         Cheri Arnold MD  11/29/22 2849

## 2022-11-29 NOTE — PROGRESS NOTES
Pharmacy Med Education Group Note    Date: 11/29/22  Start Time: 1430  End Time: 1500    Number Participants in Group:  6/15    Goal:  Patient will demonstrate an understanding of the medications intended purpose and possible adverse effects  Topic: [place] for Pharmacy Med Ed Group    Discipline Responsible:     OT  AT  [facility].  RT     X Other       Participation Level:     None  Minimal      X Active Listener     Interactive    Monopolizing         Participation Quality:    X Appropriate  Inappropriate            Attentive        Intrusive          Sharing        Resistant          Supportive        Lethargic       Affective:     X Congruent  Incongruent  Blunted  Flat    Constricted  Anxious  Elated  Angry    Labile  Depressed  Other         Cognitive:    X Alert  Oriented PPTP     Concentration   G   X F  P   Attention Span   G   X F  P   Short-Term Memory   G   X F  P   Long-Term Memory  G  F  P   ProblemSolving/  Decision Making  G  F  P   Ability to Process  Information    G   X F  P      Contributing Factors             Delusional             Hallucinating             Flight of Ideas             Other:       Modes of Intervention:    X Education    Support  Exploration    Clarifying  Problem Solving  Confrontation    Socialization  Limit Setting  Reality Testing    Activity  Movement  Media    Other:            Response to Learning:    X Able to verbalize current knowledge/experience    Able to verbalize/acknowledge new learning    Able to retain information    Capable of insight    Able to change behavior    Progressing to goal    Other:        Comments:   Patient left just as group was starting and did not return.     Jemal First, PharmD, [address]  PGY-1 Pharmacy Resident  11/29/2022 3:31 PM

## 2022-11-29 NOTE — PLAN OF CARE
Problem: Chronic Conditions and Co-morbidities  Goal: Patient's chronic conditions and co-morbidity symptoms are monitored and maintained or improved  Outcome: Progressing     Problem: Pain  Goal: Verbalizes/displays adequate comfort level or baseline comfort level  Outcome: Progressing     Problem: Self Harm/Suicidality  Goal: Will have no self-injury during hospital stay  Description: INTERVENTIONS:  1. Q 30 MINUTES: Routine safety checks  2. Q SHIFT & PRN: Assess risk to determine if routine checks are adequate to maintain patient safety  Outcome: Progressing     Problem: Micaela  Goal: Will exhibit normal sleep and speech and no impulsivity  Description: INTERVENTIONS:  1. Administer medication as ordered  2. Set limits on impulsive behavior  3. Make attempts to decrease external stimuli as possible  Outcome: Adithya Stark observed in dayroom social with peers. She is appropriate and cooperative with staff. Patient denies any symptoms of depression or anxiety, nor any thoughts of self harm at this time. She is currently on Macrobid for treatment of a UTI. Pavel Mathew denies any difficulty in initiating a urine flow, but endorses that burning with urination is still present. This nurse encouraged fluids. Patient reports that she slept well last PM achieving more than 9 hrs of rest. She endorses neck and lower back pain rating discomfort at a 7 and requested Ibuprofen for relief. Ibuprofen 400mg administered per PRN order. Patient received a New Order for [facility] 234mg IM and injection was administered in her left upper gluteal. Continued encouragement and support offered.

## 2022-11-29 NOTE — PROGRESS NOTES
Daily Progress Note  11/29/2022    Patient Name: Larissa Angulo    CHIEF COMPLAINT: Depression with suicidal ideation         SUBJECTIVE:    Nursing staff report the patient has maintained medication adherence and has not exhibited acute behavioral changes since admission yesterday. She is agreeable to assessment in the privacy of the [place] [address] where she reports that she was able to tolerate Invega long-acting injectable. She shares that she continues to struggle with the loss of of a recent relationship, an online boyfriend. She does share that visiting with family including her 10year-old son Mary Ann Verduzco on [place] was meaningful. She seems to have insight into why he remains in her sister's custody. She is somewhat forward thinking with plans to go to [place], [place]. She shares her plan to drive and appears to have little insight into the stress of driving independently. Today she is wearing excessive make-up including sparkles at her eyes. Her speech is slightly pressured and her current plans may be related to some impulsivity associated with prince. At this time, the patient is not appropriate for a lower level of care. There is risk of decompensation and patient warrants further hospitalization for safety and stabilization. Appetite:  [x] Normal/Adequate/Unchanged  [] Increased  [] Decreased      Sleep:       [] Normal/Adequate/Unchanged  [x] Fair  [] Poor      Group Attendance on Unit:   [x] Yes  [] Selectively    [] No    Medication Side Effects: Patient denies any medication side effects at the time of assessment. Mental Status Exam  Level of consciousness: Alert and awake. Appearance: Appropriate attire for setting, seated in chair, with fair  grooming and hygiene. Theatrical make-up. Behavior/Motor: Approachable, pleasant, no psychomotor abnormalities. Attitude toward examiner: Cooperative, attentive, good eye contact.   Speech: Slightly rapid rate, normal volume, normal tone.  Mood:  Patient reports \" okay\". Affect: Congruent  Thought processes: Linear, coherent, and overabundance of ideas. Thought content: Denies homicidal ideation. Suicidal Ideation: Fleeting suicidal ideations, without current plan or intent, contracts for safety on the unit. Delusions: No evidence of delusions. Denies paranoia. Perceptual Disturbance: Patient does not appear to be responding to internal stimuli. Denies auditory hallucinations. Denies visual hallucinations. Cognition: Oriented to self, location, time, and situation. Memory: Intact. Insight & Judgement: Poor. Data   height is 5' 7\" (1.702 m) and weight is 180 lb (81.6 kg). Her oral temperature is 97.7 °F (36.5 °C). Her blood pressure is 114/76 and her pulse is 78. Her respiration is 14. Labs:   Admission on 11/28/2022   Component Date Value Ref Range Status    Hemoglobin A1C 11/29/2022 5.6  4.0 - 6.0 % Final    Estimated Avg Glucose 11/29/2022 114  mg/dL Final    Comment: The ADA and AACC recommend providing the estimated average glucose result to permit better   patient understanding of their HBA1c result. Cholesterol 11/29/2022 133  <200 mg/dL Final    Comment:    Cholesterol Guidelines:      <200  Desirable   200-240  Borderline      >240  Undesirable         HDL 11/29/2022 63  >40 mg/dL Final    Comment:    HDL Guidelines:    <40     Undesirable   40-59    Borderline    >59     Desirable         LDL Cholesterol 11/29/2022 46  0 - 130 mg/dL Final    Comment:    LDL Guidelines:     <100    Desirable   100-129   Near to/above Desirable   130-159   Borderline      >159   Undesirable     Direct (measured) LDL and calculated LDL are not interchangeable tests.       Chol/HDL Ratio 11/29/2022 2.1  <5 Final            Triglycerides 11/29/2022 122  <150 mg/dL Final    Comment:    Triglyceride Guidelines:     <150   Desirable   150-199  Borderline   200-499  High     >499   Very high   Based on AHA Guidelines for fasting triglyceride, October 2012. Admission on 11/28/2022, Discharged on 11/28/2022   Component Date Value Ref Range Status    WBC 11/28/2022 10.3  3.5 - 11.3 k/uL Final    RBC 11/28/2022 4.31  3.95 - 5.11 m/uL Final    Hemoglobin 11/28/2022 12.0  11.9 - 15.1 g/dL Final    Hematocrit 11/28/2022 38.9  36.3 - 47.1 % Final    MCV 11/28/2022 90.3  82.6 - 102.9 fL Final    MCH 11/28/2022 27.8  25.2 - 33.5 pg Final    MCHC 11/28/2022 30.8  28.4 - 34.8 g/dL Final    RDW 11/28/2022 14.4  11.8 - 14.4 % Final    Platelets 10/09/6265 259  138 - 453 k/uL Final    MPV 11/28/2022 10.4  8.1 - 13.5 fL Final    NRBC Automated 11/28/2022 0.0  0.0 per 100 WBC Final    Seg Neutrophils 11/28/2022 57  36 - 65 % Final    Lymphocytes 11/28/2022 29  24 - 43 % Final    Monocytes 11/28/2022 8  3 - 12 % Final    Eosinophils % 11/28/2022 5 (A)  1 - 4 % Final    Basophils 11/28/2022 1  0 - 2 % Final    Immature Granulocytes 11/28/2022 0  0 % Final    Segs Absolute 11/28/2022 5.93  1.50 - 8.10 k/uL Final    Absolute Lymph # 11/28/2022 3.00  1. 10 - 3.70 k/uL Final    Absolute Mono # 11/28/2022 0.79  0.10 - 1.20 k/uL Final    Absolute Eos # 11/28/2022 0.46 (A)  0.00 - 0.44 k/uL Final    Basophils Absolute 11/28/2022 0.06  0.00 - 0.20 k/uL Final    Absolute Immature Granulocyte 11/28/2022 0.03  0.00 - 0.30 k/uL Final    Glucose 11/28/2022 119 (A)  70 - 99 mg/dL Final    BUN 11/28/2022 13  6 - 20 mg/dL Final    Creatinine 11/28/2022 0.74  0.50 - 0.90 mg/dL Final    Est, Glom Filt Rate 11/28/2022 >60  >60 mL/min/1.73m2 Final    Comment:       Effective Oct 3, 2022        These results are not intended for use in patients <25years of age. eGFR results are calculated without a race factor using the 2021 CKD-EPI equation. Careful clinical correlation is recommended, particularly when comparing to results   calculated using previous equations.   The CKD-EPI equation is less accurate in patients with extremes of muscle mass, extra-renal metabolism of creatine, excessive creatine ingestion, or following therapy that affects   renal tubular secretion. Calcium 11/28/2022 9.5  8.6 - 10.4 mg/dL Final    Sodium 11/28/2022 140  135 - 144 mmol/L Final    Potassium 11/28/2022 4.0  3.7 - 5.3 mmol/L Final    Chloride 11/28/2022 106  98 - 107 mmol/L Final    CO2 11/28/2022 27  20 - 31 mmol/L Final    Anion Gap 11/28/2022 7 (A)  9 - 17 mmol/L Final    HCG(Urine) Pregnancy Test 11/28/2022 NEGATIVE  NEGATIVE Final    Comment: Specimens with hCG levels near the threshold of the test (25 mIU/mL) may give a negative or   indeterminate result. In such cases, another test should be performed with a new specimen   in 48-72 hours. If early pregnancy is suspected clinically in this setting, correlation   with quantitative serum b-hCG level is suggested.       Acetaminophen Level 11/28/2022 <5 (A)  10 - 30 ug/mL Final    Ethanol 11/28/2022 <10  <10 mg/dL Final    Ethanol percent 11/28/2022 <0.010  <9.476 % Final    Salicylate Lvl 71/15/9410 1 (A)  3 - 10 mg/dL Final    Toxic Tricyclic Sc,Blood 85/15/1884 NEGATIVE  NEGATIVE Final    Amphetamine Screen, Ur 11/28/2022 NEGATIVE  NEGATIVE Final    Comment:       (Positive cutoff 1000 ng/mL)                  Barbiturate Screen, Ur 11/28/2022 NEGATIVE  NEGATIVE Final    Comment:       (Positive cutoff 200 ng/mL)                  Benzodiazepine Screen, Urine 11/28/2022 NEGATIVE  NEGATIVE Final    Comment:       (Positive cutoff 200 ng/mL)                  Cocaine Metabolite, Urine 11/28/2022 NEGATIVE  NEGATIVE Final    Comment:       (Positive cutoff 300 ng/mL)                  Methadone Screen, Urine 11/28/2022 NEGATIVE  NEGATIVE Final    Comment:       (Positive cutoff 300 ng/mL)                  Opiates, Urine 11/28/2022 NEGATIVE  NEGATIVE Final    Comment:       (Positive cutoff 300 ng/mL)                  Phencyclidine, Urine 11/28/2022 NEGATIVE  NEGATIVE Final    Comment:       (Positive cutoff 25 ng/mL) Cannabinoid Scrn, Ur 11/28/2022 POSITIVE (A)  NEGATIVE Final    Comment:       (Positive cutoff 50 ng/mL)                  Oxycodone Screen, Ur 11/28/2022 NEGATIVE  NEGATIVE Final    Comment:       (Positive cutoff 100 ng/mL)                  Fentanyl, Ur 11/28/2022 NEGATIVE  NEGATIVE Final    Comment:       (Positive cutoff  5 ng/ml)            Test Information 11/28/2022 Assay provides medical screening only. The absence of expected drug(s) and/or metabolite(s) may indicate diluted or adulterated urine, limitations of testing or timing of collection. Final    Comment: Testing for legal purposes should be confirmed by another method. To request confirmation   of test result, please call the lab within 7 days of sample submission. Admission on 11/27/2022, Discharged on 11/28/2022   Component Date Value Ref Range Status    Lithium Lvl 11/28/2022 0.6  0.6 - 1.2 mmol/L Final         Reviewed patient's current plan of care and vital signs with nursing staff.     Labs reviewed: [x] Yes  Last EKG in EMR reviewed: [x] Yes  QTc: 413    Medications  Current Facility-Administered Medications: fluticasone (FLONASE) 50 MCG/ACT nasal spray 1 spray, 1 spray, Each Nostril, Daily  hydroCHLOROthiazide (HYDRODIURIL) tablet 25 mg, 25 mg, Oral, Daily  lithium (LITHOBID) extended release tablet 600 mg, 600 mg, Oral, 2 times per day  prazosin (MINIPRESS) capsule 1 mg, 1 mg, Oral, Nightly  paliperidone (INVEGA) extended release tablet 3 mg, 3 mg, Oral, Daily  haloperidol lactate (HALDOL) injection 5 mg, 5 mg, IntraMUSCular, Q6H PRN **AND** LORazepam (ATIVAN) injection 2 mg, 2 mg, IntraMUSCular, Q6H PRN **AND** diphenhydrAMINE (BENADRYL) injection 50 mg, 50 mg, IntraMUSCular, Q6H PRN  acetaminophen (TYLENOL) tablet 650 mg, 650 mg, Oral, Q6H PRN  ibuprofen (ADVIL;MOTRIN) tablet 400 mg, 400 mg, Oral, Q6H PRN  hydrOXYzine HCl (ATARAX) tablet 50 mg, 50 mg, Oral, TID PRN  traZODone (DESYREL) tablet 50 mg, 50 mg, Oral, Nightly PRN  polyethylene glycol (GLYCOLAX) packet 17 g, 17 g, Oral, Daily PRN  aluminum & magnesium hydroxide-simethicone (MAALOX) 200-200-20 MG/5ML suspension 30 mL, 30 mL, Oral, Q6H PRN  nicotine (NICODERM CQ) 21 MG/24HR 1 patch, 1 patch, TransDERmal, Daily  nitrofurantoin (macrocrystal-monohydrate) (MACROBID) capsule 100 mg, 100 mg, Oral, 2 times per day    ASSESSMENT  Bipolar 1 disorder, mixed, severe (HCC)         HANDOFF  Patient symptoms remain unstable  Medications as determined by attending physician and as discussed trazodone as needed has been discontinued  Patient explore the reality of relocating [address] safely. Encourage participation in groups and milieu. Probable discharge is to be determined by MD    Electronically signed by MAURICE Sanabria CNP on 11/29/2022 at 3:08 PM    **This report has been created using voice recognition software. It may contain minor errors which are inherent in voice recognition technology. **  I independently saw and evaluated the patient. I reviewed the  documentation above. Any additional comments or changes to the    documentation are stated below otherwise agree with assessment.    -The patient is pleased at having received her first loading dose of [place]. She is denying any side effects from it. We will continue with oral Invega at this time. Her mood remains labile but is improved from yesterday. PLAN  Medications as noted above  Attempt to develop insight  Expected Length of Stay is 4-6 days. Psycho-education conducted. Supportive Therapy conducted.   Follow-up daily while on inpatient unit    Electronically signed by Nel Velazquez MD on 11/29/22 at 8:53 PM EST

## 2022-11-30 PROCEDURE — 6370000000 HC RX 637 (ALT 250 FOR IP): Performed by: INTERNAL MEDICINE

## 2022-11-30 PROCEDURE — 6370000000 HC RX 637 (ALT 250 FOR IP): Performed by: PSYCHIATRY & NEUROLOGY

## 2022-11-30 PROCEDURE — 99232 SBSQ HOSP IP/OBS MODERATE 35: CPT | Performed by: PSYCHIATRY & NEUROLOGY

## 2022-11-30 PROCEDURE — APPSS30 APP SPLIT SHARED TIME 16-30 MINUTES: Performed by: PSYCHIATRY & NEUROLOGY

## 2022-11-30 PROCEDURE — 1240000000 HC EMOTIONAL WELLNESS R&B

## 2022-11-30 RX ADMIN — HYDROCHLOROTHIAZIDE 25 MG: 25 TABLET ORAL at 08:42

## 2022-11-30 RX ADMIN — HYDROXYZINE HYDROCHLORIDE 50 MG: 50 TABLET, FILM COATED ORAL at 21:03

## 2022-11-30 RX ADMIN — HYDROXYZINE HYDROCHLORIDE 50 MG: 50 TABLET, FILM COATED ORAL at 15:06

## 2022-11-30 RX ADMIN — ACETAMINOPHEN 650 MG: 325 TABLET ORAL at 00:52

## 2022-11-30 RX ADMIN — PRAZOSIN HYDROCHLORIDE 1 MG: 1 CAPSULE ORAL at 21:04

## 2022-11-30 RX ADMIN — NITROFURANTOIN MONOHYDRATE/MACROCRYSTALLINE 100 MG: 25; 75 CAPSULE ORAL at 21:04

## 2022-11-30 RX ADMIN — IBUPROFEN 400 MG: 400 TABLET ORAL at 08:41

## 2022-11-30 RX ADMIN — FLUTICASONE PROPIONATE 1 SPRAY: 50 SPRAY, METERED NASAL at 08:41

## 2022-11-30 RX ADMIN — LITHIUM CARBONATE 600 MG: 300 TABLET, FILM COATED, EXTENDED RELEASE ORAL at 08:42

## 2022-11-30 RX ADMIN — POLYETHYLENE GLYCOL 3350 17 G: 17 POWDER, FOR SOLUTION ORAL at 21:04

## 2022-11-30 RX ADMIN — LITHIUM CARBONATE 600 MG: 300 TABLET, FILM COATED, EXTENDED RELEASE ORAL at 21:03

## 2022-11-30 RX ADMIN — ACETAMINOPHEN 650 MG: 325 TABLET ORAL at 21:04

## 2022-11-30 RX ADMIN — PALIPERIDONE 3 MG: 3 TABLET, EXTENDED RELEASE ORAL at 08:42

## 2022-11-30 RX ADMIN — NITROFURANTOIN MONOHYDRATE/MACROCRYSTALLINE 100 MG: 25; 75 CAPSULE ORAL at 08:42

## 2022-11-30 ASSESSMENT — PAIN SCALES - GENERAL: PAINLEVEL_OUTOF10: 7

## 2022-11-30 ASSESSMENT — PAIN DESCRIPTION - LOCATION
LOCATION: BACK
LOCATION: BACK

## 2022-11-30 ASSESSMENT — PAIN - FUNCTIONAL ASSESSMENT: PAIN_FUNCTIONAL_ASSESSMENT: 0-10

## 2022-11-30 ASSESSMENT — PAIN DESCRIPTION - ORIENTATION: ORIENTATION: MID;LOWER

## 2022-11-30 ASSESSMENT — PAIN DESCRIPTION - DESCRIPTORS: DESCRIPTORS: ACHING;DISCOMFORT

## 2022-11-30 NOTE — BH NOTE
Pharmacy Med Education Group Note    Date: 11/30  Start Time: 1430  End Time: 1500    Number Participants in Group:  7/17    Goal:  Patient will demonstrate an understanding of the medications intended purpose and possible adverse effects  Topic: [place] for Pharmacy Med Ed Group    Discipline Responsible:     OT  AT  [facility].  RT     X Other       Participation Level:     None  Minimal      X Active Listener    X Interactive    Monopolizing         Participation Quality:    X Appropriate  Inappropriate     X       Attentive        Intrusive          Sharing        Resistant          Supportive        Lethargic       Affective:     X Congruent  Incongruent  Blunted  Flat    Constricted  Anxious  Elated  Angry    Labile  Depressed  Other         Cognitive:    X Alert  Oriented PPTP     Concentration   X G  F  P   Attention Span   X G  F  P   Short-Term Memory   X G  F  P   Long-Term Memory  G  F  P   ProblemSolving/  Decision Making  G  F  P   Ability to Process  Information   X G  F  P      Contributing Factors             Delusional             Hallucinating             Flight of Ideas             Other:       Modes of Intervention:    X Education   X Support  Exploration    Clarifying  Problem Solving  Confrontation    Socialization  Limit Setting  Reality Testing    Activity  Movement  Media    Other:            Response to Learning:    X Able to verbalize current knowledge/experience    Able to verbalize/acknowledge new learning    Able to retain information    Capable of insight    Able to change behavior    Progressing to goal    Other:        Comments:     Felix Beverly PharmD, TATIANA  PGY1 Pharmacy Resident  11/30/2022 3:11 PM

## 2022-11-30 NOTE — PLAN OF CARE
Problem: Chronic Conditions and Co-morbidities  Goal: Patient's chronic conditions and co-morbidity symptoms are monitored and maintained or improved  11/29/2022 2317 by Rachael Gaona RN  Outcome: Progressing     Problem: Pain  Goal: Verbalizes/displays adequate comfort level or baseline comfort level  11/29/2022 2317 by Rachael Gaona RN  Outcome: Progressing     Problem: Self Harm/Suicidality  Goal: Will have no self-injury during hospital stay  Description: INTERVENTIONS:  1. Q 30 MINUTES: Routine safety checks  2. Q SHIFT & PRN: Assess risk to determine if routine checks are adequate to maintain patient safety  11/29/2022 2317 by Rachael Gaona RN  Outcome: Progressing     Problem: Micaela  Goal: Will exhibit normal sleep and speech and no impulsivity  Description: INTERVENTIONS:  1. Administer medication as ordered  2. Set limits on impulsive behavior  3. Make attempts to decrease external stimuli as possible  11/29/2022 2317 by Rachael Gaona RN  Outcome: Progressing   Patient is calm and cooperative during assessment. Pt reports her plan when she leaves is to drive to [facility]. Patient has been taking her medications as prescribed. She is denying auditory or visual hallucinations and does not seem to be responding to internal stimuli. Q15 minute checks are maintained.

## 2022-11-30 NOTE — GROUP NOTE
Group Therapy Note    Date: 11/30/2022    Group Start Time: 9514  Group End Time: 0930  Group Topic: Daily Inventory Group    [facility]    Saintclair Rounds, LPN        Group Therapy Note    Attendees: 4/17         Patient's Goal:  do a 30 minute exercise     Notes:  looking forward to calling sister    Status After Intervention:  Improved    Participation Level: Interactive    Participation Quality: Appropriate      Speech:  normal      Thought Process/Content: Linear      Affective Functioning: Congruent      Mood: euthymic      Level of consciousness:  Oriented x4      Response to Learning: Able to verbalize current knowledge/experience      Endings: None Reported    Modes of Intervention: Socialization      Discipline Responsible: Licensed Practical Nurse      Signature:  Saintclair Rounds, LPN

## 2022-11-30 NOTE — PLAN OF CARE
Patient reports mild, depression and anxiety. Denies thoughts of harming self or others, denies suicidal/homicidal ideations. Denies all hallucinations. Patient is pleasant, cooperative, medication and behavioral compliant. Writer washed patient clothes. Patient showered. Every 15 minute safety checks maintained. Problem: Chronic Conditions and Co-morbidities  Goal: Patient's chronic conditions and co-morbidity symptoms are monitored and maintained or improved  11/30/2022 1219 by Tana Le LPN  Outcome: Progressing     Problem: Pain  Goal: Verbalizes/displays adequate comfort level or baseline comfort level  11/30/2022 1219 by Tana Le LPN  Outcome: Progressing     Problem: Self Harm/Suicidality  Goal: Will have no self-injury during hospital stay  Description: INTERVENTIONS:  1. Q 30 MINUTES: Routine safety checks  2. Q SHIFT & PRN: Assess risk to determine if routine checks are adequate to maintain patient safety  11/30/2022 1219 by Tana Le LPN  Outcome: Progressing     Problem: Micaela  Goal: Will exhibit normal sleep and speech and no impulsivity  Description: INTERVENTIONS:  1. Administer medication as ordered  2. Set limits on impulsive behavior  3.  Make attempts to decrease external stimuli as possible  11/30/2022 1219 by Tana Le LPN  Outcome: Progressing

## 2022-11-30 NOTE — PROGRESS NOTES
Daily Progress Note  11/30/2022    Patient Name: Gali Miramontes    CHIEF COMPLAINT: Depression with suicidal ideation         SUBJECTIVE:    Nursing staff report the patient has maintained medication adherence and has not exhibited acute behavioral changes since admission yesterday. She is agreeable to assessment in the common area where she reports improvement in her mood with plans to shower this morning. She does have some complaint regarding slight pain at injection site of Invega sustained and is encouraged to explore utilizing analgesics as they are available as needed. Patient is polite and gracious, appreciating the safety of the milieu. She is reporting improved sleep, attending group programming and is more forward thinking. She continues to endorse fleeting suicidal ideation and is not able to contract for safety in the community. Appetite:  [x] Normal/Adequate/Unchanged  [] Increased  [] Decreased      Sleep:       [] Normal/Adequate/Unchanged  [x] Fair  [] Poor      Group Attendance on Unit:   [x] Yes  [] Selectively    [] No    Medication Side Effects: Patient denies any medication side effects at the time of assessment. Mental Status Exam  Level of consciousness: Alert and awake. Appearance: Appropriate attire for setting, standing, with fair  grooming and hygiene. Theatrical make-up. Behavior/Motor: Approachable, pleasant, no psychomotor abnormalities. Attitude toward examiner: Cooperative, attentive, good eye contact. Speech: Slightly rapid rate, normal volume, normal tone. Mood:  Patient reports \" okay\". Affect: Congruent  Thought processes: Linear, coherent, and overabundance of ideas. Thought content: Denies homicidal ideation. Suicidal Ideation: Fleeting suicidal ideations, without current plan or intent, contracts for safety on the unit. Delusions: No evidence of delusions. Denies paranoia.   Perceptual Disturbance: Patient does not appear to be responding to internal stimuli. Denies auditory hallucinations. Denies visual hallucinations. Cognition: Oriented to self, location, time, and situation. Memory: Intact. Insight & Judgement: Poor. Data   height is 5' 7\" (1.702 m) and weight is 180 lb (81.6 kg). Her oral temperature is 98.1 °F (36.7 °C). Her blood pressure is 124/79 and her pulse is 84. Her respiration is 14. Labs:   Admission on 11/28/2022   Component Date Value Ref Range Status    Hemoglobin A1C 11/29/2022 5.6  4.0 - 6.0 % Final    Estimated Avg Glucose 11/29/2022 114  mg/dL Final    Comment: The ADA and AACC recommend providing the estimated average glucose result to permit better   patient understanding of their HBA1c result. Cholesterol 11/29/2022 133  <200 mg/dL Final    Comment:    Cholesterol Guidelines:      <200  Desirable   200-240  Borderline      >240  Undesirable         HDL 11/29/2022 63  >40 mg/dL Final    Comment:    HDL Guidelines:    <40     Undesirable   40-59    Borderline    >59     Desirable         LDL Cholesterol 11/29/2022 46  0 - 130 mg/dL Final    Comment:    LDL Guidelines:     <100    Desirable   100-129   Near to/above Desirable   130-159   Borderline      >159   Undesirable     Direct (measured) LDL and calculated LDL are not interchangeable tests. Chol/HDL Ratio 11/29/2022 2.1  <5 Final            Triglycerides 11/29/2022 122  <150 mg/dL Final    Comment:    Triglyceride Guidelines:     <150   Desirable   150-199  Borderline   200-499  High     >499   Very high   Based on AHA Guidelines for fasting triglyceride, October 2012. Reviewed patient's current plan of care and vital signs with nursing staff.     Labs reviewed: [x] Yes  Last EKG in EMR reviewed: [x] Yes  QTc: 413    Medications  Current Facility-Administered Medications: fluticasone (FLONASE) 50 MCG/ACT nasal spray 1 spray, 1 spray, Each Nostril, Daily  hydroCHLOROthiazide (HYDRODIURIL) tablet 25 mg, 25 mg, Oral, Daily  lithium (LITHOBID) extended release tablet 600 mg, 600 mg, Oral, 2 times per day  prazosin (MINIPRESS) capsule 1 mg, 1 mg, Oral, Nightly  paliperidone (INVEGA) extended release tablet 3 mg, 3 mg, Oral, Daily  haloperidol lactate (HALDOL) injection 5 mg, 5 mg, IntraMUSCular, Q6H PRN **AND** LORazepam (ATIVAN) injection 2 mg, 2 mg, IntraMUSCular, Q6H PRN **AND** diphenhydrAMINE (BENADRYL) injection 50 mg, 50 mg, IntraMUSCular, Q6H PRN  acetaminophen (TYLENOL) tablet 650 mg, 650 mg, Oral, Q6H PRN  ibuprofen (ADVIL;MOTRIN) tablet 400 mg, 400 mg, Oral, Q6H PRN  hydrOXYzine HCl (ATARAX) tablet 50 mg, 50 mg, Oral, TID PRN  polyethylene glycol (GLYCOLAX) packet 17 g, 17 g, Oral, Daily PRN  aluminum & magnesium hydroxide-simethicone (MAALOX) 200-200-20 MG/5ML suspension 30 mL, 30 mL, Oral, Q6H PRN  nicotine (NICODERM CQ) 21 MG/24HR 1 patch, 1 patch, TransDERmal, Daily  nitrofurantoin (macrocrystal-monohydrate) (MACROBID) capsule 100 mg, 100 mg, Oral, 2 times per day    ASSESSMENT  Bipolar 1 disorder, mixed, severe (HCC)         HANDOFF  Patient symptoms remain unstable  Medications as determined by attending physician   Received [place] 234 mg HUNT 11/29/2022  Patient explore the reality of relocating to [place], [place] safely. Encourage participation in groups and milieu. Probable discharge is to be determined by MD    Electronically signed by MAURICE Ferraro CNP on 11/30/2022 at 4:23 PM    **This report has been created using voice recognition software. It may contain minor errors which are inherent in voice recognition technology. **  I independently saw and evaluated the patient. I reviewed the  documentation above. Any additional comments or changes to the    documentation are stated below otherwise agree with assessment.      -The patient is overactive on the unit. She is euphoric. She has tolerated the first loading dose of [place] without any problems.       PLAN  Medications as noted above  Attempt to develop insight  Expected Length of Stay is 4-6 days. Psycho-education conducted. Supportive Therapy conducted.   Follow-up daily while on inpatient unit    Electronically signed by Shirleyann Gowers, MD on 11/30/22 at 7:24 PM EST

## 2022-11-30 NOTE — GROUP NOTE
Group Therapy Note    Date: 11/30/2022    Group Start Time: 1100  Group End Time: 8782  Group Topic: Psychoeducation    [facility] Raynelle Closs, CTRS           Patient's Goal:  to improve social interaction/ improve self expression     Notes:  pt was pleasant and participated well     Status After Intervention:  Improved    Participation Level:  Active Listener and Interactive    Participation Quality: Appropriate, Sharing, and Supportive      Speech: hyperverbal       Thought Process/Content:flight of ideas      Affective Functioning: Congruent      Mood:anxious      Level of consciousness:  Alert      Response to Learning: Able to verbalize current knowledge/experience and Progressing to goal      Endings: None Reported    Modes of Intervention: Support, Socialization, and Activity      Discipline Responsible: Psychoeducational Specialist      Signature:  Judit Saeed

## 2022-11-30 NOTE — PLAN OF CARE
[address] [place] [address]  Day 3 Interdisciplinary Treatment Plan NOTE    Review Date & Time: 11/30/2022   1:22 pm    Admission Type:   Admission Type: Voluntary    Reason for admission:  Reason for Admission: suicidal thoughts et manic behaviors  Estimated Length of Stay: 5-7 days  Estimated Discharge Date Update: to be determined by physician    PATIENT STRENGTHS:  Patient Strengths    Patient Strengths and Limitations:Limitations: Difficult relationships / poor social skills, Difficulty problem solving/relies on others to help solve problems  Addictive Behavior:Addictive Behavior  In the Past 3 Months, Have You Felt or Has Someone Told You That You Have a Problem With  : None  Medical Problems:  Past Medical History:   Diagnosis Date    Anxiety     Bipolar 1 disorder ([facility] [address] 1999    Depression     Gestational diabetes 7/22/2016    OCD (obsessive compulsive disorder) 1999    PTSD (post-traumatic stress disorder)     Rapid rate of speech     [facility]        Risk:  Fall Risk   Jairo Scale Jairo Scale Score: 22  BVC    Change in scores no Changes to plan of Care no    Status EXAM:   Mental Status and Behavioral Exam  Normal: No  Level of Assistance: Independent/Self  Facial Expression: Flat  Affect: Appropriate  Level of Consciousness: Alert  Frequency of Checks: 4 times per hour, close  Mood:Normal: No  Mood: Depressed, Anxious  Motor Activity:Normal: Yes  Motor Activity: Increased  Eye Contact: Good  Observed Behavior: Cooperative  Sexual Misconduct History: Current - no  Involved In Any Sexual Misconduct With Others? : No  History of Sexually Inappropriate Behavior When Previously Hospitalized?: No  Uncontrollable/Compulsive Masturbation?: No  Difficulty Controlling Sexual Impulses?: No  Preception: [place] to person, [place] to time, [place] to place, [place] to situation  Attention:Normal: No  Attention: Distractible  Thought Processes: Flight of ideas  Thought Content:Normal: No  Thought Content: Preoccupations  Depression Symptoms: No problems reported or observed. Anxiety Symptoms: Generalized  Micaela Symptoms: No problems reported or observed. Hallucinations: None  Delusions: No  Memory:Normal: Yes  Memory: Poor recent  Insight and Judgment: No  Insight and Judgment: Poor judgment, Poor insight    Daily Assessment Last Entry:   Daily Sleep (WDL): Within Defined Limits            Daily Nutrition (WDL): Within Defined Limits  Level of Assistance: Independent/Self    Patient Monitoring:  Frequency of Checks: 4 times per hour, close    Psychiatric Symptoms:   Depression Symptoms  Depression Symptoms: No problems reported or observed. Anxiety Symptoms  Anxiety Symptoms: Generalized  Micaela Symptoms  Micaela Symptoms: No problems reported or observed. Suicide Risk CSSR-S:  1) Within the past month, have you wished you were dead or wished you could go to sleep and not wake up? : Yes  2) Have you actually had any thoughts of killing yourself? : Yes  3) Have you been thinking about how you might kill yourself? : Yes  5) Have you started to work out or worked out the details of how to kill yourself?  Do you intend to carry out this plan? : No  6) Have you ever done anything, started to do anything, or prepared to do anything to end your life?: No  Change in Result no Change in Plan of care no      EDUCATION:   EDUCATION:   Learner Progress Toward Treatment Goals: Reviewed results and recommendations of this team, Reviewed group plan and strategies, Reviewed signs, symptoms and risk of self harm and violent behavior, Reviewed goals and plan of care    Method:small group, individual verbal education    Outcome:verbalized by patient, but needs reinforcement to obtain goals    PATIENT GOALS:  Short term: Per social work, pt refused to attend treatment team/  Long term:    PLAN/TREATMENT RECOMMENDATIONS UPDATE: continue with group therapies, increased socialization, continue planning for after discharge goals, continue with medication compliance    SHORT-TERM GOALS UPDATE:   Time frame for Short-Term Goals: 5-7 days    LONG-TERM GOALS UPDATE:   Time frame for Long-Term Goals: 6 months  Members Present in Team Meeting: See 1 Annie Geronimo RN  .

## 2022-11-30 NOTE — PROGRESS NOTES
11/30/22 1526   Encounter Summary   Encounter Overview/Reason  Spiritual/Emotional Needs   Service Provided For: Patient   Referral/Consult From: Satish   Last Encounter  11/30/22   Complexity of Encounter Moderate   Begin Time 0130   End Time  0200   Total Time Calculated 30 min   Spiritual/Emotional needs   Type Spiritual Support   Behavioral Health    Type  Jehovah's witness Group   Assessment/Intervention/Outcome   Assessment Anxious   Intervention Active listening;Sustaining Presence/Ministry of presence;Read/Provided Scripture;Prayer (assurance of)/[place];Explored/Affirmed feelings, thoughts, concerns; Discussed belief system/Judaism practices/bishop;Discussed meaning/purpose   Outcome Receptive;Engaged in conversation; Acceptance

## 2022-12-01 PROCEDURE — 1240000000 HC EMOTIONAL WELLNESS R&B

## 2022-12-01 PROCEDURE — 6370000000 HC RX 637 (ALT 250 FOR IP): Performed by: INTERNAL MEDICINE

## 2022-12-01 PROCEDURE — 6370000000 HC RX 637 (ALT 250 FOR IP): Performed by: PSYCHIATRY & NEUROLOGY

## 2022-12-01 PROCEDURE — APPSS30 APP SPLIT SHARED TIME 16-30 MINUTES

## 2022-12-01 PROCEDURE — 99232 SBSQ HOSP IP/OBS MODERATE 35: CPT | Performed by: PSYCHIATRY & NEUROLOGY

## 2022-12-01 RX ADMIN — HYDROXYZINE HYDROCHLORIDE 50 MG: 50 TABLET, FILM COATED ORAL at 10:43

## 2022-12-01 RX ADMIN — IBUPROFEN 400 MG: 400 TABLET ORAL at 10:43

## 2022-12-01 RX ADMIN — HYDROXYZINE HYDROCHLORIDE 50 MG: 50 TABLET, FILM COATED ORAL at 21:06

## 2022-12-01 RX ADMIN — HYDROXYZINE HYDROCHLORIDE 50 MG: 50 TABLET, FILM COATED ORAL at 16:14

## 2022-12-01 RX ADMIN — IBUPROFEN 400 MG: 400 TABLET ORAL at 01:26

## 2022-12-01 RX ADMIN — FLUTICASONE PROPIONATE 1 SPRAY: 50 SPRAY, METERED NASAL at 07:44

## 2022-12-01 RX ADMIN — PRAZOSIN HYDROCHLORIDE 1 MG: 1 CAPSULE ORAL at 21:05

## 2022-12-01 RX ADMIN — NITROFURANTOIN MONOHYDRATE/MACROCRYSTALLINE 100 MG: 25; 75 CAPSULE ORAL at 07:45

## 2022-12-01 RX ADMIN — LITHIUM CARBONATE 600 MG: 300 TABLET, FILM COATED, EXTENDED RELEASE ORAL at 07:45

## 2022-12-01 RX ADMIN — IBUPROFEN 400 MG: 400 TABLET ORAL at 22:54

## 2022-12-01 RX ADMIN — LITHIUM CARBONATE 600 MG: 300 TABLET, FILM COATED, EXTENDED RELEASE ORAL at 21:06

## 2022-12-01 RX ADMIN — PALIPERIDONE 3 MG: 3 TABLET, EXTENDED RELEASE ORAL at 07:45

## 2022-12-01 RX ADMIN — HYDROCHLOROTHIAZIDE 25 MG: 25 TABLET ORAL at 07:45

## 2022-12-01 ASSESSMENT — PAIN - FUNCTIONAL ASSESSMENT: PAIN_FUNCTIONAL_ASSESSMENT: ACTIVITIES ARE NOT PREVENTED

## 2022-12-01 ASSESSMENT — PAIN SCALES - GENERAL
PAINLEVEL_OUTOF10: 6
PAINLEVEL_OUTOF10: 9
PAINLEVEL_OUTOF10: 7

## 2022-12-01 ASSESSMENT — PAIN DESCRIPTION - LOCATION
LOCATION: BACK
LOCATION: BACK

## 2022-12-01 NOTE — GROUP NOTE
Group Therapy Note    Date: 12/1/2022    Group Start Time: 1430  Group End Time: 6928  Group Topic: Healthy Living/Wellness    [facility] FELII COREEN Estes        Group Therapy Note    Attendees: 9/15       Patient's Goal:  Relaxation    Status After Intervention:  Unchanged    Participation Level:  Active Listener and Interactive    Participation Quality: Appropriate, Attentive, Sharing, and Supportive      Speech:  normal      Thought Process/Content: Logical  Flight of ideas      Affective Functioning: Flat      Mood: anxious      Level of consciousness:  Alert, Attentive, and Preoccupied      Response to Learning: Able to verbalize current knowledge/experience      Endings: None Reported    Modes of Intervention: Education, Support, Socialization, Exploration, and Clarifying      Discipline Responsible: [address] [employer]      Signature:  Sunday Estes

## 2022-12-01 NOTE — GROUP NOTE
Group Therapy Note    Date: 12/1/2022    Group Start Time: 1330  Group End Time: 6349  Group Topic: Psychoeducation    [facility]    Annreginald Parents, CTRS    Psych-Ed/Relapse Prevention Group Note        Date: December 1, 2022 Start Time: 1:30pm  End Time:  2:15pm      Number of Participants in Group & Unit Census:  10/17    Topic: psycheducation group     Goal of Group: to improve social interactions, improve coping skills       Comments:     Patient did not participate in Psych-Ed/Relapse Prevention group, despite staff encouragement and explanation of benefits. Patient remain seclusive to self. Q15 minute safety checks maintained for patient safety and will continue to encourage patient to attend unit programming.             Signature:  Jaclyn Bolaños

## 2022-12-01 NOTE — PLAN OF CARE
Problem: Chronic Conditions and Co-morbidities  Goal: Patient's chronic conditions and co-morbidity symptoms are monitored and maintained or improved  Outcome: Progressing     Problem: Pain  Goal: Verbalizes/displays adequate comfort level or baseline comfort level  Outcome: Progressing     Problem: Self Harm/Suicidality  Goal: Will have no self-injury during hospital stay  Description: INTERVENTIONS:  1. Q 30 MINUTES: Routine safety checks  2. Q SHIFT & PRN: Assess risk to determine if routine checks are adequate to maintain patient safety  Outcome: Progressing     Problem: Micaela  Goal: Will exhibit normal sleep and speech and no impulsivity  Description: INTERVENTIONS:  1. Administer medication as ordered  2. Set limits on impulsive behavior  3. Make attempts to decrease external stimuli as possible  Outcome: Progressing    Patient is medication compliant. Patient denies having depression, anxiety, hallucinations, delusions, and suicidal/homicidal ideation. Patient is accepting of one-on-one talk time with staff. Patient has completed hygiene routines. Patient has eaten all meals provided. Q15 minute checks maintained. Reassurance and support provided by staff. Patient remains safe at this time.

## 2022-12-01 NOTE — PLAN OF CARE
Problem: Self Harm/Suicidality  Goal: Will have no self-injury during hospital stay  Description: INTERVENTIONS:    11/30/2022 2152 by Arsenio Owusu LPN  Outcome: Progressing     Problem: Micaela  Goal: Will exhibit normal sleep and speech and no impulsivity  Description: INTERVENTIONS:    11/30/2022 2152 by Arsenio Owusu LPN  Outcome: Progressing   Patient denies suicidal ideas at this time. Patient denies homicidal ideas at this time. Patient verbalized depressive symptoms at this time. Patient has been out in day room watching tv and socializing with peers. Patient is free of self harm at this time. Patient agrees to seek out staff if thoughts to harm self arise. Staff will provide support and reassurance as needed. Safety checks maintained every 15 minutes.

## 2022-12-01 NOTE — GROUP NOTE
Group Therapy Note    Date: 12/1/2022    Group Start Time: 1100  Group End Time: 1130  Group Topic: Psychoeducation    JUANCARLOS Bellamy    Cognitive Skills Group Note        Date: December 1,2022 Start Time: 11am  End Time: 11:30am      Number of Participants in Group & Unit Census:  8/20    Topic: cognitive skills     Goal of Group: pt will improve social skills and improve communication skills       Comments:     Patient did not participate in Cognitive Skills group, despite staff encouragement and explanation of benefits. Patient remain seclusive to self. Q15 minute safety checks maintained for patient safety and will continue to encourage patient to attend unit programming.               Signature:  Shelly Ro

## 2022-12-01 NOTE — PROGRESS NOTES
Daily Progress Note  12/1/2022    Patient Name: Sherri Garcia    CHIEF COMPLAINT: Depression with suicidal ideation         SUBJECTIVE:    Patient was met with Claude Reed for follow-up interview. Patient has been compliant with scheduled medications at this time and has not required emergency medications in the past 24 hours. When approached for interview patient states she is good however she has been isolating today and having some \"negative feelings\". Patient reports she does not know why. Patient presents as more depressed today and endorses depression as a 5 out of 10 on a 1-10 scale (1 being low and 10 being high). Patient endorses improvement in suicidal ideation. Patient's affect is irritable at times however mostly pleasant. Patient does present with some rapid speech and became increasingly irritable when discussing financial concerns and her payee. Patient also presents with continued poor concentration however is able to easily be redirected to linear conversation. Patient is denying auditory and visual hallucinations at this time. Patient reports benefit from hospitalization surrounds interacting with people who are supportive to her. Patient states a goal of continued support in the community and discussed wanting new placement with a renewed mind however after discussion with social work they are in agreement with continuing treatment at [facility]. Appetite:  [x] Normal/Adequate/Unchanged  [] Increased  [] Decreased      Sleep:       [x] Normal/Adequate/Unchanged  [] Fair  [] Poor      Group Attendance on Unit:   [] Yes  [x] Selectively    [] No    Medication Side Effects: Patient denies any medication side effects at the time of assessment. Mental Status Exam  Level of consciousness: Alert and awake. Appearance: Appropriate attire for setting, standing, with fair  grooming and hygiene. Theatrical make-up.   Behavior/Motor: Approachable, pleasant, irritable at times, no psychomotor abnormalities. Attitude toward examiner: Cooperative, attentive, good eye contact. Speech: Slightly rapid rate, normal volume, normal tone. Mood:  Patient reports \" some negative feelings\". Affect: Congruent  Thought processes: Linear, coherent, and overabundance of ideas. Thought content: Denies homicidal ideation. Focused on financial concerns and community. Suicidal Ideation: Reports improvement in suicidal ideations, without current plan or intent, contracts for safety on the unit. Delusions: No evidence of delusions. Denies paranoia. Perceptual Disturbance: Patient does not appear to be responding to internal stimuli. Denies auditory hallucinations. Denies visual hallucinations. Cognition: Oriented to self, location, time, and situation. Memory: Intact. Insight & Judgement: Poor. Data   height is 5' 7\" (1.702 m) and weight is 180 lb (81.6 kg). Her temporal temperature is 97.5 °F (36.4 °C). Her blood pressure is 116/75 and her pulse is 87. Her respiration is 14. Labs:   Admission on 11/28/2022   Component Date Value Ref Range Status    Hemoglobin A1C 11/29/2022 5.6  4.0 - 6.0 % Final    Estimated Avg Glucose 11/29/2022 114  mg/dL Final    Comment: The ADA and AACC recommend providing the estimated average glucose result to permit better   patient understanding of their HBA1c result. Cholesterol 11/29/2022 133  <200 mg/dL Final    Comment:    Cholesterol Guidelines:      <200  Desirable   200-240  Borderline      >240  Undesirable         HDL 11/29/2022 63  >40 mg/dL Final    Comment:    HDL Guidelines:    <40     Undesirable   40-59    Borderline    >59     Desirable         LDL Cholesterol 11/29/2022 46  0 - 130 mg/dL Final    Comment:    LDL Guidelines:     <100    Desirable   100-129   Near to/above Desirable   130-159   Borderline      >159   Undesirable     Direct (measured) LDL and calculated LDL are not interchangeable tests.       Chol/HDL Ratio 11/29/2022 2.1  <5 Final            Triglycerides 11/29/2022 122  <150 mg/dL Final    Comment:    Triglyceride Guidelines:     <150   Desirable   150-199  Borderline   200-499  High     >499   Very high   Based on AHA Guidelines for fasting triglyceride, October 2012. Reviewed patient's current plan of care and vital signs with nursing staff. Labs reviewed: [x] Yes  Last EKG in EMR reviewed: [x] Yes  QTc: 413    Medications  Current Facility-Administered Medications: fluticasone (FLONASE) 50 MCG/ACT nasal spray 1 spray, 1 spray, Each Nostril, Daily  hydroCHLOROthiazide (HYDRODIURIL) tablet 25 mg, 25 mg, Oral, Daily  lithium (LITHOBID) extended release tablet 600 mg, 600 mg, Oral, 2 times per day  prazosin (MINIPRESS) capsule 1 mg, 1 mg, Oral, Nightly  paliperidone (INVEGA) extended release tablet 3 mg, 3 mg, Oral, Daily  haloperidol lactate (HALDOL) injection 5 mg, 5 mg, IntraMUSCular, Q6H PRN **AND** LORazepam (ATIVAN) injection 2 mg, 2 mg, IntraMUSCular, Q6H PRN **AND** diphenhydrAMINE (BENADRYL) injection 50 mg, 50 mg, IntraMUSCular, Q6H PRN  acetaminophen (TYLENOL) tablet 650 mg, 650 mg, Oral, Q6H PRN  ibuprofen (ADVIL;MOTRIN) tablet 400 mg, 400 mg, Oral, Q6H PRN  hydrOXYzine HCl (ATARAX) tablet 50 mg, 50 mg, Oral, TID PRN  polyethylene glycol (GLYCOLAX) packet 17 g, 17 g, Oral, Daily PRN  aluminum & magnesium hydroxide-simethicone (MAALOX) 200-200-20 MG/5ML suspension 30 mL, 30 mL, Oral, Q6H PRN  nicotine (NICODERM CQ) 21 MG/24HR 1 patch, 1 patch, TransDERmal, Daily    ASSESSMENT  Bipolar 1 disorder, mixed, severe (HCC)         HANDOFF  Patient symptoms: Showing some improvement  Medications as determined by attending physician   Received Andrea Fam Sustenna 234 mg HUNT 11/29/2022  Encourage participation in groups and milieu. Probable discharge is to be determined by MD    Electronically signed by MAURICE Brooks CNP on 12/1/2022 at 5:52 PM    **This report has been created using voice recognition software. It may contain minor errors which are inherent in voice recognition technology. **  I independently saw and evaluated the patient. I reviewed the  documentation above. Any additional comments or changes to the    documentation are stated below otherwise agree with assessment.      -The patient continues to be overactive. She is anxious and also disorganized. She has no side effects from [place]. The second dose has been ordered for tomorrow. PLAN  Medications as noted above  Attempt to develop insight  Expected Length of Stay is 3-5 days. Psycho-education conducted. Supportive Therapy conducted.   Follow-up daily while on inpatient unit    Electronically signed by Cristina Carney MD on 12/1/22 at 7:25 PM EST

## 2022-12-02 VITALS
HEART RATE: 89 BPM | DIASTOLIC BLOOD PRESSURE: 65 MMHG | SYSTOLIC BLOOD PRESSURE: 122 MMHG | HEIGHT: 67 IN | WEIGHT: 180 LBS | TEMPERATURE: 97.9 F | BODY MASS INDEX: 28.25 KG/M2 | RESPIRATION RATE: 15 BRPM

## 2022-12-02 PROCEDURE — 6370000000 HC RX 637 (ALT 250 FOR IP): Performed by: PSYCHIATRY & NEUROLOGY

## 2022-12-02 PROCEDURE — 99239 HOSP IP/OBS DSCHRG MGMT >30: CPT | Performed by: PSYCHIATRY & NEUROLOGY

## 2022-12-02 RX ORDER — LITHIUM CARBONATE 300 MG/1
600 TABLET, FILM COATED, EXTENDED RELEASE ORAL EVERY 12 HOURS SCHEDULED
Qty: 60 TABLET | Refills: 3 | Status: SHIPPED | OUTPATIENT
Start: 2022-12-02

## 2022-12-02 RX ORDER — PRAZOSIN HYDROCHLORIDE 1 MG/1
1 CAPSULE ORAL NIGHTLY
Qty: 30 CAPSULE | Refills: 0 | Status: SHIPPED | OUTPATIENT
Start: 2022-12-02

## 2022-12-02 RX ORDER — HYDROCHLOROTHIAZIDE 25 MG/1
25 TABLET ORAL DAILY
Qty: 30 TABLET | Refills: 0 | Status: SHIPPED | OUTPATIENT
Start: 2022-12-02

## 2022-12-02 RX ORDER — FLUTICASONE PROPIONATE 50 MCG
1 SPRAY, SUSPENSION (ML) NASAL DAILY
Qty: 16 G | Refills: 3 | Status: SHIPPED | OUTPATIENT
Start: 2022-12-02

## 2022-12-02 RX ADMIN — POLYETHYLENE GLYCOL 3350 17 G: 17 POWDER, FOR SOLUTION ORAL at 10:48

## 2022-12-02 RX ADMIN — HYDROCHLOROTHIAZIDE 25 MG: 25 TABLET ORAL at 08:03

## 2022-12-02 RX ADMIN — ACETAMINOPHEN 650 MG: 325 TABLET ORAL at 10:48

## 2022-12-02 RX ADMIN — LITHIUM CARBONATE 600 MG: 300 TABLET, FILM COATED, EXTENDED RELEASE ORAL at 08:03

## 2022-12-02 RX ADMIN — ACETAMINOPHEN 650 MG: 325 TABLET ORAL at 01:15

## 2022-12-02 RX ADMIN — HYDROXYZINE HYDROCHLORIDE 50 MG: 50 TABLET, FILM COATED ORAL at 13:16

## 2022-12-02 RX ADMIN — PALIPERIDONE 3 MG: 3 TABLET, EXTENDED RELEASE ORAL at 08:03

## 2022-12-02 RX ADMIN — FLUTICASONE PROPIONATE 1 SPRAY: 50 SPRAY, METERED NASAL at 08:02

## 2022-12-02 ASSESSMENT — PAIN DESCRIPTION - LOCATION: LOCATION: BACK

## 2022-12-02 ASSESSMENT — PAIN DESCRIPTION - ORIENTATION: ORIENTATION: LOWER

## 2022-12-02 ASSESSMENT — PAIN - FUNCTIONAL ASSESSMENT: PAIN_FUNCTIONAL_ASSESSMENT: ACTIVITIES ARE NOT PREVENTED

## 2022-12-02 ASSESSMENT — PAIN DESCRIPTION - DESCRIPTORS: DESCRIPTORS: ACHING

## 2022-12-02 ASSESSMENT — PAIN SCALES - GENERAL
PAINLEVEL_OUTOF10: 7
PAINLEVEL_OUTOF10: 4
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
## 2022-12-02 NOTE — CARE COORDINATION
Name: Ham Singh    : 1980    Discharge Date: 2022    Primary Auth/Cert #: [id number]    Destination: home    Discharge Medications:      Medication List        START taking these medications      paliperidone palmitate  MG/0.75ML Estefany IM injection  Commonly known as: INVEGA SUSTENNA  Inject 117 mg into the muscle every 30 days  Start taking on: 2022  Notes to patient: For mood, every 30 days            CONTINUE taking these medications      fluticasone 50 MCG/ACT nasal spray  Commonly known as: FLONASE  1 spray by Each Nostril route daily  Notes to patient: For seasonal allergies     hydroCHLOROthiazide 25 MG tablet  Commonly known as: HYDRODIURIL  Take 1 tablet by mouth daily  Notes to patient: For Blood pressure     lithium 300 MG extended release tablet  Commonly known as: LITHOBID  Take 2 tablets by mouth every 12 hours  Notes to patient: For mood     prazosin 1 MG capsule  Commonly known as: MINIPRESS  Take 1 capsule by mouth nightly  Notes to patient: For blood pressure            STOP taking these medications      Cariprazine HCl 6 MG Caps capsule  Commonly known as: VRAYLAR     cephALEXin 500 MG capsule  Commonly known as: Keflex     hydrOXYzine HCl 50 MG tablet  Commonly known as: ATARAX     lurasidone 60 MG Tabs tablet  Commonly known as: LATUDA     melatonin 3 MG Tabs tablet               Where to Get Your Medications        These medications were sent to [facility] [address] [place] [address], [address] [id number]      Phone: [phone]   fluticasone 50 MCG/ACT nasal spray  hydroCHLOROthiazide 25 MG tablet  lithium 300 MG extended release tablet  paliperidone palmitate  MG/0.75ML Estefany IM injection  prazosin 1 MG capsule         [facility] Up Appointment:  [facility] [employer]   [facility] [address]  [phone]    Follow up on 2022  @115 with  Lloyd/RN for ACT services    Discharge PT to  [address], [address]  Follow up on 12/2/2022  If PT doesn't have a ride home, Please send by [facility]

## 2022-12-02 NOTE — PROGRESS NOTES
CLINICAL PHARMACY NOTE: MEDS TO BEDS    Total # of Prescriptions Filled: 1   The following medications were delivered to the patient:  Hydrochlorothiazide 25mg    Additional Documentation:  Delivered medications to nurses station

## 2022-12-02 NOTE — GROUP NOTE
Group Therapy Note    Date: 12/2/2022    Group Start Time: 1330  Group End Time: 2244  Group Topic: Activity    STCZ FELII COREEN Howard, CTRS      Activity  Group Note        Date: December 2, 2022 Start Time: 1:30pm  End Time:  1415pm      Number of Participants in Group & Unit Census:  8/15    Topic: activity group     Goal of Group:to improve social interaction and improve leisure awareness       Comments:     Patient did not participate in  activity  group, despite staff encouragement and explanation of benefits. Patient remain seclusive to self. Q15 minute safety checks maintained for patient safety and will continue to encourage patient to attend unit programming.               Signature:  Janine Crockett

## 2022-12-02 NOTE — BH NOTE
[facility]  Discharge Note    Pt discharged with followings belongings:   Dental Appliances: None  Vision - Corrective Lenses: None  Hearing Aid: None  Jewelry: None  Body Piercings Removed: No  Clothing: Footwear, Pants, Shirt, Undergarments  Other Valuables: Keys   Valuables sent home with patient. Patient educated on aftercare instructions: Yes  Information faxed to [facility] by staff  at 1:49 PM .Patient verbalize understanding of AVS:  Yes. Status EXAM upon discharge:  Mental Status and Behavioral Exam  Normal: No  Level of Assistance: Independent/Self  Facial Expression: Brightened  Affect: Appropriate  Level of Consciousness: Alert  Frequency of Checks: 4 times per hour, close  Mood:Normal: Yes  Mood: Anxious  Motor Activity:Normal: Yes  Motor Activity: Increased  Eye Contact: Good  Observed Behavior: Cooperative, Friendly, Preoccupied  Sexual Misconduct History: Current - no  Involved In Any Sexual Misconduct With Others? : No  History of Sexually Inappropriate Behavior When Previously Hospitalized?: No  Uncontrollable/Compulsive Masturbation?: No  Difficulty Controlling Sexual Impulses?: No  Preception: [place] to person, [place] to time, [place] to situation, [place] to place  Attention:Normal: No  Attention: Distractible  Thought Processes: Flight of ideas  Thought Content:Normal: No  Thought Content: Preoccupations  Depression Symptoms: No problems reported or observed. Anxiety Symptoms: No problems reported or observed. Micaela Symptoms: No problems reported or observed.   Hallucinations: None  Delusions: No  Memory:Normal: No  Memory: Poor recent  Insight and Judgment: No  Insight and Judgment: Poor judgment, Poor insight    Tobacco Screening:  Practical Counseling, on admission, nati X, if applicable and completed (first 3 are required if patient doesn't refuse) R:            ( ) Recognizing danger situations (included triggers and roadblocks)                    ( ) Coping skills (new ways to manage stress,relaxation techniques, changing routine, distraction)                                                           ( ) Basic information about quitting (benefits of quitting, techniques in how to quit, available resources  ( ) Referral for counseling faxed to [facility]                                                                                                                   (X ) Patient refused counseling  ( ) Patient refused referral  ( ) Patient refused prescription upon discharge  ( ) Patient has not smoked in the last 30 days    Metabolic Screening:    Lab Results   Component Value Date    LABA1C 5.6 11/29/2022       Lab Results   Component Value Date    CHOL 133 11/29/2022    CHOL 116 06/23/2019    CHOL 101 09/12/2018    CHOL 151 05/17/2016     Lab Results   Component Value Date    TRIG 122 11/29/2022    TRIG 112 06/23/2019    TRIG 75 09/12/2018    TRIG 210 (H) 05/17/2016     Lab Results   Component Value Date    HDL 63 11/29/2022    HDL 47 06/23/2019    HDL 44 09/12/2018    HDL 74 05/17/2016     No components found for: LDLCAL  No results found for: LABVLDL  Patient released in care of  sent by [facility], discharged to home.     Bud Moore LPN

## 2022-12-02 NOTE — PLAN OF CARE
Problem: Self Harm/Suicidality  Goal: Will have no self-injury during hospital stay  Description: INTERVENTIONS:    12/1/2022 2358 by Bossman Denney LPN  Outcome: Progressing     Problem: Micaela  Goal: Will exhibit normal sleep and speech and no impulsivity  Description: INTERVENTIONS:    12/1/2022 2358 by Bossman Denney LPN  Outcome: Progressing   Patient denies suicidal ideas at this time. Patient denies homicidal ideas at this time. Patient denies depressive symptoms at this time. Patient has been out in day room watching tv and socializing with peers. Patient is free of self harm at this time. Patient agrees to seek out staff if thoughts to harm self arise. Staff will provide support and reassurance as needed. Safety checks maintained every 15 minutes.

## 2022-12-02 NOTE — GROUP NOTE
Group Therapy Note    Date: 12/2/2022    Group Start Time: 1100  Group End Time: 1130  Group Topic: Cognitive Skills    ANTON BHI JUANCARLOS Enriquez    Cognitive Skills Group Note        Date: December 2, 2022 Start Time: 11am  End Time: 11:30am      Number of Participants in Group & Unit Census:  8/17    Topic: cognitive skills     Goal of Group: to improve interpersonal relationships/ improve decision making skills       Comments:     Patient did not participate in Cognitive Skills group, despite staff encouragement and explanation of benefits. Patient remain seclusive to self. Q15 minute safety checks maintained for patient safety and will continue to encourage patient to attend unit programming.             Signature:  Vilma Morgan

## 2022-12-02 NOTE — PLAN OF CARE
Problem: Pain  Goal: Verbalizes/displays adequate comfort level or baseline comfort level  Outcome: Progressing  Flowsheets (Taken 12/2/2022 1041)  Verbalizes/displays adequate comfort level or baseline comfort level: Encourage patient to monitor pain and request assistance  Note: Patient denies pain at this time. Encouraged patient to monitor pain and seek out staff if pain level increases. Patient agreed to seek out staff if this occurs. Q15 minute safety checks maintained. Patient remains safe at this time.

## 2022-12-02 NOTE — GROUP NOTE
Group Therapy Note    Date: 12/2/2022    Group Start Time: 0900  Group End Time: 0930  Group Topic: Community Meeting    ANTON Diaz        Group Therapy Note    Attendees: 9/17       Patient's Goal:  Talk to social work about [address] [employer]. Notes:  Goals Group     Status After Intervention:  Unchanged    Participation Level:  Active Listener and Interactive    Participation Quality: Appropriate, Attentive, Sharing, and Supportive      Speech:  normal      Thought Process/Content: Logical      Affective Functioning: Congruent      Mood: euthymic      Level of consciousness:  Alert, Oriented x4, and Attentive      Response to Learning: Progressing to goal      Endings: None Reported    Modes of Intervention: Support and Socialization      Discipline Responsible: Behavorial Health Tech      Signature:  Claudine Diaz

## 2022-12-02 NOTE — DISCHARGE INSTRUCTIONS
Information:  Medications:   Medication summary provided   I understand that I should take only the medications on my list.     -why and when I need to take each medicine.     -which side effects to watch for.     -that I should carry my medication information at all times in case of     Emergency situations. I will take all of my medicines to follow up appointments.     -check with my physician or pharmacist before taking any new    Medication, over the counter product or drink alcohol.    -Ask about food, drug or dietary supplement interactions.    -discard old lists and update records with medication providers. Notify Physician:  Notify physician if you notice:   Always call 911 if you feel your life is in danger  In case of an emergency call 911 immediately! If 911 is not available call your local emergency medical system for help    Behavioral Health Follow Up:  Original Referral Source: [facility]  Discharge Diagnosis: Acute psychosis ([facility] [address] [F23]  Recommendations for Level of Care: Compliant with medication and follow up appt  Patient status at discharge: Alert and oriented, denies thoughts of self harm. My hospital  was: Arsen Nguyen  Aftercare plan faxed: Kristian Duque   -faxed by: nurse   -date: 12/2/22   -time: 2pm  Prescriptions: Filled by [facility] About COVID-19 and Flu Symptoms  How can you tell COVID-19 from the flu? COVID-19 and the flu have similar symptoms. The two can be hard to tell apart. The only way to know for sure which illness you have is to be tested. If you have questions about COVID-19 testing, ask your doctor or go to cdc.gov to use the COVID-19 Viral Testing Tool. Since the symptoms are so alike, it makes sense to act as if you have COVID-19 until your test results come back. This means staying home and limiting contact with people in your home. You'll need to wash your hands often and disinfect surfaces that you touch.  And be sure to wear a mask when you're around other people. This is also good advice if you think you have the flu. COVID-19 and the flu have these symptoms in common:  Fever or chills  Cough  Shortness of breath  Fatigue (tiredness)  Sore throat  Runny or stuffy nose  Muscle and body aches  Headache  Vomiting and diarrhea (more common in children than adults)  COVID-19 has another symptom that also may occur:  New loss of taste or smell  COVID-19 symptoms may appear from 2 to 14 days after infection. Flu symptoms usually appear 1 to 4 days after infection. Why should you get the flu vaccine? It's important to get your yearly flu vaccine. Both the flu and COVID-19 can be active at the same time. You can get sick with both infections at once. And having both may make you more sick than getting just one. The flu vaccine won't protect you from COVID-19. But it can help prevent the flu or reduce its symptoms. If fewer people get very ill with the flu, this will help free up medical resources that are needed for people who need urgent care, such as those suffering from COVID-19, heart attacks, and injuries from accidents. Where can you learn more? Go to https://[employer]. org and sign in to your [employer] account. Enter C123 in the Managed Systems box to learn more about \"Learning About COVID-19 and Flu Symptoms. \"     If you do not have an account, please click on the \"Sign Up Now\" link. Current as of: July 28, 2022               Content Version: 13.4  © 2006-2022 [employer]. Care instructions adapted under license by [employer]. If you have questions about a medical condition or this instruction, always ask your healthcare professional. [place] [address] any warranty or liability for your use of this information. Smoking: Quit Smoking.    Call the [employer]'s smoking quitline at 7-026-79J-QUIT  Know the signs of a heart attack   If you have any of the following symptoms call 911 immediately, do not wait more    Than five minutes. 1. Pressure, fullness and/ or squeezing in the center of the chest spreading to    The jaw, neck or shoulder. 2. Chest discomfort with light headedness, fainting, sweating, nausea or    Shortness of breath. 3. Upper abdominal pressure or discomfort. 4. Lower chest pain, back pain, unusual fatigue, shortness of breath, nausea   Or dizziness.      General Information:   Questions regarding your bill: Call HELP program [phone]     Suicide Hotline ([place] [address])  ([phone] [phone]      Recovery Help line- 869.494.2434      To obtain results of pending studies call Medical Records at: [phone]     For emergencies and 24 hour/7 days a week contact information:  [phone]

## 2022-12-02 NOTE — BH NOTE
Patient given tobacco quitline number 2-391-255-863-936-9656 at this time, refusing to call at this time, states \" I just dont want to quit now\"- patient given information as to the dangers of long term tobacco use. Continue to reinforce the importance of tobacco cessation. Patient given tobacco quitline number 9-116-855-433-688-0108 at this time, refusing to call at this time, states \" I just dont want to quit now\"- patient given information as to the dangers of long term tobacco use. Continue to reinforce the importance of tobacco cessation.

## 2022-12-03 ENCOUNTER — HOSPITAL ENCOUNTER (EMERGENCY)
Age: 42
Discharge: HOME | End: 2022-12-03
Attending: EMERGENCY MEDICINE
Payer: COMMERCIAL

## 2022-12-03 VITALS
SYSTOLIC BLOOD PRESSURE: 117 MMHG | BODY MASS INDEX: 28.19 KG/M2 | OXYGEN SATURATION: 97 % | RESPIRATION RATE: 16 BRPM | TEMPERATURE: 98.8 F | HEART RATE: 100 BPM | HEIGHT: 67 IN | DIASTOLIC BLOOD PRESSURE: 81 MMHG

## 2022-12-03 DIAGNOSIS — N93.9 VAGINAL BLEEDING: Primary | ICD-10-CM

## 2022-12-03 DIAGNOSIS — N39.0 UTI (URINARY TRACT INFECTION), UNCOMPLICATED: ICD-10-CM

## 2022-12-03 LAB
BILIRUBIN URINE: NEGATIVE
CANDIDA SPECIES, DNA PROBE: NEGATIVE
CASTS UA: NORMAL /LPF (ref 0–8)
COLOR: ABNORMAL
EPITHELIAL CELLS UA: NORMAL /HPF (ref 0–5)
GARDNERELLA VAGINALIS, DNA PROBE: NEGATIVE
GLUCOSE URINE: NEGATIVE
HCG(URINE) PREGNANCY TEST: NEGATIVE
KETONES, URINE: NEGATIVE
LEUKOCYTE ESTERASE, URINE: ABNORMAL
NITRITE, URINE: NEGATIVE
PH UA: 6.5 (ref 5–8)
PROTEIN UA: ABNORMAL
RBC UA: NORMAL /HPF (ref 0–4)
SOURCE: NORMAL
SPECIFIC GRAVITY UA: 1.02 (ref 1–1.03)
TRICHOMONAS VAGINALIS DNA: NEGATIVE
TURBIDITY: ABNORMAL
URINE HGB: ABNORMAL
UROBILINOGEN, URINE: NORMAL
WBC UA: NORMAL /HPF (ref 0–5)

## 2022-12-03 PROCEDURE — 99283 EMERGENCY DEPT VISIT LOW MDM: CPT

## 2022-12-03 PROCEDURE — 6370000000 HC RX 637 (ALT 250 FOR IP)

## 2022-12-03 PROCEDURE — 81001 URINALYSIS AUTO W/SCOPE: CPT

## 2022-12-03 PROCEDURE — 87480 CANDIDA DNA DIR PROBE: CPT

## 2022-12-03 PROCEDURE — 87591 N.GONORRHOEAE DNA AMP PROB: CPT

## 2022-12-03 PROCEDURE — 87660 TRICHOMONAS VAGIN DIR PROBE: CPT

## 2022-12-03 PROCEDURE — 87510 GARDNER VAG DNA DIR PROBE: CPT

## 2022-12-03 PROCEDURE — 87086 URINE CULTURE/COLONY COUNT: CPT

## 2022-12-03 PROCEDURE — 81025 URINE PREGNANCY TEST: CPT

## 2022-12-03 PROCEDURE — 87491 CHLMYD TRACH DNA AMP PROBE: CPT

## 2022-12-03 RX ORDER — CEPHALEXIN 500 MG/1
500 CAPSULE ORAL 4 TIMES DAILY
Qty: 40 CAPSULE | Refills: 0 | Status: SHIPPED | OUTPATIENT
Start: 2022-12-03 | End: 2022-12-13

## 2022-12-03 RX ORDER — PHENAZOPYRIDINE HYDROCHLORIDE 200 MG/1
200 TABLET, FILM COATED ORAL 3 TIMES DAILY PRN
Qty: 6 TABLET | Refills: 0 | Status: SHIPPED | OUTPATIENT
Start: 2022-12-03 | End: 2022-12-06

## 2022-12-03 RX ORDER — PHENAZOPYRIDINE HYDROCHLORIDE 100 MG/1
200 TABLET, FILM COATED ORAL ONCE
Status: COMPLETED | OUTPATIENT
Start: 2022-12-03 | End: 2022-12-03

## 2022-12-03 RX ORDER — CEPHALEXIN 500 MG/1
500 CAPSULE ORAL ONCE
Status: COMPLETED | OUTPATIENT
Start: 2022-12-03 | End: 2022-12-03

## 2022-12-03 RX ADMIN — CEPHALEXIN 500 MG: 500 CAPSULE ORAL at 19:56

## 2022-12-03 RX ADMIN — PHENAZOPYRIDINE HYDROCHLORIDE 200 MG: 100 TABLET ORAL at 19:34

## 2022-12-03 ASSESSMENT — ENCOUNTER SYMPTOMS
DIARRHEA: 0
SORE THROAT: 0
COUGH: 0
NAUSEA: 0
WHEEZING: 0
VOMITING: 0
CONSTIPATION: 0
SHORTNESS OF BREATH: 0
ABDOMINAL PAIN: 0
RHINORRHEA: 0

## 2022-12-03 ASSESSMENT — PAIN - FUNCTIONAL ASSESSMENT: PAIN_FUNCTIONAL_ASSESSMENT: 0-10

## 2022-12-03 ASSESSMENT — PAIN DESCRIPTION - DESCRIPTORS: DESCRIPTORS: SHARP;BURNING

## 2022-12-03 ASSESSMENT — PAIN SCALES - GENERAL: PAINLEVEL_OUTOF10: 4

## 2022-12-03 ASSESSMENT — PAIN DESCRIPTION - FREQUENCY: FREQUENCY: INTERMITTENT

## 2022-12-03 NOTE — ED PROVIDER NOTES
[facility] ED  Emergency Department Encounter  Emergency Medicine Resident     Pt Jose Echols  MRN: [id number]  [place] 1980  Date of evaluation: 12/3/22  PCP:  Rose Mary Jane PA-C      CHIEF COMPLAINT       Chief Complaint   Patient presents with    Dysuria    Vaginal Bleeding       HISTORY OF PRESENT ILLNESS  (Location/Symptom, Timing/Onset, Context/Setting, Quality, Duration, Modifying Factors, Severity.)      Jhoan Marks is a 38 y.o. female who presents with vaginal bleeding, dysuria, vaginal pain. Patient states that she was recently treated with a UTI with 4 days of antibiotics that ended 2 days ago. However she still states she has UTI symptoms including blood in urine, vaginal bleeding, and dysuria. She also states she has developed vaginal pain. Patient denies abdominal pain, nausea, vomiting. She states her last menstrual period was approximately 3 years ago and that the blood does not look like menstrual blood. She denies fever, lightheadedness, headache, chest pain, shortness of breath, weakness, or any other symptoms. PAST MEDICAL / SURGICAL / SOCIAL / FAMILY HISTORY      has a past medical history of Anxiety, Bipolar 1 disorder ([facility] [address] Depression, Gestational diabetes, OCD (obsessive compulsive disorder), PTSD (post-traumatic stress disorder), Rapid rate of speech, and Schizoaffective disorder ([facility] [address] has a past surgical history that includes Abdomen surgery;  section (); and laparoscopy.       Social History     Socioeconomic History    Marital status: Legally      Spouse name: Not on file    Number of children: Not on file    Years of education: Not on file    Highest education level: Not on file   Occupational History    Not on file   Tobacco Use    Smoking status: Every Day     Packs/day: 1.00     Types: Cigarettes    Smokeless tobacco: Never   Vaping Use    Vaping Use: Former    Substances: Always   Substance and Sexual Activity    Alcohol use: No     Alcohol/week: 0.0 standard drinks    Drug use: Yes     Types: Marijuana Deadra Lisandro)    Sexual activity: Yes     Partners: Male   Other Topics Concern    Not on file   Social History Narrative    Not on file     Social Determinants of Health     Financial Resource Strain: Not on file   Food Insecurity: Not on file   Transportation Needs: Not on file   Physical Activity: Not on file   Stress: Not on file   Social Connections: Not on file   Intimate Partner Violence: Not on file   Housing Stability: Not on file       Family History   Problem Relation Age of Onset    Diabetes Sister     No Known Problems Mother     No Known Problems Father        Allergies:  Abilify [aripiprazole], Codeine, Depakote er [divalproex sodium er], Gabapentin, Lamictal [lamotrigine], Percocet [oxycodone-acetaminophen], Quetiapine fumarate, Tegretol [carbamazepine], Trileptal [oxcarbazepine], Valproic acid, Ziprasidone hcl, and Zyprexa [olanzapine]    Home Medications:  Prior to Admission medications    Medication Sig Start Date End Date Taking?  Authorizing Provider   cephALEXin (KEFLEX) 500 MG capsule Take 1 capsule by mouth 4 times daily for 10 days 12/3/22 12/13/22 Yes Camille Zendejas MD   phenazopyridine (PYRIDIUM) 200 MG tablet Take 1 tablet by mouth 3 times daily as needed for Pain (bladder spasm/pain) 12/3/22 12/6/22 Yes Camille Zendejas MD   prazosin (MINIPRESS) 1 MG capsule Take 1 capsule by mouth nightly 12/2/22   Boyd Lowery MD   lithium (LITHOBID) 300 MG extended release tablet Take 2 tablets by mouth every 12 hours 12/2/22   Boyd Lowery MD   hydroCHLOROthiazide (HYDRODIURIL) 25 MG tablet Take 1 tablet by mouth daily 12/2/22   Boyd Lowery MD   fluticasone (FLONASE) 50 MCG/ACT nasal spray 1 spray by Each Nostril route daily 12/2/22   Boyd Lowery MD   paliperidone palmitate ER (INVEGA SUSTENNA) 117 MG/0.75ML ASHOK IM injection Inject 117 mg into the muscle every 30 days 12/27/22   Radha Gupta [employer] MD       REVIEW OF SYSTEMS    (2-9 systems for level 4, 10 or more for level 5)      Review of Systems   Constitutional:  Negative for chills, fatigue and fever. HENT:  Negative for congestion, rhinorrhea and sore throat. Respiratory:  Negative for cough, shortness of breath and wheezing. Cardiovascular:  Negative for chest pain and palpitations. Gastrointestinal:  Negative for abdominal pain, constipation, diarrhea, nausea and vomiting. Genitourinary:  Positive for vaginal bleeding and vaginal pain. Negative for difficulty urinating, frequency and urgency. Musculoskeletal:  Negative for arthralgias and myalgias. Neurological:  Negative for dizziness, syncope, weakness and headaches. PHYSICAL EXAM   (up to 7 for level 4, 8 or more for level 5)      INITIAL VITALS:   /81   Pulse 100   Temp 98.8 °F (37.1 °C) (Oral)   Resp 16   Ht 5' 7\" (1.702 m)   SpO2 97%   BMI 28.19 kg/m²     Physical Exam  Constitutional:       Appearance: Normal appearance. She is normal weight. HENT:      Head: Normocephalic and atraumatic. Mouth/Throat:      Mouth: Mucous membranes are moist.      Pharynx: Oropharynx is clear. Eyes:      Extraocular Movements: Extraocular movements intact. Pupils: Pupils are equal, round, and reactive to light. Cardiovascular:      Rate and Rhythm: Normal rate and regular rhythm. Pulses: Normal pulses. Heart sounds: Normal heart sounds. Pulmonary:      Effort: Pulmonary effort is normal. No respiratory distress. Breath sounds: Normal breath sounds. Abdominal:      General: Abdomen is flat. Palpations: Abdomen is soft. Tenderness: There is no abdominal tenderness. Genitourinary:     Labia:         Right: No tenderness or lesion. Left: No tenderness or lesion. Vagina: No signs of injury and foreign body. Tenderness and bleeding present. No vaginal discharge, erythema or lesions.       Cervix: Normal.      Uterus: Normal.       Adnexa:         Right: No tenderness. Left: No tenderness. Comments: Strings of IUD noted coming through the cervical os  Musculoskeletal:         General: No tenderness. Normal range of motion. Skin:     General: Skin is warm and dry. Capillary Refill: Capillary refill takes less than 2 seconds. Neurological:      General: No focal deficit present. Mental Status: She is alert and oriented to person, place, and time. Mental status is at baseline. Psychiatric:         Mood and Affect: Mood normal.         Behavior: Behavior normal.       DIFFERENTIAL  DIAGNOSIS     PLAN (LABS / IMAGING / EKG):  Orders Placed This Encounter   Procedures    Vaginitis DNA Probe    C.trachomatis N.gonorrhoeae DNA    Culture, Urine    Urinalysis with Reflex to Culture    PREGNANCY, URINE    Microscopic Urinalysis       MEDICATIONS ORDERED:  Orders Placed This Encounter   Medications    phenazopyridine (PYRIDIUM) tablet 200 mg    cephALEXin (KEFLEX) capsule 500 mg     Order Specific Question:   Antimicrobial Indications     Answer:   Urinary Tract Infection    cephALEXin (KEFLEX) 500 MG capsule     Sig: Take 1 capsule by mouth 4 times daily for 10 days     Dispense:  40 capsule     Refill:  0    phenazopyridine (PYRIDIUM) 200 MG tablet     Sig: Take 1 tablet by mouth 3 times daily as needed for Pain (bladder spasm/pain)     Dispense:  6 tablet     Refill:  0       DDX: Postmenopausal bleeding, UTI    MDM: Patient is a 15-year-old female who reportedly has not had a period in 2 years with past medical history of bipolar and hypertension who presents with dysuria, vaginal pain, vaginal bleeding. Patient is GCS 15, nontoxic in, not in acute distress, able to ambulate under her own power. Patient is afebrile, normotensive, not tachycardic, satting well on room air. Plan is to perform UA, urine pregnancy, pelvic exam, test for GC and vaginitis.   Based on findings, possible discharge home with follow-up with OB, return precautions. DIAGNOSTIC RESULTS / EMERGENCY DEPARTMENT COURSE / MDM   LAB RESULTS:  Results for orders placed or performed during the hospital encounter of 12/03/22   Vaginitis DNA Probe    Specimen: Vaginal   Result Value Ref Range    Source . VAGINAL SWAB     Trichomonas Vaginalis DNA NEGATIVE NEGATIVE    Gardnerella Vaginalis, DNA Probe NEGATIVE NEGATIVE    Candida Species, DNA Probe NEGATIVE NEGATIVE   Urinalysis with Reflex to Culture    Specimen: Urine   Result Value Ref Range    Color, UA Orange (A) Yellow    Turbidity UA Turbid (A) Clear    Glucose, Ur NEGATIVE NEGATIVE    Bilirubin Urine NEGATIVE NEGATIVE    Ketones, Urine NEGATIVE NEGATIVE    Specific Gravity, UA 1.022 1.005 - 1.030    Urine Hgb LARGE (A) NEGATIVE    pH, UA 6.5 5.0 - 8.0    Protein, UA 3+ (A) NEGATIVE    Urobilinogen, Urine Normal Normal    Nitrite, Urine NEGATIVE NEGATIVE    Leukocyte Esterase, Urine MODERATE (A) NEGATIVE   PREGNANCY, URINE   Result Value Ref Range    HCG(Urine) Pregnancy Test NEGATIVE NEGATIVE   Microscopic Urinalysis   Result Value Ref Range    WBC, UA TOO NUMEROUS TO COUNT 0 - 5 /HPF    RBC, UA TOO NUMEROUS TO COUNT 0 - 4 /HPF    Casts UA  0 - 8 /LPF     5 TO 10 HYALINE Reference range defined for non-centrifuged specimen. Epithelial Cells UA 2 TO 5 0 - 5 /HPF         RADIOLOGY:  No orders to display         EKG  None    All EKG's are interpreted by the Emergency Department Physician who either signs or Co-signs this chart in the absence of a cardiologist.    EMERGENCY DEPARTMENT COURSE:      ED Course as of 12/03/22 2032   Sat Dec 03, 2022   1948 Pelvic exam shows a extremely minimal amount of bleeding in the vagina. No bleeding coming from the cervical os. No lesions or abnormalities seen. UA shows persistent UTI. Patient states she was only treated for 4 days, chart review cannot find what antibiotic she took.   Plan on discharging with 10 days of Keflex, 2 days of Pyridium for symptoms, follow-up with OB, return precautions. [AK]      ED Course User Index  [AK] Chasity Jorgensen MD       No notes of [facility] Admission Criteria type on file. PROCEDURES:  None    CONSULTS:  None    CRITICAL CARE:  None      FINAL IMPRESSION      1. Vaginal bleeding    2. UTI (urinary tract infection), uncomplicated          DISPOSITION / PLAN     DISPOSITION Decision To Discharge 12/03/2022 08:30:45 PM      PATIENT REFERRED TO:  No follow-up provider specified.     DISCHARGE MEDICATIONS:  New Prescriptions    CEPHALEXIN (KEFLEX) 500 MG CAPSULE    Take 1 capsule by mouth 4 times daily for 10 days    PHENAZOPYRIDINE (PYRIDIUM) 200 MG TABLET    Take 1 tablet by mouth 3 times daily as needed for Pain (bladder spasm/pain)       Chasity Jorgensen MD  Emergency Medicine Resident    (Please note that portions of thisnote were completed with a voice recognition program.  Efforts were made to edit the dictations but occasionally words are mis-transcribed.)       Chasity Jorgensen MD  Resident  12/03/22 2032

## 2022-12-04 LAB
CULTURE: NORMAL
SPECIMEN DESCRIPTION: NORMAL

## 2022-12-04 NOTE — ED PROVIDER NOTES
[facility]     Emergency Department     Faculty Attestation    I performed a history and physical examination of the patient and discussed management with the resident. I reviewed the resident´s note and agree with the documented findings and plan of care. Any areas of disagreement are noted on the chart. I was personally present for the key portions of any procedures. I have documented in the chart those procedures where I was not present during the key portions. I have reviewed the emergency nurses triage note. I agree with the chief complaint, past medical history, past surgical history, allergies, medications, social and family history as documented unless otherwise noted below. For Physician Assistant/ Nurse Practitioner cases/documentation I have personally evaluated this patient and have completed at least one if not all key elements of the E/M (history, physical exam, and MDM). Additional findings are as noted. Abdomen soft and nontender, no guarding or rebounding, no pain at McBurney's point.      Ben Raya MD  12/03/22 0768

## 2022-12-04 NOTE — DISCHARGE INSTRUCTIONS
You are seen today in the emergency department for your problems peeing and vaginal bleeding. We examined you and got laboratory work. Her laboratory work showed that you have a UTI. We have given you 2 prescriptions. Please take Keflex, 1 tablet by mouth once in the morning, once at lunch, once in evening, and once before bed for 10 days. We have also given you prescription for Pyridium, please take 1 tablet 3 times a day as needed for pain while peeing. Please note that the Pyridium can change your urine color to a orange. Please return the emergency department if you develop worsening symptoms including worsening vaginal bleeding, worsening urinary symptoms, chest pain, shortness of breath, abdominal pain, nausea, vomiting, diarrhea, weakness, loss consciousness, or any other concerns    Please call the OB clinic and schedule appointment within next 24 to 40 hours for follow-up. Please call your PCP and schedule appoint within next 24 to 40 hours for follow-up.

## 2022-12-04 NOTE — ED TRIAGE NOTES
Patient presented to the ED today with complaints of dysuria. Patient states symptoms presented over 7 days ago. Patient denies abdominal pain, fever, nausea, vomiting, diarrhea.

## 2022-12-05 LAB
C TRACH DNA GENITAL QL NAA+PROBE: NEGATIVE
N. GONORRHOEAE DNA: ABNORMAL
SPECIMEN DESCRIPTION: ABNORMAL

## 2022-12-06 ENCOUNTER — TELEPHONE (OUTPATIENT)
Dept: PHARMACY | Age: 42
End: 2022-12-06

## 2022-12-10 ASSESSMENT — ENCOUNTER SYMPTOMS
VOMITING: 0
NAUSEA: 0

## 2023-01-22 ENCOUNTER — HOSPITAL ENCOUNTER (EMERGENCY)
Age: 43
Discharge: HOME | End: 2023-01-22
Attending: EMERGENCY MEDICINE
Payer: COMMERCIAL

## 2023-01-22 VITALS
RESPIRATION RATE: 16 BRPM | TEMPERATURE: 98.6 F | BODY MASS INDEX: 26.66 KG/M2 | HEIGHT: 69 IN | SYSTOLIC BLOOD PRESSURE: 135 MMHG | DIASTOLIC BLOOD PRESSURE: 77 MMHG | HEART RATE: 97 BPM | WEIGHT: 180 LBS | OXYGEN SATURATION: 98 %

## 2023-01-22 DIAGNOSIS — Z59.00 HOMELESSNESS: Primary | ICD-10-CM

## 2023-01-22 DIAGNOSIS — F41.1 ANXIETY STATE: ICD-10-CM

## 2023-01-22 PROCEDURE — 99282 EMERGENCY DEPT VISIT SF MDM: CPT

## 2023-01-22 SDOH — ECONOMIC STABILITY - HOUSING INSECURITY: HOMELESSNESS UNSPECIFIED: Z59.00

## 2023-01-22 ASSESSMENT — PAIN - FUNCTIONAL ASSESSMENT: PAIN_FUNCTIONAL_ASSESSMENT: NONE - DENIES PAIN

## 2023-01-23 ENCOUNTER — HOSPITAL ENCOUNTER (EMERGENCY)
Age: 43
Discharge: HOME | End: 2023-01-23
Attending: EMERGENCY MEDICINE
Payer: COMMERCIAL

## 2023-01-23 VITALS
TEMPERATURE: 98.1 F | OXYGEN SATURATION: 94 % | DIASTOLIC BLOOD PRESSURE: 81 MMHG | SYSTOLIC BLOOD PRESSURE: 132 MMHG | RESPIRATION RATE: 18 BRPM | HEART RATE: 82 BPM

## 2023-01-23 DIAGNOSIS — S39.012A STRAIN OF LUMBAR REGION, INITIAL ENCOUNTER: Primary | ICD-10-CM

## 2023-01-23 LAB
BILIRUBIN URINE: NEGATIVE
COLOR: YELLOW
COMMENT UA: NORMAL
GLUCOSE URINE: NEGATIVE
KETONES, URINE: NEGATIVE
LEUKOCYTE ESTERASE, URINE: NEGATIVE
NITRITE, URINE: NEGATIVE
PH UA: 7 (ref 5–8)
PROTEIN UA: NEGATIVE
SPECIFIC GRAVITY UA: 1.01 (ref 1–1.03)
TURBIDITY: CLEAR
URINE HGB: NEGATIVE
UROBILINOGEN, URINE: NORMAL

## 2023-01-23 PROCEDURE — 6360000002 HC RX W HCPCS: Performed by: STUDENT IN AN ORGANIZED HEALTH CARE EDUCATION/TRAINING PROGRAM

## 2023-01-23 PROCEDURE — 99284 EMERGENCY DEPT VISIT MOD MDM: CPT

## 2023-01-23 PROCEDURE — 81003 URINALYSIS AUTO W/O SCOPE: CPT

## 2023-01-23 PROCEDURE — 96372 THER/PROPH/DIAG INJ SC/IM: CPT

## 2023-01-23 RX ORDER — ORPHENADRINE CITRATE 30 MG/ML
60 INJECTION INTRAMUSCULAR; INTRAVENOUS ONCE
Status: COMPLETED | OUTPATIENT
Start: 2023-01-23 | End: 2023-01-23

## 2023-01-23 RX ORDER — KETOROLAC TROMETHAMINE 30 MG/ML
30 INJECTION, SOLUTION INTRAMUSCULAR; INTRAVENOUS ONCE
Status: COMPLETED | OUTPATIENT
Start: 2023-01-23 | End: 2023-01-23

## 2023-01-23 RX ADMIN — ORPHENADRINE CITRATE 60 MG: 30 INJECTION INTRAMUSCULAR; INTRAVENOUS at 07:07

## 2023-01-23 RX ADMIN — KETOROLAC TROMETHAMINE 30 MG: 30 INJECTION, SOLUTION INTRAMUSCULAR; INTRAVENOUS at 07:08

## 2023-01-23 ASSESSMENT — PAIN SCALES - GENERAL: PAINLEVEL_OUTOF10: 8

## 2023-01-23 ASSESSMENT — ENCOUNTER SYMPTOMS: BACK PAIN: 1

## 2023-01-23 ASSESSMENT — PAIN - FUNCTIONAL ASSESSMENT: PAIN_FUNCTIONAL_ASSESSMENT: 0-10

## 2023-01-23 ASSESSMENT — PAIN DESCRIPTION - ORIENTATION: ORIENTATION: LOWER

## 2023-01-23 NOTE — DISCHARGE INSTRUCTIONS
Call today or tomorrow to follow up with [facility]    Use an ice pack or bag filled with ice and apply to the injured area 3 - 4 times a day for 15 - 20 minutes each time. If the injury is older than 3 days, then use a heating pad to help relax the muscles in your back. Use ibuprofen or Tylenol (unless prescribed medications that have Tylenol in it) for pain. You can take over the counter Ibuprofen (advil) tablets (4 tablets every 8 hours or 3 tablets every 6 hours or 2 tablets every 4 hours)    Abdirahman' Flexion Exercises     1. Pelvic tilt. Lie on your back with knees bent, feet flat on floor. Flatten the small of your back against the floor, without pushing down with the legs. Hold for 5 to 10 seconds. 2. Single Knee to chest. Lie on your back with knees bent and feet flat on the floor. Slowly pull your right knee toward your shoulder and hold 5 to 10 seconds. Lower the knee and repeat with the other knee. 3. Double knee to chest. Begin as in the previous exercise. After pulling right knee to chest, pull left knee to chest and hold both knees for 5 to 10 seconds. Slowly lower one leg at a time. 4. Partial sit-up. Do the pelvic tilt (exercise 1) and, while holding this position, slowly curl your head and shoulders off the floor. Hold briefly. Return slowly to the starting position. 5. Hamstring stretch. Start in long sitting with toes directed toward the ceiling and knees fully extended. Slowly lower the trunk forward over the legs, keeping knees extended, arms outstretched over the legs, and eyes focus ahead. 6. Hip Flexor stretch. Place one foot in front of the other with the left (front) knee flexed and the right (back) knee held rigidly straight. Flex forward through the trunk until the left knee contacts the axillary fold (arm pit region). Repeat with right leg forward and left leg back. 7. Squat. Stand with both feet parallel, about shoulder's width apart.  Attempting to maintain the trunk as perpendicular as possible to the floor, eyes focused ahead, and feet flat on the floor, the subject slowly lowers his body by flexing his knees    Return to the Emergency Department for inability to move legs, worsening of pain, tingling / loss of sensation, any other care or concern.

## 2023-01-23 NOTE — ED PROVIDER NOTES
[facility]  Emergency Department  Faculty Attestation     I performed a history and physical examination of the patient and discussed management with the resident. I reviewed the residents note and agree with the documented findings and plan of care. Any areas of disagreement are noted on the chart. I was personally present for the key portions of any procedures. I have documented in the chart those procedures where I was not present during the key portions. I have reviewed the emergency nurses triage note. I agree with the chief complaint, past medical history, past surgical history, allergies, medications, social and family history as documented unless otherwise noted below. For Physician Assistant/ Nurse Practitioner cases/documentation I have personally evaluated this patient and have completed at least one if not all key elements of the E/M (history, physical exam, and MDM). Additional findings are as noted. Primary Care Physician:  No primary care provider on file. Screenings:  [unfilled]    CHIEF COMPLAINT       Chief Complaint   Patient presents with    Back Pain       RECENT VITALS:   Temp: 98.1 °F (36.7 °C),  Heart Rate: 82, Resp: 18, BP: 132/81    LABS:  Labs Reviewed   URINALYSIS WITH REFLEX TO CULTURE       Radiology  No orders to display         Attending Physician Additional  Notes    Patient has low back pain. No trauma. No radiation down the legs. It is worse with movement. No abdominal pain. No dysuria frequency hematuria. She was recently treated with UTI antibiotics right hand feels improvement. No fevers chills or sweats. No bowel or bladder incontinence or retention. No saddle anesthesia. No history of low back pain. No difficulty breathing coughing or chest pain. On exam she is comfortable afebrile vital signs normal.  She states pain score is 1/10 intensity, now after medications. Normal motor strength.   Abdomen is soft and nontender.  No respite distress.  No CVA tenderness.  Impression is low back pain likely musculoskeletal.  Plan is urinalysis, analgesics, muscle relaxants, patient is requesting social work consultation for some home discharge.  Anticipate discharge on symptom management and future precautions.            Jermaine Avila MD, FACEP  Attending Emergency  Physician                Jermaine Avila MD  01/23/23 9226

## 2023-01-23 NOTE — ED NOTES
Pt updated on need for urine sample, states she is no able to go at this time. Pt provided with water, states she will notify RN when she can go.      Ludmila Francisco RN  01/23/23 0489
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
## 2023-01-23 NOTE — ED NOTES
Pt arrives to ED walking with medic 9 for lower back pain. Pt states she was walking for 1 hr PTA and pain became severe. Pt was seen at [facility] a couple hours ago for librium refill, states they were unable to full it so she was going to walk to [facility]. Pt has not had librium for 2 days. Pt denies any injury to back. Pt denies dysuria but states she was just recently treated for UTI. Pt A&Ox4, NAD.        Sascha Virk RN  01/23/23 1114

## 2023-01-23 NOTE — ED PROVIDER NOTES
EMERGENCY DEPARTMENT ENCOUNTER    Pt Name: Jodi Bustos  MRN: [id number]  [place] 1980  Date of evaluation: 1/22/23  CHIEF COMPLAINT       Chief Complaint   Patient presents with    Mental Health Problem     HISTORY OF PRESENT ILLNESS   HPI  She is living in [facility], they manage her finances for, she states she is anxious this is new to her she left the other day she is trying to get back but she does not have a ride. Apparently was brought here by police because she was in a public facility. She denies any suicidal or homicidal thoughts. Her medications are at [facility] she wants to go back on them and go back to [facility] she is asking for a ride there. Denies any hallucinations. She feels safe at this facility and wants to go back. She does not want to be admitted and she wants to leave now. She has a history of bipolar, she is on medications for this. She has good outpatient follow-up as well. REVIEW OF SYSTEMS     Review of Systems   All other systems reviewed and are negative. PASTMEDICAL HISTORY     Past Medical History:   Diagnosis Date    Anxiety     Bipolar 1 disorder ([facility] 1999    Depression     Gestational diabetes 7/22/2016    OCD (obsessive compulsive disorder) 1999    PTSD (post-traumatic stress disorder)     Rapid rate of speech     Schizoaffective disorder [facility])      Past Problem List  Patient Active Problem List   Diagnosis Code    Pregnancy Z34.90    Hx CS x1 (G2-twins,36wks) Z98.891    H/O miscarriage X2 O09.299    Insufficient prenatal care O09.30    AMA O09.529    Smoker F17.200    OCD F42.9    Candida albicans infection B37.9    Twin gestation, 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 BXX0    Chromosomal 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, affecting management of mother, antepartum O35.10X0    Poor fetal growth, affecting management of mother, antepartum condition or complication E25.7804    Umbilical cord complication E52. 9XX0 Tobacco use disorder complicating pregnancy, childbirth, or puerperium, antepartum O99.330    Twin B-IUGR O36.5920    Twin B-Fetal cardiomegaly, pericardial effusion, VSD O35. BXX0    Polyhydramnios (twin A-8.6cm and twin B-8.7cm) O40. 2XX0    Depression F32. A    Twin B-MCAs wnl, absent UADs O69. 9XX0    Abnl Quad (1:50 T21), elev msAFP O35.10X0    Hx D&C (G3-8wks, G4-8wks) Z98.890    Elev 1hr gtt, normal 3hr R73.09    GBS bacteriuria R82.71    Chlamydia infection (SPENSER neg) O98.811, A74.9    FHx DM  Z83.3    Fetal pericardial effusion affecting management of mother O36.8990    Polyhydramnios, antepartum complication S27. 9XX0    Polyhydramnios, antepartum complication F64. 9XX0    IUGR (intrauterine growth restriction) affecting care of mother U18.4270    Umbilical cord complication W88. 9XX0    Suspected damage to fetus from other disease in mother, affecting management of mother, antepartum condition or complication D88. 8XX0    Suspected damage to fetus from other disease in mother, affecting management of mother, antepartum condition or complication S27. 8XX0    16 RLTCS @29w2d M APG Wt /M APG Wt Z98.891    Hyperglycemia R73.9    Bipolar 1 disorder (HCC) F31.9    Bipolar 1 disorder, manic, moderate (HCC) F31.12    Micaela ([address] F30.9    Psychosis (HCC) F29    Bipolar affective disorder, mixed, severe, with psychotic behavior ([address] F31.64    Depression with suicidal ideation F32. A, R45.851    Acute psychosis ([address] F23    Bipolar 1 disorder, mixed, severe ([address] F31.63     SURGICAL HISTORY       Past Surgical History:   Procedure Laterality Date    ABDOMEN SURGERY       SECTION      LAPAROSCOPY       CURRENT MEDICATIONS       Discharge Medication List as of 2023  5:31 PM        CONTINUE these medications which have NOT CHANGED    Details   prazosin (MINIPRESS) 1 MG capsule Take 1 capsule by mouth nightly, Disp-30 capsule, R-0Normal      lithium (LITHOBID) 300 MG extended release tablet Take 2 tablets by mouth every 12 hours, Disp-60 tablet, R-3Normal      hydroCHLOROthiazide (HYDRODIURIL) 25 MG tablet Take 1 tablet by mouth daily, Disp-30 tablet, R-0Normal      fluticasone (FLONASE) 50 MCG/ACT nasal spray 1 spray by Each Nostril route daily, Disp-16 g, R-3Normal      paliperidone palmitate ER (INVEGA SUSTENNA) 117 MG/0.75ML ASHOK IM injection Inject 117 mg into the muscle every 30 days, Disp-1 each, R-0Normal           ALLERGIES     is allergic to abilify [aripiprazole], codeine, depakote er [divalproex sodium er], gabapentin, lamictal [lamotrigine], percocet [oxycodone-acetaminophen], quetiapine fumarate, tegretol [carbamazepine], trileptal [oxcarbazepine], valproic acid, ziprasidone hcl, and zyprexa [olanzapine]. FAMILY HISTORY     She indicated that her mother is alive. She indicated that her father is . She indicated that the status of her sister is unknown. SOCIAL HISTORY       Social History     Tobacco Use    Smoking status: Every Day     Packs/day: 1.00     Types: Cigarettes    Smokeless tobacco: Never   Vaping Use    Vaping Use: Former    Substances: Always   Substance Use Topics    Alcohol use: No     Alcohol/week: 0.0 standard drinks    Drug use: Yes     Types: Marijuana (Weed)     PHYSICAL EXAM     INITIAL VITALS: /77   Pulse 97   Temp 98.6 °F (37 °C) (Oral)   Resp 16   Ht 5' 9\" (1.753 m)   Wt 180 lb (81.6 kg)   SpO2 98%   BMI 26.58 kg/m²    Physical Exam  Constitutional:       General: She is not in acute distress. Appearance: Normal appearance. She is well-developed. She is not diaphoretic. HENT:      Head: Normocephalic and atraumatic. Right Ear: External ear normal.      Left Ear: External ear normal.      Nose: Nose normal. No congestion. Mouth/Throat:      Mouth: Mucous membranes are moist.      Pharynx: Oropharynx is clear. Eyes:      General:         Right eye: No discharge. Left eye: No discharge.       Conjunctiva/sclera: Conjunctivae normal. Neck:      Trachea: No tracheal deviation. Cardiovascular:      Rate and Rhythm: Normal rate and regular rhythm. Pulses: Normal pulses. Heart sounds: Normal heart sounds. Pulmonary:      Effort: Pulmonary effort is normal. No respiratory distress. Breath sounds: Normal breath sounds. No stridor. No wheezing or rales. Abdominal:      Palpations: Abdomen is soft. Tenderness: There is no abdominal tenderness. There is no guarding or rebound. Musculoskeletal:         General: No tenderness or deformity. Normal range of motion. Cervical back: Normal range of motion and neck supple. Skin:     General: Skin is warm and dry. Capillary Refill: Capillary refill takes less than 2 seconds. Findings: No erythema or rash. Neurological:      General: No focal deficit present. Mental Status: She is alert and oriented to person, place, and time. Coordination: Coordination normal.   Psychiatric:         Mood and Affect: Mood normal.         Behavior: Behavior normal.         Thought Content:  Thought content normal.         Judgment: Judgment normal.       MEDICAL DECISION MAKING / ED COURSE:         1)  Number and Complexity of Problems Addressed at this Encounter  Mental health assessment stable, hx of bipolar      3)  Treatment and Disposition       Provided food and Transport to her residence at [place] [address]  Disposition discussion with patient/family, Shared Decision Making:  Discussed with patient anticipatory guidance, discharge instructions, follow up her outpatient mental health services        DATA FOR LAB AND RADIOLOGY TESTS ORDERED BELOW ARE REVIEWED BY THE ED CLINICIAN:    Vitals Reviewed:    Vitals:    01/22/23 1651 01/22/23 1700   BP: 135/77 135/77   Pulse: 97 97   Resp: 16    Temp: 98.6 °F (37 °C)    TempSrc: Oral    SpO2: 98%    Weight: 180 lb (81.6 kg)    Height: 5' 9\" (1.753 m)      MEDICATIONS GIVEN TO PATIENT THIS ENCOUNTER:  No orders of the defined types were placed in this encounter. DISCHARGE PRESCRIPTIONS:  Discharge Medication List as of 1/22/2023  5:31 PM        PHYSICIAN CONSULTS ORDERED THIS ENCOUNTER:  None  FINAL IMPRESSION      1. Homelessness    2.  Anxiety state          DISPOSITION/PLAN   DISPOSITION Decision To Discharge 01/22/2023 05:30:04 PM      OUTPATIENT FOLLOW UP THE PATIENT:  [address] [employer] [place]  [address] [facility]  [phone]  Schedule an appointment as soon as possible for a visit in 1 day  today      MD Anitha Gee MD  01/22/23 4540

## 2023-01-23 NOTE — ED PROVIDER NOTES
[facility]  ED  Emergency Department Encounter  Emergency Medicine Resident     Pt Jose R Ceja  MRN: [id number]  [place] 1980  Date of evaluation: 23  PCP:  No primary care provider on file. CHIEF COMPLAINT       Chief Complaint   Patient presents with    Back Pain       HISTORY OF PRESENT ILLNESS  (Location/Symptom, Timing/Onset, Context/Setting, Quality, Duration, Modifying Factors, Severity.)      Eugenio Cast is a 38 y.o. female who presents with today for evaluation of back pain. Musculoskeletal in nature. She states it is localized over her low back. No injury or trauma. No numbness or tingling. No difficulty going to the bathroom. She has a normal gait. Nothing taken for relief. PAST MEDICAL / SURGICAL / SOCIAL / FAMILY HISTORY      has a past medical history of Anxiety, Bipolar 1 disorder ([address] Depression, Gestational diabetes, OCD (obsessive compulsive disorder), PTSD (post-traumatic stress disorder), Rapid rate of speech, and Schizoaffective disorder ([address] has a past surgical history that includes Abdomen surgery;  section (); and laparoscopy.       Social History     Socioeconomic History    Marital status: Legally      Spouse name: Not on file    Number of children: Not on file    Years of education: Not on file    Highest education level: Not on file   Occupational History    Not on file   Tobacco Use    Smoking status: Every Day     Packs/day: 1.00     Types: Cigarettes    Smokeless tobacco: Never   Vaping Use    Vaping Use: Former    Substances: Always   Substance and Sexual Activity    Alcohol use: No     Alcohol/week: 0.0 standard drinks    Drug use: Yes     Types: Marijuana Maurita Handsome)    Sexual activity: Yes     Partners: Male   Other Topics Concern    Not on file   Social History Narrative    Not on file     Social Determinants of Health     Financial Resource Strain: Not on file   Food Insecurity: Not on file   Transportation Needs: Not on file   Physical Activity: Not on file   Stress: Not on file   Social Connections: Not on file   Intimate Partner Violence: Not on file   Housing Stability: Not on file       Family History   Problem Relation Age of Onset    Diabetes Sister     No Known Problems Mother     No Known Problems Father        Allergies:  Abilify [aripiprazole], Codeine, Depakote er [divalproex sodium er], Gabapentin, Lamictal [lamotrigine], Percocet [oxycodone-acetaminophen], Quetiapine fumarate, Tegretol [carbamazepine], Trileptal [oxcarbazepine], Valproic acid, Ziprasidone hcl, and Zyprexa [olanzapine]    Home Medications:  Prior to Admission medications    Medication Sig Start Date End Date Taking? Authorizing Provider   prazosin (MINIPRESS) 1 MG capsule Take 1 capsule by mouth nightly 12/2/22   Daniela Terry MD   lithium (LITHOBID) 300 MG extended release tablet Take 2 tablets by mouth every 12 hours 12/2/22   Daniela Terry MD   hydroCHLOROthiazide (HYDRODIURIL) 25 MG tablet Take 1 tablet by mouth daily 12/2/22   Daniela Terry MD   fluticasone (FLONASE) 50 MCG/ACT nasal spray 1 spray by Each Nostril route daily  Patient not taking: Reported on 1/23/2023 12/2/22   Daniela Terry MD   paliperidone palmitate ER (INVEGA SUSTENNA) 117 MG/0.75ML ASHOK IM injection Inject 117 mg into the muscle every 30 days  Patient not taking: Reported on 1/23/2023 12/27/22   Daniela Terry MD     REVIEW OF SYSTEMS       Review of Systems   Musculoskeletal:  Positive for back pain and myalgias. Neurological:  Negative for dizziness, tremors, weakness and numbness. PHYSICAL EXAM      INITIAL VITALS:   /81   Pulse 82   Temp 98.1 °F (36.7 °C) (Oral)   Resp 18   SpO2 94%     Physical Exam  Constitutional:       General: She is not in acute distress. Appearance: Normal appearance. Cardiovascular:      Rate and Rhythm: Normal rate and regular rhythm.    Musculoskeletal:         General: Tenderness present. Skin:     Capillary Refill: Capillary refill takes less than 2 seconds. Neurological:      General: No focal deficit present. Mental Status: She is oriented to person, place, and time. Cranial Nerves: No cranial nerve deficit. Sensory: No sensory deficit. Motor: No weakness. Coordination: Coordination normal.      Deep Tendon Reflexes: Reflexes normal.         DDX/DIAGNOSTIC RESULTS / EMERGENCY DEPARTMENT COURSE / MDM     Medical Decision Making  This is a 40-year-old female coming in today with atraumatic low back pain. She has no red flag signs or symptoms. Her exam is noted for no tenderness to palpation over the back. Full range of motion of the back. Normal gait. She was treated symptomatically here for pain relief with Toradol and Norflex. She had improvement. She be discharged home with primary care physician follow-up. She is homeless we did have social work speak with her about safe housing during the winter crisis. Amount and/or Complexity of Data Reviewed  Labs: ordered. Risk  Prescription drug management. Critical Care  Total time providing critical care: < 30 minutes      Procedures    CONSULTS:  None        FINAL IMPRESSION      1.  Strain of lumbar region, initial encounter          DISPOSITION / PLAN     DISPOSITION Decision To Discharge 01/23/2023 07:46:24 AM      PATIENT REFERRED TO:  [address] [place] [id number] [phone]  Schedule an appointment as soon as possible for a visit       DISCHARGE MEDICATIONS:  Discharge Medication List as of 1/23/2023  7:47 AM          Brian Yuan MD  Emergency Medicine Resident    (Please note that portions of thisnote were completed with a voice recognition program.  Efforts were made to edit the dictations but occasionally words are mis-transcribed.)       Brian Yuan MD  Resident  01/23/23 3736

## 2023-01-27 ENCOUNTER — HOSPITAL ENCOUNTER (EMERGENCY)
Age: 43
Discharge: HOME | End: 2023-01-27
Attending: EMERGENCY MEDICINE
Payer: COMMERCIAL

## 2023-01-27 VITALS
OXYGEN SATURATION: 98 % | DIASTOLIC BLOOD PRESSURE: 87 MMHG | TEMPERATURE: 97 F | HEART RATE: 84 BPM | RESPIRATION RATE: 16 BRPM | SYSTOLIC BLOOD PRESSURE: 138 MMHG

## 2023-01-27 DIAGNOSIS — M54.50 ACUTE EXACERBATION OF CHRONIC LOW BACK PAIN: Primary | ICD-10-CM

## 2023-01-27 DIAGNOSIS — G89.29 ACUTE EXACERBATION OF CHRONIC LOW BACK PAIN: Primary | ICD-10-CM

## 2023-01-27 PROCEDURE — 6370000000 HC RX 637 (ALT 250 FOR IP): Performed by: STUDENT IN AN ORGANIZED HEALTH CARE EDUCATION/TRAINING PROGRAM

## 2023-01-27 PROCEDURE — 99283 EMERGENCY DEPT VISIT LOW MDM: CPT

## 2023-01-27 RX ORDER — IBUPROFEN 200 MG
200 TABLET ORAL EVERY 6 HOURS PRN
Qty: 30 TABLET | Refills: 3 | Status: SHIPPED | OUTPATIENT
Start: 2023-01-27

## 2023-01-27 RX ORDER — IBUPROFEN 800 MG/1
800 TABLET ORAL ONCE
Status: COMPLETED | OUTPATIENT
Start: 2023-01-27 | End: 2023-01-27

## 2023-01-27 RX ADMIN — IBUPROFEN 800 MG: 800 TABLET, FILM COATED ORAL at 00:42

## 2023-01-27 ASSESSMENT — ENCOUNTER SYMPTOMS
VOMITING: 0
NAUSEA: 0
BACK PAIN: 1
ABDOMINAL PAIN: 0

## 2023-01-27 NOTE — ED PROVIDER NOTES
[address] [facility]     Emergency Department     Faculty Attestation    I performed a history and physical examination of the patient and discussed management with the resident. I reviewed the resident´s note and agree with the documented findings and plan of care. Any areas of disagreement are noted on the chart. I was personally present for the key portions of any procedures. I have documented in the chart those procedures where I was not present during the key portions. I have reviewed the emergency nurses triage note. I agree with the chief complaint, past medical history, past surgical history, allergies, medications, social and family history as documented unless otherwise noted below. For Physician Assistant/ Nurse Practitioner cases/documentation I have personally evaluated this patient and have completed at least one if not all key elements of the E/M (history, physical exam, and MDM). Additional findings are as noted. Paracervical and paralumbar muscle pain, no midline pain, history of chronic back pain, patient requesting Motrin. No trauma.      Dg Epperson MD  01/27/23 2046

## 2023-01-27 NOTE — DISCHARGE INSTRUCTIONS
You were seen in the ER due to back pain. A prescription for Motrin was sent electronically to your pharmacy. You should only take the medication as directed, do not take it more than indicated on the bottle. You should also take it with food. Sy Tenorio!!!    From [facility] Emergency Department    [facility], I would like to thank you for giving [facility] the opportunity to address your health care needs and concerns. We hope that during your visit, our service was delivered in a professional and caring manner. Please keep [facility] in mind as we walk with you down the path to your own personal wellness. Please expect an automated phone call from [phone] so we can ask a few questions about your health and progress. Based on your answers, a clinician may call you back to offer help and instructions. If you notice any concerning symptoms please return to the ER immediately. These can include but are not limited to: fevers, chills, shortness of breath, vomiting, weakness of the extremities, changes in your mental status, numbness, pale extremities, or chest pain.

## 2023-01-27 NOTE — ED TRIAGE NOTES
PT presents to ED with c/o upper and lower back pain that has been ongoing for a couple years. Pt states she was hoping to get a prescription for motrin 800mg . PT denies injury or fall at this time.

## 2023-01-27 NOTE — ED PROVIDER NOTES
[facility]  ED  Emergency Department Encounter  Emergency Medicine Resident     Pt Lucia Jacobson  MRN: [id number]  [place] 1980  Date of evaluation: 23  PCP:  MAURICE Gutierrez NP      CHIEF COMPLAINT       Chief Complaint   Patient presents with    Back Pain     Denies fall or injury       HISTORY OF PRESENT ILLNESS  (Location/Symptom, Timing/Onset, Context/Setting, Quality, Duration, Modifying Factors, Severity.)      Whitley Keller is a 38 y.o. female who presents with complaint of chronic back pain and neck pain for the past several years. Patient is requesting Motrin. No recent injury or trauma. Denies any fevers, chills, neck rigidity, headache, dysuria, flank pain, abdominal pain, nausea or vomiting. PAST MEDICAL / SURGICAL / SOCIAL / FAMILY HISTORY      has a past medical history of Anxiety, Bipolar 1 disorder ([address] Depression, Gestational diabetes, OCD (obsessive compulsive disorder), PTSD (post-traumatic stress disorder), Rapid rate of speech, and Schizoaffective disorder ([address] has a past surgical history that includes Abdomen surgery;  section (); and laparoscopy.       Social History     Socioeconomic History    Marital status: Legally      Spouse name: Not on file    Number of children: Not on file    Years of education: Not on file    Highest education level: Not on file   Occupational History    Not on file   Tobacco Use    Smoking status: Every Day     Packs/day: 1.00     Types: Cigarettes    Smokeless tobacco: Never   Vaping Use    Vaping Use: Former    Substances: Always   Substance and Sexual Activity    Alcohol use: No     Alcohol/week: 0.0 standard drinks    Drug use: Yes     Types: Marijuana Ladarius Semen)    Sexual activity: Yes     Partners: Male   Other Topics Concern    Not on file   Social History Narrative    Not on file     Social Determinants of Health     Financial Resource Strain: Not on file   Food Insecurity: Not on file   Transportation Needs: Not on file   Physical Activity: Not on file   Stress: Not on file   Social Connections: Not on file   Intimate Partner Violence: Not on file   Housing Stability: Not on file       Family History   Problem Relation Age of Onset    Diabetes Sister     No Known Problems Mother     No Known Problems Father        Allergies:  Abilify [aripiprazole], Codeine, Depakote er [divalproex sodium er], Gabapentin, Lamictal [lamotrigine], Percocet [oxycodone-acetaminophen], Quetiapine fumarate, Tegretol [carbamazepine], Trileptal [oxcarbazepine], Valproic acid, Ziprasidone hcl, and Zyprexa [olanzapine]    Home Medications:  Prior to Admission medications    Medication Sig Start Date End Date Taking? Authorizing Provider   ibuprofen (ADVIL;MOTRIN) 200 MG tablet Take 1 tablet by mouth every 6 hours as needed for Pain 1/27/23  Yes Nahomy Bradshaw MD   prazosin (MINIPRESS) 1 MG capsule Take 1 capsule by mouth nightly 12/2/22   Michael Messina MD   lithium (LITHOBID) 300 MG extended release tablet Take 2 tablets by mouth every 12 hours 12/2/22   Michael Messina MD   hydroCHLOROthiazide (HYDRODIURIL) 25 MG tablet Take 1 tablet by mouth daily 12/2/22   Michael Messina MD   fluticasone (FLONASE) 50 MCG/ACT nasal spray 1 spray by Each Nostril route daily  Patient not taking: Reported on 1/23/2023 12/2/22   Michael Messina MD   paliperidone palmitate ER (INVEGA SUSTENNA) 117 MG/0.75ML ASHOK IM injection Inject 117 mg into the muscle every 30 days  Patient not taking: Reported on 1/23/2023 12/27/22   Michael Messina MD     REVIEW OF SYSTEMS       Review of Systems   Constitutional:  Negative for chills and fever. Gastrointestinal:  Negative for abdominal pain, nausea and vomiting. Genitourinary:  Negative for dysuria and flank pain. Musculoskeletal:  Positive for back pain and neck pain. Negative for myalgias and neck stiffness. Neurological:  Negative for headaches.      PHYSICAL EXAM INITIAL VITALS:   /87   Pulse 84   Temp 97 °F (36.1 °C)   Resp 16   SpO2 98%     Physical Exam  Vitals and nursing note reviewed. Constitutional:       General: She is not in acute distress. Appearance: Normal appearance. She is not ill-appearing or toxic-appearing. HENT:      Head: Normocephalic and atraumatic. Cardiovascular:      Rate and Rhythm: Normal rate. Pulses: Normal pulses. Pulmonary:      Effort: Pulmonary effort is normal.   Musculoskeletal:      Comments: Cervical and thoracic paraspinal musculature pain. No midline tenderness palpation of the spine   Neurological:      Mental Status: She is alert. GCS: GCS eye subscore is 4. GCS verbal subscore is 5. GCS motor subscore is 6. Cranial Nerves: Cranial nerves 2-12 are intact. Motor: Motor function is intact. DDX/DIAGNOSTIC RESULTS / EMERGENCY DEPARTMENT COURSE / MDM     MDM-  Patient is a 76-year-old female with significant psychiatric history presenting with chronic neck pain and back pain with no recent injury or trauma. No concerning red flag symptoms. Vital signs reviewed and within normal limits. Physical examination showing some mild paravertebral musculature spasming, no midline tenderness palpation. Impression is acute exacerbation of chronic back pain. Will administer Motrin in the emergency department and provide prescription for Motrin            PROCEDURES:      CONSULTS:  None    CRITICAL CARE:  There was significant risk of life threatening deterioration of patient's condition requiring my direct management. Critical care time 0 minutes, excluding any documented procedures. FINAL IMPRESSION      1.  Acute exacerbation of chronic low back pain          DISPOSITION / PLAN     DISPOSITION Decision To Discharge 01/27/2023 12:59:47 AM      PATIENT REFERRED TO:  [facility]  [address]  [phone]  Go to   If symptoms worsen    DISCHARGE MEDICATIONS:  Discharge Medication List as of 1/27/2023  1:00 AM        START taking these medications    Details   ibuprofen (ADVIL;MOTRIN) 200 MG tablet Take 1 tablet by mouth every 6 hours as needed for Pain, Disp-30 tablet, R-3Normal             Aron Magallanes MD  Emergency Medicine Resident    (Please note that portions of thisnote were completed with a voice recognition program.  Efforts were made to edit the dictations but occasionally words are mis-transcribed.)      Aron Magallanes MD  Resident  01/27/23 8917

## 2023-02-08 ENCOUNTER — HOSPITAL ENCOUNTER (EMERGENCY)
Age: 43
Discharge: HOME | End: 2023-02-08
Attending: EMERGENCY MEDICINE
Payer: COMMERCIAL

## 2023-02-08 VITALS
HEART RATE: 80 BPM | RESPIRATION RATE: 16 BRPM | SYSTOLIC BLOOD PRESSURE: 104 MMHG | TEMPERATURE: 97.9 F | DIASTOLIC BLOOD PRESSURE: 72 MMHG | OXYGEN SATURATION: 97 %

## 2023-02-08 DIAGNOSIS — B35.6 TINEA CRURIS: ICD-10-CM

## 2023-02-08 DIAGNOSIS — A64 STD (FEMALE): Primary | ICD-10-CM

## 2023-02-08 LAB
BILIRUBIN URINE: NEGATIVE
CANDIDA SPECIES, DNA PROBE: NEGATIVE
CASTS UA: NORMAL /LPF (ref 0–8)
CHP ED QC CHECK: NORMAL
COLOR: YELLOW
EPITHELIAL CELLS UA: NORMAL /HPF (ref 0–5)
GARDNERELLA VAGINALIS, DNA PROBE: NEGATIVE
GLUCOSE UR STRIP.AUTO-MCNC: NEGATIVE MG/DL
KETONES UR STRIP.AUTO-MCNC: NEGATIVE MG/DL
LEUKOCYTE ESTERASE UR QL STRIP.AUTO: ABNORMAL
NITRITE UR QL STRIP.AUTO: NEGATIVE
PREGNANCY TEST URINE, POC: NEGATIVE
PROT UR STRIP.AUTO-MCNC: >9 MG/DL (ref 5–8)
PROT UR STRIP.AUTO-MCNC: NEGATIVE MG/DL
RBC CLUMPS #/AREA URNS AUTO: NORMAL /HPF (ref 0–4)
SOURCE: ABNORMAL
SPECIFIC GRAVITY UA: 1.01 (ref 1–1.03)
TRICHOMONAS VAGINALIS DNA: POSITIVE
TURBIDITY: CLEAR
URINE HGB: NEGATIVE
UROBILINOGEN, URINE: NORMAL
WBC UA: NORMAL /HPF (ref 0–5)

## 2023-02-08 PROCEDURE — 81001 URINALYSIS AUTO W/SCOPE: CPT

## 2023-02-08 PROCEDURE — 6360000002 HC RX W HCPCS: Performed by: STUDENT IN AN ORGANIZED HEALTH CARE EDUCATION/TRAINING PROGRAM

## 2023-02-08 PROCEDURE — 87510 GARDNER VAG DNA DIR PROBE: CPT

## 2023-02-08 PROCEDURE — 87491 CHLMYD TRACH DNA AMP PROBE: CPT

## 2023-02-08 PROCEDURE — 99284 EMERGENCY DEPT VISIT MOD MDM: CPT

## 2023-02-08 PROCEDURE — 6370000000 HC RX 637 (ALT 250 FOR IP): Performed by: STUDENT IN AN ORGANIZED HEALTH CARE EDUCATION/TRAINING PROGRAM

## 2023-02-08 PROCEDURE — 87086 URINE CULTURE/COLONY COUNT: CPT

## 2023-02-08 PROCEDURE — 87660 TRICHOMONAS VAGIN DIR PROBE: CPT

## 2023-02-08 PROCEDURE — 87591 N.GONORRHOEAE DNA AMP PROB: CPT

## 2023-02-08 PROCEDURE — 96372 THER/PROPH/DIAG INJ SC/IM: CPT

## 2023-02-08 PROCEDURE — 87480 CANDIDA DNA DIR PROBE: CPT

## 2023-02-08 RX ORDER — METRONIDAZOLE 500 MG/1
500 TABLET ORAL 2 TIMES DAILY
Qty: 14 TABLET | Refills: 0 | Status: SHIPPED | OUTPATIENT
Start: 2023-02-08 | End: 2023-02-15

## 2023-02-08 RX ORDER — CEFTRIAXONE 500 MG/1
500 INJECTION, POWDER, FOR SOLUTION INTRAMUSCULAR; INTRAVENOUS ONCE
Status: COMPLETED | OUTPATIENT
Start: 2023-02-08 | End: 2023-02-08

## 2023-02-08 RX ORDER — DOXYCYCLINE HYCLATE 100 MG
100 TABLET ORAL 2 TIMES DAILY
Qty: 14 TABLET | Refills: 0 | Status: SHIPPED | OUTPATIENT
Start: 2023-02-08 | End: 2023-02-15

## 2023-02-08 RX ORDER — CEPHALEXIN 500 MG/1
500 CAPSULE ORAL 2 TIMES DAILY
Qty: 14 CAPSULE | Refills: 0 | Status: SHIPPED | OUTPATIENT
Start: 2023-02-08 | End: 2023-02-15

## 2023-02-08 RX ORDER — METRONIDAZOLE 500 MG/1
500 TABLET ORAL ONCE
Status: DISCONTINUED | OUTPATIENT
Start: 2023-02-08 | End: 2023-02-08 | Stop reason: HOSPADM

## 2023-02-08 RX ORDER — CLOTRIMAZOLE 1 %
CREAM (GRAM) TOPICAL
Qty: 28 G | Refills: 1 | Status: SHIPPED | OUTPATIENT
Start: 2023-02-08 | End: 2023-02-15

## 2023-02-08 RX ORDER — ACETAMINOPHEN 500 MG
1000 TABLET ORAL ONCE
Status: COMPLETED | OUTPATIENT
Start: 2023-02-08 | End: 2023-02-08

## 2023-02-08 RX ORDER — DOXYCYCLINE HYCLATE 100 MG
100 TABLET ORAL ONCE
Status: COMPLETED | OUTPATIENT
Start: 2023-02-08 | End: 2023-02-08

## 2023-02-08 RX ADMIN — ACETAMINOPHEN 1000 MG: 500 TABLET ORAL at 20:19

## 2023-02-08 RX ADMIN — DOXYCYCLINE HYCLATE 100 MG: 100 TABLET, COATED ORAL at 20:35

## 2023-02-08 RX ADMIN — CEFTRIAXONE SODIUM 500 MG: 500 INJECTION, POWDER, FOR SOLUTION INTRAMUSCULAR; INTRAVENOUS at 20:35

## 2023-02-08 ASSESSMENT — ENCOUNTER SYMPTOMS
CONSTIPATION: 0
ANAL BLEEDING: 0
VOMITING: 0
CHEST TIGHTNESS: 0
DIARRHEA: 0
NAUSEA: 0
RHINORRHEA: 0
SORE THROAT: 0
ABDOMINAL DISTENTION: 0
PHOTOPHOBIA: 0
SHORTNESS OF BREATH: 0

## 2023-02-08 ASSESSMENT — PAIN DESCRIPTION - LOCATION: LOCATION: VAGINA

## 2023-02-08 ASSESSMENT — PAIN SCALES - GENERAL: PAINLEVEL_OUTOF10: 6

## 2023-02-09 LAB
MICROORGANISM SPEC CULT: NORMAL
SPECIMEN DESCRIPTION: NORMAL

## 2023-02-09 NOTE — ED TRIAGE NOTES
37 yo female arrived to ED with c/o vaginal pain and discharge over the last week. Pt states she has been sexually active. Pt A&O x 4, does not appear in acute distress, RR even and unlabored, resting comfortably on stretcher with eyes open and call light in reach. Vital signs obtained, medical hx and allergies reviewed with pt. Initial assessment performed by physician, Monie Donis will carry out initial orders/tasks and reassess pt.

## 2023-02-09 NOTE — ED PROVIDER NOTES
[facility]  ED  eMERGENCY dEPARTMENT eNCOUnter   Attending Attestation     Pt Name: Nadine Resendiz  MRN: [id number]  [place] 1980  Date of evaluation: 2/8/23       Nadine Resendiz is a 38 y.o. female who presents with Vaginal Discharge (Yellow/white)      History: Presents with vaginal discharge. Patient has no other complaints. Exam: Heart rate and rhythm are regular. Lungs are clear to station bilaterally. Abdomen is soft, nontender. Patient awake alert and acting appropriate. Please see resident note for pelvic exam.    Concern for sexually transmitted illness. We will treat empirically and discharge. I performed a history and physical examination of the patient and discussed management with the resident. I reviewed the residents note and agree with the documented findings and plan of care. Any areas of disagreement are noted on the chart. I was personally present for the key portions of any procedures. I have documented in the chart those procedures where I was not present during the key portions. I have personally reviewed all images and agree with the resident's interpretation. I have reviewed the emergency nurses triage note. I agree with the chief complaint, past medical history, past surgical history, allergies, medications, social and family history as documented unless otherwise noted below. Documentation of the HPI, Physical Exam and Medical Decision Making performed by medical students or scribes is based on my personal performance of the HPI, PE and MDM. For Phys Assistant/ Nurse Practitioner cases/documentation I have had a face to face evaluation of this patient and have completed at least one if not all key elements of the E/M (history, physical exam, and MDM). Additional findings are as noted.     For APC cases I have personally evaluated and examined the patient in conjunction with the APC and agree with the treatment plan and disposition of the patient as recorded by the APC.     Barbara Beebe MD  Attending Emergency  Physician       Dominic Araujo MD  02/08/23 [id number]

## 2023-02-09 NOTE — ED NOTES
The following labs were labeled with appropriate pt sticker and tubed to lab:     [] Blue     [] Lavender   [] on ice  [] Green/yellow  [] Green/black [] on ice  [] Jamesetta Lisandra  [] on ice  [] Yellow  [] Red  [] Type/ Screen  [] ABG  [] VBG    [] COVID-19 swab    [] Rapid  [] PCR  [] Flu swab  [] Peds Viral Panel     [x] Urine Sample  [] Fecal Sample  [x] Pelvic Cultures  [] Blood Cultures  [] X 2  [] STREP Cultures       Dania Salter LPN  08/37/89 7541

## 2023-02-09 NOTE — ED PROVIDER NOTES
[facility]  ED  Emergency Department Encounter  EmergencyMedicine Resident     Pt Faye Soler  MRN: [id number]  [place] 1980  Date of evaluation: 23  PCP:  MAURICE Cuevas NP    CHIEF COMPLAINT       Chief Complaint   Patient presents with    Vaginal Discharge     Yellow/white       HISTORY OF PRESENT ILLNESS  (Location/Symptom, Timing/Onset, Context/Setting, Quality, Duration, Modifying Factors, Severity.)      Bree House is a 38 y.o. female who presents with vaginal discharge for 2 days patient states is white and yellow. She did have unprotected sex 2 days ago and is concerned for an STD. States she is having crampy lower abdominal pain. Denies any nausea or vomiting. Denies any flank pain. PAST MEDICAL / SURGICAL / SOCIAL / FAMILY HISTORY      has a past medical history of Anxiety, Bipolar 1 disorder ([facility] [address] Depression, Gestational diabetes, OCD (obsessive compulsive disorder), PTSD (post-traumatic stress disorder), Rapid rate of speech, and Schizoaffective disorder ([facility] [address] has a past surgical history that includes Abdomen surgery;  section (); and laparoscopy.     Social History     Socioeconomic History    Marital status: Legally      Spouse name: Not on file    Number of children: Not on file    Years of education: Not on file    Highest education level: Not on file   Occupational History    Not on file   Tobacco Use    Smoking status: Every Day     Packs/day: 1.00     Types: Cigarettes    Smokeless tobacco: Never   Vaping Use    Vaping Use: Former    Substances: Always   Substance and Sexual Activity    Alcohol use: No     Alcohol/week: 0.0 standard drinks    Drug use: Yes     Types: Marijuana Tyrone Elias)    Sexual activity: Yes     Partners: Male   Other Topics Concern    Not on file   Social History Narrative    Not on file     Social Determinants of Health     Financial Resource Strain: Not on file   Food Insecurity: Not on file   Transportation Needs: Not on file   Physical Activity: Not on file   Stress: Not on file   Social Connections: Not on file   Intimate Partner Violence: Not on file   Housing Stability: Not on file       Family History   Problem Relation Age of Onset    Diabetes Sister     No Known Problems Mother     No Known Problems Father        Allergies:  Abilify [aripiprazole], Codeine, Depakote er [divalproex sodium er], Gabapentin, Lamictal [lamotrigine], Percocet [oxycodone-acetaminophen], Quetiapine fumarate, Tegretol [carbamazepine], Trileptal [oxcarbazepine], Valproic acid, Ziprasidone hcl, and Zyprexa [olanzapine]    Home Medications:  Prior to Admission medications    Medication Sig Start Date End Date Taking? Authorizing Provider   clotrimazole (LOTRIMIN) 1 % cream Apply topically 2 times daily to inner thighs where redness is.  2/8/23 2/15/23 Yes Creta Speaker, DO   doxycycline hyclate (VIBRA-TABS) 100 MG tablet Take 1 tablet by mouth 2 times daily for 7 days 2/8/23 2/15/23 Yes Creta Speaker, DO   cephALEXin (KEFLEX) 500 MG capsule Take 1 capsule by mouth 2 times daily for 7 days 2/8/23 2/15/23 Yes Creta Speaker, DO   metroNIDAZOLE (FLAGYL) 500 MG tablet Take 1 tablet by mouth 2 times daily for 7 days 2/8/23 2/15/23 Yes Creta Speaker, DO   ibuprofen (ADVIL;MOTRIN) 200 MG tablet Take 1 tablet by mouth every 6 hours as needed for Pain 1/27/23   Angel Mariscal MD   prazosin (MINIPRESS) 1 MG capsule Take 1 capsule by mouth nightly 12/2/22   Shaylee Hines MD   lithium (LITHOBID) 300 MG extended release tablet Take 2 tablets by mouth every 12 hours 12/2/22   Shaylee Hines MD   hydroCHLOROthiazide (HYDRODIURIL) 25 MG tablet Take 1 tablet by mouth daily 12/2/22   Shaylee Hines MD   fluticasone (FLONASE) 50 MCG/ACT nasal spray 1 spray by Each Nostril route daily  Patient not taking: Reported on 1/23/2023 12/2/22   Shaylee Hines MD   paliperidone palmitate ER (Dinora Triston) 117 MG/0.75ML ASHOK IM injection Inject 117 mg into the muscle every 30 days  Patient not taking: Reported on 1/23/2023 12/27/22   Shabnam Sarmiento MD       REVIEW OF SYSTEMS    (2-9 systems for level 4, 10 or more for level 5)      Review of Systems   Constitutional:  Negative for chills and fever. HENT:  Negative for rhinorrhea and sore throat. Eyes:  Negative for photophobia. Respiratory:  Negative for chest tightness and shortness of breath. Cardiovascular:  Negative for chest pain. Gastrointestinal:  Negative for abdominal distention, anal bleeding, constipation, diarrhea, nausea and vomiting. Endocrine: Negative for polyuria. Genitourinary:  Negative for difficulty urinating and flank pain. Musculoskeletal:  Negative for arthralgias. Skin:  Negative for rash. Neurological:  Negative for weakness and headaches. PHYSICAL EXAM   (up to 7 for level 4, 8 or more for level 5)      INITIAL VITALS:   /72   Pulse 80   Temp 97.9 °F (36.6 °C) (Oral)   Resp 16   SpO2 97%     Physical Exam  Constitutional:       General: She is not in acute distress. Cardiovascular:      Rate and Rhythm: Normal rate. Pulses: Normal pulses. Heart sounds: Normal heart sounds. Pulmonary:      Effort: Pulmonary effort is normal.      Breath sounds: Normal breath sounds. Abdominal:      General: There is no distension. Palpations: Abdomen is soft. Tenderness: There is no abdominal tenderness. There is no right CVA tenderness, left CVA tenderness, guarding or rebound. Musculoskeletal:      Right lower leg: No edema. Left lower leg: No edema.        DIFFERENTIAL  DIAGNOSIS     PLAN (LABS / IMAGING / EKG):  Orders Placed This Encounter   Procedures    Vaginitis DNA Probe    C.trachomatis N.gonorrhoeae DNA    Culture, Urine    Urinalysis with Reflex to Culture    Microscopic Urinalysis    Vaginal exam    POCT urine pregnancy       MEDICATIONS ORDERED:  Orders Placed This Encounter   Medications acetaminophen (TYLENOL) tablet 1,000 mg    cefTRIAXone (ROCEPHIN) injection 500 mg     Order Specific Question:   Antimicrobial Indications     Answer:   STD infection    doxycycline hyclate (VIBRA-TABS) tablet 100 mg     Order Specific Question:   Antimicrobial Indications     Answer:   STD infection    clotrimazole (LOTRIMIN) 1 % cream     Sig: Apply topically 2 times daily to inner thighs where redness is. Dispense:  28 g     Refill:  1    doxycycline hyclate (VIBRA-TABS) 100 MG tablet     Sig: Take 1 tablet by mouth 2 times daily for 7 days     Dispense:  14 tablet     Refill:  0    cephALEXin (KEFLEX) 500 MG capsule     Sig: Take 1 capsule by mouth 2 times daily for 7 days     Dispense:  14 capsule     Refill:  0    metroNIDAZOLE (FLAGYL) tablet 500 mg     Order Specific Question:   Antimicrobial Indications     Answer:   STD infection    metroNIDAZOLE (FLAGYL) 500 MG tablet     Sig: Take 1 tablet by mouth 2 times daily for 7 days     Dispense:  14 tablet     Refill:  0       DIAGNOSTIC RESULTS / EMERGENCY DEPARTMENT COURSE / MDM   LAB RESULTS:  Results for orders placed or performed during the hospital encounter of 02/08/23   Vaginitis DNA Probe    Specimen: Vaginal   Result Value Ref Range    Source . VAGINAL SWAB     Trichomonas Vaginalis DNA POSITIVE (A) NEGATIVE    Gardnerella Vaginalis, DNA Probe NEGATIVE NEGATIVE    Candida Species, DNA Probe NEGATIVE NEGATIVE   Urinalysis with Reflex to Culture    Specimen: Urine   Result Value Ref Range    Color, UA Yellow Yellow    Turbidity UA Clear Clear    Glucose, Ur NEGATIVE NEGATIVE    Bilirubin Urine NEGATIVE NEGATIVE    Ketones, Urine NEGATIVE NEGATIVE    Specific Gravity, UA 1.011 1.005 - 1.030    Urine Hgb NEGATIVE NEGATIVE    pH, UA >9.0 (H) 5.0 - 8.0    Protein, UA NEGATIVE NEGATIVE    Urobilinogen, Urine Normal Normal    Nitrite, Urine NEGATIVE NEGATIVE    Leukocyte Esterase, Urine LARGE (A) NEGATIVE   Microscopic Urinalysis   Result Value Ref Range WBC, UA TOO NUMEROUS TO COUNT 0 - 5 /HPF    RBC, UA 0 TO 2 0 - 4 /HPF    Casts UA  0 - 8 /LPF     2 TO 5 HYALINE Reference range defined for non-centrifuged specimen. Epithelial Cells UA 0 TO 2 0 - 5 /HPF   POCT urine pregnancy   Result Value Ref Range    Preg Test, Ur Negative     QC OK? ok        RADIOLOGY:  No results found. EKG Interpretation  None    [facility]  Medical Decision Making  Patient here with likely multiple STDs tested positive for trichomonas. No concern for pelvic inflammatory disease given no adnexal cervical motion tenderness. Patient given antibiotics discharged home. Strict return fall precautions    Amount and/or Complexity of Data Reviewed  Labs: ordered. Risk  OTC drugs. Prescription drug management. EMERGENCY DEPARTMENT COURSE:  ED Course as of 02/08/23 2140 Wed Feb 08, 2023 2009 White-yellowish vaginal discharge. Patient unprotected sex 2 days ago. She is concerned for an STD. She has having crampy lower abdominal pain. No pain on exam.  Patient nontoxic VSS. We will do pelvic obtain swabs and urine. Likely will need empiric treatment [ZE]   2024 Pelvic exam performed. Mucopurulent discharge. No cervical motion or adnexal tenderness. Will treat empirically. We will wait for trichomonas to come back prior to discharge. [ZE]      ED Course User Index  [ZE] Porsche Patterson DO       PROCEDURES:  Procedures     CONSULTS:  None    CRITICAL CARE:  See MDM    FINAL IMPRESSION      1. STD (female)    2.  Tinea cruris          DISPOSITION / PLAN     DISPOSITION Decision To Discharge 02/08/2023 09:39:40 PM      PATIENT REFERRED TO:  MAURICE Zepeda - NP  [address] [facility]  [phone]    In 1 day      [facility]  [address] [place] [address]  [phone]    If symptoms worsen    DISCHARGE MEDICATIONS:  New Prescriptions    CEPHALEXIN (KEFLEX) 500 MG CAPSULE    Take 1 capsule by mouth 2 times daily for 7 days    CLOTRIMAZOLE (LOTRIMIN) 1 % CREAM    Apply topically 2 times daily to inner thighs where redness is.     DOXYCYCLINE HYCLATE (VIBRA-TABS) 100 MG TABLET    Take 1 tablet by mouth 2 times daily for 7 days    METRONIDAZOLE (FLAGYL) 500 MG TABLET    Take 1 tablet by mouth 2 times daily for 7 days       Wendy Cunningham DO  Emergency Medicine Resident    (Please note that portions of thisnote were completed with a voice recognition program.  Efforts were made to edit the dictations but occasionally words are mis-transcribed.)       Barbie Alfonso DO  Resident  02/08/23 7087

## 2023-02-10 LAB
C TRACH DNA SPEC QL PROBE+SIG AMP: NEGATIVE
N GONORRHOEA DNA SPEC QL PROBE+SIG AMP: NEGATIVE
SPECIMEN DESCRIPTION: NORMAL

## 2023-02-17 ENCOUNTER — HOSPITAL ENCOUNTER (EMERGENCY)
Age: 43
Discharge: HOME | End: 2023-02-17
Attending: STUDENT IN AN ORGANIZED HEALTH CARE EDUCATION/TRAINING PROGRAM
Payer: COMMERCIAL

## 2023-02-17 VITALS
WEIGHT: 183 LBS | RESPIRATION RATE: 19 BRPM | HEIGHT: 68 IN | SYSTOLIC BLOOD PRESSURE: 109 MMHG | OXYGEN SATURATION: 97 % | BODY MASS INDEX: 27.74 KG/M2 | HEART RATE: 95 BPM | DIASTOLIC BLOOD PRESSURE: 18 MMHG | TEMPERATURE: 99 F

## 2023-02-17 DIAGNOSIS — F31.9 BIPOLAR 1 DISORDER (HCC): ICD-10-CM

## 2023-02-17 DIAGNOSIS — G47.00 INSOMNIA, UNSPECIFIED TYPE: Primary | ICD-10-CM

## 2023-02-17 LAB
EKG ATRIAL RATE: 97 BPM
EKG P AXIS: 69 DEGREES
EKG P-R INTERVAL: 150 MS
EKG Q-T INTERVAL: 370 MS
EKG QRS DURATION: 98 MS
EKG QTC CALCULATION (BAZETT): 469 MS
EKG R AXIS: 41 DEGREES
EKG T AXIS: 60 DEGREES
EKG VENTRICULAR RATE: 97 BPM

## 2023-02-17 PROCEDURE — 93010 ELECTROCARDIOGRAM REPORT: CPT | Performed by: INTERNAL MEDICINE

## 2023-02-17 PROCEDURE — 93005 ELECTROCARDIOGRAM TRACING: CPT | Performed by: STUDENT IN AN ORGANIZED HEALTH CARE EDUCATION/TRAINING PROGRAM

## 2023-02-17 PROCEDURE — 99283 EMERGENCY DEPT VISIT LOW MDM: CPT | Performed by: STUDENT IN AN ORGANIZED HEALTH CARE EDUCATION/TRAINING PROGRAM

## 2023-02-17 PROCEDURE — 6370000000 HC RX 637 (ALT 250 FOR IP): Performed by: STUDENT IN AN ORGANIZED HEALTH CARE EDUCATION/TRAINING PROGRAM

## 2023-02-17 RX ORDER — LANOLIN ALCOHOL/MO/W.PET/CERES
3 CREAM (GRAM) TOPICAL ONCE
Status: COMPLETED | OUTPATIENT
Start: 2023-02-17 | End: 2023-02-17

## 2023-02-17 RX ORDER — MIRTAZAPINE 15 MG/1
15 TABLET, FILM COATED ORAL NIGHTLY
COMMUNITY

## 2023-02-17 RX ORDER — HYDROXYZINE HYDROCHLORIDE 10 MG/1
10 TABLET, FILM COATED ORAL 3 TIMES DAILY
COMMUNITY

## 2023-02-17 RX ORDER — LANOLIN ALCOHOL/MO/W.PET/CERES
3 CREAM (GRAM) TOPICAL NIGHTLY PRN
Qty: 30 TABLET | Refills: 3 | Status: SHIPPED | OUTPATIENT
Start: 2023-02-17

## 2023-02-17 RX ORDER — CLONIDINE HYDROCHLORIDE 0.2 MG/1
0.2 TABLET ORAL DAILY
COMMUNITY

## 2023-02-17 RX ADMIN — Medication 3 MG: at 05:37

## 2023-02-17 NOTE — ED NOTES
Pt presents to the er c/o palpitations in her chest pt states that instead of taking her psych meds at their scheduled times she took all of them tonight at bedtime pt is pink warm and dry with respirations even and unlabored     Bela Gómez RN  02/17/23 0094 [facility] Ree Infante RN  02/17/23 [id number]

## 2023-02-17 NOTE — DISCHARGE INSTRUCTIONS
PLEASE RETURN TO THE EMERGENCY DEPARTMENT IMMEDIATELY for worsening symptoms of increasing pain, shortness of breath, feeling of your heart fluttering or racing, swelling to your feet, unable to lay flat, or if you develop any concerning symptoms such as: high fever not relieved by acetaminophen (Tylenol) and/or ibuprofen (Motrin / Advil), chills, persistent nausea and/or vomiting, loss of consciousness, numbness, weakness or tingling in the arms or legs or change in color of the extremities, changes in mental status, persistent headache, blurry vision, loss of bladder / bowel control, unable to follow up with your physician, or other any other care or concern.

## 2023-02-18 ENCOUNTER — HOSPITAL ENCOUNTER (EMERGENCY)
Age: 43
Discharge: HOME | End: 2023-02-18
Attending: STUDENT IN AN ORGANIZED HEALTH CARE EDUCATION/TRAINING PROGRAM
Payer: COMMERCIAL

## 2023-02-18 ENCOUNTER — HOSPITAL ENCOUNTER (EMERGENCY)
Age: 43
Discharge: HOME | End: 2023-02-18

## 2023-02-18 VITALS
RESPIRATION RATE: 16 BRPM | SYSTOLIC BLOOD PRESSURE: 144 MMHG | WEIGHT: 183 LBS | HEIGHT: 68 IN | HEART RATE: 94 BPM | OXYGEN SATURATION: 99 % | BODY MASS INDEX: 27.74 KG/M2 | DIASTOLIC BLOOD PRESSURE: 96 MMHG | TEMPERATURE: 97.7 F

## 2023-02-18 DIAGNOSIS — G89.29 CHRONIC BILATERAL LOW BACK PAIN WITHOUT SCIATICA: Primary | ICD-10-CM

## 2023-02-18 DIAGNOSIS — M54.50 CHRONIC BILATERAL LOW BACK PAIN WITHOUT SCIATICA: Primary | ICD-10-CM

## 2023-02-18 PROCEDURE — 99283 EMERGENCY DEPT VISIT LOW MDM: CPT

## 2023-02-18 PROCEDURE — 6370000000 HC RX 637 (ALT 250 FOR IP): Performed by: STUDENT IN AN ORGANIZED HEALTH CARE EDUCATION/TRAINING PROGRAM

## 2023-02-18 RX ORDER — IBUPROFEN 600 MG/1
600 TABLET ORAL EVERY 8 HOURS PRN
Qty: 6 TABLET | Refills: 1 | Status: SHIPPED | OUTPATIENT
Start: 2023-02-18

## 2023-02-18 RX ORDER — LIDOCAINE 4 G/G
1 PATCH TOPICAL DAILY
Status: DISCONTINUED | OUTPATIENT
Start: 2023-02-19 | End: 2023-02-19 | Stop reason: HOSPADM

## 2023-02-18 RX ORDER — IBUPROFEN 800 MG/1
800 TABLET ORAL ONCE
Status: COMPLETED | OUTPATIENT
Start: 2023-02-18 | End: 2023-02-18

## 2023-02-18 RX ADMIN — IBUPROFEN 800 MG: 800 TABLET ORAL at 23:38

## 2023-02-18 ASSESSMENT — ENCOUNTER SYMPTOMS
COUGH: 0
ABDOMINAL PAIN: 0
EYE PAIN: 0
BACK PAIN: 1
DIARRHEA: 0
CHEST TIGHTNESS: 0
COLOR CHANGE: 0
VOMITING: 0
PHOTOPHOBIA: 0
CONSTIPATION: 0
SHORTNESS OF BREATH: 0
EYE REDNESS: 0
SINUS PRESSURE: 0
SORE THROAT: 0
NAUSEA: 0
RHINORRHEA: 0

## 2023-02-18 ASSESSMENT — PAIN SCALES - GENERAL: PAINLEVEL_OUTOF10: 8

## 2023-02-18 ASSESSMENT — PAIN DESCRIPTION - LOCATION: LOCATION: BACK;NECK

## 2023-02-18 ASSESSMENT — PAIN - FUNCTIONAL ASSESSMENT: PAIN_FUNCTIONAL_ASSESSMENT: 0-10

## 2023-02-18 ASSESSMENT — PAIN DESCRIPTION - PAIN TYPE: TYPE: CHRONIC PAIN

## 2023-02-19 ENCOUNTER — HOSPITAL ENCOUNTER (EMERGENCY)
Age: 43
Discharge: HOME | End: 2023-02-19
Attending: STUDENT IN AN ORGANIZED HEALTH CARE EDUCATION/TRAINING PROGRAM
Payer: COMMERCIAL

## 2023-02-19 VITALS
HEART RATE: 86 BPM | DIASTOLIC BLOOD PRESSURE: 72 MMHG | SYSTOLIC BLOOD PRESSURE: 107 MMHG | RESPIRATION RATE: 18 BRPM | OXYGEN SATURATION: 98 % | TEMPERATURE: 97.4 F

## 2023-02-19 DIAGNOSIS — Z76.5 MALINGERING: Primary | ICD-10-CM

## 2023-02-19 DIAGNOSIS — Z74.8: ICD-10-CM

## 2023-02-19 PROCEDURE — 99282 EMERGENCY DEPT VISIT SF MDM: CPT

## 2023-02-19 ASSESSMENT — PAIN - FUNCTIONAL ASSESSMENT: PAIN_FUNCTIONAL_ASSESSMENT: NONE - DENIES PAIN

## 2023-02-19 ASSESSMENT — ENCOUNTER SYMPTOMS
NAUSEA: 0
ABDOMINAL PAIN: 0
SHORTNESS OF BREATH: 0
SORE THROAT: 0
VOMITING: 0
DIARRHEA: 0
EYE REDNESS: 0
EYE DISCHARGE: 0
RHINORRHEA: 0

## 2023-02-19 NOTE — ED TRIAGE NOTES
Mode of arrival (squad #, walk in, police, etc) : Walk in        Chief complaint(s): Cough, runny nose, back pain, neck pain        Arrival Note (brief scenario, treatment PTA, etc). : Patient to ED complaining of cold symptoms, as well as chronic back pain for which ibuprofen is not working. States she was able to work out today, but her pain has increased. Patient is alert and able to ambulate independently. C= \"Have you ever felt that you should Cut down on your drinking? \"  No  A= \"Have people Annoyed you by criticizing your drinking? \"  No  G= \"Have you ever felt bad or Guilty about your drinking? \"  No  E= \"Have you ever had a drink as an Eye-opener first thing in the morning to steady your nerves or to help a hangover? \"  No      Deferred []      Reason for deferring: N/A    *If yes to two or more: probable alcohol abuse. *

## 2023-02-19 NOTE — DISCHARGE INSTRUCTIONS
Your aches and pains sound like soreness after exercising. Take Motrin every 8 hours as needed to help with pain and inflammation.     Return to the ER if you develop severe abdominal pain, burning with urination, fevers or chills, difficulty walking, dizziness weakness in your limbs

## 2023-02-19 NOTE — ED PROVIDER NOTES
[address]  Emergency Department Encounter  Emergency Medicine Resident     Pt Mary Faye  MRN: [id number]  [place] 1980  Date of evaluation: 23  PCP:  MAURICE Vera NP  Note Started: 10:44 PM EST      CHIEF COMPLAINT       Chief Complaint   Patient presents with    Back Pain     Lower back    Neck Pain    Nasal Congestion    Cough       HISTORY OF PRESENT ILLNESS  (Location/Symptom, Timing/Onset, Context/Setting, Quality, Duration, Modifying Factors, Severity.)      Chelsea Luevano is a 38 y.o. female who presents with generalized aches and pains. Particularly back pain. Patient's been dealing with chronic low back for the past year and a half. She states that she went to yoga today and feels overall sore\" but a good sore\". She is having moderate low back pain, no weakness in her limbs no burning or shooting sensation down her arms or legs. No urinary incontinence she has been observed to ambulate without any gait normalities. Denying any fevers or chills. Patient has had multiple ER visits in the past for similar symptoms. PAST MEDICAL / SURGICAL / SOCIAL / FAMILY HISTORY      has a past medical history of Anxiety, Bipolar 1 disorder ([address] Depression, Gestational diabetes, OCD (obsessive compulsive disorder), PTSD (post-traumatic stress disorder), Rapid rate of speech, and Schizoaffective disorder ([address] has a past surgical history that includes Abdomen surgery;  section (); and laparoscopy.       Social History     Socioeconomic History    Marital status: Legally      Spouse name: Not on file    Number of children: Not on file    Years of education: Not on file    Highest education level: Not on file   Occupational History    Not on file   Tobacco Use    Smoking status: Every Day     Packs/day: 1.00     Types: Cigarettes    Smokeless tobacco: Never   Vaping Use    Vaping Use: Former    Substances: Always   Substance and Sexual Activity    Alcohol use: No     Alcohol/week: 0.0 standard drinks    Drug use: Not Currently     Types: Marijuana Preston Tracey)    Sexual activity: Yes     Partners: Male   Other Topics Concern    Not on file   Social History Narrative    Not on file     Social Determinants of Health     Financial Resource Strain: Not on file   Food Insecurity: Not on file   Transportation Needs: Not on file   Physical Activity: Not on file   Stress: Not on file   Social Connections: Not on file   Intimate Partner Violence: Not on file   Housing Stability: Not on file       Family History   Problem Relation Age of Onset    Diabetes Sister     No Known Problems Mother     No Known Problems Father        Allergies:  Lamictal [lamotrigine]    Home Medications:  Prior to Admission medications    Medication Sig Start Date End Date Taking?  Authorizing Provider   ibuprofen (ADVIL;MOTRIN) 600 MG tablet Take 1 tablet by mouth every 8 hours as needed for Pain 2/18/23  Yes Bolivar Aviles MD   Cariprazine HCl (VRAYLAR PO) Take 9 mg by mouth Every morning    Historical Provider, MD   cloNIDine (CATAPRES) 0.2 MG tablet Take 0.2 mg by mouth daily At bedtime    Historical Provider, MD   mirtazapine (REMERON) 15 MG tablet Take 15 mg by mouth nightly    Historical Provider, MD   hydrOXYzine HCl (ATARAX) 10 MG tablet Take 10 mg by mouth three times daily    Historical Provider, MD   melatonin (RA MELATONIN) 3 MG TABS tablet Take 1 tablet by mouth nightly as needed (Sleep) 2/17/23   Elizabeth Hughes DO   prazosin (MINIPRESS) 1 MG capsule Take 1 capsule by mouth nightly  Patient not taking: Reported on 2/17/2023 12/2/22   Bryant Ramos MD   lithium (LITHOBID) 300 MG extended release tablet Take 2 tablets by mouth every 12 hours  Patient taking differently: Take 450 mg by mouth in the morning, at noon, and at bedtime 12/2/22   Bryant Ramos MD   hydroCHLOROthiazide (HYDRODIURIL) 25 MG tablet Take 1 tablet by mouth daily  Patient not taking: Reported on 2/17/2023 12/2/22   Daniela Terry MD   fluticasone (FLONASE) 50 MCG/ACT nasal spray 1 spray by Each Nostril route daily  Patient not taking: Reported on 1/23/2023 12/2/22   Daniela Terry MD   paliperidone palmitate ER (INVEGA SUSTENNA) 117 MG/0.75ML ASHOK IM injection Inject 117 mg into the muscle every 30 days  Patient not taking: Reported on 1/23/2023 12/27/22   Daniela Terry MD         REVIEW OF SYSTEMS       Review of Systems   Constitutional:  Negative for activity change, chills, diaphoresis, fatigue and fever. HENT:  Negative for congestion, rhinorrhea, sinus pressure and sore throat. Eyes:  Negative for photophobia, pain and redness. Respiratory:  Negative for cough, chest tightness and shortness of breath. Cardiovascular:  Negative for chest pain and leg swelling. Gastrointestinal:  Negative for abdominal pain, constipation, diarrhea, nausea and vomiting. Genitourinary:  Negative for dysuria, flank pain, frequency and urgency. Musculoskeletal:  Positive for back pain. Negative for gait problem, myalgias and neck stiffness. Skin:  Negative for color change, pallor and rash. Neurological:  Negative for dizziness, syncope, weakness, light-headedness, numbness and headaches. PHYSICAL EXAM      INITIAL VITALS:   BP (!) 144/96   Pulse 94   Temp 97.7 °F (36.5 °C) (Oral)   Resp 16   Ht 5' 8\" (1.727 m)   Wt 183 lb (83 kg)   LMP 12/07/2018 (Approximate)   SpO2 99%   BMI 27.83 kg/m²     Physical Exam  Constitutional:       Appearance: She is not ill-appearing. HENT:      Head: Normocephalic. Mouth/Throat:      Mouth: Mucous membranes are moist.      Pharynx: Oropharynx is clear. Eyes:      Extraocular Movements: Extraocular movements intact. Pupils: Pupils are equal, round, and reactive to light. Cardiovascular:      Rate and Rhythm: Normal rate. Musculoskeletal:         General: No swelling, tenderness or signs of injury. Normal range of motion.       Cervical back: Normal range of motion. Comments: No midline point tenderness over lumbar region   Skin:     General: Skin is warm and dry. Capillary Refill: Capillary refill takes less than 2 seconds. Neurological:      General: No focal deficit present. Mental Status: She is alert and oriented to person, place, and time. Cranial Nerves: No cranial nerve deficit. Psychiatric:         Mood and Affect: Mood normal.         DDX/DIAGNOSTIC RESULTS / EMERGENCY DEPARTMENT COURSE / MDM     Medical Decision Making  47-year-old female presenting with low back pain and generalized aches and pains after doing yoga. No red flag signs for cord compression or cauda equina. No fevers. Neuro exam is unremarkable observed to have full range of motion of all her joints. Watched her ambulate without any abnormalities. No abdominal pain no dysuria. Provide Motrin and lidocaine patch and discharge home. EMERGENCY DEPARTMENT COURSE:           PROCEDURES:  None    CONSULTS:  None    CRITICAL CARE:  There was significant risk of life threatening deterioration of patient's condition requiring my direct management. Critical care time 0 minutes, excluding any documented procedures. FINAL IMPRESSION      1.  Chronic bilateral low back pain without sciatica          DISPOSITION / PLAN     DISPOSITION Decision To Discharge 02/18/2023 10:51:01 PM      PATIENT REFERRED TO:  MAURICE Hoff NP5 Vincenzo Echols [facility] [address] [phone]        DISCHARGE MEDICATIONS:  New Prescriptions    IBUPROFEN (ADVIL;MOTRIN) 600 MG TABLET    Take 1 tablet by mouth every 8 hours as needed for Pain       Corazon Villegas MD  Emergency Medicine Resident    (Please note that portions of thisnote were completed with a voice recognition program.  Efforts were made to edit the dictations but occasionally words are mis-transcribed.)       Vernadine Sauce Brigid Menchaca MD  Resident  02/18/23 1202

## 2023-02-19 NOTE — ED TRIAGE NOTES
Pt ambulated to 05 with co back pain. Pain 9/10, aching quality, lower back x 1 year. Pt has full range of motion in all extremities and back. Pt denies numbness or tingling in all extremities. Pt states pain is better with exercise. Pt respirations are even and unlabored, pt is alert and oriented X 4, speaking in complete sentences, bed is in the lowest position, call light is within reach. Will continue to monitor.

## 2023-02-19 NOTE — ED NOTES
Mode of arrival (squad #, walk in, police, etc) : [employer] drop off        Chief complaint(s): Mental health eval        Arrival Note (brief scenario, treatment PTA, etc). : Pt arrives to ED wishing to be cabbed home. Pt was here earlier demanding a cab ride after being seen for back pain and made no efforts to call anyone for a ride. Pt then walked out of ED A/O x 4. Pt walked down the street and then called 911 stating she was suicidal. Pt arrives and gets back to the ED and says \"well I really just wanted a sandwich and to take a nap\". Pt asked if she was SI/HI and she states no. Pt asked \"did you just need a ride home? \" and she laughs and says \"yeah\". Pt follows at [facility] and has been taking all her meds. Pt states she is in between homes currently but lives with her sister and her sister is home and able to let her into the house, but apparently unable to come pick her up. C= \"Have you ever felt that you should Cut down on your drinking? \"  No  A= \"Have people Annoyed you by criticizing your drinking? \"  No  G= \"Have you ever felt bad or Guilty about your drinking? \"  No  E= \"Have you ever had a drink as an Eye-opener first thing in the morning to steady your nerves or to help a hangover? \"  No      Deferred []      Reason for deferring: N/A    *If yes to two or more: probable alcohol abuse. Cira Pal RN  02/19/23 7994

## 2023-02-19 NOTE — ED NOTES
Provisional Diagnosis:     Patient presented to ED via [employer] for a mental health evaluation. Patient has history of Bipolar Disorder and Schizoaffective Disorder. Psychosocial and Contextual Factors:     Patient homeless. C-SSRS Summary:    Patient told [employer] Enforcement she wanted to kill herself, but denies SI upon arrival to ED. Patient: X  Family:   Agency: X  ([employer])    Substance Abuse  Patient denies    Present Suicidal Behavior:    Patient told [employer] Enforcement she wanted to kill herself, but denies SI upon arrival to ED. Verbal:     Attempt:    Past Suicidal Behavior:   Patient has past SI, no recent attempts. Verbal:X     Attempt: X    Self-Injurious/Self-Mutilation:  Patient denies    Violence Current or Past   Patient has history of violence towards staff. Patient has gotten both verbally and physically aggressive in the past.  Patient calm and cooperative this visit to ED. Trauma Identified:    None reported    Protective Factors:    Patient has insurance. Patient linked with [facility]. Patient takes medications as prescribed. Risk Factors:    Patient has poor insight. Patient homeless. Patient has poor coping skills. Clinical Summary:    Patient is a 38year old  female who presented to ED via [employer]  for a mental health evaluation. Patient was just seen in this ED and upon discharge she left hospital walking. Patient then called 911 because she \"was so cold and tired\". When officers arrived, she stated she was suicidal which led to her bring brought back to this ED. Upon arrival patient initially identified suicidal ideation but then she stated she just \"needed a place to sleep\" and that she was [employer]". Patient then responded with \"hell no\" when she was asked if she wanted to kill/harm herself. Patient was asked if she wanted a cab ride to her sisters house, to which she laughed and replied \"yes\".   Patient continues to deny suicidal ideation. Patient denies HI. Patient denies hallucinations. Patient linked with [facility] and takes her medications as prescribed. Level of Care Disposition:    Patient does not meet criteria for inpatient hospitalization. Patient to be discharged to her sisters residence.

## 2023-02-19 NOTE — ED PROVIDER NOTES
EMERGENCY DEPARTMENT ENCOUNTER    Pt Name: Kimberly Brown  MRN: [id number]  [place] 1980  Date of evaluation: 2/19/23  CHIEF COMPLAINT       Chief Complaint   Patient presents with    Homeless     HISTORY OF PRESENT ILLNESS   Is a 47-year-old woman presenting essentially requesting a ride home    She was just seen in our emergency department after she stated she had some muscle aches after doing yoga in the park    She was discharged home. She states she had to walk in the cold. She went and sat down and called the police. Initially she told them she wanted to kill herself    They then brought her here. Upon being here she denies being suicidal.  She laughs and says she does not want to harm herself. She states if she leaves she will not harm herself. She has no auditory visual hallucinations. She has no other complaints    She is simply requesting a cab ride home. She states she lives with her sister and can get into the home if she goes there. REVIEW OF SYSTEMS     Review of Systems   Constitutional:  Negative for chills and fever. HENT:  Negative for rhinorrhea and sore throat. Eyes:  Negative for discharge and redness. Respiratory:  Negative for shortness of breath. Cardiovascular:  Negative for chest pain. Gastrointestinal:  Negative for abdominal pain, diarrhea, nausea and vomiting. Genitourinary:  Negative for dysuria, frequency and urgency. Musculoskeletal:  Negative for arthralgias and myalgias. Skin:  Negative for rash. Neurological:  Negative for weakness and numbness. Psychiatric/Behavioral:  Negative for suicidal ideas. All other systems reviewed and are negative.   PASTMEDICAL HISTORY     Past Medical History:   Diagnosis Date    Anxiety     Bipolar 1 disorder ([facility] 1999    Depression     Gestational diabetes 7/22/2016    OCD (obsessive compulsive disorder) 1999    PTSD (post-traumatic stress disorder)     Rapid rate of speech     Schizoaffective disorder [facility])      Past Problem List  Patient Active Problem List   Diagnosis Code    Pregnancy Z34.90    Hx CS x1 (G2-twins,36wks) Z98.891    H/O miscarriage X2 O09.299    Insufficient prenatal care O09.30    AMA O09.529    Smoker F17.200    OCD F42.9    Candida albicans infection B37.9    Twin gestation, 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 BXX0    Chromosomal 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, affecting management of mother, antepartum O35.10X0    Poor fetal growth, affecting management of mother, antepartum condition or complication G17.8885    Umbilical cord complication I97. 9XX0    Tobacco use disorder complicating pregnancy, childbirth, or puerperium, antepartum O99.330    Twin B-IUGR O36.5920    Twin B-Fetal cardiomegaly, pericardial effusion, VSD O35. BXX0    Polyhydramnios (twin A-8.6cm and twin B-8.7cm) O40. 2XX0    Depression F32. A    Twin B-MCAs wnl, absent UADs O69. 9XX0    Abnl Quad (1:50 T21), elev msAFP O35.10X0    Hx D&C (G3-8wks, G4-8wks) Z98.890    Elev 1hr gtt, normal 3hr R73.09    GBS bacteriuria R82.71    Chlamydia infection (SPENSER neg) O98.811, A74.9    FHx DM  Z83.3    Fetal pericardial effusion affecting management of mother O36.8990    Polyhydramnios, antepartum complication E38. 9XX0    Polyhydramnios, antepartum complication G85. 9XX0    IUGR (intrauterine growth restriction) affecting care of mother B00.8557    Umbilical cord complication F40. 9XX0    Suspected damage to fetus from other disease in mother, affecting management of mother, antepartum condition or complication D27. 8XX0    Suspected damage to fetus from other disease in mother, affecting management of mother, antepartum condition or complication U05. 8XX0    8/16/16 RLTCS @29w2d M APG Wt /M APG Wt Z98.891    Hyperglycemia R73.9    Bipolar 1 disorder (HCC) F31.9    Bipolar 1 disorder, manic, moderate (HCC) F31.12    Micaela ([address] F30.9    Psychosis ([facility]) F29    Bipolar affective disorder, mixed, severe, with psychotic behavior ([facility] F31.64    Depression with suicidal ideation F32. A, R45.851    Acute psychosis ([facility] F23    Bipolar 1 disorder, mixed, severe ([facility] F31.63     SURGICAL HISTORY       Past Surgical History:   Procedure Laterality Date    ABDOMEN SURGERY       SECTION      LAPAROSCOPY       CURRENT MEDICATIONS       Previous Medications    CARIPRAZINE HCL (VRAYLAR PO)    Take 9 mg by mouth Every morning    CLONIDINE (CATAPRES) 0.2 MG TABLET    Take 0.2 mg by mouth daily At bedtime    FLUTICASONE (FLONASE) 50 MCG/ACT NASAL SPRAY    1 spray by Each Nostril route daily    HYDROCHLOROTHIAZIDE (HYDRODIURIL) 25 MG TABLET    Take 1 tablet by mouth daily    HYDROXYZINE HCL (ATARAX) 10 MG TABLET    Take 10 mg by mouth three times daily    IBUPROFEN (ADVIL;MOTRIN) 600 MG TABLET    Take 1 tablet by mouth every 8 hours as needed for Pain    LITHIUM (LITHOBID) 300 MG EXTENDED RELEASE TABLET    Take 2 tablets by mouth every 12 hours    MELATONIN (RA MELATONIN) 3 MG TABS TABLET    Take 1 tablet by mouth nightly as needed (Sleep)    MIRTAZAPINE (REMERON) 15 MG TABLET    Take 15 mg by mouth nightly    PALIPERIDONE PALMITATE ER (INVEGA SUSTENNA) 117 MG/0.75ML ASHOK IM INJECTION    Inject 117 mg into the muscle every 30 days    PRAZOSIN (MINIPRESS) 1 MG CAPSULE    Take 1 capsule by mouth nightly     ALLERGIES     is allergic to lamictal [lamotrigine]. FAMILY HISTORY     She indicated that her mother is alive. She indicated that her father is . She indicated that the status of her sister is unknown.      SOCIAL HISTORY       Social History     Tobacco Use    Smoking status: Every Day     Packs/day: 1.00     Types: Cigarettes    Smokeless tobacco: Never   Vaping Use    Vaping Use: Former    Substances: Always   Substance Use Topics    Alcohol use: No     Alcohol/week: 0.0 standard drinks    Drug use: Not Currently     Types: Marijuana (Weed)     PHYSICAL EXAM     INITIAL VITALS: /72 Pulse 86   Temp 97.4 °F (36.3 °C) (Oral)   Resp 18   LMP 12/07/2018 (Approximate)   SpO2 98%    Physical Exam  Vitals and nursing note reviewed. Constitutional:       Appearance: Normal appearance. HENT:      Head: Normocephalic and atraumatic. Nose: Nose normal.      Mouth/Throat:      Mouth: Mucous membranes are moist.   Eyes:      Conjunctiva/sclera: Conjunctivae normal.      Pupils: Pupils are equal, round, and reactive to light. Cardiovascular:      Rate and Rhythm: Normal rate and regular rhythm. Pulses: Normal pulses. Heart sounds: Normal heart sounds. Pulmonary:      Effort: Pulmonary effort is normal.      Breath sounds: Normal breath sounds. Abdominal:      Palpations: Abdomen is soft. Tenderness: There is no abdominal tenderness. Musculoskeletal:         General: No swelling or deformity. Cervical back: Normal range of motion. Skin:     General: Skin is warm. Findings: No rash. Neurological:      General: No focal deficit present. Mental Status: She is alert and oriented to person, place, and time. Psychiatric:         Mood and Affect: Mood normal.       MEDICAL DECISION MAKING / ED COURSE:     75-year-old presents after she was just discharged from the hospital ended up walking home but it was too cold so she called 911 and told him she was suicidal but then when she got here she is denying being suicidal at all    She simply wants a cab ride home    She is alert oriented normal vital signs no other complaints    Does not appear intoxicated    She is denying any suicidal or homicidal ideation no auditory visual hallucinations and no other medical complaints    Suspect she is malingering and attempting to get a ride home.   We will arrange for a cab and discharge    I have no reason to perform any medical testing or psychiatric evaluation or admission given she has no complaints      CRITICAL CARE:       PROCEDURES:    Procedures      DATA FOR LAB AND RADIOLOGY TESTS ORDERED BELOW ARE REVIEWED BY THE ED CLINICIAN:    RADIOLOGY: All x-rays, CT, MRI, and formal ultrasound images (except ED bedside ultrasound) are read by the radiologist, see reports below, unless otherwise noted in MDM or here. Reports below are reviewed by myself. No orders to display       LABS: Lab orders shown below, the results are reviewed by myself, and all abnormals are listed below. Labs Reviewed - No data to display    Vitals Reviewed:    Vitals:    02/19/23 0355   BP: 107/72   Pulse: 86   Resp: 18   Temp: 97.4 °F (36.3 °C)   TempSrc: Oral   SpO2: 98%     MEDICATIONS GIVEN TO PATIENT THIS ENCOUNTER:  No orders of the defined types were placed in this encounter. DISCHARGE PRESCRIPTIONS:  New Prescriptions    No medications on file     PHYSICIAN CONSULTS ORDERED THIS ENCOUNTER:  None  FINAL IMPRESSION      1. Malingering    2.  No transport available to caregiver          DISPOSITION/PLAN   DISPOSITION Decision To Discharge 02/19/2023 03:55:24 AM      OUTPATIENT FOLLOW UP THE PATIENT:  Eufemia Morocho APRN - NP  [address] [facility] [address] [facility]  [phone]    Schedule an appointment as soon as possible for a visit       [facility]  [address] [facility]  [phone]    As needed      MD Timoteo Bhat MD  02/19/23 5409

## 2023-02-19 NOTE — ED PROVIDER NOTES
EMERGENCY DEPARTMENT ENCOUNTER   ATTENDING ATTESTATION     Pt Name: Cecily Frankel  MRN: [id number]  [place] 1980  Date of evaluation: 2/18/23       Cecily Frankel is a 38 y.o. female who presents with Back Pain (Lower back), Neck Pain, Nasal Congestion, and Cough    Patient states she did yoga and now she has some back pain    We will treat symptomatically and discharged    MDM:     No concern for emergent pathology such as epidural abscess cauda equina    No red flag signs or symptoms    Vitals:   Vitals:    02/18/23 2229   BP: (!) 144/96   Pulse: 94   Resp: 16   Temp: 97.7 °F (36.5 °C)   TempSrc: Oral   SpO2: 99%   Weight: 183 lb (83 kg)   Height: 5' 8\" (1.727 m)         I personally saw and examined the patient. I have reviewed and agree with the resident's findings, including all diagnostic interpretations and treatment plan as written. I was present for the key portions of any procedures performed and the inclusive time noted for any critical care statement. The care is provided during an unprecedented national emergency due to the novel coronavirus, COVID 19.   Deon Prader, MD  Attending Emergency Physician            Deon Prader, MD  02/18/23 3145

## 2023-02-20 NOTE — ED PROVIDER NOTES
[facility]      Pt Name: Kirsten Burkett  MRN: [id number]  [place] 1980  Date of evaluation: 2/20/23    CHIEF COMPLAINT       Chief Complaint   Patient presents with    Palpitations       HISTORY OF PRESENT ILLNESS   Kirsten Burkett is a 38 y.o. female who presents with reported palpitations, concerned that she took multiple medications together and recently increased on Vraylar. Having difficulty sleeping due to this and is reportedly supposed to take a trip to [facility]. Requesting melatonin. PASTMEDICAL HISTORY     Past Medical History:   Diagnosis Date    Anxiety     Bipolar 1 disorder ([address] 1999    Depression     Gestational diabetes 7/22/2016    OCD (obsessive compulsive disorder) 1999    PTSD (post-traumatic stress disorder)     Rapid rate of speech     Schizoaffective disorder [facility])      Past Problem List  Patient Active Problem List   Diagnosis Code    Pregnancy Z34.90    Hx CS x1 (G2-twins,36wks) Z98.891    H/O miscarriage X2 O09.299    Insufficient prenatal care O09.30    AMA O09.529    Smoker F17.200    OCD F42.9    Candida albicans infection B37.9    Twin gestation, 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 BXX0    Chromosomal 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, affecting management of mother, antepartum O35.10X0    Poor fetal growth, affecting management of mother, antepartum condition or complication R18.3149    Umbilical cord complication L26. 9XX0    Tobacco use disorder complicating pregnancy, childbirth, or puerperium, antepartum O99.330    Twin B-IUGR O36.5920    Twin B-Fetal cardiomegaly, pericardial effusion, VSD O35. BXX0    Polyhydramnios (twin A-8.6cm and twin B-8.7cm) O40. 2XX0    Depression F32. A    Twin B-MCAs wnl, absent UADs O69. 9XX0    Abnl Quad (1:50 T21), elev msAFP O35.10X0    Hx D&C (G3-8wks, G4-8wks) Z98.890    Elev 1hr gtt, normal 3hr R73.09    GBS bacteriuria R82.71    Chlamydia infection (SPENSER neg) O98.811, A74.9    FHx DM  Z83.3    Fetal pericardial effusion affecting management of mother O36.8990    Polyhydramnios, antepartum complication L25. 9XX0    Polyhydramnios, antepartum complication E56. 9XX0    IUGR (intrauterine growth restriction) affecting care of mother F76.5486    Umbilical cord complication O01. 9XX0    Suspected damage to fetus from other disease in mother, affecting management of mother, antepartum condition or complication T26. 8XX0    Suspected damage to fetus from other disease in mother, affecting management of mother, antepartum condition or complication V06. 8XX0    16 RLTCS @29w2d M APG Wt /M APG Wt Z98.891    Hyperglycemia R73.9    Bipolar 1 disorder (HCC) F31.9    Bipolar 1 disorder, manic, moderate (HCC) F31.12    Micaela ([address] F30.9    Psychosis (HCC) F29    Bipolar affective disorder, mixed, severe, with psychotic behavior ([address] F31.64    Depression with suicidal ideation F32. A, R45.851    Acute psychosis ([address] F23    Bipolar 1 disorder, mixed, severe ([address] F31.63       SURGICAL HISTORY       Past Surgical History:   Procedure Laterality Date    ABDOMEN SURGERY       SECTION      LAPAROSCOPY         CURRENT MEDICATIONS       Discharge Medication List as of 2023  5:36 AM        CONTINUE these medications which have NOT CHANGED    Details   Cariprazine HCl (VRAYLAR PO) Take 9 mg by mouth Every morningHistorical Med      cloNIDine (CATAPRES) 0.2 MG tablet Take 0.2 mg by mouth daily At bedtimeHistorical Med      mirtazapine (REMERON) 15 MG tablet Take 15 mg by mouth nightlyHistorical Med      hydrOXYzine HCl (ATARAX) 10 MG tablet Take 10 mg by mouth three times dailyHistorical Med      ibuprofen (ADVIL;MOTRIN) 200 MG tablet Take 1 tablet by mouth every 6 hours as needed for Pain, Disp-30 tablet, R-3Normal      prazosin (MINIPRESS) 1 MG capsule Take 1 capsule by mouth nightly, Disp-30 capsule, R-0Normal      lithium (LITHOBID) 300 MG extended release tablet Take 2 tablets by mouth every 12 hours, Disp-60 tablet, R-3Normal      hydroCHLOROthiazide (HYDRODIURIL) 25 MG tablet Take 1 tablet by mouth daily, Disp-30 tablet, R-0Normal      fluticasone (FLONASE) 50 MCG/ACT nasal spray 1 spray by Each Nostril route daily, Disp-16 g, R-3Normal      paliperidone palmitate ER (INVEGA SUSTENNA) 117 MG/0.75ML ASHOK IM injection Inject 117 mg into the muscle every 30 days, Disp-1 each, R-0Normal             ALLERGIES     is allergic to lamictal [lamotrigine]. FAMILY HISTORY     She indicated that her mother is alive. She indicated that her father is . She indicated that the status of her sister is unknown. SOCIAL HISTORY       Social History     Tobacco Use    Smoking status: Every Day     Packs/day: 1.00     Types: Cigarettes    Smokeless tobacco: Never   Vaping Use    Vaping Use: Former    Substances: Always   Substance Use Topics    Alcohol use: No     Alcohol/week: 0.0 standard drinks    Drug use: Not Currently     Types: Marijuana (Weed)       PHYSICAL EXAM     INITIAL VITALS: BP (!) 109/18   Pulse 95   Temp 99 °F (37.2 °C)   Resp 19   Ht 5' 8\" (1.727 m)   Wt 183 lb (83 kg)   LMP 2018 (Approximate)   SpO2 97%   BMI 27.83 kg/m²    Physical Exam  Vitals and nursing note reviewed. Constitutional:       General: She is not in acute distress. Appearance: She is well-developed. She is not toxic-appearing. HENT:      Head: Normocephalic and atraumatic. Nose: Nose normal.      Mouth/Throat:      Mouth: Mucous membranes are moist.   Eyes:      General: No scleral icterus. Conjunctiva/sclera: Conjunctivae normal.      Pupils: Pupils are equal, round, and reactive to light. Cardiovascular:      Rate and Rhythm: Normal rate and regular rhythm. Pulmonary:      Effort: Pulmonary effort is normal. No respiratory distress. Musculoskeletal:         General: Normal range of motion. Skin:     General: Skin is warm and dry.       Findings: No erythema or rash.   Neurological:      Mental Status: She is alert and oriented to person, place, and time.   Psychiatric:      Comments: Pressured speech but linear thought process, does not appear to be responding to internal stimuli, good eye contact       MEDICAL DECISION MAKING / ED COURSE:   Summary of Patient Presentation:      1)  Number and Complexity of Problems  Problem List This Visit:    1. Insomnia, unspecified type    2. Bipolar 1 disorder (HCC)        Differential Diagnosis: Manic state versus acute psychosis versus insomnia versus polysubstance abuse    Diagnoses Considered but Do Not Suspect: Acute psychosis    Pertinent Comorbid Conditions:    Past Medical History:   Diagnosis Date    Anxiety     Bipolar 1 disorder (HCC) 1999    Depression     Gestational diabetes 7/22/2016    OCD (obsessive compulsive disorder) 1999    PTSD (post-traumatic stress disorder)     Rapid rate of speech     Schizoaffective disorder (HCC)        2)  Data Reviewed    External Documents Reviewed: Previous ER visits reviewed by myself  3)  Treatment and Disposition  [Patient repeat assessment, Disposition discussion with patient/family, Case discussed with consulting clinician, MIPS, Social determinants of health impacting treatment or disposition, Shared Decision Making, Code Status Discussion:]    She is alert and oriented all having some mild flight of ideas and pressured speech does not appear acutely psychotic.  States her symptoms are improved especially with the Vraylar.  Given dose of melatonin as well as prescription.  Based on the low acuity of concerning symptoms and improvement of symptoms, patient will be discharged with follow up and prescription information listed in the Disposition section.  Patient states they will follow-up with primary care physician and/or return to the emergency department should they experience a change or worsening of symptoms.  Patient will be discharged with resources: summary of visit,  instructions, follow-up information, prescriptions if necessary. Patient/ family instructed to read discharge paperwork. All of their questions and concerns were addressed. Suspicion for any acute life-threatening processes is low. Patient voices understanding of plan. DATA FOR LAB AND RADIOLOGY TESTS ORDERED BELOW ARE REVIEWED BY THE ED CLINICIAN:    RADIOLOGY: All x-rays, CT, MRI, and formal ultrasound images (except ED bedside ultrasound) are read by the radiologist, see reports below, unless otherwise noted in MDM or here. Reports below are reviewed by myself. No orders to display       LABS: Lab orders shown below, the results are reviewed by myself, and all abnormals are listed below. Labs Reviewed - No data to display    Vitals Reviewed:    Vitals:    02/17/23 1221 02/17/23 0518 02/17/23 0528 02/17/23 0538   BP:       Pulse: 95 (!) 103 91 95   Resp: 24 24 25 19   Temp:       SpO2:  97% 96% 97%   Weight:       Height:         MEDICATIONS GIVEN TO PATIENT THIS ENCOUNTER:  Orders Placed This Encounter   Medications    melatonin tablet 3 mg    melatonin (RA MELATONIN) 3 MG TABS tablet     Sig: Take 1 tablet by mouth nightly as needed (Sleep)     Dispense:  30 tablet     Refill:  3     DISCHARGE PRESCRIPTIONS:  Discharge Medication List as of 2/17/2023  5:36 AM        START taking these medications    Details   melatonin (RA MELATONIN) 3 MG TABS tablet Take 1 tablet by mouth nightly as needed (Sleep), Disp-30 tablet, R-3Print           PHYSICIAN CONSULTS ORDERED THIS ENCOUNTER:  None    ED Course Notes From Epic Narrator:         CRITICAL CARE:   0    PROCEDURES:  none    FINAL IMPRESSION      1. Insomnia, unspecified type    2.  Bipolar 1 disorder [facility])          DISPOSITION/PLAN   DISPOSITION Decision To Discharge 02/17/2023 05:26:55 AM      OUTPATIENT FOLLOW UP THE PATIENT:  MAURICE Mcgrath NP  [phone]    Schedule an appointment as soon as possible for a visit in 1 week        DISCHARGE MEDICATIONS:  Discharge Medication List as of 2/17/2023  5:36 AM        START taking these medications    Details   melatonin (RA MELATONIN) 3 MG TABS tablet Take 1 tablet by mouth nightly as needed (Sleep), Disp-30 tablet, R-3Print             Lisa De La Torre DO  EmergencyMedicine Attending    (Please note that portions of this note were completed with a voice recognition program.  Efforts were made to edit the dictations but occasionally words are mis-transcribed.)     Lisa De La Torre DO  02/20/23 0829

## 2023-02-21 ENCOUNTER — HOSPITAL ENCOUNTER (EMERGENCY)
Age: 43
Discharge: HOME | End: 2023-02-21
Attending: EMERGENCY MEDICINE
Payer: COMMERCIAL

## 2023-02-21 VITALS
SYSTOLIC BLOOD PRESSURE: 113 MMHG | HEIGHT: 68 IN | HEART RATE: 94 BPM | WEIGHT: 183 LBS | BODY MASS INDEX: 27.74 KG/M2 | RESPIRATION RATE: 20 BRPM | OXYGEN SATURATION: 98 % | DIASTOLIC BLOOD PRESSURE: 98 MMHG | TEMPERATURE: 97.6 F

## 2023-02-21 DIAGNOSIS — Z00.8 EVALUATION BY PSYCHIATRIC SERVICE REQUIRED: Primary | ICD-10-CM

## 2023-02-21 PROCEDURE — 6370000000 HC RX 637 (ALT 250 FOR IP): Performed by: EMERGENCY MEDICINE

## 2023-02-21 PROCEDURE — 99283 EMERGENCY DEPT VISIT LOW MDM: CPT

## 2023-02-21 RX ORDER — IBUPROFEN 600 MG/1
600 TABLET ORAL ONCE
Status: COMPLETED | OUTPATIENT
Start: 2023-02-21 | End: 2023-02-21

## 2023-02-21 RX ADMIN — IBUPROFEN 600 MG: 600 TABLET, FILM COATED ORAL at 07:40

## 2023-02-21 ASSESSMENT — PAIN DESCRIPTION - ONSET: ONSET: ON-GOING

## 2023-02-21 ASSESSMENT — ENCOUNTER SYMPTOMS
DIARRHEA: 0
BACK PAIN: 0
SORE THROAT: 0
CHEST TIGHTNESS: 0
COLOR CHANGE: 0
SHORTNESS OF BREATH: 0
EYE DISCHARGE: 0
ABDOMINAL PAIN: 0
RHINORRHEA: 0
EYE PAIN: 0
CONSTIPATION: 0
NAUSEA: 0
VOMITING: 0
TROUBLE SWALLOWING: 0
FACIAL SWELLING: 0
EYE REDNESS: 0
BLOOD IN STOOL: 0
WHEEZING: 0
COUGH: 0
SINUS PRESSURE: 0

## 2023-02-21 ASSESSMENT — PAIN DESCRIPTION - LOCATION
LOCATION: BACK
LOCATION: BACK

## 2023-02-21 ASSESSMENT — PAIN DESCRIPTION - ORIENTATION
ORIENTATION: LOWER
ORIENTATION: LOWER

## 2023-02-21 ASSESSMENT — PAIN DESCRIPTION - PAIN TYPE: TYPE: ACUTE PAIN

## 2023-02-21 ASSESSMENT — PAIN DESCRIPTION - DESCRIPTORS
DESCRIPTORS: ACHING
DESCRIPTORS: ACHING

## 2023-02-21 ASSESSMENT — PAIN - FUNCTIONAL ASSESSMENT
PAIN_FUNCTIONAL_ASSESSMENT: 0-10
PAIN_FUNCTIONAL_ASSESSMENT: NONE - DENIES PAIN

## 2023-02-21 ASSESSMENT — PAIN DESCRIPTION - FREQUENCY: FREQUENCY: CONTINUOUS

## 2023-02-21 ASSESSMENT — PAIN SCALES - GENERAL
PAINLEVEL_OUTOF10: 7
PAINLEVEL_OUTOF10: 7

## 2023-02-21 NOTE — ED NOTES
Patient is a 38year old female that is brought to the ED today via [employer]. [employer] states they received a call for a woman who was undressing and trying to crawl under the fence at [facility]. [employer] states when they arrived they located the patient who was not undressing or trying to get under the fence but did request to go to the ER. [employer] states they transported the patient to the ED without issues. Patient arrives hyper verbal but is able to be redirected and answer questions appropriately. Patient reports she is currently homeless and was outside all night. Patient reports she has been staying [facility]. Patient reports this morning she was trying to drop off flyers at [facility] but now realizes it was early and she should not have been there at that time. Patient denies S/I and H/I at this time. Patient reports having upcoming court dates for custody, a job interview and appointments at [facility]. Patient reports she does not want to stay at the hospital and would like to get to [facility] where she has supports. Patient does not currently meet criteria for admission. Patient is provided a cab to [employer].

## 2023-02-21 NOTE — DISCHARGE INSTRUCTIONS
Thank you for visiting [address] [facility] ED. You need to call in morning to make appointment as directed with appropriate doctor. Should you have any questions regarding your care or further treatment, please call [facility] Emergency Department at [phone]. Take any medications as prescribed, if given any. Return to ED if symptoms worsen, do not improve, and unable to follow up with your physician, or other concerns arise.

## 2023-02-21 NOTE — ED PROVIDER NOTES
[address]  eMERGENCY dEPARTMENT eNCOUnter      Pt Name: Sherri Garcia  MRN: [id number]  [place] 1980  Date of evaluation: 2/21/23      CHIEF COMPLAINT       Chief Complaint   Patient presents with    [address] [facility]    Sherri Garcia is a 38 y.o. female who presents complaining of psychiatric evaluation. Patient is here police brought her in because evidently she was standing outside of the radio station and there was some concerns that she was doing inappropriate things. When police arrived evidently they did not find her doing anything inappropriate and then asked if they could take her somewhere and she agreed to come here. Patient has a history of schizoaffective disorder OCD and bipolar. Patient states that she is almost out of her medications and is requesting refills. Patient denies any suicidal or homicidal ideation. Patient denies hearing voices. Patient states that she has had a bit of a cold but otherwise not been doing anything abnormal.  Patient denies drug or alcohol use. REVIEW OF SYSTEMS       Review of Systems   Constitutional:  Negative for activity change, appetite change, chills, diaphoresis and fever. HENT:  Negative for congestion, ear pain, facial swelling, nosebleeds, rhinorrhea, sinus pressure, sore throat and trouble swallowing. Eyes:  Negative for pain, discharge and redness. Respiratory:  Negative for cough, chest tightness, shortness of breath and wheezing. Cardiovascular:  Negative for chest pain, palpitations and leg swelling. Gastrointestinal:  Negative for abdominal pain, blood in stool, constipation, diarrhea, nausea and vomiting. Genitourinary:  Negative for difficulty urinating, dysuria, flank pain, frequency, genital sores and hematuria. Musculoskeletal:  Negative for arthralgias, back pain, gait problem, joint swelling, myalgias and neck pain.    Skin:  Negative for color change, pallor, rash and wound. Neurological:  Negative for dizziness, tremors, seizures, syncope, speech difficulty, weakness, numbness and headaches. Psychiatric/Behavioral:  Positive for behavioral problems. Negative for confusion, decreased concentration, hallucinations, self-injury, sleep disturbance and suicidal ideas. The patient is hyperactive.       PAST MEDICAL HISTORY     Past Medical History:   Diagnosis Date    Anxiety     Bipolar 1 disorder ([facility] [address]     Depression     Gestational diabetes 2016    OCD (obsessive compulsive disorder)     PTSD (post-traumatic stress disorder)     Rapid rate of speech     Schizoaffective disorder ([facility] [address]        SURGICAL HISTORY       Past Surgical History:   Procedure Laterality Date    ABDOMEN SURGERY       SECTION      LAPAROSCOPY         CURRENT MEDICATIONS       Current Discharge Medication List        CONTINUE these medications which have NOT CHANGED    Details   ibuprofen (ADVIL;MOTRIN) 600 MG tablet Take 1 tablet by mouth every 8 hours as needed for Pain  Qty: 6 tablet, Refills: 1      Cariprazine HCl (VRAYLAR PO) Take 9 mg by mouth Every morning      cloNIDine (CATAPRES) 0.2 MG tablet Take 0.2 mg by mouth daily At bedtime      mirtazapine (REMERON) 15 MG tablet Take 15 mg by mouth nightly      hydrOXYzine HCl (ATARAX) 10 MG tablet Take 10 mg by mouth three times daily      melatonin (RA MELATONIN) 3 MG TABS tablet Take 1 tablet by mouth nightly as needed (Sleep)  Qty: 30 tablet, Refills: 3      prazosin (MINIPRESS) 1 MG capsule Take 1 capsule by mouth nightly  Qty: 30 capsule, Refills: 0      lithium (LITHOBID) 300 MG extended release tablet Take 2 tablets by mouth every 12 hours  Qty: 60 tablet, Refills: 3      hydroCHLOROthiazide (HYDRODIURIL) 25 MG tablet Take 1 tablet by mouth daily  Qty: 30 tablet, Refills: 0      fluticasone (FLONASE) 50 MCG/ACT nasal spray 1 spray by Each Nostril route daily  Qty: 16 g, Refills: 3      paliperidone palmitate ER (INVEGA SUSTENNA) 117 MG/0.75ML ASHOK IM injection Inject 117 mg into the muscle every 30 days  Qty: 1 each, Refills: 0             ALLERGIES     is allergic to lamictal [lamotrigine]. SOCIAL HISTORY      reports that she has been smoking cigarettes. She has been smoking an average of 1 pack per day. She has never used smokeless tobacco. She reports that she does not currently use drugs after having used the following drugs: Marijuana Dietra Due). She reports that she does not drink alcohol. PHYSICAL EXAM     INITIAL VITALS: BP (!) 113/98   Pulse 94   Temp 97.6 °F (36.4 °C) (Tympanic)   Resp 20   Ht 5' 8\" (1.727 m)   Wt 183 lb (83 kg)   LMP 12/07/2018 (Approximate)   SpO2 98%   BMI 27.83 kg/m²      Physical Exam  Constitutional:       General: She is not in acute distress. Appearance: She is well-developed. She is not diaphoretic. HENT:      Head: Normocephalic and atraumatic. Eyes:      General: No scleral icterus. Right eye: No discharge. Left eye: No discharge. Conjunctiva/sclera: Conjunctivae normal.      Pupils: Pupils are equal, round, and reactive to light. Cardiovascular:      Rate and Rhythm: Normal rate and regular rhythm. Heart sounds: Normal heart sounds. No murmur heard. No friction rub. No gallop. Pulmonary:      Effort: Pulmonary effort is normal. No respiratory distress. Breath sounds: Normal breath sounds. No wheezing or rales. Neurological:      Mental Status: She is alert and oriented to person, place, and time. Psychiatric:         Mood and Affect: Mood is elated. Speech: Speech is rapid and pressured. Behavior: Behavior is hyperactive. Thought Content: Thought content does not include homicidal or suicidal ideation.          MEDICAL DECISION MAKING:     Summary of Patient Presentation:        1)  Number and Complexity of Problems  Problem List This Visit:  Homelessness, hyperactivity    Differential Diagnosis:  Schizoaffective disorder, bipolar    Diagnoses Considered but Do Not Suspect:  None    Pertinent Comorbid Conditions:  Schizoaffective, bipolar, OCD, PTSD    2)  Data Reviewed  Decision Rules/Scores/MIPS utilized:  None    External Documents Reviewed:  None    Imaging that is independently reviewed and interpreted by me as:  None    See more data below for the lab and radiology tests and orders. 3)  Treatment and Disposition      \"ED Course\" Notes From Epic Narrator:     7:38 AM EST  Patient has been assessed and  does not feel the patient is having any acute issues. Because she is requesting medication refill plan will be to either send her to [facility] or to [address] [place] [address] where she gets her counseling out to talk with her counselor. Patient is okay to be discharged at this time. PROCEDURES:  None      DATA FOR LAB AND RADIOLOGY TESTS ORDERED BELOW ARE REVIEWED BY THE ED CLINICIAN:    RADIOLOGY: All x-rays, CT, MRI, and formal ultrasound images (except ED bedside ultrasound) are read by the radiologist, see reports below, unless otherwise noted in MDM or here. Reports below are reviewed by myself. No orders to display       LABS: Lab orders shown below, the results are reviewed by myself, and all abnormals are listed below. Labs Reviewed - No data to display    Vitals Reviewed:    Vitals:    02/21/23 0725 02/21/23 0730   BP:  (!) 113/98   Pulse:  94   Resp:  20   Temp:  97.6 °F (36.4 °C)   TempSrc:  Tympanic   SpO2:  98%   Weight: 183 lb (83 kg)    Height: 5' 8\" (1.727 m)      MEDICATIONS GIVEN TO PATIENT THIS ENCOUNTER:  Orders Placed This Encounter   Medications    ibuprofen (ADVIL;MOTRIN) tablet 600 mg     DISCHARGE PRESCRIPTIONS:  Current Discharge Medication List        PHYSICIAN CONSULTS ORDERED THIS ENCOUNTER:  None    FINAL IMPRESSION      1.  Evaluation by psychiatric service required          DISPOSITION/PLAN   DISPOSITION Decision To Discharge 02/21/2023 07:36:57 AM      PATIENT REFERREDTO:  MAURICE Hickey - NP  [address]  [phone]    In 1 week      [facility] [address] [facility] [address]  [phone]    If symptoms worsen    DISCHARGEMEDICATIONS:  Current Discharge Medication List          (Please note that portions of this note were completed with a voice recognition program.  Efforts were made to edit thedictations but occasionally words are mis-transcribed.)    Andrew Crespo MD  Attending Emergency Physician                       Andrew Crespo MD  02/21/23 6874

## 2023-02-21 NOTE — ED NOTES
Pt arrived to ed by [employer]. [employer] states they received a call stating someone was naked and trying to get under the fence at [facility]. [employer] reports pt was fully dressed and stated wanted to go to ED. Pt denies SI or HI.       Chencho Garg, TRACEY  04/60/19 6098

## 2023-04-14 ENCOUNTER — HOSPITAL ENCOUNTER (INPATIENT)
Age: 43
LOS: 8 days | Discharge: HOME | End: 2023-04-22
Attending: EMERGENCY MEDICINE | Admitting: PSYCHIATRY & NEUROLOGY
Payer: COMMERCIAL

## 2023-04-14 DIAGNOSIS — F32.A DEPRESSION WITH SUICIDAL IDEATION: Primary | ICD-10-CM

## 2023-04-14 DIAGNOSIS — R45.851 DEPRESSION WITH SUICIDAL IDEATION: Primary | ICD-10-CM

## 2023-04-14 LAB
ACETAMINOPHEN LEVEL: <5 UG/ML (ref 10–30)
ALBUMIN SERPL-MCNC: 4 G/DL (ref 3.5–5.2)
ALP SERPL-CCNC: 113 U/L (ref 35–104)
ALT SERPL-CCNC: 13 U/L (ref 5–33)
ANION GAP SERPL CALCULATED.3IONS-SCNC: 9 MMOL/L (ref 9–17)
AST SERPL-CCNC: 16 U/L
BILIRUB SERPL-MCNC: 0.3 MG/DL (ref 0.3–1.2)
BUN SERPL-MCNC: 12 MG/DL (ref 6–20)
CALCIUM SERPL-MCNC: 9.9 MG/DL (ref 8.6–10.4)
CHLORIDE SERPL-SCNC: 105 MMOL/L (ref 98–107)
CO2 SERPL-SCNC: 27 MMOL/L (ref 20–31)
CREAT SERPL-MCNC: 0.74 MG/DL (ref 0.5–0.9)
ETHANOL PERCENT: <0.01 %
ETHANOL: <10 MG/DL
GFR SERPL CREATININE-BSD FRML MDRD: >60 ML/MIN/1.73M2
GLUCOSE SERPL-MCNC: 113 MG/DL (ref 70–99)
HCT VFR BLD AUTO: 41.6 % (ref 36–46)
HGB BLD-MCNC: 13.6 G/DL (ref 12–16)
MCH RBC QN AUTO: 27 PG (ref 26–34)
MCHC RBC AUTO-ENTMCNC: 32.8 G/DL (ref 31–37)
MCV RBC AUTO: 82.4 FL (ref 80–100)
PDW BLD-RTO: 15.3 % (ref 11.5–14.9)
PLATELET # BLD AUTO: 252 K/UL (ref 150–450)
PMV BLD AUTO: 8.4 FL (ref 6–12)
POTASSIUM SERPL-SCNC: 3.9 MMOL/L (ref 3.7–5.3)
PROT SERPL-MCNC: 6.5 G/DL (ref 6.4–8.3)
RBC # BLD: 5.04 M/UL (ref 4–5.2)
SALICYLATE LEVEL: <1 MG/DL (ref 3–10)
SODIUM SERPL-SCNC: 141 MMOL/L (ref 135–144)
TOXIC TRICYCLIC SC,BLOOD: ABNORMAL
TSH SERPL-ACNC: 0.72 UIU/ML (ref 0.3–5)
WBC # BLD AUTO: 6.5 K/UL (ref 3.5–11)

## 2023-04-14 PROCEDURE — 36415 COLL VENOUS BLD VENIPUNCTURE: CPT

## 2023-04-14 PROCEDURE — 80053 COMPREHEN METABOLIC PANEL: CPT

## 2023-04-14 PROCEDURE — 84443 ASSAY THYROID STIM HORMONE: CPT

## 2023-04-14 PROCEDURE — 80143 DRUG ASSAY ACETAMINOPHEN: CPT

## 2023-04-14 PROCEDURE — 6370000000 HC RX 637 (ALT 250 FOR IP): Performed by: PSYCHIATRY & NEUROLOGY

## 2023-04-14 PROCEDURE — 85027 COMPLETE CBC AUTOMATED: CPT

## 2023-04-14 PROCEDURE — 80307 DRUG TEST PRSMV CHEM ANLYZR: CPT

## 2023-04-14 PROCEDURE — 80179 DRUG ASSAY SALICYLATE: CPT

## 2023-04-14 PROCEDURE — 1240000000 HC EMOTIONAL WELLNESS R&B

## 2023-04-14 PROCEDURE — 6370000000 HC RX 637 (ALT 250 FOR IP): Performed by: EMERGENCY MEDICINE

## 2023-04-14 PROCEDURE — G0480 DRUG TEST DEF 1-7 CLASSES: HCPCS

## 2023-04-14 PROCEDURE — 99285 EMERGENCY DEPT VISIT HI MDM: CPT

## 2023-04-14 RX ORDER — HYDROXYZINE 50 MG/1
50 TABLET, FILM COATED ORAL 3 TIMES DAILY PRN
Status: DISCONTINUED | OUTPATIENT
Start: 2023-04-14 | End: 2023-04-22 | Stop reason: HOSPADM

## 2023-04-14 RX ORDER — IBUPROFEN 400 MG/1
400 TABLET ORAL EVERY 6 HOURS PRN
Status: DISCONTINUED | OUTPATIENT
Start: 2023-04-14 | End: 2023-04-15

## 2023-04-14 RX ORDER — MAGNESIUM HYDROXIDE/ALUMINUM HYDROXICE/SIMETHICONE 120; 1200; 1200 MG/30ML; MG/30ML; MG/30ML
30 SUSPENSION ORAL EVERY 6 HOURS PRN
Status: DISCONTINUED | OUTPATIENT
Start: 2023-04-14 | End: 2023-04-22 | Stop reason: HOSPADM

## 2023-04-14 RX ORDER — LORAZEPAM 1 MG/1
2 TABLET ORAL EVERY 6 HOURS PRN
Status: DISCONTINUED | OUTPATIENT
Start: 2023-04-14 | End: 2023-04-22 | Stop reason: HOSPADM

## 2023-04-14 RX ORDER — HALOPERIDOL 5 MG/1
5 TABLET ORAL EVERY 6 HOURS PRN
Status: DISCONTINUED | OUTPATIENT
Start: 2023-04-14 | End: 2023-04-22 | Stop reason: HOSPADM

## 2023-04-14 RX ORDER — POLYETHYLENE GLYCOL 3350 17 G/17G
17 POWDER, FOR SOLUTION ORAL DAILY PRN
Status: DISCONTINUED | OUTPATIENT
Start: 2023-04-14 | End: 2023-04-22 | Stop reason: HOSPADM

## 2023-04-14 RX ORDER — LORAZEPAM 2 MG/ML
2 INJECTION INTRAMUSCULAR EVERY 6 HOURS PRN
Status: DISCONTINUED | OUTPATIENT
Start: 2023-04-14 | End: 2023-04-22 | Stop reason: HOSPADM

## 2023-04-14 RX ORDER — TRAZODONE HYDROCHLORIDE 50 MG/1
50 TABLET ORAL NIGHTLY PRN
Status: DISCONTINUED | OUTPATIENT
Start: 2023-04-14 | End: 2023-04-16

## 2023-04-14 RX ORDER — IBUPROFEN 800 MG/1
800 TABLET ORAL ONCE
Status: COMPLETED | OUTPATIENT
Start: 2023-04-14 | End: 2023-04-14

## 2023-04-14 RX ORDER — HALOPERIDOL 5 MG/ML
5 INJECTION INTRAMUSCULAR EVERY 6 HOURS PRN
Status: DISCONTINUED | OUTPATIENT
Start: 2023-04-14 | End: 2023-04-22 | Stop reason: HOSPADM

## 2023-04-14 RX ORDER — ACETAMINOPHEN 325 MG/1
650 TABLET ORAL EVERY 6 HOURS PRN
Status: DISCONTINUED | OUTPATIENT
Start: 2023-04-14 | End: 2023-04-22 | Stop reason: HOSPADM

## 2023-04-14 RX ORDER — DIPHENHYDRAMINE HYDROCHLORIDE 50 MG/ML
50 INJECTION INTRAMUSCULAR; INTRAVENOUS EVERY 6 HOURS PRN
Status: DISCONTINUED | OUTPATIENT
Start: 2023-04-14 | End: 2023-04-22 | Stop reason: HOSPADM

## 2023-04-14 RX ADMIN — NICOTINE POLACRILEX 2 MG: 2 GUM, CHEWING BUCCAL at 20:20

## 2023-04-14 RX ADMIN — POLYETHYLENE GLYCOL 3350 17 G: 17 POWDER, FOR SOLUTION ORAL at 19:40

## 2023-04-14 RX ADMIN — HALOPERIDOL 5 MG: 5 TABLET ORAL at 19:53

## 2023-04-14 RX ADMIN — IBUPROFEN 800 MG: 800 TABLET, FILM COATED ORAL at 16:58

## 2023-04-14 RX ADMIN — LORAZEPAM 2 MG: 1 TABLET ORAL at 19:53

## 2023-04-14 RX ADMIN — TRAZODONE HYDROCHLORIDE 50 MG: 50 TABLET ORAL at 19:53

## 2023-04-14 RX ADMIN — HYDROXYZINE HYDROCHLORIDE 50 MG: 50 TABLET, FILM COATED ORAL at 19:53

## 2023-04-14 ASSESSMENT — ENCOUNTER SYMPTOMS
EYE DISCHARGE: 0
WHEEZING: 0
BACK PAIN: 0
EYE REDNESS: 0
VOMITING: 0
COLOR CHANGE: 0
EYE PAIN: 0
RHINORRHEA: 0
NAUSEA: 0
CHEST TIGHTNESS: 0
COUGH: 0
SINUS PRESSURE: 0
DIARRHEA: 0
TROUBLE SWALLOWING: 0
ABDOMINAL PAIN: 0
BLOOD IN STOOL: 0
CONSTIPATION: 0
SHORTNESS OF BREATH: 0
SORE THROAT: 0
FACIAL SWELLING: 0

## 2023-04-14 ASSESSMENT — PAIN SCALES - GENERAL
PAINLEVEL_OUTOF10: 0
PAINLEVEL_OUTOF10: 5
PAINLEVEL_OUTOF10: 0

## 2023-04-14 ASSESSMENT — PAIN DESCRIPTION - ORIENTATION: ORIENTATION: LOWER

## 2023-04-14 ASSESSMENT — PAIN DESCRIPTION - DESCRIPTORS: DESCRIPTORS: ACHING

## 2023-04-14 ASSESSMENT — SLEEP AND FATIGUE QUESTIONNAIRES
AVERAGE NUMBER OF SLEEP HOURS: 4
DO YOU USE A SLEEP AID: NO
DO YOU HAVE DIFFICULTY SLEEPING: YES
SLEEP PATTERN: INSOMNIA

## 2023-04-14 ASSESSMENT — PAIN - FUNCTIONAL ASSESSMENT: PAIN_FUNCTIONAL_ASSESSMENT: NONE - DENIES PAIN

## 2023-04-14 ASSESSMENT — PAIN DESCRIPTION - LOCATION: LOCATION: BACK

## 2023-04-15 PROBLEM — F31.4 BIPOLAR I DISORDER, MOST RECENT EPISODE DEPRESSED, SEVERE WITHOUT PSYCHOTIC FEATURES (HCC): Status: ACTIVE | Noted: 2023-04-15

## 2023-04-15 LAB
HIV 1+2 AB+HIV1 P24 AG SERPL QL IA: NONREACTIVE
T PALLIDUM AB SER QL IA: NONREACTIVE

## 2023-04-15 PROCEDURE — 87389 HIV-1 AG W/HIV-1&-2 AB AG IA: CPT

## 2023-04-15 PROCEDURE — 6370000000 HC RX 637 (ALT 250 FOR IP)

## 2023-04-15 PROCEDURE — 99223 1ST HOSP IP/OBS HIGH 75: CPT

## 2023-04-15 PROCEDURE — 87491 CHLMYD TRACH DNA AMP PROBE: CPT

## 2023-04-15 PROCEDURE — 6370000000 HC RX 637 (ALT 250 FOR IP): Performed by: PSYCHIATRY & NEUROLOGY

## 2023-04-15 PROCEDURE — 36415 COLL VENOUS BLD VENIPUNCTURE: CPT

## 2023-04-15 PROCEDURE — 99222 1ST HOSP IP/OBS MODERATE 55: CPT | Performed by: INTERNAL MEDICINE

## 2023-04-15 PROCEDURE — 1240000000 HC EMOTIONAL WELLNESS R&B

## 2023-04-15 PROCEDURE — 86780 TREPONEMA PALLIDUM: CPT

## 2023-04-15 PROCEDURE — 87591 N.GONORRHOEAE DNA AMP PROB: CPT

## 2023-04-15 RX ORDER — PALIPERIDONE 3 MG/1
3 TABLET, EXTENDED RELEASE ORAL DAILY
Status: DISCONTINUED | OUTPATIENT
Start: 2023-04-15 | End: 2023-04-16

## 2023-04-15 RX ORDER — IBUPROFEN 800 MG/1
800 TABLET ORAL EVERY 8 HOURS PRN
Status: DISCONTINUED | OUTPATIENT
Start: 2023-04-15 | End: 2023-04-22 | Stop reason: HOSPADM

## 2023-04-15 RX ORDER — LITHIUM CARBONATE 300 MG/1
300 TABLET, FILM COATED, EXTENDED RELEASE ORAL EVERY 12 HOURS SCHEDULED
Status: DISCONTINUED | OUTPATIENT
Start: 2023-04-15 | End: 2023-04-20

## 2023-04-15 RX ORDER — CLONIDINE HYDROCHLORIDE 0.1 MG/1
0.1 TABLET ORAL DAILY
Status: DISCONTINUED | OUTPATIENT
Start: 2023-04-15 | End: 2023-04-22 | Stop reason: HOSPADM

## 2023-04-15 RX ORDER — NICOTINE 21 MG/24HR
1 PATCH, TRANSDERMAL 24 HOURS TRANSDERMAL DAILY
Status: DISCONTINUED | OUTPATIENT
Start: 2023-04-15 | End: 2023-04-16

## 2023-04-15 RX ORDER — LANOLIN ALCOHOL/MO/W.PET/CERES
3 CREAM (GRAM) TOPICAL NIGHTLY PRN
Status: DISCONTINUED | OUTPATIENT
Start: 2023-04-15 | End: 2023-04-22 | Stop reason: HOSPADM

## 2023-04-15 RX ADMIN — IBUPROFEN 800 MG: 800 TABLET, FILM COATED ORAL at 20:10

## 2023-04-15 RX ADMIN — ACETAMINOPHEN 650 MG: 325 TABLET ORAL at 14:42

## 2023-04-15 RX ADMIN — POLYETHYLENE GLYCOL 3350 17 G: 17 POWDER, FOR SOLUTION ORAL at 14:42

## 2023-04-15 RX ADMIN — IBUPROFEN 400 MG: 400 TABLET, FILM COATED ORAL at 10:32

## 2023-04-15 RX ADMIN — NICOTINE POLACRILEX 2 MG: 2 GUM, CHEWING BUCCAL at 15:21

## 2023-04-15 RX ADMIN — HYDROXYZINE HYDROCHLORIDE 50 MG: 50 TABLET, FILM COATED ORAL at 15:49

## 2023-04-15 RX ADMIN — PALIPERIDONE 3 MG: 3 TABLET, EXTENDED RELEASE ORAL at 12:39

## 2023-04-15 ASSESSMENT — PAIN DESCRIPTION - LOCATION
LOCATION: NECK
LOCATION: BACK
LOCATION: NECK;BACK

## 2023-04-15 ASSESSMENT — LIFESTYLE VARIABLES: HOW OFTEN DO YOU HAVE A DRINK CONTAINING ALCOHOL: NEVER

## 2023-04-15 ASSESSMENT — PAIN SCALES - GENERAL
PAINLEVEL_OUTOF10: 7
PAINLEVEL_OUTOF10: 0
PAINLEVEL_OUTOF10: 8
PAINLEVEL_OUTOF10: 7

## 2023-04-15 ASSESSMENT — PAIN DESCRIPTION - DESCRIPTORS: DESCRIPTORS: ACHING

## 2023-04-16 PROCEDURE — 6370000000 HC RX 637 (ALT 250 FOR IP): Performed by: INTERNAL MEDICINE

## 2023-04-16 PROCEDURE — 6370000000 HC RX 637 (ALT 250 FOR IP): Performed by: PSYCHIATRY & NEUROLOGY

## 2023-04-16 PROCEDURE — 6370000000 HC RX 637 (ALT 250 FOR IP)

## 2023-04-16 PROCEDURE — 1240000000 HC EMOTIONAL WELLNESS R&B

## 2023-04-16 PROCEDURE — 99232 SBSQ HOSP IP/OBS MODERATE 35: CPT

## 2023-04-16 PROCEDURE — 93005 ELECTROCARDIOGRAM TRACING: CPT

## 2023-04-16 RX ORDER — PALIPERIDONE 6 MG/1
6 TABLET, EXTENDED RELEASE ORAL DAILY
Status: DISCONTINUED | OUTPATIENT
Start: 2023-04-17 | End: 2023-04-18

## 2023-04-16 RX ADMIN — IBUPROFEN 800 MG: 800 TABLET, FILM COATED ORAL at 08:17

## 2023-04-16 RX ADMIN — ACETAMINOPHEN 650 MG: 325 TABLET ORAL at 13:04

## 2023-04-16 RX ADMIN — ACETAMINOPHEN 650 MG: 325 TABLET ORAL at 21:20

## 2023-04-16 RX ADMIN — PALIPERIDONE 3 MG: 3 TABLET, EXTENDED RELEASE ORAL at 08:17

## 2023-04-16 RX ADMIN — LITHIUM CARBONATE 300 MG: 300 TABLET, FILM COATED, EXTENDED RELEASE ORAL at 21:20

## 2023-04-16 RX ADMIN — HYDROXYZINE HYDROCHLORIDE 50 MG: 50 TABLET, FILM COATED ORAL at 21:20

## 2023-04-16 RX ADMIN — HYDROXYZINE HYDROCHLORIDE 50 MG: 50 TABLET, FILM COATED ORAL at 15:12

## 2023-04-16 RX ADMIN — ACETAMINOPHEN 650 MG: 325 TABLET ORAL at 05:18

## 2023-04-16 RX ADMIN — CLONIDINE HYDROCHLORIDE 0.1 MG: 0.1 TABLET ORAL at 21:20

## 2023-04-16 ASSESSMENT — PAIN DESCRIPTION - ORIENTATION: ORIENTATION: MID

## 2023-04-16 ASSESSMENT — PAIN DESCRIPTION - DESCRIPTORS
DESCRIPTORS: ACHING;DISCOMFORT
DESCRIPTORS: ACHING;PRESSURE;THROBBING
DESCRIPTORS: ACHING;DISCOMFORT

## 2023-04-16 ASSESSMENT — PAIN DESCRIPTION - LOCATION
LOCATION: TOE (COMMENT WHICH ONE)
LOCATION: HEAD
LOCATION: BACK
LOCATION: BACK

## 2023-04-16 ASSESSMENT — PAIN SCALES - GENERAL
PAINLEVEL_OUTOF10: 0
PAINLEVEL_OUTOF10: 4
PAINLEVEL_OUTOF10: 6
PAINLEVEL_OUTOF10: 7
PAINLEVEL_OUTOF10: 6

## 2023-04-16 ASSESSMENT — LIFESTYLE VARIABLES: HOW OFTEN DO YOU HAVE A DRINK CONTAINING ALCOHOL: NEVER

## 2023-04-16 ASSESSMENT — PAIN - FUNCTIONAL ASSESSMENT: PAIN_FUNCTIONAL_ASSESSMENT: ACTIVITIES ARE NOT PREVENTED

## 2023-04-17 LAB
CHLAMYDIA DNA UR QL NAA+PROBE: NEGATIVE
EKG ATRIAL RATE: 69 BPM
EKG P AXIS: 74 DEGREES
EKG P-R INTERVAL: 170 MS
EKG Q-T INTERVAL: 400 MS
EKG QRS DURATION: 104 MS
EKG QTC CALCULATION (BAZETT): 428 MS
EKG R AXIS: 68 DEGREES
EKG T AXIS: 69 DEGREES
EKG VENTRICULAR RATE: 69 BPM
N GONORRHOEA DNA UR QL NAA+PROBE: NEGATIVE
SPECIMEN DESCRIPTION: NORMAL

## 2023-04-17 PROCEDURE — 6370000000 HC RX 637 (ALT 250 FOR IP)

## 2023-04-17 PROCEDURE — 93010 ELECTROCARDIOGRAM REPORT: CPT | Performed by: INTERNAL MEDICINE

## 2023-04-17 PROCEDURE — 99232 SBSQ HOSP IP/OBS MODERATE 35: CPT | Performed by: INTERNAL MEDICINE

## 2023-04-17 PROCEDURE — 1240000000 HC EMOTIONAL WELLNESS R&B

## 2023-04-17 PROCEDURE — 6370000000 HC RX 637 (ALT 250 FOR IP): Performed by: INTERNAL MEDICINE

## 2023-04-17 PROCEDURE — 6370000000 HC RX 637 (ALT 250 FOR IP): Performed by: PSYCHIATRY & NEUROLOGY

## 2023-04-17 PROCEDURE — 99232 SBSQ HOSP IP/OBS MODERATE 35: CPT | Performed by: NURSE PRACTITIONER

## 2023-04-17 RX ADMIN — HYDROXYZINE HYDROCHLORIDE 50 MG: 50 TABLET, FILM COATED ORAL at 13:35

## 2023-04-17 RX ADMIN — PALIPERIDONE 6 MG: 6 TABLET, EXTENDED RELEASE ORAL at 09:40

## 2023-04-17 RX ADMIN — LITHIUM CARBONATE 300 MG: 300 TABLET, FILM COATED, EXTENDED RELEASE ORAL at 21:25

## 2023-04-17 RX ADMIN — CLONIDINE HYDROCHLORIDE 0.1 MG: 0.1 TABLET ORAL at 21:25

## 2023-04-17 RX ADMIN — HYDROXYZINE HYDROCHLORIDE 50 MG: 50 TABLET, FILM COATED ORAL at 21:25

## 2023-04-17 RX ADMIN — NICOTINE POLACRILEX 2 MG: 2 GUM, CHEWING BUCCAL at 14:01

## 2023-04-17 RX ADMIN — LITHIUM CARBONATE 300 MG: 300 TABLET, FILM COATED, EXTENDED RELEASE ORAL at 09:17

## 2023-04-17 NOTE — CARE COORDINATION
Social work spoke with Elaina, [phone], Sydnee Carreno ACT 1. Elaina wanted to follow up due to pt calling her today. Elaina reports pt does not like to medications due to enjoying the prince and does not like the medications making her feel tired.

## 2023-04-17 NOTE — GROUP NOTE
Group Therapy Note    Date: 4/17/2023    Group Start Time: 1430  Group End Time: 8395  Group Topic: Recreational    [facility]    [place], [place]        Date: April 17, 2023 Start Time: 2:30pm  End Time: 310pm      Number of Participants in Group & Unit Census:  7/17    Topic: recreation therapy group     Goal of Group:pt will demonstrate improved coping skills and will identify the benefits of using leisure as coping      Comments:     Patient did not participate in Recreation group, despite staff encouragement and explanation of benefits. Patient remain seclusive to self. Q15 minute safety checks maintained for patient safety and will continue to encourage patient to attend unit programming.          Signature:  Susy Sanchez

## 2023-04-17 NOTE — GROUP NOTE
Group Therapy Note    Date: 4/17/2023    Group Start Time: 8289  Group End Time: 1130  Group Topic: Cognitive Skills    [facility]    JUANCARLOS Cobian        Group Therapy Note    Attendees: 10/19       Patient's Goal:  pt will demonstrate improved coping skills and improved interactions with peers     Notes:   pt was pleasant and participated well    Status After Intervention:  Improved    Participation Level:  Active Listener and Interactive    Participation Quality: Appropriate       Speech: pressured      Thought Process/Content: disorganized      Affective Functioning: restricted      Mood: anxious      Level of consciousness:  Alert      Response to Learning: Able to verbalize current knowledge/experience and Progressing to goal      Endings: None Reported    Modes of Intervention: Education, Socialization, and Activity      Discipline Responsible: Psychoeducational Specialist      Signature:  Elizabeth Weber

## 2023-04-18 PROCEDURE — 6370000000 HC RX 637 (ALT 250 FOR IP)

## 2023-04-18 PROCEDURE — 6370000000 HC RX 637 (ALT 250 FOR IP): Performed by: PSYCHIATRY & NEUROLOGY

## 2023-04-18 PROCEDURE — 99232 SBSQ HOSP IP/OBS MODERATE 35: CPT | Performed by: INTERNAL MEDICINE

## 2023-04-18 PROCEDURE — 90833 PSYTX W PT W E/M 30 MIN: CPT | Performed by: NURSE PRACTITIONER

## 2023-04-18 PROCEDURE — 6370000000 HC RX 637 (ALT 250 FOR IP): Performed by: INTERNAL MEDICINE

## 2023-04-18 PROCEDURE — 6370000000 HC RX 637 (ALT 250 FOR IP): Performed by: NURSE PRACTITIONER

## 2023-04-18 PROCEDURE — 99232 SBSQ HOSP IP/OBS MODERATE 35: CPT | Performed by: NURSE PRACTITIONER

## 2023-04-18 PROCEDURE — 1240000000 HC EMOTIONAL WELLNESS R&B

## 2023-04-18 RX ORDER — ARIPIPRAZOLE 15 MG/1
15 TABLET ORAL NIGHTLY
Status: DISCONTINUED | OUTPATIENT
Start: 2023-04-18 | End: 2023-04-20

## 2023-04-18 RX ADMIN — HYDROXYZINE HYDROCHLORIDE 50 MG: 50 TABLET, FILM COATED ORAL at 16:01

## 2023-04-18 RX ADMIN — ARIPIPRAZOLE 15 MG: 15 TABLET ORAL at 20:44

## 2023-04-18 RX ADMIN — CLONIDINE HYDROCHLORIDE 0.1 MG: 0.1 TABLET ORAL at 20:44

## 2023-04-18 RX ADMIN — LITHIUM CARBONATE 300 MG: 300 TABLET, FILM COATED, EXTENDED RELEASE ORAL at 08:53

## 2023-04-18 RX ADMIN — Medication 3 MG: at 20:46

## 2023-04-18 RX ADMIN — LITHIUM CARBONATE 300 MG: 300 TABLET, FILM COATED, EXTENDED RELEASE ORAL at 20:44

## 2023-04-18 RX ADMIN — HYDROXYZINE HYDROCHLORIDE 50 MG: 50 TABLET, FILM COATED ORAL at 08:53

## 2023-04-18 RX ADMIN — POLYETHYLENE GLYCOL 3350 17 G: 17 POWDER, FOR SOLUTION ORAL at 13:02

## 2023-04-18 RX ADMIN — HYDROXYZINE HYDROCHLORIDE 50 MG: 50 TABLET, FILM COATED ORAL at 20:44

## 2023-04-18 NOTE — GROUP NOTE
Group Therapy Note    Date: 4/18/2023    Group Start Time: 1430  Group End Time: 2202  Group Topic: Music Therapy    [facility]    Claudia Ballard        Group Therapy Note    Attendees: 6/15       Patient's Goal:  Patients shared preferred music and analyzed lyrics for themes, then offered peers advice based on themes analyzed within their music. Goals to engage in peer support; Increase sense of community; Increase socialization; Normalization of the environment;     Notes:  Patient attended and participated in group having positive interactions with peers and staff. Patient shared music and offered advice    Status After Intervention:  Improved    Participation Level:  Active Listener and Interactive    Participation Quality: Appropriate, Attentive, Sharing, and Supportive      Speech:  normal      Thought Process/Content: Logical  Linear      Affective Functioning: Constricted/Restricted      Mood: euthymic      Level of consciousness:  Alert and Attentive      Response to Learning: Able to verbalize current knowledge/experience, Capable of insight, and Progressing to goal      Endings: None Reported    Modes of Intervention: Support, Socialization, Exploration, Activity, Media, and Reality-testing      Discipline Responsible: Psychoeducational Specialist      Signature:  Claudia Ballard

## 2023-04-18 NOTE — GROUP NOTE
Group Therapy Note    Date: 4/18/2023    Group Start Time: 1330  Group End Time: 0079  Group Topic: Relaxation Group    [facility]    JUANCARLOS Clark        Group Therapy Note    Attendees: 5/17       Patient's Goal:  pt will identify the benefits of using art for relaxation /coping   Notes:   pt was pleasant and participated well     Status After Intervention:  Improved    Participation Level:  Active Listener and Interactive    Participation Quality: Appropriate and Supportive      Speech:  normal      Thought Process/Content: flight of idea      Affective Functioning: Congruent      Mood: anxious      Level of consciousness:  Alert      Response to Learning: Able to verbalize current knowledge/experience and Progressing to goal      Endings: None Reported    Modes of Intervention: Support, Socialization, and Activity      Discipline Responsible: Psychoeducational Specialist      Signature:  Radha Grimes

## 2023-04-18 NOTE — GROUP NOTE
Group Therapy Note    Date: 4/18/2023    Group Start Time: 3246  Group End Time: 5739  Group Topic: Cognitive Skills    ANTON Moreira, CTRS    Cognitive Skills Group Note        Date: April 18, 2023 Start Time:  1045am   End Time: 11:30am      Number of Participants in Group & Unit Census:  5/17    Topic: cognitive skills     Goal of Group: pt will demonstrate improved coping skills, concentration, and social skills       Comments:     Patient did not participate in Cognitive Skills group, despite staff encouragement and explanation of benefits. Patient remain seclusive to self. Q15 minute safety checks maintained for patient safety and will continue to encourage patient to attend unit programming.               Signature:  Enzo Elizabeth

## 2023-04-18 NOTE — GROUP NOTE
Group Therapy Note    Date: 4/18/2023    Group Start Time: 0900  Group End Time: 5762  Group Topic: Community Meeting    [facility] COREEN Reese LPN        Group Therapy Note    Attendees: 3/18      patient refused to attend Goals group at 0900 after encouragement from staff. 1:1 talk time provided as alternative to group session .        Signature:  Debra Reese LPN

## 2023-04-19 PROCEDURE — 1240000000 HC EMOTIONAL WELLNESS R&B

## 2023-04-19 PROCEDURE — 6370000000 HC RX 637 (ALT 250 FOR IP): Performed by: INTERNAL MEDICINE

## 2023-04-19 PROCEDURE — 6370000000 HC RX 637 (ALT 250 FOR IP): Performed by: NURSE PRACTITIONER

## 2023-04-19 PROCEDURE — 6370000000 HC RX 637 (ALT 250 FOR IP)

## 2023-04-19 PROCEDURE — 99232 SBSQ HOSP IP/OBS MODERATE 35: CPT | Performed by: NURSE PRACTITIONER

## 2023-04-19 PROCEDURE — 90833 PSYTX W PT W E/M 30 MIN: CPT | Performed by: NURSE PRACTITIONER

## 2023-04-19 PROCEDURE — 6370000000 HC RX 637 (ALT 250 FOR IP): Performed by: PSYCHIATRY & NEUROLOGY

## 2023-04-19 RX ORDER — PROPRANOLOL HYDROCHLORIDE 20 MG/1
10 TABLET ORAL 3 TIMES DAILY
Status: DISCONTINUED | OUTPATIENT
Start: 2023-04-20 | End: 2023-04-22 | Stop reason: HOSPADM

## 2023-04-19 RX ADMIN — HYDROXYZINE HYDROCHLORIDE 50 MG: 50 TABLET, FILM COATED ORAL at 09:15

## 2023-04-19 RX ADMIN — HYDROXYZINE HYDROCHLORIDE 50 MG: 50 TABLET, FILM COATED ORAL at 02:40

## 2023-04-19 RX ADMIN — Medication 3 MG: at 20:30

## 2023-04-19 RX ADMIN — IBUPROFEN 800 MG: 800 TABLET, FILM COATED ORAL at 10:45

## 2023-04-19 RX ADMIN — LITHIUM CARBONATE 300 MG: 300 TABLET, FILM COATED, EXTENDED RELEASE ORAL at 09:15

## 2023-04-19 RX ADMIN — IBUPROFEN 800 MG: 800 TABLET, FILM COATED ORAL at 02:40

## 2023-04-19 RX ADMIN — ARIPIPRAZOLE 15 MG: 15 TABLET ORAL at 20:29

## 2023-04-19 RX ADMIN — CLONIDINE HYDROCHLORIDE 0.1 MG: 0.1 TABLET ORAL at 20:30

## 2023-04-19 RX ADMIN — HYDROXYZINE HYDROCHLORIDE 50 MG: 50 TABLET, FILM COATED ORAL at 18:32

## 2023-04-19 RX ADMIN — LITHIUM CARBONATE 300 MG: 300 TABLET, FILM COATED, EXTENDED RELEASE ORAL at 20:30

## 2023-04-19 ASSESSMENT — PAIN SCALES - GENERAL
PAINLEVEL_OUTOF10: 3
PAINLEVEL_OUTOF10: 7
PAINLEVEL_OUTOF10: 3

## 2023-04-19 ASSESSMENT — PAIN DESCRIPTION - DESCRIPTORS: DESCRIPTORS: ACHING

## 2023-04-19 ASSESSMENT — PAIN - FUNCTIONAL ASSESSMENT: PAIN_FUNCTIONAL_ASSESSMENT: ACTIVITIES ARE NOT PREVENTED

## 2023-04-19 ASSESSMENT — PAIN DESCRIPTION - ORIENTATION: ORIENTATION: LOWER

## 2023-04-19 ASSESSMENT — PAIN DESCRIPTION - LOCATION: LOCATION: BACK

## 2023-04-19 NOTE — GROUP NOTE
Group Therapy Note    Date: 4/19/2023    Group Start Time: 7798  Group End Time: 0717  Group Topic: Cognitive Skills    JUANCARLOS Javier    Cognitive Skills Group Note        Date: April 19, 2023 Start Time:  1045am   End Time:  1125am      Number of Participants in Group & Unit Census:  6/13    Topic: cognitive skills     Goal of Group: pt will demonstrate improved coping skills, improved concentration and improved social skills       Comments:     Patient did not participate in Cognitive Skills group, despite staff encouragement and explanation of benefits. Patient remain seclusive to self. Q15 minute safety checks maintained for patient safety and will continue to encourage patient to attend unit programming.           Signature:  Gaye Walls

## 2023-04-20 LAB
ANION GAP SERPL CALCULATED.3IONS-SCNC: 8 MMOL/L (ref 9–17)
BUN SERPL-MCNC: 12 MG/DL (ref 6–20)
CALCIUM SERPL-MCNC: 9.6 MG/DL (ref 8.6–10.4)
CHLORIDE SERPL-SCNC: 107 MMOL/L (ref 98–107)
CO2 SERPL-SCNC: 25 MMOL/L (ref 20–31)
CREAT SERPL-MCNC: 0.68 MG/DL (ref 0.5–0.9)
GFR SERPL CREATININE-BSD FRML MDRD: >60 ML/MIN/1.73M2
GLUCOSE SERPL-MCNC: 104 MG/DL (ref 70–99)
LITHIUM DATE LAST DOSE: ABNORMAL
LITHIUM DOSE AMOUNT: ABNORMAL
LITHIUM DOSE TIME: 2030
LITHIUM LEVEL: 0.4 MMOL/L (ref 0.6–1.2)
POTASSIUM SERPL-SCNC: 4.3 MMOL/L (ref 3.7–5.3)
SODIUM SERPL-SCNC: 140 MMOL/L (ref 135–144)

## 2023-04-20 PROCEDURE — 1240000000 HC EMOTIONAL WELLNESS R&B

## 2023-04-20 PROCEDURE — 99232 SBSQ HOSP IP/OBS MODERATE 35: CPT | Performed by: NURSE PRACTITIONER

## 2023-04-20 PROCEDURE — 6370000000 HC RX 637 (ALT 250 FOR IP): Performed by: PSYCHIATRY & NEUROLOGY

## 2023-04-20 PROCEDURE — 6370000000 HC RX 637 (ALT 250 FOR IP): Performed by: INTERNAL MEDICINE

## 2023-04-20 PROCEDURE — 80048 BASIC METABOLIC PNL TOTAL CA: CPT

## 2023-04-20 PROCEDURE — 6370000000 HC RX 637 (ALT 250 FOR IP)

## 2023-04-20 PROCEDURE — 6370000000 HC RX 637 (ALT 250 FOR IP): Performed by: NURSE PRACTITIONER

## 2023-04-20 PROCEDURE — 90833 PSYTX W PT W E/M 30 MIN: CPT | Performed by: NURSE PRACTITIONER

## 2023-04-20 PROCEDURE — 80178 ASSAY OF LITHIUM: CPT

## 2023-04-20 PROCEDURE — 36415 COLL VENOUS BLD VENIPUNCTURE: CPT

## 2023-04-20 RX ORDER — LITHIUM CARBONATE 450 MG
450 TABLET, EXTENDED RELEASE ORAL EVERY 12 HOURS SCHEDULED
Status: DISCONTINUED | OUTPATIENT
Start: 2023-04-20 | End: 2023-04-22 | Stop reason: HOSPADM

## 2023-04-20 RX ADMIN — PROPRANOLOL HYDROCHLORIDE 10 MG: 20 TABLET ORAL at 21:35

## 2023-04-20 RX ADMIN — HYDROXYZINE HYDROCHLORIDE 50 MG: 50 TABLET, FILM COATED ORAL at 11:57

## 2023-04-20 RX ADMIN — HYDROXYZINE HYDROCHLORIDE 50 MG: 50 TABLET, FILM COATED ORAL at 08:27

## 2023-04-20 RX ADMIN — HYDROXYZINE HYDROCHLORIDE 50 MG: 50 TABLET, FILM COATED ORAL at 00:59

## 2023-04-20 RX ADMIN — PROPRANOLOL HYDROCHLORIDE 10 MG: 20 TABLET ORAL at 08:27

## 2023-04-20 RX ADMIN — ACETAMINOPHEN 650 MG: 325 TABLET ORAL at 08:27

## 2023-04-20 RX ADMIN — NICOTINE POLACRILEX 2 MG: 2 GUM, CHEWING BUCCAL at 19:42

## 2023-04-20 RX ADMIN — LITHIUM CARBONATE 300 MG: 300 TABLET, FILM COATED, EXTENDED RELEASE ORAL at 08:44

## 2023-04-20 RX ADMIN — CLONIDINE HYDROCHLORIDE 0.1 MG: 0.1 TABLET ORAL at 21:36

## 2023-04-20 RX ADMIN — NICOTINE POLACRILEX 2 MG: 2 GUM, CHEWING BUCCAL at 09:58

## 2023-04-20 RX ADMIN — PROPRANOLOL HYDROCHLORIDE 10 MG: 20 TABLET ORAL at 14:40

## 2023-04-20 RX ADMIN — LITHIUM CARBONATE 450 MG: 450 TABLET, EXTENDED RELEASE ORAL at 21:36

## 2023-04-20 RX ADMIN — IBUPROFEN 800 MG: 800 TABLET, FILM COATED ORAL at 22:33

## 2023-04-20 ASSESSMENT — PAIN DESCRIPTION - ORIENTATION
ORIENTATION: MID
ORIENTATION: LOWER

## 2023-04-20 ASSESSMENT — PAIN DESCRIPTION - LOCATION
LOCATION: BACK
LOCATION: BACK

## 2023-04-20 ASSESSMENT — PAIN SCALES - GENERAL
PAINLEVEL_OUTOF10: 6
PAINLEVEL_OUTOF10: 0
PAINLEVEL_OUTOF10: 6
PAINLEVEL_OUTOF10: 0

## 2023-04-20 ASSESSMENT — PAIN - FUNCTIONAL ASSESSMENT
PAIN_FUNCTIONAL_ASSESSMENT: ACTIVITIES ARE NOT PREVENTED
PAIN_FUNCTIONAL_ASSESSMENT: ACTIVITIES ARE NOT PREVENTED

## 2023-04-20 ASSESSMENT — PAIN DESCRIPTION - DESCRIPTORS: DESCRIPTORS: ACHING

## 2023-04-20 NOTE — GROUP NOTE
Group Therapy Note    Date: 4/20/2023    Group Start Time: 5241  Group End Time: 1130  Group Topic: Cognitive Skills    ANTON Thomas, CTRS        Group Therapy Note    Attendees: 10/13       Patient's Goal:  pt will demonstrate improved social skills and improved team building    Notes:   pt was pleasant and participated well     Status After Intervention:  Improved    Participation Level:  Active Listener and Interactive    Participation Quality: Appropriate and Supportive      Speech:  normal      Thought Process/Content:flight of ideas      Affective Functioning: Congruent      Mood: euthymic but anxious      Level of consciousness:  Alert      Response to Learning: Able to verbalize current knowledge/experience and Progressing to goal      Endings: None Reported    Modes of Intervention: Support, Socialization, and Activity      Discipline Responsible: Psychoeducational Specialist      Signature:  Van Burgess

## 2023-04-20 NOTE — GROUP NOTE
Group Therapy Note    Date: 4/20/2023    Group Start Time: 0930  Group End Time: 1000  Group Topic: Community Meeting    ANTON Burnham        Group Therapy Note    Attendees: 12/22       Patient's Goal:  Talk to Dr about discharge    Notes:  controlled    Status After Intervention:  Unchanged    Participation Level: Active Listener    Participation Quality: Appropriate      Speech:  normal      Thought Process/Content: Logical      Affective Functioning: Congruent      Mood: anxious      Level of consciousness:  Oriented x4      Response to Learning: Able to verbalize current knowledge/experience and Progressing to goal      Endings: None Reported    Modes of Intervention: Education, Support, and Socialization      Discipline Responsible: Behavorial Health Tech      Signature:   Jonathon Burnham

## 2023-04-20 NOTE — GROUP NOTE
Group Therapy Note    Date: 4/20/2023    Group Start Time:1315  Group End Time: 6645  Group Topic: guest group [address] [facility]        Group Therapy Note    Attendees : 10/21       Patient's Goal:  pt will demonstrate improved coping skills and improved knowledge of community resources. Notes:   pt was pleasant and participated well     Status After Intervention:  Improved    Participation Level:  Active Listener and Interactive    Participation Quality: Appropriate and Supportive      Speech:  normal      Thought Process/Content: Logical      Affective Functioning: Congruent      Mood: euthymic      Level of consciousness:  Alert      Response to Learning: Able to verbalize current knowledge/experience and Progressing to goal      Endings: None Reported    Modes of Intervention: Support, Socialization, and Activity      Discipline Responsible: Psychoeducational Specialist      Signature:  Avrey Pyle

## 2023-04-21 PROCEDURE — 6370000000 HC RX 637 (ALT 250 FOR IP): Performed by: NURSE PRACTITIONER

## 2023-04-21 PROCEDURE — 6370000000 HC RX 637 (ALT 250 FOR IP): Performed by: INTERNAL MEDICINE

## 2023-04-21 PROCEDURE — 6370000000 HC RX 637 (ALT 250 FOR IP): Performed by: PSYCHIATRY & NEUROLOGY

## 2023-04-21 PROCEDURE — 99232 SBSQ HOSP IP/OBS MODERATE 35: CPT | Performed by: INTERNAL MEDICINE

## 2023-04-21 PROCEDURE — 1240000000 HC EMOTIONAL WELLNESS R&B

## 2023-04-21 RX ORDER — DOCUSATE SODIUM 100 MG/1
100 CAPSULE, LIQUID FILLED ORAL 2 TIMES DAILY PRN
Status: DISCONTINUED | OUTPATIENT
Start: 2023-04-21 | End: 2023-04-22 | Stop reason: HOSPADM

## 2023-04-21 RX ORDER — NICOTINE 21 MG/24HR
1 PATCH, TRANSDERMAL 24 HOURS TRANSDERMAL DAILY
Status: DISCONTINUED | OUTPATIENT
Start: 2023-04-21 | End: 2023-04-22 | Stop reason: HOSPADM

## 2023-04-21 RX ADMIN — HYDROXYZINE HYDROCHLORIDE 50 MG: 50 TABLET, FILM COATED ORAL at 12:51

## 2023-04-21 RX ADMIN — PROPRANOLOL HYDROCHLORIDE 10 MG: 20 TABLET ORAL at 08:22

## 2023-04-21 RX ADMIN — PROPRANOLOL HYDROCHLORIDE 10 MG: 20 TABLET ORAL at 20:54

## 2023-04-21 RX ADMIN — HYDROXYZINE HYDROCHLORIDE 50 MG: 50 TABLET, FILM COATED ORAL at 02:46

## 2023-04-21 RX ADMIN — PROPRANOLOL HYDROCHLORIDE 10 MG: 20 TABLET ORAL at 12:51

## 2023-04-21 RX ADMIN — LITHIUM CARBONATE 450 MG: 450 TABLET, EXTENDED RELEASE ORAL at 20:54

## 2023-04-21 RX ADMIN — DOCUSATE SODIUM 100 MG: 100 CAPSULE, LIQUID FILLED ORAL at 20:12

## 2023-04-21 RX ADMIN — LITHIUM CARBONATE 450 MG: 450 TABLET, EXTENDED RELEASE ORAL at 08:24

## 2023-04-21 RX ADMIN — CLONIDINE HYDROCHLORIDE 0.1 MG: 0.1 TABLET ORAL at 20:54

## 2023-04-21 NOTE — GROUP NOTE
Group Therapy Note    Date: 4/21/2023    Group Start Time: 6392  Group End Time: 8488  Group Topic: Cognitive Skills    [facility]    JUANCARLOS Foreman    Cognitive Skills Group Note        Date: April 21, 2023 Start Time:  1045am   End Time:  1125am      Number of Participants in Group & Unit Census:  10/20    Topic: cognitive skills     Goal of Group: pt will demonstrate improved decision making skills, improved social skill and improved leisure awareness       Comments:     Patient did not participate in cognitive skills group, despite staff encouragement and explanation of benefits. Patient remain seclusive to self. Q15 minute safety checks maintained for patient safety and will continue to encourage patient to attend unit programming.             Signature:  Bertram Stoner

## 2023-04-21 NOTE — PROGRESS NOTES
04/17/23 1416   Encounter Summary   Encounter Overview/Reason  Spiritual/Emotional Needs   Service Provided For: Patient   Referral/Consult From: Satish   Last Encounter  04/17/23   Complexity of Encounter Moderate   Begin Time 0130   End Time  0200   Total Time Calculated 30 min   Spiritual/Emotional needs   Type Spiritual Support   Behavioral Health    Type  Spirituality Group   Assessment/Intervention/Outcome   Assessment Calm   Intervention Active listening;Nurtured Hope;Sustaining Presence/Ministry of presence   Outcome Receptive; Expressed Gratitude;Engaged in conversation
04/19/23 1424   Encounter Summary   Encounter Overview/Reason  Spiritual/Emotional Needs   Service Provided For: Patient   Referral/Consult From: Satish   Last Encounter  04/19/23   Complexity of Encounter Moderate   Begin Time 0130   End Time  0200   Total Time Calculated 30 min   Spiritual/Emotional needs   Type Spiritual Support   Behavioral Health    Type  Anglican Group   Assessment/Intervention/Outcome   Assessment Calm   Intervention Active listening;Nurtured Hope;Prayer (assurance of)/[place];Read/Provided Scripture;Sustaining Presence/Ministry of presence   Outcome Receptive; Expressed Gratitude;Engaged in conversation
[facility]    CONSULTATION / HISTORY AND PHYSICAL EXAMINATION            Date:   2023  Patient name:  Seble Cortez  Date of admission:  2023  3:35 PM  MRN:   [id number]  Account:  [de-identified]  YOB: 1980  PCP:    MAURICE Campbell NP  Room:   [facility]0229-01  Code Status:    Full Code    Physician Requesting Consult: Regan Oseguera MD    Reason for Consult:  medical management    Chief Complaint:     Chief Complaint   Patient presents with    Mental Health Problem     Si with plan        History Obtained From:     Patient medical record nursing staff    History of Present Illness:     Patient is 39-year-old female past medical history of bipolar, anxiety, has been admitted at [facility] for further management of depression and suicidal ideations. Patient told me that she needs cancer screening since her mother got diagnosed with lung and liver cancer, advised to get age-appropriate cancer screening as outpatient. Past Medical History:     Past Medical History:   Diagnosis Date    Anxiety     Bipolar 1 disorder ([facility] [address]     Depression     Gestational diabetes 2016    OCD (obsessive compulsive disorder)     PTSD (post-traumatic stress disorder)     Rapid rate of speech     Schizoaffective disorder ([facility] [address]         Past Surgical History:     Past Surgical History:   Procedure Laterality Date    ABDOMEN SURGERY       SECTION  2007    LAPAROSCOPY          Medications Prior to Admission:     Prior to Admission medications    Medication Sig Start Date End Date Taking?  Authorizing Provider   ibuprofen (ADVIL;MOTRIN) 600 MG tablet Take 1 tablet by mouth every 8 hours as needed for Pain  Patient taking differently: Take 800 mg by mouth every 8 hours as needed for Pain 23   Yanni Blackwood MD   Cariprazine HCl (VRAYLAR PO) Take 6 mg by mouth Every morning    Historical Provider, MD   cloNIDine (CATAPRES) 0.2 MG tablet Take 1
Behavioral Services                                              Medicare Re-Certification    I certify that the inpatient psychiatric hospital services furnished since the previous certification/re-certification were, and continue to be, medically necessary for;    [x] (1) Treatment which could reasonably be expected to improve the patient's condition,    [x] (2) Or for diagnostic study. Estimated length of stay/service 1-6 days    Plan for post-hospital care -outpatient care    This patient continues to need, on a daily basis, active treatment furnished directly by or requiring the supervision of inpatient psychiatric personnel.     Electronically signed by Deondre Draper MD on 4/21/2023 at 10:43 AM
Daily Progress Note  4/17/2023    Patient Name: Carrie Mahoney    CHIEF COMPLAINT: Depression with suicidal ideation         SUBJECTIVE:      Staff report that patient has been anxious, on edge, and irritable. She has been pacing the unit. Has required frequent redirection. Patient seen face-to-face for follow-up assessment. She is in her room, but is restless and exhibits racing thoughts. She has difficulty focusing on conversation and is easily distracted. She is pacing the unit. Focused on discharge. Patient had initially been refusing her lithium, but has been compliant with the medication for the last 3 administrations. We discussed need to obtain lithium level soon and patient verbalizes understanding. She states in the past her lithium dose was 400 mg twice daily. Patient paliperidone was titrated to 6 mg p.o. daily this morning. We reviewed any potential side effects for both Invega and lithium, and patient denies all. She does express feeling tired and this is a source of stress for her. Patient's outpatient provider had expressed that patient does not like any medications for anemia, as \"she likes to be manic\". Today patient expressed suicidal thoughts secondary to feelings of hopelessness. She states that she has a payee allowing her to have \"an extensive phone plan\". 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 She states that she is in her 45s and feels ashamed that she does not have more stability in her life really feels that she should be able to go home at this time. Patient has yet to demonstrate stability. Displays little insight into her current presentation. Present significant risk to self and others if not admitted to [facility].       Appetite:  [x] Adequate/Unchanged  [] Increased  [] Decreased      Sleep:       [x] Adequate/Unchanged  [] Fair  [] Poor      Group Attendance on Unit:   [x] Yes   [] Selectively    [] No    Compliant with scheduled
Daily Progress Note  4/18/2023    Patient Name: Macrina Hubbard    CHIEF COMPLAINT: Depression with suicidal ideation         SUBJECTIVE:      Patient seen face-to-face for follow-up assessment. She has not been compliant with her scheduled psychotropic medications and has been refusing her paliperidone 6 mg. We reviewed patient's concerns and she states that she is experiencing sexual side effects and is unable to reach climax with use of paliperidone. Patient states that she cries herself to sleep because she is unable to feel pleasure. Patient states that she will no longer take paliperidone. We reviewed other options, and patient is agreeable to starting aripiprazole. She requests the lowest dose possible. She is agreeable to starting at 15 mg starting this evening. She does express concern that this medication will be sedating and is agreeable to nighttime administration of aripiprazole. Patient is currently taking lithium 300 mg twice daily. She has a lithium level and BMP to be drawn tomorrow a.m and determine need for titration. During assessment, patient speech is rapid. She appears distracted at times, but is able to engage in somewhat constructive conversation. She at times is not reality oriented and as noted above, is very focused on sexual side effects and not able to be redirected. Patient states that she got bad news and discovered that her 63-year-old son who has severe autism and other mental health issues, was taken into adult protective services custody. He was living with his father and Celestine Cooks has not seen him in many months and he was not in her care. Patient verbalizes feeling extremely guilty and ashamed about his current life situation and expresses fleeting suicidal thoughts secondary to not being able to be a better mother secondary to her mental health issues. Patient currently presenting to be in a bipolar, mixed episode.   She is more irritable, with rapid
Daily Progress Note  4/20/2023    Patient Name: Lurdes Celeste    CHIEF COMPLAINT: Depression with suicidal ideation         SUBJECTIVE:      Patient compliant with lithium lab draw this morning. Lithium level below therapeutic dose at 0.4. Reviewed results with patient today and she is agreeable to titrating her lithium dose to 450 mg twice daily. We reviewed risk versus benefits and any side effects to titrating her dose. Patient verbalizes understanding. Patient tolerating aripiprazole better with addition of propranolol. Restlessness is improving. No akathisia present. We discussed transitioning to long-acting injectable aripiprazole Aristada and patient is in agreement. She received HUNT today. We will discontinue oral aripiprazole. Patient does express increased appetite and states this is concerning to her as she takes her health and appearance seriously. We will observe for improvement in side effects with long-acting injectable. Did discuss possibility of adding Topamax to help curb her appetite, but will hold off on adding additional medications at this time. Patient continuing to exhibit elevated affect. Her speech is rapid and she is on edge. Expressing grandiose thoughts and plans to open a restaurant. She is focused on building her credit score so that she can buy house. Continuing to present as mixed episode with both depressive and manic symptoms present (bipolar disorder). She has not yet demonstrated stability. Presents risk to self if not on unit. Appetite:  [x] Adequate/Unchanged  [] Increased  [] Decreased      Sleep:       [] Adequate/Unchanged unit on-call night is another like fair  [] Poor      Group Attendance on Unit:   [x] Yes   [] Selectively    [] No    Compliant with scheduled medications: [x] Yes  [] No    Received emergency medications in past 24 hrs: [] Yes   [x] No    Medication Side Effects:  Increased appetite         Mental Status Exam  Level of
Daily Progress Note  4/21/2023    Patient Name: Jairo Limon    CHIEF COMPLAINT: Depression with suicidal ideation         SUBJECTIVE:    Patient was seen for follow-up assessment today. She has been compliant with scheduled medications and has not required any emergency medications in the last 24 hours. Nursing staff report patient has been in behavioral control and more focused on discharge today. She has been cooperative and calm. She showered and attended to ADLs today. Patient was present in the day area walking around and was pleasant on approach. She was agreeable to conversation in privacy of unit sensory room. Improvements of prince are observed and speech was not rapid or pressured. There is no psychomotor agitation or akathisia observed. Patient denies any feelings of internal restlessness. She is reporting that she feels sad today and thinks it is because of the [place]. She stated \"I feel not like myself, I feel like I do not have a personality, I feel blank\". She denies that she is experiencing suicidal thoughts, however. Patient expresses having an appointment at \"[facility]\" next Tuesday and is hopeful for discharge before that time. She requests Wellbutrin from writer as she reports it has helped her mood in the past. We will observe for improvement of mood with recent titration of lithium for now. Although modestly improving patient has yet to demonstrate sustained stability and warrants further hospitalization for safety and stabilization with medication management and therapeutic milieu. Appetite:  [x] Adequate/Unchanged  [] Increased  [] Decreased      Sleep:       Fair    Group Attendance on Unit:   [x] Yes   [] Selectively    [] No    Compliant with scheduled medications: [x] Yes  [] No    Received emergency medications in past 24 hrs: [] Yes   [x] No    Medication Side Effects: Increased appetite         Mental Status Exam  Level of consciousness: Alert and awake.
splenomegaly  Neurologic: There are no new focal motor or sensory deficits, normal muscle tone and bulk, no abnormal sensation, normal speech, cranial nerves II through XII grossly intact  Skin: No gross lesions, rashes, bruising or bleeding on exposed skin area  Extremities:  peripheral pulses palpable, no pedal edema or calf pain with palpation  Psych: Investigations:      Laboratory Testing:  No results found for this or any previous visit (from the past 24 hour(s)). Consultations:   IP CONSULT TO INTERNAL MEDICINE  Assessment :      Primary Problem  Severe mixed bipolar 1 disorder without psychosis [facility])    Active Hospital Problems    Diagnosis Date Noted    Severe mixed bipolar 1 disorder without psychosis ([facility] [address] [F31.63] 11/28/2022     Priority: Medium    Depression with suicidal ideation [F32. A, R45.851] 11/12/2022     Priority: Medium       Plan:     Depression with suicidal ideation management per psychiatry  Vitals stable   TSh normal   HTN : on clonidine and HCTZ at home , B p has been stable , start  low dose clonidine to avoid rebound , will increase if gets hypertensive   Dvt prophylaxis , encourage ambulation     4/21  Seen examined face-to-face today,  Complaining of constipation, MiraLAX not helping, add Ezequiel Shea MD  4/21/2023  6:50 PM    Copy sent to Dr. Partha Navarrete, APRN - NP    Please note that this chart was generated using voice recognition Dragon dictation software. Although every effort was made to ensure the accuracy of this automated transcription, some errors in transcription may have occurred.
Although every effort was made to ensure the accuracy of this automated transcription, some errors in transcription may have occurred.
tablet 50 mg, 50 mg, Oral, TID PRN  polyethylene glycol (GLYCOLAX) packet 17 g, 17 g, Oral, Daily PRN  aluminum & magnesium hydroxide-simethicone (MAALOX) 200-200-20 MG/5ML suspension 30 mL, 30 mL, Oral, Q6H PRN    ASSESSMENT  Severe mixed bipolar 1 disorder without psychosis ([facility] [address]         PLAN  Patient symptoms: Remains unstable for discharge  Medications Changed Today. A discussion of risks, benefits, and alternatives was held with the patient and this provider with regards to medication changes. After this discussion we mutually agreed to proceed with the medication changes. Add propranolol 10 mg 3 times daily (with parameters) to help with akathisia secondary to starting aripiprazole 15 mg  Order lithium level and BMP to be drawn on morning of 4/20  Monitor need and frequency of PRN medications. Encourage participation in groups and milieu. Attempt to develop insight. Psycho-education conducted. Probable discharge is to be determined. Follow-up daily while inpatient. More than 16 minutes of the session was spent doing supportive psychotherapy. Session lasted around 35 minutes today. Patient continues to be monitored in the inpatient psychiatric facility at [facility] for safety and stabilization. Patient continues to need, on a daily basis, active treatment furnished directly by or requiring the supervision of inpatient psychiatric personnel. Electronically signed by MAURICE Pedroza CNP on 4/19/2023 at 10:26 PM    **This report has been created using voice recognition software. It may contain minor errors which are inherent in voice recognition technology. **

## 2023-04-21 NOTE — GROUP NOTE
Group Therapy Note    Date: 4/21/2023    Group Start Time: 0900  Group End Time: 0930  Group Topic: Community Meeting    ANTON Burnham        Group Therapy Note    Attendees 11/21     Community Meeting Group Note        Date: April 21, 2023 Start Time: 9am  End Time:  3664      Number of Participants in Group & Unit Census:  11/21    Topic: goal setting    Goal of Group: Set short term goal for the day      Comments:     Patient did not participate in [employer] group, despite staff encouragement and explanation of benefits. Patient remain seclusive to self. Q15 minute safety checks maintained for patient safety and will continue to encourage patient to attend unit programming.

## 2023-04-22 VITALS
HEART RATE: 70 BPM | TEMPERATURE: 97.5 F | SYSTOLIC BLOOD PRESSURE: 128 MMHG | OXYGEN SATURATION: 99 % | RESPIRATION RATE: 14 BRPM | WEIGHT: 180 LBS | DIASTOLIC BLOOD PRESSURE: 79 MMHG | HEIGHT: 68 IN | BODY MASS INDEX: 27.28 KG/M2

## 2023-04-22 PROCEDURE — 6370000000 HC RX 637 (ALT 250 FOR IP): Performed by: NURSE PRACTITIONER

## 2023-04-22 PROCEDURE — 6370000000 HC RX 637 (ALT 250 FOR IP): Performed by: PSYCHIATRY & NEUROLOGY

## 2023-04-22 PROCEDURE — 6370000000 HC RX 637 (ALT 250 FOR IP): Performed by: INTERNAL MEDICINE

## 2023-04-22 RX ORDER — PROPRANOLOL HYDROCHLORIDE 10 MG/1
10 TABLET ORAL 3 TIMES DAILY
Qty: 90 TABLET | Refills: 0 | Status: SHIPPED | OUTPATIENT
Start: 2023-04-22

## 2023-04-22 RX ORDER — HYDROXYZINE 50 MG/1
50 TABLET, FILM COATED ORAL 3 TIMES DAILY PRN
Qty: 30 TABLET | Refills: 0 | Status: SHIPPED | OUTPATIENT
Start: 2023-04-22 | End: 2023-05-02

## 2023-04-22 RX ORDER — LITHIUM CARBONATE 450 MG
450 TABLET, EXTENDED RELEASE ORAL EVERY 12 HOURS SCHEDULED
Qty: 60 TABLET | Refills: 0 | Status: SHIPPED | OUTPATIENT
Start: 2023-04-22

## 2023-04-22 RX ORDER — POLYETHYLENE GLYCOL 3350 17 G/17G
17 POWDER, FOR SOLUTION ORAL DAILY PRN
Qty: 30 EACH | Refills: 0 | Status: SHIPPED | OUTPATIENT
Start: 2023-04-22 | End: 2023-05-22

## 2023-04-22 RX ORDER — CLONIDINE HYDROCHLORIDE 0.1 MG/1
0.1 TABLET ORAL DAILY
Qty: 30 TABLET | Refills: 0 | Status: SHIPPED | OUTPATIENT
Start: 2023-04-22

## 2023-04-22 RX ORDER — PSEUDOEPHEDRINE HCL 30 MG
100 TABLET ORAL 2 TIMES DAILY PRN
Qty: 60 CAPSULE | Refills: 0 | Status: SHIPPED | OUTPATIENT
Start: 2023-04-22

## 2023-04-22 RX ADMIN — LITHIUM CARBONATE 450 MG: 450 TABLET, EXTENDED RELEASE ORAL at 08:31

## 2023-04-22 RX ADMIN — PROPRANOLOL HYDROCHLORIDE 10 MG: 20 TABLET ORAL at 08:31

## 2023-04-22 RX ADMIN — PROPRANOLOL HYDROCHLORIDE 10 MG: 20 TABLET ORAL at 14:07

## 2023-04-22 RX ADMIN — DOCUSATE SODIUM 100 MG: 100 CAPSULE, LIQUID FILLED ORAL at 09:00

## 2023-04-22 RX ADMIN — HYDROXYZINE HYDROCHLORIDE 50 MG: 50 TABLET, FILM COATED ORAL at 02:48

## 2023-04-22 NOTE — PLAN OF CARE
Problem: Behavior  Goal: Pt/Family maintain appropriate behavior and adhere to behavioral management agreement, if implemented  Description: INTERVENTIONS:  1. Assess patient/family's coping skills and  non-compliant behavior (including use of illegal substances)  2. Notify security of behavior or suspected illegal substances which indicate the need for search of the family and/or belongings  3. Encourage verbalization of thoughts and concerns in a socially appropriate manner  4. Utilize positive, consistent limit setting strategies supporting safety of patient, staff and others  5. Encourage participation in the decision making process about the behavioral management agreement  6. If a visitor's behavior poses a threat to safety call refer to organization policy. 7. Initiate consult with , Psychosocial CNS, Spiritual Care as appropriate  4/16/2023 2316 by Leopoldo Fees, LPN  Note: Behavior was a little bizarre yet somewhat appropriate and not out of control. Patient overly sleepy. Patient educated on coming to nursing staff if need be and educated on coping, relaxation and breathing exercises and methods. Will continue to monitor and Q 15min safety checks. Problem: Anxiety  Goal: Will report anxiety at manageable levels  Description: INTERVENTIONS:  1. Administer medication as ordered  2. Teach and rehearse alternative coping skills  3. Provide emotional support with 1:1 interaction with staff  4/16/2023 2316 by Leopoldo Fees, LPN  Note: Patient reported anxiety at a manageable level and stated she was in fact feeling better. Educated on coping methods and patient agreed to come to staff if need be. Will continue to monitor and Q15min safety checks.
Problem: Behavior  Goal: Pt/Family maintain appropriate behavior and adhere to behavioral management agreement, if implemented  Description: INTERVENTIONS:  1. Assess patient/family's coping skills and  non-compliant behavior (including use of illegal substances)  2. Notify security of behavior or suspected illegal substances which indicate the need for search of the family and/or belongings  3. Encourage verbalization of thoughts and concerns in a socially appropriate manner  4. Utilize positive, consistent limit setting strategies supporting safety of patient, staff and others  5. Encourage participation in the decision making process about the behavioral management agreement  6. If a visitor's behavior poses a threat to safety call refer to organization policy. 7. Initiate consult with , Psychosocial CNS, Spiritual Care as appropriate  Note: Patient exhibited appropriate behavior. Was educated on coping skills. Patient agreed to seek out nursing staff if need be. Will continue to monitor. Q15min safety checks. Problem: Anxiety  Goal: Will report anxiety at manageable levels  Description: INTERVENTIONS:  1. Administer medication as ordered  2. Teach and rehearse alternative coping skills  3. Provide emotional support with 1:1 interaction with staff  Note: Patient reporting more manageable level of anxiety. Patient was educated on coping and relaxation mechanisms. Will continue to monitor. Q15min safety methods.
Problem: Chronic Conditions and Co-morbidities  Goal: Patient's chronic conditions and co-morbidity symptoms are monitored and maintained or improved  Outcome: Progressing     Problem: Micaela  Goal: Will exhibit normal sleep and speech and no impulsivity  Description: INTERVENTIONS:  1. Administer medication as ordered  2. Set limits on impulsive behavior  3. Make attempts to decrease external stimuli as possible  Outcome: Progressing     Problem: Psychosis  Goal: Will report no hallucinations or delusions  Description: INTERVENTIONS:  1. Administer medication as  ordered  2. Assist with reality testing to support increasing orientation  3. Assess if patient's hallucinations or delusions are encouraging self harm or harm to others and intervene as appropriate  Outcome: Progressing     Problem: Anxiety  Goal: Will report anxiety at manageable levels  Description: INTERVENTIONS:  1. Administer medication as ordered  2. Teach and rehearse alternative coping skills  3. Provide emotional support with 1:1 interaction with staff  Outcome: Progressing  Note: Patient stated her anxiety and depression were both 6/10 but fluctuate. Problem: Pain  Goal: Verbalizes/displays adequate comfort level or baseline comfort level  Outcome: Progressing  Note: Patient denied pain upon assessment. Problem: Behavior  Goal: Pt/Family maintain appropriate behavior and adhere to behavioral management agreement, if implemented  Description: INTERVENTIONS:  1. Assess patient/family's coping skills and  non-compliant behavior (including use of illegal substances)  2. Notify security of behavior or suspected illegal substances which indicate the need for search of the family and/or belongings  3. Encourage verbalization of thoughts and concerns in a socially appropriate manner  4. Utilize positive, consistent limit setting strategies supporting safety of patient, staff and others  5.  Encourage participation in the decision making
Problem: Micaela  Goal: Will exhibit normal sleep and speech and no impulsivity  Description: INTERVENTIONS:  1. Administer medication as ordered  2. Set limits on impulsive behavior  3. Make attempts to decrease external stimuli as possible  4/21/2023 2034 by Faraz Lorenz RN  Outcome: Progressing  Note: Patient reports improved sleep. Her speech is clear and she is absent from impulsive behaviors. Problem: Psychosis  Goal: Will report no hallucinations or delusions  Description: INTERVENTIONS:  1. Administer medication as  ordered  2. Assist with reality testing to support increasing orientation  3. Assess if patient's hallucinations or delusions are encouraging self harm or harm to others and intervene as appropriate  4/21/2023 2034 by Faraz Lorenz RN  Outcome: Progressing  Note: Patient denies  and visual hallucinations. Problem: Anxiety  Goal: Will report anxiety at manageable levels  Description: INTERVENTIONS:  1. Administer medication as ordered  2. Teach and rehearse alternative coping skills  3. Provide emotional support with 1:1 interaction with staff  4/21/2023 2034 by Faraz Lorenz RN  Outcome: Progressing  Note: Patient is out and selectively social in day room. She is friendly and cooperative. She denies suicidal ideation and thoughts of self harm. She is anxious and has been utilizing atarax. Patient is medication compliant and in behavior control. Staff maintains Q15 minute safety checks. Problem: Pain  Goal: Verbalizes/displays adequate comfort level or baseline comfort level  Outcome: Progressing  Flowsheets (Taken 4/21/2023 2034)  Verbalizes/displays adequate comfort level or baseline comfort level: Assess pain using appropriate pain scale  Note: Patient denies pain on assessment, staff will reassess as needed.
Problem: Micaela  Goal: Will exhibit normal sleep and speech and no impulsivity  Description: INTERVENTIONS:  1. Administer medication as ordered  2. Set limits on impulsive behavior  3. Make attempts to decrease external stimuli as possible  Outcome: Progressing     Problem: Psychosis  Goal: Will report no hallucinations or delusions  Description: INTERVENTIONS:  1. Administer medication as  ordered  2. Assist with reality testing to support increasing orientation  3. Assess if patient's hallucinations or delusions are encouraging self harm or harm to others and intervene as appropriate  4/21/2023 0934 by Tyra Malik LPN  Outcome: Progressing   Patient reports good sleep, denies auditory and visual hallucinations. Patient is pleasant and cooperative with writer. Support and encouragement provided.
Problem: Micaela  Goal: Will exhibit normal sleep and speech and no impulsivity  Description: INTERVENTIONS:  1. Administer medication as ordered  2. Set limits on impulsive behavior  3. Make attempts to decrease external stimuli as possible  Outcome: Progressing  Note: Patient is irritable upon approach, patient is thought blocked and preoccupied, pressured speech. Patient easily distracted, unable to stay on task at this time without redirection. Problem: Psychosis  Goal: Will report no hallucinations or delusions  Description: INTERVENTIONS:  1. Administer medication as  ordered  2. Assist with reality testing to support increasing orientation  3. Assess if patient's hallucinations or delusions are encouraging self harm or harm to others and intervene as appropriate  Outcome: Progressing  Note: Patient denies hallucinations at this time, reports that they are intermittent, but getting better. Patient is hyper verbal, with increase racing thoughts. Instructed on action and purpose of medications ordered, patient is selective with morning medications, refusing Invega despite education. Will continue to monitor. Visual checks maintained.
Problem: Psychosis  Goal: Will report no hallucinations or delusions  Description: INTERVENTIONS:  1. Administer medication as  ordered  2. Assist with reality testing to support increasing orientation  3. Assess if patient's hallucinations or delusions are encouraging self harm or harm to others and intervene as appropriate  4/18/2023 2124 by Edward Shepard  Outcome: Progressing  Note: Denies hallucinations at this time. Pt denies thoughts of self harm and is agreeable to seeking out should thoughts of self harm arise. Safe environment maintained. Q15 minute checks for safety cont per unit policy. Will cont to monitor for safety and provides support and reassurance as needed. Problem: Anxiety  Goal: Will report anxiety at manageable levels  Description: INTERVENTIONS:  1. Administer medication as ordered  2. Teach and rehearse alternative coping skills  3. Provide emotional support with 1:1 interaction with staff  4/18/2023 2124 by Edward Shepard  Outcome: Progressing  Note: Patient was out in the milieu, cooperative. Aloof of peers. Anxious. In control of behavior. Compliant with all prescribed medications for this shift. Safety checks maintained q15min and irregular rounding.
Problem: Psychosis  Goal: Will report no hallucinations or delusions  Description: INTERVENTIONS:  1. Administer medication as  ordered  2. Assist with reality testing to support increasing orientation  3. Assess if patient's hallucinations or delusions are encouraging self harm or harm to others and intervene as appropriate  Outcome: Progressing  Note:    Patient denied hallucinations, delusions and thoughts of harm to self or others. Patient has racing thoughts and flight of ideas. Patient is more isolative to room this shift. Patient states she believes her medication is working because she is feeling less irritable. No outbursts noted this shift. Safe environment maintained. Q15 minute checks for safety continued per unit policy. Will continue to monitor for safety and provide support and reassurance as needed.         Problem: Chronic Conditions and Co-morbidities  Goal: Patient's chronic conditions and co-morbidity symptoms are monitored and maintained or improved  Outcome: Progressing  Flowsheets (Taken 4/19/2023 1817)  Care Plan - Patient's Chronic Conditions and Co-Morbidity Symptoms are Monitored and Maintained or Improved: Monitor and assess patient's chronic conditions and comorbid symptoms for stability, deterioration, or improvement
Problem: Psychosis  Goal: Will report no hallucinations or delusions  Description: INTERVENTIONS:  1. Administer medication as  ordered  2. Assist with reality testing to support increasing orientation  3. Assess if patient's hallucinations or delusions are encouraging self harm or harm to others and intervene as appropriate  Outcome: Progressing  Note: Patient denied hallucinations, delusions and thoughts of harm to self or others. Patient has racing thoughts and is irritable at times. Patient is selective with medication. Patient reports that she is sleeping too much and believes it is related to her medication. Safe environment maintained. Q15 minute checks for safety continued per unit policy. Will continue to monitor for safety and provide support and reassurance as needed. Problem: Anxiety  Goal: Will report anxiety at manageable levels  Description: INTERVENTIONS:  1. Administer medication as ordered  2. Teach and rehearse alternative coping skills  3. Provide emotional support with 1:1 interaction with staff  Flowsheets (Taken 4/18/2023 1601)  Will report anxiety at manageable levels:   Administer medication as ordered   Provide emotional support with 1:1 interaction with staff  Note: Patient continues to verbalize anxiety and depression both @ 6/10. PRN Atarax given for relief. Problem: Pain  Goal: Verbalizes/displays adequate comfort level or baseline comfort level  Outcome: Progressing  Flowsheets (Taken 4/18/2023 1601)  Verbalizes/displays adequate comfort level or baseline comfort level:   Encourage patient to monitor pain and request assistance   Assess pain using appropriate pain scale  Note: Denies pain at this time. Encouraged pain to report any pain to nursing staff if it occurs.
Problem: Psychosis  Goal: Will report no hallucinations or delusions  Description: INTERVENTIONS:  1. Administer medication as  ordered  2. Assist with reality testing to support increasing orientation  3. Assess if patient's hallucinations or delusions are encouraging self harm or harm to others and intervene as appropriate  Outcome: Progressing  Note: Patient denies auditory and visual hallucinations. Problem: Anxiety  Goal: Will report anxiety at manageable levels  Description: INTERVENTIONS:  1. Administer medication as ordered  2. Teach and rehearse alternative coping skills  3. Provide emotional support with 1:1 interaction with staff  4/20/2023 2044 by Toro Dickey RN  Outcome: Progressing  Note: Patient is out in day room social with select peers. Patient reports that she thinks the shots she got are making her \"talk too much\". She is medication compliant. She denies suicidal ideation and thoughts of self harm. Patient continues to report anxiety and has been utilizing Atarax to help with symptoms. Patient reports interrupted sleep at night but is unsure if she wants to talk the melatonin. Staff maintains Q15 minute safety checks. Problem: Pain  Goal: Verbalizes/displays adequate comfort level or baseline comfort level  Outcome: Progressing  Note: Staff denies pain on assessment, staff will reassess as needed.
anxiety and has been utilizing Atarax as this has been helpful with symptoms. Problem: Pain  Goal: Verbalizes/displays adequate comfort level or baseline comfort level  Outcome: Progressing  Flowsheets (Taken 4/19/2023 2118)  Verbalizes/displays adequate comfort level or baseline comfort level:   Encourage patient to monitor pain and request assistance   Assess pain using appropriate pain scale  Note: Patient denies pain on assessment, staff will reassess as needed.
environment maintained. Safety checks every 15 minutes continued per unit policy. Patient denied depression. Patient reports that her anxiety is okay, and states she takes PRN Atarax to help lower anxiety. Patient reports that she is worried about son being in hospital. Patient reports that her sleep is disturbed throughout the night, but that journaling helps calm her racing thoughts. Patient denied auditory, visual, tactile, olfactory and gustatory hallucinations. Patient reports that she has experienced pain in abdomen due to constipation. Patient educated on PRN colace. Patient has remained in behavior control.
levels  Description: INTERVENTIONS:  1. Administer medication as ordered  2. Teach and rehearse alternative coping skills  3. Provide emotional support with 1:1 interaction with staff  Outcome: Progressing  Pt has been anxious but has maintained control. Medication compliant. Attends select groups. Denies suicidal thoughts. Denies hallucinations. Pt. Remains safe on the unit. Q 15 minute checks for safety maintained.
been thinking about how you might kill yourself? : Yes  5) Have you started to work out or worked out the details of how to kill yourself?  Do you intend to carry out this plan? : Yes  6) Have you ever done anything, started to do anything, or prepared to do anything to end your life?: Yes  Change in Result NO Change in Plan of care NO    EDUCATION:   Learner Progress Toward Treatment Goals: Reviewed goals and plan of care    Method: Individual    Outcome: Needs reinforcement    PATIENT GOALS:     PLAN/TREATMENT RECOMMENDATIONS UPDATE:   COPING SKILLS CONTINUE WITH GROUP THERAPIES POSITIVE INTERACTIONS, GOAL SETTING    SHORT-TERM GOALS UPDATE: medication stabilization, physical activity  Time frame for Short-Term Goals: 1-2 WEEKS     LONG-TERM GOALS UPDATE: maintain medication compliance  Time frame for Long-Term Goals: 6 MONTHS  Members Present in Team Meeting: See Signature Sheet    Landis Halsted, RN

## 2023-04-22 NOTE — GROUP NOTE
Group Therapy Note    Date: 4/22/2023    Group Start Time: 1400  Group End Time: 1500  Group Topic: Cognitive Skills    [facility]        Group Therapy Note    Attendees: 10/18       Patient's Goal:  To improve cognitive skills through  decision making, turn taking and concentration/focus to task. To increase interpersonal interactions through conversation with peers. Notes:  Patient arrived to group late; however, actively participated when present. Patient was pleasant and appropriate. Patient communicated appropriately with peers.     Status After Intervention:  Improved    Participation Level: Interactive    Participation Quality: Appropriate and Attentive      Speech:  normal      Thought Process/Content: Logical      Affective Functioning: Congruent      Mood: euthymic      Level of consciousness:  Alert and Attentive      Response to Learning: Progressing to goal      Endings: None Reported    Modes of Intervention: Socialization, Problem-solving, Activity, and Reality-testing      Discipline Responsible: Psychoeducational Specialist      Signature:  Kacey Weeks

## 2023-04-22 NOTE — GROUP NOTE
Group Therapy Note    Date: 4/22/2023    Group Start Time: 7874  Group End Time: 1007  Group Topic: Community Meeting    [facility]    Vaibhav Marin RN        Group Therapy Note    Attendees: 13/19       Patient's Goal:  Work out to distract mind, start discharge process. Notes:  Patient informed of discharge process, and verbalizes understanding of discharge progress.      Status After Intervention:  Improved    Participation Level: Interactive    Participation Quality: Appropriate and Attentive      Speech:  pressured      Thought Process/Content: Linear      Affective Functioning: Congruent      Mood: anxious, worried       Level of consciousness:  Alert, Oriented x4, and Attentive      Response to Learning: Progressing to goal      Endings: None Reported    Modes of Intervention: Support, Socialization, and Exploration      Discipline Responsible: Registered Nurse      Signature:  Vaibhav Marin RN

## 2023-04-22 NOTE — DISCHARGE INSTRUCTIONS
Information:   Questions regarding your bill: Call HELP program [phone]     [facility] ([facility]  [phone]      Recovery Help line- [phone]      To obtain results of pending studies call Medical Records at: [phone]     For emergencies and 24 hour/7 days a week contact information:  [phone]

## 2023-04-22 NOTE — GROUP NOTE
Group Therapy Note    Date: 4/21/2023    Group Start Time: 2000  Group End Time: 2130  Group Topic: Wrap-Up    ANTON Zepeda        Group Therapy Note    Attendees: 11       Patient's Goal:  pt refused to verbally participate    Notes:  quietly listened    Status After Intervention:  Unchanged    Participation Level: None    Participation Quality: Resistant      Speech:  mute      Thought Process/Content: Perseverating      Affective Functioning: Blunted      Mood: anxious      Level of consciousness:  Alert and Oriented x4      Response to Learning: Resistant      Endings: None Reported    Modes of Intervention: Support      Discipline Responsible: Behavorial Health Tech      Signature:  Alecia Mna

## 2023-04-22 NOTE — BH NOTE
[facility]  Discharge Note    Pt discharged with followings belongings:   Dental Appliances: Uppers, Lowers, At bedside  Vision - Corrective Lenses: Eyeglasses  Hearing Aid: None  Jewelry: Ring  Body Piercings Removed: N/A  Clothing: Shirt, Socks, Other (Comment) (back pack, nike pouch, CD disk,)  Other Valuables: Money, Personal Toiletries   Valuables sent home with patient. Patient educated on aftercare instructions: Yes  Information faxed to [facility] by Staff  at 4:34 PM .Patient verbalize understanding of AVS:  Yes. Status EXAM upon discharge:  Mental Status and Behavioral Exam  Normal: Yes  Level of Assistance: Independent/Self  Facial Expression: Worried (about son)  Affect: Appropriate  Level of Consciousness: Alert  Frequency of Checks: 4 times per hour, close  Mood:Normal: Yes  Mood: Anxious  Motor Activity:Normal: Yes  Motor Activity: Increased  Eye Contact: Fair  Observed Behavior: Friendly, Cooperative, Preoccupied  Sexual Misconduct History: Current - no  Involved In Any Sexual Misconduct With Others? : No  History of Sexually Inappropriate Behavior When Previously Hospitalized?: No  Uncontrollable/Compulsive Masturbation?: No  Difficulty Controlling Sexual Impulses?: No  Preception: [place] to person, [place] to time, [place] to place, [place] to situation  Attention:Normal: Yes  Attention: Distractible  Thought Processes: Circumstantial  Thought Content:Normal: Yes  Thought Content: Preoccupations  Depression Symptoms: No problems reported or observed. Anxiety Symptoms: Generalized  Micaela Symptoms: No problems reported or observed.   Hallucinations: None  Delusions: No  Delusions: Paranoid  Memory:Normal: Yes  Memory: Poor remote  Insight and Judgment: Yes  Insight and Judgment: Poor insight    Tobacco Screening:  Practical Counseling, on admission, nati X, if applicable and completed (first 3 are required if patient doesn't refuse):            ( ) Recognizing danger situations (included
MED NOTE    Patient refused Fariba Castrejon, states 6mg is too much and makes her tired. Patient was accepting of  Lithium.
Patient given tobacco quitline number [phone] at this time, refusing to call at this time, states \" I just dont want to quit now\"- patient given information as to the dangers of long term tobacco use. Continue to reinforce the importance of tobacco cessation.
how you might kill yourself? : Yes  5) Have you started to work out or worked out the details of how to kill yourself?  Do you intend to carry out this plan? : Yes  6) Have you ever done anything, started to do anything, or prepared to do anything to end your life?: Yes  Change in Result:   Change in Plan of care:        EDUCATION:   Learner Progress Toward Treatment Goals:   Reviewed results and recommendations of this team, Reviewed group plan and strategies, Reviewed signs, symptoms and risk of self harm and violent behavior, Reviewed goals and plan of care    Method:  small group, individual verbal education    Outcome:   Verbalized by patient but needs reinforcement to obtain goals    PATIENT GOALS:  Short term:  talk to psychiatrist  Long term:  stay on meds    PLAN/TREATMENT RECOMMENDATIONS UPDATE:  continue with group therapies, increased socialization, continue planning for after discharge goals, continue with medication compliance    SHORT-TERM GOALS UPDATE:   Time frame for Short-Term Goals:  5-7 days    LONG-TERM GOALS UPDATE:   Time frame for Long-Term Goals:  6 months    Members Present in Team Meeting:   Patient, RT, RN  Dhruv Goyal RN

## 2023-04-24 NOTE — CARE COORDINATION
Name: Johnny Christensen    : 1980    Discharge Date: 23    Primary Auth/Cert #: [id number]    Destination: home     Discharge Medications:      Medication List        START taking these medications      ARIPiprazole lauroxil 441 MG/1.6ML Prsy injection  Commonly known as: ARISTADA  Inject 1.6 mLs into the muscle every 30 days  Start taking on: May 20, 2023  Notes to patient: Mood stabilizer     docusate 100 MG Caps  Commonly known as: COLACE, DULCOLAX  Take 100 mg by mouth 2 times daily as needed for Constipation (constipation)  Notes to patient: Constipation     polyethylene glycol 17 g packet  Commonly known as: GLYCOLAX  Take 17 g by mouth daily as needed for Constipation  Notes to patient: Constipation     propranolol 10 MG tablet  Commonly known as: INDERAL  Take 1 tablet by mouth 3 times daily  Notes to patient: Tremors, restlessness            CHANGE how you take these medications      cloNIDine 0.1 MG tablet  Commonly known as: CATAPRES  Take 1 tablet by mouth daily  What changed:   medication strength  how much to take  additional instructions  Notes to patient: Anxiety     hydrOXYzine HCl 50 MG tablet  Commonly known as: ATARAX  Take 1 tablet by mouth 3 times daily as needed for Anxiety  What changed:   medication strength  how much to take  when to take this  reasons to take this  Notes to patient: Anxiety     ibuprofen 600 MG tablet  Commonly known as: ADVIL;MOTRIN  Take 1 tablet by mouth every 8 hours as needed for Pain  What changed: how much to take  Notes to patient: Pain     lithium 450 MG extended release tablet  Commonly known as: ESKALITH  Take 1 tablet by mouth every 12 hours  What changed:   medication strength  how much to take  Notes to patient: Mood stabilizer            CONTINUE taking these medications      melatonin 3 MG Tabs tablet  Commonly known as: RA Melatonin  Take 1 tablet by mouth nightly as needed (Sleep)  Notes to patient: Sleep            STOP taking these

## 2023-05-12 ENCOUNTER — APPOINTMENT (OUTPATIENT)
Dept: GENERAL RADIOLOGY | Age: 43
End: 2023-05-12
Payer: COMMERCIAL

## 2023-05-12 ENCOUNTER — HOSPITAL ENCOUNTER (EMERGENCY)
Age: 43
Discharge: HOME | End: 2023-05-13
Attending: EMERGENCY MEDICINE
Payer: COMMERCIAL

## 2023-05-12 VITALS
SYSTOLIC BLOOD PRESSURE: 129 MMHG | RESPIRATION RATE: 14 BRPM | DIASTOLIC BLOOD PRESSURE: 87 MMHG | HEART RATE: 81 BPM | OXYGEN SATURATION: 97 % | TEMPERATURE: 96.8 F
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
## 2023-05-12 PROCEDURE — 99284 EMERGENCY DEPT VISIT MOD MDM: CPT

## 2023-05-12 PROCEDURE — 6370000000 HC RX 637 (ALT 250 FOR IP): Performed by: STUDENT IN AN ORGANIZED HEALTH CARE EDUCATION/TRAINING PROGRAM

## 2023-05-12 RX ORDER — IBUPROFEN 800 MG/1
800 TABLET ORAL ONCE
Status: COMPLETED | OUTPATIENT
Start: 2023-05-12 | End: 2023-05-12

## 2023-05-12 RX ADMIN — IBUPROFEN 800 MG: 800 TABLET, FILM COATED ORAL at 23:52

## 2023-05-12 ASSESSMENT — PAIN SCALES - GENERAL: PAINLEVEL_OUTOF10: 7

## 2023-05-13 ENCOUNTER — APPOINTMENT (OUTPATIENT)
Dept: CT IMAGING | Age: 43
End: 2023-05-13
Payer: COMMERCIAL

## 2023-05-13 PROCEDURE — 72125 CT NECK SPINE W/O DYE: CPT

## 2023-05-13 ASSESSMENT — ENCOUNTER SYMPTOMS
COLOR CHANGE: 0
VOICE CHANGE: 0
GASTROINTESTINAL NEGATIVE: 1
TROUBLE SWALLOWING: 0

## 2023-05-13 NOTE — ED NOTES
Per [employer] pt left ED lobby and was seen walking.   SW canceled ride with [facility], [facility]  05/13/23 9278

## 2023-05-13 NOTE — ED NOTES
Sw attempted to arrange transport through [facility], but was unable to due to their system being rebooted per representative. [employer] utilized. Pt going to address on file.  Trip # [id number]     RHONDA Chavarria  05/13/23 9875
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
## 2023-05-13 NOTE — DISCHARGE INSTRUCTIONS
Thank you for visiting [facility] Emergency Department. You need to call Caryle Ladd, APRN - NP to make an appointment as directed for follow up. Should you have any questions regarding your care or further treatment, please call [facility] at [phone].
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
## 2023-06-16 ENCOUNTER — HOSPITAL ENCOUNTER (EMERGENCY)
Age: 43
Discharge: HOME | End: 2023-06-16
Attending: EMERGENCY MEDICINE
Payer: COMMERCIAL

## 2023-06-16 VITALS
RESPIRATION RATE: 20 BRPM | SYSTOLIC BLOOD PRESSURE: 129 MMHG | OXYGEN SATURATION: 98 % | BODY MASS INDEX: 27.37 KG/M2 | DIASTOLIC BLOOD PRESSURE: 80 MMHG | HEIGHT: 68 IN | TEMPERATURE: 97.6 F | HEART RATE: 74 BPM

## 2023-06-16 DIAGNOSIS — F90.9 HYPERACTIVITY: Primary | ICD-10-CM

## 2023-06-16 DIAGNOSIS — Z20.2 STD EXPOSURE: ICD-10-CM

## 2023-06-16 PROCEDURE — 87591 N.GONORRHOEAE DNA AMP PROB: CPT

## 2023-06-16 PROCEDURE — 87491 CHLMYD TRACH DNA AMP PROBE: CPT

## 2023-06-16 PROCEDURE — 99283 EMERGENCY DEPT VISIT LOW MDM: CPT

## 2023-06-16 ASSESSMENT — ENCOUNTER SYMPTOMS
FACIAL SWELLING: 0
DIARRHEA: 0
VOMITING: 0
EYE PAIN: 0
NAUSEA: 0
RHINORRHEA: 0
EYE DISCHARGE: 0
COUGH: 0
SHORTNESS OF BREATH: 0
SINUS PRESSURE: 0
COLOR CHANGE: 0
BACK PAIN: 0
EYE REDNESS: 0
CHEST TIGHTNESS: 0
TROUBLE SWALLOWING: 0
ABDOMINAL PAIN: 0
BLOOD IN STOOL: 0
SORE THROAT: 0
WHEEZING: 0
CONSTIPATION: 0

## 2023-06-16 ASSESSMENT — PAIN DESCRIPTION - DESCRIPTORS: DESCRIPTORS: POUNDING

## 2023-06-16 ASSESSMENT — PAIN SCALES - GENERAL: PAINLEVEL_OUTOF10: 8

## 2023-06-16 ASSESSMENT — PAIN - FUNCTIONAL ASSESSMENT: PAIN_FUNCTIONAL_ASSESSMENT: 0-10

## 2023-06-16 ASSESSMENT — PAIN DESCRIPTION - LOCATION: LOCATION: HEAD
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
## 2023-06-16 NOTE — DISCHARGE INSTRUCTIONS
Thank you for visiting [address] ED. You need to call in morning to make appointment as directed with appropriate doctor. Should you have any questions regarding your care or further treatment, please call [facility] at [phone]. Take any medications as prescribed, if given any. Return to ED if symptoms worsen, do not improve, and unable to follow up with your physician, or other concerns arise.

## 2023-06-16 NOTE — ED TRIAGE NOTES
Mode of arrival (squad #, walk in, police, etc) : EMS        Chief complaint(s): mental health eval        Arrival Note (brief scenario, treatment PTA, etc). : patient has been off her meds for months and states \"she is craving drugs\", denies self harm thoughts. Also wants STD check        C= \"Have you ever felt that you should Cut down on your drinking? \"  No  A= \"Have people Annoyed you by criticizing your drinking? \"  No  G= \"Have you ever felt bad or Guilty about your drinking? \"  No  E= \"Have you ever had a drink as an Eye-opener first thing in the morning to steady your nerves or to help a hangover? \"  No      Deferred []      Reason for deferring: N/A    *If yes to two or more: probable alcohol abuse. *

## 2023-06-16 NOTE — ED PROVIDER NOTES
[address] ED  eMERGENCY dEPARTMENT eNCOUnter      Pt Name: Bella Lau  MRN: [id number]  [place] 1980  Date of evaluation: 6/16/23      CHIEF COMPLAINT       Chief Complaint   Patient presents with    Mental Health Problem    Exposure to STD         [facility]    Bella Lau is a 38 y.o. female who presents complaining of psychiatric evaluation. Patient has a extensive history of schizoaffective disorder PTSD OCD and bipolar. Patient has been off her meds she states for couple months and is here to get checked out. Patient denies suicidal or homicidal ideation. Patient denies any hallucinations. Basically she states that she is just making bad decisions and think she needs to get some help. Patient denies any somatic complaints. Patient does admit to some drug use mostly cocaine recently. REVIEW OF SYSTEMS       Review of Systems   Constitutional:  Negative for activity change, appetite change, chills, diaphoresis and fever. HENT:  Negative for congestion, ear pain, facial swelling, nosebleeds, rhinorrhea, sinus pressure, sore throat and trouble swallowing. Eyes:  Negative for pain, discharge and redness. Respiratory:  Negative for cough, chest tightness, shortness of breath and wheezing. Cardiovascular:  Negative for chest pain, palpitations and leg swelling. Gastrointestinal:  Negative for abdominal pain, blood in stool, constipation, diarrhea, nausea and vomiting. Genitourinary:  Negative for difficulty urinating, dysuria, flank pain, frequency, genital sores and hematuria. Musculoskeletal:  Negative for arthralgias, back pain, gait problem, joint swelling, myalgias and neck pain. Skin:  Negative for color change, pallor, rash and wound. Neurological:  Negative for dizziness, tremors, seizures, syncope, speech difficulty, weakness, numbness and headaches. Psychiatric/Behavioral:  Positive for behavioral problems.  Negative for

## 2023-06-19 LAB
CHLAMYDIA DNA UR QL NAA+PROBE: NEGATIVE
N GONORRHOEA DNA UR QL NAA+PROBE: NEGATIVE
SPECIMEN DESCRIPTION: NORMAL

## 2023-06-26 ENCOUNTER — HOSPITAL ENCOUNTER (INPATIENT)
Age: 43
LOS: 4 days | Discharge: HOME | DRG: 885 | End: 2023-06-30
Attending: EMERGENCY MEDICINE | Admitting: PSYCHIATRY & NEUROLOGY
Payer: COMMERCIAL

## 2023-06-26 DIAGNOSIS — R45.851 SUICIDAL IDEATION: ICD-10-CM

## 2023-06-26 DIAGNOSIS — F31.9 BIPOLAR 1 DISORDER (HCC): Primary | ICD-10-CM

## 2023-06-26 LAB
AMPHET UR QL SCN: NEGATIVE
ANION GAP SERPL CALCULATED.3IONS-SCNC: 11 MMOL/L (ref 9–17)
BARBITURATES UR QL SCN: NEGATIVE
BENZODIAZ UR QL: NEGATIVE
BUN SERPL-MCNC: 11 MG/DL (ref 6–20)
CALCIUM SERPL-MCNC: 9.7 MG/DL (ref 8.6–10.4)
CANNABINOIDS UR QL SCN: POSITIVE
CHLORIDE SERPL-SCNC: 103 MMOL/L (ref 98–107)
CO2 SERPL-SCNC: 23 MMOL/L (ref 20–31)
COCAINE UR QL SCN: POSITIVE
CREAT SERPL-MCNC: 0.63 MG/DL (ref 0.5–0.9)
ERYTHROCYTE [DISTWIDTH] IN BLOOD BY AUTOMATED COUNT: 14.5 % (ref 11.5–14.9)
ETHANOL PERCENT: <0.01 %
ETHANOLAMINE SERPL-MCNC: <10 MG/DL
FENTANYL UR QL: NEGATIVE
GFR SERPL CREATININE-BSD FRML MDRD: >60 ML/MIN/1.73M2
GLUCOSE SERPL-MCNC: 100 MG/DL (ref 70–99)
HCG SERPL QL: NEGATIVE
HCT VFR BLD AUTO: 42 % (ref 36–46)
HGB BLD-MCNC: 13.6 G/DL (ref 12–16)
MCH RBC QN AUTO: 26.7 PG (ref 26–34)
MCHC RBC AUTO-ENTMCNC: 32.3 G/DL (ref 31–37)
MCV RBC AUTO: 82.4 FL (ref 80–100)
METHADONE UR QL: NEGATIVE
OPIATES UR QL SCN: NEGATIVE
OXYCODONE UR QL SCN: NEGATIVE
PCP UR QL SCN: NEGATIVE
PLATELET # BLD AUTO: 262 K/UL (ref 150–450)
PMV BLD AUTO: 9 FL (ref 6–12)
POTASSIUM SERPL-SCNC: 3.4 MMOL/L (ref 3.7–5.3)
RBC # BLD AUTO: 5.1 M/UL (ref 4–5.2)
SODIUM SERPL-SCNC: 137 MMOL/L (ref 135–144)
TEST INFORMATION: ABNORMAL
WBC OTHER # BLD: 9.4 K/UL (ref 3.5–11)

## 2023-06-26 PROCEDURE — 2580000003 HC RX 258: Performed by: STUDENT IN AN ORGANIZED HEALTH CARE EDUCATION/TRAINING PROGRAM

## 2023-06-26 PROCEDURE — 99285 EMERGENCY DEPT VISIT HI MDM: CPT

## 2023-06-26 PROCEDURE — 6360000002 HC RX W HCPCS: Performed by: STUDENT IN AN ORGANIZED HEALTH CARE EDUCATION/TRAINING PROGRAM

## 2023-06-26 PROCEDURE — 1240000000 HC EMOTIONAL WELLNESS R&B

## 2023-06-26 PROCEDURE — 80307 DRUG TEST PRSMV CHEM ANLYZR: CPT

## 2023-06-26 PROCEDURE — 80048 BASIC METABOLIC PNL TOTAL CA: CPT

## 2023-06-26 PROCEDURE — G0480 DRUG TEST DEF 1-7 CLASSES: HCPCS

## 2023-06-26 PROCEDURE — 36415 COLL VENOUS BLD VENIPUNCTURE: CPT

## 2023-06-26 PROCEDURE — 96372 THER/PROPH/DIAG INJ SC/IM: CPT

## 2023-06-26 PROCEDURE — 84703 CHORIONIC GONADOTROPIN ASSAY: CPT

## 2023-06-26 PROCEDURE — 85027 COMPLETE CBC AUTOMATED: CPT

## 2023-06-26 RX ORDER — POLYETHYLENE GLYCOL 3350 17 G
2 POWDER IN PACKET (EA) ORAL
Status: DISCONTINUED | OUTPATIENT
Start: 2023-06-26 | End: 2023-06-30 | Stop reason: HOSPADM

## 2023-06-26 RX ORDER — ACETAMINOPHEN 325 MG/1
650 TABLET ORAL EVERY 4 HOURS PRN
Status: CANCELLED | OUTPATIENT
Start: 2023-06-26

## 2023-06-26 RX ORDER — ACETAMINOPHEN 325 MG/1
650 TABLET ORAL EVERY 4 HOURS PRN
Status: DISCONTINUED | OUTPATIENT
Start: 2023-06-26 | End: 2023-06-30 | Stop reason: HOSPADM

## 2023-06-26 RX ORDER — IBUPROFEN 400 MG/1
400 TABLET ORAL EVERY 6 HOURS PRN
Status: CANCELLED | OUTPATIENT
Start: 2023-06-26

## 2023-06-26 RX ORDER — CHLORPROMAZINE HYDROCHLORIDE 25 MG/1
50 TABLET, FILM COATED ORAL 3 TIMES DAILY PRN
Status: CANCELLED | OUTPATIENT
Start: 2023-06-26

## 2023-06-26 RX ORDER — POTASSIUM CHLORIDE 20 MEQ/1
40 TABLET, EXTENDED RELEASE ORAL ONCE
Status: COMPLETED | OUTPATIENT
Start: 2023-06-26 | End: 2023-06-28

## 2023-06-26 RX ORDER — CHLORPROMAZINE HYDROCHLORIDE 25 MG/1
50 TABLET, FILM COATED ORAL 3 TIMES DAILY PRN
Status: DISCONTINUED | OUTPATIENT
Start: 2023-06-26 | End: 2023-06-30 | Stop reason: HOSPADM

## 2023-06-26 RX ORDER — LORAZEPAM 2 MG/ML
2 INJECTION INTRAMUSCULAR ONCE
Status: COMPLETED | OUTPATIENT
Start: 2023-06-26 | End: 2023-06-26

## 2023-06-26 RX ORDER — POLYETHYLENE GLYCOL 3350 17 G/17G
17 POWDER, FOR SOLUTION ORAL DAILY PRN
Status: DISCONTINUED | OUTPATIENT
Start: 2023-06-26 | End: 2023-06-30 | Stop reason: HOSPADM

## 2023-06-26 RX ORDER — HYDROXYZINE 50 MG/1
50 TABLET, FILM COATED ORAL 3 TIMES DAILY PRN
Status: CANCELLED | OUTPATIENT
Start: 2023-06-26

## 2023-06-26 RX ORDER — CHLORPROMAZINE HYDROCHLORIDE 25 MG/ML
50 INJECTION INTRAMUSCULAR 3 TIMES DAILY PRN
Status: CANCELLED | OUTPATIENT
Start: 2023-06-26

## 2023-06-26 RX ORDER — TRAZODONE HYDROCHLORIDE 50 MG/1
50 TABLET ORAL NIGHTLY PRN
Status: DISCONTINUED | OUTPATIENT
Start: 2023-06-26 | End: 2023-06-30 | Stop reason: HOSPADM

## 2023-06-26 RX ORDER — CHLORPROMAZINE HYDROCHLORIDE 25 MG/ML
50 INJECTION INTRAMUSCULAR 3 TIMES DAILY PRN
Status: DISCONTINUED | OUTPATIENT
Start: 2023-06-26 | End: 2023-06-30 | Stop reason: HOSPADM

## 2023-06-26 RX ORDER — TRAZODONE HYDROCHLORIDE 50 MG/1
50 TABLET ORAL NIGHTLY PRN
Status: CANCELLED | OUTPATIENT
Start: 2023-06-26

## 2023-06-26 RX ORDER — HYDROXYZINE 50 MG/1
50 TABLET, FILM COATED ORAL 3 TIMES DAILY PRN
Status: DISCONTINUED | OUTPATIENT
Start: 2023-06-26 | End: 2023-06-30 | Stop reason: HOSPADM

## 2023-06-26 RX ORDER — POLYETHYLENE GLYCOL 3350 17 G/17G
17 POWDER, FOR SOLUTION ORAL DAILY PRN
Status: CANCELLED | OUTPATIENT
Start: 2023-06-26

## 2023-06-26 RX ORDER — NICOTINE 21 MG/24HR
1 PATCH, TRANSDERMAL 24 HOURS TRANSDERMAL DAILY
Status: CANCELLED | OUTPATIENT
Start: 2023-06-27

## 2023-06-26 RX ADMIN — ZIPRASIDONE MESYLATE 20 MG: 20 INJECTION, POWDER, LYOPHILIZED, FOR SOLUTION INTRAMUSCULAR at 19:56

## 2023-06-26 RX ADMIN — LORAZEPAM 2 MG: 2 INJECTION INTRAMUSCULAR; INTRAVENOUS at 19:59

## 2023-06-26 ASSESSMENT — SLEEP AND FATIGUE QUESTIONNAIRES
SLEEP PATTERN: NORMAL
DO YOU HAVE DIFFICULTY SLEEPING: NO
DO YOU USE A SLEEP AID: YES
AVERAGE NUMBER OF SLEEP HOURS: 7

## 2023-06-26 ASSESSMENT — PATIENT HEALTH QUESTIONNAIRE - PHQ9: SUM OF ALL RESPONSES TO PHQ QUESTIONS 1-9: 18

## 2023-06-27 PROBLEM — F31.13: Status: ACTIVE | Noted: 2023-06-27

## 2023-06-27 PROCEDURE — APPSS60 APP SPLIT SHARED TIME 46-60 MINUTES

## 2023-06-27 PROCEDURE — 1240000000 HC EMOTIONAL WELLNESS R&B

## 2023-06-27 PROCEDURE — 2500000003 HC RX 250 WO HCPCS: Performed by: NURSE PRACTITIONER

## 2023-06-27 PROCEDURE — 99222 1ST HOSP IP/OBS MODERATE 55: CPT | Performed by: INTERNAL MEDICINE

## 2023-06-27 PROCEDURE — 6370000000 HC RX 637 (ALT 250 FOR IP): Performed by: PSYCHIATRY & NEUROLOGY

## 2023-06-27 PROCEDURE — 99223 1ST HOSP IP/OBS HIGH 75: CPT | Performed by: PSYCHIATRY & NEUROLOGY

## 2023-06-27 PROCEDURE — 6370000000 HC RX 637 (ALT 250 FOR IP): Performed by: NURSE PRACTITIONER

## 2023-06-27 RX ORDER — CLONAZEPAM 0.5 MG/1
0.5 TABLET ORAL NIGHTLY
Status: ON HOLD | COMMUNITY
End: 2023-06-30 | Stop reason: HOSPADM

## 2023-06-27 RX ORDER — ZIPRASIDONE HYDROCHLORIDE 20 MG/1
20 CAPSULE ORAL 2 TIMES DAILY WITH MEALS
Status: DISCONTINUED | OUTPATIENT
Start: 2023-06-27 | End: 2023-06-28

## 2023-06-27 RX ORDER — MULTIVITAMIN
1 TABLET ORAL DAILY
Status: ON HOLD | COMMUNITY
Start: 2023-05-09 | End: 2023-06-30 | Stop reason: HOSPADM

## 2023-06-27 RX ORDER — LURASIDONE HYDROCHLORIDE 40 MG/1
40 TABLET, FILM COATED ORAL
Status: DISCONTINUED | OUTPATIENT
Start: 2023-06-27 | End: 2023-06-27

## 2023-06-27 RX ADMIN — NICOTINE POLACRILEX 2 MG: 2 LOZENGE ORAL at 12:40

## 2023-06-27 RX ADMIN — ZIPRASIDONE HYDROCHLORIDE 20 MG: 20 CAPSULE ORAL at 17:14

## 2023-06-27 RX ADMIN — ACETAMINOPHEN 650 MG: 325 TABLET ORAL at 09:34

## 2023-06-27 RX ADMIN — CHLORPROMAZINE HYDROCHLORIDE 50 MG: 25 INJECTION INTRAMUSCULAR at 18:03

## 2023-06-27 ASSESSMENT — PAIN SCALES - GENERAL
PAINLEVEL_OUTOF10: 0
PAINLEVEL_OUTOF10: 10
PAINLEVEL_OUTOF10: 2

## 2023-06-27 ASSESSMENT — PAIN DESCRIPTION - LOCATION: LOCATION: BACK

## 2023-06-28 LAB
CHOLEST SERPL-MCNC: 125 MG/DL
CHOLESTEROL/HDL RATIO: 2.2
EST. AVERAGE GLUCOSE BLD GHB EST-MCNC: 108 MG/DL
HBA1C MFR BLD: 5.4 % (ref 4–6)
HDLC SERPL-MCNC: 57 MG/DL
LDLC SERPL CALC-MCNC: 39 MG/DL (ref 0–130)
TRIGL SERPL-MCNC: 143 MG/DL

## 2023-06-28 PROCEDURE — 36415 COLL VENOUS BLD VENIPUNCTURE: CPT

## 2023-06-28 PROCEDURE — 6370000000 HC RX 637 (ALT 250 FOR IP): Performed by: NURSE PRACTITIONER

## 2023-06-28 PROCEDURE — 99231 SBSQ HOSP IP/OBS SF/LOW 25: CPT | Performed by: INTERNAL MEDICINE

## 2023-06-28 PROCEDURE — 99232 SBSQ HOSP IP/OBS MODERATE 35: CPT | Performed by: PSYCHIATRY & NEUROLOGY

## 2023-06-28 PROCEDURE — 6370000000 HC RX 637 (ALT 250 FOR IP): Performed by: PSYCHIATRY & NEUROLOGY

## 2023-06-28 PROCEDURE — 6370000000 HC RX 637 (ALT 250 FOR IP): Performed by: EMERGENCY MEDICINE

## 2023-06-28 PROCEDURE — APPSS30 APP SPLIT SHARED TIME 16-30 MINUTES

## 2023-06-28 PROCEDURE — 80061 LIPID PANEL: CPT

## 2023-06-28 PROCEDURE — 83036 HEMOGLOBIN GLYCOSYLATED A1C: CPT

## 2023-06-28 PROCEDURE — 1240000000 HC EMOTIONAL WELLNESS R&B

## 2023-06-28 RX ORDER — ZIPRASIDONE HYDROCHLORIDE 40 MG/1
40 CAPSULE ORAL 2 TIMES DAILY WITH MEALS
Status: DISCONTINUED | OUTPATIENT
Start: 2023-06-29 | End: 2023-06-29

## 2023-06-28 RX ADMIN — CHLORPROMAZINE HYDROCHLORIDE 50 MG: 25 TABLET, FILM COATED ORAL at 13:52

## 2023-06-28 RX ADMIN — ZIPRASIDONE HYDROCHLORIDE 20 MG: 20 CAPSULE ORAL at 08:29

## 2023-06-28 RX ADMIN — HYDROXYZINE HYDROCHLORIDE 50 MG: 50 TABLET, FILM COATED ORAL at 06:30

## 2023-06-28 RX ADMIN — NICOTINE POLACRILEX 2 MG: 2 LOZENGE ORAL at 20:41

## 2023-06-28 RX ADMIN — ACETAMINOPHEN 650 MG: 325 TABLET ORAL at 20:42

## 2023-06-28 RX ADMIN — POTASSIUM CHLORIDE 40 MEQ: 1500 TABLET, EXTENDED RELEASE ORAL at 06:35

## 2023-06-28 RX ADMIN — HYDROXYZINE HYDROCHLORIDE 50 MG: 50 TABLET, FILM COATED ORAL at 20:41

## 2023-06-28 RX ADMIN — ZIPRASIDONE HYDROCHLORIDE 20 MG: 20 CAPSULE ORAL at 17:34

## 2023-06-28 RX ADMIN — NICOTINE POLACRILEX 2 MG: 2 LOZENGE ORAL at 09:55

## 2023-06-28 RX ADMIN — TRAZODONE HYDROCHLORIDE 50 MG: 50 TABLET ORAL at 20:41

## 2023-06-28 RX ADMIN — NICOTINE POLACRILEX 2 MG: 2 LOZENGE ORAL at 19:18

## 2023-06-28 RX ADMIN — NICOTINE POLACRILEX 2 MG: 2 LOZENGE ORAL at 16:09

## 2023-06-28 ASSESSMENT — PAIN DESCRIPTION - LOCATION: LOCATION: GENERALIZED

## 2023-06-28 ASSESSMENT — PAIN - FUNCTIONAL ASSESSMENT: PAIN_FUNCTIONAL_ASSESSMENT: ACTIVITIES ARE NOT PREVENTED

## 2023-06-28 ASSESSMENT — PAIN DESCRIPTION - DESCRIPTORS: DESCRIPTORS: ACHING

## 2023-06-28 ASSESSMENT — PAIN SCALES - GENERAL: PAINLEVEL_OUTOF10: 8

## 2023-06-29 PROCEDURE — 6370000000 HC RX 637 (ALT 250 FOR IP): Performed by: NURSE PRACTITIONER

## 2023-06-29 PROCEDURE — APPSS30 APP SPLIT SHARED TIME 16-30 MINUTES: Performed by: NURSE PRACTITIONER

## 2023-06-29 PROCEDURE — 1240000000 HC EMOTIONAL WELLNESS R&B

## 2023-06-29 PROCEDURE — 99232 SBSQ HOSP IP/OBS MODERATE 35: CPT | Performed by: PSYCHIATRY & NEUROLOGY

## 2023-06-29 PROCEDURE — 6370000000 HC RX 637 (ALT 250 FOR IP): Performed by: PSYCHIATRY & NEUROLOGY

## 2023-06-29 RX ORDER — ZIPRASIDONE HYDROCHLORIDE 60 MG/1
60 CAPSULE ORAL 2 TIMES DAILY WITH MEALS
Status: DISCONTINUED | OUTPATIENT
Start: 2023-06-30 | End: 2023-06-30 | Stop reason: HOSPADM

## 2023-06-29 RX ADMIN — ZIPRASIDONE HCL 40 MG: 40 CAPSULE ORAL at 08:14

## 2023-06-29 RX ADMIN — TRAZODONE HYDROCHLORIDE 50 MG: 50 TABLET ORAL at 21:07

## 2023-06-29 RX ADMIN — ACETAMINOPHEN 650 MG: 325 TABLET ORAL at 21:06

## 2023-06-29 RX ADMIN — ACETAMINOPHEN 650 MG: 325 TABLET ORAL at 16:51

## 2023-06-29 RX ADMIN — ZIPRASIDONE HCL 40 MG: 40 CAPSULE ORAL at 16:51

## 2023-06-29 RX ADMIN — NICOTINE POLACRILEX 2 MG: 2 LOZENGE ORAL at 21:05

## 2023-06-29 RX ADMIN — HYDROXYZINE HYDROCHLORIDE 50 MG: 50 TABLET, FILM COATED ORAL at 21:06

## 2023-06-29 ASSESSMENT — PAIN DESCRIPTION - DESCRIPTORS: DESCRIPTORS: ACHING

## 2023-06-29 ASSESSMENT — PAIN - FUNCTIONAL ASSESSMENT: PAIN_FUNCTIONAL_ASSESSMENT: ACTIVITIES ARE NOT PREVENTED

## 2023-06-29 ASSESSMENT — PAIN SCALES - GENERAL
PAINLEVEL_OUTOF10: 5
PAINLEVEL_OUTOF10: 0
PAINLEVEL_OUTOF10: 6

## 2023-06-29 ASSESSMENT — PAIN DESCRIPTION - LOCATION
LOCATION: GENERALIZED
LOCATION: GENERALIZED

## 2023-06-30 VITALS
HEART RATE: 85 BPM | WEIGHT: 180 LBS | HEIGHT: 68 IN | OXYGEN SATURATION: 95 % | DIASTOLIC BLOOD PRESSURE: 77 MMHG | TEMPERATURE: 98.6 F | RESPIRATION RATE: 14 BRPM | BODY MASS INDEX: 27.28 KG/M2 | SYSTOLIC BLOOD PRESSURE: 124 MMHG

## 2023-06-30 PROCEDURE — 99238 HOSP IP/OBS DSCHRG MGMT 30/<: CPT | Performed by: PSYCHIATRY & NEUROLOGY

## 2023-06-30 PROCEDURE — 6370000000 HC RX 637 (ALT 250 FOR IP): Performed by: NURSE PRACTITIONER

## 2023-06-30 PROCEDURE — 6370000000 HC RX 637 (ALT 250 FOR IP): Performed by: PSYCHIATRY & NEUROLOGY

## 2023-06-30 RX ORDER — ZIPRASIDONE HYDROCHLORIDE 60 MG/1
60 CAPSULE ORAL 2 TIMES DAILY WITH MEALS
Qty: 60 CAPSULE | Refills: 0 | Status: SHIPPED | OUTPATIENT
Start: 2023-06-30

## 2023-06-30 RX ADMIN — NICOTINE POLACRILEX 2 MG: 2 LOZENGE ORAL at 08:12

## 2023-06-30 RX ADMIN — ZIPRASIDONE HYDROCHLORIDE 60 MG: 60 CAPSULE ORAL at 08:12

## 2023-06-30 RX ADMIN — HYDROXYZINE HYDROCHLORIDE 50 MG: 50 TABLET, FILM COATED ORAL at 08:47

## 2023-06-30 RX ADMIN — NICOTINE POLACRILEX 2 MG: 2 LOZENGE ORAL at 10:08

## 2023-06-30 RX ADMIN — POLYETHYLENE GLYCOL 3350 17 G: 17 POWDER, FOR SOLUTION ORAL at 13:17

## 2023-06-30 ASSESSMENT — PAIN SCALES - GENERAL: PAINLEVEL_OUTOF10: 0

## 2023-07-05 ENCOUNTER — HOSPITAL ENCOUNTER (EMERGENCY)
Age: 43
Discharge: LEFT BEFORE DISCHARGE | End: 2023-07-05
Attending: EMERGENCY MEDICINE

## 2023-07-05 DIAGNOSIS — Z53.21 ELOPED FROM EMERGENCY DEPARTMENT: Primary | ICD-10-CM

## 2023-07-05 PROCEDURE — 4500000002 HC ER NO CHARGE

## 2023-07-05 NOTE — ED NOTES
Dr. Bach Six notified about patient wanting to leave and at bedside assessing patient.       Evie Kaur RN  07/05/23 7492

## 2023-07-05 NOTE — ED PROVIDER NOTES
Patient was in room 2. She did refuse vital signs and I was notified by nursing staff. Patient was in the room she does have tangential thoughts, but is dressed appropriately and this appears to be baseline. She is able to converse with me and is not completely decompensated at this time. Patient would like to leave and not be evaluated. Given that she is alert and talking in full sentences. Allowed to leave the ED without further assessment.      Sonjia Meckel, MD  Emergency Medicine Attending        Sonjia Meckel, MD  07/05/23 9772

## 2023-07-05 NOTE — ED NOTES
Patient refusing vitals, patient instructed importance of getting vitals done, patient stating \"im leaving\".       Yo Mccollum RN  07/05/23 4739

## 2023-07-31 ENCOUNTER — HOSPITAL ENCOUNTER (INPATIENT)
Age: 43
LOS: 10 days | Discharge: HOME | DRG: 885 | End: 2023-08-10
Attending: EMERGENCY MEDICINE | Admitting: PSYCHIATRY & NEUROLOGY
Payer: COMMERCIAL

## 2023-07-31 DIAGNOSIS — F29 PSYCHOSIS, UNSPECIFIED PSYCHOSIS TYPE (HCC): Primary | ICD-10-CM

## 2023-07-31 LAB
ALBUMIN SERPL-MCNC: 4.5 G/DL (ref 3.5–5.2)
ALP SERPL-CCNC: 108 U/L (ref 35–104)
ALT SERPL-CCNC: 16 U/L (ref 5–33)
ANION GAP SERPL CALCULATED.3IONS-SCNC: 10 MMOL/L (ref 9–17)
AST SERPL-CCNC: 20 U/L
BASOPHILS # BLD: 0.1 K/UL (ref 0–0.2)
BASOPHILS NFR BLD: 1 % (ref 0–2)
BILIRUB SERPL-MCNC: 0.3 MG/DL (ref 0.3–1.2)
BUN SERPL-MCNC: 19 MG/DL (ref 6–20)
CALCIUM SERPL-MCNC: 10 MG/DL (ref 8.6–10.4)
CHLORIDE SERPL-SCNC: 106 MMOL/L (ref 98–107)
CO2 SERPL-SCNC: 27 MMOL/L (ref 20–31)
CREAT SERPL-MCNC: 0.7 MG/DL (ref 0.5–0.9)
EOSINOPHIL # BLD: 0.5 K/UL (ref 0–0.4)
EOSINOPHILS RELATIVE PERCENT: 6 % (ref 0–4)
ERYTHROCYTE [DISTWIDTH] IN BLOOD BY AUTOMATED COUNT: 14.6 % (ref 11.5–14.9)
ETHANOL PERCENT: <0.01 %
ETHANOLAMINE SERPL-MCNC: <10 MG/DL
GFR SERPL CREATININE-BSD FRML MDRD: >60 ML/MIN/1.73M2
GLUCOSE SERPL-MCNC: 108 MG/DL (ref 70–99)
HCG SERPL QL: NEGATIVE
HCT VFR BLD AUTO: 44.9 % (ref 36–46)
HGB BLD-MCNC: 15.1 G/DL (ref 12–16)
LYMPHOCYTES NFR BLD: 2.2 K/UL (ref 1–4.8)
LYMPHOCYTES RELATIVE PERCENT: 30 % (ref 24–44)
MCH RBC QN AUTO: 27.5 PG (ref 26–34)
MCHC RBC AUTO-ENTMCNC: 33.6 G/DL (ref 31–37)
MCV RBC AUTO: 81.7 FL (ref 80–100)
MONOCYTES NFR BLD: 0.7 K/UL (ref 0.1–1.3)
MONOCYTES NFR BLD: 9 % (ref 1–7)
NEUTROPHILS NFR BLD: 54 % (ref 36–66)
NEUTS SEG NFR BLD: 4.1 K/UL (ref 1.3–9.1)
PLATELET # BLD AUTO: 275 K/UL (ref 150–450)
PMV BLD AUTO: 8.5 FL (ref 6–12)
POTASSIUM SERPL-SCNC: 4.2 MMOL/L (ref 3.7–5.3)
PROT SERPL-MCNC: 7.1 G/DL (ref 6.4–8.3)
RBC # BLD AUTO: 5.49 M/UL (ref 4–5.2)
SODIUM SERPL-SCNC: 143 MMOL/L (ref 135–144)
WBC OTHER # BLD: 7.6 K/UL (ref 3.5–11)

## 2023-07-31 PROCEDURE — 2040000000 HC PSYCH ICU R&B

## 2023-07-31 PROCEDURE — 99285 EMERGENCY DEPT VISIT HI MDM: CPT

## 2023-07-31 PROCEDURE — 80053 COMPREHEN METABOLIC PANEL: CPT

## 2023-07-31 PROCEDURE — 2580000003 HC RX 258

## 2023-07-31 PROCEDURE — 96372 THER/PROPH/DIAG INJ SC/IM: CPT

## 2023-07-31 PROCEDURE — 6360000002 HC RX W HCPCS

## 2023-07-31 PROCEDURE — G0480 DRUG TEST DEF 1-7 CLASSES: HCPCS

## 2023-07-31 PROCEDURE — 6360000002 HC RX W HCPCS: Performed by: EMERGENCY MEDICINE

## 2023-07-31 PROCEDURE — 36415 COLL VENOUS BLD VENIPUNCTURE: CPT

## 2023-07-31 PROCEDURE — 84703 CHORIONIC GONADOTROPIN ASSAY: CPT

## 2023-07-31 PROCEDURE — 85025 COMPLETE CBC W/AUTO DIFF WBC: CPT

## 2023-07-31 RX ORDER — POLYETHYLENE GLYCOL 3350 17 G
2 POWDER IN PACKET (EA) ORAL
Status: DISCONTINUED | OUTPATIENT
Start: 2023-07-31 | End: 2023-08-10 | Stop reason: HOSPADM

## 2023-07-31 RX ORDER — ACETAMINOPHEN 325 MG/1
650 TABLET ORAL EVERY 4 HOURS PRN
Status: DISCONTINUED | OUTPATIENT
Start: 2023-07-31 | End: 2023-08-10 | Stop reason: HOSPADM

## 2023-07-31 RX ORDER — WATER 1000 ML/1000ML
INJECTION, SOLUTION INTRAVENOUS
Status: COMPLETED
Start: 2023-07-31 | End: 2023-07-31

## 2023-07-31 RX ORDER — DIPHENHYDRAMINE HYDROCHLORIDE 50 MG/ML
50 INJECTION INTRAMUSCULAR; INTRAVENOUS ONCE
Status: COMPLETED | OUTPATIENT
Start: 2023-07-31 | End: 2023-07-31

## 2023-07-31 RX ORDER — MIDAZOLAM HYDROCHLORIDE 1 MG/ML
2 INJECTION INTRAMUSCULAR; INTRAVENOUS ONCE
Status: COMPLETED | OUTPATIENT
Start: 2023-07-31 | End: 2023-07-31

## 2023-07-31 RX ORDER — CLONIDINE HYDROCHLORIDE 0.1 MG/1
0.2 TABLET ORAL NIGHTLY
COMMUNITY
End: 2023-07-31 | Stop reason: ALTCHOICE

## 2023-07-31 RX ORDER — LORAZEPAM 1 MG/1
2 TABLET ORAL EVERY 4 HOURS PRN
Status: DISCONTINUED | OUTPATIENT
Start: 2023-07-31 | End: 2023-08-10 | Stop reason: HOSPADM

## 2023-07-31 RX ORDER — MAGNESIUM HYDROXIDE/ALUMINUM HYDROXICE/SIMETHICONE 120; 1200; 1200 MG/30ML; MG/30ML; MG/30ML
30 SUSPENSION ORAL EVERY 6 HOURS PRN
Status: DISCONTINUED | OUTPATIENT
Start: 2023-07-31 | End: 2023-08-10 | Stop reason: HOSPADM

## 2023-07-31 RX ORDER — POLYETHYLENE GLYCOL 3350 17 G/17G
17 POWDER, FOR SOLUTION ORAL DAILY PRN
Status: DISCONTINUED | OUTPATIENT
Start: 2023-07-31 | End: 2023-08-10 | Stop reason: HOSPADM

## 2023-07-31 RX ORDER — HALOPERIDOL 5 MG/1
5 TABLET ORAL EVERY 4 HOURS PRN
Status: DISCONTINUED | OUTPATIENT
Start: 2023-07-31 | End: 2023-08-10 | Stop reason: HOSPADM

## 2023-07-31 RX ORDER — LORAZEPAM 2 MG/ML
2 INJECTION INTRAMUSCULAR EVERY 4 HOURS PRN
Status: DISCONTINUED | OUTPATIENT
Start: 2023-07-31 | End: 2023-08-10 | Stop reason: HOSPADM

## 2023-07-31 RX ORDER — HALOPERIDOL 5 MG/ML
5 INJECTION INTRAMUSCULAR ONCE
Status: COMPLETED | OUTPATIENT
Start: 2023-07-31 | End: 2023-07-31

## 2023-07-31 RX ORDER — IBUPROFEN 800 MG/1
800 TABLET ORAL EVERY 12 HOURS PRN
Status: ON HOLD | COMMUNITY
End: 2023-08-10 | Stop reason: HOSPADM

## 2023-07-31 RX ORDER — HALOPERIDOL 5 MG/ML
5 INJECTION INTRAMUSCULAR EVERY 4 HOURS PRN
Status: DISCONTINUED | OUTPATIENT
Start: 2023-07-31 | End: 2023-08-10 | Stop reason: HOSPADM

## 2023-07-31 RX ORDER — MIRTAZAPINE 15 MG/1
15 TABLET, FILM COATED ORAL NIGHTLY
COMMUNITY
End: 2023-07-31 | Stop reason: ALTCHOICE

## 2023-07-31 RX ORDER — HYDROXYZINE HYDROCHLORIDE 25 MG/1
25 TABLET, FILM COATED ORAL EVERY 8 HOURS PRN
COMMUNITY
End: 2023-07-31 | Stop reason: ALTCHOICE

## 2023-07-31 RX ORDER — DIPHENHYDRAMINE HYDROCHLORIDE 50 MG/ML
50 INJECTION INTRAMUSCULAR; INTRAVENOUS EVERY 4 HOURS PRN
Status: DISCONTINUED | OUTPATIENT
Start: 2023-07-31 | End: 2023-08-10 | Stop reason: HOSPADM

## 2023-07-31 RX ORDER — IBUPROFEN 400 MG/1
400 TABLET ORAL EVERY 6 HOURS PRN
Status: DISCONTINUED | OUTPATIENT
Start: 2023-07-31 | End: 2023-08-10 | Stop reason: HOSPADM

## 2023-07-31 RX ORDER — TRAZODONE HYDROCHLORIDE 50 MG/1
50 TABLET ORAL NIGHTLY PRN
Status: DISCONTINUED | OUTPATIENT
Start: 2023-07-31 | End: 2023-08-10 | Stop reason: HOSPADM

## 2023-07-31 RX ORDER — ZIPRASIDONE MESYLATE 20 MG/ML
INJECTION, POWDER, LYOPHILIZED, FOR SOLUTION INTRAMUSCULAR
Status: COMPLETED
Start: 2023-07-31 | End: 2023-07-31

## 2023-07-31 RX ORDER — HYDROXYZINE 50 MG/1
50 TABLET, FILM COATED ORAL 3 TIMES DAILY PRN
Status: DISCONTINUED | OUTPATIENT
Start: 2023-07-31 | End: 2023-08-10 | Stop reason: HOSPADM

## 2023-07-31 RX ADMIN — HALOPERIDOL LACTATE 5 MG: 5 INJECTION, SOLUTION INTRAMUSCULAR at 14:11

## 2023-07-31 RX ADMIN — ZIPRASIDONE MESYLATE 20 MG: 20 INJECTION, POWDER, LYOPHILIZED, FOR SOLUTION INTRAMUSCULAR at 14:32

## 2023-07-31 RX ADMIN — DIPHENHYDRAMINE HYDROCHLORIDE 50 MG: 50 INJECTION INTRAMUSCULAR; INTRAVENOUS at 14:32

## 2023-07-31 RX ADMIN — WATER 10 ML: 1 INJECTION INTRAMUSCULAR; INTRAVENOUS; SUBCUTANEOUS at 14:33

## 2023-07-31 RX ADMIN — MIDAZOLAM 2 MG: 1 INJECTION INTRAMUSCULAR; INTRAVENOUS at 14:11

## 2023-07-31 RX ADMIN — MIDAZOLAM 2 MG: 1 INJECTION INTRAMUSCULAR; INTRAVENOUS at 14:31

## 2023-07-31 ASSESSMENT — SLEEP AND FATIGUE QUESTIONNAIRES
AVERAGE NUMBER OF SLEEP HOURS: 6
DO YOU HAVE DIFFICULTY SLEEPING: NO
DO YOU USE A SLEEP AID: NO

## 2023-07-31 NOTE — ED TRIAGE NOTES
Mode of arrival (squad #, walk in, police, etc) : walk-in        Chief complaint(s): mental health problem        Arrival Note (brief scenario, treatment PTA, etc). : Pt is being pink slipped by the [facility] halfway for manic behavior x1 day. Pt was arrested yesterday for stealing a drink. C= \"Have you ever felt that you should Cut down on your drinking? \"  No  A= \"Have people Annoyed you by criticizing your drinking? \"  No  G= \"Have you ever felt bad or Guilty about your drinking? \"  No  E= \"Have you ever had a drink as an Eye-opener first thing in the morning to steady your nerves or to help a hangover? \"  No      Deferred []      Reason for deferring: N/A    *If yes to two or more: probable alcohol abuse. *
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
## 2023-07-31 NOTE — ED NOTES
This writer consulted with Dr Nai Castellanos who recommended inpatient hospitalization for safety and stabilization. Patient placed on application for emergency admission to [facility].

## 2023-07-31 NOTE — ED NOTES
Pt throwing food trays and trash cans. Pt spitting on ground and walls. Pt punching sykes.       Aquilino Davenport RN  07/31/23 9347

## 2023-08-01 PROCEDURE — 6360000002 HC RX W HCPCS: Performed by: PSYCHIATRY & NEUROLOGY

## 2023-08-01 PROCEDURE — APPSS60 APP SPLIT SHARED TIME 46-60 MINUTES: Performed by: PSYCHIATRY & NEUROLOGY

## 2023-08-01 PROCEDURE — 99223 1ST HOSP IP/OBS HIGH 75: CPT | Performed by: PSYCHIATRY & NEUROLOGY

## 2023-08-01 PROCEDURE — 99222 1ST HOSP IP/OBS MODERATE 55: CPT | Performed by: INTERNAL MEDICINE

## 2023-08-01 PROCEDURE — 6370000000 HC RX 637 (ALT 250 FOR IP): Performed by: PSYCHIATRY & NEUROLOGY

## 2023-08-01 PROCEDURE — 2040000000 HC PSYCH ICU R&B

## 2023-08-01 RX ORDER — ARIPIPRAZOLE 15 MG/1
15 TABLET ORAL DAILY
Status: DISCONTINUED | OUTPATIENT
Start: 2023-08-01 | End: 2023-08-02

## 2023-08-01 RX ADMIN — IBUPROFEN 400 MG: 400 TABLET, FILM COATED ORAL at 04:47

## 2023-08-01 RX ADMIN — HALOPERIDOL LACTATE 5 MG: 5 INJECTION, SOLUTION INTRAMUSCULAR at 08:11

## 2023-08-01 RX ADMIN — LORAZEPAM 2 MG: 2 INJECTION INTRAMUSCULAR; INTRAVENOUS at 08:11

## 2023-08-01 RX ADMIN — DIPHENHYDRAMINE HYDROCHLORIDE 50 MG: 50 INJECTION INTRAMUSCULAR; INTRAVENOUS at 08:11

## 2023-08-01 ASSESSMENT — PAIN DESCRIPTION - LOCATION: LOCATION: GENERALIZED

## 2023-08-01 ASSESSMENT — PAIN SCALES - GENERAL: PAINLEVEL_OUTOF10: 5

## 2023-08-01 NOTE — GROUP NOTE
Group Therapy Note    Date: 8/1/2023    Group Start Time: 0900  Group End Time: 0915  Group Topic: Community Meeting    ANTON Liu X [address] [facility] [address] Meeting Group Note        Date: 8/1/2023 Start Time: 9am  End Time: 0915      Number of Participants in Group & Unit Census:  1/6        Goal of Group: To discuss daily goals      Comments:     Patient did not participate in [employer] group, despite staff encouragement and explanation of benefits. Patient remain seclusive to self. Q15 minute safety checks maintained for patient safety and will continue to encourage patient to attend unit programming.

## 2023-08-01 NOTE — GROUP NOTE
Psych-Ed/Relapse Prevention Group Note        Date: August 1, 2023 Start Time: 1:30pm  End Time:  2:00pm      Number of Participants in Group & Unit Census:  2/6    Topic: Creative Expression and Coping    Goal of Group:Patient will identify benefits of creative expression for coping and stress management. Comments:     Patient did not participate in Psych-Ed/Relapse Prevention group, despite staff encouragement and explanation of benefits. Patient remain seclusive to self. Q15 minute safety checks maintained for patient safety and will continue to encourage patient to attend unit programming.          Signature:  Nina Carney

## 2023-08-01 NOTE — CARE COORDINATION
[facility] Biopsychosocial Assessment    Current Level of Psychosocial Functioning     Independent   Dependent  XX   Minimal Assist       Psychosocial High Risk Factors (check all that apply)    Unable to obtain meds   Chronic illness/pain    Substance abuse  XX   Lack of Family Support   Financial stress   Isolation   Inadequate Community Resources  Suicide attempt(s)  Not taking medications  XX   Victim of crime   Developmental Delay  Unable to manage personal needs   XX   Age 72 or older   Homeless  No transportation   Readmission within 30 days  Unemployment  Traumatic Event      Psychiatric Advanced Directives: denies     Family to [employer] in Treatment: denies     Sexual Orientation:  NA    Patient Strengths: Patient has insurance and income;     Patient Barriers: poor coping skills; non compliance with medications       Opiate Education Provided:  NA      CMHC/mental health history: Patient has multiple previous psychiatric admissions; [facility]     Plan of Care   medication management, group/individual therapies, family meetings, psycho -education, treatment team meetings to assist with stabilization    Initial Discharge Plan:  stabilize mood and symptoms with medication management; Patient will follow up with [facility]       Clinical Summary:  patient is a 37 y.o female admitted to the [address] for acute psychosis. Patient was brought to the emergency room after exhibiting bizarre behaviors at the [facility] half-way after being arrested. Patient was playing in toilet water, making threats, and throwing food. Patient has several previous psychiatric admissions. Patient is largely non compliant with mental health follow up and medication compliance. Patient was last known to be living with a roommate in a rented home. Patient has a payee through [address] [place] [id number] [employer]. Patient has a hx of cocaine and marijuana use, no UDS completed upon admission.       will continue to engage patient in

## 2023-08-02 PROCEDURE — 6370000000 HC RX 637 (ALT 250 FOR IP): Performed by: PSYCHIATRY & NEUROLOGY

## 2023-08-02 PROCEDURE — 2040000000 HC PSYCH ICU R&B

## 2023-08-02 PROCEDURE — APPSS30 APP SPLIT SHARED TIME 16-30 MINUTES: Performed by: PSYCHIATRY & NEUROLOGY

## 2023-08-02 PROCEDURE — 99232 SBSQ HOSP IP/OBS MODERATE 35: CPT | Performed by: PSYCHIATRY & NEUROLOGY

## 2023-08-02 RX ADMIN — ARIPIPRAZOLE 15 MG: 15 TABLET ORAL at 13:14

## 2023-08-02 RX ADMIN — HYDROXYZINE HYDROCHLORIDE 50 MG: 50 TABLET, FILM COATED ORAL at 00:22

## 2023-08-02 RX ADMIN — ACETAMINOPHEN 650 MG: 325 TABLET ORAL at 23:56

## 2023-08-02 RX ADMIN — IBUPROFEN 400 MG: 400 TABLET, FILM COATED ORAL at 10:44

## 2023-08-02 RX ADMIN — HYDROXYZINE HYDROCHLORIDE 50 MG: 50 TABLET, FILM COATED ORAL at 09:53

## 2023-08-02 RX ADMIN — HYDROXYZINE HYDROCHLORIDE 50 MG: 50 TABLET, FILM COATED ORAL at 19:58

## 2023-08-02 RX ADMIN — IBUPROFEN 400 MG: 400 TABLET, FILM COATED ORAL at 19:58

## 2023-08-02 RX ADMIN — TRAZODONE HYDROCHLORIDE 50 MG: 50 TABLET ORAL at 00:22

## 2023-08-02 RX ADMIN — TRAZODONE HYDROCHLORIDE 50 MG: 50 TABLET ORAL at 19:58

## 2023-08-02 RX ADMIN — CARIPRAZINE 3 MG: 3 CAPSULE, GELATIN COATED ORAL at 19:58

## 2023-08-02 ASSESSMENT — PAIN DESCRIPTION - LOCATION
LOCATION: BACK

## 2023-08-02 ASSESSMENT — PAIN DESCRIPTION - DESCRIPTORS
DESCRIPTORS: ACHING

## 2023-08-02 ASSESSMENT — PAIN SCALES - GENERAL
PAINLEVEL_OUTOF10: 3
PAINLEVEL_OUTOF10: 7
PAINLEVEL_OUTOF10: 7

## 2023-08-02 NOTE — GROUP NOTE
Psych-Ed/Relapse Prevention Group Note        Date: August 2, 2023 Start Time: 11am  End Time: 11:30am      Number of Participants in Group & Unit Census:  2/7    Topic: Leisure skills    Goal of Group:Patient will identify benefits of leisure for coping and stress management      Comments:     Patient did not participate in Psych-Ed/Relapse Prevention group, despite staff encouragement and explanation of benefits. Patient remain seclusive to self. Q15 minute safety checks maintained for patient safety and will continue to encourage patient to attend unit programming.          Signature:  Kareem Hollingsworth

## 2023-08-02 NOTE — GROUP NOTE
Group Therapy Note    Date: 8/2/2023    Group Start Time: 1430  Group End Time: 8112  Group Topic: Activity    [facility]    JUANCARLOS Zeng        Group Therapy Note    Attendees: 2/6     Topic: To increase social interaction, to practice decision making, concentration, and communication skills      Patient did not participate in Cognitive Skills Group at 14:30, despite staff encouragement and explanation of benefits. Pt is in room and seclusive to self during group time. Q15 minute safety checks maintained for patient safety and will continue to encourage patient to attend unit programming.         Discipline Responsible: Psychoeducational Specialist      Signature:  Marilyn Baltazar

## 2023-08-03 PROCEDURE — 2040000000 HC PSYCH ICU R&B

## 2023-08-03 PROCEDURE — 6370000000 HC RX 637 (ALT 250 FOR IP): Performed by: PSYCHIATRY & NEUROLOGY

## 2023-08-03 PROCEDURE — 99232 SBSQ HOSP IP/OBS MODERATE 35: CPT | Performed by: PSYCHIATRY & NEUROLOGY

## 2023-08-03 PROCEDURE — 99231 SBSQ HOSP IP/OBS SF/LOW 25: CPT | Performed by: INTERNAL MEDICINE

## 2023-08-03 RX ADMIN — CARIPRAZINE 3 MG: 3 CAPSULE, GELATIN COATED ORAL at 08:27

## 2023-08-03 RX ADMIN — LORAZEPAM 2 MG: 1 TABLET ORAL at 10:34

## 2023-08-03 RX ADMIN — IBUPROFEN 400 MG: 400 TABLET, FILM COATED ORAL at 10:28

## 2023-08-03 RX ADMIN — IBUPROFEN 400 MG: 400 TABLET, FILM COATED ORAL at 03:29

## 2023-08-03 RX ADMIN — ACETAMINOPHEN 650 MG: 325 TABLET ORAL at 08:02

## 2023-08-03 RX ADMIN — HALOPERIDOL 5 MG: 5 TABLET ORAL at 10:34

## 2023-08-03 RX ADMIN — HYDROXYZINE HYDROCHLORIDE 50 MG: 50 TABLET, FILM COATED ORAL at 10:22

## 2023-08-03 ASSESSMENT — PAIN DESCRIPTION - LOCATION
LOCATION: BACK

## 2023-08-03 ASSESSMENT — PAIN SCALES - GENERAL
PAINLEVEL_OUTOF10: 3
PAINLEVEL_OUTOF10: 7
PAINLEVEL_OUTOF10: 6
PAINLEVEL_OUTOF10: 6

## 2023-08-03 ASSESSMENT — PAIN DESCRIPTION - DESCRIPTORS: DESCRIPTORS: ACHING

## 2023-08-03 ASSESSMENT — PAIN SCALES - WONG BAKER: WONGBAKER_NUMERICALRESPONSE: 2

## 2023-08-03 NOTE — GROUP NOTE
Group Therapy Note    Date: 8/3/2023    Group Start Time: 1330  Group End Time: 3921  Group Topic: Recreational    [facility]    Nata Angel    Recreational Group Note        Date: 8/3/2023 Start Time: 1330  End Time: 3208      Number of Participants in Group & Unit Census:  1/4    Topic: Patinets engaged in a variety of activities including art, music, and games. Goal of Group:Patient goals to increase rapport with staff; Increase self-expression; Demonstrate positive use of time;       Comments:     Patient did not participate in Recreational group, despite staff encouragement and explanation of benefits. Patient remain seclusive to self. Q15 minute safety checks maintained for patient safety and will continue to encourage patient to attend unit programming.

## 2023-08-03 NOTE — GROUP NOTE
Group Therapy Note    Date: 8/3/2023    Group Start Time: 0900  Group End Time: 0915  Group Topic: Group Documentation    [facility]    Gregoria Mcmullen LPN        Community Meeting Group Note        Date: August 3, 2023 Start Time: 9am  End Time: 10am      Number of Participants in Group & Unit Census:  0/6    Topic: Daily Goals Group    Goal of Group:Short term Goals      Comments:     Patient did not participate in [employer] group, despite staff encouragement and explanation of benefits. Patient remain seclusive to self. Q15 minute safety checks maintained for patient safety and will continue to encourage patient to attend unit programming.           Signature:  Gregoria Mcmullen LPN

## 2023-08-04 PROCEDURE — 99232 SBSQ HOSP IP/OBS MODERATE 35: CPT | Performed by: PSYCHIATRY & NEUROLOGY

## 2023-08-04 PROCEDURE — 6370000000 HC RX 637 (ALT 250 FOR IP): Performed by: PSYCHIATRY & NEUROLOGY

## 2023-08-04 PROCEDURE — 2040000000 HC PSYCH ICU R&B

## 2023-08-04 RX ADMIN — NICOTINE POLACRILEX 2 MG: 2 LOZENGE ORAL at 10:08

## 2023-08-04 RX ADMIN — CARIPRAZINE 3 MG: 3 CAPSULE, GELATIN COATED ORAL at 09:30

## 2023-08-04 RX ADMIN — HALOPERIDOL 5 MG: 5 TABLET ORAL at 13:14

## 2023-08-04 RX ADMIN — IBUPROFEN 400 MG: 400 TABLET, FILM COATED ORAL at 06:10

## 2023-08-04 RX ADMIN — NICOTINE POLACRILEX 2 MG: 2 LOZENGE ORAL at 12:31

## 2023-08-04 RX ADMIN — LORAZEPAM 2 MG: 1 TABLET ORAL at 13:14

## 2023-08-04 RX ADMIN — HYDROXYZINE HYDROCHLORIDE 50 MG: 50 TABLET, FILM COATED ORAL at 10:42

## 2023-08-04 ASSESSMENT — PAIN DESCRIPTION - LOCATION: LOCATION: BACK

## 2023-08-04 ASSESSMENT — PAIN SCALES - GENERAL: PAINLEVEL_OUTOF10: 5

## 2023-08-04 NOTE — GROUP NOTE
Group Therapy Note    Date: 8/4/2023    Group Start Time: 7177  Group End Time: 1115  Group Topic: Recreational    [facility]    Leona Andres        Group Therapy Note    Attendees: 3/5       Patient's Goal:  Patients shared preferred music while engaging in games. Goals to demonstrate positive use of time; Increase sense of community; Increase socialization; Increase self-expression; Improved rapport with staff. Group had to be terminated due to multiple peers yelling in day room, and med assist occurring, directing patients back to rooms. Notes:  Patient attended and participated in group. Was frequently grabbing her head in second half of group, expressing she was struggling with how loud the millieu was. Expressed to patient to try and moving closer to the speaker and it seemed to help, as she was singing along and appeared less anxious.      Status After Intervention:  Improved    Participation Level: Interactive    Participation Quality: Appropriate, Attentive, and Sharing      Speech:  normal      Thought Process/Content: Flight of ideas      Affective Functioning: Constricted/Restricted      Mood: euthymic      Level of consciousness:  Alert and Attentive      Response to Learning: Able to verbalize current knowledge/experience and Progressing to goal      Endings: None Reported    Modes of Intervention: Support, Socialization, Activity, Media, and Reality-testing      Discipline Responsible: Psychoeducational Specialist      Signature:  Leona Andres

## 2023-08-05 PROCEDURE — 6360000002 HC RX W HCPCS: Performed by: PSYCHIATRY & NEUROLOGY

## 2023-08-05 PROCEDURE — 2040000000 HC PSYCH ICU R&B

## 2023-08-05 PROCEDURE — 6370000000 HC RX 637 (ALT 250 FOR IP): Performed by: PSYCHIATRY & NEUROLOGY

## 2023-08-05 RX ADMIN — DIPHENHYDRAMINE HYDROCHLORIDE 50 MG: 50 INJECTION INTRAMUSCULAR; INTRAVENOUS at 16:29

## 2023-08-05 RX ADMIN — ACETAMINOPHEN 650 MG: 325 TABLET ORAL at 08:00

## 2023-08-05 RX ADMIN — DIPHENHYDRAMINE HYDROCHLORIDE 50 MG: 50 INJECTION INTRAMUSCULAR; INTRAVENOUS at 09:10

## 2023-08-05 RX ADMIN — NICOTINE POLACRILEX 2 MG: 2 LOZENGE ORAL at 08:00

## 2023-08-05 RX ADMIN — CARIPRAZINE 4.5 MG: 3 CAPSULE, GELATIN COATED ORAL at 08:00

## 2023-08-05 RX ADMIN — HALOPERIDOL LACTATE 5 MG: 5 INJECTION, SOLUTION INTRAMUSCULAR at 16:29

## 2023-08-05 RX ADMIN — HALOPERIDOL LACTATE 5 MG: 5 INJECTION, SOLUTION INTRAMUSCULAR at 09:10

## 2023-08-05 RX ADMIN — LORAZEPAM 2 MG: 2 INJECTION INTRAMUSCULAR; INTRAVENOUS at 16:29

## 2023-08-05 RX ADMIN — LORAZEPAM 2 MG: 2 INJECTION INTRAMUSCULAR; INTRAVENOUS at 09:10

## 2023-08-06 PROCEDURE — 6360000002 HC RX W HCPCS: Performed by: PSYCHIATRY & NEUROLOGY

## 2023-08-06 PROCEDURE — 2040000000 HC PSYCH ICU R&B

## 2023-08-06 PROCEDURE — 6370000000 HC RX 637 (ALT 250 FOR IP): Performed by: PSYCHIATRY & NEUROLOGY

## 2023-08-06 PROCEDURE — 6370000000 HC RX 637 (ALT 250 FOR IP)

## 2023-08-06 RX ORDER — LITHIUM CARBONATE 300 MG/1
300 TABLET, FILM COATED, EXTENDED RELEASE ORAL DAILY
Status: DISCONTINUED | OUTPATIENT
Start: 2023-08-06 | End: 2023-08-06

## 2023-08-06 RX ORDER — LITHIUM CARBONATE 300 MG/1
300 TABLET, FILM COATED, EXTENDED RELEASE ORAL EVERY 12 HOURS SCHEDULED
Status: DISCONTINUED | OUTPATIENT
Start: 2023-08-06 | End: 2023-08-10 | Stop reason: HOSPADM

## 2023-08-06 RX ADMIN — HYDROXYZINE HYDROCHLORIDE 50 MG: 50 TABLET, FILM COATED ORAL at 05:25

## 2023-08-06 RX ADMIN — NICOTINE POLACRILEX 2 MG: 2 LOZENGE ORAL at 14:24

## 2023-08-06 RX ADMIN — HYDROXYZINE HYDROCHLORIDE 50 MG: 50 TABLET, FILM COATED ORAL at 19:12

## 2023-08-06 RX ADMIN — LORAZEPAM 2 MG: 2 INJECTION INTRAMUSCULAR; INTRAVENOUS at 10:51

## 2023-08-06 RX ADMIN — ACETAMINOPHEN 650 MG: 325 TABLET ORAL at 08:42

## 2023-08-06 RX ADMIN — DIPHENHYDRAMINE HYDROCHLORIDE 50 MG: 50 INJECTION INTRAMUSCULAR; INTRAVENOUS at 10:51

## 2023-08-06 RX ADMIN — TRAZODONE HYDROCHLORIDE 50 MG: 50 TABLET ORAL at 19:12

## 2023-08-06 RX ADMIN — CARIPRAZINE 4.5 MG: 3 CAPSULE, GELATIN COATED ORAL at 08:42

## 2023-08-06 RX ADMIN — IBUPROFEN 400 MG: 400 TABLET, FILM COATED ORAL at 05:25

## 2023-08-06 RX ADMIN — LITHIUM CARBONATE 300 MG: 300 TABLET, FILM COATED, EXTENDED RELEASE ORAL at 15:06

## 2023-08-06 RX ADMIN — HALOPERIDOL LACTATE 5 MG: 5 INJECTION, SOLUTION INTRAMUSCULAR at 10:50

## 2023-08-06 ASSESSMENT — PAIN SCALES - GENERAL
PAINLEVEL_OUTOF10: 5
PAINLEVEL_OUTOF10: 0

## 2023-08-06 ASSESSMENT — PAIN DESCRIPTION - LOCATION: LOCATION: BACK

## 2023-08-07 PROCEDURE — 6370000000 HC RX 637 (ALT 250 FOR IP)

## 2023-08-07 PROCEDURE — 6370000000 HC RX 637 (ALT 250 FOR IP): Performed by: PSYCHIATRY & NEUROLOGY

## 2023-08-07 PROCEDURE — 2040000000 HC PSYCH ICU R&B

## 2023-08-07 PROCEDURE — 99232 SBSQ HOSP IP/OBS MODERATE 35: CPT | Performed by: PSYCHIATRY & NEUROLOGY

## 2023-08-07 RX ORDER — ZIPRASIDONE HYDROCHLORIDE 40 MG/1
40 CAPSULE ORAL 2 TIMES DAILY WITH MEALS
Status: DISCONTINUED | OUTPATIENT
Start: 2023-08-07 | End: 2023-08-10 | Stop reason: HOSPADM

## 2023-08-07 RX ADMIN — ZIPRASIDONE HCL 40 MG: 40 CAPSULE ORAL at 17:02

## 2023-08-07 RX ADMIN — IBUPROFEN 400 MG: 400 TABLET, FILM COATED ORAL at 02:17

## 2023-08-07 RX ADMIN — HYDROXYZINE HYDROCHLORIDE 50 MG: 50 TABLET, FILM COATED ORAL at 02:17

## 2023-08-07 RX ADMIN — LITHIUM CARBONATE 300 MG: 300 TABLET, FILM COATED, EXTENDED RELEASE ORAL at 19:20

## 2023-08-07 RX ADMIN — CARIPRAZINE 4.5 MG: 3 CAPSULE, GELATIN COATED ORAL at 08:08

## 2023-08-07 RX ADMIN — HYDROXYZINE HYDROCHLORIDE 50 MG: 50 TABLET, FILM COATED ORAL at 19:20

## 2023-08-07 RX ADMIN — IBUPROFEN 400 MG: 400 TABLET, FILM COATED ORAL at 08:08

## 2023-08-07 RX ADMIN — NICOTINE POLACRILEX 2 MG: 2 LOZENGE ORAL at 11:53

## 2023-08-07 RX ADMIN — TRAZODONE HYDROCHLORIDE 50 MG: 50 TABLET ORAL at 20:23

## 2023-08-07 RX ADMIN — LITHIUM CARBONATE 300 MG: 300 TABLET, FILM COATED, EXTENDED RELEASE ORAL at 08:08

## 2023-08-07 RX ADMIN — IBUPROFEN 400 MG: 400 TABLET, FILM COATED ORAL at 19:20

## 2023-08-07 ASSESSMENT — PAIN SCALES - GENERAL
PAINLEVEL_OUTOF10: 0
PAINLEVEL_OUTOF10: 5
PAINLEVEL_OUTOF10: 5
PAINLEVEL_OUTOF10: 6

## 2023-08-07 ASSESSMENT — PAIN DESCRIPTION - LOCATION
LOCATION: BACK
LOCATION: BACK

## 2023-08-07 ASSESSMENT — PAIN SCALES - WONG BAKER: WONGBAKER_NUMERICALRESPONSE: 0

## 2023-08-07 NOTE — GROUP NOTE
Group Therapy Note    Date: 8/7/2023    Group Start Time: 1172  Group End Time: 3148  Group Topic: Music Therapy    [facility]    Lamar Mtz        Group Therapy Note    Attendees: 4/7     Patient's Goal:  Patients shared music and dedicated song to important people in their lives, answering questions about these relationships as asked by this writer. Patients were also offered art supplies to have tactile stimulation and help focus while listening to music. Patient goals to reflect on relationships; Increase sense of community; Increase socialization; Normalization of the environment;    Notes:  Patient attended and participated in group, sharing music, engaging pleasantly in conversations, and engaging in art. Shared a song and dedicated it to her children, talking about what she would do with them if she had them all together for a day, because her children are all spread out and not in her custody. Status After Intervention:  Improved    Participation Level:  Active Listener and Interactive    Participation Quality: Appropriate, Attentive, Sharing, and Supportive      Speech:  normal      Thought Process/Content: Logical  Linear      Affective Functioning: Congruent      Mood: euthymic      Level of consciousness:  Alert and Attentive      Response to Learning: Able to verbalize current knowledge/experience and Progressing to goal      Endings: None Reported    Modes of Intervention: Support, Socialization, Exploration, Activity, Media, and Reality-testing      Discipline Responsible: Psychoeducational Specialist      Signature:  Lamar Mtz

## 2023-08-07 NOTE — GROUP NOTE
Group Therapy Note    Date: 8/7/2023    Group Start Time: 0900  Group End Time: 0920  Group Topic: Community Meeting    [address]    Ramu Carpenter        Group Therapy Note    Attendees: 5/7       Patient's Goal:  Talk to the doctor and find out what the plan is    Notes:  Goal setting    Status After Intervention:  Improved    Participation Level: Minimal    Participation Quality: Appropriate      Speech:  normal      Thought Process/Content: Logical      Affective Functioning: Flat      Mood: anxious      Level of consciousness:  Alert      Response to Learning: Able to verbalize current knowledge/experience      Endings: None Reported    Modes of Intervention: Education, Support, Socialization, Exploration, and Problem-solving      Discipline Responsible: [address]      Signature:  Ramu Carpenter

## 2023-08-08 PROCEDURE — 6370000000 HC RX 637 (ALT 250 FOR IP): Performed by: PSYCHIATRY & NEUROLOGY

## 2023-08-08 PROCEDURE — 99232 SBSQ HOSP IP/OBS MODERATE 35: CPT | Performed by: PSYCHIATRY & NEUROLOGY

## 2023-08-08 PROCEDURE — 2040000000 HC PSYCH ICU R&B

## 2023-08-08 PROCEDURE — 6370000000 HC RX 637 (ALT 250 FOR IP)

## 2023-08-08 RX ADMIN — LITHIUM CARBONATE 300 MG: 300 TABLET, FILM COATED, EXTENDED RELEASE ORAL at 07:52

## 2023-08-08 RX ADMIN — HYDROXYZINE HYDROCHLORIDE 50 MG: 50 TABLET, FILM COATED ORAL at 16:43

## 2023-08-08 RX ADMIN — ZIPRASIDONE HCL 40 MG: 40 CAPSULE ORAL at 07:52

## 2023-08-08 RX ADMIN — HYDROXYZINE HYDROCHLORIDE 50 MG: 50 TABLET, FILM COATED ORAL at 23:23

## 2023-08-08 RX ADMIN — HYDROXYZINE HYDROCHLORIDE 50 MG: 50 TABLET, FILM COATED ORAL at 11:57

## 2023-08-08 RX ADMIN — TRAZODONE HYDROCHLORIDE 50 MG: 50 TABLET ORAL at 23:23

## 2023-08-08 RX ADMIN — IBUPROFEN 400 MG: 400 TABLET, FILM COATED ORAL at 07:52

## 2023-08-08 RX ADMIN — ZIPRASIDONE HCL 40 MG: 40 CAPSULE ORAL at 16:43

## 2023-08-08 RX ADMIN — LITHIUM CARBONATE 300 MG: 300 TABLET, FILM COATED, EXTENDED RELEASE ORAL at 23:23

## 2023-08-08 RX ADMIN — NICOTINE POLACRILEX 2 MG: 2 LOZENGE ORAL at 13:17

## 2023-08-08 ASSESSMENT — PAIN SCALES - WONG BAKER
WONGBAKER_NUMERICALRESPONSE: 0
WONGBAKER_NUMERICALRESPONSE: 0

## 2023-08-08 ASSESSMENT — PAIN SCALES - GENERAL
PAINLEVEL_OUTOF10: 0
PAINLEVEL_OUTOF10: 0

## 2023-08-08 NOTE — GROUP NOTE
Group Therapy Note    Date: 8/8/2023    Group Start Time: 0900  Group End Time: 0920  Group Topic: Community Meeting    [facility]        Group Therapy Note    Attendees: 4/7       Patient's Goal:  \"Stay calm\"    Notes:  Goal setting    Status After Intervention:  Improved    Participation Level: Interactive    Participation Quality: Appropriate, Attentive, Sharing, and Supportive      Speech:  normal      Thought Process/Content: Logical      Affective Functioning: Congruent      Mood: anxious      Level of consciousness:  Alert, Oriented x4, and Attentive      Response to Learning: Able to verbalize current knowledge/experience      Endings: None Reported    Modes of Intervention: Education, Support, Socialization, Exploration, and Problem-solving      Discipline Responsible: [address] [facility]      Signature:  Gilberto Lua

## 2023-08-08 NOTE — GROUP NOTE
Group Therapy Note    Date: 8/8/2023    Group Start Time: 7423  Group End Time: 3508  Group Topic: Music Therapy    [facility]    Leona Andres        Group Therapy Note    Attendees: 2/6     Patient's Goal:  Patients shared music and discussed a variety of topics, based on the topics they thought important about their music. Patient goals to increase self-expression; Increase sense of community; Increase socialization; Normalization of the environment    Notes:  Patient attended and participated in group having positive interactions with peers and staff. Patient was pleasant and engaging throughout, sharing music and discussing feeling lonely at home and missing her children, listening to music to cheer her up due to feeling sad today. Status After Intervention:  Improved    Participation Level:  Active Listener and Interactive    Participation Quality: Appropriate, Attentive, and Sharing      Speech:  normal      Thought Process/Content: Logical  Linear      Affective Functioning: Congruent      Mood: euthymic      Level of consciousness:  Alert and Attentive      Response to Learning: Able to verbalize current knowledge/experience and Progressing to goal      Endings: None Reported    Modes of Intervention: Support, Socialization, Exploration, Activity, Media, and Reality-testing      Discipline Responsible: Psychoeducational Specialist      Signature:  Leona Andres

## 2023-08-09 VITALS
WEIGHT: 180 LBS | HEIGHT: 68 IN | DIASTOLIC BLOOD PRESSURE: 69 MMHG | OXYGEN SATURATION: 95 % | HEART RATE: 90 BPM | TEMPERATURE: 97.5 F | BODY MASS INDEX: 27.28 KG/M2 | RESPIRATION RATE: 14 BRPM | SYSTOLIC BLOOD PRESSURE: 148 MMHG

## 2023-08-09 PROCEDURE — 99232 SBSQ HOSP IP/OBS MODERATE 35: CPT | Performed by: PSYCHIATRY & NEUROLOGY

## 2023-08-09 PROCEDURE — 6370000000 HC RX 637 (ALT 250 FOR IP)

## 2023-08-09 PROCEDURE — 2040000000 HC PSYCH ICU R&B

## 2023-08-09 PROCEDURE — 6370000000 HC RX 637 (ALT 250 FOR IP): Performed by: PSYCHIATRY & NEUROLOGY

## 2023-08-09 RX ADMIN — NICOTINE POLACRILEX 2 MG: 2 LOZENGE ORAL at 12:16

## 2023-08-09 RX ADMIN — LORAZEPAM 2 MG: 1 TABLET ORAL at 02:15

## 2023-08-09 RX ADMIN — IBUPROFEN 400 MG: 400 TABLET, FILM COATED ORAL at 02:14

## 2023-08-09 RX ADMIN — NICOTINE POLACRILEX 2 MG: 2 LOZENGE ORAL at 14:56

## 2023-08-09 RX ADMIN — ZIPRASIDONE HCL 40 MG: 40 CAPSULE ORAL at 16:18

## 2023-08-09 RX ADMIN — HALOPERIDOL 5 MG: 5 TABLET ORAL at 02:15

## 2023-08-09 RX ADMIN — LITHIUM CARBONATE 300 MG: 300 TABLET, FILM COATED, EXTENDED RELEASE ORAL at 21:21

## 2023-08-09 RX ADMIN — TRAZODONE HYDROCHLORIDE 50 MG: 50 TABLET ORAL at 21:21

## 2023-08-09 RX ADMIN — LITHIUM CARBONATE 300 MG: 300 TABLET, FILM COATED, EXTENDED RELEASE ORAL at 10:00

## 2023-08-09 ASSESSMENT — PAIN SCALES - GENERAL: PAINLEVEL_OUTOF10: 6

## 2023-08-09 ASSESSMENT — PAIN DESCRIPTION - LOCATION: LOCATION: BACK

## 2023-08-09 NOTE — GROUP NOTE
Group Therapy Note    Date: 8/9/2023    Group Start Time: 1330  Group End Time: 2592  Group Topic: Activity    [facility]    JUANCARLOS Lozano        Group Therapy Note    Attendees: 2/7     Patient's Goal:  To increase positive social interaction, creative expression and reality based topics,  and communication skills              Notes: Pt was pleasant and cooperative. Pt was initially attentive but passive, pt appeared anxious and had difficulty concentrating. As group progressed, pt initiated a drawing freehand and focused intently on drawing. Pt was polite but shared very little with peer and RT . Pt was able to practice creative expression, self expression on paper, and improved concentration to task and motivation to engage in task. Status After Intervention: Improved         Participation Level: Active Listener ,expressed own ideas creatively on paper but did not share verbally. Polite     Participation Quality:  Attentive , expressed own ideas creatively on paper but did not share verbally. Polite     Speech:  Minimal, guarded, polite     Thought Process/Content: Pt had difficulty initially starting creative expression but did focus intently on task once started. Pt was polite but guarded, and did not share with RT or peer. Affective Functioning: Blunted, brightened with positive feedback r/t her art work from RT and peer. Mood: Initially passive and withdrawn, as group progressed pt became engrossed in drawing of her own design and was polite, brightened with positive comments from peer and RT. Level of consciousness:  Alert, attentive     Response to Learning: Able to express own ideas on paper, Attentive to new learning/dicussion in group, and Progressing to goal        Endings: None Reported     Modes of Intervention: Education, Support, Socialization, Exploration, Clarifying and   Problem-solving.

## 2023-08-09 NOTE — GROUP NOTE
Group Therapy Note    Date: 8/8/2023    Group Start Time: 1930  Group End Time: 2000  Group Topic: Wrap-Up    [facility]    Dana Hoffmann        Group Therapy Note    Wrap-Up Group Note        Date: August 8, 2023 Start Time:  19:30   End Time:  20:00      Number of Participants in Group & Unit Census:  3/6    Topic: wrap up    Goal of Group:relaxation      Comments:     Patient did not participate in Wrap-Up group, despite staff encouragement and explanation of benefits. Patient remain seclusive to self. Q15 minute safety checks maintained for patient safety and will continue to encourage patient to attend unit programming.             Signature:  Yarely Strong

## 2023-08-10 PROCEDURE — 6370000000 HC RX 637 (ALT 250 FOR IP): Performed by: PSYCHIATRY & NEUROLOGY

## 2023-08-10 PROCEDURE — 99239 HOSP IP/OBS DSCHRG MGMT >30: CPT | Performed by: PSYCHIATRY & NEUROLOGY

## 2023-08-10 PROCEDURE — 6370000000 HC RX 637 (ALT 250 FOR IP)

## 2023-08-10 RX ORDER — ZIPRASIDONE HYDROCHLORIDE 40 MG/1
40 CAPSULE ORAL 2 TIMES DAILY WITH MEALS
Qty: 60 CAPSULE | Refills: 3 | Status: SHIPPED | OUTPATIENT
Start: 2023-08-10

## 2023-08-10 RX ORDER — LITHIUM CARBONATE 300 MG/1
300 TABLET, FILM COATED, EXTENDED RELEASE ORAL EVERY 12 HOURS SCHEDULED
Qty: 60 TABLET | Refills: 3 | Status: SHIPPED | OUTPATIENT
Start: 2023-08-10

## 2023-08-10 RX ADMIN — IBUPROFEN 400 MG: 400 TABLET, FILM COATED ORAL at 04:16

## 2023-08-10 RX ADMIN — ZIPRASIDONE HCL 40 MG: 40 CAPSULE ORAL at 08:28

## 2023-08-10 RX ADMIN — LITHIUM CARBONATE 300 MG: 300 TABLET, FILM COATED, EXTENDED RELEASE ORAL at 08:28

## 2023-08-10 RX ADMIN — NICOTINE POLACRILEX 2 MG: 2 LOZENGE ORAL at 08:59

## 2023-08-10 ASSESSMENT — PAIN SCALES - GENERAL: PAINLEVEL_OUTOF10: 3

## 2023-08-10 NOTE — CARE COORDINATION
Name: Anjel Kruger    : 1980    Discharge Date: 8/10/23    Primary Auth/Cert #: [id number]    Destination: Patient was discharged to [facility]     Discharge Medications:      Medication List        START taking these medications      lithium 300 MG extended release tablet  Commonly known as: LITHOBID  Take 1 tablet by mouth every 12 hours  Notes to patient: Clear thoughts            CHANGE how you take these medications      ziprasidone 40 MG capsule  Commonly known as: GEODON  Take 1 capsule by mouth 2 times daily (with meals)  What changed:   medication strength  how much to take  Notes to patient: Clear thoughts            STOP taking these medications      ibuprofen 800 MG tablet  Commonly known as: ADVIL;MOTRIN               Where to Get Your Medications        These medications were sent to [facility]  [facility] [address] [place] [address]      Phone: [phone]   lithium 300 MG extended release tablet  ziprasidone 40 MG capsule         Follow Up Appointment: [address] [place] [facility] [address]   [phone]  fax [phone]  Follow up on 8/15/2023  You have a scheduled appointment on Tuesday August 15at 9:00am.  Please make this appt. If you miss this appointment you will be closed with services at [address].     [place] [id number], [address]  Follow up on 8/10/2023  If Pt doesn't have a ride home, Please send by [facility]

## 2023-08-10 NOTE — DISCHARGE INSTRUCTIONS
Information:  Medications:   Medication summary provided   I understand that I should take only the medications on my list.     -why and when I need to take each medicine.     -which side effects to watch for.     -that I should carry my medication information at all times in case of     Emergency situations. I will take all of my medicines to follow up appointments.     -check with my physician or pharmacist before taking any new    Medication, over the counter product or drink alcohol.    -Ask about food, drug or dietary supplement interactions.    -discard old lists and update records with medication providers. Notify Physician:  Notify physician if you notice:   Always call 911 if you feel your life is in danger  In case of an emergency call 911 immediately! If 911 is not available call your local emergency medical system for help    Behavioral Health Follow Up:  Original Referral Source:JULIA Burns Course/Progress toward goals: Decrease in symptoms  Recommendations for Level of Care: Follow up  Patient status at discharge: stable  [facility] hospital  [facility]: [facility]  Aftercare plan faxed: yes   -faxed by: staff   -date: 8/10/23   -time: 1400    Smoking: Quit Smoking. Call the [employer]'s smoking quitline at 7-059-97S-QUIT  Know the signs of a heart attack   If you have any of the following symptoms call 911 immediately, do not wait more    Than five minutes. 1. Pressure, fullness and/ or squeezing in the center of the chest spreading to    The jaw, neck or shoulder. 2. Chest discomfort with light headedness, fainting, sweating, nausea or    Shortness of breath. 3. Upper abdominal pressure or discomfort. 4. Lower chest pain, back pain, unusual fatigue, shortness of breath, nausea   Or dizziness.      General Information:   Questions regarding your bill: Call HELP program [phone]     Suicide Hotline (rescue crisis) [phone]) 923-5819

## 2023-08-10 NOTE — PLAN OF CARE
Problem: Chronic Conditions and Co-morbidities  Goal: Patient's chronic conditions and co-morbidity symptoms are monitored and maintained or improved  8/1/2023 0940 by Claudia Lozano RN  Outcome: Progressing     Problem: Risk for Elopement  Goal: Patient will not exit the unit/facility without proper excort  8/1/2023 0940 by Claudia Lozano RN  Outcome: Progressing     Problem: Safety - Violent/Self-destructive Restraint  Goal: Remains free of injury from restraints (Restraint for Violent/Self-Destructive Behavior)  Description: INTERVENTIONS:  1. Determine that de-escalation and other, less restrictive measures have been tried or would not be effective before applying the restraint  2. Identify and document the criteria for restraint  3. Evaluate the patient's condition at the time of restraint application  4. Inform patient/family regarding the reason for restraint/seclusion  5. Q2H: Monitor comfort, nutrition and hydration needs  6. Q15M: Perform safety checks including skin, circulation, sensory, respiratory and psychological status  7. Ensure continuous observation  8. Identify and implement measures to help patient regain control, assess readiness for release and initiate progressive release per policy  Outcome: Progressing  Flowsheets (Taken 8/1/2023 0815)  Remains Free of Injury from Restraints (Restraint for Violent/Self-destructive Behavior):   Determine that de-escalation and other, less restrictive measures have been tried or would not be effective before applying the restraint   Identify and document the criteria for restraint   Evaluate the patient's condition at the time of restraint application   Every 2 hours: Monitor comfort, nutrition and hydration needs    Patient is observed in her room. Patient is currently denying thoughts of harming herself or others. Patient denies hearing voices, patient reports anxiety 3 out of 10, patient was given reassurance and support and denies any depression.
Problem: Chronic Conditions and Co-morbidities  Goal: Patient's chronic conditions and co-morbidity symptoms are monitored and maintained or improved  8/4/2023 1553 by Fabián Garner LPN  Outcome: Not Progressing  Flowsheets (Taken 8/4/2023 1553)  Care Plan - Patient's Chronic Conditions and Co-Morbidity Symptoms are Monitored and Maintained or Improved:   Monitor and assess patient's chronic conditions and comorbid symptoms for stability, deterioration, or improvement   Collaborate with multidisciplinary team to address chronic and comorbid conditions and prevent exacerbation or deterioration   Update acute care plan with appropriate goals if chronic or comorbid symptoms are exacerbated and prevent overall improvement and discharge  Note: Pt is not using coping skills, pt has been throwing tantrums with throwing garbage and emptying hygiene products everywhere in her room. Remained isolated to room t/o the evening. Calm and cooperative, refused vs. No medications due this evening.
Problem: Chronic Conditions and Co-morbidities  Goal: Patient's chronic conditions and co-morbidity symptoms are monitored and maintained or improved  8/9/2023 2217 by Crystal Apodaca LPN  Outcome: Progressing  Note: Patient admits to depression this shift. Patient is isolative to room coming out for needs only this shift. Patient denies suicidal/homicidal ideations and hallucinations this shift. Patient pleasant and cooperative with assessments and medications this shift. Patient educated and agrees to come to staff if needed this shift.  Patient monitored every 15 minutes with environmental safety checks     Problem: Risk for Elopement  Goal: Patient will not exit the unit/facility without proper excort  8/9/2023 2217 by Crystal Apodaca LPN  Outcome: Progressing     Problem: Pain  Goal: Verbalizes/displays adequate comfort level or baseline comfort level  Outcome: Progressing
Problem: Chronic Conditions and Co-morbidities  Goal: Patient's chronic conditions and co-morbidity symptoms are monitored and maintained or improved  Outcome: Not Progressing  Flowsheets (Taken 8/4/2023 1553)  Care Plan - Patient's Chronic Conditions and Co-Morbidity Symptoms are Monitored and Maintained or Improved:   Monitor and assess patient's chronic conditions and comorbid symptoms for stability, deterioration, or improvement   Collaborate with multidisciplinary team to address chronic and comorbid conditions and prevent exacerbation or deterioration   Update acute care plan with appropriate goals if chronic or comorbid symptoms are exacerbated and prevent overall improvement and discharge  Note: Pt is not using coping skills, pt has been throwing tantrums with throwing garbage and emptying hygiene products everywhere in her room.
Problem: Chronic Conditions and Co-morbidities  Goal: Patient's chronic conditions and co-morbidity symptoms are monitored and maintained or improved  Outcome: Progressing     Problem: Risk for Elopement  Goal: Patient will not exit the unit/facility without proper excort  8/2/2023 1037 by Ezra Palmer LPN  Outcome: Progressing     Problem: Safety - Violent/Self-destructive Restraint  Goal: Remains free of injury from restraints (Restraint for Violent/Self-Destructive Behavior)  Description: INTERVENTIONS:  1. Determine that de-escalation and other, less restrictive measures have been tried or would not be effective before applying the restraint  2. Identify and document the criteria for restraint  3. Evaluate the patient's condition at the time of restraint application  4. Inform patient/family regarding the reason for restraint/seclusion  5. Q2H: Monitor comfort, nutrition and hydration needs  6. Q15M: Perform safety checks including skin, circulation, sensory, respiratory and psychological status  7. Ensure continuous observation  8. Identify and implement measures to help patient regain control, assess readiness for release and initiate progressive release per policy  Outcome: Progressing     Patient remains safe on unit, free from injury and self-harm. Patient denies suicidal ideations or thoughts of self-harm. 15 minute safety checks in place and will continue. Patient is anxious, depressed, and suspicious at times. Patient refused to take Abilify this morning, stating \"Abilify is bad for you. I'll take meds, but I'm not taking that. \" Patient educated on the importance of taking medications and encouraged to speak with provider about medication options. Patient showered, changed clothes, and changed bedding. Patient has been in behavioral control, with no attempts of elopement.
Problem: Chronic Conditions and Co-morbidities  Goal: Patient's chronic conditions and co-morbidity symptoms are monitored and maintained or improved  Outcome: Progressing     Problem: Risk for Elopement  Goal: Patient will not exit the unit/facility without proper excort  Outcome: Progressing     Problem: Safety - Violent/Self-destructive Restraint  Goal: Remains free of injury from restraints (Restraint for Violent/Self-Destructive Behavior)  Description: INTERVENTIONS:  1. Determine that de-escalation and other, less restrictive measures have been tried or would not be effective before applying the restraint  2. Identify and document the criteria for restraint  3. Evaluate the patient's condition at the time of restraint application  4. Inform patient/family regarding the reason for restraint/seclusion  5. Q2H: Monitor comfort, nutrition and hydration needs  6. Q15M: Perform safety checks including skin, circulation, sensory, respiratory and psychological status  7. Ensure continuous observation  8. Identify and implement measures to help patient regain control, assess readiness for release and initiate progressive release per policy  Outcome: Progressing    Patient is observed in her room. Patient is isolative to room and only out for needs. Patient refused Geodon but took her Lithium she denies any side effects. Patient appearance is disheveled and she was encouraged to shower. Patient denies homicidal or suicidal ideation. Patient denies hearing any voices. Patient displays some anxiety and depression. Patient was encouraged to attend group and socialize with peers. Patient has maintained behavioral control thus far.
Problem: Chronic Conditions and Co-morbidities  Goal: Patient's chronic conditions and co-morbidity symptoms are monitored and maintained or improved  Outcome: Progressing    Patients mood and behaviors are much more stable and patient admits that she is feeling much better, \"that Lithium is really helping me\". More controlled and focused. Denies any suicidal or homicidal thoughts, denies any hallucinations, and admits that her thoughts are clearer. Good appetite. Social with peers selectively. Quiet and preoccupied at times.
Problem: Chronic Conditions and Co-morbidities  Goal: Patient's chronic conditions and co-morbidity symptoms are monitored and maintained or improved  Outcome: Progressing   Pt has remained isolative to room. Lethargic but does respond when approached. Pt did eat a sandwich & takes fluids well. Pt relates she is very tired & sleeps at long intervals. She is very brief with any answers to questions. Denies suicidal ideation, no self harm behaviors exhibited. Pt mumbles at times due to lethargy. She remains safe & free from physical injury & falls. Problem: Risk for Elopement  Goal: Patient will not exit the unit/facility without proper excort  Outcome: Progressing  Flowsheets (Taken 7/31/2023 1844 by Lore Garcia RN)  Nursing Interventions for Elopement Risk:   Communicate/escalate to /other team member the risk of elopement   Escort with two staff members if patient must leave the unit   Communicate to physician the risk for elopement  Pt has made no attempts to leave the unit. She continues to isolate to room.
Problem: Chronic Conditions and Co-morbidities  Goal: Patient's chronic conditions and co-morbidity symptoms are monitored and maintained or improved  Outcome: Progressing  Note: Patient admits to depression and anxiety this shift. Patient is isolative to room coming out for needs only this shift. Patient denies suicidal/homicidal ideations and hallucinations this shift. Patient educated and agrees to come to staff if needed this shift. Patient monitored every 15 minutes with environmental safety checks.       Problem: Risk for Elopement  Goal: Patient will not exit the unit/facility without proper excort  Outcome: Progressing
Problem: Pain  Goal: Verbalizes/displays adequate comfort level or baseline comfort level  Outcome: Progressing     Problem: Risk for Elopement  Goal: Patient will not exit the unit/facility without proper excort  Outcome: Progressing   Remains calm and cooperative t/o the evening . No c/o pain . No attempts to exit were made this shift.
Problem: Pain  Goal: Verbalizes/displays adequate comfort level or baseline comfort level  Outcome: Progressing     Problem: Risk for Elopement  Goal: Patient will not exit the unit/facility without proper excort  Outcome: Progressing   Remains calm and cooperative t/o the evening . No c/o pain . No attempts to exit were made this shift.
Problem: Risk for Elopement  Goal: Patient will not exit the unit/facility without proper excort  8/1/2023 2210 by Ashli Dowd  Outcome: Progressing   Patient have not attempted to exit the unit this evening. Patient have been isolative to self and have been calm, cooperative Q 15 minutes safety checks maintained.
Problem: Risk for Elopement  Goal: Patient will not exit the unit/facility without proper excort  8/10/2023 0925 by Toni Griffin LPN  Outcome: Progressing   Patient remains on unit. Patient has not shown any signs of elopement risk. Problem: Chronic Conditions and Co-morbidities  Goal: Patient's chronic conditions and co-morbidity symptoms are monitored and maintained or improved  8/10/2023 0925 by Toni Griffin LPN  Outcome: Progressing   Patient is pleasant and cooperative with staff. Patient took all morning medications without any issues reported. Patient is anxious but brightened. Patient took a shower and ate 100% of breakfast. Every 15 minute checks maintained for patient safety.
Problem: Risk for Elopement  Goal: Patient will not exit the unit/facility without proper excort  8/5/2023 2122 by Barry Powell  Outcome: Progressing   Patient have not attempted to exit the units this evening. Patient have been calm, cooperative. Q 15 minutes safety checks maintained.
Problem: Risk for Elopement  Goal: Patient will not exit the unit/facility without proper excort  8/6/2023 2312 by Leonia Bence, LPN  Outcome: Progressing   Patient has not attempted to exit unit of facility at this time. Problem: Safety - Violent/Self-destructive Restraint  Goal: Remains free of injury from restraints (Restraint for Violent/Self-Destructive Behavior)  Description: INTERVENTIONS:  1. Determine that de-escalation and other, less restrictive measures have been tried or would not be effective before applying the restraint  2. Identify and document the criteria for restraint  3. Evaluate the patient's condition at the time of restraint application  4. Inform patient/family regarding the reason for restraint/seclusion  5. Q2H: Monitor comfort, nutrition and hydration needs  6. Q15M: Perform safety checks including skin, circulation, sensory, respiratory and psychological status  7. Ensure continuous observation  8. Identify and implement measures to help patient regain control, assess readiness for release and initiate progressive release per policy  3/9/9537 1878 by Leonia Bence, LPN  Outcome: Progressing   Patient denies suicidal ideas at this time. No sign of self harm noted. Patient reports anxiety at this time. Patient encouraged to attend groups to learn coping skills. Patient has remained free from restraints at this time. Problem: Pain  Goal: Verbalizes/displays adequate comfort level or baseline comfort level  8/6/2023 2312 by Leonia Bence, LPN  Outcome: Progressing   Patient denies current pain at this time. Will continue to monitor for safety and provide support as needed.
Problem: Risk for Elopement  Goal: Patient will not exit the unit/facility without proper excort  8/7/2023 1137 by Kala Clifford  Outcome: Progressing    Problem: Chronic Conditions and Co-morbidities  Goal: Patient's chronic conditions and co-morbidity symptoms are monitored and maintained or improved  Outcome: Progressing    Patient is active in [place], more organized thoughts and behaviors. Continues to have some short attention span, but is more cooperative with hourly requests and limits. When requesting items for a shower, she relates that she understands that she was really bad when she first came in and shouldn't be punished for that. Flat affect, more depressed mood, decreased prince noted. Denies any suicidal or homicidal thoughts, denies any hallucinations. Thoughts are clearer and more organized. Accepting of staff support and encouragement. Takes a shower, has good appetite. Takes medications as ordered.
Problem: Risk for Elopement  Goal: Patient will not exit the unit/facility without proper excort  Outcome: Progressing   Patient has not attempted to exit unit of facility at this time. Problem: Safety - Violent/Self-destructive Restraint  Goal: Remains free of injury from restraints (Restraint for Violent/Self-Destructive Behavior)  Description: INTERVENTIONS:  1. Determine that de-escalation and other, less restrictive measures have been tried or would not be effective before applying the restraint  2. Identify and document the criteria for restraint  3. Evaluate the patient's condition at the time of restraint application  4. Inform patient/family regarding the reason for restraint/seclusion  5. Q2H: Monitor comfort, nutrition and hydration needs  6. Q15M: Perform safety checks including skin, circulation, sensory, respiratory and psychological status  7. Ensure continuous observation  8. Identify and implement measures to help patient regain control, assess readiness for release and initiate progressive release per policy  Outcome: Progressing   Patient remains free of injury while on the unit. Patient isolative to room, patient encouraged to attend groups to work on coping skills for life stressors. Will continue to monitor for safety and provide support as needed.
Problem: Risk for Elopement  Goal: Patient will not exit the unit/facility without proper excort  Outcome: Progressing   Patient has not shown any signs of elopement risk behaviors. Patient remains on unit. Problem: Chronic Conditions and Co-morbidities  Goal: Patient's chronic conditions and co-morbidity symptoms are monitored and maintained or improved  Outcome: Progressing   Patient took all morning medications. Patient is currently cleaning her room from yesterday's episode of her throwing lotion, soap, and toilet paper all over. Patient is currently pleasant and cooperative with staff. Patient showered this morning. Every 15 minute checks maintained for patient safety.
Problem: Risk for Elopement  Goal: Patient will not exit the unit/facility without proper excort  Outcome: Progressing   Patient remains on unit. Patient does not show any signs of elopement risk. Problem: Chronic Conditions and Co-morbidities  Goal: Patient's chronic conditions and co-morbidity symptoms are monitored and maintained or improved  Outcome: Progressing   Patient continues to be verbally and physically aggressive towards staff and unit. Patient tried to throw trash in dayroom and threw her mattress in the hallway. Patient made verbal threats to staff. Every 15 minute checks maintained for patient safety.
Time frame for Short-Term Goals:  5-7 days    LONG-TERM GOALS UPDATE:   Time frame for Long-Term Goals:  6 months    Members Present in Team Meeting:   See signature sheet  Rosario Johnson RN

## 2023-08-10 NOTE — BH NOTE
[facility] Interdisciplinary Treatment Plan Note     Review Date & Time: 08/08/2023 0915    Admission Type:   Admission Type: Voluntary    Reason for admission:  Reason for Admission: Manic behavior and brought in by police.  Throwing food and trash cans in [place]UL[place]    Estimated Length of Stay:  8-14 days  Estimated Discharge Date Update:   to be determined by physician    PATIENT STRENGTHS:  Patient Strengths:   Patient Strengths and Limitations:Limitations: Inappropriate/potentially harmful leisure interests, Difficulty problem solving/relies on others to help solve problems, Difficult relationships / poor social skills, Demonstrates discomfort with /lack of social skills, External locus of control, Unrealistic self-view  Addictive Behavior:Addictive Behavior  In the Past 3 Months, Have You Felt or Has Someone Told You That You Have a Problem With  : None  Medical Problems:   Past Medical History:   Diagnosis Date    Anxiety     Bipolar 1 disorder ([address]) 1999    Depression     Gestational diabetes 7/22/2016    OCD (obsessive compulsive disorder) 1999    PTSD (post-traumatic stress disorder)     Rapid rate of speech     Schizoaffective disorder ([address])        Risk:  Fall Risk   Jairo Scale Jairo Scale Score: 22  BVC      Change in scores:  No. Changes to plan of Care:  No    Status EXAM:   Mental Status and Behavioral Exam  Normal: No  Level of Assistance: Independent/Self  Facial Expression: Flat  Affect: Blunt  Level of Consciousness: Alert  Frequency of Checks: 4 times per hour, close  Mood:Normal: No  Mood: Anxious, Depressed, Sad  Motor Activity:Normal: Yes  Motor Activity: Agitated  Eye Contact: Good  Observed Behavior: Preoccupied, Cooperative  Sexual Misconduct History: Current - no  Preception: [place] to person, [place] to time, [place] to place, [place] to situation  Attention:Normal: No  Attention: Distractible  Thought Processes: Circumstantial, Flight of ideas  Thought Content:Normal:
[facility]  Admission Note     Admission Type:   Admission Type: Voluntary    Reason for admission:  Reason for Admission: Manic behavior and brought in by police.  Throwing food and trash cans in [place]      Addictive Behavior:   Addictive Behavior  In the Past 3 Months, Have You Felt or Has Someone Told You That You Have a Problem With  : None    Medical Problems:   Past Medical History:   Diagnosis Date    Anxiety     Bipolar 1 disorder ([address]) 1999    Depression     Gestational diabetes 7/22/2016    OCD (obsessive compulsive disorder) 1999    PTSD (post-traumatic stress disorder)     Rapid rate of speech     Schizoaffective disorder (HCC)        Status EXAM:  Mental Status and Behavioral Exam  Normal: No  Level of Assistance: Independent/Self  Facial Expression: Avoids Gaze  Affect: Unstable  Level of Consciousness: Alert  Frequency of Checks: 4 times per hour, close  Mood:Normal: No  Mood: Anxious, Labile  Motor Activity:Normal: Yes  Eye Contact: Poor  Observed Behavior: Withdrawn, Agitated, Preoccupied  Sexual Misconduct History: Current - no  Preception: [place] to person, [place] to time, [place] to place  Attention:Normal: No  Attention: Unable to concentrate  Thought Processes: Flight of ideas  Thought Content:Normal: No  Thought Content: Preoccupations, Paranoia  Depression Symptoms: Impaired concentration, Loss of interest  Anxiety Symptoms: Generalized  [place] Symptoms: Flight of ideas, Poor judgment  Hallucinations: Unable to assess  Delusions: No  Memory:Normal: No  Memory: Poor recent, Poor remote  Insight and Judgment: No  Insight and Judgment: Poor judgment, Unrealistic, Unmotivated, Poor insight    Tobacco Screening:  Practical Counseling, on admission, nati X, if applicable and completed (first 3 are required if patient doesn't refuse):            ( ) Recognizing danger situations (included triggers and roadblocks)                    ( ) Coping skills (new ways to manage stress,relaxation
[facility]  Discharge Note    Pt discharged with followings belongings:   Dental Appliances: None  Vision - Corrective Lenses: None  Hearing Aid: None  Jewelry: Bracelet, Ring  Body Piercings Removed: N/A  Clothing: Footwear, Shorts, Shirt  Other Valuables: Lighter/Matches   Valuables sent home withpatient or returned to patient. Patient educated on aftercare instructions: yes  Information faxed to  by [place]  at 12:54 PM .Patient verbalize understanding of AVS:  yes. Status EXAM upon discharge:  Mental Status and Behavioral Exam  Normal: No  Level of Assistance: Independent/Self  Facial Expression: Flat  Affect: Appropriate, Blunt  Level of Consciousness: Alert  Frequency of Checks: 4 times per hour, close  Mood:Normal: No  Mood: Anxious (anxious but brightened)  Motor Activity:Normal: Yes  Motor Activity: Decreased  Eye Contact: Good  Observed Behavior: Friendly, Cooperative  Sexual Misconduct History: Current - no  Preception: [place] to person, [place] to time, [place] to place, [place] to situation  Attention:Normal: Yes  Attention: Distractible  Thought Processes: Blocking  Thought Content:Normal: No  Thought Content: Preoccupations  Depression Symptoms: Impaired concentration  Anxiety Symptoms: Generalized  [place] Symptoms: No problems reported or observed.   Hallucinations: None  Delusions: No  Delusions: Paranoid  Memory:Normal: Yes  Memory: Confabulation  Insight and Judgment: No  Insight and Judgment: Poor judgment    Tobacco Screening:  Practical Counseling, on admission, nati X, if applicable and completed (first 3 are required if patient doesn't refuse)refused:            ( ) Recognizing danger situations (included triggers and roadblocks)                    ( ) Coping skills (new ways to manage stress,relaxation techniques, changing routine, distraction)                                                           ( ) Basic information about quitting (benefits of quitting,
Approved behavorial plan in place.
BEHAVIORAL SERVICES: One - Hour In- Person Review  For Management of Violent or Self - Destructive Behavior    Seclusion/Restraint:  Seclusion    Reason for Intervention: Patient throwing lotion all over day area, not accepting redirections, placed self on floor in hallway while not wearing clothes    Response to Intervention: Continues to test boundaries and not be redirectable    Medical Record reviewed and discussed precipitating events/behaviors with RN initiating Intervention:  Yes    Patient Medical Status: Declined new set, vitals from 07:30  Vital Signs: 116/77  Respiratory Status: No distress noted  Circulatory Status: No distress noted  Skin Integrity: No signs of injury    Orientation: oriented to person, place, and time/date    Mood/Affect: anxious and labile    Speech: Pressured    Thought Content: tangential    Thought Processes: Tangential    Rationale for continued use of intervention: Patient continues to be unable to follow staff instruction and remain in behavioral control    Rationale for discontinuing intervention: Patient is able to: follow staff instruction and remain in behavioral control    One Hour Review Evaluation Physician Notification:  Yes, Dr. Hai Lott on call physician notifed
BEHAVIORAL SERVICES: One - Hour In- Person Review  For Management of Violent or Self - Destructive Behavior    Seclusion/Restraint:  Seclusion    Reason for Intervention: patient is threatening staff, throwing things around the unit, and putting feces in inappropriate places on the unit. Response to Intervention:  Patient is unable to follow staff directions, taking clothes off, yelling threats at staff    Medical Record reviewed and discussed precipitating events/behaviors with RN initiating Intervention:  Yes    Patient Medical Status:  Vital Signs: unable to obtain  Respiratory Status: no distress noted  Circulatory Status: WNL  Skin Integrity: no injuries noted    Orientation: oriented to person, place, time/date, and situation    Mood/Affect: angry, anxious, and irritable    Speech: Pressured    Thought Content: delusions    Thought Processes: Racing, Flight of Ideas, and Loose Associations    Rationale for continued use of intervention: patient continues to display behavior that warranted seclusion, unable to follow staff direction    Rationale for discontinuing intervention: Patient is able to: follow staff direction, leave clothing on, interact with peers/staff appropriately. One Hour Review Evaluation Physician Notification:  LANCE Romano
BEHAVIORAL SERVICES: One - Hour In- Person Review  For Management of Violent or Self - Destructive Behavior    Seclusion/Restraint:  Seclusion initiated at 8978    Reason for Intervention: Throwing juice, ripping papers, being destructive in her room, not accepting redirection    Response to Intervention:  Patient in seclusion after medication administration paing    Medical Record reviewed and discussed precipitating events/behaviors with RN initiating Intervention:  Yes    Patient Medical Status:  Vital Signs: Unable to assess due to patient behaviors  Respiratory Status: Even and non labored respirations  Circulatory Status: no injury noted  Skin Integrity:no injury noted    Orientation: oriented to person, place, time/date, and situation    Mood/Affect: irritable and labile    Speech: Pressured    Thought Content: Unable to assess due to patient behaviors    Thought Processes: Other Unable to assess due to patient behaviors    Rationale for continued use of intervention: Patient has not regained behavioral control. Rationale for discontinuing intervention: Patient is able to: Patient able to regain behavioral control.     One Hour Review Evaluation Physician Notification:  completed
BEHAVIORAL SERVICES: One - Hour In- Person Review  For Management of Violent or Self - Destructive Behavior    Seclusion/Restraint:  Seclusion locked at 1100    Reason for Intervention: unable to follow staff directions, throwing mattresses in the day room    Response to Intervention:  Patient attempted to wrap her gown around her neck while in seclusion. Medical Record reviewed and discussed precipitating events/behaviors with RN initiating Intervention:  Yes    Patient Medical Status:  Vital Signs: unable to obtain  Respiratory Status: no distress noted  Circulatory Status: WNL  Skin Integrity: no injuries noted    Orientation: oriented to person, place, time/date, and situation    Mood/Affect: angry, anxious, irritable, and labile    Speech: Loud and Pressured    Thought Content: paranoid ideation and tangential    Thought Processes: Tangential and Flight of Ideas    Rationale for continued use of intervention: Patient continues to display behavior that warranted seclusion. Unable to follow staff direction.     Rationale for discontinuing intervention: Patient is able to: follow staff direction, interact with peers/staff appropriately     One Hour Review Evaluation Physician Notification:  Dr. Simms Aw
DISCONTINUATION OF LOCKED SECLUSION     Emergency Medication Follow-Up Note:    PRN medication of Haldol 5 mg, Ativan 2 mg, Benadryl 50 mg was effective as evidence by patient resting in seclusion room, regaining behavioral control. Patient denies medication side effects. Criteria for release explained to patient. Patient verbalizes she will follow staffs' directions. Locked seclusion discontinued 1225, lasting for a total of 85 minutes. Patient walked to room. Gown and pants given to patient. Lunch given to patient. Will continue to monitor and provide support as needed.
DISCONTINUATION OF LOCKED SECLUSION     Emergency Medication Follow-Up Note:    PRN medication of Haldol 5mg, Ativan 2 mg, Benadryl 50 mg IM was effective as evidence by patient is resting in seclusion room, regaining behavioral control. Patient denies medication side effects. Criteria for release explained to patient. Patient verbalized she will follow staffs' directions. Locked seclusion discontinued at 1015, lasting for a total of 65 minutes. Patient walked to room. Gown given to patient. Will continue to monitor and provide support as needed.
DISCONTINUATION OF LOCKED SECLUSION    Emergency Medication Follow-Up Note:    PRN medication of Haldol 5mg IM, Ativan 2mg IM, Benadryl 50 mg IM was effective as evidence by  Patient regain behavioral control, absence of behavior warranting, resting in seclusion room. Criteria for release explained to patient, patient states she will follow staff directions and keep close on and remain in behavioral control. Locked seclusion ended at 8:52 am lasting for 37 minutes. Patient walked to room. Breakfast tray given to patient. Patient denies medication side effects. Will continue to monitor and provide support as needed.
DISCONTINUE OF LOCKED SECLUSION     Emergency Medication Follow-Up Note:    PRN medication of Haldol 5mg IM, Ativan 2mg IM, Benadryl 50mg IM was effective as evidence by Patient regain behavioral control, resting in seclusion room. Criteria for release explained to patient, patient verbalized she will follow staff directions. Locked seclusion discontinued at 5:24 pm lasting 54 minutes, patient walked to room. Patient denies medication side effects. Will continue to monitor and provide support as needed.
Emergency Medication Follow-Up Note:    PRN medication of Ativan 2mg IM, Haldol 5mg IM, Benadryl 50mg IM was effective as evidence by patient regaining behavioral control. Patient denies medication side effects. Will continue to monitor and provide support as needed.
Emergency Medication Follow-Up Note:    PRN medication of Haldol 5mg, Ativan 2mg oral was effective as evidence by patient regain behavioral control. Patient denies medication side effects. Will continue to monitor and provide support as needed.
Emergency Medication Follow-Up Note:    PRN medication of PO Haldol 5 mg, Ativan 2mg  was effective as evidence by behavior warranting emergency medication. Patient denies medication side effects. Will continue to monitor and provide support as needed.
Emergency PRN Medication Administration Note:      Patient is Agitated and Disruptive as evidence by increased agitation, increased gross motor activity, frequently at desk, difficult to redirect, pacing, increased fidgeting. Staff attempted to find and relieve the distress by Talking to patient, Refocusing on new activity, Offering suggestions, and Administer PRN medications Patient is currently accepted PRN medications. Medication Administered as prescribed: oral Haldol 5mg and ativan 2mg Patient Tolerated medication administration. Will continue to monitor, offer support, and reassess.
Emergency PRN Medication Administration Note:      Patient is Agitated and Disruptive as evidence by pacing, not redirectable. Staff attempted to find and relieve the distress by Talking to patient, Refocusing on new activity, Offering suggestions, and Administer PRN medications Patient is currently   accepted PRN medications. Medication Administered as prescribed: Haldol 5 mg Ativan 2mg PO. Patient Tolerated medication administration. Will continue to monitor, offer support, and reassess.
Emergency PRN Medication Administration Note:      Patient is Agitated, Disruptive, and Destructive as evidence by slammed phone down, attempting to rip up trash bag with trash in dayroom, and swearing. Staff attempted to find and relieve the distress by Talking to patient, Refocusing on new activity, Offering suggestions, Checking for undiagnosed pain, and Administer PRN medications Patient is currently  continuing to escalate. SOS and security present. Medication Administered as prescribed: Haldol 5mg IM, Ativan 2mg IM, Benadryl 50mg IM. Patient Tolerated medication administration. Will continue to monitor, offer support, and reassess.
INITIATION OF LOCKED SECLUSION     Emergency PRN Medication Administration Note:      Patient is Agitated, Disruptive, and Destructive as evidence by constantly at nurses station. Patient was then asked to have a seat, patient was not following directions, states \"no I'm not listening to you\", then started throwing lotion, spilling it on the doors and walls, non redirectable not following directions. Staff attempted to find and relieve the distress by Talking to patient, Refocusing on new activity, Offering suggestions, Checking for undiagnosed pain, and Administer PRN medications Patient is currently continuing to escalate. Charge nurse and security notified, and present on unit. Medication Administered as prescribed: Haldol 5mg IM, Ativan 2mg IM, Benadryl 50 mg IM. Patient then started to throw mattress, pillows, gown, out of room all while naked. Patient remained non redirectable, and walked to seclusion room at 8:15 am. Charge nurse Jerod Pillai present as advocate on unit. Dr. Jose Pastor notified. Patient Criteria for release explained to patient. 1:1 staff present with patient. No physical hold required. Will continue to monitor, offer support, and reassess.
INITIATION OF LOCKED SECLUSION     Locked seclusion started at 11:00 am, due to patient throwing mattress in day area and not following staff directions. Patient walked to seclusion room and no physical holds were required. Dr. Phoenix Cleary was notified and patient advocate Azar  is present on the unit. Security was notified and present on unit. Criteria for release was explained to patient. 1:1 staff present with patient.
LOCK SECLUSION INITIATED    Emergency PRN Medication Administration Note:      Patient is Agitated, Disruptive, and Destructive as evidence by throwing trash in day room, non redirectable. Staff attempted to find and relieve the distress by Talking to patient, Refocusing on new activity, Offering suggestions, Checking for undiagnosed pain, and Administer PRN medications Patient is currently continuing to escalate. Medication Administered as prescribed: Haldol 5mg IM, Ativan 2mg IM, Benadryl 50 mg IM. Dr. Juan José Do notified and patient advocate Raphael Patino present on unit, along with security. Patient walked to seclusion room with no physical hold. Criteria for release explained to patient. 1:1 staff present with patient. Patient Tolerated medication administration. Will continue to monitor, offer support, and reassess.
Patient arrived on unit from the [facility], transferred by two unit staff. All belongings and paperwork brought to unit, patient changed into unit appropriate attire. Vitals taken, and scanned with search wand. Staff will continue to monitor patient.
Patient cooperated with Safety/Room checks. No items found.
Patient given tobacco quitline number 6-908-505-343-269-7571 at this time, refusing to call at this time, states \" I just dont want to quit now\"- patient given information as to the dangers of long term tobacco use. Continue to reinforce the importance of tobacco cessation.
Patient given tobacco quitline number 78864476055 at this time, refusing to call at this time, states \" I just dont want to quit now\"- patient given information as to the dangers of long term tobacco use. Continue to reinforce the importance of tobacco cessation.
Patient has put cups, paper, and toilet paper into toilet attempting to flood her room. Maintenance was called. Patient stated, \"I got angry and that's why I did it! \" Charge nurse notified. Interventions in place.
Patient in locked seclusion. Writer is patient's 1:1 sitter. Patient started crying and stated, \"I don't like this medication. I makes me feel worst!\" Patient talked with writer about using better coping skills when she feels angry. Writer educated patient on unit rules and what is expected in order for patient to be out of seclusion. Patient has poor insight to her behaviors and outbursts.
Patient is currently ripping her paper shirt up and attempting to rip up the mattress in seclusion. Staf did intervene and removed items. Patient was hitting the walls. No signs of injury noted. Patient denies any pain. 1:1 is present.
Patient is in locked seclusion and was observed tying gown around her neck. Staff intervene and removed gown, pants and socks.  Locked seclusion continued
Patient refused Abilify, stating, \"Abilify is not good for you. I'm not taking it. \" Writer spoke with patient about the importance of taking medications. Patient states she is willing to take medications, but does not want to take Abilify.
Patient refused to take Abilify stating \"No I will never take Abilify, I don't take that\". Staff educated patient on importance of taking medication.
Patient took her dinner Noel, patient stated she was taking it so she can leave tomorrow.
Patient was encouraged to attend evening wrap up group with staff encouragement. Patient declined to attend. Patient will continue to be encouraged to attend groups.
Post Restraint and Seclusion Patient Debriefing    Did debriefing occur? No    If No, why not? [x] Patient refused  [] Patient is unavailable for debriefing due to:  [] Patient debriefing not completed due to clinical contraindication of:    What events led to the seclusion/restraint incident? Did being restrained or in seclusion help you regain control of your behavior? Did you feel safe while you were in restraint or seclusion:      [] Very Safe  [] Safe  [] Somewhat Safe  [] Not Safe     Did you have the chance to gain control of your behavior before you were secluded or restrained? If Yes, how:    [] Offered Medication  [] Talked with  [] Given Time Out      [] Offered alternatives to restraint/seclusion     During the restraint or seclusion process were you offered medicine to help you gain control? Were your physical and emotional needs met, and your privacy rights addressed while you were in restraints/seclusion? How can we assist you in remaining restraint or seclusion free in the future? Is there anything else you would like to share regarding this restraint/seclusion episode? Patient consented to family or significant other to participate in debriefing     Patient's guardian participated in debriefing (when applicable)     Patient's guardian unable to participate in debriefing (when applicable)     Staff: Were modifications to the treatment plan completed?  Yes
Post Restraint and Seclusion Patient Debriefing    Did debriefing occur? yes    If No, why not? [] Patient refused  [] Patient is unavailable for debriefing due to:  [] Patient debriefing not completed due to clinical contraindication of:    What events led to the seclusion/restraint incident? Did being restrained or in seclusion help you regain control of your behavior? yes    Did you feel safe while you were in restraint or seclusion:      [] Very Safe  [x] Safe  [] Somewhat Safe  [] Not Safe     Did you have the chance to gain control of your behavior before you were secluded or restrained? yes    If Yes, how:    [x] Offered Medication  [] Talked with  [x] Given Time Out      [] Offered alternatives to restraint/seclusion     During the restraint or seclusion process were you offered medicine to help you gain control? yes      Were your physical and emotional needs met, and your privacy rights addressed while you were in restraints/seclusion? yes      How can we assist you in remaining restraint or seclusion free in the future? NA    Is there anything else you would like to share regarding this restraint/seclusion episode? NA    Patient consented to family or significant other to participate in debriefing. NO    Patient's guardian participated in debriefing (when applicable) NA    Patient's guardian unable to participate in debriefing (when applicable)     Staff: Were modifications to the treatment plan completed?  yes
Prn effective.
Pt has been throwing tantrums by throwing and dumping the dayroom garbage bag, gently flipping dayroom chairs. Pt was given POs prior to this acting out.
Pt unable to remain calm. She is throwing he mattress at the walls in her room. States , im trying to be good . Im just having a hard time relaxing. Po prns given as ordered at this time.
RN has reviewed all LPN documentation.
While completing 15 minute rounds writer saw patient's room. Patient threw lotion and liquid soap all over the walls and floors and ripped toilet paper stuck on scattered spots. Patient was asked to clean her room. Patient refused. Staff has initiated a behavior plan in place.
Goals: reviewed group plans and strategies for care    Method:group therapy, medication compliance, individualized assessments and care planning    Outcome: needs reinforcement    PATIENT GOALS: to be discussed with patient within 72 hours    PLAN/TREATMENT RECOMMENDATIONS:     continue group therapy , medications compliance, goal setting, individualized assessments and care, continue to monitor pt on unit      SHORT-TERM GOALS:   Patient refused  Time frame for Short-Term Goals: 5-7 days    LONG-TERM GOALS:  Patient refused  Time frame for Long-Term Goals: 6 months  Members Present in Team Meeting: See Reji Murillo RN

## 2023-08-10 NOTE — DISCHARGE SUMMARY
DISCHARGE SUMMARY      Patient ID:  Krista Dutton  [id number]  33 y.o.  1980    Admit date: 7/31/2023    Discharge date and time: 8/10/2023    Disposition: Home      Admitting Physician: Holger Campos MD     Discharge Physician: Dr Rob Hamlin MD    Admission Diagnoses: Acute psychosis ([address]) [F23]  Psychosis, unspecified psychosis type ([address]) [F29]    Admission Condition: poor    Discharged Condition: stable    Admission Circumstance: Krista Dutton is a 37 y.o. female who has a past medical history of bipolar disorder, depression, PTSD. Patient presented to the [facility] ED on 7/31/23 brought in by the [facility]. Patient was released from intermediate to be brought in due to abnormal and bizarre behavior. According to the ED Note:     Brought in on police pink slip. Was released from intermediate. Noncompliant with medications. She is disorganized, agitated irritable, throwing food. She was playing in the toilet water at intermediate. Not making sense. Making threats. Patient denies any suicidal or homicidal thoughts, however she does admit to some thoughts towards hurting others who are out to get her. Patient states she does not go to outpatient mental health services. She currently does not have a facility to live. Patient in the ED was reported to be very disruptive and having outbursts where she was throwing food trays and punching the walls in the [facility]. Patient was not cooperative and has received multiple rounds of IM medications due to agitation. Patient was in seclusion in the [facility] upon admission after she had an episode this morning of throwing all of the contents in her room into the hallway and was throwing lotion all over. Patient has since calmed down and was released out of seclusion back into her room. Patient was most recently admitted to the [facility] about a month ago where she was discharged on Geodon. She has since been non-compliant with her medication regimen.  She was

## 2023-08-11 ENCOUNTER — HOSPITAL ENCOUNTER (EMERGENCY)
Age: 43
Discharge: HOME | End: 2023-08-11
Attending: STUDENT IN AN ORGANIZED HEALTH CARE EDUCATION/TRAINING PROGRAM
Payer: COMMERCIAL

## 2023-08-11 VITALS
HEIGHT: 68 IN | RESPIRATION RATE: 18 BRPM | WEIGHT: 173 LBS | OXYGEN SATURATION: 99 % | BODY MASS INDEX: 26.22 KG/M2 | SYSTOLIC BLOOD PRESSURE: 137 MMHG | HEART RATE: 85 BPM | TEMPERATURE: 97.7 F | DIASTOLIC BLOOD PRESSURE: 72 MMHG

## 2023-08-11 DIAGNOSIS — N76.0 BV (BACTERIAL VAGINOSIS): Primary | ICD-10-CM

## 2023-08-11 DIAGNOSIS — B37.31 CANDIDA VAGINITIS: ICD-10-CM

## 2023-08-11 DIAGNOSIS — B96.89 BV (BACTERIAL VAGINOSIS): Primary | ICD-10-CM

## 2023-08-11 LAB
CANDIDA SPECIES: POSITIVE
GARDNERELLA VAGINALIS: POSITIVE
SOURCE: ABNORMAL
TRICHOMONAS: NEGATIVE

## 2023-08-11 PROCEDURE — 87660 TRICHOMONAS VAGIN DIR PROBE: CPT

## 2023-08-11 PROCEDURE — 99283 EMERGENCY DEPT VISIT LOW MDM: CPT

## 2023-08-11 PROCEDURE — 87491 CHLMYD TRACH DNA AMP PROBE: CPT

## 2023-08-11 PROCEDURE — 87480 CANDIDA DNA DIR PROBE: CPT

## 2023-08-11 PROCEDURE — 87591 N.GONORRHOEAE DNA AMP PROB: CPT

## 2023-08-11 PROCEDURE — 6370000000 HC RX 637 (ALT 250 FOR IP): Performed by: STUDENT IN AN ORGANIZED HEALTH CARE EDUCATION/TRAINING PROGRAM

## 2023-08-11 PROCEDURE — 87510 GARDNER VAG DNA DIR PROBE: CPT

## 2023-08-11 RX ORDER — FLUCONAZOLE 150 MG/1
150 TABLET ORAL ONCE
Status: COMPLETED | OUTPATIENT
Start: 2023-08-11 | End: 2023-08-11

## 2023-08-11 RX ORDER — IBUPROFEN 600 MG/1
600 TABLET ORAL ONCE
Status: COMPLETED | OUTPATIENT
Start: 2023-08-11 | End: 2023-08-11

## 2023-08-11 RX ORDER — METRONIDAZOLE 500 MG/1
500 TABLET ORAL 2 TIMES DAILY
Qty: 14 TABLET | Refills: 0 | Status: SHIPPED | OUTPATIENT
Start: 2023-08-11 | End: 2023-08-18

## 2023-08-11 RX ORDER — METRONIDAZOLE 500 MG/1
500 TABLET ORAL ONCE
Status: COMPLETED | OUTPATIENT
Start: 2023-08-11 | End: 2023-08-11

## 2023-08-11 RX ADMIN — METRONIDAZOLE 500 MG: 500 TABLET ORAL at 22:34

## 2023-08-11 RX ADMIN — FLUCONAZOLE 150 MG: 150 TABLET ORAL at 22:34

## 2023-08-11 RX ADMIN — IBUPROFEN 600 MG: 600 TABLET ORAL at 21:01

## 2023-08-11 ASSESSMENT — ENCOUNTER SYMPTOMS
RHINORRHEA: 0
SINUS PAIN: 0
NAUSEA: 0
COUGH: 0
PHOTOPHOBIA: 0
BACK PAIN: 1
SORE THROAT: 0
WHEEZING: 0
SHORTNESS OF BREATH: 0
ABDOMINAL PAIN: 0
SINUS PRESSURE: 0
DIARRHEA: 0
VOMITING: 0

## 2023-08-11 ASSESSMENT — PAIN DESCRIPTION - PAIN TYPE: TYPE: CHRONIC PAIN

## 2023-08-11 ASSESSMENT — PAIN SCALES - GENERAL: PAINLEVEL_OUTOF10: 7

## 2023-08-11 ASSESSMENT — PAIN DESCRIPTION - LOCATION: LOCATION: BACK

## 2023-08-11 ASSESSMENT — PAIN - FUNCTIONAL ASSESSMENT
PAIN_FUNCTIONAL_ASSESSMENT: 0-10
PAIN_FUNCTIONAL_ASSESSMENT: PREVENTS OR INTERFERES SOME ACTIVE ACTIVITIES AND ADLS

## 2023-08-11 ASSESSMENT — PAIN DESCRIPTION - ORIENTATION: ORIENTATION: MID;LOWER

## 2023-08-11 ASSESSMENT — PAIN DESCRIPTION - FREQUENCY: FREQUENCY: CONTINUOUS

## 2023-08-12 ENCOUNTER — HOSPITAL ENCOUNTER (EMERGENCY)
Age: 43
Discharge: HOME | End: 2023-08-12
Attending: EMERGENCY MEDICINE
Payer: COMMERCIAL

## 2023-08-12 VITALS
SYSTOLIC BLOOD PRESSURE: 144 MMHG | WEIGHT: 173 LBS | DIASTOLIC BLOOD PRESSURE: 95 MMHG | TEMPERATURE: 98 F | RESPIRATION RATE: 14 BRPM | HEIGHT: 68 IN | BODY MASS INDEX: 26.22 KG/M2 | OXYGEN SATURATION: 98 % | HEART RATE: 98 BPM

## 2023-08-12 DIAGNOSIS — Z13.39 ENCOUNTER FOR SCREENING EXAMINATION FOR OTHER MENTAL HEALTH AND BEHAVIORAL DISORDERS: Primary | ICD-10-CM

## 2023-08-12 PROCEDURE — 99282 EMERGENCY DEPT VISIT SF MDM: CPT

## 2023-08-12 NOTE — ED PROVIDER NOTES
[address] [facility] ED  EMERGENCY DEPARTMENT ENCOUNTER      Pt Name: Milan Raman  MRN: [id number]  [address] [facility] 1980  Date of evaluation: 23      CHIEF COMPLAINT       Chief Complaint   Patient presents with    Sleep Problem    Homeless         HISTORY OF PRESENT ILLNESS   HPI 37 y.o. female presents mental health evaluation. Patient has a history of schizoaffective disorder depression PTSD anxiety bipolar substance abuse homelessness. She has regular recurrent evaluations. She was most recently admitted to the hospital between  and 10 August.  She states that she called police to be brought to the emergency department to \" get checked out\" and \" make sure everything is okay\". She denies any suicidal or homicidal ideations. She denies any hallucinations. She denies any substance abuse. REVIEW OF SYSTEMS     Review of Systems   Constitutional:  Negative for fever. Respiratory:  Negative for cough. Cardiovascular:  Negative for chest pain. Gastrointestinal:  Negative for abdominal pain. Psychiatric/Behavioral:  Negative for agitation and suicidal ideas. The patient is not nervous/anxious.         PAST MEDICAL HISTORY     Past Medical History:   Diagnosis Date    Anxiety     Bipolar 1 disorder ([address])     Depression     Gestational diabetes 2016    OCD (obsessive compulsive disorder)     PTSD (post-traumatic stress disorder)     Rapid rate of speech     Schizoaffective disorder ([address])        SURGICAL HISTORY       Past Surgical History:   Procedure Laterality Date    ABDOMEN SURGERY       SECTION      LAPAROSCOPY         CURRENT MEDICATIONS       Discharge Medication List as of 2023  8:11 AM        CONTINUE these medications which have NOT CHANGED    Details   metroNIDAZOLE (FLAGYL) 500 MG tablet Take 1 tablet by mouth in the morning and at bedtime for 7 days, Disp-14 tablet, R-0Print      ziprasidone (GEODON) 40 MG capsule Take 1 capsule by mouth 2 times

## 2023-08-12 NOTE — ED NOTES
Writer met with patient in regards to mental health concerns. Patient reports she called [employer] this morning so \"a doctor could evaluate her. \" Mana Ansari asks patient what she means by this and the patient responds \"I don't know. I was just going to see if the doctor thought I needed something. \"     Patient denies suicidal and homicidal ideations. Patient reports not taking her prescribed medications today because she \"doesn't like it. \" Writer attempts to discuss the importance of medication compliance. It is noted the patient was discharged from the [facility] on 8/10/23. Writer encouraged patient to attend scheduled follow up appointment to further discuss medication adjustments. Patient expresses concerns of being homeless. Patient states she does not want to stay at a homeless shelter and is working with her payee to find an affordable apartment. Patient reports she thinks she will have an apartment by Wednesday of next week. Patient reports she plans to utilize [employer] for exercise and showering and will stay with her sister or friends at night. Patient is unable to identify any other needs or concerns at this time. Writer educates patient on the importance of treatment compliance. Writer reminds patient that if symptoms worsen or change she can return to the ED at anytime.

## 2023-08-12 NOTE — ED TRIAGE NOTES
Pt was brought in by [employer]. [employer] dropped pt off at ER entrance. Pt states she came here because she wants to get some breakfast. Pt also states she is homeless but that she is going to go to [employer] to work out and shower after this. Pt states she also [facility] to [facility]. C= \"Have you ever felt that you should Cut down on your drinking? \"  No  A= \"Have people Annoyed you by criticizing your drinking? \"  No  G= \"Have you ever felt bad or Guilty about your drinking? \"  No  E= \"Have you ever had a drink as an Eye-opener first thing in the morning to steady your nerves or to help a hangover? \"  No      Deferred []      Reason for deferring: N/A    *If yes to two or more: probable alcohol abuse. *

## 2023-08-12 NOTE — ED TRIAGE NOTES
Mode of arrival (squad #, walk in, police, etc) : walk in        Chief complaint(s): Back pain, exposure to STD        Arrival Note (brief scenario, treatment PTA, etc). : Pt reports chronic back pain and exposure to STD. Pt denies vaginal discharge, itching, or dysuria. Pt reports unprotected sex last night. C= \"Have you ever felt that you should Cut down on your drinking? \"  No  A= \"Have people Annoyed you by criticizing your drinking? \"  No  G= \"Have you ever felt bad or Guilty about your drinking? \"  No  E= \"Have you ever had a drink as an Eye-opener first thing in the morning to steady your nerves or to help a hangover? \"  No      Deferred []      Reason for deferring: N/A    *If yes to two or more: probable alcohol abuse. *
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
## 2023-08-12 NOTE — DISCHARGE INSTRUCTIONS
Seen in the emergency department for STD exposure. Tested positive for bacterial vaginosis and candidal vaginal infection. Given one-time dose of fluconazole which is the treatment for Candida. Also given dose of Flagyl here in the emergency department. Given prescription for Flagyl which treats bacterial vaginosis. Please take this prescription to completion and as prescribed. Please return to the emergency department if you begin developing worsening vaginal discharge, vaginal pain, vaginal bleeding, or any other concerning symptoms.   Please follow-up with your primary care physician within the next 1 to 2 days

## 2023-08-13 ASSESSMENT — ENCOUNTER SYMPTOMS
ABDOMINAL PAIN: 0
COUGH: 0

## 2023-08-14 LAB
C TRACH DNA SPEC QL PROBE+SIG AMP: NEGATIVE
N GONORRHOEA DNA SPEC QL PROBE+SIG AMP: ABNORMAL
SPECIMEN DESCRIPTION: ABNORMAL

## 2023-08-14 NOTE — PROGRESS NOTES
Pharmacy Note:    The patient is positive for gonorrhea. The patient was not treated with ceftriaxone during encounter. Discussed case with Dr. Cheryl Cedillo who recommends patient return for treatment. Contacted patient at preferred number and discussed test results and treatment plan. Advised patient to avoid sexual activity until treatment course is complete and for at least 7 days after treatment. Also advised asking sexual partners to be tested and treated. Patient expressed understanding and states she will return shortly.        Thank you,  Denia Leon, PharmD, BCPS  [phone]

## 2023-08-15 ENCOUNTER — HOSPITAL ENCOUNTER (EMERGENCY)
Age: 43
Discharge: HOME | End: 2023-08-15
Attending: EMERGENCY MEDICINE
Payer: COMMERCIAL

## 2023-08-15 VITALS
DIASTOLIC BLOOD PRESSURE: 93 MMHG | OXYGEN SATURATION: 94 % | TEMPERATURE: 98.1 F | HEART RATE: 100 BPM | SYSTOLIC BLOOD PRESSURE: 137 MMHG | RESPIRATION RATE: 20 BRPM

## 2023-08-15 DIAGNOSIS — A54.9 GONORRHEA: Primary | ICD-10-CM

## 2023-08-15 PROCEDURE — 2580000003 HC RX 258

## 2023-08-15 PROCEDURE — 99284 EMERGENCY DEPT VISIT MOD MDM: CPT

## 2023-08-15 PROCEDURE — 96372 THER/PROPH/DIAG INJ SC/IM: CPT

## 2023-08-15 PROCEDURE — 6360000002 HC RX W HCPCS: Performed by: PHYSICIAN ASSISTANT

## 2023-08-15 RX ORDER — WATER 1000 ML/1000ML
INJECTION, SOLUTION INTRAVENOUS
Status: COMPLETED
Start: 2023-08-15 | End: 2023-08-15

## 2023-08-15 RX ORDER — CEFTRIAXONE 500 MG/1
500 INJECTION, POWDER, FOR SOLUTION INTRAMUSCULAR; INTRAVENOUS ONCE
Status: COMPLETED | OUTPATIENT
Start: 2023-08-15 | End: 2023-08-15

## 2023-08-15 RX ADMIN — WATER 10 ML: 1 INJECTION INTRAMUSCULAR; INTRAVENOUS; SUBCUTANEOUS at 19:02

## 2023-08-15 RX ADMIN — CEFTRIAXONE SODIUM 500 MG: 500 INJECTION, POWDER, FOR SOLUTION INTRAMUSCULAR; INTRAVENOUS at 19:02

## 2023-08-15 NOTE — ED NOTES
Mode of arrival (squad #, walk in, police, etc) : walk in         Chief complaint(s): std exposure         Arrival Note (brief scenario, treatment PTA, etc). : Positive for gonorrhea. Pt would like a refill on her lithium. C= \"Have you ever felt that you should Cut down on your drinking? \"  No  A= \"Have people Annoyed you by criticizing your drinking? \"  No  G= \"Have you ever felt bad or Guilty about your drinking? \"  No  E= \"Have you ever had a drink as an Eye-opener first thing in the morning to steady your nerves or to help a hangover? \"  No      Deferred []      Reason for deferring: N/A    *If yes to two or more: probable alcohol abuse. Keith Silva RN  08/15/23 [id number]

## 2023-08-19 ENCOUNTER — HOSPITAL ENCOUNTER (EMERGENCY)
Age: 43
Discharge: HOME | End: 2023-08-19
Attending: EMERGENCY MEDICINE
Payer: COMMERCIAL

## 2023-08-19 VITALS
DIASTOLIC BLOOD PRESSURE: 74 MMHG | OXYGEN SATURATION: 98 % | SYSTOLIC BLOOD PRESSURE: 130 MMHG | HEART RATE: 74 BPM | RESPIRATION RATE: 16 BRPM | TEMPERATURE: 98 F

## 2023-08-19 DIAGNOSIS — R45.4 ANGER REACTION: Primary | ICD-10-CM

## 2023-08-19 PROCEDURE — 99282 EMERGENCY DEPT VISIT SF MDM: CPT

## 2023-08-19 ASSESSMENT — ENCOUNTER SYMPTOMS
ABDOMINAL PAIN: 0
NAUSEA: 0
VOMITING: 0
SHORTNESS OF BREATH: 0
COUGH: 0

## 2023-08-19 ASSESSMENT — PAIN - FUNCTIONAL ASSESSMENT
PAIN_FUNCTIONAL_ASSESSMENT: ADULT NONVERBAL PAIN SCALE (NPVS)
PAIN_FUNCTIONAL_ASSESSMENT: NONE - DENIES PAIN

## 2023-08-19 NOTE — ED TRIAGE NOTES
Mode of arrival (squad #, walk in, police, etc) : [facility]        Chief complaint(s): mental health        Arrival Note (brief scenario, treatment PTA, etc). : out of meds, homeless, pt refusing V/S,         C= \"Have you ever felt that you should Cut down on your drinking? \"  No  A= \"Have people Annoyed you by criticizing your drinking? \"  No  G= \"Have you ever felt bad or Guilty about your drinking? \"  No  E= \"Have you ever had a drink as an Eye-opener first thing in the morning to steady your nerves or to help a hangover? \"  No      Deferred []      Reason for deferring: N/A    *If yes to two or more: probable alcohol abuse. *

## 2023-08-20 NOTE — ED NOTES
SW arranged pt transportation through [facility] [place] and the [facility] and [address] [place] [address] Drive to pt's sister's home.
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
## 2023-08-29 ENCOUNTER — HOSPITAL ENCOUNTER (INPATIENT)
Age: 43
LOS: 3 days | Discharge: HOME | End: 2023-09-01
Attending: EMERGENCY MEDICINE | Admitting: PSYCHIATRY & NEUROLOGY
Payer: COMMERCIAL

## 2023-08-29 DIAGNOSIS — E87.6 HYPOKALEMIA: ICD-10-CM

## 2023-08-29 DIAGNOSIS — F39 MOOD DISORDER (HCC): ICD-10-CM

## 2023-08-29 DIAGNOSIS — R45.851 SUICIDAL THOUGHTS: Primary | ICD-10-CM

## 2023-08-29 PROBLEM — F25.9 SCHIZOAFFECTIVE DISORDER (HCC): Status: ACTIVE | Noted: 2023-08-29

## 2023-08-29 PROBLEM — F25.0 SCHIZOAFFECTIVE DISORDER, BIPOLAR TYPE (HCC): Status: ACTIVE | Noted: 2023-08-29

## 2023-08-29 LAB
AMORPH SED URNS QL MICRO: ABNORMAL
AMPHET UR QL SCN: NEGATIVE
ANION GAP SERPL CALCULATED.3IONS-SCNC: 8 MMOL/L (ref 9–17)
B-HCG SERPL EIA 3RD IS-ACNC: <1 MIU/ML
BACTERIA URNS QL MICRO: ABNORMAL
BARBITURATES UR QL SCN: NEGATIVE
BASOPHILS # BLD: 0 K/UL (ref 0–0.2)
BASOPHILS NFR BLD: 1 % (ref 0–2)
BENZODIAZ UR QL: NEGATIVE
BILIRUB UR QL STRIP: NEGATIVE
BUN SERPL-MCNC: 7 MG/DL (ref 6–20)
CALCIUM SERPL-MCNC: 9.8 MG/DL (ref 8.6–10.4)
CANNABINOIDS UR QL SCN: POSITIVE
CHLORIDE SERPL-SCNC: 104 MMOL/L (ref 98–107)
CLARITY UR: ABNORMAL
CO2 SERPL-SCNC: 32 MMOL/L (ref 20–31)
COCAINE UR QL SCN: POSITIVE
COLOR UR: YELLOW
CREAT SERPL-MCNC: 0.7 MG/DL (ref 0.5–0.9)
EOSINOPHIL # BLD: 0.3 K/UL (ref 0–0.4)
EOSINOPHILS RELATIVE PERCENT: 3 % (ref 0–4)
EPI CELLS #/AREA URNS HPF: ABNORMAL /HPF
ERYTHROCYTE [DISTWIDTH] IN BLOOD BY AUTOMATED COUNT: 14.9 % (ref 11.5–14.9)
ETHANOL PERCENT: <0.01 %
ETHANOLAMINE SERPL-MCNC: <10 MG/DL
FENTANYL UR QL: POSITIVE
GFR SERPL CREATININE-BSD FRML MDRD: >60 ML/MIN/1.73M2
GLUCOSE SERPL-MCNC: 101 MG/DL (ref 70–99)
GLUCOSE UR STRIP-MCNC: NEGATIVE MG/DL
HCT VFR BLD AUTO: 43.6 % (ref 36–46)
HGB BLD-MCNC: 14 G/DL (ref 12–16)
HGB UR QL STRIP.AUTO: ABNORMAL
KETONES UR STRIP-MCNC: NEGATIVE MG/DL
LEUKOCYTE ESTERASE UR QL STRIP: ABNORMAL
LYMPHOCYTES NFR BLD: 2.1 K/UL (ref 1–4.8)
LYMPHOCYTES RELATIVE PERCENT: 26 % (ref 24–44)
MCH RBC QN AUTO: 27.2 PG (ref 26–34)
MCHC RBC AUTO-ENTMCNC: 32.2 G/DL (ref 31–37)
MCV RBC AUTO: 84.6 FL (ref 80–100)
METHADONE UR QL: NEGATIVE
MONOCYTES NFR BLD: 0.6 K/UL (ref 0.1–1.3)
MONOCYTES NFR BLD: 8 % (ref 1–7)
NEUTROPHILS NFR BLD: 62 % (ref 36–66)
NEUTS SEG NFR BLD: 5 K/UL (ref 1.3–9.1)
NITRITE UR QL STRIP: NEGATIVE
OPIATES UR QL SCN: NEGATIVE
OXYCODONE UR QL SCN: NEGATIVE
PCP UR QL SCN: NEGATIVE
PH UR STRIP: 7.5 [PH] (ref 5–8)
PLATELET # BLD AUTO: 264 K/UL (ref 150–450)
PMV BLD AUTO: 8.9 FL (ref 6–12)
POTASSIUM SERPL-SCNC: 3.3 MMOL/L (ref 3.7–5.3)
PROT UR STRIP-MCNC: NEGATIVE MG/DL
RBC # BLD AUTO: 5.15 M/UL (ref 4–5.2)
RBC #/AREA URNS HPF: ABNORMAL /HPF
SODIUM SERPL-SCNC: 144 MMOL/L (ref 135–144)
SP GR UR STRIP: 1.01 (ref 1–1.03)
TEST INFORMATION: ABNORMAL
UROBILINOGEN UR STRIP-ACNC: NORMAL EU/DL (ref 0–1)
WBC #/AREA URNS HPF: ABNORMAL /HPF
WBC OTHER # BLD: 8 K/UL (ref 3.5–11)

## 2023-08-29 PROCEDURE — 6370000000 HC RX 637 (ALT 250 FOR IP): Performed by: EMERGENCY MEDICINE

## 2023-08-29 PROCEDURE — 85025 COMPLETE CBC W/AUTO DIFF WBC: CPT

## 2023-08-29 PROCEDURE — 84702 CHORIONIC GONADOTROPIN TEST: CPT

## 2023-08-29 PROCEDURE — 81001 URINALYSIS AUTO W/SCOPE: CPT

## 2023-08-29 PROCEDURE — G0480 DRUG TEST DEF 1-7 CLASSES: HCPCS

## 2023-08-29 PROCEDURE — 87491 CHLMYD TRACH DNA AMP PROBE: CPT

## 2023-08-29 PROCEDURE — 6370000000 HC RX 637 (ALT 250 FOR IP): Performed by: PSYCHIATRY & NEUROLOGY

## 2023-08-29 PROCEDURE — 80307 DRUG TEST PRSMV CHEM ANLYZR: CPT

## 2023-08-29 PROCEDURE — 1240000000 HC EMOTIONAL WELLNESS R&B

## 2023-08-29 PROCEDURE — 80048 BASIC METABOLIC PNL TOTAL CA: CPT

## 2023-08-29 PROCEDURE — 87591 N.GONORRHOEAE DNA AMP PROB: CPT

## 2023-08-29 PROCEDURE — 99285 EMERGENCY DEPT VISIT HI MDM: CPT

## 2023-08-29 PROCEDURE — 6370000000 HC RX 637 (ALT 250 FOR IP): Performed by: NURSE PRACTITIONER

## 2023-08-29 PROCEDURE — 36415 COLL VENOUS BLD VENIPUNCTURE: CPT

## 2023-08-29 RX ORDER — POLYETHYLENE GLYCOL 3350 17 G
2 POWDER IN PACKET (EA) ORAL
Status: DISCONTINUED | OUTPATIENT
Start: 2023-08-29 | End: 2023-09-01 | Stop reason: HOSPADM

## 2023-08-29 RX ORDER — POTASSIUM CHLORIDE 20 MEQ/1
40 TABLET, EXTENDED RELEASE ORAL ONCE
Status: COMPLETED | OUTPATIENT
Start: 2023-08-29 | End: 2023-08-29

## 2023-08-29 RX ORDER — HALOPERIDOL 5 MG/1
5 TABLET ORAL EVERY 6 HOURS PRN
Status: DISCONTINUED | OUTPATIENT
Start: 2023-08-29 | End: 2023-09-01 | Stop reason: HOSPADM

## 2023-08-29 RX ORDER — LORAZEPAM 1 MG/1
2 TABLET ORAL EVERY 6 HOURS PRN
Status: DISCONTINUED | OUTPATIENT
Start: 2023-08-29 | End: 2023-09-01 | Stop reason: HOSPADM

## 2023-08-29 RX ORDER — ZIPRASIDONE HYDROCHLORIDE 40 MG/1
40 CAPSULE ORAL 2 TIMES DAILY WITH MEALS
Status: DISCONTINUED | OUTPATIENT
Start: 2023-08-29 | End: 2023-08-31

## 2023-08-29 RX ORDER — HALOPERIDOL 5 MG/ML
5 INJECTION INTRAMUSCULAR EVERY 6 HOURS PRN
Status: DISCONTINUED | OUTPATIENT
Start: 2023-08-29 | End: 2023-09-01 | Stop reason: HOSPADM

## 2023-08-29 RX ORDER — LORAZEPAM 2 MG/ML
2 INJECTION INTRAMUSCULAR EVERY 6 HOURS PRN
Status: DISCONTINUED | OUTPATIENT
Start: 2023-08-29 | End: 2023-09-01 | Stop reason: HOSPADM

## 2023-08-29 RX ORDER — ACETAMINOPHEN 325 MG/1
650 TABLET ORAL EVERY 4 HOURS PRN
Status: DISCONTINUED | OUTPATIENT
Start: 2023-08-29 | End: 2023-09-01 | Stop reason: HOSPADM

## 2023-08-29 RX ORDER — LANOLIN ALCOHOL/MO/W.PET/CERES
3 CREAM (GRAM) TOPICAL NIGHTLY
Status: DISCONTINUED | OUTPATIENT
Start: 2023-08-29 | End: 2023-08-29

## 2023-08-29 RX ORDER — MAGNESIUM HYDROXIDE/ALUMINUM HYDROXICE/SIMETHICONE 120; 1200; 1200 MG/30ML; MG/30ML; MG/30ML
30 SUSPENSION ORAL EVERY 6 HOURS PRN
Status: DISCONTINUED | OUTPATIENT
Start: 2023-08-29 | End: 2023-09-01 | Stop reason: HOSPADM

## 2023-08-29 RX ORDER — LITHIUM CARBONATE 300 MG/1
300 TABLET, FILM COATED, EXTENDED RELEASE ORAL EVERY 12 HOURS SCHEDULED
Status: DISCONTINUED | OUTPATIENT
Start: 2023-08-29 | End: 2023-09-01 | Stop reason: HOSPADM

## 2023-08-29 RX ORDER — DIPHENHYDRAMINE HYDROCHLORIDE 50 MG/ML
50 INJECTION INTRAMUSCULAR; INTRAVENOUS EVERY 6 HOURS PRN
Status: DISCONTINUED | OUTPATIENT
Start: 2023-08-29 | End: 2023-09-01 | Stop reason: HOSPADM

## 2023-08-29 RX ORDER — LANOLIN ALCOHOL/MO/W.PET/CERES
3 CREAM (GRAM) TOPICAL NIGHTLY PRN
Status: DISCONTINUED | OUTPATIENT
Start: 2023-08-29 | End: 2023-09-01 | Stop reason: HOSPADM

## 2023-08-29 RX ORDER — POLYETHYLENE GLYCOL 3350 17 G/17G
17 POWDER, FOR SOLUTION ORAL DAILY PRN
Status: DISCONTINUED | OUTPATIENT
Start: 2023-08-29 | End: 2023-09-01 | Stop reason: HOSPADM

## 2023-08-29 RX ORDER — IBUPROFEN 400 MG/1
400 TABLET ORAL EVERY 6 HOURS PRN
Status: DISCONTINUED | OUTPATIENT
Start: 2023-08-29 | End: 2023-09-01 | Stop reason: HOSPADM

## 2023-08-29 RX ADMIN — IBUPROFEN 400 MG: 400 TABLET, FILM COATED ORAL at 19:48

## 2023-08-29 RX ADMIN — POTASSIUM CHLORIDE 40 MEQ: 1500 TABLET, EXTENDED RELEASE ORAL at 12:44

## 2023-08-29 RX ADMIN — LITHIUM CARBONATE 300 MG: 300 TABLET, FILM COATED, EXTENDED RELEASE ORAL at 21:42

## 2023-08-29 ASSESSMENT — PAIN DESCRIPTION - DESCRIPTORS: DESCRIPTORS: ACHING;DISCOMFORT

## 2023-08-29 ASSESSMENT — PATIENT HEALTH QUESTIONNAIRE - PHQ9
SUM OF ALL RESPONSES TO PHQ QUESTIONS 1-9: 0
SUM OF ALL RESPONSES TO PHQ QUESTIONS 1-9: 0
SUM OF ALL RESPONSES TO PHQ QUESTIONS 1-9: 22
1. LITTLE INTEREST OR PLEASURE IN DOING THINGS: 0
SUM OF ALL RESPONSES TO PHQ QUESTIONS 1-9: 0
2. FEELING DOWN, DEPRESSED OR HOPELESS: 0
SUM OF ALL RESPONSES TO PHQ9 QUESTIONS 1 & 2: 0
SUM OF ALL RESPONSES TO PHQ QUESTIONS 1-9: 0

## 2023-08-29 ASSESSMENT — SLEEP AND FATIGUE QUESTIONNAIRES
DO YOU HAVE DIFFICULTY SLEEPING: NO
DO YOU USE A SLEEP AID: NO
AVERAGE NUMBER OF SLEEP HOURS: 6

## 2023-08-29 ASSESSMENT — PAIN SCALES - GENERAL
PAINLEVEL_OUTOF10: 7
PAINLEVEL_OUTOF10: 10
PAINLEVEL_OUTOF10: 0

## 2023-08-29 ASSESSMENT — ENCOUNTER SYMPTOMS
VOMITING: 0
VOICE CHANGE: 0
TROUBLE SWALLOWING: 0
CHEST TIGHTNESS: 0
COLOR CHANGE: 0
SHORTNESS OF BREATH: 0
BACK PAIN: 0
NAUSEA: 0
PHOTOPHOBIA: 0
ABDOMINAL PAIN: 0
FACIAL SWELLING: 0
EYE PAIN: 0

## 2023-08-29 ASSESSMENT — LIFESTYLE VARIABLES
HOW OFTEN DO YOU HAVE A DRINK CONTAINING ALCOHOL: PATIENT DECLINED
HOW MANY STANDARD DRINKS CONTAINING ALCOHOL DO YOU HAVE ON A TYPICAL DAY: PATIENT DOES NOT DRINK
HOW OFTEN DO YOU HAVE A DRINK CONTAINING ALCOHOL: NEVER
HOW MANY STANDARD DRINKS CONTAINING ALCOHOL DO YOU HAVE ON A TYPICAL DAY: PATIENT DECLINED

## 2023-08-29 ASSESSMENT — PAIN - FUNCTIONAL ASSESSMENT
PAIN_FUNCTIONAL_ASSESSMENT: ACTIVITIES ARE NOT PREVENTED
PAIN_FUNCTIONAL_ASSESSMENT: NONE - DENIES PAIN

## 2023-08-29 ASSESSMENT — PAIN DESCRIPTION - LOCATION: LOCATION: NECK

## 2023-08-29 NOTE — BH NOTE
[address] [facility]  Admission Note     Admission Type:   Admission Type: Voluntary    Reason for admission:  Reason for Admission: SI no plan, anxious, agitated homless      Addictive Behavior:   Addictive Behavior  In the Past 3 Months, Have You Felt or Has Someone Told You That You Have a Problem With  : None    Medical Problems:   Past Medical History:   Diagnosis Date    Anxiety     Bipolar 1 disorder ([facility]) 1999    Depression     Gestational diabetes 7/22/2016    OCD (obsessive compulsive disorder) 1999    PTSD (post-traumatic stress disorder)     Rapid rate of speech     Schizoaffective disorder (HCC)        Status EXAM:  Mental Status and Behavioral Exam  Normal: No  Level of Assistance: Independent/Self  Facial Expression: Flat, Sad, Exaggerated  Affect: Appropriate, Blunt  Level of Consciousness: Alert  Frequency of Checks: 4 times per hour, close  Mood:Normal: No  Mood: Anxious  Motor Activity:Normal: No  Motor Activity: Increased, Agitated  Eye Contact: Poor  Observed Behavior: Impulsive, Hypermobile, Preoccupied  Sexual Misconduct History: Current - no  Preception: [place] to person, [place] to time, [place] to place, [place] to situation  Attention:Normal: No  Attention: Hyperalert  Thought Processes: Circumstantial  Thought Content:Normal: No  Thought Content: Poverty of content  Depression Symptoms: Feelings of helplessness, Feelings of hopelessess, Impaired concentration, Sleep disturbance  Anxiety Symptoms: Generalized  Micaela Symptoms: Increased energy, Poor judgment, Pressured speech, Psychomotor agitation  Hallucinations: None  Delusions: No  Memory:Normal: Yes  Insight and Judgment: No  Insight and Judgment: Poor judgment, Poor insight    Tobacco Screening:  Practical Counseling, on admission, nati X, if applicable and completed (first 3 are required if patient doesn't refuse):            ( ) Recognizing danger situations (included triggers and roadblocks)                    ( ) Coping ambulated independently, vitals taken, wanded, assessed, orientated to unit.    Jori Snider RN

## 2023-08-29 NOTE — BH NOTE
Patient arrived on the unit transported by two staff members via wheelchair signed voluntary in hand. Patient ambulated independently, vitals taken, wanded, assessed, orientated to unit.

## 2023-08-29 NOTE — ED PROVIDER NOTES
EMERGENCY DEPARTMENT ENCOUNTER    Pt Name: Martha Cassidy  MRN: [id number]  [address] [place] 1980  Date of evaluation: 8/29/23  CHIEF COMPLAINT       Chief Complaint   Patient presents with    Psychiatric Evaluation    Suicidal     Pt called [employer] to bring her to ED for suicidal ideations with no intent. Pt states that she has been off meds for a few months and homeless now for about two to three. HISTORY OF PRESENT ILLNESS   53-year-old female presenting to the ER complaining of manic thoughts racing thoughts and suicidal ideations without a plan. Patient states she was prescribed medications but has been altering them herself in order to best find a good combination. The history is provided by the patient. Mental Health Problem  Presenting symptoms: agitation and suicidal thoughts    Presenting symptoms: no hallucinations    Associated symptoms: no abdominal pain, no appetite change, no chest pain, no fatigue and no headaches          REVIEW OF SYSTEMS     Review of Systems   Constitutional:  Negative for activity change, appetite change and fatigue. HENT:  Negative for facial swelling, trouble swallowing and voice change. Eyes:  Negative for photophobia and pain. Respiratory:  Negative for chest tightness and shortness of breath. Cardiovascular:  Negative for chest pain and palpitations. Gastrointestinal:  Negative for abdominal pain, nausea and vomiting. Genitourinary:  Negative for dysuria and urgency. Musculoskeletal:  Negative for arthralgias and back pain. Skin:  Negative for color change and rash. Neurological:  Negative for dizziness, syncope and headaches. Psychiatric/Behavioral:  Positive for agitation and suicidal ideas. Negative for behavioral problems and hallucinations. The patient is hyperactive.     PASTMEDICAL HISTORY     Past Medical History:   Diagnosis Date    Anxiety     Bipolar 1 disorder ([address] ) 1999    Depression     Gestational diabetes 7/22/2016    OCD
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
## 2023-08-29 NOTE — BH NOTE
Patient given tobacco quitline number 52817646473 at this time, refusing to call at this time, states \" I just dont want to quit now\"- patient given information as to the dangers of long term tobacco use. Continue to reinforce the importance of tobacco cessation.

## 2023-08-30 PROBLEM — R45.851 SUICIDAL THOUGHTS: Status: ACTIVE | Noted: 2023-08-30

## 2023-08-30 PROBLEM — F11.10 OPIATE ABUSE, EPISODIC (HCC): Status: ACTIVE | Noted: 2023-08-30

## 2023-08-30 LAB
C TRACH DNA SPEC QL PROBE+SIG AMP: NEGATIVE
LITHIUM DATE LAST DOSE: ABNORMAL
LITHIUM DOSE AMOUNT: 300
LITHIUM DOSE TIME: 804
LITHIUM LEVEL: 0.2 MMOL/L (ref 0.6–1.2)
N GONORRHOEA DNA SPEC QL PROBE+SIG AMP: NEGATIVE
SPECIMEN DESCRIPTION: NORMAL

## 2023-08-30 PROCEDURE — 6370000000 HC RX 637 (ALT 250 FOR IP): Performed by: NURSE PRACTITIONER

## 2023-08-30 PROCEDURE — 99222 1ST HOSP IP/OBS MODERATE 55: CPT | Performed by: INTERNAL MEDICINE

## 2023-08-30 PROCEDURE — 6370000000 HC RX 637 (ALT 250 FOR IP): Performed by: PSYCHIATRY & NEUROLOGY

## 2023-08-30 PROCEDURE — 1240000000 HC EMOTIONAL WELLNESS R&B

## 2023-08-30 PROCEDURE — 6370000000 HC RX 637 (ALT 250 FOR IP): Performed by: INTERNAL MEDICINE

## 2023-08-30 PROCEDURE — 36415 COLL VENOUS BLD VENIPUNCTURE: CPT

## 2023-08-30 PROCEDURE — 80178 ASSAY OF LITHIUM: CPT

## 2023-08-30 PROCEDURE — 99223 1ST HOSP IP/OBS HIGH 75: CPT | Performed by: PSYCHIATRY & NEUROLOGY

## 2023-08-30 RX ORDER — POTASSIUM CHLORIDE 20 MEQ/1
40 TABLET, EXTENDED RELEASE ORAL ONCE
Status: COMPLETED | OUTPATIENT
Start: 2023-08-30 | End: 2023-08-30

## 2023-08-30 RX ORDER — LIDOCAINE 4 G/G
1 PATCH TOPICAL DAILY
Status: DISCONTINUED | OUTPATIENT
Start: 2023-08-30 | End: 2023-09-01 | Stop reason: HOSPADM

## 2023-08-30 RX ADMIN — IBUPROFEN 400 MG: 400 TABLET, FILM COATED ORAL at 17:08

## 2023-08-30 RX ADMIN — IBUPROFEN 400 MG: 400 TABLET, FILM COATED ORAL at 08:05

## 2023-08-30 RX ADMIN — ACETAMINOPHEN 650 MG: 325 TABLET ORAL at 02:13

## 2023-08-30 RX ADMIN — ACETAMINOPHEN 650 MG: 325 TABLET ORAL at 19:28

## 2023-08-30 RX ADMIN — LITHIUM CARBONATE 300 MG: 300 TABLET, FILM COATED, EXTENDED RELEASE ORAL at 21:53

## 2023-08-30 RX ADMIN — POTASSIUM CHLORIDE 40 MEQ: 1500 TABLET, EXTENDED RELEASE ORAL at 14:51

## 2023-08-30 RX ADMIN — LITHIUM CARBONATE 300 MG: 300 TABLET, FILM COATED, EXTENDED RELEASE ORAL at 08:04

## 2023-08-30 ASSESSMENT — PAIN SCALES - GENERAL
PAINLEVEL_OUTOF10: 7
PAINLEVEL_OUTOF10: 7
PAINLEVEL_OUTOF10: 0
PAINLEVEL_OUTOF10: 0
PAINLEVEL_OUTOF10: 7
PAINLEVEL_OUTOF10: 7

## 2023-08-30 ASSESSMENT — PAIN DESCRIPTION - LOCATION
LOCATION: NECK

## 2023-08-30 ASSESSMENT — PAIN DESCRIPTION - DESCRIPTORS: DESCRIPTORS: ACHING

## 2023-08-30 NOTE — PLAN OF CARE
Problem: Chronic Conditions and Co-morbidities  Goal: Patient's chronic conditions and co-morbidity symptoms are monitored and maintained or improved  8/30/2023 0901 by Jennifer Carey RN  Outcome: Progressing- Patient's chronic conditions and co-morbidities are monitored by advanced practice providers and nurses. Medications administered as ordered. Problem: Self Harm/Suicidality  Goal: Will have no self-injury during hospital stay  Description: INTERVENTIONS:  1. Ensure constant observer at bedside with Q15M safety checks  2. Maintain a safe environment  3. Secure patient belongings  4. Ensure family/visitors adhere to safety recommendations  5. Ensure safety tray has been added to patient's diet order  6. Every shift and PRN: Re-assess suicidal risk via Frequent Screener    8/30/2023 0901 by Jennifer Carey RN  Outcome: Progressing- Patient denies thoughts of suicide and denies thoughts of self harm     Problem: Depression  Goal: Will be euthymic at discharge  Description: INTERVENTIONS:  1. Administer medication as ordered  2. Provide emotional support via 1:1 interaction with staff  3. Encourage involvement in milieu/groups/activities  4. Monitor for social isolation  8/30/2023 0901 by Jennifer Carey RN  Outcome: Progressing- patient reports that she believes her depression is \"circumstantial.\" She believes that if she can \"get her life together\" her depression would decrease. Problem: Micaela  Goal: Will exhibit normal sleep and speech and no impulsivity  Description: INTERVENTIONS:  1. Administer medication as ordered  2. Set limits on impulsive behavior  3. Make attempts to decrease external stimuli as possible  8/30/2023 0901 by Jennifer Carey RN  Outcome: Progressing- Medication for micaela is administered as ordered. Manic behaviors and symptoms of micaela monitored.  Patient no longer appears to have flight of ideas     Problem: Anxiety  Goal: Will report anxiety at manageable

## 2023-08-30 NOTE — GROUP NOTE
Psych-Ed/Relapse Prevention Group Note        Date: August 30, 2023 Start Time: 2:30pm  End Time:  3:15pm      Number of Participants in Group & Unit Census:  8/19    Topic: Coping skills    Goal of Group:Patient will identify benefits of music for coping and stress management      Comments:     Patient did not participate in Psych-Ed/Relapse Prevention group, despite staff encouragement and explanation of benefits. Patient remain seclusive to self. Q15 minute safety checks maintained for patient safety and will continue to encourage patient to attend unit programming.          Signature:  Nina Sen

## 2023-08-30 NOTE — H&P
[facility]  Taylor Umana MD; Samantha Shea MD; Kevon Saleem MD; MD Matthieu Delgado MD; MD [facility]   [address]    HISTORY AND PHYSICAL EXAMINATION            Date:   8/30/2023  Patient name:  Jeremiah Hensley  Date of admission:  8/29/2023 10:45 AM  MRN:   [id number]  Account:  [de-identified]  YOB: 1980  PCP:    MAURICE Bernard NP  Room:   [facility]012-  Code Status:    Full Code    Chief Complaint:     Chief Complaint   Patient presents with    Psychiatric Evaluation    Suicidal     Pt called [facility] to bring her to ED for suicidal ideations with no intent. Pt states that she has been off meds for a few months and homeless now for about two to three. Neck pain    History Obtained From:   Pt medical record and nursing staff        History of Present Illness:     Jeremiah Hensley is a 37 y.o. Non- / non  female who presents with Psychiatric Evaluation and Suicidal (Pt called [facility] to bring her to ED for suicidal ideations with no intent. Pt states that she has been off meds for a few months and homeless now for about two to three.)   and is admitted to the hospital for the management of Schizoaffective disorder ([address] [facility]).     40-year-old lady history of mental health disorder complains of chronic neck pain no weakness exam no neuropathy no weakness no ataxia  Aggravated by movement no significant recent injury reported by patient      Past Medical History:     Past Medical History:   Diagnosis Date    Anxiety     Bipolar 1 disorder ([address]) 1999    Depression     Gestational diabetes 7/22/2016    OCD (obsessive compulsive disorder) 1999    PTSD (post-traumatic stress disorder)     Rapid rate of speech     Schizoaffective disorder ([address])         Past Surgical History:     Past Surgical History:   Procedure Laterality Date    ABDOMEN SURGERY

## 2023-08-30 NOTE — PLAN OF CARE
[facility]  Initial Interdisciplinary Treatment Plan NO      Original treatment plan Date & Time: 8/30/2023   1255    Admission Type:  Admission Type: Voluntary    Reason for admission:   Reason for Admission: SI no plan, anxious, agitated homless    Estimated Length of Stay:  5-7days  Estimated Discharge Date: to be determined by physician    PATIENT STRENGTHS:  Patient Strengths:   Patient Strengths and Limitations:Limitations: Difficult relationships / poor social skills, Perceives need for assistance with self-care, Multiple barriers to leisure interests, Inappropriate/potentially harmful leisure interests, Difficulty problem solving/relies on others to help solve problems  Addictive Behavior: Addictive Behavior  In the Past 3 Months, Have You Felt or Has Someone Told You That You Have a Problem With  : None  Medical Problems:  Past Medical History:   Diagnosis Date    Anxiety     Bipolar 1 disorder ([address]) 1999    Depression     Gestational diabetes 7/22/2016    OCD (obsessive compulsive disorder) 1999    PTSD (post-traumatic stress disorder)     Rapid rate of speech     Schizoaffective disorder ([address])      Status EXAM:Mental Status and Behavioral Exam  Normal: No  Level of Assistance: Independent/Self  Facial Expression: Expressionless, Sad  Affect: Unstable, Blunt  Level of Consciousness: Alert  Frequency of Checks: 4 times per hour, close  Mood:Normal: No  Mood: Depressed, Empty, Anxious  Motor Activity:Normal: Yes  Motor Activity: Increased, Agitated  Eye Contact: Fair  Observed Behavior: Cooperative, Preoccupied, Guarded  Sexual Misconduct History: Current - no  Preception: Wayne to person, Wayne to time, Wayne to place, Wayne to situation  Attention:Normal: No  Attention: Hyperalert  Thought Processes: Circumstantial  Thought Content:Normal: No  Thought Content: Preoccupations, Poverty of content  Depression Symptoms: Change in energy level, Feelings of helplessness, Feelings of

## 2023-08-30 NOTE — PROGRESS NOTES
Behavioral Services  Medicare Certification Upon Admission    I certify that this patient's inpatient psychiatric hospital admission is medically necessary for:    [x] (1) Treatment which could reasonably be expected to improve this patient's condition,       [x] (2) Or for diagnostic study;     AND     [x](2) The inpatient psychiatric services are provided while the individual is under the care of a physician and are included in the individualized plan of care.     Estimated length of stay/service 2-9 days    Plan for post-hospital care -outpatient care    Electronically signed by Nic Xiao MD on 8/30/2023 at 11:38 AM

## 2023-08-30 NOTE — GROUP NOTE
Group Therapy Note    Date: 8/30/2023    Group Start Time: 1000  Group End Time: 4847  Group Topic: Psychotherapy    [address] [place] [address] GABY Geronimo, MELW        Group Therapy Note    Attendees: 8/21         Patient refused to attend psychotherapy group at 10:00 am after encouragement from staff. 1:1 talk time provided as alternative to group session.     Discipline Responsible: /Counselor      Signature:  Severa Mody, MSW, RHONDA

## 2023-08-30 NOTE — PLAN OF CARE
Problem: Chronic Conditions and Co-morbidities  Goal: Patient's chronic conditions and co-morbidity symptoms are monitored and maintained or improved  8/30/2023 1447 by [facility]  Outcome: Progressing  Pt has been cooperative with vital signs. Medication compliant. Pt. Remains safe on the unit. Q 15 minute checks for safety maintained. Problem: Self Harm/Suicidality  Goal: Will have no self-injury during hospital stay  Description: INTERVENTIONS:  1. Ensure constant observer at bedside with Q15M safety checks  2. Maintain a safe environment  3. Secure patient belongings  4. Ensure family/visitors adhere to safety recommendations  5. Ensure safety tray has been added to patient's diet order  6. Every shift and PRN: Re-assess suicidal risk via Frequent Screener    8/30/2023 1447 by [facility]  Outcome: Progressing  Pt denies current suicidal thoughts. Pt. Remains safe on the unit. Q 15 minute checks for safety maintained. Problem: Depression  Goal: Will be euthymic at discharge  Description: INTERVENTIONS:  1. Administer medication as ordered  2. Provide emotional support via 1:1 interaction with staff  3. Encourage involvement in milieu/groups/activities  4. Monitor for social isolation  8/30/2023 1447 by [facility]  Outcome: Progressing  Pt. Says she has been off of medications. Mood swings and has been depressed. Problem: Micaela  Goal: Will exhibit normal sleep and speech and no impulsivity  Description: INTERVENTIONS:  1. Administer medication as ordered  2. Set limits on impulsive behavior  3. Make attempts to decrease external stimuli as possible  8/30/2023 1447 by [facility]  Outcome: Progressing  Pt. Has not had any manic behaviors. Pt relates she is very tired and has been resting in bed except for meals and needs. Pt did come out this afternoon and take a shower.

## 2023-08-30 NOTE — PLAN OF CARE
Problem: Chronic Conditions and Co-morbidities  Goal: Patient's chronic conditions and co-morbidity symptoms are monitored and maintained or improved  Outcome: Progressing     Problem: Self Harm/Suicidality  Goal: Will have no self-injury during hospital stay  Description: INTERVENTIONS:  1. Ensure constant observer at bedside with Q15M safety checks  2. Maintain a safe environment  3. Secure patient belongings  4. Ensure family/visitors adhere to safety recommendations  5. Ensure safety tray has been added to patient's diet order  6. Every shift and PRN: Re-assess suicidal risk via Frequent Screener    Outcome: Progressing  Note: Patient remains free from self harm and injury during shift. Problem: Depression  Goal: Will be euthymic at discharge  Description: INTERVENTIONS:  1. Administer medication as ordered  2. Provide emotional support via 1:1 interaction with staff  3. Encourage involvement in milieu/groups/activities  4. Monitor for social isolation  Outcome: Progressing  Note: Patient states she is upset and sad because she is stressed due to trying to find housing. Problem: Pain  Goal: Verbalizes/displays adequate comfort level or baseline comfort level  Outcome: Progressing     Problem: Micaela  Goal: Will exhibit normal sleep and speech and no impulsivity  Description: INTERVENTIONS:  1. Administer medication as ordered  2. Set limits on impulsive behavior  3. Make attempts to decrease external stimuli as possible  Outcome: Not Progressing  Note: Patient presents manic behavior such as hyper -verbal speech and tanglement in conversation. Patient appeared to be anxious and sad during assessment. She was isolative to her room the whole shift.

## 2023-08-30 NOTE — GROUP NOTE
Psych-Ed/Relapse Prevention Group Note        Date: August 30, 2023 Start Time: 11am  End Time: 11:45am      Number of Participants in Group & Unit Census:  10/20    Topic: Mental health education and advocacy    Goal of Group:Patient will identify benefits of mental healthy advocacy and education to decrease stigma      Comments:     Patient did not participate in Psych-Ed/Relapse Prevention group, despite staff encouragement and explanation of benefits. Patient remain seclusive to self. Q15 minute safety checks maintained for patient safety and will continue to encourage patient to attend unit programming.          Signature:  Nina Corona

## 2023-08-30 NOTE — GROUP NOTE
Group Therapy Note    Date: 8/30/2023    Group Start Time: 0900  Group End Time: 0930  Group Topic: Community Meeting    ANTON Calzada        Group Therapy Note    Attendees: 6/21     Community Meeting Group Note        Date: August 30, 2023 Start Time: 9am  End Time:  2755      Number of Participants in Group & Unit Census:  6/21    Topic: goal setting    Goal of Group: set short term goal for the day      Comments:     Patient did not participate in [employer] group, despite staff encouragement and explanation of benefits. Patient remain seclusive to self. Q15 minute safety checks maintained for patient safety and will continue to encourage patient to attend unit programming.

## 2023-08-30 NOTE — PROGRESS NOTES
Pharmacy Medication History Note      List of current medications patient is taking is complete. Patient was discharged from [facility] on 08-10-23. No change to medication list from S.    Please let me know if you have any questions about this encounter. Thank you!     Electronically signed by Chiara Roth [facility] on 8/30/2023 at 7:49 AM

## 2023-08-31 PROCEDURE — 6370000000 HC RX 637 (ALT 250 FOR IP): Performed by: PSYCHIATRY & NEUROLOGY

## 2023-08-31 PROCEDURE — 6370000000 HC RX 637 (ALT 250 FOR IP): Performed by: NURSE PRACTITIONER

## 2023-08-31 PROCEDURE — 1240000000 HC EMOTIONAL WELLNESS R&B

## 2023-08-31 PROCEDURE — 6370000000 HC RX 637 (ALT 250 FOR IP): Performed by: INTERNAL MEDICINE

## 2023-08-31 PROCEDURE — 99231 SBSQ HOSP IP/OBS SF/LOW 25: CPT | Performed by: INTERNAL MEDICINE

## 2023-08-31 PROCEDURE — 99232 SBSQ HOSP IP/OBS MODERATE 35: CPT | Performed by: PSYCHIATRY & NEUROLOGY

## 2023-08-31 RX ADMIN — LITHIUM CARBONATE 300 MG: 300 TABLET, FILM COATED, EXTENDED RELEASE ORAL at 20:49

## 2023-08-31 RX ADMIN — IBUPROFEN 400 MG: 400 TABLET, FILM COATED ORAL at 03:17

## 2023-08-31 RX ADMIN — ACETAMINOPHEN 650 MG: 325 TABLET ORAL at 20:49

## 2023-08-31 RX ADMIN — IBUPROFEN 400 MG: 400 TABLET, FILM COATED ORAL at 17:30

## 2023-08-31 RX ADMIN — ACETAMINOPHEN 650 MG: 325 TABLET ORAL at 09:00

## 2023-08-31 RX ADMIN — CARIPRAZINE 3 MG: 3 CAPSULE, GELATIN COATED ORAL at 20:53

## 2023-08-31 RX ADMIN — LITHIUM CARBONATE 300 MG: 300 TABLET, FILM COATED, EXTENDED RELEASE ORAL at 09:00

## 2023-08-31 ASSESSMENT — PAIN SCALES - GENERAL
PAINLEVEL_OUTOF10: 7
PAINLEVEL_OUTOF10: 4
PAINLEVEL_OUTOF10: 4

## 2023-08-31 ASSESSMENT — PAIN DESCRIPTION - DESCRIPTORS
DESCRIPTORS: ACHING
DESCRIPTORS: ACHING;DULL
DESCRIPTORS: ACHING

## 2023-08-31 ASSESSMENT — PAIN DESCRIPTION - LOCATION
LOCATION: NECK
LOCATION: BACK;NECK
LOCATION: NECK;BACK

## 2023-08-31 ASSESSMENT — PAIN DESCRIPTION - ORIENTATION: ORIENTATION: LOWER

## 2023-08-31 NOTE — GROUP NOTE
Psych-Ed/Relapse Prevention Group Note        Date: August 31, 2023 Start Time: 11am  End Time: 11:45am      Number of Participants in Group & Unit Census:  10/20    Topic: Communication skills    Goal of Group:Patient will identify ways to improve verbal communication      Comments:     Patient did not participate in Psych-Ed/Relapse Prevention group, despite staff encouragement and explanation of benefits. Patient remain seclusive to self. Q15 minute safety checks maintained for patient safety and will continue to encourage patient to attend unit programming.          Signature:  Kareem Culver

## 2023-08-31 NOTE — PLAN OF CARE
Problem: Pain  Goal: Verbalizes/displays adequate comfort level or baseline comfort level  8/30/2023 2148 by Lori Jenkins LPN  Outcome: Progressing   Patient denies pain at this time. Patient is compliant with medication regime. Educated to come to staff if needs arise. Continued fifteen minute checks for safety.

## 2023-08-31 NOTE — PROGRESS NOTES
Daily Progress Note  8/31/2023    Patient Name: Agnieszka Vaz    CHIEF COMPLAINT: Micaela and suicidal ideation         SUBJECTIVE:    Conor Bueno was seen for follow-up assessment today. She has been compliant with scheduled lithium but has refused Geodon. She has reported to staff that it makes her feel more suicidal.  Nursing staff report she has been frequently isolative to her room and resting for much of the day today. She has been in behavioral control and cooperative otherwise. Patient was approached in the day area and was agreeable to conversation in privacy of unit sensory room. Speech was less rapid and pressured today. Thoughts were linear, organized, and logical.  Patient reports that she is already feeling improvement in manic symptoms with the lithium. Writer inquired about barriers to patient maintaining treatment in the community and she reports that the biggest issue for her with antipsychotic medication are the sexual side effects. She identified Abilify and Geodon as being some of the worst.  She is forward thinking and expresses wanting to maintain compliance with treatment plan in the community. She continues to report she would like to move [address] [place] [address] to [place] to be near her children. She made no delusional or paranoid statements. She is denying auditory and visual hallucinations. Although modestly improving she has yet to demonstrate sustained stability and warrants further hospitalization for safety and stabilization.     Appetite:  [x] Adequate/Unchanged  [] Increased  [] Decreased      Sleep:       [x] Adequate/Unchanged  [] Fair  [] Poor      Group Attendance on Unit:   [] Yes   [x] Selectively    [] No    Compliant with scheduled medications: [] Yes  [x] No    Received emergency medications in past 24 hrs: [] Yes   [x] No    Medication Side Effects: Denies         Mental Status Exam  Level of consciousness: Awake and alert  Appearance:  Appropriate attire, seated on

## 2023-08-31 NOTE — GROUP NOTE
Psych-Ed/Relapse Prevention Group Note        Date: August 31, 2023 Start Time: 1:30pm  End Time:  2:15pm      Number of Participants in Group & Unit Census:  7/15    Topic: Socialization skills    Goal of Group:Patient will demonstrate improved interpersonal skills      Comments:     Patient did not participate in Psych-Ed/Relapse Prevention group, despite staff encouragement and explanation of benefits. Patient remain seclusive to self. Q15 minute safety checks maintained for patient safety and will continue to encourage patient to attend unit programming.          Signature:  Kareem Kern

## 2023-08-31 NOTE — GROUP NOTE
Group Therapy Note    Date: 8/31/2023    Group Start Time: 1000  Group End Time: 1030  Group Topic: Psychotherapy    [facility]    Dewayne Rome        Group Therapy Note    Attendees: 10/21       Patient's Goal:  PT will demonstrate increased interpersonal interaction and a clear understanding on multiple types of coping skills relating to the here-and-now therapeutic practice    Notes: Patient did not participate in group fully and left the group early. Status After Intervention:  Unchanged    Participation Level: Minimal    Participation Quality: Resistant      Speech:  normal      Thought Process/Content: Logical      Affective Functioning: Flat      Mood: depressed      Level of consciousness:  Alert and Attentive      Response to Learning: Able to verbalize/acknowledge new learning and Progressing to goal      Endings: None Reported    Modes of Intervention: Support, Socialization, Exploration, and Clarifying      Discipline Responsible: /Counselor      Signature:   Dewayne Rome

## 2023-09-01 VITALS
RESPIRATION RATE: 14 BRPM | TEMPERATURE: 98 F | HEART RATE: 97 BPM | HEIGHT: 68 IN | DIASTOLIC BLOOD PRESSURE: 87 MMHG | OXYGEN SATURATION: 100 % | BODY MASS INDEX: 26.22 KG/M2 | WEIGHT: 173 LBS | SYSTOLIC BLOOD PRESSURE: 131 MMHG

## 2023-09-01 PROCEDURE — 6370000000 HC RX 637 (ALT 250 FOR IP): Performed by: PSYCHIATRY & NEUROLOGY

## 2023-09-01 PROCEDURE — 6370000000 HC RX 637 (ALT 250 FOR IP): Performed by: INTERNAL MEDICINE

## 2023-09-01 PROCEDURE — 99239 HOSP IP/OBS DSCHRG MGMT >30: CPT | Performed by: PSYCHIATRY & NEUROLOGY

## 2023-09-01 PROCEDURE — 6370000000 HC RX 637 (ALT 250 FOR IP): Performed by: NURSE PRACTITIONER

## 2023-09-01 RX ORDER — LITHIUM CARBONATE 300 MG/1
300 TABLET, FILM COATED, EXTENDED RELEASE ORAL EVERY 12 HOURS SCHEDULED
Qty: 60 TABLET | Refills: 3 | Status: SHIPPED | OUTPATIENT
Start: 2023-09-01

## 2023-09-01 RX ADMIN — IBUPROFEN 400 MG: 400 TABLET, FILM COATED ORAL at 05:29

## 2023-09-01 RX ADMIN — IBUPROFEN 400 MG: 400 TABLET, FILM COATED ORAL at 12:49

## 2023-09-01 RX ADMIN — LITHIUM CARBONATE 300 MG: 300 TABLET, FILM COATED, EXTENDED RELEASE ORAL at 08:40

## 2023-09-01 RX ADMIN — CARIPRAZINE 3 MG: 3 CAPSULE, GELATIN COATED ORAL at 08:40

## 2023-09-01 ASSESSMENT — PAIN SCALES - GENERAL
PAINLEVEL_OUTOF10: 7
PAINLEVEL_OUTOF10: 6
PAINLEVEL_OUTOF10: 2
PAINLEVEL_OUTOF10: 7

## 2023-09-01 ASSESSMENT — PAIN DESCRIPTION - LOCATION
LOCATION: BACK
LOCATION: BACK;NECK;SHOULDER

## 2023-09-01 ASSESSMENT — PAIN DESCRIPTION - DESCRIPTORS: DESCRIPTORS: ACHING

## 2023-09-01 NOTE — CARE COORDINATION
Name: Agnieszka Vaz    : 1980    Discharge Date: 23    Primary Auth/Cert #: [id number]    Destination: Private residence    Discharge Medications:      Medication List        START taking these medications      cariprazine hcl 3 MG Caps capsule  Commonly known as: VRAYLAR  Take 1 capsule by mouth daily  Start taking on: 2023  Notes to patient: Mood             CONTINUE taking these medications      lithium 300 MG extended release tablet  Commonly known as: LITHOBID  Take 1 tablet by mouth every 12 hours  Notes to patient: Mood             STOP taking these medications      ziprasidone 40 MG capsule  Commonly known as: Twylla Sandhoff               Where to Get Your Medications        These medications were sent to [facility] [address] [facility], [address] [place] [address]      Phone: [phone]   cariprazine hcl 3 MG Caps capsule  lithium 300 MG extended release tablet         Follow Up Appointment:  [address], [place], [address] [place] [address] [phone]  fax [phone]  Go on 2023  You have an appointment at 8:40am

## 2023-09-01 NOTE — BH NOTE
Patient given tobacco quitline number 56778591982 at this time, refusing to call at this time, states \" I just dont want to quit now\"- patient given information as to the dangers of long term tobacco use. Continue to reinforce the importance of tobacco cessation.

## 2023-09-01 NOTE — PLAN OF CARE
Problem: Pain  Goal: Verbalizes/displays adequate comfort level or baseline comfort level  Outcome: Progressing   Patient is compliant with medication regime. Patient denies pain at this time. Isolative to room, out for needs. Patient educated to come to staff if needs arise, fifteen minute checks continue for safety. Addendum: Medicated per order for complaints of neck/back/shoulder pain at 0530.

## 2023-09-01 NOTE — GROUP NOTE
Group Therapy Note    Date: 9/1/2023    Group Start Time: 0900  Group End Time: 1000  Group Topic: Community Meeting    [address] [facility], LPN        Group Therapy Note    Attendees: 9/17       Patient's Goal:  Patient's goal is to stay on medications and communicate to the doctor about any issues that arise. Notes:  During group, we talked about goals and daily positive affirmations.     Status After Intervention:  Unchanged    Participation Level: Minimal    Participation Quality: Resistant      Speech:  hesitant      Thought Process/Content: Logical      Affective Functioning: Flat and Constricted/Restricted      Mood: anxious and depressed      Level of consciousness:  Alert      Response to Learning: Progressing to goal      Endings: None Reported    Modes of Intervention: Support and Socialization      Discipline Responsible: Licensed Practical Nurse      Signature:  Reji Garcia LPN

## 2023-09-01 NOTE — DISCHARGE SUMMARY
DISCHARGE SUMMARY      Patient ID:  Taylor Siegel  [id number]  63 y.o.  1980    Admit date: 8/29/2023    Discharge date and time: 9/1/2023    Disposition: Home     Admitting Physician: Nic Xiao MD     Discharge Physician: Dr Mich Price MD    Admission Diagnoses: Hypokalemia [E87.6]  Mood disorder ([address]) [F39]  Schizoaffective disorder ([address]) [F25.9]  Suicidal thoughts [R45.851]  Schizoaffective disorder, bipolar type ([address]) [F25.0]    Admission Condition: poor    Discharged Condition: stable    Admission Circumstance: Taylor Siegel is a 37 y.o. female who has a past medical history of bipolar disorder, depression, PTSD. Patient presented to the ED on 8/29/2023 per ED documentation:      \"37year-old female presenting to the ER complaining of manic thoughts racing thoughts and suicidal ideations without a plan. Patient states she was prescribed medications but has been altering them herself in order to best find a good combination. \"     Patient was asleep when entering the room however was awaken by verbal stimulus. Patient was cooperative and willing to partake in conversation. Pt was most recently admitted to [address] on 8/1/2023 where she was started on lithium. Pt reports compliance taking lithium and feels it has been helpful. As for her Geodon, pt states while taking the Geodon she began to have suicidal ideation as well as \"crying spells\" leading to the discontinuation of Geodon. She was scheduled to be seen at [facility] however pt states she did not like it there and stopped going. Instead she has been linked with [place], pt states she has not gone as finding a home has been her priority. Bertram Wong identifies her greatest stressor is homelessness, per social work documentation she was recently kicked out of the [facility].  She is self medicating and has been abusing different substances taking multiple \"puffs\" of crack cocaine or marijuana.  She explains that the last time she took attend outpatient follow up appointment and therapy. Patient was advised to call the outpatient provider, visit the nearest ED or call 911 if symptoms are not manageable. Medication List        START taking these medications      cariprazine hcl 3 MG Caps capsule  Commonly known as: VRAYLAR  Take 1 capsule by mouth daily  Start taking on: September 2, 2023            CONTINUE taking these medications      lithium 300 MG extended release tablet  Commonly known as: LITHOBID  Take 1 tablet by mouth every 12 hours            STOP taking these medications      ziprasidone 40 MG capsule  Commonly known as: GEODON               Where to Get Your Medications        These medications were sent to [facility] [address] [facility], [address] [address]      Phone: [phone]   cariprazine hcl 3 MG Caps capsule  lithium 300 MG extended release tablet               Core Measures statement:   Not applicable      TIME SPENT - 35 MINUTES TO COMPLETE THE EVALUATION, DISCHARGE SUMMARY, MEDICATION RECONCILIATION AND FOLLOW UP CARE                                         Jeannie Sotomayor is a 37 y.o. female being evaluated Kitty Doan MD on 9/1/2023 at 9:35 AM    An electronic signature was used to authenticate this note. **This report has been created using voice recognition software. It may contain minor errors which are inherent in voice recognition technology. **

## 2023-09-01 NOTE — BH NOTE
[facility]  Discharge Note    Pt discharged with followings belongings:   Dental Appliances: Uppers  Vision - Corrective Lenses: None  Hearing Aid: None  Jewelry: Ring, Earrings  Body Piercings Removed: N/A  Clothing: Footwear, Shorts, Shirt, Pants, Slippers, Socks, Sweater, Undergarments  Other Valuables: Jacinto Daniel, Personal Toiletries   Valuables sent home with or returned to patient.  Patient educated on aftercare instructions: yes  Information faxed to Elroy Tsang  by staff  at 9:44 AM .Patient verbalize understanding of AVS:  .    Status EXAM upon discharge:  Mental Status and Behavioral Exam  Normal: No (anxious)  Level of Assistance: Independent/Self  Facial Expression: Flat  Affect: Blunt  Level of Consciousness: Alert  Frequency of Checks: 4 times per hour, close  Mood:Normal: No  Mood: Anxious, Depressed  Motor Activity:Normal: No  Motor Activity: Increased  Eye Contact: Good  Observed Behavior: Cooperative, Preoccupied  Sexual Misconduct History: Current - no  Preception: Waldo to person, Waldo to time, Waldo to place, Waldo to situation  Attention:Normal: No  Attention: Distractible  Thought Processes: Circumstantial  Thought Content:Normal: No  Thought Content: Preoccupations  Depression Symptoms: Feelings of helplessness, Impaired concentration, Feelings of hopelessess  Anxiety Symptoms: Generalized  Micaela Symptoms: Pressured speech  Hallucinations: None  Delusions: No  Delusions: Paranoid  Memory:Normal: Yes  Memory: Poor recent  Insight and Judgment: No  Insight and Judgment: Poor judgment, Poor insight    Tobacco Screening:  Practical Counseling, on admission, nati X, if applicable and completed (first 3 are required if patient doesn't refuse):            ( ) Recognizing danger situations (included triggers and roadblocks)                    ( ) Coping skills (new ways to manage stress,relaxation techniques, changing routine, distraction)

## 2023-09-01 NOTE — GROUP NOTE
Psych-Ed/Relapse Prevention Group Note        Date: September 1, 2023 Start Time: 1:30pm  End Time:  2:15pm      Number of Participants in Group & Unit Census:  12/17    Topic: Communication skills    Goal of Group:Patient will identify benefits of alternate communication methods to improve interpersonal communication      Comments:     Patient did not participate in Psych-Ed/Relapse Prevention group, despite staff encouragement and explanation of benefits. Patient remain seclusive to self. Q15 minute safety checks maintained for patient safety and will continue to encourage patient to attend unit programming.          Signature:  Nina Chapman

## 2023-09-01 NOTE — PROGRESS NOTES
CLINICAL PHARMACY NOTE: MEDS TO BEDS    Total # of Prescriptions Filled: 1   The following medications were delivered to the patient:  Vraylar 3mg    Additional Documentation:  Delivered medications to nurses station

## 2023-09-01 NOTE — GROUP NOTE
Group Therapy Note    Date: 9/1/2023    Group Start Time: 1100  Group End Time: 6581  Group Topic: Psychoeducation    [facility]    JUANCARLOS Rouse    Group Therapy Note    Attendees: 10/17       Patient's Goal:  Patient will identify benefits of creative expression for coping and stress management    Notes:  Patient attended group and participated    Status After Intervention:  Improved    Participation Level:  Active Listener and Interactive    Participation Quality: Appropriate, Attentive    Speech:  normal      Thought Process/Content: Logical  Linear      Affective Functioning: Constricted      Mood: euthymic      Level of consciousness:  Alert, Oriented x4, and Attentive      Response to Learning: Able to verbalize current knowledge/experience, Able to verbalize/acknowledge new learning, Able to retain information      Endings: None Reported    Modes of Intervention: Education, Support, Socialization, and Exploration      Discipline Responsible: Psychoeducational Specialist      Signature:  Nina Rouse

## 2023-09-01 NOTE — DISCHARGE INSTRUCTIONS
Information:  Medications:   Medication summary provided   I understand that I should take only the medications on my list.     -why and when I need to take each medicine.     -which side effects to watch for.     -that I should carry my medication information at all times in case of     Emergency situations. I will take all of my medicines to follow up appointments.     -check with my physician or pharmacist before taking any new    Medication, over the counter product or drink alcohol.    -Ask about food, drug or dietary supplement interactions.    -discard old lists and update records with medication providers. Notify Physician:  Notify physician if you notice:   Always call 911 if you feel your life is in danger  In case of an emergency call 911 immediately! If 911 is not available call your local emergency medical system for help    Behavioral Health Follow Up:  Original Referral Source:radha  Discharge Diagnosis: Hypokalemia [E87.6]  Mood disorder ([address]) [F39]  Schizoaffective disorder ([address]) [F25.9]  Suicidal thoughts [R45.851]  Schizoaffective disorder, bipolar type ([address]) [F25.0]  Recommendations for Level of Care: continue medications, attend follow up appointment   Patient status at discharge: stable  My hospital  was: [facility]   Aftercare plan faxed: yes   -faxed by: staff   -date: 9/1/23   -time: 1600  Prescriptions: ***    Smoking: Quit Smoking. Call the [employer]'s smoking quitline at 4-693-05C-QUIT  Know the signs of a heart attack   If you have any of the following symptoms call 911 immediately, do not wait more    Than five minutes. 1. Pressure, fullness and/ or squeezing in the center of the chest spreading to    The jaw, neck or shoulder. 2. Chest discomfort with light headedness, fainting, sweating, nausea or    Shortness of breath. 3. Upper abdominal pressure or discomfort. 4. Lower chest pain, back pain, unusual fatigue, shortness of breath, nausea   Or dizziness. shortness of breath, nausea   Or dizziness. General Information:   Questions regarding your bill: Call HELP program [phone]     Suicide Hotline ([address] [place] [address])  (452) 325-6760      Recovery Help line- 176.503.6956      To obtain results of pending studies call Medical Records at: [phone]     For emergencies and 24 hour/7 days a week contact information:  [address] [phone])  What is COVID-19? COVID-19 is a disease caused by a type of coronavirus. This illness was first found in 2019 and has since spread worldwide (pandemic). Symptoms can range from mild, such as fever and body aches, to severe, including trouble breathing. COVID-19 can be deadly. Coronaviruses are a large group of viruses. Some types cause the common cold. Others cause more serious illnesses like Middle East respiratory syndrome (MERS) and severe acute respiratory syndrome (SARS). What are the symptoms? COVID-19 symptoms may include:  Fever. Cough. Trouble breathing. Chills or repeated shaking with chills. Muscle and body aches. Headache. Sore throat. New loss of taste or smell. Vomiting. Diarrhea. Stuffy or runny nose. In severe cases, COVID-19 can cause pneumonia and make it hard to breathe without help from a machine. It can cause death. How is it diagnosed? COVID-19 is diagnosed with a viral test. This may also be called a PCR test or antigen test. It looks for evidence of the virus in your breathing passages or lungs (respiratory system). The test is most often done on a sample from the nose, throat, or lungs. It's sometimes done on a sample of saliva. One way a sample is collected is by rubbing the swab in a [place] in each nostril. If you have questions about testing, ask your doctor or check the CDC website at cdc.gov for information. How is it treated? A mild case of COVID-19 can usually be treated at home.  Over-the-counter medicine like acetaminophen

## 2023-10-30 ENCOUNTER — HOSPITAL ENCOUNTER (INPATIENT)
Age: 43
LOS: 7 days | Discharge: HOME | DRG: 885 | End: 2023-11-06
Attending: STUDENT IN AN ORGANIZED HEALTH CARE EDUCATION/TRAINING PROGRAM | Admitting: PSYCHIATRY & NEUROLOGY
Payer: COMMERCIAL

## 2023-10-30 DIAGNOSIS — T14.91XA SUICIDAL BEHAVIOR WITH ATTEMPTED SELF-INJURY (HCC): Primary | ICD-10-CM

## 2023-10-30 DIAGNOSIS — F29 PSYCHOSIS, UNSPECIFIED PSYCHOSIS TYPE (HCC): ICD-10-CM

## 2023-10-30 LAB
ALBUMIN SERPL-MCNC: 4.1 G/DL (ref 3.5–5.2)
ALP SERPL-CCNC: 104 U/L (ref 35–104)
ALT SERPL-CCNC: 18 U/L (ref 5–33)
ANION GAP SERPL CALCULATED.3IONS-SCNC: 11 MMOL/L (ref 9–17)
AST SERPL-CCNC: 18 U/L
BASOPHILS # BLD: 0 K/UL (ref 0–0.2)
BASOPHILS NFR BLD: 1 % (ref 0–2)
BILIRUB SERPL-MCNC: 0.2 MG/DL (ref 0.3–1.2)
BUN SERPL-MCNC: 10 MG/DL (ref 6–20)
CALCIUM SERPL-MCNC: 9.5 MG/DL (ref 8.6–10.4)
CHLORIDE SERPL-SCNC: 102 MMOL/L (ref 98–107)
CO2 SERPL-SCNC: 27 MMOL/L (ref 20–31)
CREAT SERPL-MCNC: 0.5 MG/DL (ref 0.5–0.9)
EOSINOPHIL # BLD: 0.2 K/UL (ref 0–0.4)
EOSINOPHILS RELATIVE PERCENT: 4 % (ref 0–4)
ERYTHROCYTE [DISTWIDTH] IN BLOOD BY AUTOMATED COUNT: 14.4 % (ref 11.5–14.9)
ETHANOL PERCENT: <0.01 %
ETHANOLAMINE SERPL-MCNC: <10 MG/DL
GFR SERPL CREATININE-BSD FRML MDRD: >60 ML/MIN/1.73M2
GLUCOSE SERPL-MCNC: 97 MG/DL (ref 70–99)
HCG SERPL QL: NEGATIVE
HCT VFR BLD AUTO: 39.5 % (ref 36–46)
HGB BLD-MCNC: 12.5 G/DL (ref 12–16)
LYMPHOCYTES NFR BLD: 1.5 K/UL (ref 1–4.8)
LYMPHOCYTES RELATIVE PERCENT: 27 % (ref 24–44)
MCH RBC QN AUTO: 26.8 PG (ref 26–34)
MCHC RBC AUTO-ENTMCNC: 31.5 G/DL (ref 31–37)
MCV RBC AUTO: 84.8 FL (ref 80–100)
MONOCYTES NFR BLD: 0.5 K/UL (ref 0.1–1.3)
MONOCYTES NFR BLD: 9 % (ref 1–7)
NEUTROPHILS NFR BLD: 59 % (ref 36–66)
NEUTS SEG NFR BLD: 3.3 K/UL (ref 1.3–9.1)
PLATELET # BLD AUTO: 270 K/UL (ref 150–450)
PMV BLD AUTO: 8.3 FL (ref 6–12)
POTASSIUM SERPL-SCNC: 3.5 MMOL/L (ref 3.7–5.3)
PROT SERPL-MCNC: 6.6 G/DL (ref 6.4–8.3)
RBC # BLD AUTO: 4.66 M/UL (ref 4–5.2)
SODIUM SERPL-SCNC: 140 MMOL/L (ref 135–144)
WBC OTHER # BLD: 5.6 K/UL (ref 3.5–11)

## 2023-10-30 PROCEDURE — APPSS60 APP SPLIT SHARED TIME 46-60 MINUTES: Performed by: NURSE PRACTITIONER

## 2023-10-30 PROCEDURE — 2040000000 HC PSYCH ICU R&B

## 2023-10-30 PROCEDURE — 96372 THER/PROPH/DIAG INJ SC/IM: CPT

## 2023-10-30 PROCEDURE — 99285 EMERGENCY DEPT VISIT HI MDM: CPT

## 2023-10-30 PROCEDURE — 99222 1ST HOSP IP/OBS MODERATE 55: CPT | Performed by: INTERNAL MEDICINE

## 2023-10-30 PROCEDURE — 80053 COMPREHEN METABOLIC PANEL: CPT

## 2023-10-30 PROCEDURE — 36415 COLL VENOUS BLD VENIPUNCTURE: CPT

## 2023-10-30 PROCEDURE — 6360000002 HC RX W HCPCS: Performed by: STUDENT IN AN ORGANIZED HEALTH CARE EDUCATION/TRAINING PROGRAM

## 2023-10-30 PROCEDURE — 85025 COMPLETE CBC W/AUTO DIFF WBC: CPT

## 2023-10-30 PROCEDURE — 6370000000 HC RX 637 (ALT 250 FOR IP): Performed by: NURSE PRACTITIONER

## 2023-10-30 PROCEDURE — 84703 CHORIONIC GONADOTROPIN ASSAY: CPT

## 2023-10-30 PROCEDURE — G0480 DRUG TEST DEF 1-7 CLASSES: HCPCS

## 2023-10-30 RX ORDER — PALIPERIDONE 9 MG/1
9 TABLET, EXTENDED RELEASE ORAL DAILY
Qty: 14 TABLET | Refills: 0 | Status: ON HOLD | COMMUNITY
Start: 2023-10-16 | End: 2023-11-06 | Stop reason: HOSPADM

## 2023-10-30 RX ORDER — TRAZODONE HYDROCHLORIDE 50 MG/1
50 TABLET ORAL NIGHTLY PRN
Status: DISCONTINUED | OUTPATIENT
Start: 2023-10-30 | End: 2023-11-06 | Stop reason: HOSPADM

## 2023-10-30 RX ORDER — MAGNESIUM HYDROXIDE/ALUMINUM HYDROXICE/SIMETHICONE 120; 1200; 1200 MG/30ML; MG/30ML; MG/30ML
30 SUSPENSION ORAL EVERY 6 HOURS PRN
Status: DISCONTINUED | OUTPATIENT
Start: 2023-10-30 | End: 2023-11-06 | Stop reason: HOSPADM

## 2023-10-30 RX ORDER — ACETAMINOPHEN 325 MG/1
650 TABLET ORAL EVERY 4 HOURS PRN
Status: DISCONTINUED | OUTPATIENT
Start: 2023-10-30 | End: 2023-11-06 | Stop reason: HOSPADM

## 2023-10-30 RX ORDER — POLYETHYLENE GLYCOL 3350 17 G/17G
17 POWDER, FOR SOLUTION ORAL DAILY PRN
Status: DISCONTINUED | OUTPATIENT
Start: 2023-10-30 | End: 2023-11-06 | Stop reason: HOSPADM

## 2023-10-30 RX ORDER — NICOTINE 21 MG/24HR
1 PATCH, TRANSDERMAL 24 HOURS TRANSDERMAL DAILY
Qty: 14 PATCH | Refills: 0 | Status: ON HOLD | COMMUNITY
Start: 2023-10-16 | End: 2023-11-06 | Stop reason: HOSPADM

## 2023-10-30 RX ORDER — HALOPERIDOL 5 MG/ML
5 INJECTION INTRAMUSCULAR EVERY 6 HOURS PRN
Status: DISCONTINUED | OUTPATIENT
Start: 2023-10-30 | End: 2023-11-06 | Stop reason: HOSPADM

## 2023-10-30 RX ORDER — HALOPERIDOL 5 MG/1
5 TABLET ORAL EVERY 6 HOURS PRN
Status: DISCONTINUED | OUTPATIENT
Start: 2023-10-30 | End: 2023-11-06 | Stop reason: HOSPADM

## 2023-10-30 RX ORDER — HYDROXYZINE 50 MG/1
50 TABLET, FILM COATED ORAL 3 TIMES DAILY PRN
Status: DISCONTINUED | OUTPATIENT
Start: 2023-10-30 | End: 2023-11-06 | Stop reason: HOSPADM

## 2023-10-30 RX ORDER — NICOTINE 21 MG/24HR
1 PATCH, TRANSDERMAL 24 HOURS TRANSDERMAL DAILY
Status: DISCONTINUED | OUTPATIENT
Start: 2023-10-30 | End: 2023-11-06 | Stop reason: HOSPADM

## 2023-10-30 RX ORDER — DIPHENHYDRAMINE HYDROCHLORIDE 50 MG/ML
50 INJECTION INTRAMUSCULAR; INTRAVENOUS EVERY 6 HOURS PRN
Status: DISCONTINUED | OUTPATIENT
Start: 2023-10-30 | End: 2023-11-06 | Stop reason: HOSPADM

## 2023-10-30 RX ORDER — HALOPERIDOL 5 MG/ML
5 INJECTION INTRAMUSCULAR ONCE
Status: COMPLETED | OUTPATIENT
Start: 2023-10-30 | End: 2023-10-30

## 2023-10-30 RX ORDER — LORAZEPAM 2 MG/ML
2 INJECTION INTRAMUSCULAR EVERY 6 HOURS PRN
Status: DISCONTINUED | OUTPATIENT
Start: 2023-10-30 | End: 2023-11-06 | Stop reason: HOSPADM

## 2023-10-30 RX ORDER — IBUPROFEN 400 MG/1
400 TABLET ORAL EVERY 6 HOURS PRN
Status: DISCONTINUED | OUTPATIENT
Start: 2023-10-30 | End: 2023-11-06 | Stop reason: HOSPADM

## 2023-10-30 RX ORDER — LORAZEPAM 2 MG/ML
2 INJECTION INTRAMUSCULAR ONCE
Status: COMPLETED | OUTPATIENT
Start: 2023-10-30 | End: 2023-10-30

## 2023-10-30 RX ORDER — HYDROXYZINE HYDROCHLORIDE 25 MG/1
25 TABLET, FILM COATED ORAL 3 TIMES DAILY PRN
Status: ON HOLD | COMMUNITY
Start: 2023-10-15 | End: 2023-11-06 | Stop reason: HOSPADM

## 2023-10-30 RX ORDER — LORAZEPAM 1 MG/1
2 TABLET ORAL EVERY 6 HOURS PRN
Status: DISCONTINUED | OUTPATIENT
Start: 2023-10-30 | End: 2023-11-06 | Stop reason: HOSPADM

## 2023-10-30 RX ADMIN — TRAZODONE HYDROCHLORIDE 50 MG: 50 TABLET ORAL at 20:54

## 2023-10-30 RX ADMIN — HALOPERIDOL 5 MG: 5 TABLET ORAL at 20:54

## 2023-10-30 RX ADMIN — HALOPERIDOL LACTATE 5 MG: 5 INJECTION, SOLUTION INTRAMUSCULAR at 08:12

## 2023-10-30 RX ADMIN — HYDROXYZINE HYDROCHLORIDE 50 MG: 50 TABLET, FILM COATED ORAL at 20:54

## 2023-10-30 RX ADMIN — LORAZEPAM 2 MG: 2 INJECTION INTRAMUSCULAR; INTRAVENOUS at 08:12

## 2023-10-30 RX ADMIN — LORAZEPAM 2 MG: 1 TABLET ORAL at 20:54

## 2023-10-30 ASSESSMENT — LIFESTYLE VARIABLES
HOW MANY STANDARD DRINKS CONTAINING ALCOHOL DO YOU HAVE ON A TYPICAL DAY: PATIENT DECLINED
HOW OFTEN DO YOU HAVE A DRINK CONTAINING ALCOHOL: PATIENT DECLINED

## 2023-10-30 ASSESSMENT — PATIENT HEALTH QUESTIONNAIRE - PHQ9
SUM OF ALL RESPONSES TO PHQ QUESTIONS 1-9: 0
SUM OF ALL RESPONSES TO PHQ QUESTIONS 1-9: 0
2. FEELING DOWN, DEPRESSED OR HOPELESS: 0
SUM OF ALL RESPONSES TO PHQ QUESTIONS 1-9: 0
1. LITTLE INTEREST OR PLEASURE IN DOING THINGS: 0
SUM OF ALL RESPONSES TO PHQ9 QUESTIONS 1 & 2: 0
SUM OF ALL RESPONSES TO PHQ QUESTIONS 1-9: 0

## 2023-10-30 ASSESSMENT — PAIN - FUNCTIONAL ASSESSMENT
PAIN_FUNCTIONAL_ASSESSMENT: NONE - DENIES PAIN
PAIN_FUNCTIONAL_ASSESSMENT: NONE - DENIES PAIN

## 2023-10-30 ASSESSMENT — SLEEP AND FATIGUE QUESTIONNAIRES
AVERAGE NUMBER OF SLEEP HOURS: 6
DO YOU HAVE DIFFICULTY SLEEPING: NO
DO YOU USE A SLEEP AID: NO

## 2023-10-30 NOTE — ED NOTES
Pt resting quietly with eyes closed on hospital bed, breathing non-labored with no distress noted at this time.       Estella Washington, TRACEY  46/57/59 [id number]

## 2023-10-30 NOTE — ED NOTES
Safeguard in [facility] AN AFFILIATE OF [facility] for patient watch. Safeguard informed that they need to stay with the patient at all time, must be present in the room and if they need a break or relief to let the nurse know so they can be replaced. Safeguard verbalizes understanding. Belongings and patient checked by security. Belongings locked up. Pt in blue gown.        Bree Cruz LPN  21/47/52 [id number]

## 2023-10-30 NOTE — ED NOTES
Provisional Diagnosis:   Acute psychosis     Psychosocial and Contextual Factors:   Patient presents at the ED via [employer] on a pink slip. C-SSRS Summary:  X    Patient: X  Family: X  Agency:      Substance Abuse: Patient denies    Present Suicidal Behavior:  Patient denies    Verbal:      Attempt:     Past Suicidal Behavior: Patient denies    Verbal:    Attempt:      Self-Injurious/Self-Mutilation:       Violence Current or Past        Trauma Identified:       Protective Factors:    Patient has payee      Risk Factors:    Patient does not having house and denies being linked       Clinical Summary:    Dewayne Castellano is a 37 y.o. female who presents at the ED via [employer] on a pink slip. Per pink slip,   \"Yani came into the [facility] police station [employer]. She was unable to form a complete sentence. She fled the front of the station and into [address] [facility]. She was running in the roadway as traffic was trying to avoid hitting her. Two officers had to run into the road and remove her for her safety. While in police custody, she stated people are out trying to kill her. She was in a manic state and unable to have a logical conversation. \"     Patient manic upon arrival to the [facility] AN AFFILIATE OF [facility]. Patient is hyper verbal with a flight of ideas. Patient pacing around JULIA, punching the walls, grabbing tissues/water/trash can, and unable to stay still. Patient is cooperative with changing out and getting medication. Patient medicated at 2761. Patient repeating \"they ask me if I'm hearing/seeing voices, all the time, that's what they say, the officers come to me and they yell at me and say I'm committing a crime but I'm not, theres people out to get me, they think they can fuck with them, whatever, I have PTSD, I know you dummy, you see that you know me, but you never know me, I am not the same person. \"     Patient was last admitted to [facility] 08/29/2023-09/01/2023. Patient denies taking her medication.  Patient stated \"fuck no I'm

## 2023-10-30 NOTE — ED NOTES
Patient decline giving urine sample. Patient repeating \"no, no, no, I don't have to, I need water and milk. \"     Patient given liquids and encouraged to inform staff when they have to use the restroom. Patient shook head and stated \"yeah yeah okay, no I don't have to. \"

## 2023-10-30 NOTE — H&P
[facility] Internal Medicine    CONSULTATION / HISTORY AND PHYSICAL EXAMINATION            Date:   10/30/2023  Patient name:  Abdirahman Issa  Date of admission:  10/30/2023  7:48 AM  MRN:   [id number]  Account:  [de-identified]  YOB: 1980  PCP:    MAURICE Hanna NP  Room:   [facility]0113-01  Code Status:    Full Code    Physician Requesting Consult: Camila Jordan MD    Reason for Consult:  medical management    Chief Complaint:     No chief complaint on file. History Obtained From:     Patient medical record nursing staff    History of Present Illness:     Patient is 64-year-old female with past medical history of anxiety depression PTSD schizoaffective disorder is been admitted at [facility] for further management of acute psychosis. Patient very poor historian, did not talk much, was oriented but cannot give any history,     Past Medical History:     Past Medical History:   Diagnosis Date    Anxiety     Bipolar 1 disorder ([address])     Depression     Gestational diabetes 2016    OCD (obsessive compulsive disorder)     PTSD (post-traumatic stress disorder)     Rapid rate of speech     Schizoaffective disorder ([address])         Past Surgical History:     Past Surgical History:   Procedure Laterality Date    ABDOMEN SURGERY       SECTION  2007    LAPAROSCOPY          Medications Prior to Admission:     Prior to Admission medications    Medication Sig Start Date End Date Taking?  Authorizing Provider   paliperidone palmitate ER (INVEGA SUSTENNA) 234 MG/1.5ML ASHOK IM injection Inject 234 mg into the muscle every 28 days 10/30/23 11/27/23 Yes ProviderJulius MD   hydrOXYzine HCl (ATARAX) 25 MG tablet Take 1 tablet by mouth 3 times daily as needed 10/15/23  Yes Julius Harris MD   paliperidone (INVEGA) 9 MG extended release tablet Take 1 tablet by mouth daily 10/16/23 10/30/23  Julius Harris MD   nicotine (NICODERM CQ) 21 MG/24HR

## 2023-10-30 NOTE — H&P
Department of Psychiatry  Attending Physician Psychiatric Assessment     Reason for Admission to Psychiatric Unit:  Threat to self requiring 24 hour professional observation  Acute disordered/bizarre behavior or psychomotor agitation or retardation;interferes with ADLs so that patient cannot function at a less intensive care level of care during evaluation and treatment   Failure of outpatient psychiatry treatment so that the beneficiary requires 24 hour professional observation and care  Concerns about patient's safety in the community    CHIEF COMPLAINT: Acute psychosis with bizarre and dangerous behaviors    History obtained from: Patient, electronic medical record          HISTORY OF PRESENT ILLNESS:    Lynda Boone is a 37 y.o. female who has a past medical history of schizoaffective disorder bipolar type and polysubstance abuse. Patient presented to the ED on an application for emergency admission with [employer]. Noted that patient was exhibiting psychosis with dangerous and erratic behaviors. Per ED documentation:  Kvng Juarez came into the [facility] police station [employer]. She was unable to form a complete sentence. She fled the front of the station and into [address] [facility]. She was running in the roadway as traffic was trying to avoid hitting her. Two officers had to run into the road and remove her for her safety. While in police custody, she stated people are out trying to kill her. She was in a manic state and unable to have a logical conversation. \"      Patient manic upon arrival to the [facility] AN [facility]FFIL[facility]ATE OF [facility]. Patient is hyper verbal with a flight of ideas. Patient pacing around J[place], punching the walls, grabbing tissues/water/trash can, and unable to stay still. Patient is cooperative with changing out and getting medication. Patient medicated at 5789.      Patient repeating Duey Na ask me if I'm hearing/seeing voices, all the time, that's what they say, the officers come to me and they yell at me and say I'm committing a

## 2023-10-30 NOTE — ED PROVIDER NOTES
Code      PROCEDURES:  none    CONSULTS:  IP CONSULT TO INTERNAL MEDICINE    CRITICAL CARE:  There was a high probability of clinically significant/life threatening deterioration in this patient's condition which required my urgent intervention. Total critical care time was 15 minutes. This excludes any time for separately reportable procedures. FINAL IMPRESSION     1. Suicidal behavior with attempted self-injury ([address])    2. Psychosis, unspecified psychosis type ([address])          DISPOSITION / [address] Admitted 10/30/2023 10:53:35 AM        PATIENT REFERRED TO:  No follow-up provider specified.     DISCHARGE MEDICATIONS:  Current Discharge Medication List          Wheeler Leyden, DO  Emergency Medicine Attending    (Please note that portions of this note were completed with a voice recognition program.  Efforts were made to edit the dictations but occasionally words are mis-transcribed.)          Wheeler Leyden, DO  10/30/23 5808

## 2023-10-31 PROCEDURE — 6370000000 HC RX 637 (ALT 250 FOR IP): Performed by: INTERNAL MEDICINE

## 2023-10-31 PROCEDURE — 1240000000 HC EMOTIONAL WELLNESS R&B

## 2023-10-31 PROCEDURE — 99223 1ST HOSP IP/OBS HIGH 75: CPT | Performed by: PSYCHIATRY & NEUROLOGY

## 2023-10-31 PROCEDURE — 6370000000 HC RX 637 (ALT 250 FOR IP): Performed by: NURSE PRACTITIONER

## 2023-10-31 PROCEDURE — 6370000000 HC RX 637 (ALT 250 FOR IP): Performed by: PSYCHIATRY & NEUROLOGY

## 2023-10-31 RX ORDER — PALIPERIDONE 3 MG/1
3 TABLET, EXTENDED RELEASE ORAL DAILY
Status: DISCONTINUED | OUTPATIENT
Start: 2023-10-31 | End: 2023-11-06 | Stop reason: HOSPADM

## 2023-10-31 RX ORDER — AMLODIPINE BESYLATE 5 MG/1
5 TABLET ORAL DAILY
Status: DISCONTINUED | OUTPATIENT
Start: 2023-10-31 | End: 2023-11-06 | Stop reason: HOSPADM

## 2023-10-31 RX ADMIN — HYDROXYZINE HYDROCHLORIDE 50 MG: 50 TABLET, FILM COATED ORAL at 12:52

## 2023-10-31 RX ADMIN — PALIPERIDONE 3 MG: 3 TABLET, EXTENDED RELEASE ORAL at 12:51

## 2023-10-31 RX ADMIN — IBUPROFEN 400 MG: 400 TABLET, FILM COATED ORAL at 16:38

## 2023-10-31 RX ADMIN — AMLODIPINE BESYLATE 5 MG: 5 TABLET ORAL at 12:51

## 2023-10-31 ASSESSMENT — PAIN DESCRIPTION - DESCRIPTORS: DESCRIPTORS: ACHING

## 2023-10-31 ASSESSMENT — PAIN DESCRIPTION - LOCATION: LOCATION: ELBOW

## 2023-10-31 ASSESSMENT — PAIN DESCRIPTION - ORIENTATION: ORIENTATION: LEFT

## 2023-10-31 ASSESSMENT — PAIN SCALES - GENERAL: PAINLEVEL_OUTOF10: 5

## 2023-10-31 NOTE — GROUP NOTE
Group Therapy Note    Date: 10/31/2023    Group Start Time: 8332  Group End Time: 3358  Group Topic: Music Therapy    [facility]    Jad Enriquez        Group Therapy Note    Attendees: 2/6       Patient's Goal:  Patients were tasked with art group at a patient request,and allowed to request music to listen to while working on task. Patients were each given a paper with the same small doodle/shape on it, and asked to expand upon the doodle to create a full picture/image. Patient goals to increase rapport with staff; Demonstrate positive use of time; Increase socialization; Increase sense of community; Increase rapport with staff;    Notes:  Patient had requested a combination of art and music for group. Stayed for less than 10 minutes, and left group becfore she could show peers the art she created. Patient requested one song \"about [place] because that's where my kids are at. \" Patient did not return to group    Status After Intervention:  Unchanged    Participation Level: Minimal    Participation Quality: Resistant      Speech:  normal      Thought Process/Content: Logical  Linear      Affective Functioning: Congruent      Mood: euthymic      Level of consciousness:  Alert      Response to Learning: Resistant      Endings: None Reported    Modes of Intervention: Support, Socialization, Exploration, Activity, Media, and Reality-testing      Discipline Responsible: Psychoeducational Specialist      Signature:  Jad Enriquez

## 2023-11-01 PROCEDURE — 1240000000 HC EMOTIONAL WELLNESS R&B

## 2023-11-01 PROCEDURE — 6370000000 HC RX 637 (ALT 250 FOR IP): Performed by: INTERNAL MEDICINE

## 2023-11-01 PROCEDURE — 6370000000 HC RX 637 (ALT 250 FOR IP): Performed by: NURSE PRACTITIONER

## 2023-11-01 PROCEDURE — 6360000002 HC RX W HCPCS: Performed by: NURSE PRACTITIONER

## 2023-11-01 PROCEDURE — 6370000000 HC RX 637 (ALT 250 FOR IP): Performed by: PSYCHIATRY & NEUROLOGY

## 2023-11-01 PROCEDURE — 99232 SBSQ HOSP IP/OBS MODERATE 35: CPT | Performed by: INTERNAL MEDICINE

## 2023-11-01 PROCEDURE — 99232 SBSQ HOSP IP/OBS MODERATE 35: CPT | Performed by: PSYCHIATRY & NEUROLOGY

## 2023-11-01 PROCEDURE — APPSS30 APP SPLIT SHARED TIME 16-30 MINUTES

## 2023-11-01 RX ADMIN — PALIPERIDONE 3 MG: 3 TABLET, EXTENDED RELEASE ORAL at 09:46

## 2023-11-01 RX ADMIN — HYDROXYZINE HYDROCHLORIDE 50 MG: 50 TABLET, FILM COATED ORAL at 20:20

## 2023-11-01 RX ADMIN — HALOPERIDOL LACTATE 5 MG: 5 INJECTION, SOLUTION INTRAMUSCULAR at 12:22

## 2023-11-01 RX ADMIN — LORAZEPAM 2 MG: 2 INJECTION INTRAMUSCULAR; INTRAVENOUS at 12:22

## 2023-11-01 RX ADMIN — DIPHENHYDRAMINE HYDROCHLORIDE 50 MG: 50 INJECTION INTRAMUSCULAR; INTRAVENOUS at 12:22

## 2023-11-01 RX ADMIN — ACETAMINOPHEN 650 MG: 325 TABLET ORAL at 20:20

## 2023-11-01 RX ADMIN — AMLODIPINE BESYLATE 5 MG: 5 TABLET ORAL at 09:46

## 2023-11-01 RX ADMIN — ACETAMINOPHEN 650 MG: 325 TABLET ORAL at 09:46

## 2023-11-01 RX ADMIN — ACETAMINOPHEN 650 MG: 325 TABLET ORAL at 05:04

## 2023-11-01 ASSESSMENT — PAIN DESCRIPTION - LOCATION
LOCATION: GENERALIZED
LOCATION: BACK

## 2023-11-01 ASSESSMENT — PAIN SCALES - GENERAL
PAINLEVEL_OUTOF10: 5
PAINLEVEL_OUTOF10: 4

## 2023-11-01 NOTE — GROUP NOTE
Group Therapy Note    Date: 11/1/2023    Group Start Time: 1430  Group End Time: 4312  Group Topic: Cognitive Skills    Kareem Mendoza        Group Therapy Note    Attendees: 4/8     Topic: To increase social interaction, practice decision making, concentration, and communication skills        Patient did not participate in Cognitive Skill Group at 14:30, despite staff encouragement and   explanation of benefits. Patient remains seclusive to self      Q15 minute safety checks maintained for patient safety and will continue to encourage patient to attend unit programming.         Discipline Responsible: Psychoeducational Specialist      Signature:  Farheen Gallardo

## 2023-11-02 PROCEDURE — 1240000000 HC EMOTIONAL WELLNESS R&B

## 2023-11-02 PROCEDURE — 6370000000 HC RX 637 (ALT 250 FOR IP): Performed by: PSYCHIATRY & NEUROLOGY

## 2023-11-02 PROCEDURE — 6370000000 HC RX 637 (ALT 250 FOR IP): Performed by: NURSE PRACTITIONER

## 2023-11-02 PROCEDURE — 6370000000 HC RX 637 (ALT 250 FOR IP): Performed by: INTERNAL MEDICINE

## 2023-11-02 PROCEDURE — APPSS30 APP SPLIT SHARED TIME 16-30 MINUTES: Performed by: NURSE PRACTITIONER

## 2023-11-02 PROCEDURE — 99232 SBSQ HOSP IP/OBS MODERATE 35: CPT | Performed by: PSYCHIATRY & NEUROLOGY

## 2023-11-02 RX ADMIN — ACETAMINOPHEN 650 MG: 325 TABLET ORAL at 10:52

## 2023-11-02 RX ADMIN — HYDROXYZINE HYDROCHLORIDE 50 MG: 50 TABLET, FILM COATED ORAL at 08:31

## 2023-11-02 RX ADMIN — PALIPERIDONE 3 MG: 3 TABLET, EXTENDED RELEASE ORAL at 08:26

## 2023-11-02 RX ADMIN — AMLODIPINE BESYLATE 5 MG: 5 TABLET ORAL at 08:26

## 2023-11-02 RX ADMIN — IBUPROFEN 400 MG: 400 TABLET, FILM COATED ORAL at 23:50

## 2023-11-02 RX ADMIN — LORAZEPAM 2 MG: 1 TABLET ORAL at 13:31

## 2023-11-02 RX ADMIN — IBUPROFEN 400 MG: 400 TABLET, FILM COATED ORAL at 03:36

## 2023-11-02 RX ADMIN — HALOPERIDOL 5 MG: 5 TABLET ORAL at 13:31

## 2023-11-02 ASSESSMENT — PAIN DESCRIPTION - LOCATION
LOCATION: HEAD
LOCATION: BACK

## 2023-11-02 ASSESSMENT — PAIN SCALES - GENERAL
PAINLEVEL_OUTOF10: 5
PAINLEVEL_OUTOF10: 6
PAINLEVEL_OUTOF10: 4

## 2023-11-03 PROCEDURE — 6370000000 HC RX 637 (ALT 250 FOR IP): Performed by: NURSE PRACTITIONER

## 2023-11-03 PROCEDURE — 1240000000 HC EMOTIONAL WELLNESS R&B

## 2023-11-03 PROCEDURE — 6370000000 HC RX 637 (ALT 250 FOR IP): Performed by: PSYCHIATRY & NEUROLOGY

## 2023-11-03 PROCEDURE — 99232 SBSQ HOSP IP/OBS MODERATE 35: CPT | Performed by: PSYCHIATRY & NEUROLOGY

## 2023-11-03 PROCEDURE — 6370000000 HC RX 637 (ALT 250 FOR IP): Performed by: INTERNAL MEDICINE

## 2023-11-03 RX ADMIN — HYDROXYZINE HYDROCHLORIDE 50 MG: 50 TABLET, FILM COATED ORAL at 16:52

## 2023-11-03 RX ADMIN — IBUPROFEN 400 MG: 400 TABLET, FILM COATED ORAL at 08:35

## 2023-11-03 RX ADMIN — IBUPROFEN 400 MG: 400 TABLET, FILM COATED ORAL at 14:18

## 2023-11-03 RX ADMIN — PALIPERIDONE 3 MG: 3 TABLET, EXTENDED RELEASE ORAL at 08:29

## 2023-11-03 RX ADMIN — AMLODIPINE BESYLATE 5 MG: 5 TABLET ORAL at 08:29

## 2023-11-03 RX ADMIN — TRAZODONE HYDROCHLORIDE 50 MG: 50 TABLET ORAL at 19:49

## 2023-11-03 RX ADMIN — HYDROXYZINE HYDROCHLORIDE 50 MG: 50 TABLET, FILM COATED ORAL at 19:49

## 2023-11-03 RX ADMIN — ACETAMINOPHEN 650 MG: 325 TABLET ORAL at 18:15

## 2023-11-03 ASSESSMENT — PAIN DESCRIPTION - LOCATION: LOCATION: HEAD

## 2023-11-03 NOTE — GROUP NOTE
Group Therapy Note    Date: 11/3/2023    Group Start Time: 1010  Group End Time: 4124  Group Topic: Cognitive Skills    [facility] Clementina Hatchet, CTRS        Group Therapy Note    Attendees: 4/9       Patient's Goal:  pt will demonstrate improved coping skills and improved social skills     Notes:   pt was pleasant and participated well     Status After Intervention:  Improved    Participation Level:  Active Listener and Interactive    Participation Quality: Appropriate, Sharing, and Supportive      Speech:  hyperverbal       Thought Process/Content: Flight of ideas      Affective Functioning: anxious      Mood: manic       Level of consciousness:  Alert      Response to Learning: Able to verbalize current knowledge/experience and Progressing to goal      Endings: None Reported    Modes of Intervention: Education, Support, Socialization, and Activity      Discipline Responsible: Psychoeducational Specialist      Signature:  Harriet Shaw

## 2023-11-04 PROCEDURE — 6370000000 HC RX 637 (ALT 250 FOR IP): Performed by: PSYCHIATRY & NEUROLOGY

## 2023-11-04 PROCEDURE — 99232 SBSQ HOSP IP/OBS MODERATE 35: CPT | Performed by: PSYCHIATRY & NEUROLOGY

## 2023-11-04 PROCEDURE — 6370000000 HC RX 637 (ALT 250 FOR IP): Performed by: NURSE PRACTITIONER

## 2023-11-04 PROCEDURE — 1240000000 HC EMOTIONAL WELLNESS R&B

## 2023-11-04 PROCEDURE — 6370000000 HC RX 637 (ALT 250 FOR IP): Performed by: INTERNAL MEDICINE

## 2023-11-04 RX ADMIN — HYDROXYZINE HYDROCHLORIDE 50 MG: 50 TABLET, FILM COATED ORAL at 02:45

## 2023-11-04 RX ADMIN — HYDROXYZINE HYDROCHLORIDE 50 MG: 50 TABLET, FILM COATED ORAL at 17:30

## 2023-11-04 RX ADMIN — TRAZODONE HYDROCHLORIDE 50 MG: 50 TABLET ORAL at 20:36

## 2023-11-04 RX ADMIN — IBUPROFEN 400 MG: 400 TABLET, FILM COATED ORAL at 11:55

## 2023-11-04 RX ADMIN — ACETAMINOPHEN 650 MG: 325 TABLET ORAL at 20:36

## 2023-11-04 RX ADMIN — ACETAMINOPHEN 650 MG: 325 TABLET ORAL at 04:11

## 2023-11-04 RX ADMIN — HYDROXYZINE HYDROCHLORIDE 50 MG: 50 TABLET, FILM COATED ORAL at 11:30

## 2023-11-04 RX ADMIN — ACETAMINOPHEN 650 MG: 325 TABLET ORAL at 17:32

## 2023-11-04 RX ADMIN — IBUPROFEN 400 MG: 400 TABLET, FILM COATED ORAL at 18:59

## 2023-11-04 RX ADMIN — AMLODIPINE BESYLATE 5 MG: 5 TABLET ORAL at 08:34

## 2023-11-04 RX ADMIN — PALIPERIDONE 3 MG: 3 TABLET, EXTENDED RELEASE ORAL at 08:34

## 2023-11-04 ASSESSMENT — PAIN DESCRIPTION - LOCATION
LOCATION: THROAT
LOCATION: BACK

## 2023-11-04 ASSESSMENT — PAIN SCALES - GENERAL
PAINLEVEL_OUTOF10: 3
PAINLEVEL_OUTOF10: 5

## 2023-11-04 ASSESSMENT — PAIN DESCRIPTION - ORIENTATION: ORIENTATION: LOWER

## 2023-11-05 PROCEDURE — 6370000000 HC RX 637 (ALT 250 FOR IP): Performed by: NURSE PRACTITIONER

## 2023-11-05 PROCEDURE — 6370000000 HC RX 637 (ALT 250 FOR IP): Performed by: INTERNAL MEDICINE

## 2023-11-05 PROCEDURE — 6370000000 HC RX 637 (ALT 250 FOR IP)

## 2023-11-05 PROCEDURE — 1240000000 HC EMOTIONAL WELLNESS R&B

## 2023-11-05 PROCEDURE — 6370000000 HC RX 637 (ALT 250 FOR IP): Performed by: PSYCHIATRY & NEUROLOGY

## 2023-11-05 RX ORDER — CLONIDINE HYDROCHLORIDE 0.1 MG/1
0.1 TABLET ORAL NIGHTLY
Status: DISCONTINUED | OUTPATIENT
Start: 2023-11-05 | End: 2023-11-06 | Stop reason: HOSPADM

## 2023-11-05 RX ADMIN — IBUPROFEN 400 MG: 400 TABLET, FILM COATED ORAL at 12:05

## 2023-11-05 RX ADMIN — HYDROXYZINE HYDROCHLORIDE 50 MG: 50 TABLET, FILM COATED ORAL at 18:20

## 2023-11-05 RX ADMIN — HYDROXYZINE HYDROCHLORIDE 50 MG: 50 TABLET, FILM COATED ORAL at 01:44

## 2023-11-05 RX ADMIN — CLONIDINE HYDROCHLORIDE 0.1 MG: 0.1 TABLET ORAL at 20:57

## 2023-11-05 RX ADMIN — TRAZODONE HYDROCHLORIDE 50 MG: 50 TABLET ORAL at 20:57

## 2023-11-05 RX ADMIN — ACETAMINOPHEN 650 MG: 325 TABLET ORAL at 18:20

## 2023-11-05 RX ADMIN — AMLODIPINE BESYLATE 5 MG: 5 TABLET ORAL at 09:56

## 2023-11-05 RX ADMIN — IBUPROFEN 400 MG: 400 TABLET, FILM COATED ORAL at 18:20

## 2023-11-05 RX ADMIN — PALIPERIDONE 3 MG: 3 TABLET, EXTENDED RELEASE ORAL at 09:56

## 2023-11-05 NOTE — GROUP NOTE
Group Therapy Note    Date: 11/2/2023    Group Start Time: 0900  Group End Time: 0004  Group Topic: Community Meeting    ANTON SAUCEDA    Jena Hamman    Group Therapy Note    Attendees: 8/9     Patient's Goal: Patient will verbalize today's goals. Patient will also offer supportive listening and interact with peers to                             clarify and understand clearly set goals. Notes: Patient is making progress AEB participating in group discussion, actively listening, and supporting other group               members. Status After Intervention: Improved      Participation Level:  Active Listener and Interactive      Participation Quality: Appropriate, Attentive, Sharing, and Supportive      Speech: normal      Thought Process/Content: Logical, Linear      Affective Functioning: Congruent      Mood: anxious      Level of consciousness: Oriented x4      Response to Learning: Able to verbalize current knowledge/experience, Able to verbalize/acknowledge                                         new learning, Able to retain information, and Capable of insight      Endings: None Reported      Modes of Intervention: Education, Support, Socialization, and Problem-solving         Discipline Responsible: Behavorial Health Tech      Signature: Jena Hamman

## 2023-11-05 NOTE — GROUP NOTE
Group Therapy Note    Date: 11/5/2023    Group Start Time: 1000  Group End Time: 1100  Group Topic: Music Therapy    [facility]    Bienvenido Asher LPN  Music Group. Patient was actively participating. Group Therapy Note    Attendees: 5/7       Status After Intervention:  Improved    Participation Level:  Active Listener    Participation Quality: Appropriate      Speech:  normal      Thought Process/Content: Logical      Affective Functioning: Congruent      Mood: Stable      Level of consciousness:  Alert      Response to Learning: Able to verbalize current knowledge/experience      Endings: None Reported    Modes of Intervention: Support      Discipline Responsible: Licensed Practical Nurse      Signature:  Bienvenido Asher LPN

## 2023-11-05 NOTE — GROUP NOTE
Group Therapy Note    Date: 11/5/2023    Group Start Time: 1330  Group End Time: 1440  Group Topic: Relaxation    ANTON Nayak, CTRS        Group Therapy Note    Attendees: 7/8     Patient's Goal: To increase social interaction, explore creative expression, and music choices as means of relaxation, and practice communication skills. Notes: Pt participated fully in group task and discussion. Pt was able to explore creative expression, and music choices as means of relaxation, and practice communication skills. Status After Intervention:  Improved     Participation Level: Active Listener,  appropriate, sharing, supportive     Participation Quality: Appropriate,  Attentive, sharing, supportive        Speech:  Normal      Thought Process/Content: Logical ,linear r/t group topic. Pt focused very well and created her own image by enlarging, and copying freehand, a printed image. Pt took care to finish image, making good choices of materials. Affective Functioning: Congruent     Mood: Euthymic, pt stated she was going to get some art materials when she was discharged as she found it very enjoyable and relaxing. Pt was appreciative of positive feedback for her efforts and image from RT and peers in group.      Level of consciousness:  Alert, and Attentive        Response to Learning:  Able to verbalize current knowledge, able to acknowledge/verbalize new learning,  and Progressing to goal        Endings: None Reported     Modes of Intervention: Education, Support, Socialization, Exploration, Clarifying and Problem-solving        Discipline Responsible: Psychoeducational Specialist        Signature:  Kourtney Gloria

## 2023-11-05 NOTE — GROUP NOTE
Group Therapy Note    Date: 11/5/2023    Group Start Time: 1330  Group End Time: 1430  Group Topic: Focus Group    ANTON Hoffman LPN        Group Therapy Note    Attendees: 3/8         Notes:  Patient was cooperative and goal oriented setting short and long term goals and making a list to reflect on at home. Status After Intervention:  Improved    Participation Level:  Active Listener    Participation Quality: Appropriate      Speech:  normal      Thought Process/Content: Logical      Affective Functioning: Congruent      Mood: Stable      Level of consciousness:  Alert      Response to Learning: Able to verbalize current knowledge/experience      Endings: None Reported    Modes of Intervention: Support      Discipline Responsible: Licensed Practical Nurse      Signature:  Seth Pimentel LPN

## 2023-11-06 VITALS
RESPIRATION RATE: 16 BRPM | TEMPERATURE: 97.2 F | HEART RATE: 75 BPM | DIASTOLIC BLOOD PRESSURE: 80 MMHG | OXYGEN SATURATION: 100 % | WEIGHT: 173 LBS | BODY MASS INDEX: 27.15 KG/M2 | HEIGHT: 67 IN | SYSTOLIC BLOOD PRESSURE: 126 MMHG

## 2023-11-06 PROCEDURE — 6370000000 HC RX 637 (ALT 250 FOR IP): Performed by: NURSE PRACTITIONER

## 2023-11-06 PROCEDURE — 6370000000 HC RX 637 (ALT 250 FOR IP): Performed by: INTERNAL MEDICINE

## 2023-11-06 PROCEDURE — 6370000000 HC RX 637 (ALT 250 FOR IP): Performed by: PSYCHIATRY & NEUROLOGY

## 2023-11-06 PROCEDURE — 99239 HOSP IP/OBS DSCHRG MGMT >30: CPT | Performed by: PSYCHIATRY & NEUROLOGY

## 2023-11-06 RX ORDER — NICOTINE 21 MG/24HR
1 PATCH, TRANSDERMAL 24 HOURS TRANSDERMAL DAILY
Qty: 30 PATCH | Refills: 0 | Status: ON HOLD | OUTPATIENT
Start: 2023-11-07

## 2023-11-06 RX ORDER — HYDROXYZINE 50 MG/1
50 TABLET, FILM COATED ORAL 3 TIMES DAILY PRN
Qty: 30 TABLET | Refills: 0 | Status: ON HOLD | OUTPATIENT
Start: 2023-11-06 | End: 2023-11-16

## 2023-11-06 RX ORDER — CLONIDINE HYDROCHLORIDE 0.1 MG/1
0.1 TABLET ORAL NIGHTLY
Qty: 30 TABLET | Refills: 0 | Status: ON HOLD | OUTPATIENT
Start: 2023-11-06

## 2023-11-06 RX ORDER — PALIPERIDONE 3 MG/1
3 TABLET, EXTENDED RELEASE ORAL DAILY
Qty: 7 TABLET | Refills: 0 | Status: ON HOLD | OUTPATIENT
Start: 2023-11-07

## 2023-11-06 RX ORDER — AMLODIPINE BESYLATE 5 MG/1
5 TABLET ORAL DAILY
Qty: 30 TABLET | Refills: 0 | Status: ON HOLD | OUTPATIENT
Start: 2023-11-07

## 2023-11-06 RX ORDER — TRAZODONE HYDROCHLORIDE 50 MG/1
50 TABLET ORAL NIGHTLY PRN
Qty: 30 TABLET | Refills: 0 | Status: ON HOLD | OUTPATIENT
Start: 2023-11-06

## 2023-11-06 RX ADMIN — PALIPERIDONE 3 MG: 3 TABLET, EXTENDED RELEASE ORAL at 07:48

## 2023-11-06 RX ADMIN — AMLODIPINE BESYLATE 5 MG: 5 TABLET ORAL at 07:48

## 2023-11-06 RX ADMIN — IBUPROFEN 400 MG: 400 TABLET, FILM COATED ORAL at 09:36

## 2023-11-06 ASSESSMENT — PAIN DESCRIPTION - DESCRIPTORS: DESCRIPTORS: ACHING

## 2023-11-06 ASSESSMENT — PAIN SCALES - GENERAL: PAINLEVEL_OUTOF10: 4

## 2023-11-06 ASSESSMENT — PAIN - FUNCTIONAL ASSESSMENT: PAIN_FUNCTIONAL_ASSESSMENT: ACTIVITIES ARE NOT PREVENTED

## 2023-11-06 ASSESSMENT — PAIN DESCRIPTION - LOCATION: LOCATION: MOUTH;HEAD

## 2023-11-06 NOTE — CARE COORDINATION
:BHI Biopsychosocial Assessment    Current Level of Psychosocial Functioning     Independent   Dependent  X  Minimal Assist     Psychosocial High Risk Factors (check all that apply)    Unable to obtain meds   Chronic illness/pain    Substance abuse X Marijuana, Cocaine   Lack of Family Support X  Financial stress   Isolation X  Inadequate Community Resources  Suicide attempt(s)   Not taking medications X  Victim of crime   Developmental Delay  Unable to manage personal needs  X  Age 72 or older   Homeless X  No transportation X  Readmission within 30 days  Unemployment  Traumatic Event    Psychiatric Advanced Directives: none reported     Family to Involve in Treatment: Lack of family support     Sexual Orientation:  TARAN     Patient Strengths: insurance, SSDI income     Patient Barriers: homeless, lack of family support, substance abuse, not linked with HC, non-compliant with treatment and with medications. Opiate Education Provided: N/A Patient denies any current Opiate or Heroin use/abuse, There was not a drug screen completed upon admission, but last drug screen completed on 10/8/2023 was positive for Cannabis and Cocaine. CM[facility]/mental health history: Not Linked, was last linked with [facility], but has been non-compliant with medications and with treatment, last [facility] hospitalization was from 8/29-9/1/2023, homeless, substance abuse     Plan of Care   medication management, group/individual therapies, family meetings, psycho -education, treatment team meetings to assist with stabilization    Initial Discharge Plan: To Be Determined     Clinical Summary:  Sim Yoder is a 37 y.o. female who presents on admission with Acute Psychosis. It was documented that patient was brought in by [facility] police and was put on an application for emergency admission. It was documented on the pink slip that patient went to the [facility] police station lobby. She was unable to form a complete sentence.  She fled
Social work was unable to complete biopsychosocial assessment with patient today. Will complete tomorrow 10/31/23.
as: LITHOBID     nicotine 21 MG/24HR  Commonly known as: Maggie Doll  Replaced by: nicotine 14 MG/24HR               Where to Get Your Medications        These medications were sent to [facility] [address] [facility], [address]  [address], [facility] [facility] [address]      Phone: [phone]   amLODIPine 5 MG tablet  cloNIDine 0.1 MG tablet  hydrOXYzine HCl 50 MG tablet  nicotine 14 MG/24HR  paliperidone 3 MG extended release tablet  traZODone 50 MG tablet       These medications were sent to [address] [facility], [address] [facility] [address]      Phone: [phone]   paliperidone palmitate  MG/1.5ML Estefany IM injection         Follow Up Appointment:  [address]  [address] [place] [address]  Ph: [phone]  Fax: [phone]  Go on 11/14/2023  You have an appointment at 10:00am

## 2023-11-06 NOTE — PLAN OF CARE
[facility]  Initial Interdisciplinary Treatment Plan NO      Original treatment plan Date & Time: 10.31.23 0900    Admission Type:  Admission Type: Involuntary    Reason for admission:   Reason for Admission: Running in traffic states people are trying to kill her, manic, paraniod punching walls, not having logical conversation.     Estimated Length of Stay:  5-7days  Estimated Discharge Date: to be determined by physician    PATIENT STRENGTHS:  Patient Strengths:   Patient Strengths and Limitations:Limitations: Perceives need for assistance with self-care, Difficult relationships / poor social skills, Difficulty problem solving/relies on others to help solve problems, Tendency to isolate self, Apathetic / unmotivated, External locus of control, Demonstrates discomfort with /lack of social skills, Multiple barriers to leisure interests, Lacks leisure interests, Inappropriate/potentially harmful leisure interests  Addictive Behavior: Addictive Behavior  In the Past 3 Months, Have You Felt or Has Someone Told You That You Have a Problem With  : None  Medical Problems:  Past Medical History:   Diagnosis Date    Anxiety     Bipolar 1 disorder ([address] ) 1999    Depression     Gestational diabetes 7/22/2016    OCD (obsessive compulsive disorder) 1999    PTSD (post-traumatic stress disorder)     Rapid rate of speech     Schizoaffective disorder ([address])      Status EXAM:Mental Status and Behavioral Exam  Normal: Yes  Level of Assistance: Independent/Self  Facial Expression: Exaggerated, Elevated  Affect: Congruent  Level of Consciousness: Alert  Frequency of Checks: 4 times per hour, close  Mood:Normal: No  Mood: Depressed, Anxious  Motor Activity:Normal: No  Motor Activity: Increased  Eye Contact: Fair  Observed Behavior: Hypermobile, Impulsive, Preoccupied, Compulsive  Sexual Misconduct History: Current - no  Preception: [place] to person, [place] to time, [place] to place, [place] to situation  Attention:Normal:
Problem: Drug Abuse/Detox  Goal: Will have no detox symptoms and will verbalize plan for changing drug-related behavior  Description: INTERVENTIONS:  1. Administer medication as ordered  2. Monitor physical status  3. Provide emotional support with 1:1 interaction with staff  4. Encourage  recovery focused treatment   11/4/2023 1650 by Delilah Parham LPN  Outcome: Progressing   Pt denies both suicidal and homicidal ideations. Pt denies both auditory and visual disturbances. Pt denies both anxiety and depression. Pt is compliant with scheduled medications and remains in behavioral control. Pt is polite, reports eating and sleeping adequately with safety checks Q15 minutes and at irregular intervals. Problem: Involuntary Admit  Goal: Will cooperate with staff recommendations and doctor's orders and will demonstrate appropriate behavior  Description: INTERVENTIONS:  1. Treat underlying conditions and offer medication as ordered  2. Educate regarding involuntary admission procedures and rules  3. Contain excessive/inappropriate behavior per unit and hospital policies  24/3/9013 7515 by Delilah Parham LPN  Outcome: Progressing   Pt denies both suicidal and homicidal ideations. Pt denies both auditory and visual disturbances. Pt denies both anxiety and depression. Pt is compliant with scheduled medications and remains in behavioral control. Pt is polite, reports eating and sleeping adequately with safety checks Q15 minutes and at irregular intervals. Problem: Micaela  Goal: Will exhibit normal sleep and speech and no impulsivity  Description: INTERVENTIONS:  1. Administer medication as ordered  2. Set limits on impulsive behavior  3. Make attempts to decrease external stimuli as possible  11/4/2023 1650 by Delilah Parham LPN  Outcome: Progressing   Pt denies both suicidal and homicidal ideations. Pt denies both auditory and visual disturbances. Pt denies both anxiety and depression.  Pt is compliant with scheduled
Problem: Drug Abuse/Detox  Goal: Will have no detox symptoms and will verbalize plan for changing drug-related behavior  Description: INTERVENTIONS:  1. Administer medication as ordered  2. Monitor physical status  3. Provide emotional support with 1:1 interaction with staff  4. Encourage  recovery focused treatment   Note: Ms. Monique Pacheco has not demonstrated indication of detoxing during this shift. Problem: Involuntary Admit  Goal: Will cooperate with staff recommendations and doctor's orders and will demonstrate appropriate behavior  Description: INTERVENTIONS:  1. Treat underlying conditions and offer medication as ordered  2. Educate regarding involuntary admission procedures and rules  3. Contain excessive/inappropriate behavior per unit and hospital policies  Note: Ms. Monique Pacheco has declined to sign a voluntary admission form. Problem: Micaela  Goal: Will exhibit normal sleep and speech and no impulsivity  Description: INTERVENTIONS:  1. Administer medication as ordered  2. Set limits on impulsive behavior  3. Make attempts to decrease external stimuli as possible  Note: Ms. Monique Pacheco has demonstrated impusive and destructive behaviors on this shift. She has been able to participate in conversations and is discharge focused. Problem: Safety - Violent/Self-destructive Restraint  Goal: Remains free of injury from restraints (Restraint for Violent/Self-Destructive Behavior)  Description: INTERVENTIONS:  1. Determine that de-escalation and other, less restrictive measures have been tried or would not be effective before applying the restraint  2. Identify and document the criteria for restraint  3. Evaluate the patient's condition at the time of restraint application  4. Inform patient/family regarding the reason for restraint/seclusion  5. Q2H: Monitor comfort, nutrition and hydration needs  6. Q15M: Perform safety checks including skin, circulation, sensory, respiratory and psychological status  7.
Problem: Drug Abuse/Detox  Goal: Will have no detox symptoms and will verbalize plan for changing drug-related behavior  Description: INTERVENTIONS:  1. Administer medication as ordered  2. Monitor physical status  3. Provide emotional support with 1:1 interaction with staff  4. Encourage  recovery focused treatment   Note: Patient has no complaints of withdrawal symptoms this evening     Problem: Involuntary Admit  Goal: Will cooperate with staff recommendations and doctor's orders and will demonstrate appropriate behavior  Description: INTERVENTIONS:  1. Treat underlying conditions and offer medication as ordered  2. Educate regarding involuntary admission procedures and rules  3. Contain excessive/inappropriate behavior per unit and hospital policies  Note: Patient denies suicidal thoughts and hallucinations. She reports mild depression and anxiety. She has been out in the milieu, social with peers and calm.  Q15min safety checks continue
Problem: Drug Abuse/Detox  Goal: Will have no detox symptoms and will verbalize plan for changing drug-related behavior  Description: INTERVENTIONS:  1. Administer medication as ordered  2. Monitor physical status  3. Provide emotional support with 1:1 interaction with staff  4. Encourage  recovery focused treatment   Outcome: Progressing     Problem: Involuntary Admit  Goal: Will cooperate with staff recommendations and doctor's orders and will demonstrate appropriate behavior  Description: INTERVENTIONS:  1. Treat underlying conditions and offer medication as ordered  2. Educate regarding involuntary admission procedures and rules  3.  Contain excessive/inappropriate behavior per unit and hospital policies  Outcome: Progressing
Problem: Drug Abuse/Detox  Goal: Will have no detox symptoms and will verbalize plan for changing drug-related behavior  Description: INTERVENTIONS:  1. Administer medication as ordered  2. Monitor physical status  3. Provide emotional support with 1:1 interaction with staff  4. Encourage  recovery focused treatment   Outcome: Progressing  Patient denies experiencing feelings of depression or anxiety, thoughts of wanting to harm themself or others, as well as hallucinations of any kind. Patient is polite and cooperative with staff. Patient is medication compliant and remains behavior controlled. Patient is able to complete activities of daily living without any supervision or assistance. Patient appears mildly brightened. Patient states that they are sleeping and eating sufficiently. Patient is actively making plans and goals for discharge and prioritized what she needs to complete in order to lead a stable life. Patient is selectively social with their peers when she is in the dayroom. Patient has been attending group therapy sessions and unit programming, and participates in productive and meaningful ways. Patient is comfortable approaching staff for any needs or requests. Patient agrees to notify staff if any thoughts, feelings, or concerns need to be brought to their attention. Q15 minute safety checks maintained. Problem: Involuntary Admit  Goal: Will cooperate with staff recommendations and doctor's orders and will demonstrate appropriate behavior  Description: INTERVENTIONS:  1. Treat underlying conditions and offer medication as ordered  2. Educate regarding involuntary admission procedures and rules  3. Contain excessive/inappropriate behavior per unit and hospital policies  Outcome: Progressing     Problem: Micaela  Goal: Will exhibit normal sleep and speech and no impulsivity  Description: INTERVENTIONS:  1. Administer medication as ordered  2. Set limits on impulsive behavior  3.  Make attempts to
Problem: Involuntary Admit  Goal: Will cooperate with staff recommendations and doctor's orders and will demonstrate appropriate behavior  Description: INTERVENTIONS:  1. Treat underlying conditions and offer medication as ordered  2. Educate regarding involuntary admission procedures and rules  3. Contain excessive/inappropriate behavior per unit and hospital policies  54/0/6246 [id number] by Elías Page LPN  Outcome: Progressing  Note: Patient denies wanting to hurt self of others at this time. Patient educated to seek help from staff if needing anything. Patient calm and cooperative and states she is ready to go home and has a plan of things to do once home.
Problem: Involuntary Admit  Goal: Will cooperate with staff recommendations and doctor's orders and will demonstrate appropriate behavior  Description: INTERVENTIONS:  1. Treat underlying conditions and offer medication as ordered  2. Educate regarding involuntary admission procedures and rules  3. Contain excessive/inappropriate behavior per unit and hospital policies  Outcome: Progressing   Patient was admitted involuntarily and has signed in. They are behaviorally controlled, appropriate with peers and staff and follows unit rules. Will continue to encourage patient to demonstrate appropriate behavior and reorient patient as needed. Problem: Micaela  Goal: Will exhibit normal sleep and speech and no impulsivity  Description: INTERVENTIONS:  1. Administer medication as ordered  2. Set limits on impulsive behavior  3. Make attempts to decrease external stimuli as possible  Outcome: Progressing  Patient exhibits normal sleep and speech with no impulsivity noted. Will continue to monitor and provide calming, therapeutic environment. Problem: Drug Abuse/Detox  Goal: Will have no detox symptoms and will verbalize plan for changing drug-related behavior  Description: INTERVENTIONS:  1. Administer medication as ordered  2. Monitor physical status  3. Provide emotional support with 1:1 interaction with staff  4. Encourage  recovery focused treatment   Outcome: Progressing  Patient admits to feelings of anxiety but does not relate these feelings to detox. Writer offered support and patient not accepting of discussing continued sobriety. Although [facility] concluded positive drug screen results, patient denies using illicit substances. Will continue to monitor and provide support as needed. Q15min checks for safety. Problem: Safety - Violent/Self-destructive Restraint  Goal: Remains free of injury from restraints (Restraint for Violent/Self-Destructive Behavior)  Description: INTERVENTIONS:  1.  Determine that
Problem: Micaela  Goal: Will exhibit normal sleep and speech and no impulsivity  Description: INTERVENTIONS:  1. Administer medication as ordered  2. Set limits on impulsive behavior  3. Make attempts to decrease external stimuli as possible  Outcome: Progressing  Patient isolative to room for long intervals, but does come out for meals and needs. Has moments of increased micaela, as evidence by rapid, pressured speech, flight of ideas, loose thoughts. When calmer, her thoughts are clearer and more organized. No medications ordered at this time. Good appetite. Disheveled appearance. Denies any suicidal or homicidal thoughts, denies any hallucinations, accepting of staff support and encouragement.
Problem: Pain  Goal: Verbalizes/displays adequate comfort level or baseline comfort level  Outcome: Progressing     Problem: Drug Abuse/Detox  Goal: Will have no detox symptoms and will verbalize plan for changing drug-related behavior  Description: INTERVENTIONS:  1. Administer medication as ordered  2. Monitor physical status  3. Provide emotional support with 1:1 interaction with staff  4. Encourage  recovery focused treatment   11/2/2023 2058 by Tomeka Meza RN  Outcome: Progressing     Patient denies feeling pain/discomfort and withdrawal symptoms at this time. Patient is irritable on approach and dismissive of writer. Patient not accepting of 1:1 talk with writer. Writer encouraged patient to seek assistance from staff when needed. Q15 minute safety checks maintained. Patient remains safe at this time.
Problem: Safety - Adult  Goal: Free from fall injury  Outcome: Progressing   Patient remains free from falls. Patient ambulates with a steady gait. Problem: Involuntary Admit  Goal: Will cooperate with staff recommendations and doctor's orders and will demonstrate appropriate behavior  Description: INTERVENTIONS:  1. Treat underlying conditions and offer medication as ordered  2. Educate regarding involuntary admission procedures and rules  3. Contain excessive/inappropriate behavior per unit and hospital policies  Outcome: Progressing   Patient is compliant with medications. Patient follows staff direction. Patient is isolative to room for long intervals. Patient verbalizes readiness for discharge.
[place] to time, [place] to place, [place] to situation  Attention:Normal: No  Attention: Unable to concentrate, Distractible  Thought Processes: Blocking, Loose association  Thought Content:Normal: No  Thought Content: Preoccupations, Paranoia  Depression Symptoms: Impaired concentration, Isolative, Loss of interest  Anxiety Symptoms: Generalized  Micaela Symptoms: Poor judgment  Hallucinations: None (pt denies)  Delusions: Yes  Delusions: Paranoid  Memory:Normal: No  Memory: Poor recent, Confabulation  Insight and Judgment: No  Insight and Judgment: Poor judgment, Poor insight    Daily Assessment Last Entry:   Daily Sleep (WDL): Within Defined Limits            Daily Nutrition (WDL): Within Defined Limits  Level of Assistance: Independent/Self    Patient Monitoring:  Frequency of Checks: 4 times per hour, close    Psychiatric Symptoms:   Depression Symptoms  Depression Symptoms: Impaired concentration, Isolative, Loss of interest  Anxiety Symptoms  Anxiety Symptoms: Generalized  Micaela Symptoms  Micaela Symptoms: Poor judgment          Suicide Risk CSSR-S:  1) Within the past month, have you wished you were dead or wished you could go to sleep and not wake up? : No  2) Have you actually had any thoughts of killing yourself? : No  6) Have you ever done anything, started to do anything, or prepared to do anything to end your life?: No  Change in Result:   Change in Plan of care:        EDUCATION:   Learner Progress Toward Treatment Goals:   Reviewed results and recommendations of this team, Reviewed group plan and strategies, Reviewed signs, symptoms and risk of self harm and violent behavior, Reviewed goals and plan of care    Method:  small group, individual verbal education    Outcome:   Verbalized by patient but needs reinforcement to obtain goals    PATIENT GOALS:  Short term:  Medication management  Long term:  Link with mental Health provider, medication compliance    PLAN/TREATMENT RECOMMENDATIONS UPDATE:  continue
Q15 minutes and at irregular intervals. Problem: Pain  Goal: Verbalizes/displays adequate comfort level or baseline comfort level  Outcome: Progressing   Pt denies both suicidal and homicidal ideations. Pt denies both auditory and visual disturbances. Pt denies both anxiety and depression. Pt is compliant with scheduled medications and remains in behavioral control. Pt is polite, reports eating and sleeping adequately with safety checks Q15 minutes and at irregular intervals. Problem: Safety - Violent/Self-destructive Restraint  Goal: Remains free of injury from restraints (Restraint for Violent/Self-Destructive Behavior)  Description: INTERVENTIONS:  1. Determine that de-escalation and other, less restrictive measures have been tried or would not be effective before applying the restraint  2. Identify and document the criteria for restraint  3. Evaluate the patient's condition at the time of restraint application  4. Inform patient/family regarding the reason for restraint/seclusion  5. Q2H: Monitor comfort, nutrition and hydration needs  6. Q15M: Perform safety checks including skin, circulation, sensory, respiratory and psychological status  7. Ensure continuous observation  8. Identify and implement measures to help patient regain control, assess readiness for release and initiate progressive release per policy  Outcome: Progressing   Pt denies both suicidal and homicidal ideations. Pt denies both auditory and visual disturbances. Pt denies both anxiety and depression. Pt is compliant with scheduled medications and remains in behavioral control. Pt is polite, reports eating and sleeping adequately with safety checks Q15 minutes and at irregular intervals. Problem: Safety - Adult  Goal: Free from fall injury  Outcome: Progressing   Pt denies both suicidal and homicidal ideations. Pt denies both auditory and visual disturbances. Pt denies both anxiety and depression.  Pt is compliant with scheduled
restraint/seclusion  5. Q2H: Monitor comfort, nutrition and hydration needs  6. Q15M: Perform safety checks including skin, circulation, sensory, respiratory and psychological status  7. Ensure continuous observation  8.  Identify and implement measures to help patient regain control, assess readiness for release and initiate progressive release per policy  10/6/6422 [id number] by James Acuña LPN  Outcome: Progressing    Problem: Safety - Adult  Goal: Free from fall injury  11/5/2023 0145 by James Acuña LPN  Outcome: Progressing

## 2023-11-06 NOTE — PROGRESS NOTES
11/02/23 1407   Encounter Summary   Encounter Overview/Reason  Spiritual/Emotional Needs   Service Provided For: Patient   Referral/Consult From: Satish   Last Encounter  11/02/23   Complexity of Encounter Moderate   Begin Time 0130   End Time  0145   Total Time Calculated 15 min   Spiritual/Emotional needs   Type Spiritual Support   Behavioral Health    Type  [facility]   Assessment/Intervention/Outcome   Assessment Calm   Intervention Active listening;Nurtured Hope;Read/Provided Scripture;Sustaining Presence/Ministry of presence   Outcome Engaged in conversation;Receptive
[facility] Internal Medicine    CONSULTATION / HISTORY AND PHYSICAL EXAMINATION            Date:   2023  Patient name:  Portia Hammond  Date of admission:  10/30/2023  7:48 AM  MRN:   [id number]  Account:  [de-identified]  YOB: 1980  PCP:    Perfecto Koyanagi, APRN - NP  Room:   [address] [place] [address]  Code Status:    Full Code    Physician Requesting Consult: Joaquin Rutherford MD    Reason for Consult:  medical management    Chief Complaint:     No chief complaint on file. History Obtained From:     Patient medical record nursing staff    History of Present Illness:     Patient is 52-year-old female with past medical history of anxiety depression PTSD schizoaffective disorder is been admitted at [facility] for further management of acute psychosis. Patient very poor historian, did not talk much, was oriented but cannot give any history,     Past Medical History:     Past Medical History:   Diagnosis Date    Anxiety     Bipolar 1 disorder ([address] )     Depression     Gestational diabetes 2016    OCD (obsessive compulsive disorder)     PTSD (post-traumatic stress disorder)     Rapid rate of speech     Schizoaffective disorder ([address])         Past Surgical History:     Past Surgical History:   Procedure Laterality Date    ABDOMEN SURGERY       SECTION  2007    LAPAROSCOPY          Medications Prior to Admission:     Prior to Admission medications    Medication Sig Start Date End Date Taking?  Authorizing Provider   paliperidone palmitate ER (INVEGA SUSTENNA) 234 MG/1.5ML ASHOK IM injection Inject 234 mg into the muscle every 28 days 10/30/23 11/27/23 Yes ProviderJulius MD   hydrOXYzine HCl (ATARAX) 25 MG tablet Take 1 tablet by mouth 3 times daily as needed 10/15/23  Yes ProviderJulius MD   cariprazine hcl (VRAYLAR) 3 MG CAPS capsule Take 1 capsule by mouth daily 23   Joaquin Rutherford MD   lithium (LITHOBID) 300 MG extended release tablet Take 1
Behavioral Services  Medicare Certification Upon Admission    I certify that this patient's inpatient psychiatric hospital admission is medically necessary for:    [x] (1) Treatment which could reasonably be expected to improve this patient's condition,       [x] (2) Or for diagnostic study;     AND     [x](2) The inpatient psychiatric services are provided while the individual is under the care of a physician and are included in the individualized plan of care.     Estimated length of stay/service 2-9 days    Plan for post-hospital care -outpatient care    Electronically signed by Jasbir Copeland MD on 10/31/2023 at 9:20 AM
CLINICAL PHARMACY NOTE: MEDS TO BEDS    Total # of Prescriptions Filled: 6   The following medications were delivered to the patient:  Amlodipine Besylate 5mg  Clonidine 0.1mg  Hydroxyzine 50mg  Nicotine 14mg/24hr patch  Paliperidone ER 3mg  Trazodone 50mg    Additional Documentation: Patient is Eligible to Utilize Meds To Beds for Their Discharge Medications Delivered Medication to Nurses Station  11/06/23
Daily Progress Note  11/1/2023    Patient Name: Stefan Garcia    CHIEF COMPLAINT:  Acute psychosis with bizarre and dangerous behaviors          SUBJECTIVE:      Patient is seen today for a follow up assessment. She has been compliant with scheduled medication. Unfortunately patient has required emergency medication and locked seclusion for agitation, disruptive behavior. Per prior documentation, \"Patient is Agitated, Disruptive, and Destructive as evidence by patient hyper-verbal and irritable in the day area, disrupting other patients . Staff attempted to find and relieve the distress by Talking to patient, Refocusing on new activity, and Offering suggestions Patient is currently escalating, not following staffs' directions, threw cups of sugar at nurses' station, and threw three gray buckets of crayons, coloring pages, and fluids in the day area. [employer] and security called, and present on unit. Medication Administered as prescribed: Haldol 5 mg, Ativan 2 mg, Benadryl 50 mg IM, right and left arm. Locked seclusion initiated at 12:23pm. Patient walked to seclusion room, no physical hold required. Criteria for release explained to patient. Patient is currently banging on seclusion door sticking up middle finger. Advocate present. DR Falk Todd notified. Will continue to monitor, offer support, and reassess. \"  Patient was seen earlier in the day prior to need for emergency medication. She is hyperverbal, disorganized however reports wanting to be discharged. Patient minimizes depressive symptoms. Patient shows poor insight in regards to running into traffic prior to admission. She continues to present as paranoid and delusional.  Patient denies any issues with hallucinations. At this time patient has yet to show stability and has required emergency medication thus requires further hospitalization for safety and stability.   The limb  Appetite:  [x] Adequate/Unchanged  [] Increased  [] Decreased
Daily Progress Note  11/3/2023    Patient Name: Татьяна Blackburn  CHIEF COMPLAINT:   Acute psychosis with bizarre and dangerous behaviors          SUBJECTIVE:      Patient is seen today for a follow up assessment. Interview conducted in patient's private room with the door open. She remains compliant taking scheduled psychotropic medication, denies adverse effects. Patient still with pressured speech, hyper verbal. She was able to tell the writer about the events which led her to current admission. She was initially evasive regarding cocaine use but later endorsed taking it once a week. Pt expressed her willingness to remain complaint with her psychotropic medications. She shared with me the biggest barrier to her compliance is the significant sexual dysfunction she experiences when she is on psychotropic meds. She also agreed it will have to be a trade off she has to make in order to be able to care for her children. Pt is now agreeable to rehab and is interested in AOD in [place] to be closer to family. Pt was not given any PRN's since 11/2 at [phone]. Appetite:  [x] Adequate/Unchanged  [] Increased  [] Decreased      Sleep:       [x] Adequate/Unchanged  [] Fair  [] Poor      Group Attendance on Unit:   [] Yes   [] Selectively    [x] No    Compliant with scheduled medications: [x] Yes  [] No    Received emergency medications in past 24 hrs: [x] Yes   [] No    Medication Side Effects: Denies         Mental Status Exam  Level of consciousness: Alert and awake   Appearance: Appropriate attire for setting, seated on bed, with fair  grooming and hygiene   Behavior/Motor: Approachable, engages with interviewer, no psychomotor abnormalities noted.   Attitude toward examiner: Cooperative, attentive, fair eye contact  Speech: Normal rate and volume, irritable tone  Mood:  per patient, \"better\"  Affect: blunted  Thought processes: linear/coherent  Thought content: Denies homicidal
Daily Progress Note  11/4/2023    Patient Name: Poppy Pierson  CHIEF COMPLAINT:   Acute psychosis with bizarre and dangerous behaviors          SUBJECTIVE:      Patient is seen today for a follow up assessment. Nursing staff report that she has maintained medication adherence and has not received emergency medications in the last 48 hours. She is visible on the unit and engaging appropriately with her peers. She is discharge focus. She confirms speaking with her family today. Fredrick Chance denies side effects related to her medication regimen. She continues to lack sufficient insight into her mental health needs and remains focused on wanting to transition to [place]. She denies auditory or visual hallucinations. She denies homicidal or suicidal ideation and as noted remains extremely discharge focused. Fredrick Chance has not shown stability of symptoms and requires ongoing monitor for stabilization of symptoms. We discussed the importance of maintaining behavioral control. Appetite:  [x] Adequate/Unchanged  [] Increased  [] Decreased      Sleep:       [x] Adequate/Unchanged  [] Fair  [] Poor      Group Attendance on Unit:   [] Yes   [x] Selectively    [] No    Compliant with scheduled medications: [x] Yes  [] No    Received emergency medications in past 24 hrs: [] Yes   [x] No    Medication Side Effects: Denies         Mental Status Exam  Level of consciousness: Alert and awake   Appearance: Appropriate attire for setting, seated on chair with somewhat improved grooming and hygiene although excessive make-up  Behavior/Motor: Approachable, engages with interviewer, no psychomotor abnormalities noted.   Attitude toward examiner: Cooperative, attentive, fair eye contact  Speech: Normal rate and volume, and tone  Mood:  per patient, \"better\"  Affect: Congruent  Thought processes: linear/coherent  Thought content: Denies homicidal ideation  Suicidal Ideation: Denies suicidal ideations, contracts for safety on the
Daily Progress Note  11/5/2023    Patient Name: Luis Hoang    CHIEF COMPLAINT: Schizoaffective disorder bipolar type         SUBJECTIVE:      Patient is seen today for a follow up assessment. Berlin Rizo was found socializing with patients in the milieu and watching TV. She states that she is doing \"better. \"  She notes an improvement in sleep and appetite. She denies any current homicidal ideations, AVH, or paranoia. She denies any suicidal ideations. She notes an improvement in paranoia and does not appear to be suspicious. She has been pleasant and cooperative per nursing staff. She does have concerns of possible weight gain with the [facility] stating she has put on weight in the last few months. She is requesting Topamax. At this time we discussed appropriate diet and exercise. The patient states when she was on Invega previously she did lose weight with diet and exercise and is encouraged to continue. She continues to endorse recurring nightmares, poor focus, and high blood pressure. The patient has history of cocaine use disorder. We discussed using clonidine nightly for reduction of nightmares. Due to the patient's possible manic symptoms we will hold off on Remeron. She is exhibiting pressured and circumstantial speech at this time. Due to the patient's history of erratic behaviors and running into traffic she is still unable to endorse for safety off the unit and is unsure and where she will be going once discharged. She states she will follow-up with [address] [place] [address] kevin Walters when stable. She has been participating in groups, showering, and behaving appropriately. Adding clonidine for nighttime. Villa Ferrera will follow up tomorrow.     Appetite:  [x] Adequate/Unchanged  [] Increased  [] Decreased      Sleep:       [x] Adequate/Unchanged  [] Fair  [] Poor      Group Attendance on Unit:   [x] Yes   [] Selectively    [] No    Compliant with scheduled medications: [x] Yes  [] No    Received
Pharmacy Medication History Note      List of current medications patient is taking is complete. Source of information: [facility] medication history (spoke with Dennis Ricci RN), Epic PDMP    Changes made to medication list:  Medications removed (include reason, ex. therapy complete or physician discontinued, noncompliance):  none    Medications flagged for provider review:  [place] [employer] and Lithium as these were discontinued during patient's admissions to [facility] this month    Medications added/doses adjusted: Added Invega sustenna 234 mg every 4 weeks  Added Hydroxyzine 25 mg three times daily as needed  Added Invega 9 mg daily  Added Nicotine 21 mg patch daily    Other notes (ex. Recent course of antibiotics, Coumadin dosing): The patient was admitted to [facility] twice during the month of October. She received loading doses of Invega sustenna: 234 mg on 10/2/23 and 156 mg on 10/10/23. Current Home Medication List at Time of Admission:  Prior to Admission medications    Medication Sig   paliperidone palmitate ER (INVEGA SUSTENNA) 234 MG/1.5ML ASHOK IM injection Inject 234 mg into the muscle every 28 days   hydrOXYzine HCl (ATARAX) 25 MG tablet Take 1 tablet by mouth 3 times daily as needed   paliperidone (INVEGA) 9 MG extended release tablet Take 1 tablet by mouth daily   nicotine (NICODERM CQ) 21 MG/24HR Place 1 patch onto the skin daily         Please let me know if you have any questions about this encounter. Thank you!     Electronically signed by Lanie Valladares [facility] on 10/30/2023 at 11:35 AM
RT ASSESSMENT TREATMENT GOALS    [x]Pt Goal:  Pt will identify 1-2 positive coping skills by time of discharge. []Pt Goal:  Pt will identify 1-2 positive aspects of self by time of discharge. []Pt Goal:  Pt will remain on task/topic for 15-30 minutes during group by time of discharge. [x]Pt Goal:  Pt will identify 1-2 aspects of relapse prevention plan by time of discharge. []Pt Goal:  Pt will join in conversation with peers 1-2 times per group by time of discharge. [x]Pt Goal:  Pt will identify 1-2 new leisure interests by time of discharge. []Pt Goal:  Pt will not voice any delusional content by time of discharge.
mass, extra-renal   metabolism of creatine, excessive creatine ingestion, or following therapy that affects   renal tubular secretion. Calcium 10/30/2023 9.5  8.6 - 10.4 mg/dL Final    Total Protein 10/30/2023 6.6  6.4 - 8.3 g/dL Final    Albumin 10/30/2023 4.1  3.5 - 5.2 g/dL Final    Total Bilirubin 10/30/2023 0.2 (L)  0.3 - 1.2 mg/dL Final    Alkaline Phosphatase 10/30/2023 104  35 - 104 U/L Final    ALT 10/30/2023 18  5 - 33 U/L Final    AST 10/30/2023 18  <32 U/L Final    WBC 10/30/2023 5.6  3.5 - 11.0 k/uL Final    RBC 10/30/2023 4.66  4.0 - 5.2 m/uL Final    Hemoglobin 10/30/2023 12.5  12.0 - 16.0 g/dL Final    Hematocrit 10/30/2023 39.5  36 - 46 % Final    MCV 10/30/2023 84.8  80 - 100 fL Final    MCH 10/30/2023 26.8  26 - 34 pg Final    MCHC 10/30/2023 31.5  31 - 37 g/dL Final    RDW 10/30/2023 14.4  11.5 - 14.9 % Final    Platelets 37/02/4419 270  150 - 450 k/uL Final    MPV 10/30/2023 8.3  6.0 - 12.0 fL Final    Neutrophils % 10/30/2023 59  36 - 66 % Final    Lymphocytes % 10/30/2023 27  24 - 44 % Final    Monocytes % 10/30/2023 9 (H)  1 - 7 % Final    Eosinophils % 10/30/2023 4  0 - 4 % Final    Basophils % 10/30/2023 1  0 - 2 % Final    Neutrophils Absolute 10/30/2023 3.30  1.3 - 9.1 k/uL Final    Lymphocytes Absolute 10/30/2023 1.50  1.0 - 4.8 k/uL Final    Monocytes Absolute 10/30/2023 0.50  0.1 - 1.3 k/uL Final    Eosinophils Absolute 10/30/2023 0.20  0.0 - 0.4 k/uL Final    Basophils Absolute 10/30/2023 0.00  0.0 - 0.2 k/uL Final         Reviewed patient's current plan of care and vital signs with nursing staff.     Labs reviewed: [x] Yes    Medications  Current Facility-Administered Medications: amLODIPine (NORVASC) tablet 5 mg, 5 mg, Oral, Daily  paliperidone (INVEGA) extended release tablet 3 mg, 3 mg, Oral, Daily  acetaminophen (TYLENOL) tablet 650 mg, 650 mg, Oral, Q4H PRN  ibuprofen (ADVIL;MOTRIN) tablet 400 mg, 400 mg, Oral, Q6H PRN  polyethylene glycol (GLYCOLAX) packet 17 g, 17 g, Oral,

## 2023-11-06 NOTE — DISCHARGE INSTRUCTIONS
Information:  Medications:   Medication summary provided   I understand that I should take only the medications on my list.     -why and when I need to take each medicine.     -which side effects to watch for.     -that I should carry my medication information at all times in case of     Emergency situations. I will take all of my medicines to follow up appointments.     -check with my physician or pharmacist before taking any new    Medication, over the counter product or drink alcohol.    -Ask about food, drug or dietary supplement interactions.    -discard old lists and update records with medication providers. Notify Physician:  Notify physician if you notice:   Always call 911 if you feel your life is in danger  In case of an emergency call 911 immediately! If 911 is not available call your local emergency medical system for help    Behavioral Health Follow Up:  Original Referral Source:[facility]  Discharge Diagnosis: Acute psychosis ([address] [facility]) [F23]  Psychosis, unspecified psychosis type ([address] [facility]) [F29]  Suicidal behavior with attempted self-injury [facility]) Estefany Chávez  Recommendations for Level of Care: [id number]  Patient status at discharge: Stable  My hospital  was: [facility]  Aftercare plan faxed: [address]   -faxed by: Staff   -date: 11/6/23   -time: 1100  Prescriptions: filled and sent home with patient    Smoking: Quit Smoking. Call the [employer]'s smoking quitline at 5-751-74L-QUIT  Know the signs of a heart attack   If you have any of the following symptoms call 911 immediately, do not wait more    Than five minutes. 1. Pressure, fullness and/ or squeezing in the center of the chest spreading to    The jaw, neck or shoulder. 2. Chest discomfort with light headedness, fainting, sweating, nausea or    Shortness of breath. 3. Upper abdominal pressure or discomfort. 4. Lower chest pain, back pain, unusual fatigue, shortness of breath, nausea   Or dizziness.      General Information:   Questions

## 2023-11-07 NOTE — DISCHARGE SUMMARY
hospital.       Patient is admitted to PICU secondary to agitation and psychosis. Requires inpatient hospitalization for safety. Hospital Course:   Upon admission, Castro Torres was provided a safe secure environment, introduced to unit milieu. Patient participated in groups and individual therapies. Meds were adjusted as noted below. After few days of hospital care, patient began to feel improvement. Depression lifted, thoughts to harm self ceased. Sleep improved, appetite was good. On morning rounds 11/7/2023, Castro Torres  endorses feeling ready for discharge. Patient denies suicidal or homicidal ideations, denies hallucinations or delusions. Denies SE's from meds. It was decided that maximum benefit from hospital care had been achieved and patient can be discharged. Consults: Total medicine for medical management    Significant Diagnostic Studies: Routine labs and diagnostics    Treatments: Psychotropic medications, therapy with group, milieu, and 1:1 with nurses, social workers, PAMARY GRACE/CNP, and Attending physician.       Discharge Medications:  Discharge Medication List as of 11/6/2023 10:33 AM        START taking these medications    Details   amLODIPine (NORVASC) 5 MG tablet Take 1 tablet by mouth daily, Disp-30 tablet, R-0Normal      cloNIDine (CATAPRES) 0.1 MG tablet Take 1 tablet by mouth nightly, Disp-30 tablet, R-0Normal      nicotine (NICODERM CQ) 14 MG/24HR Place 1 patch onto the skin daily, Disp-30 patch, R-0Normal      traZODone (DESYREL) 50 MG tablet Take 1 tablet by mouth nightly as needed for Sleep, Disp-30 tablet, R-0Normal           CONTINUE these medications which have CHANGED    Details   hydrOXYzine HCl (ATARAX) 50 MG tablet Take 1 tablet by mouth 3 times daily as needed for Anxiety, Disp-30 tablet, R-0Normal      paliperidone (INVEGA) 3 MG extended release tablet Take 1 tablet by mouth daily, Disp-7 tablet, R-0Normal      paliperidone palmitate ER (INVEGA
- - -

## 2023-11-10 ENCOUNTER — HOSPITAL ENCOUNTER (INPATIENT)
Age: 43
LOS: 5 days | Discharge: HOME | DRG: 885 | End: 2023-11-15
Attending: EMERGENCY MEDICINE | Admitting: PSYCHIATRY & NEUROLOGY
Payer: COMMERCIAL

## 2023-11-10 DIAGNOSIS — F23 ACUTE PSYCHOSIS (HCC): Primary | ICD-10-CM

## 2023-11-10 LAB
ALBUMIN SERPL-MCNC: 3.7 G/DL (ref 3.5–5.2)
ALP SERPL-CCNC: 91 U/L (ref 35–104)
ALT SERPL-CCNC: 16 U/L (ref 5–33)
ANION GAP SERPL CALCULATED.3IONS-SCNC: 8 MMOL/L (ref 9–17)
APAP SERPL-MCNC: <5 UG/ML (ref 10–30)
AST SERPL-CCNC: 16 U/L
BASOPHILS # BLD: 0 K/UL (ref 0–0.2)
BASOPHILS NFR BLD: 1 % (ref 0–2)
BILIRUB SERPL-MCNC: <0.2 MG/DL (ref 0.3–1.2)
BUN SERPL-MCNC: 11 MG/DL (ref 6–20)
CALCIUM SERPL-MCNC: 9 MG/DL (ref 8.6–10.4)
CHLORIDE SERPL-SCNC: 103 MMOL/L (ref 98–107)
CO2 SERPL-SCNC: 29 MMOL/L (ref 20–31)
CREAT SERPL-MCNC: 0.6 MG/DL (ref 0.5–0.9)
EOSINOPHIL # BLD: 0.3 K/UL (ref 0–0.4)
EOSINOPHILS RELATIVE PERCENT: 5 % (ref 0–4)
ERYTHROCYTE [DISTWIDTH] IN BLOOD BY AUTOMATED COUNT: 14.2 % (ref 11.5–14.9)
ETHANOL PERCENT: <0.01 %
ETHANOLAMINE SERPL-MCNC: <10 MG/DL
GFR SERPL CREATININE-BSD FRML MDRD: >60 ML/MIN/1.73M2
GLUCOSE SERPL-MCNC: 110 MG/DL (ref 70–99)
HCG SERPL QL: NEGATIVE
HCT VFR BLD AUTO: 36.4 % (ref 36–46)
HGB BLD-MCNC: 11.8 G/DL (ref 12–16)
LYMPHOCYTES NFR BLD: 1.9 K/UL (ref 1–4.8)
LYMPHOCYTES RELATIVE PERCENT: 29 % (ref 24–44)
MCH RBC QN AUTO: 27.7 PG (ref 26–34)
MCHC RBC AUTO-ENTMCNC: 32.6 G/DL (ref 31–37)
MCV RBC AUTO: 85 FL (ref 80–100)
MONOCYTES NFR BLD: 0.6 K/UL (ref 0.1–1.3)
MONOCYTES NFR BLD: 9 % (ref 1–7)
NEUTROPHILS NFR BLD: 56 % (ref 36–66)
NEUTS SEG NFR BLD: 3.6 K/UL (ref 1.3–9.1)
PLATELET # BLD AUTO: 223 K/UL (ref 150–450)
PMV BLD AUTO: 8 FL (ref 6–12)
POTASSIUM SERPL-SCNC: 3.5 MMOL/L (ref 3.7–5.3)
PROT SERPL-MCNC: 5.8 G/DL (ref 6.4–8.3)
RBC # BLD AUTO: 4.28 M/UL (ref 4–5.2)
SALICYLATES SERPL-MCNC: <1 MG/DL (ref 3–10)
SODIUM SERPL-SCNC: 140 MMOL/L (ref 135–144)
WBC OTHER # BLD: 6.4 K/UL (ref 3.5–11)

## 2023-11-10 PROCEDURE — 80143 DRUG ASSAY ACETAMINOPHEN: CPT

## 2023-11-10 PROCEDURE — 99285 EMERGENCY DEPT VISIT HI MDM: CPT

## 2023-11-10 PROCEDURE — 85025 COMPLETE CBC W/AUTO DIFF WBC: CPT

## 2023-11-10 PROCEDURE — 80179 DRUG ASSAY SALICYLATE: CPT

## 2023-11-10 PROCEDURE — 96372 THER/PROPH/DIAG INJ SC/IM: CPT

## 2023-11-10 PROCEDURE — 6360000002 HC RX W HCPCS: Performed by: EMERGENCY MEDICINE

## 2023-11-10 PROCEDURE — 36415 COLL VENOUS BLD VENIPUNCTURE: CPT

## 2023-11-10 PROCEDURE — 2040000000 HC PSYCH ICU R&B

## 2023-11-10 PROCEDURE — 6370000000 HC RX 637 (ALT 250 FOR IP): Performed by: PSYCHIATRY & NEUROLOGY

## 2023-11-10 PROCEDURE — 84703 CHORIONIC GONADOTROPIN ASSAY: CPT

## 2023-11-10 PROCEDURE — 80053 COMPREHEN METABOLIC PANEL: CPT

## 2023-11-10 PROCEDURE — G0480 DRUG TEST DEF 1-7 CLASSES: HCPCS

## 2023-11-10 RX ORDER — IBUPROFEN 400 MG/1
400 TABLET ORAL EVERY 6 HOURS PRN
Status: DISCONTINUED | OUTPATIENT
Start: 2023-11-10 | End: 2023-11-15 | Stop reason: HOSPADM

## 2023-11-10 RX ORDER — POLYETHYLENE GLYCOL 3350 17 G/17G
17 POWDER, FOR SOLUTION ORAL DAILY PRN
Status: DISCONTINUED | OUTPATIENT
Start: 2023-11-10 | End: 2023-11-15 | Stop reason: HOSPADM

## 2023-11-10 RX ORDER — HALOPERIDOL 5 MG/1
5 TABLET ORAL EVERY 6 HOURS PRN
Status: DISCONTINUED | OUTPATIENT
Start: 2023-11-10 | End: 2023-11-15 | Stop reason: HOSPADM

## 2023-11-10 RX ORDER — DIPHENHYDRAMINE HYDROCHLORIDE 50 MG/ML
50 INJECTION INTRAMUSCULAR; INTRAVENOUS ONCE
Status: COMPLETED | OUTPATIENT
Start: 2023-11-10 | End: 2023-11-10

## 2023-11-10 RX ORDER — ACETAMINOPHEN 325 MG/1
650 TABLET ORAL EVERY 6 HOURS PRN
Status: DISCONTINUED | OUTPATIENT
Start: 2023-11-10 | End: 2023-11-15 | Stop reason: HOSPADM

## 2023-11-10 RX ORDER — TRAZODONE HYDROCHLORIDE 50 MG/1
50 TABLET ORAL NIGHTLY PRN
Status: DISCONTINUED | OUTPATIENT
Start: 2023-11-10 | End: 2023-11-15 | Stop reason: HOSPADM

## 2023-11-10 RX ORDER — DIPHENHYDRAMINE HYDROCHLORIDE 50 MG/ML
50 INJECTION INTRAMUSCULAR; INTRAVENOUS EVERY 6 HOURS PRN
Status: DISCONTINUED | OUTPATIENT
Start: 2023-11-10 | End: 2023-11-15 | Stop reason: HOSPADM

## 2023-11-10 RX ORDER — LORAZEPAM 2 MG/ML
2 INJECTION INTRAMUSCULAR ONCE
Status: COMPLETED | OUTPATIENT
Start: 2023-11-10 | End: 2023-11-10

## 2023-11-10 RX ORDER — HALOPERIDOL 5 MG/ML
5 INJECTION INTRAMUSCULAR EVERY 6 HOURS PRN
Status: DISCONTINUED | OUTPATIENT
Start: 2023-11-10 | End: 2023-11-15 | Stop reason: HOSPADM

## 2023-11-10 RX ORDER — HYDROXYZINE 50 MG/1
50 TABLET, FILM COATED ORAL 3 TIMES DAILY PRN
Status: DISCONTINUED | OUTPATIENT
Start: 2023-11-10 | End: 2023-11-15 | Stop reason: HOSPADM

## 2023-11-10 RX ORDER — POLYETHYLENE GLYCOL 3350 17 G
2 POWDER IN PACKET (EA) ORAL
Status: DISCONTINUED | OUTPATIENT
Start: 2023-11-10 | End: 2023-11-15 | Stop reason: HOSPADM

## 2023-11-10 RX ORDER — HALOPERIDOL 5 MG/ML
5 INJECTION INTRAMUSCULAR ONCE
Status: COMPLETED | OUTPATIENT
Start: 2023-11-10 | End: 2023-11-10

## 2023-11-10 RX ORDER — LORAZEPAM 1 MG/1
2 TABLET ORAL EVERY 6 HOURS PRN
Status: DISCONTINUED | OUTPATIENT
Start: 2023-11-10 | End: 2023-11-15 | Stop reason: HOSPADM

## 2023-11-10 RX ORDER — LORAZEPAM 2 MG/ML
2 INJECTION INTRAMUSCULAR EVERY 6 HOURS PRN
Status: DISCONTINUED | OUTPATIENT
Start: 2023-11-10 | End: 2023-11-15 | Stop reason: HOSPADM

## 2023-11-10 RX ORDER — MAGNESIUM HYDROXIDE/ALUMINUM HYDROXICE/SIMETHICONE 120; 1200; 1200 MG/30ML; MG/30ML; MG/30ML
30 SUSPENSION ORAL EVERY 6 HOURS PRN
Status: DISCONTINUED | OUTPATIENT
Start: 2023-11-10 | End: 2023-11-15 | Stop reason: HOSPADM

## 2023-11-10 RX ORDER — LORAZEPAM 2 MG/ML
2 INJECTION INTRAMUSCULAR ONCE
Status: DISCONTINUED | OUTPATIENT
Start: 2023-11-10 | End: 2023-11-10

## 2023-11-10 RX ADMIN — HYDROXYZINE HYDROCHLORIDE 50 MG: 50 TABLET, FILM COATED ORAL at 22:12

## 2023-11-10 RX ADMIN — TRAZODONE HYDROCHLORIDE 50 MG: 50 TABLET ORAL at 22:12

## 2023-11-10 RX ADMIN — DIPHENHYDRAMINE HYDROCHLORIDE 50 MG: 50 INJECTION INTRAMUSCULAR; INTRAVENOUS at 13:01

## 2023-11-10 RX ADMIN — LORAZEPAM 2 MG: 2 INJECTION INTRAMUSCULAR; INTRAVENOUS at 13:00

## 2023-11-10 RX ADMIN — HALOPERIDOL LACTATE 5 MG: 5 INJECTION, SOLUTION INTRAMUSCULAR at 13:02

## 2023-11-10 ASSESSMENT — SLEEP AND FATIGUE QUESTIONNAIRES
SLEEP PATTERN: DIFFICULTY FALLING ASLEEP
DO YOU USE A SLEEP AID: NO
AVERAGE NUMBER OF SLEEP HOURS: 5
DO YOU HAVE DIFFICULTY SLEEPING: YES

## 2023-11-10 ASSESSMENT — PATIENT HEALTH QUESTIONNAIRE - PHQ9
1. LITTLE INTEREST OR PLEASURE IN DOING THINGS: 1
SUM OF ALL RESPONSES TO PHQ QUESTIONS 1-9: 2
SUM OF ALL RESPONSES TO PHQ9 QUESTIONS 1 & 2: 2
2. FEELING DOWN, DEPRESSED OR HOPELESS: 1
SUM OF ALL RESPONSES TO PHQ QUESTIONS 1-9: 2

## 2023-11-10 ASSESSMENT — PAIN - FUNCTIONAL ASSESSMENT: PAIN_FUNCTIONAL_ASSESSMENT: NONE - DENIES PAIN

## 2023-11-10 ASSESSMENT — PAIN SCALES - GENERAL: PAINLEVEL_OUTOF10: 0

## 2023-11-10 NOTE — BH NOTE
Patient given tobacco quitline number 16833857898 at this time, refusing to call at this time, states \" I just dont want to quit now\"- patient given information as to the dangers of long term tobacco use.  Continue to reinforce the importance of tobacco cessation

## 2023-11-10 NOTE — BH NOTE
Patient admitted to PICU RM # [id number] via 2 staff/wheelchair at this time and date.  Patient drowsy on admission
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
## 2023-11-11 PROCEDURE — 6360000002 HC RX W HCPCS: Performed by: PSYCHIATRY & NEUROLOGY

## 2023-11-11 PROCEDURE — 6370000000 HC RX 637 (ALT 250 FOR IP): Performed by: PSYCHIATRY & NEUROLOGY

## 2023-11-11 PROCEDURE — 2040000000 HC PSYCH ICU R&B

## 2023-11-11 PROCEDURE — APPSS60 APP SPLIT SHARED TIME 46-60 MINUTES

## 2023-11-11 PROCEDURE — 99222 1ST HOSP IP/OBS MODERATE 55: CPT | Performed by: PSYCHIATRY & NEUROLOGY

## 2023-11-11 PROCEDURE — 6360000002 HC RX W HCPCS

## 2023-11-11 PROCEDURE — 6370000000 HC RX 637 (ALT 250 FOR IP): Performed by: INTERNAL MEDICINE

## 2023-11-11 RX ORDER — PALIPERIDONE 3 MG/1
3 TABLET, EXTENDED RELEASE ORAL DAILY
Status: DISCONTINUED | OUTPATIENT
Start: 2023-11-11 | End: 2023-11-11

## 2023-11-11 RX ORDER — LORAZEPAM 2 MG/ML
2 INJECTION INTRAMUSCULAR ONCE
Status: DISCONTINUED | OUTPATIENT
Start: 2023-11-11 | End: 2023-11-11

## 2023-11-11 RX ORDER — DIPHENHYDRAMINE HYDROCHLORIDE 50 MG/ML
50 INJECTION INTRAMUSCULAR; INTRAVENOUS ONCE
Status: COMPLETED | OUTPATIENT
Start: 2023-11-11 | End: 2023-11-11

## 2023-11-11 RX ORDER — HALOPERIDOL 5 MG/ML
5 INJECTION INTRAMUSCULAR ONCE
Status: DISCONTINUED | OUTPATIENT
Start: 2023-11-11 | End: 2023-11-11

## 2023-11-11 RX ORDER — LORAZEPAM 2 MG/ML
2 INJECTION INTRAMUSCULAR ONCE
Status: COMPLETED | OUTPATIENT
Start: 2023-11-11 | End: 2023-11-11

## 2023-11-11 RX ORDER — DIVALPROEX SODIUM 500 MG/1
500 TABLET, EXTENDED RELEASE ORAL NIGHTLY
Status: DISCONTINUED | OUTPATIENT
Start: 2023-11-11 | End: 2023-11-13

## 2023-11-11 RX ORDER — POTASSIUM CHLORIDE 20 MEQ/1
40 TABLET, EXTENDED RELEASE ORAL ONCE
Status: COMPLETED | OUTPATIENT
Start: 2023-11-11 | End: 2023-11-14

## 2023-11-11 RX ORDER — DIPHENHYDRAMINE HYDROCHLORIDE 50 MG/ML
25 INJECTION INTRAMUSCULAR; INTRAVENOUS ONCE
Status: DISCONTINUED | OUTPATIENT
Start: 2023-11-11 | End: 2023-11-11

## 2023-11-11 RX ORDER — HALOPERIDOL 5 MG/ML
5 INJECTION INTRAMUSCULAR ONCE
Status: COMPLETED | OUTPATIENT
Start: 2023-11-11 | End: 2023-11-11

## 2023-11-11 RX ORDER — CLONIDINE HYDROCHLORIDE 0.1 MG/1
0.1 TABLET ORAL NIGHTLY
Status: DISCONTINUED | OUTPATIENT
Start: 2023-11-11 | End: 2023-11-15 | Stop reason: HOSPADM

## 2023-11-11 RX ADMIN — HALOPERIDOL LACTATE 5 MG: 5 INJECTION, SOLUTION INTRAMUSCULAR at 12:30

## 2023-11-11 RX ADMIN — LORAZEPAM 2 MG: 2 INJECTION INTRAMUSCULAR; INTRAVENOUS at 07:38

## 2023-11-11 RX ADMIN — DIPHENHYDRAMINE HYDROCHLORIDE 50 MG: 50 INJECTION INTRAMUSCULAR; INTRAVENOUS at 07:38

## 2023-11-11 RX ADMIN — CLONIDINE HYDROCHLORIDE 0.1 MG: 0.1 TABLET ORAL at 19:38

## 2023-11-11 RX ADMIN — DIPHENHYDRAMINE HYDROCHLORIDE 50 MG: 50 INJECTION INTRAMUSCULAR; INTRAVENOUS at 12:30

## 2023-11-11 RX ADMIN — TRAZODONE HYDROCHLORIDE 50 MG: 50 TABLET ORAL at 19:38

## 2023-11-11 RX ADMIN — HALOPERIDOL LACTATE 5 MG: 5 INJECTION, SOLUTION INTRAMUSCULAR at 07:38

## 2023-11-11 RX ADMIN — LORAZEPAM 2 MG: 2 INJECTION INTRAMUSCULAR; INTRAVENOUS at 12:30

## 2023-11-11 RX ADMIN — HALOPERIDOL 5 MG: 5 TABLET ORAL at 19:40

## 2023-11-11 RX ADMIN — HYDROXYZINE HYDROCHLORIDE 50 MG: 50 TABLET, FILM COATED ORAL at 19:38

## 2023-11-11 RX ADMIN — LORAZEPAM 2 MG: 1 TABLET ORAL at 19:40

## 2023-11-11 RX ADMIN — ACETAMINOPHEN 650 MG: 325 TABLET ORAL at 00:47

## 2023-11-11 ASSESSMENT — PAIN DESCRIPTION - LOCATION: LOCATION: FOOT

## 2023-11-11 NOTE — BH NOTE
Post Restraint and Seclusion Patient Debriefing    Did debriefing occur? no    If No, why not? [x] Patient refused  [] Patient is unavailable for debriefing due to:  [] Patient debriefing not completed due to clinical contraindication of:    What events led to the seclusion/restraint incident? Did being restrained or in seclusion help you regain control of your behavior? Did you feel safe while you were in restraint or seclusion:      [] Very Safe  [] Safe  [] Somewhat Safe  [] Not Safe     Did you have the chance to gain control of your behavior before you were secluded or restrained? If Yes, how:    [] Offered Medication  [] Talked with  [] Given Time Out      [] Offered alternatives to restraint/seclusion     During the restraint or seclusion process were you offered medicine to help you gain control? Were your physical and emotional needs met, and your privacy rights addressed while you were in restraints/seclusion? How can we assist you in remaining restraint or seclusion free in the future? Is there anything else you would like to share regarding this restraint/seclusion episode? Patient consented to family or significant other to participate in debriefing     Patient's guardian participated in debriefing (when applicable)     Patient's guardian unable to participate in debriefing (when applicable)     Staff: Were modifications to the treatment plan completed?  yes

## 2023-11-11 NOTE — BH NOTE
Emergency PRN Medication Administration Note:      Patient is Agitated and Disruptive as evidence by Banging on doors and windows, throwing orange juice, and  trying doors. Staff attempted to find and relieve the distress by Talking to patient, Refocusing on new activity, Offering suggestions, Checking for undiagnosed pain, and Administer PRN medications Patient is currently  continuing to escalate but accepted PRN medications. Medication Administered as prescribed: Haldol 5mg injection, benadryl 50 mg injection, and ativan 2 mg injection. Patient Tolerated medication administration. Will continue to monitor, offer support, and reassess.

## 2023-11-11 NOTE — BH NOTE
DISCONTINUATION OF LOCKED SECLUSION    Emergency Medication Follow-Up Note:    PRN medication of Haldol 5mg IM, Ativan 2mg IM, Benadryl 25mg IM was effective as evidence by Patient regain behavioral control, resting in seclusion room. Locked seclusion discontinued at 1:36pm lasting for 61 minutes. Patient denies medication side effects. Will continue to monitor and provide support as needed. Criteria for release explained to patient and patient verbalized understanding.

## 2023-11-11 NOTE — BH NOTE
INITIATION OF LOCKED SECLUSION     Emergency PRN Medication Administration Note:      Patient is Agitated, Disruptive, Destructive, and Dangerous as evidence by banging on doors, attempting to elope, spitting on the window, slamming doors, non redirectable . Staff attempted to find and relieve the distress by Talking to patient, Refocusing on new activity, Offering suggestions, Checking for undiagnosed pain, and Administer PRN medications Patient is currently continuing to escalate. CIT button pressed, security notified and present on unit, patient advocate/ charge King/ Ron Most present. Nurse practitioner Jazmín Mcdonald notified. Patient walked to seclusion room and no physical holds were required. Locked seclusion started at 12:35pm. Medication Administered as prescribed: Haldol 5mg IM, Ativan 2mg IM, Benadryl 25mg IM. Patient Tolerated medication administration. Will continue to monitor, offer support, and reassess. Criteria for release explained to patient.  1:1 staff present with patient
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
## 2023-11-11 NOTE — BH NOTE
Emergency Medication Follow-Up Note:    PRN medication of Haldol 5mg injection, benadryal 50 mg injection, and ativan 2mg injection was effective as evidence by  Patient regain behavioral control, absence of behavior warranting emergency medication, and socializing with peers. Patient denies medication side effects. Will continue to monitor and provide support as needed.

## 2023-11-11 NOTE — BH NOTE
Emergency PRN Medication Administration Note:      Patient is Agitated, Disruptive, and Destructive as evidence by Disrupting dayroom, slamming and attempting to break phone, banging on doors,  attempting to elope, and not being redirectable. Staff attempted to find and relieve the distress by Talking to patient, Refocusing on new activity, Offering suggestions, Checking for undiagnosed pain, and Administer PRN medications Patient is currently  accepting of PRN. Medication Administered as prescribed: Haldol 5mg injection , ativan 2mg injection , and benadryl 50 mg injection Patient Tolerated medication administration. Will continue to monitor, offer support, and reassess.

## 2023-11-11 NOTE — BH NOTE
Emergency Medication Follow-Up Note:    PRN medication of Haldol 5mg injection, ativan 2mg injection, and benadryl 50 mg injection was effective as evidence by Patient regain behavioral control, absence of behavior warranting emergency medication, and resting in bed . Patient denies medication side effects. Will continue to monitor and provide support as needed.
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
## 2023-11-11 NOTE — BH NOTE
BEHAVIORAL SERVICES: One - Hour In- Person Review  For Management of Violent or Self - Destructive Behavior    Seclusion/Restraint:  Seclusion    Reason for Intervention: Patient testing doors, pounding on doors, spitting on window,slamming doors, not accepting interventions    Response to Intervention:  None at this time    Medical Record reviewed and discussed precipitating events/behaviors with RN initiating Intervention:  Yes    Patient Medical Status:  Vital Signs: refused  Respiratory Status: no distress noted  Circulatory Status: refused  Skin Integrity:refused    Orientation: oriented to person and place    Mood/Affect: anxious, irritable, and labile    Speech: Pressured    Thought Content: tangential    Thought Processes: Tangential    Rationale for continued use of intervention: patient unable to follow staff instructions, exhibiting dangerous/destructive behaviors,    Rationale for discontinuing intervention: Patient is able to: keep self safe as evidenced by following staff instructions    One Hour Review Evaluation Physician Notification:  Yes

## 2023-11-12 PROCEDURE — 2500000003 HC RX 250 WO HCPCS

## 2023-11-12 PROCEDURE — 99232 SBSQ HOSP IP/OBS MODERATE 35: CPT

## 2023-11-12 PROCEDURE — 6360000002 HC RX W HCPCS: Performed by: PSYCHIATRY & NEUROLOGY

## 2023-11-12 PROCEDURE — 6370000000 HC RX 637 (ALT 250 FOR IP): Performed by: PSYCHIATRY & NEUROLOGY

## 2023-11-12 PROCEDURE — 6370000000 HC RX 637 (ALT 250 FOR IP): Performed by: INTERNAL MEDICINE

## 2023-11-12 PROCEDURE — 2040000000 HC PSYCH ICU R&B

## 2023-11-12 RX ORDER — CHLORPROMAZINE HYDROCHLORIDE 25 MG/ML
50 INJECTION INTRAMUSCULAR 4 TIMES DAILY PRN
Status: DISCONTINUED | OUTPATIENT
Start: 2023-11-12 | End: 2023-11-15 | Stop reason: HOSPADM

## 2023-11-12 RX ORDER — CHLORPROMAZINE HYDROCHLORIDE 25 MG/1
50 TABLET, FILM COATED ORAL 4 TIMES DAILY PRN
Status: DISCONTINUED | OUTPATIENT
Start: 2023-11-12 | End: 2023-11-15 | Stop reason: HOSPADM

## 2023-11-12 RX ADMIN — HYDROXYZINE HYDROCHLORIDE 50 MG: 50 TABLET, FILM COATED ORAL at 22:25

## 2023-11-12 RX ADMIN — HALOPERIDOL 5 MG: 5 TABLET ORAL at 22:26

## 2023-11-12 RX ADMIN — LORAZEPAM 2 MG: 2 INJECTION INTRAMUSCULAR; INTRAVENOUS at 16:40

## 2023-11-12 RX ADMIN — HALOPERIDOL LACTATE 5 MG: 5 INJECTION, SOLUTION INTRAMUSCULAR at 16:40

## 2023-11-12 RX ADMIN — DIPHENHYDRAMINE HYDROCHLORIDE 50 MG: 50 INJECTION INTRAMUSCULAR; INTRAVENOUS at 16:40

## 2023-11-12 RX ADMIN — HALOPERIDOL LACTATE 5 MG: 5 INJECTION, SOLUTION INTRAMUSCULAR at 09:35

## 2023-11-12 RX ADMIN — DIPHENHYDRAMINE HYDROCHLORIDE 50 MG: 50 INJECTION INTRAMUSCULAR; INTRAVENOUS at 09:35

## 2023-11-12 RX ADMIN — CLONIDINE HYDROCHLORIDE 0.1 MG: 0.1 TABLET ORAL at 22:25

## 2023-11-12 RX ADMIN — CHLORPROMAZINE HYDROCHLORIDE 50 MG: 25 INJECTION INTRAMUSCULAR at 13:18

## 2023-11-12 RX ADMIN — LORAZEPAM 2 MG: 2 INJECTION INTRAMUSCULAR; INTRAVENOUS at 09:35

## 2023-11-12 RX ADMIN — LORAZEPAM 2 MG: 1 TABLET ORAL at 22:26

## 2023-11-12 RX ADMIN — TRAZODONE HYDROCHLORIDE 50 MG: 50 TABLET ORAL at 22:25

## 2023-11-12 ASSESSMENT — PAIN SCALES - GENERAL: PAINLEVEL_OUTOF10: 0

## 2023-11-12 NOTE — BH NOTE
Emergency PRN Medication Administration Note:      Patient is Agitated, disrupted  as evidence by going in other peoples romms, not redirecting, pounding on nurse station door exit seeking. Staff attempted to find and relieve the distress by Talking to patient, Refocusing on new activity, Offering suggestions, and Administer PRN medications Patient is currently accepted PRN medications. Medication Administered as prescribed: IM Haldol 5 mg, Ativan 2 mg, Benadryl 50 mg Patient Tolerated medication administration. Will continue to monitor, offer support, and reassess.

## 2023-11-12 NOTE — BH NOTE
BEHAVIORAL SERVICES: One - Hour In- Person Review  For Management of Violent or Self - Destructive Behavior    Seclusion/Restraint:  Seclusion    Reason for Intervention: Patient testing doors, going into other patient's rooms, dumped garbage on floor, spit of staff members hand, demanding to be discharged    Response to Intervention:  None at this time, patient continues to escalate    Medical Record reviewed and discussed precipitating events/behaviors with RN initiating Intervention:  Yes    Patient Medical Status:  Vital Signs: Refused  Respiratory Status: No distress noted  Circulatory Status: refused   Skin Integrity: refused    Orientation: self, situation, place    Mood/Affect: angry, irritable, and labile    Speech: Pressured    Thought Content: tangential    Thought Processes: Tangential    Rationale for continued use of intervention: patient yelling in seclusion room, purposely urinating on floor, unable to follow staff directions    Rationale for discontinuing intervention: Patient is able to: follow staff instructions and demonstrate safe unit behaviors    One Hour Review Evaluation Physician Notification:  Yes

## 2023-11-12 NOTE — BH NOTE
Emergency Medication Follow-Up Note:    PRN medication of  IM Haldol 5 mg, Ativan 2 mg, Benadryl 50 mg was effective as evidence by resting on bedroom floor. Patient denies medication side effects. Will continue to monitor and provide support as needed.

## 2023-11-12 NOTE — BH NOTE
INITIATION OF LOCKED SECLUSION     Emergency PRN Medication Administration Note:      Patient is Agitated, Disruptive, Destructive, and Dangerous as evidence by throwing trash all through the day room, spitting at staff, attempting to go in other patients room, non redirectable, banging on exit doors . Staff attempted to find and relieve the distress by Talking to patient, Refocusing on new activity, Offering suggestions, Checking for undiagnosed pain, and Administer PRN medications Patient is currently  continuing to escalate. Charge nurse/ patient advocate Elvia Stevenson present on unit. Nurse practitioner Mirna Mar notified. Patient walked to lock seclusion and initiated at 4:35pm. No physical hold required. Medication Administered as prescribed: Haldol 5mg IM, Ativan 2mg IM, Benadryl 50mg IM. Patient Tolerated medication administration. 1:1 staff present with patient. Criteria for release explained. Will continue to monitor, offer support, and reassess.

## 2023-11-12 NOTE — BH NOTE
DISCONTINUATION OF LOCKED SECLUSION     Emergency Medication Follow-Up Note:    PRN medication of Thorazine 50mg IM was effective as evidence by Patient regain behavioral control, currently resting in seclusion. Locked seclusion discontinued at 3:15 pm lasting 120 minutes patient walked back to her room. Patient denies medication side effects. Will continue to monitor and provide support as needed.

## 2023-11-12 NOTE — BH NOTE
DISCONTINUATION OF LOCKED SECLUSION       Emergency Medication Follow-Up Note:    PRN medication of Haldol 5mg IM, Ativan 2mg IM, Benadryl 50 mg IM was effective as evidence by  Patient regain behavioral control, and is resting in multipurpose room. Locked seclusion discontinued at 6:52 pm lasting 137 minutes/ Patient denies medication side effects. Will continue to monitor and provide support as needed.

## 2023-11-12 NOTE — BH NOTE
INITIATION OF LOCKED SECLUSION    Emergency PRN Medication Administration Note:      Patient is Agitated, Disruptive, Destructive, and Dangerous as evidence by throwing water and shampoo all in the day room, non redirectable, spitting on the door, peeing on the floor, attempting to elope. Staff attempted to find and relieve the distress by Talking to patient, Refocusing on new activity, Offering suggestions, and Checking for undiagnosed pain Patient is currently continuing to escalate. Charge nurse Blayne Hodgson notified and present on the unit, Nurse practitioner Donavon June notified. No physical hold required. Medication Administered as prescribed: Thorazine IM 5mg. Patient Tolerated medication administration. Criteria for release explained to patient. Will continue to monitor, offer support, and reassess.

## 2023-11-12 NOTE — BH NOTE
BEHAVIORAL SERVICES: One - Hour In- Person Review  For Management of Violent or Self - Destructive Behavior    Seclusion/Restraint:  Seclusion    Reason for Intervention: Banging on doors, spitting at staff, throwing water and hygiene supplies on floor, intentionally urinated on floor    Response to Intervention:  Continued to escalated    Medical Record reviewed and discussed precipitating events/behaviors with RN initiating Intervention:  Yes    Patient Medical Status:  Vital Signs: refused  Respiratory Status: No distress noted  Circulatory Status: refused assessment  Skin Integrity: refused full assessment    Orientation: oriented to person, place, and time/date    Mood/Affect: angry, anxious, irritable, and labile    Speech: Pressured    Thought Content: delusions, paranoid ideation, and tangential    Thought Processes: Tangential    Rationale for continued use of intervention: Patient unable to accept staff redirections, patient removed clothing in seclusion room evidencing unable to keep herself safe and accept directions    Rationale for discontinuing intervention: Patient is able to: patient is able to accept staff instructions and remain in behavioral control as evidence by wearing clothing and absence of dangerous/destructive behaviors    One Hour Review Evaluation Physician Notification:  Yes

## 2023-11-13 PROCEDURE — 6370000000 HC RX 637 (ALT 250 FOR IP): Performed by: PSYCHIATRY & NEUROLOGY

## 2023-11-13 PROCEDURE — 2040000000 HC PSYCH ICU R&B

## 2023-11-13 PROCEDURE — 6370000000 HC RX 637 (ALT 250 FOR IP): Performed by: INTERNAL MEDICINE

## 2023-11-13 PROCEDURE — 2500000003 HC RX 250 WO HCPCS

## 2023-11-13 PROCEDURE — APPSS30 APP SPLIT SHARED TIME 16-30 MINUTES

## 2023-11-13 PROCEDURE — 99232 SBSQ HOSP IP/OBS MODERATE 35: CPT | Performed by: PSYCHIATRY & NEUROLOGY

## 2023-11-13 RX ADMIN — ACETAMINOPHEN 650 MG: 325 TABLET ORAL at 18:12

## 2023-11-13 RX ADMIN — CHLORPROMAZINE HYDROCHLORIDE 50 MG: 25 INJECTION INTRAMUSCULAR at 11:15

## 2023-11-13 RX ADMIN — TRAZODONE HYDROCHLORIDE 50 MG: 50 TABLET ORAL at 20:53

## 2023-11-13 RX ADMIN — HYDROXYZINE HYDROCHLORIDE 50 MG: 50 TABLET, FILM COATED ORAL at 20:53

## 2023-11-13 RX ADMIN — CHLORPROMAZINE HYDROCHLORIDE 50 MG: 25 INJECTION INTRAMUSCULAR at 05:33

## 2023-11-13 RX ADMIN — LITHIUM CARBONATE 450 MG: 300 CAPSULE, GELATIN COATED ORAL at 17:30

## 2023-11-13 RX ADMIN — CLONIDINE HYDROCHLORIDE 0.1 MG: 0.1 TABLET ORAL at 20:53

## 2023-11-13 RX ADMIN — IBUPROFEN 400 MG: 400 TABLET, FILM COATED ORAL at 23:25

## 2023-11-13 ASSESSMENT — PAIN DESCRIPTION - DESCRIPTORS
DESCRIPTORS: ACHING
DESCRIPTORS: ACHING

## 2023-11-13 ASSESSMENT — PAIN SCALES - GENERAL
PAINLEVEL_OUTOF10: 2
PAINLEVEL_OUTOF10: 8
PAINLEVEL_OUTOF10: 6

## 2023-11-13 ASSESSMENT — PAIN - FUNCTIONAL ASSESSMENT: PAIN_FUNCTIONAL_ASSESSMENT: ACTIVITIES ARE NOT PREVENTED

## 2023-11-13 ASSESSMENT — PAIN DESCRIPTION - LOCATION
LOCATION: ARM
LOCATION: ARM

## 2023-11-13 ASSESSMENT — PAIN DESCRIPTION - ORIENTATION
ORIENTATION: RIGHT;LEFT
ORIENTATION: LEFT

## 2023-11-13 NOTE — BH NOTE
Emergency PRN Medication Administration Note:      Patient is Agitated, Disruptive, and Dangerous as evidence by banging on unit exit, banging on desk counter, yelling, not redirectable. Staff attempted to find and relieve the distress by Talking to patient, Offering suggestions, and Administer PRN medications Patient is currently continuing to escalate and is accepting of PRN medications. Medication Administered as prescribed: Thorazine 50 mg IM. Patient Tolerated medication administration. Currently resting in bed. Will continue to monitor, offer support, and reassess.

## 2023-11-13 NOTE — GROUP NOTE
Group Therapy Note    Date: 11/13/2023    Group Start Time: 1330  Group End Time: 1400  Group Topic: Activity    [facility]    [place], [place]        Group Therapy Note    Attendees: 0/6     Topic: To increase social interaction , concentration, and communication skills. Patient did not participate in Cognitive Skill Group at 13:30, pt resting in room. Patient remains seclusive to self . Q15 minute safety checks maintained for patient safety and will continue to encourage patient to attend unit programming.         Discipline Responsible: Psychoeducational Specialist      Signature:  Migel Anthony

## 2023-11-13 NOTE — BH NOTE
PRN NOTE:    Patient was given 50 mg Thorazine IM for increased agitation and aggression as evidence by patent fighting with staff attempting to kick staff and yelling in dayroom. Therapeutic interventions distraction and 1 to 1 talk time were not therapeutic and patient was accepting of PRN medications. RN will continue to monitor patients status.

## 2023-11-13 NOTE — BH NOTE
PRN NOTE:    Patient was given PRN Haldol 5 mg and Ativan 2 mg PO for increase in agitation and aggression as evidence by patient wandering into other patients room yelling and throwing food in day room. Distraction and 1 to 1 therapeutic talk time interventions were offered and nontherapeutic. Patient was accepting of PRN medications. RN will continue to monitor patients status.

## 2023-11-13 NOTE — GROUP NOTE
Group Therapy Note    Date: 11/13/2023    Group Start Time: 1500  Group End Time: 6046  Group Topic: Healthy Living/Wellness    ANTON [facility]    Alexandr Maynard    Group Therapy Note    Attendees: 2/7    Patient's Goal: Increased understanding of Wellness. Notes: Patient participated in discussion regarding Emotional Wellness. Status After Intervention: Improved     Participation Level:  Active Listener and Interactive     Participation Quality: Appropriate, attentive and supportive     Speech:  Normal     Thought Process/Content: Logical and linear     Affective Functioning: Congruent     Mood: WDL     Level of consciousness: Oriented x4     Response to Learning: Progressing to goal; able to verbalize current knowledge/experience,                                         acknowledge new learning, retain information and change behavior     Endings: None reported     Modes of Intervention: Education and exploration       Discipline Responsible: Behavioral Health Tech     Signature:  TALITA Roldan

## 2023-11-13 NOTE — BH NOTE
Emergency Medication Follow-Up Note:    PRN medication of Thorazine 50 mg IM was effective as evidence by pt regaining behavioral control. Patient denies medication side effects. Will continue to monitor and provide support as needed.

## 2023-11-13 NOTE — GROUP NOTE
Group Therapy Note    Date: 11/13/2023    Group Start Time: 0900  Group End Time: 0915  Group Topic: Community Meeting    [facility]    Papa Munoz    Group Therapy Note    Attendees: 4/7       Patient's Goal: Patient will verbalize today's goals. Patient will also offer supportive listening and interact with peers to                             clarify and understand clearly set goals. Notes: Patient is making progress AEB participating in group discussion, actively listening, and supporting other group               members. Status After Intervention: Improved      Participation Level:  Active Listener and Interactive      Participation Quality: Appropriate, Attentive, Sharing, and Supportive      Speech: normal      Thought Process/Content: Logical, Linear      Affective Functioning: Congruent      Mood: anxious      Level of consciousness: Oriented x4      Response to Learning: Able to verbalize current knowledge/experience, Able to verbalize/acknowledge                                         new learning, Able to retain information, and Capable of insight      Endings: None Reported      Modes of Intervention: Education, Support, Socialization, and Problem-solving         Discipline Responsible: Behavorial Health Tech      Signature: Papa Munoz

## 2023-11-14 PROCEDURE — 6370000000 HC RX 637 (ALT 250 FOR IP): Performed by: PSYCHIATRY & NEUROLOGY

## 2023-11-14 PROCEDURE — 99232 SBSQ HOSP IP/OBS MODERATE 35: CPT | Performed by: INTERNAL MEDICINE

## 2023-11-14 PROCEDURE — 2040000000 HC PSYCH ICU R&B

## 2023-11-14 PROCEDURE — 6370000000 HC RX 637 (ALT 250 FOR IP): Performed by: INTERNAL MEDICINE

## 2023-11-14 PROCEDURE — 99232 SBSQ HOSP IP/OBS MODERATE 35: CPT | Performed by: PSYCHIATRY & NEUROLOGY

## 2023-11-14 PROCEDURE — APPSS30 APP SPLIT SHARED TIME 16-30 MINUTES

## 2023-11-14 RX ORDER — ACYCLOVIR 200 MG/1
400 CAPSULE ORAL 3 TIMES DAILY
Status: DISCONTINUED | OUTPATIENT
Start: 2023-11-14 | End: 2023-11-15 | Stop reason: HOSPADM

## 2023-11-14 RX ADMIN — IBUPROFEN 400 MG: 400 TABLET, FILM COATED ORAL at 07:23

## 2023-11-14 RX ADMIN — NICOTINE POLACRILEX 2 MG: 2 LOZENGE ORAL at 17:00

## 2023-11-14 RX ADMIN — HYDROXYZINE HYDROCHLORIDE 50 MG: 50 TABLET, FILM COATED ORAL at 07:25

## 2023-11-14 RX ADMIN — IBUPROFEN 400 MG: 400 TABLET, FILM COATED ORAL at 13:22

## 2023-11-14 RX ADMIN — LITHIUM CARBONATE 450 MG: 300 CAPSULE, GELATIN COATED ORAL at 16:56

## 2023-11-14 RX ADMIN — ACYCLOVIR 400 MG: 200 CAPSULE ORAL at 20:44

## 2023-11-14 RX ADMIN — LITHIUM CARBONATE 450 MG: 300 CAPSULE, GELATIN COATED ORAL at 07:24

## 2023-11-14 RX ADMIN — ACYCLOVIR 400 MG: 200 CAPSULE ORAL at 17:57

## 2023-11-14 RX ADMIN — HYDROXYZINE HYDROCHLORIDE 50 MG: 50 TABLET, FILM COATED ORAL at 15:32

## 2023-11-14 RX ADMIN — HYDROXYZINE HYDROCHLORIDE 50 MG: 50 TABLET, FILM COATED ORAL at 20:42

## 2023-11-14 RX ADMIN — CLONIDINE HYDROCHLORIDE 0.1 MG: 0.1 TABLET ORAL at 20:41

## 2023-11-14 RX ADMIN — NICOTINE POLACRILEX 2 MG: 2 LOZENGE ORAL at 10:05

## 2023-11-14 RX ADMIN — POTASSIUM CHLORIDE 40 MEQ: 1500 TABLET, EXTENDED RELEASE ORAL at 15:28

## 2023-11-14 RX ADMIN — IBUPROFEN 400 MG: 400 TABLET, FILM COATED ORAL at 20:41

## 2023-11-14 ASSESSMENT — PAIN SCALES - GENERAL
PAINLEVEL_OUTOF10: 7
PAINLEVEL_OUTOF10: 2
PAINLEVEL_OUTOF10: 5

## 2023-11-14 ASSESSMENT — LIFESTYLE VARIABLES: HOW OFTEN DO YOU HAVE A DRINK CONTAINING ALCOHOL: NEVER

## 2023-11-14 ASSESSMENT — PAIN DESCRIPTION - DESCRIPTORS
DESCRIPTORS: DISCOMFORT;ACHING
DESCRIPTORS: ACHING;DISCOMFORT

## 2023-11-14 ASSESSMENT — PAIN DESCRIPTION - LOCATION: LOCATION: SHOULDER

## 2023-11-14 NOTE — GROUP NOTE
Group Therapy Note    Date: 11/14/2023    Group Start Time: 1330  Group End Time: 3175  Group Topic: Healthy Living/Wellness    ANTON MontanoNina        Group Therapy Note    Attendees: 2/6     Patient's Goal: To increase social interaction , explore healthy activities and practices for daily life through discussion and art work. Notes: Pt participated fully in group task and discussion. Pt was able to explore healthy activities and practices for daily life through discussion and art work. Status After Intervention:  Improved     Participation Level: Active Listener,  appropriate, sharing, supportive     Participation Quality: Appropriate,  Attentive, sharing, supportive        Speech:  rapid, hyperverbal at times      Thought Process/Content: Logical r/t task -pt was accepting of prompts to pause and think about next step. Pt expressed enjoyment and improved confidence with art work. Pt demonstrates racing thoughts, and some flight of ideas but does identify she is hopeful that she can take medications in long acting form, possibly 6 month duration of IM medication to \"stay in my system\". 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 Pt identifies she misses her children but knows that they care about her. Affective Functioning: Blunted, brightens     Mood: Pleasant, cooperative, hypomanic-speech and drawing are rapid, slightly impulsive choices with art but pt is accepting of prompts to slow down at intervals to consider choices. Pt remains in group for entire period. Pt expresses enjoyment and improvement in confidence r/t her art work.      Level of consciousness:  Alert, and Attentive        Response to Learning:  Able to verbalize current knowledge, able to

## 2023-11-14 NOTE — GROUP NOTE
Group Therapy Note    Date: 11/14/2023    Group Start Time: 1000  Group End Time: 1030  Group Topic: Psychotherapy    [facility]    GABY Schmitz, RHONDA        Group Therapy Note    Attendees: 1/6       Patient's Goal:  Expression  of feeling    Notes:      Status After Intervention:  Unchanged    Participation Level: Interactive    Participation Quality: Sharing      Speech:  normal      Thought Process/Content: Logical      Affective Functioning: Congruent      Mood: euthymic      Level of consciousness:  Alert and Oriented x4      Response to Learning: Able to verbalize current knowledge/experience      Endings: None Reported    Modes of Intervention: Support      Discipline Responsible: /Counselor      Signature:  GABY Schmitz, RHONDA

## 2023-11-15 VITALS
SYSTOLIC BLOOD PRESSURE: 127 MMHG | BODY MASS INDEX: 26.99 KG/M2 | HEIGHT: 67 IN | WEIGHT: 171.96 LBS | DIASTOLIC BLOOD PRESSURE: 70 MMHG | RESPIRATION RATE: 14 BRPM | TEMPERATURE: 98.6 F | HEART RATE: 103 BPM | OXYGEN SATURATION: 98 %

## 2023-11-15 PROCEDURE — 99239 HOSP IP/OBS DSCHRG MGMT >30: CPT | Performed by: PSYCHIATRY & NEUROLOGY

## 2023-11-15 PROCEDURE — 6370000000 HC RX 637 (ALT 250 FOR IP): Performed by: PSYCHIATRY & NEUROLOGY

## 2023-11-15 PROCEDURE — 6370000000 HC RX 637 (ALT 250 FOR IP): Performed by: INTERNAL MEDICINE

## 2023-11-15 RX ORDER — CLONIDINE HYDROCHLORIDE 0.1 MG/1
0.1 TABLET ORAL NIGHTLY
Qty: 30 TABLET | Refills: 0 | Status: SHIPPED | OUTPATIENT
Start: 2023-11-15

## 2023-11-15 RX ORDER — ACYCLOVIR 200 MG/1
400 CAPSULE ORAL 3 TIMES DAILY
Qty: 36 CAPSULE | Refills: 0 | Status: SHIPPED | OUTPATIENT
Start: 2023-11-15 | End: 2023-11-21

## 2023-11-15 RX ORDER — LITHIUM CARBONATE 150 MG/1
450 CAPSULE ORAL 2 TIMES DAILY WITH MEALS
Qty: 90 CAPSULE | Refills: 3 | Status: SHIPPED | OUTPATIENT
Start: 2023-11-15

## 2023-11-15 RX ADMIN — ACYCLOVIR 400 MG: 200 CAPSULE ORAL at 07:29

## 2023-11-15 RX ADMIN — IBUPROFEN 400 MG: 400 TABLET, FILM COATED ORAL at 02:38

## 2023-11-15 RX ADMIN — HYDROXYZINE HYDROCHLORIDE 50 MG: 50 TABLET, FILM COATED ORAL at 10:58

## 2023-11-15 RX ADMIN — LITHIUM CARBONATE 450 MG: 300 CAPSULE, GELATIN COATED ORAL at 07:28

## 2023-11-15 RX ADMIN — NICOTINE POLACRILEX 2 MG: 2 LOZENGE ORAL at 07:31

## 2023-11-15 RX ADMIN — HYDROXYZINE HYDROCHLORIDE 50 MG: 50 TABLET, FILM COATED ORAL at 03:46

## 2023-11-15 ASSESSMENT — LIFESTYLE VARIABLES: HOW OFTEN DO YOU HAVE A DRINK CONTAINING ALCOHOL: NEVER

## 2023-11-15 ASSESSMENT — PAIN SCALES - GENERAL: PAINLEVEL_OUTOF10: 6

## 2023-11-15 NOTE — PLAN OF CARE
Problem: Anxiety  Goal: Will report anxiety at manageable levels  Description: INTERVENTIONS:  1. Administer medication as ordered  2. Teach and rehearse alternative coping skills  3. Provide emotional support with 1:1 interaction with staff  11/11/2023 1055 by Juanito Area  Outcome: Progressing     Problem: Behavior  Goal: Pt/Family maintain appropriate behavior and adhere to behavioral management agreement, if implemented  Description: INTERVENTIONS:  1. Assess patient/family's coping skills and  non-compliant behavior (including use of illegal substances)  2. Notify security of behavior or suspected illegal substances which indicate the need for search of the family and/or belongings  3. Encourage verbalization of thoughts and concerns in a socially appropriate manner  4. Utilize positive, consistent limit setting strategies supporting safety of patient, staff and others  5. Encourage participation in the decision making process about the behavioral management agreement  6. If a visitor's behavior poses a threat to safety call refer to organization policy. 7. Initiate consult with , Psychosocial CNS, Spiritual Care as appropriate  11/11/2023 1055 by Juanito Area  Outcome: Progressing     Problem: Risk for Elopement  Goal: Patient will not exit the unit/facility without proper excort  11/11/2023 1055 by Juanito Area  Outcome: Progressing    Patients mood is labile, angry, wanting to leave. Threw 2 orange juices behind nurses station at staff. Standing at unit doors, wanting to leave, going up and down unit trying to walk into other patients rooms, needing staff to walk with her during these moments. IM PRN given this morning, and patient requested to lay in the multipurpose room and rested for a short interval. Much staff talk time and redirection. Some confusion noted, for example says, \"I need to pee\" and needed to be shown bathroom.  Selectively cooperative with select
Problem: Anxiety  Goal: Will report anxiety at manageable levels  Description: INTERVENTIONS:  1. Administer medication as ordered  2. Teach and rehearse alternative coping skills  3. Provide emotional support with 1:1 interaction with staff  11/11/2023 2243 by Olaf Schreiber RN  Outcome: Progressing   She is anxious and irritable, frequently becomes tearful and panicky. Thoughts are repetitive and remains focused on leaving, becomes upset when told she can not go outside. Problem: Behavior  Goal: Pt/Family maintain appropriate behavior and adhere to behavioral management agreement, if implemented  Description: INTERVENTIONS:  1. Assess patient/family's coping skills and  non-compliant behavior (including use of illegal substances)  2. Notify security of behavior or suspected illegal substances which indicate the need for search of the family and/or belongings  3. Encourage verbalization of thoughts and concerns in a socially appropriate manner  4. Utilize positive, consistent limit setting strategies supporting safety of patient, staff and others  5. Encourage participation in the decision making process about the behavioral management agreement  6. If a visitor's behavior poses a threat to safety call refer to organization policy. 7. Initiate consult with , Psychosocial CNS, Spiritual Care as appropriate  11/11/2023 2243 by Olaf Schreiber RN  Outcome: Progressing   She denies need for hospitalization and remains focused on leaving. She becomes agitated quickly when she does not get the answers she wants to hear \"I just want to go outside it's not a big deal\". She requires much encouragement as well as prn medication but does remain somewhat cooperative. She eats well at snack and is selective with medication, she does accept long acting with much encouragement.   Problem: Pain  Goal: Verbalizes/displays adequate comfort level or baseline comfort level  Outcome: Progressing   Pt is satisfied with
Problem: Anxiety  Goal: Will report anxiety at manageable levels  Description: INTERVENTIONS:  1. Administer medication as ordered  2. Teach and rehearse alternative coping skills  3. Provide emotional support with 1:1 interaction with staff  11/14/2023 0831 by Mandy Conklin LPN  Outcome: Progressing  Note: Patient denies any current suicidal thoughts and agrees to seek out staff if thoughts arise. Endorses depression and anxiety. Denies any hallucinations. Continues to be paranoid. Hyper verbal and tearful during talk time. Up at desk frequently, occasionally needing redirection. Focused on discharge. Pt. Compliant with medications. Has remained behaviorally controlled at this time.
Problem: Anxiety  Goal: Will report anxiety at manageable levels  Description: INTERVENTIONS:  1. Administer medication as ordered  2. Teach and rehearse alternative coping skills  3. Provide emotional support with 1:1 interaction with staff  11/14/2023 2125 by Virginia Berman RN  Outcome: Progressing     Problem: Behavior  Goal: Pt/Family maintain appropriate behavior and adhere to behavioral management agreement, if implemented  Description: INTERVENTIONS:  1. Assess patient/family's coping skills and  non-compliant behavior (including use of illegal substances)  2. Notify security of behavior or suspected illegal substances which indicate the need for search of the family and/or belongings  3. Encourage verbalization of thoughts and concerns in a socially appropriate manner  4. Utilize positive, consistent limit setting strategies supporting safety of patient, staff and others  5. Encourage participation in the decision making process about the behavioral management agreement  6. If a visitor's behavior poses a threat to safety call refer to organization policy. 7. Initiate consult with , Psychosocial CNS, Spiritual Care as appropriate  Outcome: Progressing     Problem: Involuntary Admit  Goal: Will cooperate with staff recommendations and doctor's orders and will demonstrate appropriate behavior  Description: INTERVENTIONS:  1. Treat underlying conditions and offer medication as ordered  2. Educate regarding involuntary admission procedures and rules  3. Contain excessive/inappropriate behavior per unit and hospital policies  Outcome: Progressing     Problem: Pain  Goal: Verbalizes/displays adequate comfort level or baseline comfort level  Outcome: Progressing   Patient denies suicidal ideations during this shift. Patient remains having rapid speech, but is more linear. Patient reports anxiety she is using as needed atarax for.  Patient remains in behavior control and is taking medications as
Problem: Anxiety  Goal: Will report anxiety at manageable levels  Description: INTERVENTIONS:  1. Administer medication as ordered  2. Teach and rehearse alternative coping skills  3. Provide emotional support with 1:1 interaction with staff  11/15/2023 0846 by Yoana Irizarry LPN  Outcome: Progressing  Note: Pt. Denies any current suicidal thoughts and agrees to seek out staff if thoughts arise. Brightens upon approach. Continues to have flight of ideas and pressured speech. Preoccupied at times and needs occasional redirection. Not at the desk as frequently and has followed staff directions. Pt. Compliant with medications and has remained in behavioral control at this time.
Problem: Anxiety  Goal: Will report anxiety at manageable levels  Description: INTERVENTIONS:  1. Administer medication as ordered  2. Teach and rehearse alternative coping skills  3. Provide emotional support with 1:1 interaction with staff  Note: PT reports anxiety is high this evening but is unable to rate. PT has flight of ideas and remains worried about her children that she has not talked to in awhile. PT remains disheveled. PT appears restless pacing in halls and in her room. PT is taking ordered medications. PT declines unit programming. Problem: Behavior  Goal: Pt/Family maintain appropriate behavior and adhere to behavioral management agreement, if implemented  Description: INTERVENTIONS:  1. Assess patient/family's coping skills and  non-compliant behavior (including use of illegal substances)  2. Notify security of behavior or suspected illegal substances which indicate the need for search of the family and/or belongings  3. Encourage verbalization of thoughts and concerns in a socially appropriate manner  4. Utilize positive, consistent limit setting strategies supporting safety of patient, staff and others  5. Encourage participation in the decision making process about the behavioral management agreement  6. If a visitor's behavior poses a threat to safety call refer to organization policy. 7. Initiate consult with , Psychosocial CNS, Spiritual Care as appropriate  Note: PT requires some redirection but has remained in control this shift. PT is up and down through out the night. PT maintained on 15 min safety checks and rounds at irregular intervals.         Problem: Risk for Elopement  Goal: Patient will not exit the unit/facility without proper excort  Outcome: Completed
Problem: Anxiety  Goal: Will report anxiety at manageable levels  Description: INTERVENTIONS:  1. Administer medication as ordered  2. Teach and rehearse alternative coping skills  3. Provide emotional support with 1:1 interaction with staff  Outcome: Not Progressing    Patient is not able to willing to utilize coping skills or receptive to 1 to 1 interaction with staff. Patient not able to manage anxiety appropriately      Problem: Behavior  Goal: Pt/Family maintain appropriate behavior and adhere to behavioral management agreement, if implemented  Description: INTERVENTIONS:  1. Assess patient/family's coping skills and  non-compliant behavior (including use of illegal substances)  2. Notify security of behavior or suspected illegal substances which indicate the need for search of the family and/or belongings  3. Encourage verbalization of thoughts and concerns in a socially appropriate manner  4. Utilize positive, consistent limit setting strategies supporting safety of patient, staff and others  5. Encourage participation in the decision making process about the behavioral management agreement  6. If a visitor's behavior poses a threat to safety call refer to organization policy. 7. Initiate consult with , Psychosocial CNS, Spiritual Care as appropriate  Outcome: Not Progressing    Patient not able to maintain appropriate behavior on the unit and adhere to behavioral management agreement.
Problem: Chronic Conditions and Co-morbidities  Goal: Patient's chronic conditions and co-morbidity symptoms are monitored and maintained or improved  11/10/2023 1533 by Niru Pride RN  Outcome: Completed     Problem: Anxiety  Goal: Will report anxiety at manageable levels  Description: INTERVENTIONS:  1. Administer medication as ordered  2. Teach and rehearse alternative coping skills  3. Provide emotional support with 1:1 interaction with staff  Outcome: Progressing     Patient reports anxiety and depression. Patient was crying and pacing at the beginning of shift. Patient was wandering into other patients rooms and urinated on floor in the delaney. Patient was able to be redirected at that time. 1:1 emotional support provided. Q15M checks continue and safety maintained. Problem: Behavior  Goal: Pt/Family maintain appropriate behavior and adhere to behavioral management agreement, if implemented  Description: INTERVENTIONS:  1. Assess patient/family's coping skills and  non-compliant behavior (including use of illegal substances)  2. Notify security of behavior or suspected illegal substances which indicate the need for search of the family and/or belongings  3. Encourage verbalization of thoughts and concerns in a socially appropriate manner  4. Utilize positive, consistent limit setting strategies supporting safety of patient, staff and others  5. Encourage participation in the decision making process about the behavioral management agreement  6. If a visitor's behavior poses a threat to safety call refer to organization policy. 7. Initiate consult with , Psychosocial CNS, Spiritual Care as appropriate  Outcome: Progressing     Problem: Involuntary Admit  Goal: Will cooperate with staff recommendations and doctor's orders and will demonstrate appropriate behavior  Description: INTERVENTIONS:  1. Treat underlying conditions and offer medication as ordered  2.  Educate regarding involuntary
[place] to person, [place] to time, [place] to place  Attention:Normal: No  Attention: Distractible, Unable to concentrate  Thought Processes: Flight of ideas, Loose association  Thought Content:Normal: No  Thought Content: Paranoia, Preoccupations  Depression Symptoms: Increased irritability, Impaired concentration  Anxiety Symptoms: Compulsive  [place] Symptoms: Poor judgment, Labile  Hallucinations: None  Delusions: Yes  Delusions: Paranoid, Persecutory  Memory:Normal: No  Memory: Poor recent, Poor remote  Insight and Judgment: No  Insight and Judgment: Poor judgment, Poor insight    Daily Assessment Last Entry:   Daily Sleep (WDL): Within Defined Limits            Daily Nutrition (WDL): Within Defined Limits  Level of Assistance: Independent/Self    Patient Monitoring:  Frequency of Checks: 4 times per hour, close    Psychiatric Symptoms:   Depression Symptoms  Depression Symptoms: Increased irritability, Impaired concentration  Anxiety Symptoms  Anxiety Symptoms: Compulsive  Micaela Symptoms  Micaela Symptoms: Poor judgment, Labile          Suicide Risk CSSR-S:  1) Within the past month, have you wished you were dead or wished you could go to sleep and not wake up? : No  2) Have you actually had any thoughts of killing yourself? : No  6) Have you ever done anything, started to do anything, or prepared to do anything to end your life?: No  Change in Result: Change in Plan of care:      EDUCATION:   Learner Progress Toward Treatment Goals: Reviewed results and recommendations of this team, Reviewed group plan and strategies, Reviewed signs, symptoms and risk of self harm and violent behavior, Reviewed goals and plan of care    Method:  small group, individual verbal education    Outcome: Verbalized by patient but needs reinforcement to obtain goals    PATIENT GOALS:  Short term:  pt unable to participate  Long term:  pt unable to participate     PLAN/TREATMENT RECOMMENDATIONS UPDATE:  continue with group therapies,
PRN medication given, with minimal results. Disheveled appearance. Name placed on room door to help with orientation, however she takes it off. Denies any suicidal or homicidal thoughts denies any hallucinations. Very preoccupied with leaving.
demonstrate appropriate behavior  Description: INTERVENTIONS:  1. Treat underlying conditions and offer medication as ordered  2. Educate regarding involuntary admission procedures and rules  3. Contain excessive/inappropriate behavior per unit and hospital policies  Outcome: Progressing  Note: Pt maintains behavioral control although she is exit seeking, is redirectable. Staff will continue to monitor pt and offer support as needed. Problem: Pain  Goal: Verbalizes/displays adequate comfort level or baseline comfort level  Outcome: Progressing  Note: Pt denies pain and is agreeable to let staff know if pain occurs. Staff will continue to monitor pt and offer support as needed. Problem: Anxiety  Goal: Will report anxiety at manageable levels  Description: INTERVENTIONS:  1. Administer medication as ordered  2. Teach and rehearse alternative coping skills  3. Provide emotional support with 1:1 interaction with staff  11/13/2023 0956 by Chuy Celaya RN  Outcome: Not Progressing  Note: Pt is lethargic this AM and confused; denies anxiety although has signs of anxiety AEB pt wandering dayroom and seeking exits. Staff will continue to monitor pt and offer support as needed. 11/13/2023 0458 by Kevin Izquierdo RN  Outcome: Not Progressing     Problem: Behavior  Goal: Pt/Family maintain appropriate behavior and adhere to behavioral management agreement, if implemented  Description: INTERVENTIONS:  1. Assess patient/family's coping skills and  non-compliant behavior (including use of illegal substances)  2. Notify security of behavior or suspected illegal substances which indicate the need for search of the family and/or belongings  3. Encourage verbalization of thoughts and concerns in a socially appropriate manner  4. Utilize positive, consistent limit setting strategies supporting safety of patient, staff and others  5.  Encourage participation in the decision making process about the behavioral

## 2023-11-15 NOTE — DISCHARGE INSTR - DIET

## 2023-11-15 NOTE — BH NOTE
[address] [facility]  Discharge Note    Pt discharged with followings belongings:   Dental Appliances: None  Vision - Corrective Lenses: None  Hearing Aid: None  Jewelry: None  Body Piercings Removed: N/A  Clothing: Footwear, Shirt, Socks, Pajamas  Other Valuables: Personal Toiletries   Valuables returned to patient. Patient educated on aftercare instructions: yes  Information faxed to [facility] by staff  at 2:24 PM .Patient verbalize understanding of AVS:  yes .     Status EXAM upon discharge:  Mental Status and Behavioral Exam  Normal: No  Level of Assistance: Independent/Self  Facial Expression: Brightened  Affect: Appropriate  Level of Consciousness: Alert  Frequency of Checks: 4 times per hour, close  Mood:Normal: No  Mood: Anxious  Motor Activity:Normal: Yes  Motor Activity: Increased  Eye Contact: Fair  Observed Behavior: Cooperative, Preoccupied, Friendly  Sexual Misconduct History: Current - no  Preception: [place] to person, [place] to time, [place] to place, [place] to situation  Attention:Normal: No  Attention: Distractible  Thought Processes: Circumstantial, Flight of ideas  Thought Content:Normal: No  Thought Content: Preoccupations  Depression Symptoms: Impaired concentration  Anxiety Symptoms: Generalized  [place] Symptoms: Poor judgment, Flight of ideas, Pressured speech  Hallucinations: None  Delusions: No  Delusions: Paranoid  Memory:Normal: Yes  Memory: Poor recent  Insight and Judgment: No  Insight and Judgment: Poor judgment, Poor insight    Tobacco Screening:  Practical Counseling, on admission, nati X, if applicable and completed (first 3 are required if patient doesn't refuse) refused:            ( ) Recognizing danger situations (included triggers and roadblocks)                    ( ) Coping skills (new ways to manage stress,relaxation techniques, changing routine, distraction)                                                           ( ) Basic information about quitting (benefits of quitting,

## 2023-11-15 NOTE — GROUP NOTE
Group Therapy Note    Date: 11/15/2023    Group Start Time: 1105  Group End Time: 1150  Group Topic: Music Therapy    ANTON BHYANET HWANGU    [facility]        [facility] Note    Attendees: 3/7     Patient's Goal:  Patients were asked to dedicate songs to important people in their lives, and asked about these people and relationships. Patients did two song dedications each, and then had the opportunity to share a song for leisure listening, sharing a total of 3 songs each. Goals to reflect on relationships; Increase sense of community; Increase socialization; Demonstrate positive use of time;     Notes:  Patient attended and participated in group having positive interactions with peers and staff throughout. Shared multiple songs and dedications. Dedicated a song to herself, and another song to Kimberly of the people in spotlight who feel trapped in their homes like a jail. \" Asked patient where place is that she feels she can be her \"true genuine self\" and patient stated \"just at home. \" Patient talked about things she likes to do at home that make her happy. Status After Intervention:  Improved    Participation Level:  Active Listener and Interactive    Participation Quality: Appropriate, Attentive, Sharing, and Supportive      Speech:  normal      Thought Process/Content: Logical  Linear      Affective Functioning: Congruent      Mood: euthymic      Level of consciousness:  Alert and Attentive      Response to Learning: Able to verbalize current knowledge/experience and Progressing to goal      Endings: None Reported    Modes of Intervention: Support, Socialization, Exploration, Activity, Media, and Reality-testing      Discipline Responsible: Psychoeducational Specialist      Signature:  Jayla Irene

## 2023-11-15 NOTE — CARE COORDINATION
Name: Jeannie Sotomayor    : 1980    Discharge Date: 11/15/2023    Primary Auth/Cert #: [id number]     Destination: Private residence    Discharge Medications:      Medication List        START taking these medications      acyclovir 200 MG capsule  Commonly known as: ZOVIRAX  Take 2 capsules by mouth 3 times daily for 18 doses  Notes to patient: Virus infections      lithium 150 MG capsule  Take 3 capsules by mouth 2 times daily (with meals)  Notes to patient: Mood stabilizer             CHANGE how you take these medications      paliperidone palmitate  MG/1.5ML Estefany IM injection  Commonly known as: INVEGA SUSTENNA  Inject 234 mg into the muscle every 30 days for 28 days  Start taking on: 2023  What changed: when to take this  Notes to patient: Mood stabilizer             CONTINUE taking these medications      cloNIDine 0.1 MG tablet  Commonly known as: CATAPRES  Take 1 tablet by mouth nightly  Notes to patient: Blood Pressure             STOP taking these medications      amLODIPine 5 MG tablet  Commonly known as: NORVASC     hydrOXYzine HCl 50 MG tablet  Commonly known as: ATARAX     nicotine 14 MG/24HR  Commonly known as: NICODERM CQ     paliperidone 3 MG extended release tablet  Commonly known as: INVEGA     traZODone 50 MG tablet  Commonly known as: DESYREL               Where to Get Your Medications        These medications were sent to [facility] [address] [facility], [address] [place] [address]      Phone: [phone]   acyclovir 200 MG capsule  cloNIDine 0.1 MG tablet  lithium 150 MG capsule       Information about where to get these medications is not yet available    Ask your nurse or doctor about these medications  paliperidone palmitate  MG/1.5ML Estefany IM injection         Follow Up Appointment: [address], [facility]  [phone]  fax [phone]  Go on

## 2023-11-15 NOTE — DISCHARGE INSTRUCTIONS
Information:  Medications:   Medication summary provided   I understand that I should take only the medications on my list.     -why and when I need to take each medicine.     -which side effects to watch for.     -that I should carry my medication information at all times in case of     Emergency situations. I will take all of my medicines to follow up appointments.     -check with my physician or pharmacist before taking any new    Medication, over the counter product or drink alcohol.    -Ask about food, drug or dietary supplement interactions.    -discard old lists and update records with medication providers. Notify Physician:  Notify physician if you notice:   Always call 911 if you feel your life is in danger  In case of an emergency call 911 immediately! If 911 is not available call your local emergency medical system for help    Behavioral Health Follow Up:  Original Referral Source:[facility]  Discharge Diagnosis: Acute psychosis [facility]) [F23]  Recommendations for Level of Care: Follow up and take medications as prescribed   Patient status at discharge:  Alert and [place]   My hospital  was: [facility]  Aftercare plan faxed: yes   -faxed by: staff   -date: 11/15/23   -time: 1880  Prescriptions: Filled and sent with patient     Smoking: Quit Smoking. Call the [employer]'s smoking quitline at 0-247-46I-QUIT  Know the signs of a heart attack   If you have any of the following symptoms call 911 immediately, do not wait more    Than five minutes. 1. Pressure, fullness and/ or squeezing in the center of the chest spreading to    The jaw, neck or shoulder. 2. Chest discomfort with light headedness, fainting, sweating, nausea or    Shortness of breath. 3. Upper abdominal pressure or discomfort. 4. Lower chest pain, back pain, unusual fatigue, shortness of breath, nausea   Or dizziness.      General Information:   Questions regarding your bill: Call HELP program ([phone])

## 2023-11-15 NOTE — DISCHARGE SUMMARY
DISCHARGE SUMMARY      Patient ID:  Franz Hatchet  [id number]  59 y.o.  1980    Admit date: 11/10/2023    Discharge date and time: 11/15/2023    Disposition: Home     Admitting Physician: Jens Angeles MD     Discharge Physician: Dr Romel Appiah MD    Admission Diagnoses: Acute psychosis ([facility]) [F23]    Admission Condition: poor    Discharged Condition: stable    Admission Circumstance: Franz Hatchet is a 37 y.o. female who has a past medical history of schizoaffective disorder bipolar type and polysubstance abuse. Patient presented to the ED on an application for emergency admission with [employer]. Noted that patient was exhibiting psychosis with dangerous and erratic behaviors. Per ED documentation:  Fransisco Montes De Oca [place] the [place] police [place] ech[place]. She was unable to form a complete sentence. She fled the front of the station and into [address]. She was running in the roadway as traffic was trying to avoid hitting her. Two officers had to run into the road and remove her for her safety. While in police custody, she stated people are out trying to kill her. She was in a manic state and unable to have a logical conversation. \"      Patient manic upon arrival to the [facility]. Patient is hyper verbal with a flight of ideas. Patient pacing around [facility], punching the walls, grabbing tissues/water/trash can, and unable to stay still. Patient is cooperative with changing out and getting medication. Patient medicated at 9339. Patient repeating \"they ask me if I'm hearing/seeing voices, all the time, that's what they say, the officers come to me and they yell at me and say I'm committing a crime but I'm not, theres people out to get me, they think they can fuck with them, whatever, I have PTSD, I know you dummy, you see that you know me, but you never know me, I am not the same person. \"      Patient denies taking her medication. Patient stated \"fuck no I'm not, you just gave me shot.  They gave me a 6 month

## 2023-11-15 NOTE — CARE COORDINATION
SOCIAL SERVICE NOTE: SW attempts to schedule patient follow up appointment at [facility], where patient reports that she follows up at. [facility] staff said that since patient has missed all of the scheduled appointments that she has had, they are unable to schedule her for any future appointments and are unable to allow her to come back to [facility] for [facility].

## 2023-11-15 NOTE — BH NOTE
Patient given tobacco quitline number 41676123407 at this time, refusing to call at this time, states \" I just dont want to quit now\"- patient given information as to the dangers of long term tobacco use. Continue to reinforce the importance of tobacco cessation.

## 2023-11-15 NOTE — GROUP NOTE
Group Therapy Note    Date: 11/15/2023    Group Start Time: 1015  Group End Time: 1844  Group Topic: Psychoeducation    ANTON SAUCEDA    GABY Howard LSW        Group Therapy Note    Attendees: 2/7       Patient's Goal:  Expression of feeling    Notes:      Status After Intervention:  Improved    Participation Level: Interactive    Participation Quality: Sharing      Speech:  normal      Thought Process/Content: Logical      Affective Functioning: Congruent      Mood: euthymic      Level of consciousness:  Alert and Oriented x4      Response to Learning: Able to verbalize current knowledge/experience      Endings: None Reported    Modes of Intervention: Support      Discipline Responsible: /Counselor      Signature:  GABY Howard, RHONDA

## 2023-12-29 ENCOUNTER — HOSPITAL ENCOUNTER (INPATIENT)
Age: 43
LOS: 5 days | Discharge: HOME | DRG: 885 | End: 2024-01-03
Attending: EMERGENCY MEDICINE | Admitting: PSYCHIATRY & NEUROLOGY
Payer: COMMERCIAL

## 2023-12-29 DIAGNOSIS — R45.851 DEPRESSION WITH SUICIDAL IDEATION: Primary | ICD-10-CM

## 2023-12-29 DIAGNOSIS — E87.6 HYPOKALEMIA: ICD-10-CM

## 2023-12-29 DIAGNOSIS — F32.A DEPRESSION WITH SUICIDAL IDEATION: Primary | ICD-10-CM

## 2023-12-29 LAB
ALBUMIN SERPL-MCNC: 4.4 G/DL (ref 3.5–5.2)
ALP SERPL-CCNC: 109 U/L (ref 35–104)
ALT SERPL-CCNC: 8 U/L (ref 5–33)
AMPHET UR QL SCN: NEGATIVE
ANION GAP SERPL CALCULATED.3IONS-SCNC: 9 MMOL/L (ref 9–17)
APAP SERPL-MCNC: <5 UG/ML (ref 10–30)
AST SERPL-CCNC: 15 U/L
BARBITURATES UR QL SCN: NEGATIVE
BENZODIAZ UR QL: NEGATIVE
BILIRUB SERPL-MCNC: 0.2 MG/DL (ref 0.3–1.2)
BUN SERPL-MCNC: 7 MG/DL (ref 6–20)
CALCIUM SERPL-MCNC: 9.5 MG/DL (ref 8.6–10.4)
CANNABINOIDS UR QL SCN: POSITIVE
CHLORIDE SERPL-SCNC: 104 MMOL/L (ref 98–107)
CO2 SERPL-SCNC: 28 MMOL/L (ref 20–31)
COCAINE UR QL SCN: POSITIVE
CREAT SERPL-MCNC: 0.6 MG/DL (ref 0.5–0.9)
ERYTHROCYTE [DISTWIDTH] IN BLOOD BY AUTOMATED COUNT: 14.2 % (ref 11.5–14.9)
ETHANOL PERCENT: <0.01 %
ETHANOLAMINE SERPL-MCNC: <10 MG/DL
FENTANYL UR QL: NEGATIVE
GFR SERPL CREATININE-BSD FRML MDRD: >60 ML/MIN/1.73M2
GLUCOSE SERPL-MCNC: 105 MG/DL (ref 70–99)
HCT VFR BLD AUTO: 42.3 % (ref 36–46)
HGB BLD-MCNC: 13.9 G/DL (ref 12–16)
MCH RBC QN AUTO: 27.2 PG (ref 26–34)
MCHC RBC AUTO-ENTMCNC: 32.9 G/DL (ref 31–37)
MCV RBC AUTO: 82.9 FL (ref 80–100)
METHADONE UR QL: NEGATIVE
OPIATES UR QL SCN: NEGATIVE
OXYCODONE UR QL SCN: NEGATIVE
PCP UR QL SCN: NEGATIVE
PLATELET # BLD AUTO: 259 K/UL (ref 150–450)
PMV BLD AUTO: 9 FL (ref 6–12)
POTASSIUM SERPL-SCNC: 3.3 MMOL/L (ref 3.7–5.3)
PROT SERPL-MCNC: 6.8 G/DL (ref 6.4–8.3)
RBC # BLD AUTO: 5.1 M/UL (ref 4–5.2)
SALICYLATES SERPL-MCNC: <1 MG/DL (ref 3–10)
SODIUM SERPL-SCNC: 141 MMOL/L (ref 135–144)
TEST INFORMATION: ABNORMAL
TSH SERPL DL<=0.05 MIU/L-ACNC: 0.83 UIU/ML (ref 0.3–5)
WBC OTHER # BLD: 9.2 K/UL (ref 3.5–11)

## 2023-12-29 PROCEDURE — 85027 COMPLETE CBC AUTOMATED: CPT

## 2023-12-29 PROCEDURE — 6370000000 HC RX 637 (ALT 250 FOR IP)

## 2023-12-29 PROCEDURE — 80143 DRUG ASSAY ACETAMINOPHEN: CPT

## 2023-12-29 PROCEDURE — 84443 ASSAY THYROID STIM HORMONE: CPT

## 2023-12-29 PROCEDURE — 80179 DRUG ASSAY SALICYLATE: CPT

## 2023-12-29 PROCEDURE — 6370000000 HC RX 637 (ALT 250 FOR IP): Performed by: EMERGENCY MEDICINE

## 2023-12-29 PROCEDURE — 99285 EMERGENCY DEPT VISIT HI MDM: CPT

## 2023-12-29 PROCEDURE — 1240000000 HC EMOTIONAL WELLNESS R&B

## 2023-12-29 PROCEDURE — 36415 COLL VENOUS BLD VENIPUNCTURE: CPT

## 2023-12-29 PROCEDURE — 80053 COMPREHEN METABOLIC PANEL: CPT

## 2023-12-29 PROCEDURE — APPSS60 APP SPLIT SHARED TIME 46-60 MINUTES

## 2023-12-29 PROCEDURE — 99222 1ST HOSP IP/OBS MODERATE 55: CPT | Performed by: INTERNAL MEDICINE

## 2023-12-29 PROCEDURE — G0480 DRUG TEST DEF 1-7 CLASSES: HCPCS

## 2023-12-29 PROCEDURE — 6370000000 HC RX 637 (ALT 250 FOR IP): Performed by: NURSE PRACTITIONER

## 2023-12-29 PROCEDURE — 80307 DRUG TEST PRSMV CHEM ANLYZR: CPT

## 2023-12-29 PROCEDURE — 6370000000 HC RX 637 (ALT 250 FOR IP): Performed by: PSYCHIATRY & NEUROLOGY

## 2023-12-29 RX ORDER — POLYETHYLENE GLYCOL 3350 17 G/17G
17 POWDER, FOR SOLUTION ORAL DAILY PRN
Status: DISCONTINUED | OUTPATIENT
Start: 2023-12-29 | End: 2024-01-03 | Stop reason: HOSPADM

## 2023-12-29 RX ORDER — POTASSIUM CHLORIDE 20 MEQ/1
40 TABLET, EXTENDED RELEASE ORAL ONCE
Status: COMPLETED | OUTPATIENT
Start: 2023-12-29 | End: 2023-12-29

## 2023-12-29 RX ORDER — LORAZEPAM 1 MG/1
2 TABLET ORAL EVERY 6 HOURS PRN
Status: DISCONTINUED | OUTPATIENT
Start: 2023-12-29 | End: 2024-01-03 | Stop reason: HOSPADM

## 2023-12-29 RX ORDER — TRAZODONE HYDROCHLORIDE 50 MG/1
50 TABLET ORAL NIGHTLY PRN
Status: DISCONTINUED | OUTPATIENT
Start: 2023-12-29 | End: 2024-01-03 | Stop reason: HOSPADM

## 2023-12-29 RX ORDER — POLYETHYLENE GLYCOL 3350 17 G
2 POWDER IN PACKET (EA) ORAL
Status: DISCONTINUED | OUTPATIENT
Start: 2023-12-29 | End: 2023-12-29

## 2023-12-29 RX ORDER — HALOPERIDOL 5 MG/ML
5 INJECTION INTRAMUSCULAR EVERY 6 HOURS PRN
Status: DISCONTINUED | OUTPATIENT
Start: 2023-12-29 | End: 2024-01-03 | Stop reason: HOSPADM

## 2023-12-29 RX ORDER — LITHIUM CARBONATE 150 MG/1
150 CAPSULE ORAL 2 TIMES DAILY WITH MEALS
Status: DISCONTINUED | OUTPATIENT
Start: 2023-12-29 | End: 2023-12-30

## 2023-12-29 RX ORDER — HYDROXYZINE 50 MG/1
50 TABLET, FILM COATED ORAL 3 TIMES DAILY PRN
Status: DISCONTINUED | OUTPATIENT
Start: 2023-12-29 | End: 2024-01-03 | Stop reason: HOSPADM

## 2023-12-29 RX ORDER — NICOTINE 21 MG/24HR
1 PATCH, TRANSDERMAL 24 HOURS TRANSDERMAL DAILY
Status: DISCONTINUED | OUTPATIENT
Start: 2023-12-29 | End: 2024-01-03 | Stop reason: HOSPADM

## 2023-12-29 RX ORDER — LORAZEPAM 2 MG/ML
2 INJECTION INTRAMUSCULAR EVERY 6 HOURS PRN
Status: DISCONTINUED | OUTPATIENT
Start: 2023-12-29 | End: 2024-01-03 | Stop reason: HOSPADM

## 2023-12-29 RX ORDER — HALOPERIDOL 5 MG/1
5 TABLET ORAL EVERY 6 HOURS PRN
Status: DISCONTINUED | OUTPATIENT
Start: 2023-12-29 | End: 2024-01-03 | Stop reason: HOSPADM

## 2023-12-29 RX ORDER — ACETAMINOPHEN 325 MG/1
650 TABLET ORAL EVERY 4 HOURS PRN
Status: DISCONTINUED | OUTPATIENT
Start: 2023-12-29 | End: 2024-01-03 | Stop reason: HOSPADM

## 2023-12-29 RX ORDER — DIPHENHYDRAMINE HYDROCHLORIDE 50 MG/ML
50 INJECTION INTRAMUSCULAR; INTRAVENOUS EVERY 6 HOURS PRN
Status: DISCONTINUED | OUTPATIENT
Start: 2023-12-29 | End: 2024-01-03 | Stop reason: HOSPADM

## 2023-12-29 RX ORDER — MAGNESIUM HYDROXIDE/ALUMINUM HYDROXICE/SIMETHICONE 120; 1200; 1200 MG/30ML; MG/30ML; MG/30ML
30 SUSPENSION ORAL EVERY 6 HOURS PRN
Status: DISCONTINUED | OUTPATIENT
Start: 2023-12-29 | End: 2024-01-03 | Stop reason: HOSPADM

## 2023-12-29 RX ADMIN — POTASSIUM CHLORIDE 40 MEQ: 1500 TABLET, EXTENDED RELEASE ORAL at 11:17

## 2023-12-29 RX ADMIN — LITHIUM CARBONATE 150 MG: 150 CAPSULE, GELATIN COATED ORAL at 17:37

## 2023-12-29 RX ADMIN — ACETAMINOPHEN 650 MG: 325 TABLET ORAL at 13:04

## 2023-12-29 ASSESSMENT — PATIENT HEALTH QUESTIONNAIRE - PHQ9
SUM OF ALL RESPONSES TO PHQ QUESTIONS 1-9: 0
2. FEELING DOWN, DEPRESSED OR HOPELESS: 0
SUM OF ALL RESPONSES TO PHQ QUESTIONS 1-9: 0
SUM OF ALL RESPONSES TO PHQ QUESTIONS 1-9: 0
1. LITTLE INTEREST OR PLEASURE IN DOING THINGS: 0
SUM OF ALL RESPONSES TO PHQ QUESTIONS 1-9: 0
SUM OF ALL RESPONSES TO PHQ9 QUESTIONS 1 & 2: 0

## 2023-12-29 ASSESSMENT — LIFESTYLE VARIABLES
HOW MANY STANDARD DRINKS CONTAINING ALCOHOL DO YOU HAVE ON A TYPICAL DAY: PATIENT DOES NOT DRINK
HOW OFTEN DO YOU HAVE A DRINK CONTAINING ALCOHOL: NEVER
HOW OFTEN DO YOU HAVE A DRINK CONTAINING ALCOHOL: NEVER
HOW MANY STANDARD DRINKS CONTAINING ALCOHOL DO YOU HAVE ON A TYPICAL DAY: PATIENT DOES NOT DRINK
HOW MANY STANDARD DRINKS CONTAINING ALCOHOL DO YOU HAVE ON A TYPICAL DAY: PATIENT DOES NOT DRINK
HOW OFTEN DO YOU HAVE A DRINK CONTAINING ALCOHOL: NEVER

## 2023-12-29 ASSESSMENT — PAIN SCALES - GENERAL
PAINLEVEL_OUTOF10: 2
PAINLEVEL_OUTOF10: 7
PAINLEVEL_OUTOF10: 2
PAINLEVEL_OUTOF10: 7

## 2023-12-29 ASSESSMENT — SLEEP AND FATIGUE QUESTIONNAIRES
AVERAGE NUMBER OF SLEEP HOURS: 8
DO YOU USE A SLEEP AID: NO
DO YOU HAVE DIFFICULTY SLEEPING: NO

## 2023-12-29 ASSESSMENT — PAIN DESCRIPTION - LOCATION: LOCATION: HAND

## 2023-12-29 ASSESSMENT — PAIN DESCRIPTION - DESCRIPTORS: DESCRIPTORS: ACHING

## 2023-12-29 NOTE — BH NOTE
Patient arrived to unit at 1250 via wheelchair per facility policy, accompanied by 2 [facility] staff members. Patient changed into hospital attire gown appropriate for unit. Patient is observed to be cooperative during admission process with THELMA Pierre.

## 2023-12-29 NOTE — BH NOTE
Patient refused phone numbers from phone. Verbalized understanding phone will be locked into the safe with no access to phone until discharge.

## 2023-12-29 NOTE — CARE COORDINATION
BHI Biopsychosocial Assessment    Current Level of Psychosocial Functioning     Independent X  Dependent    Minimal Assist     Comments:    Psychosocial High Risk Factors (check all that apply)    Unable to obtain meds   Chronic illness/pain    Substance abuse XX  Lack of Family Support   Financial stress   Isolation   Inadequate Community Resources XX  Suicide attempt(s)  Not taking medications   Victim of crime   Developmental Delay  Unable to manage personal needs    Age 65 or older   Homeless  No transportation   Readmission within 30 days  Unemployment  Traumatic Event    Comments:   Psychiatric Advanced Directives: n/a    Family to Involve in Treatment: denies    Sexual Orientation:  n/a    Patient Strengths: Has housing, payee, and insurance    Patient Barriers: Hx of admissions and noncompliance to treatment, cocaine use      Opiate Education Provided: AoD resource folder provided       CMHC/mental health history: Hx of noncompliance with outpatient services    Plan of Care   medication management, group/individual therapies, family meetings, psycho -education, treatment team meetings to assist with stabilization    Initial Discharge Plan: TBD- Pt is interested in AoD treatment       Clinical Summary: Pt is a 43 year female admitted to [facility] for depression and suicidal ideation. Pt has a significant hx of prior hospitalizations. Pt's most recent admission being 11/10/2023-11/15/2023. Pt was tearful throughout assessment. She states she's experiencing intense cravings for cocaine. She denies use of any other illicit substances. UDS reflects positive for marijuana and cocaine. Pt identified the custody hearing of her oldest son with development delays as a primary stressor. Pt has stable housing and income. Pt denies being suicidal and states she is concerned about staying away from cocaine. ANDREA discussed inpatient AoD treatment options for pt. ANDREA provided pt with AoD resource folder. Pt reports

## 2023-12-29 NOTE — BH NOTE
Patient given tobacco quitline number 10742362080 at this time, refusing to call at this time, states \" I just dont want to quit now\"- patient given information as to the dangers of long term tobacco use. Continue to reinforce the importance of tobacco cessation.

## 2023-12-29 NOTE — BH NOTE
[facility] [place]  Admission Note     Admission Type:   Admission Type: Voluntary    Reason for admission:  Reason for Admission: patient called [employer] thoughts of sucide for two days, has been googeling ways to do it. Patient wants help stopping cocaine      Addictive Behavior:   Addictive Behavior  In the Past 3 Months, Have You Felt or Has Someone Told You That You Have a Problem With  : None    Medical Problems:   Past Medical History:   Diagnosis Date    Anxiety     Bipolar 1 disorder ([facility]) 1999    Depression     Gestational diabetes 7/22/2016    OCD (obsessive compulsive disorder) 1999    PTSD (post-traumatic stress disorder)     Rapid rate of speech     Schizoaffective disorder (HCC)        Status EXAM:  Mental Status and Behavioral Exam  Normal: No (sad)  Level of Assistance: Independent/Self  Facial Expression: Sad  Affect: Appropriate  Level of Consciousness: Alert  Frequency of Checks: 4 times per hour, close  Mood:Normal: No  Mood: Anxious, Depressed, Sad  Motor Activity:Normal: Yes  Eye Contact: Good  Observed Behavior: Cooperative, Preoccupied  Sexual Misconduct History: Current - no  Preception: [place] to person, [place] to time, [place] to place, [place] to situation  Attention:Normal: No  Attention: Distractible  Thought Processes: Circumstantial  Thought Content:Normal: No  Thought Content: Preoccupations  Depression Symptoms: Change in energy level, Isolative  Anxiety Symptoms: Generalized  Micaela Symptoms: No problems reported or observed.  Hallucinations: None  Delusions: No  Memory:Normal: Yes  Insight and Judgment: No  Insight and Judgment: Poor judgment, Poor insight    Tobacco Screening:  Practical Counseling, on admission, nati X, if applicable and completed (first 3 are required if patient doesn't refuse):            ( ) Recognizing danger situations (included triggers and roadblocks)                    ( ) Coping skills (new ways to manage stress,relaxation techniques, changing

## 2023-12-29 NOTE — ED NOTES
Provisional Diagnosis:   Depression     Psychosocial and Contextual Factors:   Patient presents at the ED via [employer] on a voluntary status due to suicidal ideations.     C-SSRS Summary:  X    Patient: X  Family:    Agency: X    Substance Abuse: Crack cocaine    Present Suicidal Behavior:  X    Verbal: X    Attempt:     Past Suicidal Behavior: X    Verbal:X    Attempt:X      Self-Injurious/Self-Mutilation:       Violence Current or Past        Trauma Identified:       Protective Factors:    Patient has housing      Risk Factors:    Patient not connected to mental health agency, poor coping skills       Clinical Summary:    Yani Nowak is a 43 y.o. female who presents at the ED via [employer] on a voluntary status due to suicidal ideations.     Patient reports suicidal ideations for a couple days. Patient reports googling ways to kill herself. Patient stated \"I've been typing in how to kill myself without any pain.\"     Patient reports using crack cocaine last night. Patient stated she did not use a lot and has been trying to ween herself off. Patient stated that she has these intense cravings and wants them to stop.     Patient denies taking any of her medication. Patient denies being connected to any mental health agency.     Patient reports living alone and reports being able to care for self.     Patient denies homicidal ideations. Patient denies auditory and visual hallucinations. Patient denies paranoid thoughts.     Patients most recent admission to [facility] 11/10/2023-11/15/2023.    Patient cooperative while in the [facility]. Patient presents with a low affect.       Level of Care Disposition:    Writer consulted with POOJA Peacock, who recommends inpatient psychiatric admission for safety and stabilization. Patient voluntary signed in.

## 2023-12-29 NOTE — GROUP NOTE
Group Therapy Note    Date: 12/29/2023    Group Start Time: 1430  Group End Time: 1510  Group Topic: Music Therapy    Sunday Jeffers    Music Therapy Group Note        Date: 12/29/2023   Start Time: 1430  End Time: 1510      Number of Participants in Group & Unit Census:  1/7    Topic: Patients offered music therapy group, and discussed topics relating to their individual songs, answering questions relating to lyrics and themes as observed by this writer     Goal of Group:Demonstrate positive use of time; Increase sense of community; Increase rapport with staff; Increase socialization; Increase self-expression       Comments:     Patient did not participate in Music Therapy group, despite staff encouragement and explanation of benefits.  Patient remain seclusive to self.  Q15 minute safety checks maintained for patient safety and will continue to encourage patient to attend unit programming.

## 2023-12-29 NOTE — ED PROVIDER NOTES
[facility]  eMERGENCY dEPARTMENT eNCOUnter      Pt Name: Yani Nowak  MRN: [id number]  Birthdate 1980  Date of evaluation: 12/29/23      CHIEF COMPLAINT       Chief Complaint   Patient presents with    Suicidal         HISTORY OF PRESENT ILLNESS    Yani Nowak is a 43 y.o. female who presents complaining of suicidal ideation.  Patient states that she has not been taking her medications for a long time.  Patient has a history of bipolar, depression, schizoaffective disorder.  Patient has been hospitalized in the past and states she does not have a doctor to follow with.  Patient states for the last 2 days she has been having thoughts of killing herself and she has been looking on the Internet trying to research different ways to do it.  Patient has attempted in the past.  Patient denies any hallucinations.  Patient states she has been using cocaine again and last dose was last night.  No other drugs or alcohol.  Patient denies any somatic complaints.      REVIEW OF SYSTEMS       Review of Systems   Constitutional:  Negative for activity change, appetite change, chills, diaphoresis and fever.   HENT:  Negative for congestion, ear pain, facial swelling, nosebleeds, rhinorrhea, sinus pressure, sore throat and trouble swallowing.    Eyes:  Negative for pain, discharge and redness.   Respiratory:  Negative for cough, chest tightness, shortness of breath and wheezing.    Cardiovascular:  Negative for chest pain, palpitations and leg swelling.   Gastrointestinal:  Negative for abdominal pain, blood in stool, constipation, diarrhea, nausea and vomiting.   Genitourinary:  Negative for difficulty urinating, dysuria, flank pain, frequency, genital sores and hematuria.   Musculoskeletal:  Negative for arthralgias, back pain, gait problem, joint swelling, myalgias and neck pain.   Skin:  Negative for color change, pallor, rash and wound.   Neurological:  Negative for dizziness, tremors, seizures, syncope,

## 2023-12-29 NOTE — BH NOTE
Patient triggered suicide precautions due to previous charting in ED. Patient at this time denies suicidal ideations and feels safe to himself. Patient contracts for safety while on the unit. Patient does not feel the need to harm himself. Suicide precautions and constant observation discontinued.

## 2023-12-29 NOTE — ED TRIAGE NOTES
Mode of arrival (squad #, walk in, police, etc) : police        Chief complaint(s): suicidal        Arrival Note (brief scenario, treatment PTA, etc).: Pt reports SI without a plan x2 days. Pt reports she called [employer] for help.        C= \"Have you ever felt that you should Cut down on your drinking?\"  No  A= \"Have people Annoyed you by criticizing your drinking?\"  No  G= \"Have you ever felt bad or Guilty about your drinking?\"  No  E= \"Have you ever had a drink as an Eye-opener first thing in the morning to steady your nerves or to help a hangover?\"  No      Deferred []      Reason for deferring: N/A    *If yes to two or more: probable alcohol abuse.*

## 2023-12-30 PROCEDURE — 90792 PSYCH DIAG EVAL W/MED SRVCS: CPT | Performed by: PSYCHIATRY & NEUROLOGY

## 2023-12-30 PROCEDURE — 6370000000 HC RX 637 (ALT 250 FOR IP)

## 2023-12-30 PROCEDURE — 6370000000 HC RX 637 (ALT 250 FOR IP): Performed by: PSYCHIATRY & NEUROLOGY

## 2023-12-30 PROCEDURE — 99232 SBSQ HOSP IP/OBS MODERATE 35: CPT | Performed by: INTERNAL MEDICINE

## 2023-12-30 PROCEDURE — 1240000000 HC EMOTIONAL WELLNESS R&B

## 2023-12-30 PROCEDURE — 6370000000 HC RX 637 (ALT 250 FOR IP): Performed by: NURSE PRACTITIONER

## 2023-12-30 RX ORDER — LITHIUM CARBONATE 300 MG/1
300 CAPSULE ORAL 2 TIMES DAILY
Status: DISCONTINUED | OUTPATIENT
Start: 2023-12-30 | End: 2024-01-03 | Stop reason: HOSPADM

## 2023-12-30 RX ADMIN — ACETAMINOPHEN 650 MG: 325 TABLET ORAL at 15:25

## 2023-12-30 RX ADMIN — ACETAMINOPHEN 650 MG: 325 TABLET ORAL at 23:43

## 2023-12-30 RX ADMIN — LITHIUM CARBONATE 300 MG: 300 CAPSULE, GELATIN COATED ORAL at 21:00

## 2023-12-30 RX ADMIN — LITHIUM CARBONATE 150 MG: 150 CAPSULE, GELATIN COATED ORAL at 08:53

## 2023-12-30 RX ADMIN — ACETAMINOPHEN 650 MG: 325 TABLET ORAL at 08:53

## 2023-12-30 ASSESSMENT — PAIN DESCRIPTION - DESCRIPTORS
DESCRIPTORS: ACHING
DESCRIPTORS: ACHING

## 2023-12-30 ASSESSMENT — PAIN SCALES - GENERAL
PAINLEVEL_OUTOF10: 5
PAINLEVEL_OUTOF10: 6
PAINLEVEL_OUTOF10: 4
PAINLEVEL_OUTOF10: 4

## 2023-12-30 ASSESSMENT — PAIN DESCRIPTION - LOCATION
LOCATION: BACK

## 2023-12-30 ASSESSMENT — PAIN DESCRIPTION - ORIENTATION
ORIENTATION: LOWER
ORIENTATION: LOWER

## 2023-12-30 NOTE — PLAN OF CARE
Problem: Anxiety  Goal: Will report anxiety at manageable levels  Description: INTERVENTIONS:  1. Administer medication as ordered  2. Teach and rehearse alternative coping skills  3. Provide emotional support with 1:1 interaction with staff  Note: Patient denies any suicidal thoughts at this time. Patient agrees to come talk with staff if having any thoughts to harm self this shift. Q 15 min rounds continued for patient safety.     Problem: Pain  Goal: Verbalizes/displays adequate comfort level or baseline comfort level  Note: Patient denies any pain at this tome, care ongoing.

## 2023-12-30 NOTE — H&P
Department of Psychiatry  Attending Physician Psychiatric Assessment     Reason for Admission to Psychiatric Unit:  Threat to self requiring 24 hour professional observation  Concerns about patient's safety in the community    CHIEF COMPLAINT: Suicidal ideation      History obtained from: Patient, electronic medical record          HISTORY OF PRESENT ILLNESS:    Yani Nowak is a 43 y.o. female who has a past medical history of bipolar disorder, substance abuse. When approached, patient accepted need for privacy.  Interview occurred privately in patients room.  Patient presents to the hospital with depression and suicidal ideation.  Patient is well-known to this author and has a well-documented history of bipolar disorder.  Patient is currently presenting without manic features and is endorsing significant depression with suicidal ideation.  Patient reports she was looking up ways to kill herself that were not painful which is when she called 911 for help.  Patient endorses stressors in the community leading to suicidal ideation include dissatisfaction with life and substance abuse.  Patient also endorses poor medication compliance in the community related to sexual side effects.  Patient has a well-documented history of coming to inpatient hospitalization and focusing on different medications that are no longer helpful and endorsing other medications as helpful that were previously rejected.  Patient states she stopped all medication after discharge from [facility] in 11/15/2023.  Patient received Invega Sustenna 234 mg on 11/11/2023.  Patient states she did not follow-up in community for ongoing treatment and stopped all medication because \"that month on the long-acting injection was hell.  I gained weight and had no sex drive.  I do not want to take Invega again.\"  Patient does endorse desire to continue with lithium.  Plan discussed with attending physician about starting lithium and building therapeutic 
Note to r/s
mmol/L    Glucose 105 (H) 70 - 99 mg/dL    BUN 7 6 - 20 mg/dL    Creatinine 0.6 0.5 - 0.9 mg/dL    Est, Glom Filt Rate >60 >60 mL/min/1.73m2    Calcium 9.5 8.6 - 10.4 mg/dL    Total Protein 6.8 6.4 - 8.3 g/dL    Albumin 4.4 3.5 - 5.2 g/dL    Total Bilirubin 0.2 (L) 0.3 - 1.2 mg/dL    Alkaline Phosphatase 109 (H) 35 - 104 U/L    ALT 8 5 - 33 U/L    AST 15 <32 U/L   TSH with Reflex    Collection Time: 12/29/23 10:35 AM   Result Value Ref Range    TSH 0.83 0.30 - 5.00 uIU/mL   Urine Drug Screen    Collection Time: 12/29/23 11:43 AM   Result Value Ref Range    Amphetamine Screen, Ur NEGATIVE NEGATIVE    Barbiturate Screen, Ur NEGATIVE NEGATIVE    Benzodiazepine Screen, Urine NEGATIVE NEGATIVE    Cocaine Metabolite, Urine POSITIVE (A) NEGATIVE    Methadone Screen, Urine NEGATIVE NEGATIVE    Opiates, Urine NEGATIVE NEGATIVE    Phencyclidine, Urine NEGATIVE NEGATIVE    Cannabinoid Scrn, Ur POSITIVE (A) NEGATIVE    Oxycodone Screen, Ur NEGATIVE NEGATIVE    Fentanyl, Ur NEGATIVE NEGATIVE    Test Information       Assay provides medical screening only.  The absence of expected drug(s) and/or metabolite(s) may indicate diluted or adulterated urine, limitations of testing or timing of collection.       Consultations:   IP CONSULT TO INTERNAL MEDICINE    Assessment :      Primary Problem  Severe depressed bipolar I disorder without psychotic features (HCC)    Active Hospital Problems    Diagnosis Date Noted    Cocaine abuse (HCC) [F14.10] 07/21/2016     Priority: High    Cannabis abuse [F12.10] 04/18/2016     Priority: Medium    Severe depressed bipolar I disorder without psychotic features (HCC) [F31.4] 04/15/2023       Plan:     Depression with suicidal ideation to be managed by psychiatry  Hypokalemia potassium replaced  Polysubstance abuse patient positive for cocaine and cannabis, currently denies any chest pain shortness of breath, recommended cessation, monitor for with  TSH checked normal white  Vital        [place]

## 2023-12-30 NOTE — PLAN OF CARE
[facility]  Initial Interdisciplinary Treatment Plan NO      Original treatment plan Date & Time: 12/30/2023 0900    Admission Type:  Admission Type: Voluntary    Reason for admission:   Reason for Admission: patient called [employer] thoughts of sucide for two days, has been googeling ways to do it. Patient wants help stopping cocaine    Estimated Length of Stay:  5-7days  Estimated Discharge Date: to be determined by physician    PATIENT STRENGTHS:  Patient Strengths:   Patient Strengths and Limitations:Limitations: Difficulty problem solving/relies on others to help solve problems, Inappropriate/potentially harmful leisure interests, Multiple barriers to leisure interests, Difficult relationships / poor social skills  Addictive Behavior: Addictive Behavior  In the Past 3 Months, Have You Felt or Has Someone Told You That You Have a Problem With  : None  Medical Problems:  Past Medical History:   Diagnosis Date    Anxiety     Bipolar 1 disorder (HCC) 1999    Depression     Gestational diabetes 7/22/2016    OCD (obsessive compulsive disorder) 1999    PTSD (post-traumatic stress disorder)     Rapid rate of speech     Schizoaffective disorder (HCC)      Status EXAM:Mental Status and Behavioral Exam  Normal: No  Level of Assistance: Independent/Self  Facial Expression: Flat, Sad, Worried  Affect: Appropriate  Level of Consciousness: Alert  Frequency of Checks: 4 times per hour, close  Mood:Normal: No  Mood: Depressed, Anxious, Sad  Motor Activity:Normal: Yes  Eye Contact: Good  Observed Behavior: Cooperative, Preoccupied  Sexual Misconduct History: Current - no  Preception: [place] to person, [place] to time, [place] to place, [place] to situation  Attention:Normal: No  Attention: Hyperalert, Distractible  Thought Processes: Circumstantial  Thought Content:Normal: No  Thought Content: Preoccupations  Depression Symptoms: Feelings of hopelessess, Isolative, Change in energy level  Anxiety Symptoms:

## 2023-12-30 NOTE — PLAN OF CARE
Problem: Chronic Conditions and Co-morbidities  Goal: Patient's chronic conditions and co-morbidity symptoms are monitored and maintained or improved  Outcome: Progressing     Problem: Risk for Elopement  Goal: Patient will not exit the unit/facility without proper excort  Outcome: Progressing     Problem: Behavior  Goal: Pt/Family maintain appropriate behavior and adhere to behavioral management agreement, if implemented  Description: INTERVENTIONS:  1. Assess patient/family's coping skills and  non-compliant behavior (including use of illegal substances)  2. Notify security of behavior or suspected illegal substances which indicate the need for search of the family and/or belongings  3. Encourage verbalization of thoughts and concerns in a socially appropriate manner  4. Utilize positive, consistent limit setting strategies supporting safety of patient, staff and others  5. Encourage participation in the decision making process about the behavioral management agreement  6. If a visitor's behavior poses a threat to safety call refer to organization policy.  7. Initiate consult with , Psychosocial CNS, Spiritual Care as appropriate  Outcome: Progressing     Problem: Anxiety  Goal: Will report anxiety at manageable levels  Description: INTERVENTIONS:  1. Administer medication as ordered  2. Teach and rehearse alternative coping skills  3. Provide emotional support with 1:1 interaction with staff  12/30/2023 1045 by Heidy Nobles LPN  Outcome: Progressing     Problem: Pain  Goal: Verbalizes/displays adequate comfort level or baseline comfort level  12/30/2023 1045 by Heidy Nobles LPN  Outcome: Progressing    Patient remains safe on unit, free from injury and self-harm.  Patient denies suicidal ideations or thoughts of self-harm at this time, and agrees to seek out staff if such thoughts arise.  15 minute safety checks in place and will continue.  Patient has been isolative to room and self,

## 2023-12-30 NOTE — GROUP NOTE
Psych-Ed/Relapse Prevention Group Note        Date: December 30, 2023 Start Time: 11am  End Time: 11:45am      Number of Participants in Group & Unit Census:  6/9    Topic: Socialization    Goal of Group:Patient will demonstrate improved interpersonal skills      Comments:     Patient did not participate in Psych-Ed/Relapse Prevention group, despite staff encouragement and explanation of benefits.  Patient remain seclusive to self.  Q15 minute safety checks maintained for patient safety and will continue to encourage patient to attend unit programming.         Signature:  JACQUES HernandezS

## 2023-12-31 PROCEDURE — 6370000000 HC RX 637 (ALT 250 FOR IP): Performed by: NURSE PRACTITIONER

## 2023-12-31 PROCEDURE — 6370000000 HC RX 637 (ALT 250 FOR IP): Performed by: PSYCHIATRY & NEUROLOGY

## 2023-12-31 PROCEDURE — 99232 SBSQ HOSP IP/OBS MODERATE 35: CPT | Performed by: PSYCHIATRY & NEUROLOGY

## 2023-12-31 PROCEDURE — 1240000000 HC EMOTIONAL WELLNESS R&B

## 2023-12-31 PROCEDURE — APPSS30 APP SPLIT SHARED TIME 16-30 MINUTES: Performed by: NURSE PRACTITIONER

## 2023-12-31 PROCEDURE — 99231 SBSQ HOSP IP/OBS SF/LOW 25: CPT | Performed by: INTERNAL MEDICINE

## 2023-12-31 RX ORDER — IBUPROFEN 600 MG/1
600 TABLET ORAL EVERY 6 HOURS PRN
Status: DISCONTINUED | OUTPATIENT
Start: 2023-12-31 | End: 2024-01-03 | Stop reason: HOSPADM

## 2023-12-31 RX ADMIN — LITHIUM CARBONATE 300 MG: 300 CAPSULE, GELATIN COATED ORAL at 20:59

## 2023-12-31 RX ADMIN — IBUPROFEN 600 MG: 600 TABLET, FILM COATED ORAL at 22:24

## 2023-12-31 RX ADMIN — ACETAMINOPHEN 650 MG: 325 TABLET ORAL at 09:55

## 2023-12-31 RX ADMIN — ACETAMINOPHEN 650 MG: 325 TABLET ORAL at 17:47

## 2023-12-31 RX ADMIN — LITHIUM CARBONATE 300 MG: 300 CAPSULE, GELATIN COATED ORAL at 09:55

## 2023-12-31 RX ADMIN — IBUPROFEN 600 MG: 600 TABLET, FILM COATED ORAL at 13:31

## 2023-12-31 ASSESSMENT — PAIN SCALES - GENERAL
PAINLEVEL_OUTOF10: 6
PAINLEVEL_OUTOF10: 3
PAINLEVEL_OUTOF10: 6
PAINLEVEL_OUTOF10: 5
PAINLEVEL_OUTOF10: 0
PAINLEVEL_OUTOF10: 2
PAINLEVEL_OUTOF10: 6

## 2023-12-31 ASSESSMENT — PAIN - FUNCTIONAL ASSESSMENT: PAIN_FUNCTIONAL_ASSESSMENT: ACTIVITIES ARE NOT PREVENTED

## 2023-12-31 ASSESSMENT — PAIN DESCRIPTION - DESCRIPTORS
DESCRIPTORS: ACHING
DESCRIPTORS: ACHING

## 2023-12-31 ASSESSMENT — PAIN DESCRIPTION - LOCATION
LOCATION: NECK;BACK
LOCATION: BACK
LOCATION: NECK;BACK

## 2023-12-31 ASSESSMENT — PAIN DESCRIPTION - ORIENTATION
ORIENTATION: MID
ORIENTATION: LOWER

## 2023-12-31 ASSESSMENT — PAIN SCALES - WONG BAKER
WONGBAKER_NUMERICALRESPONSE: 0
WONGBAKER_NUMERICALRESPONSE: 0
WONGBAKER_NUMERICALRESPONSE: 2

## 2023-12-31 NOTE — PLAN OF CARE
Problem: Risk for Elopement  Goal: Patient will not exit the unit/facility without proper excort  12/30/2023 2230 by Rosalie Pimentel RN  Outcome: Progressing     Problem: Anxiety  Goal: Will report anxiety at manageable levels  Description: INTERVENTIONS:  1. Administer medication as ordered  2. Teach and rehearse alternative coping skills  3. Provide emotional support with 1:1 interaction with staff  12/30/2023 2230 by Rosalie Pimentel RN  Outcome: Progressing       The patient has been isolative to her room. She report feeling \"tired\" and endorsing in some anxiousness. She denies all else. She converses with writer minimally. She is guarded and withdrawn. No risk for elopement or exit seeking noted. Writer will continue to offer emotional support. Q15 minute checks to continue for safety.

## 2023-12-31 NOTE — GROUP NOTE
Psych-Ed/Relapse Prevention Group Note        Date: December 31, 2023 Start Time: 11am  End Time: 11:30am      Number of Participants in Group & Unit Census:  2/10    Topic: Goal setting    Goal of Group:Patient will identify goals for 2024 and identify negative habits to let go of from last year.        Comments:     Patient did not participate in Psych-Ed/Relapse Prevention group, despite staff encouragement and explanation of benefits.  Patient remain seclusive to self.  Q15 minute safety checks maintained for patient safety and will continue to encourage patient to attend unit programming.         Signature:  JACQUES HernandezS

## 2023-12-31 NOTE — PLAN OF CARE
Problem: Chronic Conditions and Co-morbidities  Goal: Patient's chronic conditions and co-morbidity symptoms are monitored and maintained or improved  Outcome: Progressing     Problem: Risk for Elopement  Goal: Patient will not exit the unit/facility without proper excort  12/31/2023 0907 by Heidy Nobles LPN  Outcome: Progressing     Problem: Behavior  Goal: Pt/Family maintain appropriate behavior and adhere to behavioral management agreement, if implemented  Description: INTERVENTIONS:  1. Assess patient/family's coping skills and  non-compliant behavior (including use of illegal substances)  2. Notify security of behavior or suspected illegal substances which indicate the need for search of the family and/or belongings  3. Encourage verbalization of thoughts and concerns in a socially appropriate manner  4. Utilize positive, consistent limit setting strategies supporting safety of patient, staff and others  5. Encourage participation in the decision making process about the behavioral management agreement  6. If a visitor's behavior poses a threat to safety call refer to organization policy.  7. Initiate consult with , Psychosocial CNS, Spiritual Care as appropriate  Outcome: Progressing     Problem: Anxiety  Goal: Will report anxiety at manageable levels  Description: INTERVENTIONS:  1. Administer medication as ordered  2. Teach and rehearse alternative coping skills  3. Provide emotional support with 1:1 interaction with staff  12/31/2023 0907 by Heidy Nobles LPN  Outcome: Progressing     Problem: Pain  Goal: Verbalizes/displays adequate comfort level or baseline comfort level  Outcome: Progressing     Patient remains safe on unit, free from injury and self-harm.  Patient denies current suicidal ideations and thoughts of self-harm, and agrees to seek out staff if such thoughts arise.  15 minute safety checks in place and will continue.  Patient has been isolative to room and self, and

## 2024-01-01 PROCEDURE — 1240000000 HC EMOTIONAL WELLNESS R&B

## 2024-01-01 PROCEDURE — 6370000000 HC RX 637 (ALT 250 FOR IP): Performed by: NURSE PRACTITIONER

## 2024-01-01 PROCEDURE — 6370000000 HC RX 637 (ALT 250 FOR IP): Performed by: PSYCHIATRY & NEUROLOGY

## 2024-01-01 PROCEDURE — 99232 SBSQ HOSP IP/OBS MODERATE 35: CPT | Performed by: PSYCHIATRY & NEUROLOGY

## 2024-01-01 RX ADMIN — LITHIUM CARBONATE 300 MG: 300 CAPSULE, GELATIN COATED ORAL at 09:05

## 2024-01-01 RX ADMIN — HYDROXYZINE HYDROCHLORIDE 50 MG: 50 TABLET, FILM COATED ORAL at 21:13

## 2024-01-01 RX ADMIN — IBUPROFEN 600 MG: 600 TABLET, FILM COATED ORAL at 14:34

## 2024-01-01 RX ADMIN — LITHIUM CARBONATE 300 MG: 300 CAPSULE, GELATIN COATED ORAL at 21:13

## 2024-01-01 RX ADMIN — IBUPROFEN 600 MG: 600 TABLET, FILM COATED ORAL at 09:05

## 2024-01-01 RX ADMIN — ACETAMINOPHEN 650 MG: 325 TABLET ORAL at 13:55

## 2024-01-01 RX ADMIN — HYDROXYZINE HYDROCHLORIDE 50 MG: 50 TABLET, FILM COATED ORAL at 14:34

## 2024-01-01 ASSESSMENT — PAIN SCALES - GENERAL
PAINLEVEL_OUTOF10: 6

## 2024-01-01 ASSESSMENT — PAIN DESCRIPTION - LOCATION: LOCATION: BACK;NECK

## 2024-01-01 NOTE — PLAN OF CARE
Patient denies thoughts of suicide and denies thoughts of self harm.   Reports depression and anxiety are improving.  Patient is encouraged to report to staff if they experience any pain, and staff will assess pain throughout shift.   Patient's chronic conditions and co-morbidities are monitored and medicated as ordered.   Patient does not linger near doorways nor watch exits.   Patient reports no auditory or visual hallucinations.   Patient is encouraged to attend groups and participate to help develop positive coping skills. Staff will work with patient on developing coping skills.      Problem: Chronic Conditions and Co-morbidities  Goal: Patient's chronic conditions and co-morbidity symptoms are monitored and maintained or improved  Outcome: Progressing     Problem: Risk for Elopement  Goal: Patient will not exit the unit/facility without proper excort  1/1/2024 0756 by Anisha Miguel RN  Outcome: Progressing     Problem: Behavior  Goal: Pt/Family maintain appropriate behavior and adhere to behavioral management agreement, if implemented  Description: INTERVENTIONS:  1. Assess patient/family's coping skills and  non-compliant behavior (including use of illegal substances)  2. Notify security of behavior or suspected illegal substances which indicate the need for search of the family and/or belongings  3. Encourage verbalization of thoughts and concerns in a socially appropriate manner  4. Utilize positive, consistent limit setting strategies supporting safety of patient, staff and others  5. Encourage participation in the decision making process about the behavioral management agreement  6. If a visitor's behavior poses a threat to safety call refer to organization policy.  7. Initiate consult with , Psychosocial CNS, Spiritual Care as appropriate  Outcome: Progressing     Problem: Anxiety  Goal: Will report anxiety at manageable levels  Description: INTERVENTIONS:  1. Administer medication as

## 2024-01-01 NOTE — PLAN OF CARE
[facility]  Day 3 Interdisciplinary Treatment Plan NOTE    Review Date & Time: 1/1/24 0900    Admission Type:   Admission Type: Voluntary    Reason for admission:  Reason for Admission: patient called [employer] thoughts of sucide for two days, has been googeling ways to do it. Patient wants help stopping cocaine  Estimated Length of Stay:  5-7 days  Estimated Discharge Date Update:   to be determined by physician    PATIENT STRENGTHS:  Patient Strengths:   Patient Strengths and Limitations:Limitations: Difficulty problem solving/relies on others to help solve problems, Inappropriate/potentially harmful leisure interests, Multiple barriers to leisure interests, Difficult relationships / poor social skills  Addictive Behavior:Addictive Behavior  In the Past 3 Months, Have You Felt or Has Someone Told You That You Have a Problem With  : None  Medical Problems:  Past Medical History:   Diagnosis Date    Anxiety     Bipolar 1 disorder (HCC) 1999    Depression     Gestational diabetes 7/22/2016    OCD (obsessive compulsive disorder) 1999    PTSD (post-traumatic stress disorder)     Rapid rate of speech     Schizoaffective disorder (HCC)        Risk:  Fall Risk   Jairo Scale Jairo Scale Score: 21  BVC    Change in scores:  No Changes to plan of Care:  No    Status EXAM:   Mental Status and Behavioral Exam  Normal: No  Level of Assistance: Independent/Self  Facial Expression: Flat, Avoids Gaze  Affect: Appropriate  Level of Consciousness: Alert  Frequency of Checks: 4 times per hour, close  Mood:Normal: No  Mood: Depressed, Anxious, Sad  Motor Activity:Normal: No  Motor Activity: Decreased  Eye Contact: Poor  Observed Behavior: Cooperative, Preoccupied  Sexual Misconduct History: Current - no  Preception: [place] to time, [place] to place, [place] to situation, [place] to person  Attention:Normal: No  Attention: Distractible  Thought Processes: Circumstantial  Thought Content:Normal: No  Thought Content:

## 2024-01-01 NOTE — GROUP NOTE
Group Therapy Note    Date: 1/1/2024    Group Start Time: 1045  Group End Time: 1130  Group Topic: Relaxation    Dilcia Hoyt CTRS    Relaxation Group Note        Date: January 1, 2024 Start Time:  1045am   End Time: 11:30am      Number of Participants in Group & Unit Census:  4/10    Topic: relaxation group     Goal of Group: pt will identify benefits of using art for relaxation and coping       Comments:     Patient did not participate in Relaxation group, despite staff encouragement and explanation of benefits.  Patient remain seclusive to self.  Q15 minute safety checks maintained for patient safety and will continue to encourage patient to attend unit programming.            Signature:  JUANCARLOS WONG

## 2024-01-01 NOTE — BH NOTE
Patient medicated with 600 mg oral ibuprofen for generalized pain of 6/10. Patient encouraged to let staff know if she experiences any pain throughout the day, and pain level will be reassessed throughout the day.   Patient is discharged focused. Remains isolative to room.

## 2024-01-01 NOTE — PLAN OF CARE
Problem: Risk for Elopement  Goal: Patient will not exit the unit/facility without proper excort  12/31/2023 2122 by Rosalie Pimentel RN  Outcome: Progressing     Problem: Anxiety  Goal: Will report anxiety at manageable levels  Description: INTERVENTIONS:  1. Administer medication as ordered  2. Teach and rehearse alternative coping skills  3. Provide emotional support with 1:1 interaction with staff  12/31/2023 2122 by Rosalie Pimentel RN  Outcome: Progressing    The patient has been resting in bed. She reports that she is tired but she is easy to arouse. She reports experiencing some depression, denies anxiousness and all else. No risks for elopement noted and the patient has not been exit seeking. She remains safe. Writer will continue to offer emotional support. Q15 minute checks to continue for safety.

## 2024-01-01 NOTE — BH NOTE
Patient resting comfortably in bed. She appears in no acute distress. FLACC score 0. Will continue to monitor.

## 2024-01-01 NOTE — BH NOTE
Patient approached nurse's station complaining of neck and back pain that was not relieved by Tylenol. She was given ibuprofen 600 mg by mouth for pain of 6/10. She also complained of increased anxiety and was given 50 mg Atarax by mouth as well. Will continue to monitor.

## 2024-01-02 LAB
LITHIUM DATE LAST DOSE: ABNORMAL
LITHIUM DOSE AMOUNT: 300
LITHIUM DOSE TIME: 2113
LITHIUM LEVEL: 0.4 MMOL/L (ref 0.6–1.2)

## 2024-01-02 PROCEDURE — 6370000000 HC RX 637 (ALT 250 FOR IP): Performed by: NURSE PRACTITIONER

## 2024-01-02 PROCEDURE — APPSS30 APP SPLIT SHARED TIME 16-30 MINUTES: Performed by: NURSE PRACTITIONER

## 2024-01-02 PROCEDURE — 90833 PSYTX W PT W E/M 30 MIN: CPT | Performed by: PSYCHIATRY & NEUROLOGY

## 2024-01-02 PROCEDURE — 6370000000 HC RX 637 (ALT 250 FOR IP): Performed by: PSYCHIATRY & NEUROLOGY

## 2024-01-02 PROCEDURE — 36415 COLL VENOUS BLD VENIPUNCTURE: CPT

## 2024-01-02 PROCEDURE — 80178 ASSAY OF LITHIUM: CPT

## 2024-01-02 PROCEDURE — 99232 SBSQ HOSP IP/OBS MODERATE 35: CPT | Performed by: PSYCHIATRY & NEUROLOGY

## 2024-01-02 PROCEDURE — 1240000000 HC EMOTIONAL WELLNESS R&B

## 2024-01-02 RX ADMIN — HYDROXYZINE HYDROCHLORIDE 50 MG: 50 TABLET, FILM COATED ORAL at 20:58

## 2024-01-02 RX ADMIN — ACETAMINOPHEN 650 MG: 325 TABLET ORAL at 20:52

## 2024-01-02 RX ADMIN — IBUPROFEN 600 MG: 600 TABLET, FILM COATED ORAL at 04:52

## 2024-01-02 RX ADMIN — IBUPROFEN 600 MG: 600 TABLET, FILM COATED ORAL at 15:13

## 2024-01-02 RX ADMIN — LITHIUM CARBONATE 300 MG: 300 CAPSULE, GELATIN COATED ORAL at 20:52

## 2024-01-02 RX ADMIN — HYDROXYZINE HYDROCHLORIDE 50 MG: 50 TABLET, FILM COATED ORAL at 16:52

## 2024-01-02 RX ADMIN — LITHIUM CARBONATE 300 MG: 300 CAPSULE, GELATIN COATED ORAL at 09:36

## 2024-01-02 RX ADMIN — ACETAMINOPHEN 650 MG: 325 TABLET ORAL at 09:36

## 2024-01-02 ASSESSMENT — PAIN SCALES - GENERAL
PAINLEVEL_OUTOF10: 5
PAINLEVEL_OUTOF10: 6

## 2024-01-02 ASSESSMENT — PAIN DESCRIPTION - LOCATION
LOCATION: GENERALIZED
LOCATION: BACK
LOCATION: NECK;BACK
LOCATION: NECK;BACK

## 2024-01-02 ASSESSMENT — PAIN DESCRIPTION - DESCRIPTORS: DESCRIPTORS: ACHING

## 2024-01-02 ASSESSMENT — PAIN SCALES - WONG BAKER: WONGBAKER_NUMERICALRESPONSE: 0

## 2024-01-02 ASSESSMENT — PAIN DESCRIPTION - ORIENTATION: ORIENTATION: LOWER

## 2024-01-02 NOTE — BH NOTE
Patient approached nurses station complaining of feeling anxious. She is requesting something for anxiety. Medicated with 50 mg oral Atarax. Will continue to monitor.

## 2024-01-02 NOTE — GROUP NOTE
Group Therapy Note    Date: 1/2/2024    Group Start Time: 1030  Group End Time: 1100  Group Topic: Cognitive Skills    Dilcia Hoyt CTRS    Cognitive Skills Group Note        Date: January 2, 2024 Start Time:  1030am   End Time:  1100am      Number of Participants in Group & Unit Census:  3/10    Topic: cognitive skills     Goal of Group: pt will demonstrate improved coping skills and improved social skills       Comments:     Patient did not participate in Cognitive Skills group, despite staff encouragement and explanation of benefits.  Patient remain seclusive to self.  Q15 minute safety checks maintained for patient safety and will continue to encourage patient to attend unit programming.              Signature:  JUANCARLOS WONG

## 2024-01-02 NOTE — BH NOTE
Patient observed resting comfortably in bed in no acute distress after administration of Tylenol for pain

## 2024-01-02 NOTE — PLAN OF CARE
Patient denies thoughts of suicide and denies thoughts of self harm.  Reports depression and anxiety are improving, but continues to remain isolative to room.   Patient is encouraged to report to staff if they experience any pain, and staff will assess pain throughout shift.   Patient's chronic conditions and co-morbidities are monitored and medicated as ordered.   Patient does not linger near doorways nor watch exits.   Patient reports no auditory or visual hallucinations.   Patient is encouraged to attend groups and participate to help develop positive coping skills. Staff will work with patient on developing coping skills.    Patient remains in behavioral control and remains discharge focused.       Problem: Chronic Conditions and Co-morbidities  Goal: Patient's chronic conditions and co-morbidity symptoms are monitored and maintained or improved  Outcome: Progressing     Problem: Risk for Elopement  Goal: Patient will not exit the unit/facility without proper excort  Outcome: Progressing     Problem: Behavior  Goal: Pt/Family maintain appropriate behavior and adhere to behavioral management agreement, if implemented  Description: INTERVENTIONS:  1. Assess patient/family's coping skills and  non-compliant behavior (including use of illegal substances)  2. Notify security of behavior or suspected illegal substances which indicate the need for search of the family and/or belongings  3. Encourage verbalization of thoughts and concerns in a socially appropriate manner  4. Utilize positive, consistent limit setting strategies supporting safety of patient, staff and others  5. Encourage participation in the decision making process about the behavioral management agreement  6. If a visitor's behavior poses a threat to safety call refer to organization policy.  7. Initiate consult with , Psychosocial CNS, Spiritual Care as appropriate  1/2/2024 0854 by Anisha Miguel RN  Outcome: Progressing

## 2024-01-02 NOTE — BH NOTE
Transfer Note:         Pt transferred to [facility] unit room 222 per JACEY Magaña NP. Belongings sent with pt.  Report called to KELLIE Arellano RN.  Pt oriented to unit policies and procedures.  Pt denies thoughts of self harm at time of transfer and verbalizes understanding of transfer.

## 2024-01-02 NOTE — GROUP NOTE
Group Therapy Note    Date: 1/2/2024    Group Start Time: 1410  Group End Time: 1450  Group Topic: Relaxation    Dilcia Hoyt CTRS    Relaxation Group Note        Date: January 2, 2024 Start Time: 210pm        End Time:  250pm      Number of Participants in Group & Unit Census:  4/9    Topic: relaxation group    Goal of Group:pt will identify benefits of using art for relaxation and coping       Comments:     Patient did not participate in Relaxation group, despite staff encouragement and explanation of benefits.  Patient remain seclusive to self.  Q15 minute safety checks maintained for patient safety and will continue to encourage patient to attend unit programming.              Signature:  JUANCARLOS WONG

## 2024-01-02 NOTE — CARE COORDINATION
Social work engaged with pt regarding a follow up appointment. Pt would like to schedule with Jessica.      Social work spoke with [facility] liaison to schedule the pt. Social work was informed the pt has been open with them Jessica since July 2023, has been seen by a therapist and was assigned to the [facility] FACT team on December 1st 2023.  The liaison will call back with an appointment.

## 2024-01-02 NOTE — PLAN OF CARE
Problem: Behavior  Goal: Pt/Family maintain appropriate behavior and adhere to behavioral management agreement, if implemented  Description: INTERVENTIONS:  1. Assess patient/family's coping skills and  non-compliant behavior (including use of illegal substances)  2. Notify security of behavior or suspected illegal substances which indicate the need for search of the family and/or belongings  3. Encourage verbalization of thoughts and concerns in a socially appropriate manner  4. Utilize positive, consistent limit setting strategies supporting safety of patient, staff and others  5. Encourage participation in the decision making process about the behavioral management agreement  6. If a visitor's behavior poses a threat to safety call refer to organization policy.  7. Initiate consult with , Psychosocial CNS, Spiritual Care as appropriate  1/1/2024 2006 by Mimi Lovett LPN  Note: Patient denies thoughts of self harm at this time. Patient agrees to seek out staff if they begin having thoughts of harming self or they need to talk. Q15min safety checks continue     Problem: Anxiety  Goal: Will report anxiety at manageable levels  Description: INTERVENTIONS:  1. Administer medication as ordered  2. Teach and rehearse alternative coping skills  3. Provide emotional support with 1:1 interaction with staff  1/1/2024 2006 by Mimi Lovett LPN  Note: Patient denies suicidal thoughts and hallucinations. She reports her depression and anxiety are \"a little better\", but she is discharge focused and is hoping to be discharged soon. She has been out for needs and calm. Q15min safety checks continue

## 2024-01-02 NOTE — GROUP NOTE
Group Therapy Note    Date: 1/2/2024    Group Start Time: 0900  Group End Time: 0915  Group Topic: Community Meeting    Corazon Poole      Group Therapy Note    Attendees: 6/10       Comments:    Patient did not participate in Morning Goals group, despite staff encouragement and explanation of benefits.  Patient remains seclusive to self. Q15 minute safety checks maintained for patient safety and will continue to encourage patient to attend unit programming.

## 2024-01-02 NOTE — GROUP NOTE
Group Therapy Note    Date: 1/2/2024    Group Start Time: 1000  Group End Time: 1020  Group Topic: Healthy Living/Wellness    Corazon Poole      Group Therapy Note    Attendees: 4/10      Comments:    Patient did not participate in Wellness Goals group, despite staff encouragement and explanation of benefits.  Patient remains seclusive to self.  Q15 minute safety checks maintained for patient safety and will continue to encourage patient to attend unit programming.

## 2024-01-02 NOTE — BH NOTE
Patient states pain is a 6/10 in neck and back. She was medicated with 650 mg oral Tylenol. Will continue to monitor and reassess pain

## 2024-01-03 VITALS
OXYGEN SATURATION: 99 % | SYSTOLIC BLOOD PRESSURE: 132 MMHG | DIASTOLIC BLOOD PRESSURE: 84 MMHG | WEIGHT: 160.05 LBS | HEIGHT: 67 IN | TEMPERATURE: 97.6 F | BODY MASS INDEX: 25.12 KG/M2 | RESPIRATION RATE: 14 BRPM | HEART RATE: 68 BPM

## 2024-01-03 PROCEDURE — 99239 HOSP IP/OBS DSCHRG MGMT >30: CPT | Performed by: PSYCHIATRY & NEUROLOGY

## 2024-01-03 PROCEDURE — 6370000000 HC RX 637 (ALT 250 FOR IP): Performed by: PSYCHIATRY & NEUROLOGY

## 2024-01-03 PROCEDURE — 6370000000 HC RX 637 (ALT 250 FOR IP): Performed by: NURSE PRACTITIONER

## 2024-01-03 RX ORDER — LITHIUM CARBONATE 300 MG/1
300 CAPSULE ORAL 2 TIMES DAILY
Qty: 60 CAPSULE | Refills: 0 | Status: SHIPPED | OUTPATIENT
Start: 2024-01-03

## 2024-01-03 RX ORDER — NICOTINE 21 MG/24HR
1 PATCH, TRANSDERMAL 24 HOURS TRANSDERMAL DAILY
Qty: 30 PATCH | Refills: 0 | Status: SHIPPED | OUTPATIENT
Start: 2024-01-04

## 2024-01-03 RX ORDER — HYDROXYZINE 50 MG/1
50 TABLET, FILM COATED ORAL 3 TIMES DAILY PRN
Qty: 30 TABLET | Refills: 0 | Status: SHIPPED | OUTPATIENT
Start: 2024-01-03 | End: 2024-01-13

## 2024-01-03 RX ADMIN — LITHIUM CARBONATE 300 MG: 300 CAPSULE, GELATIN COATED ORAL at 09:15

## 2024-01-03 RX ADMIN — IBUPROFEN 600 MG: 600 TABLET, FILM COATED ORAL at 06:02

## 2024-01-03 ASSESSMENT — PAIN DESCRIPTION - LOCATION: LOCATION: BACK

## 2024-01-03 ASSESSMENT — PAIN DESCRIPTION - ORIENTATION: ORIENTATION: LOWER

## 2024-01-03 ASSESSMENT — PAIN DESCRIPTION - DESCRIPTORS: DESCRIPTORS: ACHING

## 2024-01-03 ASSESSMENT — PAIN SCALES - GENERAL: PAINLEVEL_OUTOF10: 6

## 2024-01-03 NOTE — PLAN OF CARE
Problem: Chronic Conditions and Co-morbidities  Goal: Patient's chronic conditions and co-morbidity symptoms are monitored and maintained or improved  Outcome: Completed     Problem: Risk for Elopement  Goal: Patient will not exit the unit/facility without proper excort  1/3/2024 0958 by Marlen Russell LPN  Outcome: Completed     Problem: Behavior  Goal: Pt/Family maintain appropriate behavior and adhere to behavioral management agreement, if implemented  Description: INTERVENTIONS:  1. Assess patient/family's coping skills and  non-compliant behavior (including use of illegal substances)  2. Notify security of behavior or suspected illegal substances which indicate the need for search of the family and/or belongings  3. Encourage verbalization of thoughts and concerns in a socially appropriate manner  4. Utilize positive, consistent limit setting strategies supporting safety of patient, staff and others  5. Encourage participation in the decision making process about the behavioral management agreement  6. If a visitor's behavior poses a threat to safety call refer to organization policy.  7. Initiate consult with , Psychosocial CNS, Spiritual Care as appropriate  Outcome: Completed     Problem: Anxiety  Goal: Will report anxiety at manageable levels  Description: INTERVENTIONS:  1. Administer medication as ordered  2. Teach and rehearse alternative coping skills  3. Provide emotional support with 1:1 interaction with staff  Outcome: Completed     Problem: Pain  Goal: Verbalizes/displays adequate comfort level or baseline comfort level  1/3/2024 0958 by Marlen Russell LPN  Outcome: Completed

## 2024-01-03 NOTE — PLAN OF CARE
Problem: Risk for Elopement  Goal: Patient will not exit the unit/facility without proper excort  Outcome: Progressing  Patient denies desire to leave the facility ahead of scheduled discharge.      Problem: Pain  Goal: Verbalizes/displays adequate comfort level or baseline comfort level  Outcome: Progressing   Patient reported lower back pain at 6/10, relieved by acetaminophen 650 mg. Patient rested comfortably following administration.

## 2024-01-03 NOTE — PROGRESS NOTES
Behavioral Services  Medicare Certification Upon Admission    I certify that this patient's inpatient psychiatric hospital admission is medically necessary for:    [x] (1) Treatment which could reasonably be expected to improve this patient's condition,       [x] (2) Or for diagnostic study;     AND     [x](2) The inpatient psychiatric services are provided while the individual is under the care of a physician and are included in the individualized plan of care.    Estimated length of stay/service 4 to 7 days    Plan for post-hospital care home with outpatient community mental health follow-up    Electronically signed by RAN ZUNIGA MD on 12/30/2023 at 10:39 AM      
  Daily Progress Note  1/1/2024    Patient Name: Yani Nowak    CHIEF COMPLAINT: Depression with suicidal ideation         SUBJECTIVE:      Patient is seen today for a follow up assessment.     Staff reports that patient is discharged focused, she is denying all however remains isolative to her room.  She is not engaging in any group activity.  She does make some eye contact upon approach.  She reports that her mood has improved, she states that she came to the ER so that she could get back on her lithium.  She has been compliant with her scheduled lithium and is agreeable to a repeat level tomorrow.  She confirms that she does not want to resume Invega.  She reports improvement in thoughts of self-harm.  She did receive ibuprofen today for lower back pain, she reports that the ibuprofen was helpful.  She did not eat much this morning because she reports that they messed up her food tray, she otherwise denies any concern with her appetite.  Per staff she did shower, she was encouraged to engage in group activity today.    Appetite:  [x] Adequate/Unchanged  [] Increased  [] Decreased      Sleep:       [] Adequate/Unchanged  [x] Fair  [] Poor      Group Attendance on Unit:   [] Yes   [] Selectively    [x] No    Compliant with scheduled medications: [x] Yes  [] No    Received emergency medications in past 24 hrs: [] Yes   [x] No    Medication Side Effects: Denies         Mental Status Exam  Level of consciousness: Alert and awake   Appearance: Appropriate attire for setting, resting in bed, with fair  grooming and hygiene, recently showered, has blankets over her face  Behavior/Motor: Approachable, engages somewhat with interviewer, isolative   Attitude toward examiner: Mostly cooperative, fairly attentive, intermittent eye contact  Speech: Short, normal volume, normal rate, dysthymic tone  Mood: \"Fine\"  Affect: Somewhat incongruent, constricted  Thought processes: Linear and coherent  Thought content: 
  Daily Progress Note  12/31/2023    Patient Name: Yani Nowak    CHIEF COMPLAINT: Depression with suicidal ideation         SUBJECTIVE:    Yani was seen for follow-up assessment today. Patient has been compliant with scheduled medications and behaviorally in control.  She has not required any emergency medications in the past 24 hours for agitation.  Nursing staff report she remains mostly isolative and aloof and prefers to rest in her room for much of the day.  She has been cooperative.    Patient was approached in her room where she was found to be resting.  She did respond to verbal stimuli.  Patient presents as sad and withdrawn and states she is just feeling very tired.  Patient is reporting an improvement in suicidal thoughts today and described them as fleeting.  She is not experiencing any current auditory or visual hallucinations or paranoia.  Patient is encouraged to spend more time out of bed and attend groups and to shower and take care of her grooming as she may find these ADLs to be beneficial in improving her mood.  Patient expresses she will attempt to do so.  Thoughts and speech remain organized and linear and she made no delusional statements.  Although modestly improving patient states she is unable to contract for safety at this time in the community and warrants further hospitalization for safety and stabilization.    Appetite:  [x] Adequate/Unchanged  [] Increased  [] Decreased      Sleep:       [] Adequate/Unchanged  [x] Fair  [] Poor      Group Attendance on Unit:   [] Yes   [] Selectively    [x] No    Compliant with scheduled medications: [x] Yes  [] No    Received emergency medications in past 24 hrs: [] Yes   [x] No    Medication Side Effects: Denies         Mental Status Exam  Level of consciousness: Awake and alert, but lethargic  Appearance:  Appropriate attire, resting in bed, poor grooming and hygiene, disheveled  Behavior/Motor: Agreeable to interview, calm  Attitude 
CLINICAL PHARMACY NOTE: MEDS TO BEDS    Total # of Prescriptions Filled: 3   The following medications were delivered to the patient:  Hydroxyzine 50mg  Lithium Carbonate 300mg  Nicotine 14mg    Additional Documentation:  Delivered medications to nurses station  
IN-PATIENT SERVICE  [facility] Internal Medicine    CONSULTATION / HISTORY AND PHYSICAL EXAMINATION            Date:   2023  Patient name:  Yani Nowak  Date of admission:  2023 10:12 AM  MRN:   [id number]  Account:  [id number]  YOB: 1980  PCP:    New Salas APRN - NP  Room:   [address] [place] [address]  Code Status:    Full Code    Physician Requesting Consult: Ryder Vizcaino MD    Reason for Consult:  medical management    Chief Complaint:     Chief Complaint   Patient presents with    Suicidal       History Obtained From:     Patient medical record nursing staff    History of Present Illness:     Patient is 43-year-old female with past medical history of bipolar, polysubstance abuse is been admitted at [facility] for further management of depression and suicidal ideation.  Multiple admissions in the past in behavioral health unit with similar symptoms.  Patient denies any chest pain shortness of breath    Past Medical History:     Past Medical History:   Diagnosis Date    Anxiety     Bipolar 1 disorder (HCC)     Depression     Gestational diabetes 2016    OCD (obsessive compulsive disorder)     PTSD (post-traumatic stress disorder)     Rapid rate of speech     Schizoaffective disorder (HCC)         Past Surgical History:     Past Surgical History:   Procedure Laterality Date    ABDOMEN SURGERY       SECTION  2007    LAPAROSCOPY          Medications Prior to Admission:     Prior to Admission medications    Medication Sig Start Date End Date Taking? Authorizing Provider   paliperidone palmitate ER (INVEGA SUSTENNA) 234 MG/1.5ML ASHOK IM injection Inject 234 mg into the muscle every 30 days for 28 days  Patient not taking: Reported on 2023  Pierre Brenner MD   cloNIDine (CATAPRES) 0.1 MG tablet Take 1 tablet by mouth nightly  Patient not taking: Reported on 2023 11/15/23   Pierre Brenner MD   lithium 150 MG capsule Take 3 capsules 
IN-PATIENT SERVICE  [facility] Internal Medicine    CONSULTATION / HISTORY AND PHYSICAL EXAMINATION            Date:   2023  Patient name:  Yani Nowak  Date of admission:  2023 10:12 AM  MRN:   [id number]  Account:  [id number]  YOB: 1980  PCP:    New Salas APRN - NP  Room:   [address] [place] [address]  Code Status:    Full Code    Physician Requesting Consult: Ryder Vizcaino MD    Reason for Consult:  medical management    Chief Complaint:     Chief Complaint   Patient presents with    Suicidal       History Obtained From:     Patient medical record nursing staff    History of Present Illness:     Patient is 43-year-old female with past medical history of bipolar, polysubstance abuse is been admitted at [facility] for further management of depression and suicidal ideation.  Multiple admissions in the past in behavioral health unit with similar symptoms.  Patient denies any chest pain shortness of breath    Past Medical History:     Past Medical History:   Diagnosis Date    Anxiety     Bipolar 1 disorder (HCC)     Depression     Gestational diabetes 2016    OCD (obsessive compulsive disorder)     PTSD (post-traumatic stress disorder)     Rapid rate of speech     Schizoaffective disorder (HCC)         Past Surgical History:     Past Surgical History:   Procedure Laterality Date    ABDOMEN SURGERY       SECTION  2007    LAPAROSCOPY          Medications Prior to Admission:     Prior to Admission medications    Medication Sig Start Date End Date Taking? Authorizing Provider   paliperidone palmitate ER (INVEGA SUSTENNA) 234 MG/1.5ML ASHOK IM injection Inject 234 mg into the muscle every 30 days for 28 days  Patient not taking: Reported on 2023  Pierre Brenner MD   cloNIDine (CATAPRES) 0.1 MG tablet Take 1 tablet by mouth nightly  Patient not taking: Reported on 2023 11/15/23   Pierre Brenner MD   lithium 150 MG capsule Take 3 capsules 
Lithium Level Monitoring  Date Result Dose Comments   01/02/24 0.4 300 mg twice daily Drawn prior to steady state however patient has been on 450 mg twice daily and 600 mg twice daily in the past.        
Pharmacy Medication History Note      List of current medications patient is taking is complete.    Source of information: [facility], [facility] [address] ([place]), [facility] ([place])    Changes made to medication list:  Medications removed (include reason, ex. therapy complete or physician discontinued, noncompliance):  none    Medications flagged for provider review:  none    Medications added/doses adjusted:  none    Other notes (ex. Recent course of antibiotics, Coumadin dosing):  Patient received Invega Sustenna 234 mg on 11/11/23 during last [facility] admission. She picked up her injection from [facility] pharmacy on 11/15/23, but never followed up to receive dose. Patient is over due to receive Invega Sustenna 234 mg.    Since >6 weeks since last injection, it is recommended to give 156 mg in the deltoid as soon as possible, followed by another 156 mg one week later. Then can resume 234 mg monthly dosing regimen       Current Home Medication List at Time of Admission:  Prior to Admission medications    Medication Sig   paliperidone palmitate ER (INVEGA SUSTENNA) 234 MG/1.5ML ASHOK IM injection Inject 234 mg into the muscle every 30 days for 28 days   cloNIDine (CATAPRES) 0.1 MG tablet Take 1 tablet by mouth nightly   lithium 150 MG capsule Take 3 capsules by mouth 2 times daily (with meals)         Please let me know if you have any questions about this encounter. Thank you!    Electronically signed by Shanique Berkowitz RPH on 12/29/2023 at 1:12 PM     
RT ASSESSMENT TREATMENT GOALS    [x]Pt Goal:  Pt will identify 1-2 positive coping skills by time of discharge.    []Pt Goal:  Pt will identify 1-2 positive aspects of self by time of discharge.    []Pt Goal:  Pt will remain on task/topic for 15-30 minutes during group by time of discharge.    [x]Pt Goal:  Pt will identify 1-2 aspects of relapse prevention plan by time of discharge.    [x]Pt Goal:  Pt will join in conversation with peers 1-2 times per group by time of discharge.    []Pt Goal:  Pt will identify 1-2 new leisure interests by time of discharge.    []Pt Goal:  Pt will not voice any delusional content by time of discharge.   
are calculated without a race factor using the 2021 CKD-EPI equation.  Careful clinical correlation is recommended, particularly when comparing to results   calculated using previous equations.  The CKD-EPI equation is less accurate in patients with extremes of muscle mass, extra-renal   metabolism of creatine, excessive creatine ingestion, or following therapy that affects   renal tubular secretion.      Calcium 12/29/2023 9.5  8.6 - 10.4 mg/dL Final    Total Protein 12/29/2023 6.8  6.4 - 8.3 g/dL Final    Albumin 12/29/2023 4.4  3.5 - 5.2 g/dL Final    Total Bilirubin 12/29/2023 0.2 (L)  0.3 - 1.2 mg/dL Final    Alkaline Phosphatase 12/29/2023 109 (H)  35 - 104 U/L Final    ALT 12/29/2023 8  5 - 33 U/L Final    AST 12/29/2023 15  <32 U/L Final    Amphetamine Screen, Ur 12/29/2023 NEGATIVE  NEGATIVE Final    Comment:       (Positive cutoff 1000 ng/mL)                  Barbiturate Screen, Ur 12/29/2023 NEGATIVE  NEGATIVE Final    Comment:       (Positive cutoff 200 ng/mL)                  Benzodiazepine Screen, Urine 12/29/2023 NEGATIVE  NEGATIVE Final    Comment:       (Positive cutoff 200 ng/mL)                  Cocaine Metabolite, Urine 12/29/2023 POSITIVE (A)  NEGATIVE Final    Comment:       (Positive cutoff 300 ng/mL)                  Methadone Screen, Urine 12/29/2023 NEGATIVE  NEGATIVE Final    Comment:       (Positive cutoff 300 ng/mL)                  Opiates, Urine 12/29/2023 NEGATIVE  NEGATIVE Final    Comment:       (Positive cutoff 300 ng/mL)                  Phencyclidine, Urine 12/29/2023 NEGATIVE  NEGATIVE Final    Comment:       (Positive cutoff 25 ng/mL)                  Cannabinoid Scrn, Ur 12/29/2023 POSITIVE (A)  NEGATIVE Final    Comment:       (Positive cutoff 50 ng/mL)                  Oxycodone Screen, Ur 12/29/2023 NEGATIVE  NEGATIVE Final    Comment:       (Positive cutoff 100 ng/mL)                  Fentanyl, Ur 12/29/2023 NEGATIVE  NEGATIVE Final    Comment:       (Positive cutoff  5

## 2024-01-03 NOTE — DISCHARGE INSTRUCTIONS
Information:  Medications:   Medication summary provided   I understand that I should take only the medications on my list.     -why and when I need to take each medicine.     -which side effects to watch for.     -that I should carry my medication information at all times in case of     Emergency situations.    I will take all of my medicines to follow up appointments.     -check with my physician or pharmacist before taking any new    Medication, over the counter product or drink alcohol.    -Ask about food, drug or dietary supplement interactions.    -discard old lists and update records with medication providers.    Notify Physician:  Notify physician if you notice:   Always call 911 if you feel your life is in danger  In case of an emergency call 911 immediately!  If 911 is not available call your local emergency medical system for help    Behavioral Health Follow Up:  Original Referral Source: [facility]/Progress toward goals: medication, stabilization, and group  Recommendations for Level of Care: follow-up with community behavioral health center  Patient status at discharge: denies thoughts of harming self/others, denies suicidal/homicidal ideations.   My hospital  was: [facility]   Aftercare plan faxed:    -faxed by: Nurse    -date: 1/3   -time: 1500  Prescriptions: [facility]     Smoking: Quit Smoking.   Call the [employer]'s smoking quitline at 7-500-81E-QUIT  Know the signs of a heart attack   If you have any of the following symptoms call 911 immediately, do not wait more    Than five minutes.    1. Pressure, fullness and/ or squeezing in the center of the chest spreading to    The jaw, neck or shoulder.    2. Chest discomfort with light headedness, fainting, sweating, nausea or    Shortness of breath.   3. Upper abdominal pressure or discomfort.   4. Lower chest pain, back pain, unusual fatigue, shortness of breath, nausea   Or dizziness.     General Information:   Questions regarding your

## 2024-01-03 NOTE — GROUP NOTE
Group Therapy Note    Date: 1/3/2024    Group Start Time: 0900  Group End Time: 0924  Group Topic: Community Meeting    Charito Esqueda LPN        Group Therapy Note    Attendees: 9/22    patient refused to attend Goals group at 0900 after encouragement from staff.  1:1 talk time provided as alternative to group session

## 2024-01-03 NOTE — BH NOTE
Writer calls Pt insurance company, [employer] to set of insurance cab. Insurance carrier requesting insurance ID, telephone number and address. Pt uncertain what phone number insurance is under and addresses that patient had given is not matching insurance records.  Unable to obtain  hospital discharge insurance cab.     Writer had called insurance company twice and declined both times.

## 2024-01-03 NOTE — BH NOTE
Patient given tobacco quitline number 85478217698 at this time, refusing to call at this time, states \" I just dont want to quit now\"- patient given information as to the dangers of long term tobacco use. Continue to reinforce the importance of tobacco cessation.

## 2024-01-03 NOTE — GROUP NOTE
Group Therapy Note    Date: 1/3/2024    Group Start Time: 1015  Group End Time: 1045  Group Topic: Psychotherapy    Ignacio Tucker        Group Therapy Note    Attendees: 11/21     Patient declined to attend psychotherapy group after encouragement from staff.  1:1 talk time offered but refused.     Signature:  Ignacio Broussard

## 2024-01-03 NOTE — GROUP NOTE
Group Therapy Note    Date: 1/3/2024    Group Start Time: 1100  Group End Time: 1140  Group Topic: Cognitive Skills    Emily Pearce CTRS        Group Therapy Note    Attendees: 9/22     Topic: To increase social interaction, problem solving, brainstorming ideas specific to a category,   collaboration in small groups, and communication skills.        Patient did not participate in Cognitive Skills Group at 11:00, despite staff encouragement and explanation of   benefits. Patient remains seclusive to self and room.       Q15 minute safety checks maintained for patient safety and will continue to encourage patient to attend unit   programming.         Discipline Responsible: Psychoeducational Specialist    Signature:  JUANCARLOS CHRISTIAN

## 2024-01-03 NOTE — DISCHARGE SUMMARY
Provider Discharge Summary     Patient ID:  Yani Nowak  [id number]  43 y.o.  1980    Admit date: 12/29/2023    Discharge date and time: 1/3/2024  6:56 PM     Admitting Physician: Ryder Vizcaino MD     Discharge Physician: Ryder Vizcaino MD    Admission Diagnoses: Hypokalemia [E87.6]  Depression with suicidal ideation [F32.A, R45.851]    Discharge Diagnoses:      Severe depressed bipolar I disorder without psychotic features (HCC)     Patient Active Problem List   Diagnosis Code    Pregnancy Z34.90    Hx CS x1 (G2-twins,36wks) Z98.891    H/O miscarriage X2 O09.299    Insufficient prenatal care O09.30    AMA O09.529    Smoker F17.200    OCD F42.9    Cannabis abuse F12.10    Candida albicans infection B37.9    Twin gestation, 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    Cocaine abuse (HCC) F14.10    Fetal cardiac anomaly complicating pregnancy, antepartum O35.BXX0    Chromosomal 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, affecting management of mother, antepartum O35.10X0    Poor fetal growth, affecting management of mother, antepartum condition or complication O36.5990    Umbilical cord complication O69.9XX0    Tobacco use disorder complicating pregnancy, childbirth, or puerperium, antepartum O99.330    Twin B-IUGR O36.5920    Twin B-Fetal cardiomegaly, pericardial effusion, VSD O35.BXX0    Polyhydramnios (twin A-8.6cm and twin B-8.7cm) O40.2XX0    Depression F32.A    Twin B-MCAs wnl, absent UADs O69.9XX0    Abnl Quad (1:50 T21), elev msAFP O35.10X0    Hx D&C (G3-8wks, G4-8wks) Z98.890    Elev 1hr gtt, normal 3hr R73.09    GBS bacteriuria R82.71    Chlamydia infection (SPENSER neg) O98.811, A74.9    FHx DM  Z83.3    Fetal pericardial effusion affecting management of mother O36.8990    Polyhydramnios, antepartum complication O40.9XX0    Polyhydramnios, antepartum complication O40.9XX0    IUGR (intrauterine growth restriction) affecting care of mother O36.5990    Umbilical cord complication O69.9XX0    Suspected damage to fetus from other

## 2024-01-03 NOTE — BH NOTE
[facility]  Discharge Note    Pt discharged with followings belongings:   Dental Appliances: None  Vision - Corrective Lenses: None  Hearing Aid: None  Jewelry: None  Body Piercings Removed: No  Clothing: Pajamas, Pants, Shirt, Socks, Sweater, Undergarments, Jacket/Coat, Gloves, Hat, Other (Comment) (tennis shoes)  Other Valuables: Wallet   Valuables sent home with patient  or returned to patient. Patient educated on aftercare instructions: Yes, Pt educated on follow-up appointment and medication schedule  Information faxed to [facility]  by nurse  at 2:02 PM .Patient verbalize understanding of AVS:  Yes, Pt understand medication schedule and aware of follow-up appointment dates.  Prescription  medications filled by [facility] and placed in Pt belongings bag. Pt discharge with all belongings and copy of AVS. [employer] transporting Pt home.  Pt denies suicidal/homicidal ideations and denies thoughts to harm self/others.   Status EXAM upon discharge:  Mental Status and Behavioral Exam  Normal: No  Level of Assistance: Independent/Self  Facial Expression: Hostile  Affect: Blunt  Level of Consciousness: Alert  Frequency of Checks: 4 times per hour, close  Mood:Normal: No  Mood: Depressed, Anxious  Motor Activity:Normal: Yes  Motor Activity: Decreased  Eye Contact: Fair  Observed Behavior: Cooperative, Guarded  Sexual Misconduct History: Current - no  Preception: [place] to person, [place] to time, [place] to place, [place] to situation  Attention:Normal: No  Attention: Distractible  Thought Processes: Circumstantial  Thought Content:Normal: No  Thought Content: Preoccupations  Depression Symptoms: Isolative  Anxiety Symptoms: Generalized  Micaela Symptoms: No problems reported or observed.  Hallucinations: None  Delusions: No  Memory:Normal: Yes  Insight and Judgment: No  Insight and Judgment: Poor insight, Poor judgment      Metabolic Screening:    Lab Results   Component Value Date    LABA1C 5.4

## 2024-01-05 NOTE — CARE COORDINATION
Name: Yani Nowak    : 1980    Discharge Date: 1/3/2024    Primary Auth/Cert #: [id number]     Destination: home    *If you have any specific discharge questions, please contact the assigned /discharge planner: Mimi ([phone]) or Gilda ([phone])      Discharge Medications:      Medication List        START taking these medications      hydrOXYzine HCl 50 MG tablet  Commonly known as: ATARAX  Take 1 tablet by mouth 3 times daily as needed for Anxiety  Notes to patient: For anxiety      nicotine 14 MG/24HR  Commonly known as: NICODERM CQ  Place 1 patch onto the skin daily  Notes to patient: For smoking cessation             CHANGE how you take these medications      lithium 300 MG capsule  Take 1 capsule by mouth 2 times daily  What changed:   medication strength  how much to take  when to take this  Notes to patient: To stabilize mood             STOP taking these medications      cloNIDine 0.1 MG tablet  Commonly known as: CATAPRES     paliperidone palmitate  MG/1.5ML Estefany IM injection  Commonly known as: INVEGA SUSTENNA               Where to Get Your Medications        These medications were sent to [id number] - [address] -  [phone] - F [phone]  [address] [place] [address]      Phone: [phone]   hydrOXYzine HCl 50 MG tablet  lithium 300 MG capsule  nicotine 14 MG/24HR         Follow Up Appointment: Group, [facility] [address]  [facility] [id number] [phone]    Follow up on 2024  appointment at 2:30 pm    Group, [facility] [address]  [facility] [id number] [phone]    Follow up on 2024  Appointment with Doctor at 3:15 pm

## 2024-03-30 ENCOUNTER — HOSPITAL ENCOUNTER (EMERGENCY)
Age: 44
Discharge: LEFT BEFORE DISCHARGE | End: 2024-03-30
Attending: EMERGENCY MEDICINE
Payer: COMMERCIAL

## 2024-03-30 ENCOUNTER — APPOINTMENT (OUTPATIENT)
Dept: GENERAL RADIOLOGY | Age: 44
End: 2024-03-30
Payer: COMMERCIAL

## 2024-03-30 VITALS
OXYGEN SATURATION: 97 % | WEIGHT: 160 LBS | HEART RATE: 93 BPM | TEMPERATURE: 97.6 F | HEIGHT: 68 IN | RESPIRATION RATE: 18 BRPM | BODY MASS INDEX: 24.25 KG/M2

## 2024-03-30 DIAGNOSIS — M25.571 ACUTE RIGHT ANKLE PAIN: Primary | ICD-10-CM

## 2024-03-30 PROCEDURE — 73610 X-RAY EXAM OF ANKLE: CPT

## 2024-03-30 PROCEDURE — 99283 EMERGENCY DEPT VISIT LOW MDM: CPT

## 2024-03-30 ASSESSMENT — ENCOUNTER SYMPTOMS
BACK PAIN: 0
FACIAL SWELLING: 0
CHEST TIGHTNESS: 0
SHORTNESS OF BREATH: 0
ABDOMINAL DISTENTION: 0
EYE PAIN: 0
EYE DISCHARGE: 0
ABDOMINAL PAIN: 0

## 2024-03-30 ASSESSMENT — PAIN - FUNCTIONAL ASSESSMENT: PAIN_FUNCTIONAL_ASSESSMENT: 0-10

## 2024-03-30 ASSESSMENT — PAIN SCALES - GENERAL: PAINLEVEL_OUTOF10: 8
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
## 2024-07-17 ENCOUNTER — APPOINTMENT (OUTPATIENT)
Dept: CT IMAGING | Age: 44
End: 2024-07-17

## 2024-07-17 ENCOUNTER — HOSPITAL ENCOUNTER (EMERGENCY)
Age: 44
Discharge: HOME | End: 2024-07-17
Attending: EMERGENCY MEDICINE

## 2024-07-17 VITALS
HEART RATE: 90 BPM | WEIGHT: 200 LBS | TEMPERATURE: 98.5 F | HEIGHT: 68 IN | BODY MASS INDEX: 30.31 KG/M2 | RESPIRATION RATE: 19 BRPM | OXYGEN SATURATION: 98 % | DIASTOLIC BLOOD PRESSURE: 86 MMHG | SYSTOLIC BLOOD PRESSURE: 138 MMHG
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
## 2024-07-17 LAB
ANION GAP SERPL CALCULATED.3IONS-SCNC: 14 MMOL/L (ref 9–16)
BODY TEMPERATURE: 37
BUN BLD-MCNC: 7 MG/DL (ref 8–26)
BUN SERPL-MCNC: 8 MG/DL (ref 6–20)
CA-I BLD-SCNC: 1.24 MMOL/L (ref 1.15–1.33)
CHLORIDE BLD-SCNC: 108 MMOL/L (ref 98–107)
CHLORIDE SERPL-SCNC: 106 MMOL/L (ref 98–107)
CO2 BLD CALC-SCNC: 23 MMOL/L (ref 22–30)
CO2 SERPL-SCNC: 21 MMOL/L (ref 20–31)
COHGB MFR BLD: 7.1 % (ref 0–5)
CREAT SERPL-MCNC: 0.7 MG/DL (ref 0.5–0.9)
EGFR, POC: >90 ML/MIN/1.73M2
ERYTHROCYTE [DISTWIDTH] IN BLOOD BY AUTOMATED COUNT: 14.7 % (ref 11.8–14.4)
ETHANOL PERCENT: <0.01 %
ETHANOLAMINE SERPL-MCNC: <10 MG/DL (ref 0–0.08)
FIO2 ON VENT: ABNORMAL %
GFR, ESTIMATED: >90 ML/MIN/1.73M2
GLUCOSE BLD-MCNC: 117 MG/DL (ref 74–100)
GLUCOSE SERPL-MCNC: 114 MG/DL (ref 74–99)
HCG SERPL QL: NEGATIVE
HCO3 VENOUS: 21.5 MMOL/L (ref 24–30)
HCO3 VENOUS: 23.6 MMOL/L (ref 22–29)
HCT VFR BLD AUTO: 41.7 % (ref 36.3–47.1)
HCT VFR BLD AUTO: 44 % (ref 36–46)
HGB BLD-MCNC: 13.4 G/DL (ref 11.9–15.1)
INR PPP: 1.1
MCH RBC QN AUTO: 26.9 PG (ref 25.2–33.5)
MCHC RBC AUTO-ENTMCNC: 32.1 G/DL (ref 28.4–34.8)
MCV RBC AUTO: 83.6 FL (ref 82.6–102.9)
NEGATIVE BASE EXCESS, VEN: 1.3 MMOL/L (ref 0–2)
NRBC BLD-RTO: 0 PER 100 WBC
O2 SAT, VEN: 94.1 % (ref 60–85)
O2 SAT, VEN: 99.4 % (ref 60–85)
PARTIAL THROMBOPLASTIN TIME: 27.6 SEC (ref 23–36.5)
PCO2 VENOUS: 31.9 MM HG (ref 39–55)
PCO2 VENOUS: 34.2 MM HG (ref 41–51)
PH VENOUS: 7.44 (ref 7.32–7.42)
PH VENOUS: 7.45 (ref 7.32–7.43)
PLATELET # BLD AUTO: 294 K/UL (ref 138–453)
PMV BLD AUTO: 10.9 FL (ref 8.1–13.5)
PO2 VENOUS: 139 MM HG (ref 30–50)
PO2 VENOUS: 67.2 MM HG (ref 30–50)
POC ANION GAP: 15 MMOL/L (ref 7–16)
POC CREATININE: 0.7 MG/DL (ref 0.51–1.19)
POC HEMOGLOBIN (CALC): 14.9 G/DL (ref 12–16)
POC LACTIC ACID: 2.1 MMOL/L (ref 0.56–1.39)
POSITIVE BASE EXCESS, VEN: 0.1 MMOL/L (ref 0–3)
POTASSIUM BLD-SCNC: 3.4 MMOL/L (ref 3.5–4.5)
POTASSIUM SERPL-SCNC: 3.5 MMOL/L (ref 3.7–5.3)
PROTHROMBIN TIME: 13.7 SEC (ref 11.7–14.9)
RBC # BLD AUTO: 4.99 M/UL (ref 3.95–5.11)
SODIUM BLD-SCNC: 145 MMOL/L (ref 138–146)
SODIUM SERPL-SCNC: 141 MMOL/L (ref 136–145)
WBC OTHER # BLD: 8.7 K/UL (ref 3.5–11.3)

## 2024-07-17 PROCEDURE — 93005 ELECTROCARDIOGRAM TRACING: CPT | Performed by: EMERGENCY MEDICINE

## 2024-07-17 PROCEDURE — 70486 CT MAXILLOFACIAL W/O DYE: CPT

## 2024-07-17 PROCEDURE — 85610 PROTHROMBIN TIME: CPT

## 2024-07-17 PROCEDURE — 82947 ASSAY GLUCOSE BLOOD QUANT: CPT

## 2024-07-17 PROCEDURE — 82565 ASSAY OF CREATININE: CPT

## 2024-07-17 PROCEDURE — 82805 BLOOD GASES W/O2 SATURATION: CPT

## 2024-07-17 PROCEDURE — 72125 CT NECK SPINE W/O DYE: CPT

## 2024-07-17 PROCEDURE — 85014 HEMATOCRIT: CPT

## 2024-07-17 PROCEDURE — 84703 CHORIONIC GONADOTROPIN ASSAY: CPT

## 2024-07-17 PROCEDURE — 83605 ASSAY OF LACTIC ACID: CPT

## 2024-07-17 PROCEDURE — 6360000002 HC RX W HCPCS: Performed by: STUDENT IN AN ORGANIZED HEALTH CARE EDUCATION/TRAINING PROGRAM

## 2024-07-17 PROCEDURE — 85730 THROMBOPLASTIN TIME PARTIAL: CPT

## 2024-07-17 PROCEDURE — 70450 CT HEAD/BRAIN W/O DYE: CPT

## 2024-07-17 PROCEDURE — 82803 BLOOD GASES ANY COMBINATION: CPT

## 2024-07-17 PROCEDURE — 6360000004 HC RX CONTRAST MEDICATION: Performed by: STUDENT IN AN ORGANIZED HEALTH CARE EDUCATION/TRAINING PROGRAM

## 2024-07-17 PROCEDURE — 84520 ASSAY OF UREA NITROGEN: CPT

## 2024-07-17 PROCEDURE — 85027 COMPLETE CBC AUTOMATED: CPT

## 2024-07-17 PROCEDURE — 82330 ASSAY OF CALCIUM: CPT

## 2024-07-17 PROCEDURE — 74177 CT ABD & PELVIS W/CONTRAST: CPT

## 2024-07-17 PROCEDURE — G0480 DRUG TEST DEF 1-7 CLASSES: HCPCS

## 2024-07-17 PROCEDURE — 80051 ELECTROLYTE PANEL: CPT

## 2024-07-17 PROCEDURE — 71260 CT THORAX DX C+: CPT

## 2024-07-17 PROCEDURE — 99285 EMERGENCY DEPT VISIT HI MDM: CPT | Performed by: EMERGENCY MEDICINE

## 2024-07-17 PROCEDURE — 96374 THER/PROPH/DIAG INJ IV PUSH: CPT | Performed by: EMERGENCY MEDICINE

## 2024-07-17 RX ORDER — LORAZEPAM 2 MG/ML
1 INJECTION INTRAMUSCULAR
Status: DISCONTINUED | OUTPATIENT
Start: 2024-07-17 | End: 2024-07-18 | Stop reason: HOSPADM

## 2024-07-17 RX ORDER — DIPHENHYDRAMINE HYDROCHLORIDE 50 MG/ML
25 INJECTION INTRAMUSCULAR; INTRAVENOUS ONCE
Status: COMPLETED | OUTPATIENT
Start: 2024-07-17 | End: 2024-07-17

## 2024-07-17 RX ORDER — LORAZEPAM 2 MG/ML
1 INJECTION INTRAMUSCULAR ONCE
Status: COMPLETED | OUTPATIENT
Start: 2024-07-17 | End: 2024-07-17

## 2024-07-17 RX ADMIN — LORAZEPAM 1 MG: 2 INJECTION INTRAMUSCULAR; INTRAVENOUS at 14:29

## 2024-07-17 RX ADMIN — IOPAMIDOL 75 ML: 755 INJECTION, SOLUTION INTRAVENOUS at 15:18

## 2024-07-17 RX ADMIN — DIPHENHYDRAMINE HYDROCHLORIDE 25 MG: 50 INJECTION INTRAMUSCULAR; INTRAVENOUS at 14:30

## 2024-07-17 ASSESSMENT — ENCOUNTER SYMPTOMS
CHEST TIGHTNESS: 0
ABDOMINAL PAIN: 0
BACK PAIN: 0
COUGH: 0
DIARRHEA: 0
VOMITING: 0
WHEEZING: 0
COLOR CHANGE: 1
SHORTNESS OF BREATH: 0
NAUSEA: 0

## 2024-07-17 ASSESSMENT — PAIN - FUNCTIONAL ASSESSMENT: PAIN_FUNCTIONAL_ASSESSMENT: NONE - DENIES PAIN

## 2024-07-17 NOTE — ED PROVIDER NOTES
[facility]  FACULTY HANDOFF       Handoff taken on the following patient from prior Attending Physician:  Pt Name: Yani Nowak  PCP:  [place] APRN - POOJA    Attestation  I was available and discussed any additional care issues that arose and coordinated the management plans with the resident(s) caring for the patient during my duty period. Any areas of disagreement with resident's documentation of care or procedures are noted on the chart. I was personally present for the key portions of any/all procedures during my duty period. I have documented in the chart those procedures where I was not present during the key portions.          King Berrios MD  07/17/24 [id number]
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
## 2024-07-18 NOTE — ED NOTES
SW met with patient to discuss discharge plans. Patient requested to go to friend's apartment at [facility]. [employer] will scheduled at discharge. Awaiting discharge orders.
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
## 2024-07-19 LAB
EKG ATRIAL RATE: 97 BPM
EKG P AXIS: 74 DEGREES
EKG P-R INTERVAL: 154 MS
EKG Q-T INTERVAL: 354 MS
EKG QRS DURATION: 96 MS
EKG QTC CALCULATION (BAZETT): 442 MS
EKG R AXIS: 58 DEGREES
EKG T AXIS: 71 DEGREES
EKG VENTRICULAR RATE: 94 BPM

## 2024-07-22 ENCOUNTER — HOSPITAL ENCOUNTER (EMERGENCY)
Age: 44
Discharge: LEFT BEFORE DISCHARGE | End: 2024-07-22
Attending: EMERGENCY MEDICINE

## 2024-07-22 VITALS
OXYGEN SATURATION: 98 % | DIASTOLIC BLOOD PRESSURE: 96 MMHG | RESPIRATION RATE: 16 BRPM | HEART RATE: 105 BPM | SYSTOLIC BLOOD PRESSURE: 131 MMHG
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
## 2024-07-22 NOTE — ED PROVIDER NOTES
Patient eloped from the emergency department prior to physician evaluation.  Did not provide care to this the patient     Magy Maxwell MD  07/22/24 1600

## 2024-07-28 ENCOUNTER — HOSPITAL ENCOUNTER (EMERGENCY)
Age: 44
Discharge: LEFT BEFORE DISCHARGE | End: 2024-07-28
Attending: EMERGENCY MEDICINE
Payer: MEDICARE

## 2024-07-28 VITALS
BODY MASS INDEX: 25.92 KG/M2 | HEIGHT: 69 IN | HEART RATE: 79 BPM | RESPIRATION RATE: 16 BRPM | WEIGHT: 175 LBS | TEMPERATURE: 97 F | DIASTOLIC BLOOD PRESSURE: 92 MMHG | SYSTOLIC BLOOD PRESSURE: 140 MMHG | OXYGEN SATURATION: 98 %

## 2024-07-28 DIAGNOSIS — S05.02XA ABRASION OF LEFT CORNEA, INITIAL ENCOUNTER: Primary | ICD-10-CM

## 2024-07-28 LAB
CANDIDA SPECIES: NEGATIVE
GARDNERELLA VAGINALIS: POSITIVE
SOURCE: ABNORMAL
TRICHOMONAS: NEGATIVE

## 2024-07-28 PROCEDURE — 87591 N.GONORRHOEAE DNA AMP PROB: CPT

## 2024-07-28 PROCEDURE — 99283 EMERGENCY DEPT VISIT LOW MDM: CPT

## 2024-07-28 PROCEDURE — 87491 CHLMYD TRACH DNA AMP PROBE: CPT

## 2024-07-28 PROCEDURE — 87510 GARDNER VAG DNA DIR PROBE: CPT

## 2024-07-28 PROCEDURE — 87480 CANDIDA DNA DIR PROBE: CPT

## 2024-07-28 PROCEDURE — 6370000000 HC RX 637 (ALT 250 FOR IP)

## 2024-07-28 PROCEDURE — 87660 TRICHOMONAS VAGIN DIR PROBE: CPT

## 2024-07-28 RX ORDER — IBUPROFEN 800 MG/1
800 TABLET ORAL ONCE
Status: COMPLETED | OUTPATIENT
Start: 2024-07-28 | End: 2024-07-28

## 2024-07-28 RX ORDER — TETRACAINE HYDROCHLORIDE 5 MG/ML
1 SOLUTION OPHTHALMIC ONCE
Status: COMPLETED | OUTPATIENT
Start: 2024-07-28 | End: 2024-07-28

## 2024-07-28 RX ORDER — GENTAMICIN SULFATE 3 MG/ML
1 SOLUTION/ DROPS OPHTHALMIC EVERY 4 HOURS
Qty: 5 ML | Refills: 0 | Status: SHIPPED | OUTPATIENT
Start: 2024-07-28 | End: 2024-07-28

## 2024-07-28 RX ORDER — ACETAMINOPHEN 325 MG/1
650 TABLET ORAL ONCE
Status: COMPLETED | OUTPATIENT
Start: 2024-07-28 | End: 2024-07-28

## 2024-07-28 RX ORDER — GENTAMICIN SULFATE 3 MG/ML
1 SOLUTION/ DROPS OPHTHALMIC ONCE
Status: DISCONTINUED | OUTPATIENT
Start: 2024-07-28 | End: 2024-07-28 | Stop reason: HOSPADM

## 2024-07-28 RX ORDER — GENTAMICIN SULFATE 3 MG/ML
1 SOLUTION/ DROPS OPHTHALMIC EVERY 4 HOURS
Qty: 5 ML | Refills: 0 | Status: SHIPPED | OUTPATIENT
Start: 2024-07-28 | End: 2024-07-29

## 2024-07-28 RX ADMIN — IBUPROFEN 800 MG: 800 TABLET, FILM COATED ORAL at 10:10

## 2024-07-28 RX ADMIN — TETRACAINE HYDROCHLORIDE 1 DROP: 5 SOLUTION OPHTHALMIC at 10:11

## 2024-07-28 RX ADMIN — ACETAMINOPHEN 650 MG: 325 TABLET ORAL at 10:08

## 2024-07-28 RX ADMIN — FLUORESCEIN SODIUM 1 MG: 1 STRIP OPHTHALMIC at 10:10

## 2024-07-28 ASSESSMENT — PAIN - FUNCTIONAL ASSESSMENT: PAIN_FUNCTIONAL_ASSESSMENT: 0-10

## 2024-07-28 ASSESSMENT — PAIN SCALES - GENERAL: PAINLEVEL_OUTOF10: 10

## 2024-07-28 ASSESSMENT — PAIN DESCRIPTION - LOCATION: LOCATION: HEAD

## 2024-07-28 ASSESSMENT — LIFESTYLE VARIABLES: HOW OFTEN DO YOU HAVE A DRINK CONTAINING ALCOHOL: NEVER

## 2024-07-28 ASSESSMENT — PAIN DESCRIPTION - PAIN TYPE: TYPE: ACUTE PAIN
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
## 2024-07-28 NOTE — ED NOTES
The following labs were labeled with appropriate pt sticker and tubed to lab:     [] Blue     [] Lavender   [] on ice  [] Green/yellow  [] Green/black [] on ice  [] Grey  [] on ice  [] Yellow  [] Red  [] Pink  [] Type/ Screen  [] ABG  [] VBG    [] COVID-19 swab    [] Rapid  [] PCR  [] Flu swab  [] Peds Viral Panel     [] Urine Sample  [] Fecal Sample  [x] Pelvic Cultures  [] Blood Cultures  [] X 2  [] STREP Cultures  [] Wound Cultures
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
## 2024-07-29 ENCOUNTER — HOSPITAL ENCOUNTER (EMERGENCY)
Age: 44
Discharge: HOME | End: 2024-07-30
Attending: EMERGENCY MEDICINE
Payer: MEDICARE

## 2024-07-29 VITALS
TEMPERATURE: 97.7 F | RESPIRATION RATE: 19 BRPM | BODY MASS INDEX: 25.92 KG/M2 | WEIGHT: 175 LBS | OXYGEN SATURATION: 97 % | SYSTOLIC BLOOD PRESSURE: 152 MMHG | DIASTOLIC BLOOD PRESSURE: 99 MMHG | HEIGHT: 69 IN | HEART RATE: 92 BPM

## 2024-07-29 DIAGNOSIS — T74.21XA REPORTED SEXUAL ASSAULT OF ADULT: Primary | ICD-10-CM

## 2024-07-29 LAB
C TRACH DNA SPEC QL PROBE+SIG AMP: NEGATIVE
GLUCOSE BLD-MCNC: 127 MG/DL (ref 65–105)
N GONORRHOEA DNA SPEC QL PROBE+SIG AMP: NEGATIVE
SPECIMEN DESCRIPTION: NORMAL

## 2024-07-29 PROCEDURE — 6360000002 HC RX W HCPCS

## 2024-07-29 PROCEDURE — 82947 ASSAY GLUCOSE BLOOD QUANT: CPT

## 2024-07-29 PROCEDURE — 96372 THER/PROPH/DIAG INJ SC/IM: CPT

## 2024-07-29 PROCEDURE — 6370000000 HC RX 637 (ALT 250 FOR IP)

## 2024-07-29 PROCEDURE — 99284 EMERGENCY DEPT VISIT MOD MDM: CPT

## 2024-07-29 RX ORDER — AZITHROMYCIN 250 MG/1
1000 TABLET, FILM COATED ORAL ONCE
Status: COMPLETED | OUTPATIENT
Start: 2024-07-29 | End: 2024-07-29

## 2024-07-29 RX ORDER — CEFTRIAXONE 1 G/1
1000 INJECTION, POWDER, FOR SOLUTION INTRAMUSCULAR; INTRAVENOUS ONCE
Status: COMPLETED | OUTPATIENT
Start: 2024-07-29 | End: 2024-07-29

## 2024-07-29 RX ORDER — GENTAMICIN SULFATE 3 MG/ML
1 SOLUTION/ DROPS OPHTHALMIC EVERY 4 HOURS
Qty: 5 ML | Refills: 0 | Status: SHIPPED | OUTPATIENT
Start: 2024-07-29 | End: 2024-08-08

## 2024-07-29 RX ORDER — METRONIDAZOLE 500 MG/1
2000 TABLET ORAL ONCE
Status: COMPLETED | OUTPATIENT
Start: 2024-07-29 | End: 2024-07-29

## 2024-07-29 RX ADMIN — METRONIDAZOLE 2000 MG: 500 TABLET ORAL at 22:32

## 2024-07-29 RX ADMIN — CEFTRIAXONE SODIUM 1000 MG: 1 INJECTION, POWDER, FOR SOLUTION INTRAMUSCULAR; INTRAVENOUS at 22:36

## 2024-07-29 RX ADMIN — AZITHROMYCIN 1000 MG: 250 TABLET, FILM COATED ORAL at 22:35

## 2024-07-29 ASSESSMENT — PAIN - FUNCTIONAL ASSESSMENT: PAIN_FUNCTIONAL_ASSESSMENT: 0-10

## 2024-07-29 ASSESSMENT — PAIN SCALES - GENERAL: PAINLEVEL_OUTOF10: 10

## 2024-07-29 NOTE — ED PROVIDER NOTES
[facility] ED     Emergency Department     Faculty Attestation    I performed a history and physical examination of the patient and discussed management with the resident. I reviewed the resident’s note and agree with the documented findings and plan of care. Any areas of disagreement are noted on the chart. I was personally present for the key portions of any procedures. I have documented in the chart those procedures where I was not present during the key portions. I have reviewed the emergency nurses triage note. I agree with the chief complaint, past medical history, past surgical history, allergies, medications, social and family history as documented unless otherwise noted below. For Physician Assistant/ Nurse Practitioner cases/documentation I have personally evaluated this patient and have completed at least one if not all key elements of the E/M (history, physical exam, and MDM). Additional findings are as noted.    Note Started: 7:56 PM EDT    Patient here for same complaint, no other complaints voiced to me.  For all other details see forensic nurse notes.      Critical Care     none    Jermaine Lopez MD, FACEP, FAAEM  Attending Emergency  Physician           Jermaine Lopez MD  07/29/24 4115

## 2024-07-30 ENCOUNTER — HOSPITAL ENCOUNTER (EMERGENCY)
Age: 44
Discharge: HOME | End: 2024-07-30

## 2024-07-30 VITALS
OXYGEN SATURATION: 98 % | RESPIRATION RATE: 18 BRPM | SYSTOLIC BLOOD PRESSURE: 139 MMHG | HEART RATE: 92 BPM | BODY MASS INDEX: 25.77 KG/M2 | TEMPERATURE: 98.5 F | HEIGHT: 69 IN | WEIGHT: 174 LBS | DIASTOLIC BLOOD PRESSURE: 65 MMHG
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
## 2024-07-30 NOTE — DISCHARGE INSTRUCTIONS
Call today or tomorrow for a follow up with [facility] in 1-[facility] days.    Return to the Emergency Department for worsening of symptoms, pain with urination, any vaginal discharge, any other care or concern.
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
## 2024-08-27 ENCOUNTER — HOSPITAL ENCOUNTER (EMERGENCY)
Age: 44
Discharge: HOME | End: 2024-08-27
Attending: EMERGENCY MEDICINE
Payer: MEDICARE

## 2024-08-27 VITALS
OXYGEN SATURATION: 97 % | DIASTOLIC BLOOD PRESSURE: 106 MMHG | HEART RATE: 86 BPM | BODY MASS INDEX: 25.7 KG/M2 | SYSTOLIC BLOOD PRESSURE: 150 MMHG | HEIGHT: 69 IN | TEMPERATURE: 97.7 F | RESPIRATION RATE: 20 BRPM

## 2024-08-27 DIAGNOSIS — F48.9 MENTAL HEALTH PROBLEM: Primary | ICD-10-CM

## 2024-08-27 PROCEDURE — 99283 EMERGENCY DEPT VISIT LOW MDM: CPT

## 2024-08-27 NOTE — ED PROVIDER NOTES
EMERGENCY DEPARTMENT ENCOUNTER    Pt Name: Yani Nowak  MRN: [id number]  Birthdate 1980  Date of evaluation: 24  CHIEF COMPLAINT       Chief Complaint   Patient presents with    Altered Mental Status     HISTORY OF PRESENT ILLNESS   HPI  44-year-old female with a history of schizoaffective disorder was brought to the ED by EMS after being found asleep outside.  Patient was found sleeping outside of an apartment building, apparently she used to live there, residents were concerned and called EMS.  When EMS woke the patient up they found that her speech was rapid so they brought her to the ED for evaluation.  Here in the ER patient has no acute complaints, she states that she was homeless and often sleeps wherever she can.  She denies SI/HI/AVH, denies injury or trauma, denies drug or alcohol use.    REVIEW OF SYSTEMS     Review of Systems   All other systems reviewed and are negative.    PASTMEDICAL HISTORY     Past Medical History:   Diagnosis Date    Anxiety     Bipolar 1 disorder (HCC)     Depression     Gestational diabetes 2016    OCD (obsessive compulsive disorder)     PTSD (post-traumatic stress disorder)     Rapid rate of speech     Schizoaffective disorder (HCC)      SURGICAL HISTORY       Past Surgical History:   Procedure Laterality Date    ABDOMEN SURGERY       SECTION      LAPAROSCOPY       CURRENT MEDICATIONS       Discharge Medication List as of 2024  8:25 AM        CONTINUE these medications which have NOT CHANGED    Details   lithium 300 MG capsule Take 1 capsule by mouth 2 times daily, Disp-60 capsule, R-0Normal      nicotine (NICODERM CQ) 14 MG/24HR Place 1 patch onto the skin daily, Disp-30 patch, R-0Normal           ALLERGIES     is allergic to lamictal [lamotrigine].  FAMILY HISTORY     She indicated that her mother is alive. She indicated that her father is . She indicated that the status of her sister is unknown.     SOCIAL HISTORY

## 2024-10-03 ENCOUNTER — HOSPITAL ENCOUNTER (EMERGENCY)
Age: 44
Discharge: LEFT BEFORE DISCHARGE | End: 2024-10-03
Attending: EMERGENCY MEDICINE
Payer: MEDICARE

## 2024-10-03 ENCOUNTER — APPOINTMENT (OUTPATIENT)
Dept: GENERAL RADIOLOGY | Age: 44
End: 2024-10-03
Payer: MEDICARE

## 2024-10-03 VITALS
TEMPERATURE: 99.1 F | RESPIRATION RATE: 25 BRPM | DIASTOLIC BLOOD PRESSURE: 106 MMHG | OXYGEN SATURATION: 95 % | HEART RATE: 110 BPM | SYSTOLIC BLOOD PRESSURE: 147 MMHG

## 2024-10-03 DIAGNOSIS — R00.0 TACHYCARDIA: Primary | ICD-10-CM

## 2024-10-03 LAB
ANION GAP SERPL CALCULATED.3IONS-SCNC: 13 MMOL/L (ref 9–16)
BASOPHILS # BLD: 0.05 K/UL (ref 0–0.2)
BASOPHILS NFR BLD: 1 % (ref 0–2)
BUN SERPL-MCNC: 13 MG/DL (ref 6–20)
CALCIUM SERPL-MCNC: 9.2 MG/DL (ref 8.6–10.4)
CHLORIDE SERPL-SCNC: 108 MMOL/L (ref 98–107)
CO2 SERPL-SCNC: 21 MMOL/L (ref 20–31)
CREAT SERPL-MCNC: 0.7 MG/DL (ref 0.5–0.9)
EOSINOPHIL # BLD: 0.22 K/UL (ref 0–0.44)
EOSINOPHILS RELATIVE PERCENT: 3 % (ref 1–4)
ERYTHROCYTE [DISTWIDTH] IN BLOOD BY AUTOMATED COUNT: 13.9 % (ref 11.8–14.4)
GFR, ESTIMATED: >90 ML/MIN/1.73M2
GLUCOSE SERPL-MCNC: 120 MG/DL (ref 74–99)
HCG SERPL QL: NEGATIVE
HCT VFR BLD AUTO: 41.1 % (ref 36.3–47.1)
HGB BLD-MCNC: 13.2 G/DL (ref 11.9–15.1)
IMM GRANULOCYTES # BLD AUTO: 0.03 K/UL (ref 0–0.3)
IMM GRANULOCYTES NFR BLD: 0 %
LYMPHOCYTES NFR BLD: 3.02 K/UL (ref 1.1–3.7)
LYMPHOCYTES RELATIVE PERCENT: 38 % (ref 24–43)
MCH RBC QN AUTO: 27.3 PG (ref 25.2–33.5)
MCHC RBC AUTO-ENTMCNC: 32.1 G/DL (ref 28.4–34.8)
MCV RBC AUTO: 84.9 FL (ref 82.6–102.9)
MONOCYTES NFR BLD: 0.47 K/UL (ref 0.1–1.2)
MONOCYTES NFR BLD: 6 % (ref 3–12)
NEUTROPHILS NFR BLD: 52 % (ref 36–65)
NEUTS SEG NFR BLD: 4.16 K/UL (ref 1.5–8.1)
NRBC BLD-RTO: 0 PER 100 WBC
PLATELET # BLD AUTO: 265 K/UL (ref 138–453)
PMV BLD AUTO: 10.3 FL (ref 8.1–13.5)
POTASSIUM SERPL-SCNC: 3.7 MMOL/L (ref 3.7–5.3)
RBC # BLD AUTO: 4.84 M/UL (ref 3.95–5.11)
SODIUM SERPL-SCNC: 142 MMOL/L (ref 136–145)
TROPONIN I SERPL HS-MCNC: <6 NG/L (ref 0–14)
TSH SERPL DL<=0.05 MIU/L-ACNC: 0.79 UIU/ML (ref 0.27–4.2)
WBC OTHER # BLD: 8 K/UL (ref 3.5–11.3)

## 2024-10-03 PROCEDURE — 71045 X-RAY EXAM CHEST 1 VIEW: CPT

## 2024-10-03 PROCEDURE — 80048 BASIC METABOLIC PNL TOTAL CA: CPT

## 2024-10-03 PROCEDURE — 93005 ELECTROCARDIOGRAM TRACING: CPT | Performed by: EMERGENCY MEDICINE

## 2024-10-03 PROCEDURE — 2580000003 HC RX 258

## 2024-10-03 PROCEDURE — 84443 ASSAY THYROID STIM HORMONE: CPT

## 2024-10-03 PROCEDURE — 6360000002 HC RX W HCPCS

## 2024-10-03 PROCEDURE — 84703 CHORIONIC GONADOTROPIN ASSAY: CPT

## 2024-10-03 PROCEDURE — 84484 ASSAY OF TROPONIN QUANT: CPT

## 2024-10-03 PROCEDURE — 85025 COMPLETE CBC W/AUTO DIFF WBC: CPT

## 2024-10-03 PROCEDURE — 96374 THER/PROPH/DIAG INJ IV PUSH: CPT

## 2024-10-03 PROCEDURE — 99284 EMERGENCY DEPT VISIT MOD MDM: CPT

## 2024-10-03 RX ORDER — 0.9 % SODIUM CHLORIDE 0.9 %
1000 INTRAVENOUS SOLUTION INTRAVENOUS ONCE
Status: COMPLETED | OUTPATIENT
Start: 2024-10-03 | End: 2024-10-03

## 2024-10-03 RX ORDER — LORAZEPAM 2 MG/ML
1 INJECTION INTRAMUSCULAR ONCE
Status: COMPLETED | OUTPATIENT
Start: 2024-10-03 | End: 2024-10-03

## 2024-10-03 RX ADMIN — SODIUM CHLORIDE 1000 ML: 9 INJECTION, SOLUTION INTRAVENOUS at 15:28

## 2024-10-03 RX ADMIN — LORAZEPAM 1 MG: 2 INJECTION INTRAMUSCULAR; INTRAVENOUS at 15:28

## 2024-10-03 ASSESSMENT — ENCOUNTER SYMPTOMS
ABDOMINAL PAIN: 0
CHEST TIGHTNESS: 0
VOMITING: 0
DIARRHEA: 0
NAUSEA: 0
CONSTIPATION: 0
COUGH: 0
SHORTNESS OF BREATH: 0

## 2024-10-03 NOTE — ED NOTES
Patient tells ANDREA that she recently moved into a [facility] group home and doesn't love it but is agreeable that she needs to give it more time as her other option is homelessness.  Patient states she smokes crack cocaine regularly, but not daily.  She states she had not used for 30 days and relapsed today.  [facility] and was told they have dual treatment programs.  Patient has an appointment on Monday 10/7/2024 and will talk to her CM/CNP about treatment support and options.  SW provided patient with a walk-in list for treatment as well.  RHONDA Mcmillan

## 2024-10-03 NOTE — ED PROVIDER NOTES
[facility] ED  Emergency Department Encounter  Emergency Medicine Resident     Pt Name:Yani Nowak  MRN: [id number]  Birthdate 1980  Date of evaluation: 10/3/24  PCP:  New Salas APRN - NP  Note Started: 3:14 PM EDT      CHIEF COMPLAINT       Chief Complaint   Patient presents with    Tachycardia    Mental Health Problem       HISTORY OF PRESENT ILLNESS  (Location/Symptom, Timing/Onset, Context/Setting, Quality, Duration, Modifying Factors, Severity.)      Yani Nowak is a 44 y.o. female who presents with EMS due to feeling like her heart racing.  Currently denies palpitations and feeling like her heart was racing.  States she was in a park earlier and she smoked \" have a fingernails\" worth of crack cocaine.  She started feeling like her heart was racing and having palpitations, she then called EMS.  States she only smokes cocaine 3-4 times per month.  She is currently in a group home, wanted to also come to the emergency department to be placed into rehab.  Denies chest pain, diaphoresis, shortness of breath, nausea, vomiting, abdominal pain, diarrhea, constipation, lightheadedness.       PAST MEDICAL / SURGICAL / SOCIAL / FAMILY HISTORY      has a past medical history of Anxiety, Bipolar 1 disorder (HCC), Depression, Gestational diabetes, OCD (obsessive compulsive disorder), PTSD (post-traumatic stress disorder), Rapid rate of speech, and Schizoaffective disorder (HCC).       has a past surgical history that includes Abdomen surgery;  section (); and laparoscopy.      Social History     Socioeconomic History    Marital status: Legally      Spouse name: Not on file    Number of children: Not on file    Years of education: Not on file    Highest education level: Not on file   Occupational History    Not on file   Tobacco Use    Smoking status: Every Day     Current packs/day: 1.00     Types: Cigarettes    Smokeless tobacco: Never   Vaping Use

## 2024-10-03 NOTE — ED NOTES
Pt arrives via Ems, pt c/o of tachycardia.   Pt states she is staying in a group home, relapsed today on crack. Pt states she called EMS to bring her to a hospital so she can be placed somewhere other than a group home. Pt states at first she felt like her heart was racing but no longer feels like it is.   Pt states she wanted to come to hospital for mental health and medication help not for fast heart rate.   Pt is A+Ox4, pt placed on monitor, call light in reach.

## 2024-10-03 NOTE — ED PROVIDER NOTES
[facility] ED     Emergency Department     Faculty Attestation        I performed a history and physical examination of the patient and discussed management with the resident. I reviewed the resident’s note and agree with the documented findings and plan of care. Any areas of disagreement are noted on the chart. I was personally present for the key portions of any procedures. I have documented in the chart those procedures where I was not present during the key portions. I have reviewed the emergency nurses triage note. I agree with the chief complaint, past medical history, past surgical history, allergies, medications, social and family history as documented unless otherwise noted below.    For mid-level providers such as nurse practitioners as well as physicians assistants:    I have personally seen and evaluated the patient.    I find the patient's history and physical exam are consistent with NP/PA documentation.  I agree with the care provided, treatment rendered, disposition, & follow-up plan.     Additional findings are as noted.    Vital Signs: BP (!) 147/106   Pulse (!) 151   Temp 99.1 °F (37.3 °C) (Oral)   Resp 21   SpO2 93%   PCP:  New Salas APRN - NP    Pertinent Comments:     Patient presents with palpitations.  She was in the park smoking crack cocaine and called EMS.  They noticed her heart rate was elevated at 1 5160 EKG obtained here shows sinus tachycardia heart rate is variable from 1 30-1 40 awaiting labs, fluids, benzodiazepines, reassessment      Critical Care  None          Charles Null MD    Attending Emergency Medicine Physician            Felipe Null MD  10/03/24 [id number]

## 2024-10-04 LAB
EKG ATRIAL RATE: 140 BPM
EKG P AXIS: 72 DEGREES
EKG P-R INTERVAL: 146 MS
EKG Q-T INTERVAL: 296 MS
EKG QRS DURATION: 88 MS
EKG QTC CALCULATION (BAZETT): 451 MS
EKG R AXIS: 6 DEGREES
EKG T AXIS: 63 DEGREES
EKG VENTRICULAR RATE: 140 BPM

## 2024-10-07 PROCEDURE — 93010 ELECTROCARDIOGRAM REPORT: CPT | Performed by: INTERNAL MEDICINE

## 2025-03-20 ENCOUNTER — HOSPITAL ENCOUNTER (OUTPATIENT)
Age: 45
Discharge: HOME | End: 2025-03-20
Payer: MEDICARE

## 2025-03-20 LAB
HCG SERPL QL: NEGATIVE
PROLACTIN SERPL-MCNC: 99.5 NG/ML (ref 4.79–23.3)

## 2025-03-20 PROCEDURE — 36415 COLL VENOUS BLD VENIPUNCTURE: CPT

## 2025-03-20 PROCEDURE — 84146 ASSAY OF PROLACTIN: CPT

## 2025-03-20 PROCEDURE — 84703 CHORIONIC GONADOTROPIN ASSAY: CPT

## 2025-04-10 NOTE — GROUP NOTE
Group Therapy Note    Date: 8/9/2023    Group Start Time: 1100  Group End Time: 5589  Group Topic: Activity    [facility]    JUANCARLOS Yi        Group Therapy Note    Attendees: 0/7       Topic: To increase positive social interaction, to practice leisure skills, improve concentration, and communication skills      Patient did not participate in Activity Group at 11:00, despite staff encouragement   and explanation of benefits. Pt was approached x3 by RT throughout group time. Pt is in room and seclusive to self during group time. Q15 minute safety checks maintained for patient safety and will continue to encourage patient to attend unit programming.         Discipline Responsible: Psychoeducational Specialist      Signature:  Nikkie Thornton denies

## 2025-04-26 ENCOUNTER — HOSPITAL ENCOUNTER (EMERGENCY)
Age: 45
Discharge: HOME | End: 2025-04-26
Attending: EMERGENCY MEDICINE
Payer: MEDICARE

## 2025-04-26 VITALS
TEMPERATURE: 97.9 F | OXYGEN SATURATION: 99 % | HEART RATE: 100 BPM | RESPIRATION RATE: 15 BRPM | SYSTOLIC BLOOD PRESSURE: 127 MMHG | DIASTOLIC BLOOD PRESSURE: 82 MMHG

## 2025-04-26 DIAGNOSIS — N76.0 BACTERIAL VAGINITIS: ICD-10-CM

## 2025-04-26 DIAGNOSIS — B96.89 BACTERIAL VAGINITIS: ICD-10-CM

## 2025-04-26 DIAGNOSIS — A59.09 TRICHOMONAL CERVICITIS: Primary | ICD-10-CM

## 2025-04-26 LAB
BACTERIA URNS QL MICRO: NORMAL
BILIRUB UR QL STRIP: NEGATIVE
C TRACH DNA SPEC QL PROBE+SIG AMP: NORMAL
CANDIDA SPECIES: NEGATIVE
CASTS #/AREA URNS LPF: NORMAL /LPF (ref 0–8)
CLARITY UR: CLEAR
COLOR UR: YELLOW
EPI CELLS #/AREA URNS HPF: NORMAL /HPF (ref 0–5)
GARDNERELLA VAGINALIS: POSITIVE
GLUCOSE UR STRIP-MCNC: NEGATIVE MG/DL
HCG UR QL: NEGATIVE
HGB UR QL STRIP.AUTO: ABNORMAL
HIV 1+2 AB+HIV1 P24 AG SERPL QL IA: NONREACTIVE
KETONES UR STRIP-MCNC: NEGATIVE MG/DL
LEUKOCYTE ESTERASE UR QL STRIP: ABNORMAL
N GONORRHOEA DNA SPEC QL PROBE+SIG AMP: NORMAL
NITRITE UR QL STRIP: NEGATIVE
PH UR STRIP: 6.5 [PH] (ref 5–8)
PROT UR STRIP-MCNC: NEGATIVE MG/DL
RBC #/AREA URNS HPF: NORMAL /HPF (ref 0–4)
SOURCE: ABNORMAL
SP GR UR STRIP: 1.01 (ref 1–1.03)
SPECIMEN DESCRIPTION: NORMAL
T PALLIDUM AB SER QL IA: NONREACTIVE
TRICHOMONAS: POSITIVE
UROBILINOGEN UR STRIP-ACNC: NORMAL EU/DL (ref 0–1)
WBC #/AREA URNS HPF: NORMAL /HPF (ref 0–5)

## 2025-04-26 PROCEDURE — 96372 THER/PROPH/DIAG INJ SC/IM: CPT

## 2025-04-26 PROCEDURE — 99284 EMERGENCY DEPT VISIT MOD MDM: CPT

## 2025-04-26 PROCEDURE — 87086 URINE CULTURE/COLONY COUNT: CPT

## 2025-04-26 PROCEDURE — 87491 CHLMYD TRACH DNA AMP PROBE: CPT

## 2025-04-26 PROCEDURE — 87591 N.GONORRHOEAE DNA AMP PROB: CPT

## 2025-04-26 PROCEDURE — 6360000002 HC RX W HCPCS

## 2025-04-26 PROCEDURE — 86780 TREPONEMA PALLIDUM: CPT

## 2025-04-26 PROCEDURE — 87660 TRICHOMONAS VAGIN DIR PROBE: CPT

## 2025-04-26 PROCEDURE — 81025 URINE PREGNANCY TEST: CPT

## 2025-04-26 PROCEDURE — 6370000000 HC RX 637 (ALT 250 FOR IP)

## 2025-04-26 PROCEDURE — 87510 GARDNER VAG DNA DIR PROBE: CPT

## 2025-04-26 PROCEDURE — 87389 HIV-1 AG W/HIV-1&-2 AB AG IA: CPT

## 2025-04-26 PROCEDURE — 81001 URINALYSIS AUTO W/SCOPE: CPT

## 2025-04-26 PROCEDURE — 87480 CANDIDA DNA DIR PROBE: CPT

## 2025-04-26 RX ORDER — METRONIDAZOLE 500 MG/1
500 TABLET ORAL ONCE
Status: COMPLETED | OUTPATIENT
Start: 2025-04-26 | End: 2025-04-26

## 2025-04-26 RX ORDER — WATER 10 ML/10ML
INJECTION INTRAMUSCULAR; INTRAVENOUS; SUBCUTANEOUS
Status: DISCONTINUED
Start: 2025-04-26 | End: 2025-04-26 | Stop reason: HOSPADM

## 2025-04-26 RX ORDER — DOXYCYCLINE HYCLATE 100 MG
100 TABLET ORAL ONCE
Status: COMPLETED | OUTPATIENT
Start: 2025-04-26 | End: 2025-04-26

## 2025-04-26 RX ORDER — DOXYCYCLINE HYCLATE 100 MG
100 TABLET ORAL 2 TIMES DAILY
Qty: 14 TABLET | Refills: 0 | Status: SHIPPED | OUTPATIENT
Start: 2025-04-26 | End: 2025-05-03

## 2025-04-26 RX ORDER — METRONIDAZOLE 500 MG/1
500 TABLET ORAL 2 TIMES DAILY
Qty: 14 TABLET | Refills: 0 | Status: SHIPPED | OUTPATIENT
Start: 2025-04-26 | End: 2025-05-03

## 2025-04-26 RX ORDER — CEFTRIAXONE 500 MG/1
500 INJECTION, POWDER, FOR SOLUTION INTRAMUSCULAR; INTRAVENOUS ONCE
Status: COMPLETED | OUTPATIENT
Start: 2025-04-26 | End: 2025-04-26

## 2025-04-26 RX ADMIN — CEFTRIAXONE SODIUM 500 MG: 500 INJECTION, POWDER, FOR SOLUTION INTRAMUSCULAR; INTRAVENOUS at 17:14

## 2025-04-26 RX ADMIN — DOXYCYCLINE HYCLATE 100 MG: 100 TABLET, COATED ORAL at 17:15

## 2025-04-26 RX ADMIN — METRONIDAZOLE 500 MG: 500 TABLET ORAL at 17:15

## 2025-04-26 ASSESSMENT — ENCOUNTER SYMPTOMS
VOMITING: 0
DIARRHEA: 0
ABDOMINAL PAIN: 0

## 2025-04-26 NOTE — ED PROVIDER NOTES
[facility] EMERGENCY DEPARTMENT     Emergency Department     Faculty Attestation        I performed a history and physical examination of the patient and discussed management with the resident. I reviewed the resident’s note and agree with the documented findings and plan of care. Any areas of disagreement are noted on the chart. I was personally present for the key portions of any procedures. I have documented in the chart those procedures where I was not present during the key portions. I have reviewed the emergency nurses triage note. I agree with the chief complaint, past medical history, past surgical history, allergies, medications, social and family history as documented unless otherwise noted below.    For mid-level providers such as nurse practitioners as well as physicians assistants:    I have personally seen and evaluated the patient.    I find the patient's history and physical exam are consistent with NP/PA documentation.  I agree with the care provided, treatment rendered, disposition, & follow-up plan.     Additional findings are as noted.    Vital Signs: /82   Pulse 100   Temp 97.9 °F (36.6 °C) (Oral)   Resp 15   SpO2 99%   PCP:  New Salas APRN - NP    Pertinent Comments:     Patient complains of vaginal discharge and itching for the past 2 days he sexually active and does not wear protection.  She denies abdominal pain fevers chills or systemic symptoms abdomen soft and nontender.      Critical Care  None          Charles Null MD    Attending Emergency Medicine Physician            Felipe Null MD  04/26/25 [id number]

## 2025-04-26 NOTE — ED NOTES
Pt presents to ED with c/o vaginal discharge and itching that started 2 days ago.  Pt reports being sexually active and not using protection.  Pt denies dysuria and an odor when urinating.  Patient alert and oriented x4, talking in complete sentences. Respirations even and unlabored. Call light in reach, all needs met at this time.

## 2025-04-26 NOTE — DISCHARGE INSTRUCTIONS
Thank you for visiting [facility] Emergency Department.    You did test positive for Trichomonas and bacterial vaginitis. Trichomonas is a sexually transmitted infection.  Bacterial vaginitis is not a sexually transmitted infection.  Your chlamydia and gonorrhea are still pending.  You are being treated for all of these just in case.  You were given a shot of Rocephin here to treat for gonorrhea.  The chlamydia will be treated with the doxycycline pills you were sent with.  The trichomonas and bacterial vaginosis will be treated with the Flagyl medication you were sent with.    Please speak to all of your recent sexual partners and let them know that you were diagnosed with trichomonas.  We recommend that they get tested.    Your HIV test was negative.  Your syphilis test is still pending.    Return to the emergency department for any severe abdominal pain, nausea, vomiting.    You need to call New Salas APRN - NP to make an appointment as directed for follow up.    Should you have any questions regarding your care or further treatment, please call [facility] Emergency Department at [phone].

## 2025-04-26 NOTE — ED PROVIDER NOTES
[facility] EMERGENCY DEPARTMENT  Emergency Department Encounter  Emergency Medicine Resident     Pt Name:Yani Nowak  MRN: [id number]  Birthdate 1980  Date of evaluation: 25  PCP:  New Salas APRN - NP  Note Started: 3:41 PM EDT      CHIEF COMPLAINT       Chief Complaint   Patient presents with    Dysuria    Vaginal Itching       HISTORY OF PRESENT ILLNESS  (Location/Symptom, Timing/Onset, Context/Setting, Quality, Duration, Modifying Factors, Severity.)      Yani Nowak is a 44 y.o. female who presents with vaginal itching and burning that began 2 days ago.  She reports a small amount of white discharge.  She denies hematuria, dysuria, increased urinary frequency.  She denies having any flank pain.  No abdominal pain, nausea, vomiting.  She reports that she is sexually active, last was 1 week ago and she does not use condoms.  She reports that she has had a prior .  She states that she does not have menstrual cycles anymore and therefore does not think she can be pregnant.    PAST MEDICAL / SURGICAL / SOCIAL / FAMILY HISTORY      has a past medical history of Anxiety, Bipolar 1 disorder (HCC), Depression, Gestational diabetes, OCD (obsessive compulsive disorder), PTSD (post-traumatic stress disorder), Rapid rate of speech, and Schizoaffective disorder ([facility]).       has a past surgical history that includes Abdomen surgery;  section (); and laparoscopy.      Social History     Socioeconomic History    Marital status: Legally      Spouse name: Not on file    Number of children: Not on file    Years of education: Not on file    Highest education level: Not on file   Occupational History    Not on file   Tobacco Use    Smoking status: Every Day     Current packs/day: 1.00     Types: Cigarettes    Smokeless tobacco: Never   Vaping Use    Vaping status: Former    Substances: Always   Substance and Sexual Activity    Alcohol use: No     Alcohol/week:  doctor as needed.  She was told to call her sexual partners and encouraged them to get tested.  Strict return precautions were provided.  Discharged home in stable condition. [JR]      ED Course User Index  [JR] Erin Bullock MD       PROCEDURES:  None    CONSULTS:  None    CRITICAL CARE:  There was significant risk of life threatening deterioration of patient's condition requiring my direct management. Critical care time 0 minutes, excluding any documented procedures.    FINAL IMPRESSION      1. Trichomonal cervicitis    2. Bacterial vaginitis          DISPOSITION / PLAN     DISPOSITION Decision To Discharge 04/26/2025 05:03:04 PM   DISPOSITION CONDITION Stable           PATIENT REFERRED TO:  New Salas, APRN - NP  [address] [address]  [phone]    Schedule an appointment as soon as possible for a visit       [facility] Department [place] [address]          DISCHARGE MEDICATIONS:  New Prescriptions    DOXYCYCLINE HYCLATE (VIBRA-TABS) 100 MG TABLET    Take 1 tablet by mouth 2 times daily for 7 days    METRONIDAZOLE (FLAGYL) 500 MG TABLET    Take 1 tablet by mouth 2 times daily for 7 days       Erin Bullock MD  Emergency Medicine Resident    (Please note that portions of thisnote were completed with a voice recognition program.  Efforts were made to edit the dictations but occasionally words are mis-transcribed.)

## 2025-04-27 LAB
MICROORGANISM SPEC CULT: NORMAL
SPECIMEN DESCRIPTION: NORMAL

## 2025-04-28 LAB
C TRACH DNA SPEC QL PROBE+SIG AMP: ABNORMAL
N GONORRHOEA DNA SPEC QL PROBE+SIG AMP: NEGATIVE
SPECIMEN DESCRIPTION: ABNORMAL

## 2025-04-29 ENCOUNTER — TELEPHONE (OUTPATIENT)
Age: 45
End: 2025-04-29

## 2025-04-29 NOTE — TELEPHONE ENCOUNTER
CLINICAL PHARMACY NOTE:  Documentation of review for Positive STD Test    At the time of Yani Nowak's visit to [facility] Emergency Department on 4/26/25 STD testing was performed.  DNA testing was positive for Chlamydia.      Pregnancy status:  negative.     While in the ED, patient was given ceftriaxone 500 mg  IM x 1 dose, one dose of doxycycline 100mg orally and a prescription for doxycycline 100mg orally twice daily x 7 days.  Treatment appropriate, no follow up needed at this time.     Melany GARCIA. Ph., CACP, Clinical Pharmacist  Anticoagulation Services, [facility]  4/29/2025  10:42 AM          For Pharmacy Admin Tracking Only    Intervention Detail:   Total # of Interventions Recommended: 0  Total # of Interventions Accepted: 0  Time Spent (min): 5

## 2025-05-08 ENCOUNTER — HOSPITAL ENCOUNTER (EMERGENCY)
Age: 45
Discharge: LEFT BEFORE DISCHARGE | End: 2025-05-08
Attending: EMERGENCY MEDICINE
Payer: MEDICARE

## 2025-05-08 VITALS
SYSTOLIC BLOOD PRESSURE: 131 MMHG | WEIGHT: 160 LBS | BODY MASS INDEX: 23.7 KG/M2 | DIASTOLIC BLOOD PRESSURE: 84 MMHG | RESPIRATION RATE: 20 BRPM | HEART RATE: 87 BPM | OXYGEN SATURATION: 97 % | TEMPERATURE: 98.5 F | HEIGHT: 69 IN

## 2025-05-08 DIAGNOSIS — N30.00 ACUTE CYSTITIS WITHOUT HEMATURIA: Primary | ICD-10-CM

## 2025-05-08 DIAGNOSIS — Z53.21 ELOPED FROM EMERGENCY DEPARTMENT: ICD-10-CM

## 2025-05-08 LAB
BACTERIA URNS QL MICRO: NORMAL
BILIRUB UR QL STRIP: NEGATIVE
C TRACH DNA SPEC QL PROBE+SIG AMP: NEGATIVE
CANDIDA SPECIES: NEGATIVE
CASTS #/AREA URNS LPF: NORMAL /LPF (ref 0–8)
CLARITY UR: ABNORMAL
COLOR UR: YELLOW
EPI CELLS #/AREA URNS HPF: NORMAL /HPF (ref 0–5)
GARDNERELLA VAGINALIS: NEGATIVE
GLUCOSE UR STRIP-MCNC: NEGATIVE MG/DL
HCG UR QL: NEGATIVE
HGB UR QL STRIP.AUTO: ABNORMAL
KETONES UR STRIP-MCNC: NEGATIVE MG/DL
LEUKOCYTE ESTERASE UR QL STRIP: ABNORMAL
N GONORRHOEA DNA SPEC QL PROBE+SIG AMP: NEGATIVE
NITRITE UR QL STRIP: NEGATIVE
PH UR STRIP: 6 [PH] (ref 5–8)
PROT UR STRIP-MCNC: NEGATIVE MG/DL
RBC #/AREA URNS HPF: NORMAL /HPF (ref 0–4)
SOURCE: NORMAL
SP GR UR STRIP: 1.01 (ref 1–1.03)
SPECIMEN DESCRIPTION: NORMAL
TRICHOMONAS: NEGATIVE
UROBILINOGEN UR STRIP-ACNC: NORMAL EU/DL (ref 0–1)
WBC #/AREA URNS HPF: NORMAL /HPF (ref 0–5)

## 2025-05-08 PROCEDURE — 87086 URINE CULTURE/COLONY COUNT: CPT

## 2025-05-08 PROCEDURE — 87660 TRICHOMONAS VAGIN DIR PROBE: CPT

## 2025-05-08 PROCEDURE — 87510 GARDNER VAG DNA DIR PROBE: CPT

## 2025-05-08 PROCEDURE — 81001 URINALYSIS AUTO W/SCOPE: CPT

## 2025-05-08 PROCEDURE — 87491 CHLMYD TRACH DNA AMP PROBE: CPT

## 2025-05-08 PROCEDURE — 99283 EMERGENCY DEPT VISIT LOW MDM: CPT | Performed by: EMERGENCY MEDICINE

## 2025-05-08 PROCEDURE — 87480 CANDIDA DNA DIR PROBE: CPT

## 2025-05-08 PROCEDURE — 81025 URINE PREGNANCY TEST: CPT

## 2025-05-08 PROCEDURE — 87591 N.GONORRHOEAE DNA AMP PROB: CPT

## 2025-05-08 RX ORDER — CEPHALEXIN 500 MG/1
500 CAPSULE ORAL 2 TIMES DAILY
Qty: 14 CAPSULE | Refills: 0 | Status: SHIPPED | OUTPATIENT
Start: 2025-05-08 | End: 2025-05-15

## 2025-05-08 RX ORDER — CEPHALEXIN 500 MG/1
500 CAPSULE ORAL ONCE
Status: DISCONTINUED | OUTPATIENT
Start: 2025-05-08 | End: 2025-05-08 | Stop reason: HOSPADM

## 2025-05-08 ASSESSMENT — PAIN DESCRIPTION - LOCATION: LOCATION: ABDOMEN

## 2025-05-08 ASSESSMENT — PAIN DESCRIPTION - DESCRIPTORS: DESCRIPTORS: CRAMPING

## 2025-05-08 ASSESSMENT — PAIN - FUNCTIONAL ASSESSMENT: PAIN_FUNCTIONAL_ASSESSMENT: 0-10

## 2025-05-08 ASSESSMENT — PAIN SCALES - GENERAL: PAINLEVEL_OUTOF10: 7

## 2025-05-08 NOTE — ED PROVIDER NOTES
[facility] EMERGENCY DEPARTMENT     Emergency Department     Faculty Attestation    I performed a history and physical examination of the patient and discussed management with the resident. I reviewed the resident’s note and agree with the documented findings and plan of care. Any areas of disagreement are noted on the chart. I was personally present for the key portions of any procedures. I have documented in the chart those procedures where I was not present during the key portions. I have reviewed the emergency nurses triage note. I agree with the chief complaint, past medical history, past surgical history, allergies, medications, social and family history as documented unless otherwise noted below. For Physician Assistant/ Nurse Practitioner cases/documentation I have personally evaluated this patient and have completed at least one if not all key elements of the E/M (history, physical exam, and MDM). Additional findings are as noted.    Note Started: 5:08 AM EDT    Patient here with vaginal discomfort after intercourse tonight.  States it was consensual.  No bleeding.  Does have old appearing ecchymosis around her left eye states she did cocaine several days ago and fell but does not wish to be evaluated for that.  On exam well-appearing nontoxic eating snacks from the vending machine.  Will check pelvic exam urinalysis plan discharge      Critical Care     none    Jermaine Lopez MD, FACEP  Attending Emergency  Physician           Jermaine Lopez MD  05/08/25 [id number]

## 2025-05-08 NOTE — ED PROVIDER NOTES
[facility] EMERGENCY DEPARTMENT  Emergency Department Encounter  Emergency Medicine Resident     Pt Name:Yani Nowak  MRN: [id number]  Birthdate 1980  Date of evaluation: 25  PCP:  New Salas APRN - NP  Note Started: 5:40 AM EDT      CHIEF COMPLAINT       Chief Complaint   Patient presents with    Abdominal Pain    Dysuria       HISTORY OF PRESENT ILLNESS  (Location/Symptom, Timing/Onset, Context/Setting, Quality, Duration, Modifying Factors, Severity.)      Yani Nowak is a 44 y.o. female who presents with history of vaginal discharge.  Patient states she has had dysuria, vaginal discharge and lower abdominal scum for for last 2 to 3 days.  She states she is sexually active, does not use protection.  She states she would like to be tested for STIs.  States she cannot remember her last menstrual period.  She also complains of left eye pain.  She states she fell 4 days ago after smoking too much crack and bumped her head against the wall.  Did not lose consciousness.  Does not have a headache or any changes in her vision.    PAST MEDICAL / SURGICAL / SOCIAL / FAMILY HISTORY      has a past medical history of Anxiety, Bipolar 1 disorder (HCC), Depression, Gestational diabetes, OCD (obsessive compulsive disorder), PTSD (post-traumatic stress disorder), Rapid rate of speech, and Schizoaffective disorder (HCC).       has a past surgical history that includes Abdomen surgery;  section (); and laparoscopy.      Social History     Socioeconomic History    Marital status: Legally      Spouse name: Not on file    Number of children: Not on file    Years of education: Not on file    Highest education level: Not on file   Occupational History    Not on file   Tobacco Use    Smoking status: Every Day     Current packs/day: 1.00     Types: Cigarettes    Smokeless tobacco: Never   Vaping Use    Vaping status: Former    Substances: Always   Substance and Sexual  Oriented - self; Oriented - place; Oriented - time

## 2025-05-08 NOTE — DISCHARGE INSTRUCTIONS
You were seen today in the emergency department.  We now feel you are safe for discharge home.    Please return to the emergency department immediately if develop any new or worsening concerns including chest pain, shortness of breath, abdominal pain, nausea, vomiting, diarrhea, weakness, loss consciousness, fever, chills, or any other concerns.    Please call your PCP and schedule appointment within the next 24 to 48 hours for follow-up.

## 2025-05-08 NOTE — ED TRIAGE NOTES
Pt arriving to ed 06 via triage with co of abd cramping, dysuria with vaginal itching and hematoma to left eye  Pt states the GI and and  symptoms started earlier today and she is concerned for possible STI  Pt states she was smoking crack about 3-4 days ago and got dizzy and fell and hit eye and left arm  Denies LOC or visual changes   Pt is resting on stretcher with call light within reach.  Breathing is non labored and no acute distress is noted.   Will continue to follow plan of care

## 2025-05-09 LAB
MICROORGANISM SPEC CULT: NORMAL
SPECIMEN DESCRIPTION: NORMAL

## 2025-05-12 ENCOUNTER — HOSPITAL ENCOUNTER (EMERGENCY)
Age: 45
Discharge: HOME | End: 2025-05-12
Attending: EMERGENCY MEDICINE
Payer: MEDICARE

## 2025-05-12 ENCOUNTER — APPOINTMENT (OUTPATIENT)
Dept: CT IMAGING | Age: 45
End: 2025-05-12
Payer: MEDICARE

## 2025-05-12 ENCOUNTER — APPOINTMENT (OUTPATIENT)
Dept: GENERAL RADIOLOGY | Age: 45
End: 2025-05-12
Payer: MEDICARE

## 2025-05-12 VITALS
HEART RATE: 77 BPM | DIASTOLIC BLOOD PRESSURE: 85 MMHG | OXYGEN SATURATION: 96 % | RESPIRATION RATE: 16 BRPM | SYSTOLIC BLOOD PRESSURE: 136 MMHG | TEMPERATURE: 97.7 F

## 2025-05-12 DIAGNOSIS — M25.522 LEFT ELBOW PAIN: ICD-10-CM

## 2025-05-12 DIAGNOSIS — M79.671 PAIN IN BOTH FEET: ICD-10-CM

## 2025-05-12 DIAGNOSIS — M54.2 NECK PAIN: Primary | ICD-10-CM

## 2025-05-12 DIAGNOSIS — B35.3 TINEA PEDIS OF BOTH FEET: ICD-10-CM

## 2025-05-12 DIAGNOSIS — M79.672 PAIN IN BOTH FEET: ICD-10-CM

## 2025-05-12 PROCEDURE — 6370000000 HC RX 637 (ALT 250 FOR IP)

## 2025-05-12 PROCEDURE — 72125 CT NECK SPINE W/O DYE: CPT

## 2025-05-12 PROCEDURE — 99284 EMERGENCY DEPT VISIT MOD MDM: CPT

## 2025-05-12 PROCEDURE — 73080 X-RAY EXAM OF ELBOW: CPT

## 2025-05-12 PROCEDURE — 70450 CT HEAD/BRAIN W/O DYE: CPT

## 2025-05-12 RX ORDER — ACETAMINOPHEN 500 MG
1000 TABLET ORAL ONCE
Status: COMPLETED | OUTPATIENT
Start: 2025-05-12 | End: 2025-05-12

## 2025-05-12 RX ORDER — ACETAMINOPHEN 500 MG
500 TABLET ORAL 4 TIMES DAILY PRN
Qty: 20 TABLET | Refills: 0 | Status: SHIPPED | OUTPATIENT
Start: 2025-05-12 | End: 2025-05-17

## 2025-05-12 RX ORDER — CLOTRIMAZOLE 1 %
CREAM (GRAM) TOPICAL
Qty: 35.4 G | Refills: 0 | Status: SHIPPED | OUTPATIENT
Start: 2025-05-12 | End: 2025-05-19

## 2025-05-12 RX ADMIN — ACETAMINOPHEN 1000 MG: 500 TABLET ORAL at 08:10

## 2025-05-12 ASSESSMENT — PAIN SCALES - GENERAL: PAINLEVEL_OUTOF10: 8

## 2025-05-12 NOTE — ED NOTES
Pt to ED c/o neck pain, bilateral foot pain, and lower back pain. Pt states he has been on her feet a lot recently. Pt c/o of \"hard bumps\" on bottom of feet. Pt has been taking motrin for the pain. Pt also reports a fall a few days ago. Pt states she was not sure if she lost consciousness. Pt has bruise under left eye. Pt alert and oriented x4, talking in complete sentences, respirations even and unlabored. Pt acting age appropriate. Will continue to plan of care.

## 2025-05-12 NOTE — ED PROVIDER NOTES
[facility] EMERGENCY DEPARTMENT  Emergency Department Encounter  Emergency Medicine Resident     Pt Name:Yani Nowak  MRN: [id number]  Birthdate 1980  Date of evaluation: 5/12/25  PCP:  New Salas APRN - NP  Note Started: 7:35 AM EDT      CHIEF COMPLAINT       Chief Complaint   Patient presents with    Neck Pain    Back Pain    Foot Pain       HISTORY OF PRESENT ILLNESS  (Location/Symptom, Timing/Onset, Context/Setting, Quality, Duration, Modifying Factors, Severity.)      Yani Nowak is a 44 y.o. female with no significant pertinent medical history, has a previous psychiatric history who presents with foot pain, neck pain, left elbow pain.    Patient has had foot pain, concern for foot arthritis, also for fungal infection of the feet.  Requesting referral to podiatry and something to help with fungus on her feet.    Also complaining of left elbow pain, patient states that she had a fall and is unknown why it happened but patient hit her head as well, has left elbow pain with some bruising and tenderness to the lateral epicondyle as well as periorbital bruising on the left.  Patient states that she did lose consciousness.  Patient states she is not on any blood thinners.    She is also stating she has some papular rash, this is after going in the sun, and appears to be papular rash with no ulceration in the bilateral upper back/low neck.  Patient states she gets these sometimes while in the sun.    Concerning for intracranial bleed or other pathology given fall with left periorbital ecchymosis, however this was 5 days ago.  Patient also having some midline C-spine tenderness, patient states there is a clicking motion when she is moving her neck however has been  5 days, and she is concerned about her feet however I believe the treatment and podiatry referral will suffice at this time as there is no trauma to the feet.    PAST MEDICAL / SURGICAL / SOCIAL / FAMILY HISTORY

## 2025-05-12 NOTE — ED PROVIDER NOTES
[facility]     Emergency Department     Faculty Attestation  9:08 AM EDT      I performed a history and physical examination of the patient and discussed management with the resident. I have reviewed and agree with the resident’s findings including all diagnostic interpretations, and treatment plans as written. Any areas of disagreement are noted on the chart. I was personally present for the key portions of any procedures. I have documented in the chart those procedures where I was not present during the key portions. I have reviewed the emergency nurses triage note. I agree with the chief complaint, past medical history, past surgical history, allergies, medications, social and family history as documented unless otherwise noted below. Documentation of the HPI, Physical Exam and Medical Decision Making performed by [email or web address] is based on my personal performance of the HPI, PE and MDM. For Physician Assistant/ Nurse Practitioner cases/documentation I have personally evaluated this patient and have completed at least one if not all key elements of the E/M (history, physical exam, and MDM). Additional findings are as noted.    Patient reports fall a few days ago, and Having back pain and left elbow pain.  She is non toxic, currently staying at [facility], tried motrin without relief of her pain.    Patient non toxic appearing, sleeping on [facility], has to be woken up for exam  No neuro deficits  Low back without tenderness to palpation, no midline cervical,  thoracic or lumbar ttp   Sun burn to her posterior neck  No concern for shingles  Soft abdomen non tender to palpation  Periorbital eccymosis to the L eye, appears old, patient reports about 7 days ago she fell also  EOMI, PERRLA    Pain control, xray.  Anticipate discharge    Corazon Jensen D.O, M.P.H  Attending Emergency Medicine Physician         Corazon Jensen,   05/12/25 0952

## 2025-05-12 NOTE — DISCHARGE INSTRUCTIONS
We have given you a referral to podiatry, as well as antifungal cream for your feet at this time.    Your head, neck, and left elbow have no fractures or other injuries today.    Please take Tylenol as needed for your pain.  You can also take Motrin as needed as you have this already.    Please follow-up with your family medical doctor.  I provided the number for the [facility] clinic.  Call make an appoint with them.    You may return to emergency department for any new or worsening symptoms, or if your symptoms do not improve.  This includes fever and chills, nausea and vomiting that prevents from eating, shortness of breath or chest pains, lightheadedness dizziness or troubles with balance, or if your symptoms not improved.

## 2025-05-20 ENCOUNTER — HOSPITAL ENCOUNTER (INPATIENT)
Age: 45
LOS: 7 days | Discharge: HOME | DRG: 885 | End: 2025-05-27
Attending: PSYCHIATRY & NEUROLOGY | Admitting: PSYCHIATRY & NEUROLOGY
Payer: MEDICARE

## 2025-05-20 PROCEDURE — 6370000000 HC RX 637 (ALT 250 FOR IP): Performed by: INTERNAL MEDICINE

## 2025-05-20 PROCEDURE — 6370000000 HC RX 637 (ALT 250 FOR IP): Performed by: PSYCHIATRY & NEUROLOGY

## 2025-05-20 PROCEDURE — 2040000000 HC PSYCH ICU R&B

## 2025-05-20 PROCEDURE — GZHZZZZ GROUP PSYCHOTHERAPY: ICD-10-PCS | Performed by: PSYCHIATRY & NEUROLOGY

## 2025-05-20 RX ORDER — TRAZODONE HYDROCHLORIDE 50 MG/1
50 TABLET ORAL NIGHTLY PRN
Status: DISCONTINUED | OUTPATIENT
Start: 2025-05-20 | End: 2025-05-27 | Stop reason: HOSPADM

## 2025-05-20 RX ORDER — HALOPERIDOL 5 MG/ML
5 INJECTION INTRAMUSCULAR EVERY 6 HOURS PRN
Status: DISCONTINUED | OUTPATIENT
Start: 2025-05-20 | End: 2025-05-27 | Stop reason: HOSPADM

## 2025-05-20 RX ORDER — MAGNESIUM HYDROXIDE/ALUMINUM HYDROXICE/SIMETHICONE 120; 1200; 1200 MG/30ML; MG/30ML; MG/30ML
30 SUSPENSION ORAL EVERY 6 HOURS PRN
Status: DISCONTINUED | OUTPATIENT
Start: 2025-05-20 | End: 2025-05-27 | Stop reason: HOSPADM

## 2025-05-20 RX ORDER — DIPHENHYDRAMINE HYDROCHLORIDE 50 MG/ML
50 INJECTION, SOLUTION INTRAMUSCULAR; INTRAVENOUS EVERY 6 HOURS PRN
Status: DISCONTINUED | OUTPATIENT
Start: 2025-05-20 | End: 2025-05-27 | Stop reason: HOSPADM

## 2025-05-20 RX ORDER — IODINE/SODIUM IODIDE 2 %
TINCTURE TOPICAL 2 TIMES DAILY
Status: DISCONTINUED | OUTPATIENT
Start: 2025-05-20 | End: 2025-05-27 | Stop reason: HOSPADM

## 2025-05-20 RX ORDER — IBUPROFEN 400 MG/1
400 TABLET, FILM COATED ORAL EVERY 6 HOURS PRN
Status: DISCONTINUED | OUTPATIENT
Start: 2025-05-20 | End: 2025-05-21

## 2025-05-20 RX ORDER — HYDROXYZINE HYDROCHLORIDE 50 MG/1
50 TABLET, FILM COATED ORAL 3 TIMES DAILY PRN
Status: DISCONTINUED | OUTPATIENT
Start: 2025-05-20 | End: 2025-05-27 | Stop reason: HOSPADM

## 2025-05-20 RX ORDER — POLYETHYLENE GLYCOL 3350 17 G/17G
17 POWDER, FOR SOLUTION ORAL DAILY PRN
Status: DISCONTINUED | OUTPATIENT
Start: 2025-05-20 | End: 2025-05-27 | Stop reason: HOSPADM

## 2025-05-20 RX ORDER — ACETAMINOPHEN 325 MG/1
650 TABLET ORAL EVERY 6 HOURS PRN
Status: DISCONTINUED | OUTPATIENT
Start: 2025-05-20 | End: 2025-05-27 | Stop reason: HOSPADM

## 2025-05-20 RX ORDER — POLYETHYLENE GLYCOL 3350 17 G
2 POWDER IN PACKET (EA) ORAL
Status: DISCONTINUED | OUTPATIENT
Start: 2025-05-20 | End: 2025-05-21

## 2025-05-20 RX ORDER — HALOPERIDOL 5 MG/1
5 TABLET ORAL EVERY 6 HOURS PRN
Status: DISCONTINUED | OUTPATIENT
Start: 2025-05-20 | End: 2025-05-27 | Stop reason: HOSPADM

## 2025-05-20 RX ADMIN — IBUPROFEN 400 MG: 400 TABLET, FILM COATED ORAL at 20:27

## 2025-05-20 RX ADMIN — HALOPERIDOL 5 MG: 5 TABLET ORAL at 20:32

## 2025-05-20 RX ADMIN — HYDROXYZINE HYDROCHLORIDE 50 MG: 50 TABLET ORAL at 21:39

## 2025-05-20 RX ADMIN — ACETAMINOPHEN 650 MG: 325 TABLET ORAL at 19:32

## 2025-05-20 RX ADMIN — FERRIC OXIDE RED: 8; 8 LOTION TOPICAL at 20:43

## 2025-05-20 ASSESSMENT — PATIENT HEALTH QUESTIONNAIRE - PHQ9
SUM OF ALL RESPONSES TO PHQ QUESTIONS 1-9: 0
1. LITTLE INTEREST OR PLEASURE IN DOING THINGS: NOT AT ALL
2. FEELING DOWN, DEPRESSED OR HOPELESS: NOT AT ALL
SUM OF ALL RESPONSES TO PHQ QUESTIONS 1-9: 0

## 2025-05-20 ASSESSMENT — LIFESTYLE VARIABLES
HOW MANY STANDARD DRINKS CONTAINING ALCOHOL DO YOU HAVE ON A TYPICAL DAY: 3 OR 4
HOW OFTEN DO YOU HAVE A DRINK CONTAINING ALCOHOL: 2-3 TIMES A WEEK

## 2025-05-20 ASSESSMENT — PAIN DESCRIPTION - LOCATION
LOCATION: GENERALIZED

## 2025-05-20 ASSESSMENT — PAIN SCALES - GENERAL
PAINLEVEL_OUTOF10: 8
PAINLEVEL_OUTOF10: 3
PAINLEVEL_OUTOF10: 8
PAINLEVEL_OUTOF10: 3

## 2025-05-20 ASSESSMENT — SLEEP AND FATIGUE QUESTIONNAIRES
DO YOU HAVE DIFFICULTY SLEEPING: YES
DO YOU USE A SLEEP AID: NO
SLEEP PATTERN: UNABLE TO ASSESS
AVERAGE NUMBER OF SLEEP HOURS: 0

## 2025-05-20 NOTE — BH NOTE
[facility]  Admission Note     Admission Type:   Involuntary - Not Signed in Upon Admission     Reason for admission:  Reason for Admission: Pt reports she was brought to the ED by EMS for not taking her medications and she wanted to dive in fromt of a car. EMS reports patient was found wandering streets with erratic behavior.      Addictive Behavior:   Addictive Behavior  In the Past 3 Months, Have You Felt or Has Someone Told You That You Have a Problem With  : None    Medical Problems:   Past Medical History:   Diagnosis Date    Anxiety     Bipolar 1 disorder (HCC) 1999    Depression     Gestational diabetes 7/22/2016    OCD (obsessive compulsive disorder) 1999    PTSD (post-traumatic stress disorder)     Rapid rate of speech     Schizoaffective disorder (HCC)        Status EXAM:  Mental Status and Behavioral Exam  Normal: No  Level of Assistance: Independent/Self  Facial Expression: Exaggerated  Affect: Unstable  Level of Consciousness: Alert  Frequency of Checks: 4 times per hour, close  Mood:Normal: No  Mood: Anxious  Motor Activity:Normal: No  Motor Activity: Increased  Eye Contact: Good  Observed Behavior: Preoccupied, Cooperative  Sexual Misconduct History: Current - no  Preception: Nelson to person, Nelson to time, Nelson to place  Attention:Normal: No  Attention: Distractible  Thought Processes: Tangential  Thought Content:Normal: No  Thought Content: Preoccupations  Depression Symptoms: No problems reported or observed.  Anxiety Symptoms: Generalized  Micaela Symptoms: Pressured speech  Hallucinations: None  Delusions: Yes  Delusions: Erotomanic  Memory:Normal: No  Memory: Poor recent, Poor remote  Insight and Judgment: No  Insight and Judgment: Poor judgment, Poor insight    Tobacco Screening:  Practical Counseling, on admission, nati X, if applicable and completed (first 3 are required if patient doesn't refuse):            ( ) Recognizing danger situations (included triggers and

## 2025-05-20 NOTE — BH NOTE
Patient given tobacco quitline number 36364187731 at this time, refusing to call at this time, states \" I just dont want to quit now\"- patient given information as to the dangers of long term tobacco use. Continue to reinforce the importance of tobacco cessation.

## 2025-05-21 PROBLEM — F25.9 SCHIZOAFFECTIVE DISORDER (HCC): Status: RESOLVED | Noted: 2023-08-29 | Resolved: 2025-05-21

## 2025-05-21 LAB
LITHIUM DATE LAST DOSE: NORMAL
LITHIUM DOSE AMOUNT: NORMAL
LITHIUM DOSE TIME: NORMAL
LITHIUM LEVEL: <0.1 MMOL/L

## 2025-05-21 PROCEDURE — APPSS60 APP SPLIT SHARED TIME 46-60 MINUTES

## 2025-05-21 PROCEDURE — 80178 ASSAY OF LITHIUM: CPT

## 2025-05-21 PROCEDURE — 6370000000 HC RX 637 (ALT 250 FOR IP)

## 2025-05-21 PROCEDURE — 99222 1ST HOSP IP/OBS MODERATE 55: CPT | Performed by: INTERNAL MEDICINE

## 2025-05-21 PROCEDURE — 99222 1ST HOSP IP/OBS MODERATE 55: CPT | Performed by: PSYCHIATRY & NEUROLOGY

## 2025-05-21 PROCEDURE — 6370000000 HC RX 637 (ALT 250 FOR IP): Performed by: PSYCHIATRY & NEUROLOGY

## 2025-05-21 PROCEDURE — 36415 COLL VENOUS BLD VENIPUNCTURE: CPT

## 2025-05-21 PROCEDURE — 2040000000 HC PSYCH ICU R&B

## 2025-05-21 RX ORDER — PRAZOSIN HYDROCHLORIDE 1 MG/1
1 CAPSULE ORAL NIGHTLY
Status: ON HOLD | COMMUNITY
End: 2025-05-27

## 2025-05-21 RX ORDER — LITHIUM CARBONATE 300 MG/1
300 CAPSULE ORAL 2 TIMES DAILY
Status: DISCONTINUED | OUTPATIENT
Start: 2025-05-21 | End: 2025-05-27 | Stop reason: HOSPADM

## 2025-05-21 RX ORDER — PRAZOSIN HYDROCHLORIDE 1 MG/1
1 CAPSULE ORAL NIGHTLY
Status: DISCONTINUED | OUTPATIENT
Start: 2025-05-21 | End: 2025-05-27 | Stop reason: HOSPADM

## 2025-05-21 RX ORDER — ALOGLIPTIN 25 MG/1
25 TABLET, FILM COATED ORAL DAILY
Status: DISCONTINUED | OUTPATIENT
Start: 2025-05-21 | End: 2025-05-22

## 2025-05-21 RX ORDER — METOPROLOL SUCCINATE 25 MG/1
25 TABLET, EXTENDED RELEASE ORAL DAILY
Status: DISCONTINUED | OUTPATIENT
Start: 2025-05-21 | End: 2025-05-22

## 2025-05-21 RX ORDER — IBUPROFEN 800 MG/1
800 TABLET, FILM COATED ORAL EVERY 8 HOURS PRN
Status: DISCONTINUED | OUTPATIENT
Start: 2025-05-21 | End: 2025-05-27 | Stop reason: HOSPADM

## 2025-05-21 RX ORDER — RISPERIDONE 2 MG/1
2 TABLET, ORALLY DISINTEGRATING ORAL 2 TIMES DAILY
Status: DISCONTINUED | OUTPATIENT
Start: 2025-05-21 | End: 2025-05-23

## 2025-05-21 RX ORDER — LIDOCAINE 4 G/G
1 PATCH TOPICAL DAILY
Status: DISCONTINUED | OUTPATIENT
Start: 2025-05-21 | End: 2025-05-27 | Stop reason: HOSPADM

## 2025-05-21 RX ORDER — NICOTINE 21 MG/24HR
1 PATCH, TRANSDERMAL 24 HOURS TRANSDERMAL DAILY
Status: DISCONTINUED | OUTPATIENT
Start: 2025-05-21 | End: 2025-05-27 | Stop reason: HOSPADM

## 2025-05-21 RX ORDER — METOPROLOL SUCCINATE 25 MG/1
25 TABLET, EXTENDED RELEASE ORAL DAILY
Status: ON HOLD | COMMUNITY
End: 2025-05-27 | Stop reason: HOSPADM

## 2025-05-21 RX ORDER — BUSPIRONE HYDROCHLORIDE 10 MG/1
10 TABLET ORAL 3 TIMES DAILY
Status: DISCONTINUED | OUTPATIENT
Start: 2025-05-21 | End: 2025-05-25

## 2025-05-21 RX ORDER — BUSPIRONE HYDROCHLORIDE 10 MG/1
10 TABLET ORAL 3 TIMES DAILY
Status: ON HOLD | COMMUNITY
End: 2025-05-27 | Stop reason: HOSPADM

## 2025-05-21 RX ORDER — QUETIAPINE FUMARATE 100 MG/1
100 TABLET, FILM COATED ORAL 2 TIMES DAILY
Status: ON HOLD | COMMUNITY
End: 2025-05-27 | Stop reason: HOSPADM

## 2025-05-21 RX ORDER — BUPROPION HYDROCHLORIDE 150 MG/1
150 TABLET ORAL EVERY MORNING
Status: ON HOLD | COMMUNITY
End: 2025-05-27 | Stop reason: HOSPADM

## 2025-05-21 RX ADMIN — ALOGLIPTIN 25 MG: 25 TABLET, FILM COATED ORAL at 14:21

## 2025-05-21 RX ADMIN — METOPROLOL SUCCINATE 25 MG: 25 TABLET, FILM COATED, EXTENDED RELEASE ORAL at 14:21

## 2025-05-21 RX ADMIN — ACETAMINOPHEN 650 MG: 325 TABLET ORAL at 04:09

## 2025-05-21 RX ADMIN — FERRIC OXIDE RED: 8; 8 LOTION TOPICAL at 22:58

## 2025-05-21 RX ADMIN — HALOPERIDOL 5 MG: 5 TABLET ORAL at 23:09

## 2025-05-21 RX ADMIN — IBUPROFEN 400 MG: 400 TABLET, FILM COATED ORAL at 11:20

## 2025-05-21 RX ADMIN — IBUPROFEN 400 MG: 400 TABLET, FILM COATED ORAL at 08:33

## 2025-05-21 ASSESSMENT — PAIN DESCRIPTION - DESCRIPTORS: DESCRIPTORS: ACHING

## 2025-05-21 ASSESSMENT — PAIN DESCRIPTION - LOCATION: LOCATION: BACK

## 2025-05-21 ASSESSMENT — PAIN SCALES - GENERAL: PAINLEVEL_OUTOF10: 3

## 2025-05-21 ASSESSMENT — PAIN DESCRIPTION - ORIENTATION: ORIENTATION: LOWER

## 2025-05-21 NOTE — GROUP NOTE
Group Therapy Note    Date: 5/21/2025    Group Start Time: 1000  Group End Time: 1034  Group Topic: Discharge Planning    [facility]    Charito Allison LPN        Group Therapy Note    Attendees: 0/8    patient refused to attend discharge planning group at 1000 after encouragement from staff.  1:1 talk time provided as alternative to group session        Signature:  Charito Allison LPN

## 2025-05-21 NOTE — BH NOTE
PRN haloperidol effective as evidenced by patient much less restless, slower speech and able to focus on one thing at a time.  Staff will continue to monitor.

## 2025-05-21 NOTE — BH NOTE
At this time writer offered scheduled medication Risperdal and Buspar.  Writer offered education on benefits and uses of medication as well as her earlier concern of wanting to be started on these specific medication . Patient states \" yeah,  definitely not because I'm going to turn into a severely obese woman taking those. \"

## 2025-05-21 NOTE — GROUP NOTE
Group Therapy Note    Date: 5/21/2025    Group Start Time: 1100  Group End Time: 1115  Group Topic: Healthy Living/Wellness    [facility]    Concha Mares, RN        Group Therapy Note    Attendees: 0/8  Patient declined     Signature:  Concha Mares RN

## 2025-05-21 NOTE — GROUP NOTE
Group Therapy Note    Date: 5/21/2025    Group Start Time: 1430  Group End Time: 1520  Group Topic: Recreational    [facility] Sunday Aguilar        Group Therapy Note    Attendees: 2/6       Patient's Goal:  Patients each given a paper with small doodle/shape on it, and instructed to expand on it to create more of an image. Patients spent 5-10 minutes working on each piece and then had the opportunity to share what they created. Patients also able to request preferred music during this time. Patient goals to increase sense of community; Increase self-expression; Demonstrate positive use of time;     Notes:  Patient attended and participated in group having positive interactions with peers and staff throughout.   Patient was pleasant and engaging throughout. Patinet shared art and music, and was supportive of peers art and sharing     Status After Intervention:  Improved     Participation Level: Active Listener and Interactive     Participation Quality: Appropriate, Attentive, Sharing, and Supportive        Speech:  normal        Thought Process/Content: Logical  Linear        Affective Functioning: Congruent        Mood: euthymic        Level of consciousness:  Alert and Attentive        Response to Learning: Able to verbalize current knowledge/experience and Progressing to goal        Endings: None Reported     Modes of Intervention: Support, Socialization, Exploration, Activity, Media, and Reality-testing        Discipline Responsible: Psychoeducational Specialist        Signature:  Sunday Quiles

## 2025-05-21 NOTE — PLAN OF CARE
[facility]  Initial Interdisciplinary Treatment Plan NO      Original treatment plan Date & Time: 5/21/25 0900    Admission Type:  Admission Type: Involuntary    Reason for admission:   Reason for Admission: Pt reports she was brought to the ED by EMS for not taking her medications and she wanted to dive in fromt of a car. EMS reports patient was found wandering streets with erratic behavior.    Estimated Length of Stay:  5-7days  Estimated Discharge Date: to be determined by physician    PATIENT STRENGTHS:  Patient Strengths:   Patient Strengths and Limitations:   Addictive Behavior: Addictive Behavior  In the Past 3 Months, Have You Felt or Has Someone Told You That You Have a Problem With  : None  Medical Problems:  Past Medical History:   Diagnosis Date    Anxiety     Bipolar 1 disorder (HCC) 1999    Depression     Gestational diabetes 7/22/2016    OCD (obsessive compulsive disorder) 1999    PTSD (post-traumatic stress disorder)     Rapid rate of speech     Schizoaffective disorder (HCC)      Status EXAM:Mental Status and Behavioral Exam  Normal: No  Level of Assistance: Independent/Self  Facial Expression: Exaggerated  Affect: Congruent  Level of Consciousness: Alert  Frequency of Checks: 4 times per hour, close  Mood:Normal: No  Mood: Anxious  Motor Activity:Normal: Yes  Motor Activity: Increased  Eye Contact: Good  Observed Behavior: Cooperative, Friendly, Preoccupied  Sexual Misconduct History: Current - no  Preception: Poyen to person, Poyen to time, Poyen to place, Poyen to situation  Attention:Normal: No  Attention: Unable to concentrate  Thought Processes: Flight of ideas, Loose association, Tangential  Thought Content:Normal: No  Thought Content: Preoccupations  Depression Symptoms: Feelings of helplessness, Feelings of hopelessess, Impaired concentration  Anxiety Symptoms: Generalized  Micaela Symptoms: Flight of ideas, Pressured speech  Hallucinations: None  Delusions:

## 2025-05-21 NOTE — GROUP NOTE
Group Therapy Note    Date: 5/21/2025    Group Start Time: 0845  Group End Time: 0900  Group Topic: Community Meeting    [facility]    Concha Mares, RN        Group Therapy Note    Attendees: 2/8     Patient declined  Signature:  Concha Mares RN

## 2025-05-21 NOTE — CARE COORDINATION
[facility] Biopsychosocial Assessment    Current Level of Psychosocial Functioning     Independent   Dependent  xx  Minimal Assist     Comments:    Psychosocial High Risk Factors (check all that apply)    Unable to obtain meds   Chronic illness/pain    Substance abuse xx  Lack of Family Support   Financial stress   Isolation   Inadequate Community Resources  Suicide attempt(s)  Not taking medications xx  Victim of crime   Developmental Delay  Unable to manage personal needs    Age 65 or older   Homeless  No transportation   Readmission within 30 days  Unemployment  Traumatic Event    Comments:   Psychiatric Advanced Directives: none reported     Family to Involve in Treatment: reports her sister is supportive     Sexual Orientation:  n/a    Patient Strengths: has legal guardian and sister who are supportive, receives social security income/has payee, linked with [facility] team     Patient Barriers: history of previous psychiatric hospitalizations, reports recent cocaine use       Opiate Education Provided:  denies       [facility]/mental health history: linked with [facility]     Plan of Care   medication management, group/individual therapies, family meetings, psycho -education, treatment team meetings to assist with stabilization    Initial Discharge Plan:  to be discussed/confirmed with legal guardian      Clinical Summary:  Yani is a 44 year old single female who has been admitted to [facility], chart reports states she has \"recently stopped taking her medications\" and was \"having thoughts to dive in front of a car.\" Yani states she stopped taking her medications due to side effects that she states she has already discussed with Dr. Vizcaino. She states she has been staying with friends and on/off at her sisters home. She states she has been using crack cocaine and \"cannot be\" at her sister's home when she is using, so stays with friends. She states it's \"hard for me to have a phone\" and therefore has

## 2025-05-21 NOTE — BH NOTE
PRN haloperidol 5mg given to patient for increasing manic behavior.  Patient is hyper-verbal, restless, and anxious.  Patient offered 1:1 talk time, puzzles and word searches, and redirection.  Patient was accepting of medication with no issue.  Staff will continue to monitor and offer support.

## 2025-05-21 NOTE — H&P
Department of Psychiatry  Attending Physician Psychiatric Assessment   Patient: Yani Nowak  MRN: [id number]  Reason for Admission to Psychiatric Unit:  Threat to self requiring 24 hour professional observation  Acute disordered/bizarre behavior or psychomotor agitation or retardation;interferes with ADLs so that patient cannot function at a less intensive care level of care during evaluation and treatment   A mental disorder causing major disability in social, interpersonal, occupational, and/or educational functioning that is leading to dangerous or life-threatening functioning, and that can only be addressed in an acute inpatient setting   A mental disorder that causes an inability to maintain adequate nurtrition or self-care, and family/community support cannot provide reliable, essential care, so that the patient cannont function at a less intensive level of care during evaluation and treatment   Failure of outpatient psychiatry treatment so that the beneficiary requires 24 hour professional observation and care  Concerns about patient's safety in the community  Past Mental Health Diagnosis: a history of  Schizoaffective Disorder, Prior suicide attempt, and Alcohol and/or Drug Use Disorder  Triggering event or precipitating factor: Housing instability, Financial instability, Alcohol/Drug Relapse, and Psych Treatment Noncompliance  Length of time/duration of triggering event: Weeks  Legal Status: Legal Guardian    CHIEF COMPLAINT: Schizoaffective disorder    History obtained from: Patient, electronic medical record          HISTORY OF PRESENT ILLNESS:    Yani Nowak is a 44 y.o. female who has a past medical history of diabetes, OCD, bipolar disorder, anxiety. Patient presented from [facility] with suicidal thoughts and erratic behaviors.  Patient recently stopped her medications.  Having thoughts to dive in front of a car.  Was witnessed by EMS being erratic.    Upon presentation the patient is 
MG extended release tablet Take 1 tablet by mouth daily   Yes ProviderJulius MD   prazosin (MINIPRESS) 1 MG capsule Take 1 capsule by mouth nightly   Yes ProviderJulius MD   QUEtiapine (SEROQUEL) 100 MG tablet Take 1 tablet by mouth 2 times daily   Yes ProviderJulius MD   SITagliptin (JANUVIA) 100 MG tablet Take 1 tablet by mouth daily   Yes Julius Harris MD   paliperidone palmitate ER (INVEGA SUSTENNA) 156 MG/ML ASHOK IM injection Inject 156 mg into the muscle every 28 days   Yes ProviderJulius MD   acetaminophen (TYLENOL) 500 MG tablet Take 1 tablet by mouth 4 times daily as needed for Pain 5/12/25 5/17/25  Josue Chowdhury MD   lithium 300 MG capsule Take 1 capsule by mouth 2 times daily 1/3/24   Ryder Vizcaino MD   nicotine (NICODERM CQ) 14 MG/24HR Place 1 patch onto the skin daily 1/4/24   Ryder Vizcaino MD        Allergies:     Lamictal [lamotrigine]    Social History:     Tobacco:    reports that she has been smoking cigarettes. She has never used smokeless tobacco.  Alcohol:      reports no history of alcohol use.  Drug Use:  reports that she does not currently use drugs after having used the following drugs: Marijuana (Weed).    Family History:     Family History   Problem Relation Age of Onset    Diabetes Sister     No Known Problems Mother     No Known Problems Father        Review of Systems:     Positive and Negative as described in HPI.    CONSTITUTIONAL:  negative for fevers, chills, sweats, fatigue, weight loss  HEENT:  negative for vision, hearing changes, runny nose, throat pain  RESPIRATORY:  negative for shortness of breath, cough, congestion, wheezing.  CARDIOVASCULAR:  negative for chest pain, palpitations.  GASTROINTESTINAL:  negative for nausea, vomiting, diarrhea, constipation, change in bowel habits, abdominal pain   GENITOURINARY:  negative for difficulty of urination, burning with urination, frequency   INTEGUMENT:  negative for rash, skin

## 2025-05-21 NOTE — PLAN OF CARE
Problem: Pain  Goal: Verbalizes/displays adequate comfort level or baseline comfort level  5/21/2025 0943 by Charito Allison LPN  Outcome: Progressing  5/21/2025 0548 by Cory Nunez, RN  Note: Patient was complaining of generalized pain throughout her body.  PRN medications given three different times.     Problem: Risk for Elopement  Goal: Patient will not exit the unit/facility without proper excort  5/21/2025 0943 by Charito Allison LPN  Outcome: Progressing  5/21/2025 0548 by Cory Nunez, RN  Note: Patient did not attempt to elope the facility, nor did they display any elopement type behavior.      Problem: Self Harm/Suicidality  Goal: Will have no self-injury during hospital stay  Description: INTERVENTIONS:1.  Ensure constant observer at bedside with Q15M safety checks2.  Maintain a safe environment3.  Secure patient belongings4.  Ensure family/visitors adhere to safety recommendations5.  Ensure safety tray has been added to patient's diet order6.  Every shift and PRN: Re-assess suicidal risk via Frequent Screener  5/21/2025 0943 by Charito Allison LPN  Outcome: Progressing  5/21/2025 0548 by Cory Nunez, RN  Note: Patient had no attempts at self harm.  She denies any suicidal ideation at this time.  Patient remained in behavior control.      Problem: Depression  Goal: Will be euthymic at discharge  Description: INTERVENTIONS:1. Administer medication as ordered2. Provide emotional support via 1:1 interaction with staff3. Encourage involvement in milieu/groups/activities4. Monitor for social isolation  5/21/2025 0943 by Charito Allison LPN  Outcome: Progressing  5/21/2025 0548 by Cory Nunez, RN  Note: Patient endorses depression due to her circumstances of being homeless and away from her children.       Patient reports depression and anxiety but denies suicidal ideation.  Patient agrees to notify staff if mood or thought changes occur throughout shift for patient safety while on the unit. Patient

## 2025-05-21 NOTE — PLAN OF CARE
Problem: Pain  Goal: Verbalizes/displays adequate comfort level or baseline comfort level  Note: Patient was complaining of generalized pain throughout her body.  PRN medications given three different times.     Problem: Risk for Elopement  Goal: Patient will not exit the unit/facility without proper excort  Note: Patient did not attempt to elope the facility, nor did they display any elopement type behavior.      Problem: Self Harm/Suicidality  Goal: Will have no self-injury during hospital stay  Description: INTERVENTIONS:1.  Ensure constant observer at bedside with Q15M safety checks2.  Maintain a safe environment3.  Secure patient belongings4.  Ensure family/visitors adhere to safety recommendations5.  Ensure safety tray has been added to patient's diet order6.  Every shift and PRN: Re-assess suicidal risk via Frequent Screener  Note: Patient had no attempts at self harm.  She denies any suicidal ideation at this time.  Patient remained in behavior control.

## 2025-05-22 LAB
EST. AVERAGE GLUCOSE BLD GHB EST-MCNC: 100 MG/DL
HBA1C MFR BLD: 5.1 % (ref 4–6)

## 2025-05-22 PROCEDURE — 36415 COLL VENOUS BLD VENIPUNCTURE: CPT

## 2025-05-22 PROCEDURE — 83036 HEMOGLOBIN GLYCOSYLATED A1C: CPT

## 2025-05-22 PROCEDURE — 6370000000 HC RX 637 (ALT 250 FOR IP): Performed by: PSYCHIATRY & NEUROLOGY

## 2025-05-22 PROCEDURE — 99232 SBSQ HOSP IP/OBS MODERATE 35: CPT | Performed by: INTERNAL MEDICINE

## 2025-05-22 PROCEDURE — 99232 SBSQ HOSP IP/OBS MODERATE 35: CPT | Performed by: PSYCHIATRY & NEUROLOGY

## 2025-05-22 PROCEDURE — 6370000000 HC RX 637 (ALT 250 FOR IP)

## 2025-05-22 PROCEDURE — APPSS30 APP SPLIT SHARED TIME 16-30 MINUTES: Performed by: NURSE PRACTITIONER

## 2025-05-22 PROCEDURE — 1240000000 HC EMOTIONAL WELLNESS R&B

## 2025-05-22 PROCEDURE — 6370000000 HC RX 637 (ALT 250 FOR IP): Performed by: INTERNAL MEDICINE

## 2025-05-22 RX ORDER — AMLODIPINE BESYLATE 5 MG/1
5 TABLET ORAL DAILY
Status: DISCONTINUED | OUTPATIENT
Start: 2025-05-22 | End: 2025-05-27 | Stop reason: HOSPADM

## 2025-05-22 RX ADMIN — ACETAMINOPHEN 650 MG: 325 TABLET ORAL at 06:58

## 2025-05-22 RX ADMIN — PRAZOSIN HYDROCHLORIDE 1 MG: 1 CAPSULE ORAL at 21:00

## 2025-05-22 RX ADMIN — ALOGLIPTIN 25 MG: 25 TABLET, FILM COATED ORAL at 08:46

## 2025-05-22 RX ADMIN — FERRIC OXIDE RED: 8; 8 LOTION TOPICAL at 10:55

## 2025-05-22 RX ADMIN — AMLODIPINE BESYLATE 5 MG: 5 TABLET ORAL at 14:45

## 2025-05-22 RX ADMIN — IBUPROFEN 800 MG: 800 TABLET, FILM COATED ORAL at 00:55

## 2025-05-22 RX ADMIN — ACETAMINOPHEN 650 MG: 325 TABLET ORAL at 14:36

## 2025-05-22 RX ADMIN — IBUPROFEN 800 MG: 800 TABLET, FILM COATED ORAL at 21:00

## 2025-05-22 RX ADMIN — METOPROLOL SUCCINATE 25 MG: 25 TABLET, FILM COATED, EXTENDED RELEASE ORAL at 08:47

## 2025-05-22 RX ADMIN — IBUPROFEN 800 MG: 800 TABLET, FILM COATED ORAL at 10:54

## 2025-05-22 ASSESSMENT — PAIN SCALES - GENERAL
PAINLEVEL_OUTOF10: 7
PAINLEVEL_OUTOF10: 7
PAINLEVEL_OUTOF10: 5

## 2025-05-22 ASSESSMENT — PAIN DESCRIPTION - DESCRIPTORS
DESCRIPTORS: ACHING
DESCRIPTORS: ACHING;DISCOMFORT
DESCRIPTORS: ACHING

## 2025-05-22 ASSESSMENT — PAIN DESCRIPTION - ORIENTATION
ORIENTATION: LOWER
ORIENTATION: LOWER

## 2025-05-22 ASSESSMENT — PAIN DESCRIPTION - LOCATION
LOCATION: NECK;BACK
LOCATION: BACK;NECK
LOCATION: BACK

## 2025-05-22 NOTE — PLAN OF CARE
Problem: Pain  Goal: Verbalizes/displays adequate comfort level or baseline comfort level  5/22/2025 1205 by Shanqiue Bond LPN  Outcome: Progressing   Patient denies both suicidal and homicidal ideations. Patient denies both auditory and visual disturbances. Patient reports both anxiety and depression. Patient reports pain in lower back and at this time and is provided medication to relieve pain. Patient is selective with medications and did not take scheduled Risperdal and Buspar but has remained in behavioral control. Patient is polite, reports eating and sleeping adequately with safety checks Q15 minutes and at irregular intervals.   Problem: Risk for Elopement  Goal: Patient will not exit the unit/facility without proper excort  5/22/2025 1205 by Shanique Bond LPN  Outcome: Progressing   Patient denies both suicidal and homicidal ideations. Patient denies both auditory and visual disturbances. Patient reports both anxiety and depression. Patient reports pain in lower back and at this time and is provided medication to relieve pain. Patient is selective with medications and did not take scheduled Risperdal and Buspar but has remained in behavioral control. Patient is polite, reports eating and sleeping adequately with safety checks Q15 minutes and at irregular intervals.   Problem: Self Harm/Suicidality  Goal: Will have no self-injury during hospital stay  Description: INTERVENTIONS:1.  Ensure constant observer at bedside with Q15M safety checks2.  Maintain a safe environment3.  Secure patient belongings4.  Ensure family/visitors adhere to safety recommendations5.  Ensure safety tray has been added to patient's diet order6.  Every shift and PRN: Re-assess suicidal risk via Frequent Screener  5/22/2025 1205 by Shanique Bond LPN  Outcome: Progressing   Patient denies both suicidal and homicidal ideations. Patient denies both auditory and visual disturbances. Patient reports both anxiety and depression.

## 2025-05-22 NOTE — GROUP NOTE
Group Therapy Note    Date: 5/22/2025    Group Start Time: 1000  Group End Time: 1030  Group Topic: Psychotherapy    [facility]    Flor Carson MSW, RHONDA        Group Therapy Note    Attendees: 0/8       Patient was offered group therapy today but declined to participate despite encouragement from staff.  1:1 was offered.     Signature:  GABY Torres, RHONDA

## 2025-05-22 NOTE — GROUP NOTE
Group Therapy Note    Date: 5/22/2025    Group Start Time: 1430  Group End Time: 1530  Group Topic: Group Therapy    [facility]        Group Therapy Note    Attendees: 2/8         Notes:  During group we painted with watercolors and played KEAGAN.    Status After Intervention:  Improved    Participation Level: Interactive    Participation Quality: Appropriate, Attentive, Sharing, and Supportive      Speech:  normal      Thought Process/Content: Logical      Affective Functioning: Congruent      Mood: anxious      Level of consciousness:  Alert, Oriented x4, and Attentive      Response to Learning: Able to verbalize current knowledge/experience      Endings: None Reported    Modes of Intervention: Support and Socialization      Discipline Responsible: Licensed Practical Nurse      Signature:  Shanique Bond LPN

## 2025-05-22 NOTE — PLAN OF CARE
Problem: Self Harm/Suicidality  Goal: Will have no self-injury during hospital stay  Description: INTERVENTIONS:1.  Ensure constant observer at bedside with Q15M safety checks2.  Maintain a safe environment3.  Secure patient belongings4.  Ensure family/visitors adhere to safety recommendations5.  Ensure safety tray has been added to patient's diet order6.  Every shift and PRN: Re-assess suicidal risk via Frequent Screener  Outcome: Progressing   Patient denies thoughts of self harm. Patient reports depression and anxiety. Patient isolative to room sleeping for most of the night. Patient refused nighttime medications despite encouragement stating \"I am over tired, I've been sleeping all day. And I'm not taking any mental health medications until I talk to the psychiatrist and they give me one that doesn't cause sexual dysfunction. I'm too old to be dealing with this shit\".

## 2025-05-22 NOTE — GROUP NOTE
Group Therapy Note    Date: 5/22/2025    Group Start Time: 0900  Group End Time: 0915  Group Topic: Community Meeting    [facility]    Concha Mares, RN        Group Therapy Note    Attendees: 2/8     Patient declined    Signature:  Concha Mares RN

## 2025-05-22 NOTE — BH NOTE
ROIs faxed to [employer] for ok to release information to patient's sisters.  Liliya Cunningham [phone]  Lorena Kathleen [phone]

## 2025-05-23 LAB
CANDIDA SPECIES: POSITIVE
GARDNERELLA VAGINALIS: POSITIVE
SOURCE: ABNORMAL
TRICHOMONAS: NEGATIVE

## 2025-05-23 PROCEDURE — 90833 PSYTX W PT W E/M 30 MIN: CPT | Performed by: PSYCHIATRY & NEUROLOGY

## 2025-05-23 PROCEDURE — 6370000000 HC RX 637 (ALT 250 FOR IP): Performed by: PSYCHIATRY & NEUROLOGY

## 2025-05-23 PROCEDURE — 87660 TRICHOMONAS VAGIN DIR PROBE: CPT

## 2025-05-23 PROCEDURE — 87510 GARDNER VAG DNA DIR PROBE: CPT

## 2025-05-23 PROCEDURE — 1240000000 HC EMOTIONAL WELLNESS R&B

## 2025-05-23 PROCEDURE — 99232 SBSQ HOSP IP/OBS MODERATE 35: CPT | Performed by: PSYCHIATRY & NEUROLOGY

## 2025-05-23 PROCEDURE — 6370000000 HC RX 637 (ALT 250 FOR IP): Performed by: INTERNAL MEDICINE

## 2025-05-23 PROCEDURE — 6370000000 HC RX 637 (ALT 250 FOR IP)

## 2025-05-23 PROCEDURE — 99232 SBSQ HOSP IP/OBS MODERATE 35: CPT

## 2025-05-23 PROCEDURE — 87480 CANDIDA DNA DIR PROBE: CPT

## 2025-05-23 RX ORDER — HALOPERIDOL 5 MG/1
5 TABLET ORAL 2 TIMES DAILY
Status: DISCONTINUED | OUTPATIENT
Start: 2025-05-23 | End: 2025-05-27 | Stop reason: HOSPADM

## 2025-05-23 RX ADMIN — PRAZOSIN HYDROCHLORIDE 1 MG: 1 CAPSULE ORAL at 21:02

## 2025-05-23 RX ADMIN — TRAZODONE HYDROCHLORIDE 50 MG: 50 TABLET ORAL at 21:02

## 2025-05-23 RX ADMIN — HYDROXYZINE HYDROCHLORIDE 50 MG: 50 TABLET ORAL at 19:53

## 2025-05-23 RX ADMIN — FERRIC OXIDE RED: 8; 8 LOTION TOPICAL at 08:29

## 2025-05-23 RX ADMIN — AMLODIPINE BESYLATE 5 MG: 5 TABLET ORAL at 08:24

## 2025-05-23 RX ADMIN — IBUPROFEN 800 MG: 800 TABLET, FILM COATED ORAL at 08:24

## 2025-05-23 RX ADMIN — POLYETHYLENE GLYCOL 3350 17 G: 17 POWDER, FOR SOLUTION ORAL at 20:53

## 2025-05-23 RX ADMIN — BUSPIRONE HYDROCHLORIDE 10 MG: 10 TABLET ORAL at 21:02

## 2025-05-23 ASSESSMENT — PAIN SCALES - GENERAL
PAINLEVEL_OUTOF10: 0
PAINLEVEL_OUTOF10: 6

## 2025-05-23 NOTE — PLAN OF CARE
Problem: Pain  Goal: Verbalizes/displays adequate comfort level or baseline comfort level  Outcome: Progressing     Problem: Risk for Elopement  Goal: Patient will not exit the unit/facility without proper excort  5/23/2025 0953 by Charito Allison LPN  Outcome: Progressing  5/23/2025 0014 by Mimi Keene RN  Outcome: Progressing     Problem: Self Harm/Suicidality  Goal: Will have no self-injury during hospital stay  Description: INTERVENTIONS:1.  Ensure constant observer at bedside with Q15M safety checks2.  Maintain a safe environment3.  Secure patient belongings4.  Ensure family/visitors adhere to safety recommendations5.  Ensure safety tray has been added to patient's diet order6.  Every shift and PRN: Re-assess suicidal risk via Frequent Screener  5/23/2025 0953 by Charito Allison LPN  Outcome: Progressing  5/23/2025 0014 by Mimi Keene RN  Outcome: Progressing     Problem: Depression  Goal: Will be euthymic at discharge  Description: INTERVENTIONS:1. Administer medication as ordered2. Provide emotional support via 1:1 interaction with staff3. Encourage involvement in milieu/groups/activities4. Monitor for social isolation  Outcome: Progressing     Patient reports anxiety and depression but denies suicidal ideation.  Patient is noted to be pleasant, cooperative, aloof of peers, out for needs only, medication compliant and behavior controled.  Patient agrees to notify staff if any mood or thought changes occur throughout shift. Patient reports pain being 7/10 as needed medication and scheduled medication was provided. Patient educated to notify staff of any changes in the pain scale. Patient seems to be no risk at this time staff will report and document any changes.  Patient reports adequate amount of sleep and normal appetite. 15 minute rounding continues throughout shift for patient safety while on the unit.

## 2025-05-23 NOTE — GROUP NOTE
Group Therapy Note    Date: 5/23/2025    Group Start Time: 1100  Group End Time: 1135  Group Topic: Music Therapy    [facility]        Group Therapy Note    Attendees: 3/16       Patient's Goal:  Engaged in education on CBT \"Positive Affirmations\"  and reflection after activity Patient's shared songs that they felt reflected positive things about themself/their life framing it in the form of a positive affirmation. Goals to increase self-esteem; increase self-expression; increase sense of community; demonstrate positive use of time.     Notes:  Patient attended and participated in group arriving about 10 minutes after start, having positive interactions with peers and staff. Patient shared music and identified positive qualities of herself, and engaged appropriately in reflection. Pleasant and engaging throughout and supportive of peers music and sharing.    Status After Intervention:  Improved    Participation Level: Active Listener and Interactive    Participation Quality: Appropriate, Attentive, Sharing, and Supportive      Speech:  normal      Thought Process/Content: Logical  Linear      Affective Functioning: Congruent      Mood: euthymic      Level of consciousness:  Alert and Attentive      Response to Learning: Able to verbalize current knowledge/experience and Progressing to goal      Endings: None Reported    Modes of Intervention: Support, Socialization, Exploration, Activity, Media, and Reality-testing      Discipline Responsible: Psychoeducational Specialist      Signature:  Sunday Quiles

## 2025-05-23 NOTE — BH NOTE
At this time patient refused scheduled medication Buspar and Risperidone after writer provided education on benefits and uses.

## 2025-05-23 NOTE — GROUP NOTE
Group Therapy Note    Date: 5/23/2025    Group Start Time: 1500  Group End Time: 1545  Group Topic: Activity    [facility]            Group Therapy Note    Attendees: 8/16       Patient's Goal:  Work with peers in a team setting to correctly answer the trivia questions         Status After Intervention:  Improved    Participation Level: Active Listener and Interactive    Participation Quality: Appropriate, Attentive, Sharing, and Supportive      Speech:  normal      Thought Process/Content: Logical  Linear      Affective Functioning: Congruent      Mood: euphoric      Level of consciousness:  Alert and Oriented x4      Response to Learning: Able to verbalize current knowledge/experience, Able to verbalize/acknowledge new learning, Able to retain information, and Capable of insight      Endings: None Reported    Modes of Intervention: Activity      Discipline Responsible: Registered Nurse

## 2025-05-23 NOTE — GROUP NOTE
Group Therapy Note    Date: 5/23/2025    Group Start Time: 1000  Group End Time: 1035  Group Topic: Psychoeducation    [facility]    Mimi Packer LSW        Group Therapy Note    Attendees: 4/16       Patient's Goal:  healthy coping    Notes:      Status After Intervention:  Improved    Participation Level: Active Listener and Interactive    Participation Quality: Appropriate      Speech:  normal      Thought Process/Content: Logical      Affective Functioning: Congruent      Mood: euthymic      Level of consciousness:  Alert and Oriented x4      Response to Learning: Able to verbalize current knowledge/experience and Able to verbalize/acknowledge new learning      Endings: None Reported    Modes of Intervention: Education and Support      Discipline Responsible: /Counselor      Signature:  RHONDA Larose

## 2025-05-23 NOTE — PLAN OF CARE
Problem: Risk for Elopement  Goal: Patient will not exit the unit/facility without proper excort  5/23/2025 0014 by Mimi Keene RN  Outcome: Progressing     Problem: Self Harm/Suicidality  Goal: Will have no self-injury during hospital stay  Description: INTERVENTIONS:1.  Ensure constant observer at bedside with Q15M safety checks2.  Maintain a safe environment3.  Secure patient belongings4.  Ensure family/visitors adhere to safety recommendations5.  Ensure safety tray has been added to patient's diet order6.  Every shift and PRN: Re-assess suicidal risk via Frequent Screener  5/23/2025 0014 by Mimi Keene, RN  Outcome: Progressing     Patient alert and oriented. Patient remains free from self harm throughout this shift. Patient agrees to seek out staff if thoughts of self harm arise. Patient has not attempted to exit the unit without the proper escort. Patient's body language and behavior shows no signs of risk for elopement at this time.  Patient out and social with peers. Patient refused select medications because she wanted to talk to the doctor and didn't want the medications to \"ruin her sex drive.\" Patient is medication compliant with select medications and behavior remains controlled at this time. Safe environment provided. Q15 minute checks maintained.

## 2025-05-23 NOTE — PLAN OF CARE
[facility]  Day 3 Interdisciplinary Treatment Plan NOTE    Review Date & Time: 5/23/2025   1245    Admission Type:   Admission Type: Involuntary    Reason for admission:  Reason for Admission: Pt reports she was brought to the ED by EMS for not taking her medications and she wanted to dive in fromt of a car. EMS reports patient was found wandering streets with erratic behavior.  Estimated Length of Stay: 5-7 days  Estimated Discharge Date Update: to be determined by physician    PATIENT STRENGTHS:  Patient Strengths    Patient Strengths and Limitations:Limitations: Difficulty problem solving/relies on others to help solve problems, Inappropriate/potentially harmful leisure interests, Difficult relationships / poor social skills  Addictive Behavior:Addictive Behavior  In the Past 3 Months, Have You Felt or Has Someone Told You That You Have a Problem With  : None  Medical Problems:  Past Medical History:   Diagnosis Date    Anxiety     Bipolar 1 disorder (HCC) 1999    Depression     Gestational diabetes 7/22/2016    OCD (obsessive compulsive disorder) 1999    PTSD (post-traumatic stress disorder)     Rapid rate of speech     Schizoaffective disorder (HCC)        Risk:  Fall Risk   Jairo Scale Jairo Scale Score: 22  BVC    Change in scores no Changes to plan of Care no    Status EXAM:   Mental Status and Behavioral Exam  Normal: No  Level of Assistance: Independent/Self  Facial Expression: Brightened  Affect: Appropriate  Level of Consciousness: Alert  Frequency of Checks: 4 times per hour, close  Mood:Normal: No  Mood: Depressed, Anxious  Motor Activity:Normal: Yes  Motor Activity: Increased  Eye Contact: Good  Observed Behavior: Friendly, Cooperative, Preoccupied  Sexual Misconduct History: Current - no  Preception: [place] to person, [place] to time, [place] to place, [place] to situation  Attention:Normal: No  Attention: Distractible  Thought Processes: Circumstantial  Thought Content:Normal:

## 2025-05-24 LAB
HCV AB SERPL QL IA: NONREACTIVE
HIV 1+2 AB+HIV1 P24 AG SERPL QL IA: NONREACTIVE
T PALLIDUM AB SER QL IA: NONREACTIVE

## 2025-05-24 PROCEDURE — 6370000000 HC RX 637 (ALT 250 FOR IP): Performed by: PSYCHIATRY & NEUROLOGY

## 2025-05-24 PROCEDURE — 86780 TREPONEMA PALLIDUM: CPT

## 2025-05-24 PROCEDURE — 86694 HERPES SIMPLEX NES ANTBDY: CPT

## 2025-05-24 PROCEDURE — 6370000000 HC RX 637 (ALT 250 FOR IP): Performed by: INTERNAL MEDICINE

## 2025-05-24 PROCEDURE — 99232 SBSQ HOSP IP/OBS MODERATE 35: CPT

## 2025-05-24 PROCEDURE — 36415 COLL VENOUS BLD VENIPUNCTURE: CPT

## 2025-05-24 PROCEDURE — 86803 HEPATITIS C AB TEST: CPT

## 2025-05-24 PROCEDURE — 6370000000 HC RX 637 (ALT 250 FOR IP)

## 2025-05-24 PROCEDURE — 87389 HIV-1 AG W/HIV-1&-2 AB AG IA: CPT

## 2025-05-24 PROCEDURE — 1240000000 HC EMOTIONAL WELLNESS R&B

## 2025-05-24 PROCEDURE — 99232 SBSQ HOSP IP/OBS MODERATE 35: CPT | Performed by: INTERNAL MEDICINE

## 2025-05-24 RX ORDER — MICONAZOLE NITRATE 20 MG/G
CREAM TOPICAL 2 TIMES DAILY
Status: DISCONTINUED | OUTPATIENT
Start: 2025-05-24 | End: 2025-05-27 | Stop reason: HOSPADM

## 2025-05-24 RX ORDER — METRONIDAZOLE 500 MG/1
500 TABLET ORAL EVERY 12 HOURS SCHEDULED
Status: DISCONTINUED | OUTPATIENT
Start: 2025-05-24 | End: 2025-05-27 | Stop reason: HOSPADM

## 2025-05-24 RX ADMIN — FERRIC OXIDE RED: 8; 8 LOTION TOPICAL at 21:23

## 2025-05-24 RX ADMIN — TRAZODONE HYDROCHLORIDE 50 MG: 50 TABLET ORAL at 22:03

## 2025-05-24 RX ADMIN — HALOPERIDOL 5 MG: 5 TABLET ORAL at 08:49

## 2025-05-24 RX ADMIN — METRONIDAZOLE 500 MG: 500 TABLET ORAL at 20:39

## 2025-05-24 RX ADMIN — FERRIC OXIDE RED: 8; 8 LOTION TOPICAL at 08:50

## 2025-05-24 RX ADMIN — AMLODIPINE BESYLATE 5 MG: 5 TABLET ORAL at 08:50

## 2025-05-24 RX ADMIN — IBUPROFEN 800 MG: 800 TABLET, FILM COATED ORAL at 09:22

## 2025-05-24 RX ADMIN — ANTIFUNGAL: 2 CREAM TOPICAL at 20:40

## 2025-05-24 RX ADMIN — HYDROXYZINE HYDROCHLORIDE 50 MG: 50 TABLET ORAL at 22:03

## 2025-05-24 RX ADMIN — LITHIUM CARBONATE 300 MG: 300 CAPSULE, GELATIN COATED ORAL at 08:50

## 2025-05-24 RX ADMIN — BUSPIRONE HYDROCHLORIDE 10 MG: 10 TABLET ORAL at 08:50

## 2025-05-24 RX ADMIN — LITHIUM CARBONATE 300 MG: 300 CAPSULE, GELATIN COATED ORAL at 20:39

## 2025-05-24 RX ADMIN — BUSPIRONE HYDROCHLORIDE 10 MG: 10 TABLET ORAL at 15:12

## 2025-05-24 RX ADMIN — PRAZOSIN HYDROCHLORIDE 1 MG: 1 CAPSULE ORAL at 20:39

## 2025-05-24 RX ADMIN — ACETAMINOPHEN 650 MG: 325 TABLET ORAL at 12:08

## 2025-05-24 RX ADMIN — IBUPROFEN 800 MG: 800 TABLET, FILM COATED ORAL at 21:07

## 2025-05-24 RX ADMIN — HALOPERIDOL 5 MG: 5 TABLET ORAL at 20:39

## 2025-05-24 ASSESSMENT — PAIN - FUNCTIONAL ASSESSMENT: PAIN_FUNCTIONAL_ASSESSMENT: ACTIVITIES ARE NOT PREVENTED

## 2025-05-24 ASSESSMENT — PAIN SCALES - GENERAL: PAINLEVEL_OUTOF10: 7

## 2025-05-24 ASSESSMENT — PAIN DESCRIPTION - LOCATION: LOCATION: NECK;BACK;HEAD

## 2025-05-24 NOTE — PLAN OF CARE
Problem: Pain  Goal: Verbalizes/displays adequate comfort level or baseline comfort level  5/24/2025 0005 by Michelle Chaudhry RN  Outcome: Progressing  Patient not complaining about pain at this time. PRN pain medications available upon request.     Problem: Risk for Elopement  Goal: Patient will not exit the unit/facility without proper excort  5/24/2025 0005 by Michelle Chaudhry RN  Outcome: Progressing  Patient not observed trying to leave nor talking about leaving prior to being discharged.     Problem: Self Harm/Suicidality  Goal: Will have no self-injury during hospital stay  Description: INTERVENTIONS:1.  Ensure constant observer at bedside with Q15M safety checks2.  Maintain a safe environment3.  Secure patient belongings4.  Ensure family/visitors adhere to safety recommendations5.  Ensure safety tray has been added to patient's diet order6.  Every shift and PRN: Re-assess suicidal risk via Frequent Screener  5/24/2025 0005 by Michelle Chaudhry RN  Outcome: Progressing     Problem: Depression  Goal: Will be euthymic at discharge  Description: INTERVENTIONS:1. Administer medication as ordered2. Provide emotional support via 1:1 interaction with staff3. Encourage involvement in milieu/groups/activities4. Monitor for social isolation  5/24/2025 0005 by Michelle Chaudhry RN  Outcome: Progressing

## 2025-05-24 NOTE — GROUP NOTE
Group Therapy Note    Date: 5/24/2025    Group Start Time: 0900  Group End Time: 0930  Group Topic: Orientation Group    [facility]        Group Therapy Note    Attendees: 7/15       Patient's Goal:  Orientation/ Goal Group.    Notes:   Verbalizing the understanding on setting goals.    Status After Intervention:  Unchanged    Participation Level: Active Listener and Interactive    Participation Quality: Appropriate, Attentive, and Sharing      Speech:  hesitant      Thought Process/Content: Linear      Affective Functioning: Congruent      Mood: anxious      Level of consciousness:  Preoccupied      Response to Learning: Able to retain information and Capable of insight      Endings: None Reported    Modes of Intervention: Support and Socialization      Discipline Responsible: Licensed Practical Nurse      Signature:  Jessica Mathew LPN

## 2025-05-24 NOTE — PLAN OF CARE
Problem: Pain  Goal: Verbalizes/displays adequate comfort level or baseline comfort level  Outcome: Progressing     Problem: Self Harm/Suicidality  Goal: Will have no self-injury during hospital stay  Description: INTERVENTIONS:1.  Ensure constant observer at bedside with Q15M safety checks2.  Maintain a safe environment3.  Secure patient belongings4.  Ensure family/visitors adhere to safety recommendations5.  Ensure safety tray has been added to patient's diet order6.  Every shift and PRN: Re-assess suicidal risk via Frequent Screener  Outcome: Progressing     Problem: Depression  Goal: Will be euthymic at discharge  Description: INTERVENTIONS:1. Administer medication as ordered2. Provide emotional support via 1:1 interaction with staff3. Encourage involvement in milieu/groups/activities4. Monitor for social isolation  Outcome: Progressing,  Patient pleasant and cooperative with staff and peers. Patient reports sleeping during the night and hopeful of feeling well soon.  Patient encouraged to continue her medication regimen. Patient denied suicidal and homicidal ideations.

## 2025-05-24 NOTE — GROUP NOTE
Group Therapy Note    Date: 5/24/2025    Group Start Time: 1030  Group End Time: 1115  Group Topic: Psychoeducation    [facility]    Jadyn Proctor CTRS    Group Therapy Note    Attendees: 7/15       Patient's Goal:  Patient will identify benefits of music for coping and stress management    Notes:  Patient attended group and participated    Status After Intervention:  Improved    Participation Level: Active Listener and Interactive    Participation Quality: Appropriate, Attentive, Sharing, and Supportive      Speech:  normal      Thought Process/Content: Logical  Linear      Affective Functioning: Constricted/Restricted      Mood: euthymic      Level of consciousness:  Alert, Oriented x4, and Attentive      Response to Learning: Able to verbalize current knowledge/experience, Able to verbalize/acknowledge new learning, Able to retain information, Able to change behavior, and Progressing to goal      Endings: None Reported    Modes of Intervention: Education, Support, Socialization, and Exploration      Discipline Responsible: Psychoeducational Specialist      Signature:  JUANCARLOS Hernandez

## 2025-05-25 PROCEDURE — 6370000000 HC RX 637 (ALT 250 FOR IP): Performed by: INTERNAL MEDICINE

## 2025-05-25 PROCEDURE — 6370000000 HC RX 637 (ALT 250 FOR IP)

## 2025-05-25 PROCEDURE — 1240000000 HC EMOTIONAL WELLNESS R&B

## 2025-05-25 PROCEDURE — 6370000000 HC RX 637 (ALT 250 FOR IP): Performed by: PSYCHIATRY & NEUROLOGY

## 2025-05-25 PROCEDURE — 99232 SBSQ HOSP IP/OBS MODERATE 35: CPT | Performed by: INTERNAL MEDICINE

## 2025-05-25 PROCEDURE — 99232 SBSQ HOSP IP/OBS MODERATE 35: CPT

## 2025-05-25 RX ADMIN — ANTIFUNGAL: 2 CREAM TOPICAL at 21:19

## 2025-05-25 RX ADMIN — METRONIDAZOLE 500 MG: 500 TABLET ORAL at 07:56

## 2025-05-25 RX ADMIN — IBUPROFEN 800 MG: 800 TABLET, FILM COATED ORAL at 21:17

## 2025-05-25 RX ADMIN — AMLODIPINE BESYLATE 5 MG: 5 TABLET ORAL at 07:55

## 2025-05-25 RX ADMIN — HALOPERIDOL 5 MG: 5 TABLET ORAL at 07:55

## 2025-05-25 RX ADMIN — PRAZOSIN HYDROCHLORIDE 1 MG: 1 CAPSULE ORAL at 21:16

## 2025-05-25 RX ADMIN — LITHIUM CARBONATE 300 MG: 300 CAPSULE, GELATIN COATED ORAL at 21:17

## 2025-05-25 RX ADMIN — ANTIFUNGAL: 2 CREAM TOPICAL at 08:01

## 2025-05-25 RX ADMIN — HYDROXYZINE HYDROCHLORIDE 50 MG: 50 TABLET ORAL at 21:18

## 2025-05-25 RX ADMIN — IBUPROFEN 800 MG: 800 TABLET, FILM COATED ORAL at 07:55

## 2025-05-25 RX ADMIN — LITHIUM CARBONATE 300 MG: 300 CAPSULE, GELATIN COATED ORAL at 07:55

## 2025-05-25 RX ADMIN — METRONIDAZOLE 500 MG: 500 TABLET ORAL at 21:17

## 2025-05-25 RX ADMIN — TRAZODONE HYDROCHLORIDE 50 MG: 50 TABLET ORAL at 21:17

## 2025-05-25 RX ADMIN — HALOPERIDOL 5 MG: 5 TABLET ORAL at 21:17

## 2025-05-25 ASSESSMENT — PAIN DESCRIPTION - DESCRIPTORS: DESCRIPTORS: TIGHTNESS;ACHING;THROBBING

## 2025-05-25 ASSESSMENT — PAIN - FUNCTIONAL ASSESSMENT: PAIN_FUNCTIONAL_ASSESSMENT: ACTIVITIES ARE NOT PREVENTED

## 2025-05-25 ASSESSMENT — PAIN SCALES - GENERAL
PAINLEVEL_OUTOF10: 0
PAINLEVEL_OUTOF10: 5

## 2025-05-25 ASSESSMENT — PAIN DESCRIPTION - LOCATION: LOCATION: BACK;HEAD;NECK

## 2025-05-25 NOTE — GROUP NOTE
Group Therapy Note    Date: 5/25/2025    Group Start Time: 1030  Group End Time: 1115  Group Topic: Community Meeting    [facility] Ignacio Duran, THELMA        Group Therapy Note    Attendees:4/16       Patient's Goal:  education     Notes:  coping skills     Status After Intervention:  Improved    Participation Level: Active Listener and Interactive    Participation Quality: Appropriate, Attentive, and Sharing      Speech:  normal      Thought Process/Content: Logical  Linear      Affective Functioning: Congruent      Mood: euthymic      Level of consciousness:  Alert, Oriented x4, and Attentive      Response to Learning: Progressing to goal      Endings: None Reported    Modes of Intervention: Education      Discipline Responsible: Registered Nurse      Signature:  Ignacio Martinez RN

## 2025-05-25 NOTE — PLAN OF CARE
Problem: Risk for Elopement  Goal: Patient will not exit the unit/facility without proper excort  Outcome: Progressing     Problem: Self Harm/Suicidality  Goal: Will have no self-injury during hospital stay  Description: INTERVENTIONS:1.  Ensure constant observer at bedside with Q15M safety checks2.  Maintain a safe environment3.  Secure patient belongings4.  Ensure family/visitors adhere to safety recommendations5.  Ensure safety tray has been added to patient's diet order6.  Every shift and PRN: Re-assess suicidal risk via Frequent Screener  5/24/2025 2207 by Mimi Keene, RN  Outcome: Progressing     Patient alert and oriented. Patient remains free from self harm throughout this shift. Patient agrees to seek out staff if thoughts of self harm arise.  Patient has not attempted to exit the unit without the proper escort. Patient's body language and behavior shows no signs of risk for elopement at this time.  Patient cooperative with staff but remains preoccupied. Patient is medication compliant with all scheduled medications except for buspar and behavior remains controlled at this time.  Safe environment provided. Q15 minute checks maintained.

## 2025-05-25 NOTE — BH NOTE
Patient requested to rescinded both of the ROIs for her sisters Lorena Kathleen ([phone]) and Liliya Cunningham ([phone]). Patient states her sisters don't care to be involved in her care and they no longer need the ROIs.     ROIs removed from patient's chart at this time and placed in the shredder.

## 2025-05-25 NOTE — PLAN OF CARE
Problem: Pain  Goal: Verbalizes/displays adequate comfort level or baseline comfort level  Outcome: Progressing     Problem: Self Harm/Suicidality  Goal: Will have no self-injury during hospital stay  Description: INTERVENTIONS:1.  Ensure constant observer at bedside with Q15M safety checks2.  Maintain a safe environment3.  Secure patient belongings4.  Ensure family/visitors adhere to safety recommendations5.  Ensure safety tray has been added to patient's diet order6.  Every shift and PRN: Re-assess suicidal risk via Frequent Screener  Outcome: Progressing     Problem: Depression  Goal: Will be euthymic at discharge  Description: INTERVENTIONS:1. Administer medication as ordered2. Provide emotional support via 1:1 interaction with staff3. Encourage involvement in milieu/groups/activities4. Monitor for social isolation  Outcome: Progressing, Patient pleasant and cooperative. Patient report back discomfort, as needed pain medication given as ordered. Patient encouraged to continue her  medication regimen. Patient denied self harm.

## 2025-05-25 NOTE — BH NOTE
Patient didn't participate in the AM orientation group despite staff invite to attend. Patient preferred to sleep.

## 2025-05-26 PROCEDURE — 6370000000 HC RX 637 (ALT 250 FOR IP): Performed by: INTERNAL MEDICINE

## 2025-05-26 PROCEDURE — 90833 PSYTX W PT W E/M 30 MIN: CPT | Performed by: PSYCHIATRY & NEUROLOGY

## 2025-05-26 PROCEDURE — 6370000000 HC RX 637 (ALT 250 FOR IP)

## 2025-05-26 PROCEDURE — 99232 SBSQ HOSP IP/OBS MODERATE 35: CPT | Performed by: PSYCHIATRY & NEUROLOGY

## 2025-05-26 PROCEDURE — 99232 SBSQ HOSP IP/OBS MODERATE 35: CPT | Performed by: INTERNAL MEDICINE

## 2025-05-26 PROCEDURE — APPSS30 APP SPLIT SHARED TIME 16-30 MINUTES

## 2025-05-26 PROCEDURE — 1240000000 HC EMOTIONAL WELLNESS R&B

## 2025-05-26 PROCEDURE — 6370000000 HC RX 637 (ALT 250 FOR IP): Performed by: PSYCHIATRY & NEUROLOGY

## 2025-05-26 RX ADMIN — IBUPROFEN 800 MG: 800 TABLET, FILM COATED ORAL at 12:52

## 2025-05-26 RX ADMIN — HYDROXYZINE HYDROCHLORIDE 50 MG: 50 TABLET ORAL at 17:37

## 2025-05-26 RX ADMIN — TRAZODONE HYDROCHLORIDE 50 MG: 50 TABLET ORAL at 22:19

## 2025-05-26 RX ADMIN — AMLODIPINE BESYLATE 5 MG: 5 TABLET ORAL at 08:30

## 2025-05-26 RX ADMIN — HALOPERIDOL 5 MG: 5 TABLET ORAL at 22:19

## 2025-05-26 RX ADMIN — FERRIC OXIDE RED: 8; 8 LOTION TOPICAL at 08:32

## 2025-05-26 RX ADMIN — HALOPERIDOL 5 MG: 5 TABLET ORAL at 08:29

## 2025-05-26 RX ADMIN — ANTIFUNGAL: 2 CREAM TOPICAL at 08:32

## 2025-05-26 RX ADMIN — ANTIFUNGAL: 2 CREAM TOPICAL at 22:25

## 2025-05-26 RX ADMIN — METRONIDAZOLE 500 MG: 500 TABLET ORAL at 08:29

## 2025-05-26 RX ADMIN — LITHIUM CARBONATE 300 MG: 300 CAPSULE, GELATIN COATED ORAL at 22:19

## 2025-05-26 RX ADMIN — METRONIDAZOLE 500 MG: 500 TABLET ORAL at 22:19

## 2025-05-26 RX ADMIN — PRAZOSIN HYDROCHLORIDE 1 MG: 1 CAPSULE ORAL at 22:19

## 2025-05-26 RX ADMIN — LITHIUM CARBONATE 300 MG: 300 CAPSULE, GELATIN COATED ORAL at 08:29

## 2025-05-26 ASSESSMENT — PAIN SCALES - GENERAL: PAINLEVEL_OUTOF10: 5

## 2025-05-26 ASSESSMENT — PAIN DESCRIPTION - ORIENTATION: ORIENTATION: LOWER

## 2025-05-26 ASSESSMENT — PAIN DESCRIPTION - LOCATION: LOCATION: BACK;NECK

## 2025-05-26 NOTE — GROUP NOTE
Group Therapy Note    Date: 5/26/2025    Group Start Time: 1330  Group End Time: 1415  Group Topic: Cognitive Skills    [facility]    Dilcia Le CTRS        Group Therapy Note    Attendees: 12/15       Patient's Goal:  pt will demonstrate improved coping skills and improved interpersonal relationships    Notes:   pt was seclusive to self and had minimal participation    Status After Intervention:  Improved    Participation Level: Active Listener and Interactive    Participation Quality: Appropriate and Sharing      Speech:  mumbles      Thought Process/Content slow to process      Affective Functioning: flat      Mood: anxious      Level of consciousness:  Alert      Response to Learning: Able to verbalize current knowledge/experience, Capable of insight, and Progressing to goal      Endings: None Reported    Modes of Intervention: Education, Support, and Activity      Discipline Responsible: Psychoeducational Specialist      Signature:  JUANCARLOS WONG

## 2025-05-26 NOTE — GROUP NOTE
Group Therapy Note    Date: 5/26/2025    Group Start Time: 1000  Group End Time: 1045  Group Topic: Psychoeducation    [facility]    Ignacio Broussard        Group Therapy Note    Attendees: 12/19       Patient's Goal:  PT will demonstrate increased interpersonal interaction and participate in group activities of discussing ways to build happiness.       Notes:  Patient is making progress, AEB participating in group discussion, actively listening, and supporting other group members.    Status After Intervention:  Unchanged    Participation Level: Active Listener and Interactive    Participation Quality: Appropriate, Attentive, and Sharing      Speech:  normal      Thought Process/Content: Logical      Affective Functioning: Flat      Mood: euthymic      Level of consciousness:  Alert, Oriented x4, and Attentive      Response to Learning: Able to verbalize/acknowledge new learning and Progressing to goal      Endings: None Reported    Modes of Intervention: Education, Support, and Socialization      Discipline Responsible: /Counselor      Signature:  Ignacio Broussard

## 2025-05-26 NOTE — PLAN OF CARE
Problem: Pain  Goal: Verbalizes/displays adequate comfort level or baseline comfort level  5/25/2025 2248 by Simeon Seals, RN  Outcome: Progressing  Patient reports pain that is relieved by by available medications.      Problem: Risk for Elopement  Goal: Patient will not exit the unit/facility without proper excort  Outcome: Progressing  Patient displays no elopement behaviors at this time.       Problem: Self Harm/Suicidality  Goal: Will have no self-injury during hospital stay  Description: INTERVENTIONS:1.  Ensure constant observer at bedside with Q15M safety checks2.  Maintain a safe environment3.  Secure patient belongings4.  Ensure family/visitors adhere to safety recommendations5.  Ensure safety tray has been added to patient's diet order6.  Every shift and PRN: Re-assess suicidal risk via Frequent Screener  5/25/2025 2248 by Simeon Seals, RN  Outcome: Progressing  Patient has made no attempt to harm self at this time.      Problem: Depression  Goal: Will be euthymic at discharge  Description: INTERVENTIONS:1. Administer medication as ordered2. Provide emotional support via 1:1 interaction with staff3. Encourage involvement in milieu/groups/activities4. Monitor for social isolation  5/25/2025 2248 by Simeon Seals, RN  Outcome: Progressing  Patient is pleasant, cooperative, anxious and mostly aloof of peers. She displays some insight relating to being able to control her prince better now that she is older. She is able to recognize prince coming before she loses control. Patient reports improving depression and anxiety. Patient denies suicidal ideation, homicidal ideation and hallucinations at this time. Safety plan reviewed with patient, agrees to approach staff when feeling upset.  15 minute and random checks maintained for safety.  No violent or escalating behaviors noted during this shift. Patient is currently calm, controlled and medication-compliant.

## 2025-05-26 NOTE — BH NOTE
Ms. Cunningham requested DARIAN's be completed for sisters Liliya Carrillo. Writer reached out to [facility]  for authorization and found it was closed for the holiday. Unsigned forms in chart.

## 2025-05-26 NOTE — GROUP NOTE
Group Therapy Note    Date: 5/26/2025    Group Start Time: 0900  Group End Time: 0945  Group Topic: Community Meeting    [facility]    Jacquelin Nunez        Group Therapy Note    Attendees: 14/19       Patient's Goal:  stay up today and shower    Notes:      Status After Intervention:  Unchanged    Participation Level: Active Listener    Participation Quality: Appropriate      Speech:  normal      Thought Process/Content: Logical      Affective Functioning: Congruent      Mood: anxious      Level of consciousness:  Alert      Response to Learning: Able to verbalize current knowledge/experience and Progressing to goal      Endings: None Reported    Modes of Intervention: Education, Support, and Socialization      Discipline Responsible: Behavorial Health Tech      Signature:  Jacquelin Nunez

## 2025-05-26 NOTE — GROUP NOTE
Group Therapy Note    Date: 5/26/2025    Group Start Time: 0800  Group End Time: 0815  Group Topic: Orientation Group    [facility]    Jacquelin Nunez        Group Therapy Note    Attendees: 18/19       Patient's Goal:  Review of staff, rules and schedule      Notes:      Status After Intervention:  Improved    Participation Level: Active Listener    Participation Quality: Appropriate      Speech:  normal      Thought Process/Content: Logical      Affective Functioning: Congruent      Mood: anxious      Level of consciousness:  Alert      Response to Learning: Able to verbalize current knowledge/experience and Progressing to goal      Endings: None Reported    Modes of Intervention: Education, Support, and Socialization      Discipline Responsible: Behavorial Health Tech      Signature:  Jacquelin Nunez

## 2025-05-26 NOTE — PLAN OF CARE
Pt denies thoughts to harm/self others. Pt encouraged to explore coping skills for reducing anxiety. None verbalized at this time.  Pt denies pain discomfort. Pt tending to ADLs & personal hygenic needs independently without prompting. Pt wearing non slip socks, surrounding area free of clutter. Every 15 min safety checks in place.   Problem: Pain  Goal: Verbalizes/displays adequate comfort level or baseline comfort level  5/26/2025 1017 by Marlen Russell LPN  Outcome: Progressing     Problem: Risk for Elopement  Goal: Patient will not exit the unit/facility without proper excort  5/26/2025 1017 by Marlen Russell LPN  Outcome: Completed     Problem: Self Harm/Suicidality  Goal: Will have no self-injury during hospital stay  Description: INTERVENTIONS:1.  Ensure constant observer at bedside with Q15M safety checks2.  Maintain a safe environment3.  Secure patient belongings4.  Ensure family/visitors adhere to safety recommendations5.  Ensure safety tray has been added to patient's diet order6.  Every shift and PRN: Re-assess suicidal risk via Frequent Screener  5/26/2025 1017 by Marlen Russell LPN  Outcome: Progressing     Problem: Depression  Goal: Will be euthymic at discharge  Description: INTERVENTIONS:1. Administer medication as ordered2. Provide emotional support via 1:1 interaction with staff3. Encourage involvement in milieu/groups/activities4. Monitor for social isolation  5/26/2025 1017 by Marlen Russell LPN  Outcome: Progressing

## 2025-05-26 NOTE — GROUP NOTE
Group Therapy Note    Date: 5/25/2025    Group Start Time: 2030  Group End Time: 2100  Group Topic: Wrap-Up    [facility]    Katharine Day        Group Therapy Note    Attendees: 13/18       Patient's Goal:  To review daily goals.    Notes:  Patient attended and participated in group. Patient stated she accomplished her goal today by learning about her medications. Patient will like to remain medication compliant once discharged.    Status After Intervention:  Improved    Participation Level: Active Listener and Interactive    Participation Quality: Appropriate, Attentive, and Sharing      Speech:  normal      Thought Process/Content: Logical  Linear      Affective Functioning: Congruent      Mood: euthymic      Level of consciousness:  Alert, Oriented x4, and Attentive      Response to Learning: Able to verbalize current knowledge/experience, Able to verbalize/acknowledge new learning, and Able to retain information      Endings: None Reported    Modes of Intervention: Education, Support, and Socialization      Discipline Responsible: Behavorial Health Tech      Signature:  Katharine Day

## 2025-05-27 VITALS
SYSTOLIC BLOOD PRESSURE: 119 MMHG | BODY MASS INDEX: 23.63 KG/M2 | DIASTOLIC BLOOD PRESSURE: 77 MMHG | HEART RATE: 66 BPM | RESPIRATION RATE: 14 BRPM | HEIGHT: 69 IN | TEMPERATURE: 98 F | OXYGEN SATURATION: 98 %

## 2025-05-27 LAB — HSV1+2 IGM SER QL IA: 1.65

## 2025-05-27 PROCEDURE — 6370000000 HC RX 637 (ALT 250 FOR IP): Performed by: PSYCHIATRY & NEUROLOGY

## 2025-05-27 PROCEDURE — 6370000000 HC RX 637 (ALT 250 FOR IP): Performed by: INTERNAL MEDICINE

## 2025-05-27 PROCEDURE — 99239 HOSP IP/OBS DSCHRG MGMT >30: CPT | Performed by: PSYCHIATRY & NEUROLOGY

## 2025-05-27 RX ORDER — METRONIDAZOLE 500 MG/1
500 TABLET ORAL EVERY 12 HOURS SCHEDULED
Qty: 8 TABLET | Refills: 0 | Status: SHIPPED | OUTPATIENT
Start: 2025-05-27 | End: 2025-05-27

## 2025-05-27 RX ORDER — LITHIUM CARBONATE 300 MG/1
300 CAPSULE ORAL 2 TIMES DAILY
Qty: 60 CAPSULE | Refills: 0 | Status: SHIPPED | OUTPATIENT
Start: 2025-05-27

## 2025-05-27 RX ORDER — LIDOCAINE 4 G/G
1 PATCH TOPICAL DAILY
Qty: 30 PATCH | Refills: 0 | Status: SHIPPED | OUTPATIENT
Start: 2025-05-27 | End: 2025-05-27

## 2025-05-27 RX ORDER — PRAZOSIN HYDROCHLORIDE 1 MG/1
1 CAPSULE ORAL NIGHTLY
Qty: 30 CAPSULE | Refills: 0 | Status: SHIPPED | OUTPATIENT
Start: 2025-05-27 | End: 2025-05-27

## 2025-05-27 RX ORDER — METRONIDAZOLE 500 MG/1
500 TABLET ORAL EVERY 12 HOURS SCHEDULED
Qty: 8 TABLET | Refills: 0 | Status: SHIPPED | OUTPATIENT
Start: 2025-05-27 | End: 2025-05-31

## 2025-05-27 RX ORDER — LITHIUM CARBONATE 300 MG/1
300 CAPSULE ORAL 2 TIMES DAILY
Qty: 60 CAPSULE | Refills: 0 | Status: SHIPPED | OUTPATIENT
Start: 2025-05-27 | End: 2025-05-27

## 2025-05-27 RX ORDER — IODINE/SODIUM IODIDE 2 %
TINCTURE TOPICAL 2 TIMES DAILY
Qty: 177 ML | Refills: 0 | Status: SHIPPED | OUTPATIENT
Start: 2025-05-27 | End: 2025-05-27

## 2025-05-27 RX ORDER — HALOPERIDOL 5 MG/1
5 TABLET ORAL 2 TIMES DAILY
Qty: 60 TABLET | Refills: 0 | Status: SHIPPED | OUTPATIENT
Start: 2025-05-27

## 2025-05-27 RX ORDER — LIDOCAINE 4 G/G
1 PATCH TOPICAL DAILY
Qty: 30 PATCH | Refills: 0 | Status: SHIPPED | OUTPATIENT
Start: 2025-05-27

## 2025-05-27 RX ORDER — MICONAZOLE NITRATE 20 MG/G
CREAM TOPICAL
Qty: 14 G | Refills: 0 | Status: SHIPPED | OUTPATIENT
Start: 2025-05-27 | End: 2025-05-27

## 2025-05-27 RX ORDER — PRAZOSIN HYDROCHLORIDE 1 MG/1
1 CAPSULE ORAL NIGHTLY
Qty: 30 CAPSULE | Refills: 0 | Status: SHIPPED | OUTPATIENT
Start: 2025-05-27

## 2025-05-27 RX ORDER — NICOTINE 21 MG/24HR
1 PATCH, TRANSDERMAL 24 HOURS TRANSDERMAL DAILY
Qty: 30 PATCH | Refills: 0 | Status: SHIPPED | OUTPATIENT
Start: 2025-05-27 | End: 2025-05-27

## 2025-05-27 RX ORDER — AMLODIPINE BESYLATE 5 MG/1
5 TABLET ORAL DAILY
Qty: 30 TABLET | Refills: 0 | Status: SHIPPED | OUTPATIENT
Start: 2025-05-27 | End: 2025-05-27

## 2025-05-27 RX ORDER — HYDROXYZINE HYDROCHLORIDE 50 MG/1
50 TABLET, FILM COATED ORAL 3 TIMES DAILY PRN
Qty: 30 TABLET | Refills: 0 | Status: SHIPPED | OUTPATIENT
Start: 2025-05-27 | End: 2025-05-27

## 2025-05-27 RX ORDER — MICONAZOLE NITRATE 20 MG/G
CREAM TOPICAL
Qty: 14 G | Refills: 0 | Status: SHIPPED | OUTPATIENT
Start: 2025-05-27

## 2025-05-27 RX ORDER — TRAZODONE HYDROCHLORIDE 50 MG/1
50 TABLET ORAL NIGHTLY PRN
Qty: 30 TABLET | Refills: 0 | Status: SHIPPED | OUTPATIENT
Start: 2025-05-27

## 2025-05-27 RX ORDER — TRAZODONE HYDROCHLORIDE 50 MG/1
50 TABLET ORAL NIGHTLY PRN
Qty: 30 TABLET | Refills: 0 | Status: SHIPPED | OUTPATIENT
Start: 2025-05-27 | End: 2025-05-27

## 2025-05-27 RX ORDER — AMLODIPINE BESYLATE 5 MG/1
5 TABLET ORAL DAILY
Qty: 30 TABLET | Refills: 0 | Status: SHIPPED | OUTPATIENT
Start: 2025-05-27

## 2025-05-27 RX ORDER — HYDROXYZINE HYDROCHLORIDE 50 MG/1
50 TABLET, FILM COATED ORAL 3 TIMES DAILY PRN
Qty: 30 TABLET | Refills: 0 | Status: SHIPPED | OUTPATIENT
Start: 2025-05-27 | End: 2025-06-06

## 2025-05-27 RX ORDER — IBUPROFEN 800 MG/1
800 TABLET, FILM COATED ORAL EVERY 8 HOURS PRN
Qty: 120 TABLET | Refills: 0 | Status: SHIPPED | OUTPATIENT
Start: 2025-05-27

## 2025-05-27 RX ORDER — HALOPERIDOL 5 MG/1
5 TABLET ORAL 2 TIMES DAILY
Qty: 60 TABLET | Refills: 0 | Status: SHIPPED | OUTPATIENT
Start: 2025-05-27 | End: 2025-05-27

## 2025-05-27 RX ORDER — NICOTINE 21 MG/24HR
1 PATCH, TRANSDERMAL 24 HOURS TRANSDERMAL DAILY
Qty: 30 PATCH | Refills: 0 | Status: SHIPPED | OUTPATIENT
Start: 2025-05-27

## 2025-05-27 RX ORDER — IODINE/SODIUM IODIDE 2 %
TINCTURE TOPICAL 2 TIMES DAILY
Qty: 177 ML | Refills: 0 | Status: SHIPPED | OUTPATIENT
Start: 2025-05-27

## 2025-05-27 RX ORDER — IBUPROFEN 800 MG/1
800 TABLET, FILM COATED ORAL EVERY 8 HOURS PRN
Qty: 120 TABLET | Refills: 0 | Status: SHIPPED | OUTPATIENT
Start: 2025-05-27 | End: 2025-05-27

## 2025-05-27 RX ADMIN — AMLODIPINE BESYLATE 5 MG: 5 TABLET ORAL at 08:37

## 2025-05-27 RX ADMIN — METRONIDAZOLE 500 MG: 500 TABLET ORAL at 08:37

## 2025-05-27 RX ADMIN — HALOPERIDOL 5 MG: 5 TABLET ORAL at 08:37

## 2025-05-27 RX ADMIN — LITHIUM CARBONATE 300 MG: 300 CAPSULE, GELATIN COATED ORAL at 08:37

## 2025-05-27 RX ADMIN — ANTIFUNGAL: 2 CREAM TOPICAL at 08:58

## 2025-05-27 ASSESSMENT — LIFESTYLE VARIABLES
HOW OFTEN DO YOU HAVE A DRINK CONTAINING ALCOHOL: NEVER
HOW MANY STANDARD DRINKS CONTAINING ALCOHOL DO YOU HAVE ON A TYPICAL DAY: PATIENT DOES NOT DRINK
HOW MANY STANDARD DRINKS CONTAINING ALCOHOL DO YOU HAVE ON A TYPICAL DAY: PATIENT DOES NOT DRINK
HOW OFTEN DO YOU HAVE A DRINK CONTAINING ALCOHOL: NEVER

## 2025-05-27 ASSESSMENT — PAIN SCALES - GENERAL: PAINLEVEL_OUTOF10: 0

## 2025-05-27 NOTE — CARE COORDINATION
Discharge Arrangements:  Discharge to friend's apartment at [place] with consent of guardian.     Guardian notified: yes    Discharge destination/address: [place] [address] [facility]     Transported by:  ADRIANA    Yani was accepting of her MENTAL HEALTH followup appointment scheduled with [facility] 05/29/2025 1pm. She did not accept a SUBSTANCE ABUSE referral.     *JEAN CLAUDE resources were offered to patient throughout admission and at time of discharge. This list of [facility] JEAN CLAUDE providers was provided to patient:     [facility]  [address] [facility] [address]   [address] [facility] [address]  Phone: [phone]     Phone: [phone]    [facility]   [address] [facility] [address]   [address] [facility] [address]  Phone: [phone]     Phone: [phone]    [facility]  [address] [place] [address]    [address] [facility] [facility] [address]  Phone: [phone]     Phone: [phone]    [facility]     [facility]   [address] [place] [id number]  [place] [address]      Phone: [phone]  Phone: [phone]        [facility]  [address] [facility] [address]   [address] [place] [address]  Phone: [phone]     Phone:  [phone]    [employer]      [facility]  [address] [facility] [address]   [address] [facility] [address]  Phone: [phone]     Phone: [phone]    [facility]  [address] [facility] [address]   [address] [facility] [address]  Phone: [phone]     Phone: [phone]    [facility]  [facility]  [address] [facility] [address] [facility] [address] [address] [facility] [address]  Phone: [phone]     Phone: [phone]    Meng

## 2025-05-27 NOTE — BH NOTE
Patient given tobacco quitline number 54578521579 at this time, refusing to call at this time, states \" I just dont want to quit now\"- patient given information as to the dangers of long term tobacco use. Continue to reinforce the importance of tobacco cessation.

## 2025-05-27 NOTE — GROUP NOTE
Group Therapy Note    Date: 5/27/2025    Group Start Time: 1000  Group End Time: 1030  Group Topic: Psychoeducation    [facility]    Ignacio Broussard        Group Therapy Note    Attendees: 11/15       Patient's Goal:    PT will demonstrate increased interpersonal interaction and participate in group activities of discussing coping skills     Notes:  Patient is making progress, AEB participating in group discussion, actively listening, and supporting other group members.    Status After Intervention:  Unchanged    Participation Level: Active Listener and Interactive    Participation Quality: Appropriate, Attentive, and Sharing      Speech:  normal      Thought Process/Content: Logical      Affective Functioning: Flat      Mood: euthymic      Level of consciousness:  Alert, Oriented x4, and Attentive      Response to Learning: Able to verbalize/acknowledge new learning and Progressing to goal      Endings: None Reported    Modes of Intervention: Education, Support, and Socialization      Discipline Responsible: /Counselor      Signature:  Ignacio Broussard

## 2025-05-27 NOTE — DISCHARGE INSTRUCTIONS
Information:  Medications:   Medication summary provided   I understand that I should take only the medications on my list.     -why and when I need to take each medicine.     -which side effects to watch for.     -that I should carry my medication information at all times in case of     Emergency situations.    I will take all of my medicines to follow up appointments.     -check with my physician or pharmacist before taking any new    Medication, over the counter product or drink alcohol.    -Ask about food, drug or dietary supplement interactions.    -discard old lists and update records with medication providers.    Notify Physician:  Notify physician if you notice:   Always call 911 if you feel your life is in danger  In case of an emergency call 911 immediately!  If 911 is not available call your local emergency medical system for help    Behavioral Health Follow Up:  Original Referral Source: [facility]  Discharge Diagnosis: Bipolar 1 disorder (HCC) [F31.9]  Schizoaffective disorder (HCC) [F25.9]  Recommendations for Level of Care: Follow up care and medication compliance  Patient status at discharge: Calm and cooperative  My hospital  was: [facility]  Aftercare plan faxed: Yes   -faxed by: RN   -date: 5/27/2025   -time: 1300  Prescriptions: [facility]     Smoking: Quit Smoking.   Call the [employer]'s smoking quitline at 4-861-25B-QUIT  Know the signs of a heart attack   If you have any of the following symptoms call 911 immediately, do not wait more    Than five minutes.    1. Pressure, fullness and/ or squeezing in the center of the chest spreading to    The jaw, neck or shoulder.    2. Chest discomfort with light headedness, fainting, sweating, nausea or    Shortness of breath.   3. Upper abdominal pressure or discomfort.   4. Lower chest pain, back pain, unusual fatigue, shortness of breath, nausea   Or dizziness.     General Information:   Questions regarding your bill: Call HELP program ([phone])

## 2025-05-27 NOTE — PLAN OF CARE
Problem: Pain  Goal: Verbalizes/displays adequate comfort level or baseline comfort level  5/27/2025 0829 by Corazon Arellano RN  Outcome: Completed  5/27/2025 0401 by Anisha Mancera RN  Outcome: Progressing  Note: Patient denies pain this shift. Patient continues to utilize lidocaine patches and prn medications. Will continue to monitor.      Problem: Self Harm/Suicidality  Goal: Will have no self-injury during hospital stay  Description: INTERVENTIONS:1.  Ensure constant observer at bedside with Q15M safety checks2.  Maintain a safe environment3.  Secure patient belongings4.  Ensure family/visitors adhere to safety recommendations5.  Ensure safety tray has been added to patient's diet order6.  Every shift and PRN: Re-assess suicidal risk via Frequent Screener  5/27/2025 0829 by Corazon Arellano RN  Outcome: Completed  5/27/2025 0401 by Anisha Mancera RN  Outcome: Progressing  Note: Patient states symptoms improving and rates depression at 2. . Patient has been isolative to bed and self, despite encouragement to attend to ADL's and participate in unit programming. Will continue to monitor and offer support for safety.     Problem: Depression  Goal: Will be euthymic at discharge  Description: INTERVENTIONS:1. Administer medication as ordered2. Provide emotional support via 1:1 interaction with staff3. Encourage involvement in milieu/groups/activities4. Monitor for social isolation  Outcome: Completed

## 2025-05-27 NOTE — CARE COORDINATION
Writer spoke with AURA Powers to discuss Yani's discharge plan. Gio states she believes Yani would benefit from \"a women's residential program.\" Yani has declined assist with substance abuse treatment. Writer also spoke with Anitha Griffin, AURA ABBASI who states Yani has been living with a friend at [facility], states the friend is supportive. Anitha states group home placement is difficult with Yani per her history of being \"very destructive.\"     Writer updated April, guardianship services board on above.

## 2025-05-27 NOTE — TRANSITION OF CARE
Behavioral Health Transition Record    Patient Name: Yani Nowak  YOB: 1980   Medical Record Number: [id number]  Date of Admission: 5/20/2025  4:34 PM   Date of Discharge: 5/27/2025    Attending Provider: Ryder Vizcaino MD   Discharging Provider: Dr. Vizcaino  To contact this individual call [phone] and ask the  to page.  If unavailable, ask to be transferred to Behavioral Health Provider on call.  A Behavioral Health Provider will be available on call 24/7 and during holidays.    Primary Care Provider: New [place]as APRN - NP    Allergies   Allergen Reactions    Lamictal [Lamotrigine] Hives       Reason for Admission: Reason for Admission to Psychiatric Unit:  Threat to self requiring 24 hour professional observation  Acute disordered/bizarre behavior or psychomotor agitation or retardation;interferes with ADLs so that patient cannot function at a less intensive care level of care during evaluation and treatment   A mental disorder causing major disability in social, interpersonal, occupational, and/or educational functioning that is leading to dangerous or life-threatening functioning, and that can only be addressed in an acute inpatient setting   A mental disorder that causes an inability to maintain adequate nurtrition or self-care, and family/community support cannot provide reliable, essential care, so that the patient cannont function at a less intensive level of care during evaluation and treatment   Failure of outpatient psychiatry treatment so that the beneficiary requires 24 hour professional observation and care  Concerns about patient's safety in the community  Past Mental Health Diagnosis: a history of  Schizoaffective Disorder, Prior suicide attempt, and Alcohol and/or Drug Use Disorder  Triggering event or precipitating factor: Housing instability, Financial instability, Alcohol/Drug Relapse, and Psych Treatment Noncompliance  Length of time/duration of triggering

## 2025-05-27 NOTE — PROGRESS NOTES
Behavioral Services  Medicare Certification Upon Admission    I certify that this patient's inpatient psychiatric hospital admission is medically necessary for:    [x] (1) Treatment which could reasonably be expected to improve this patient's condition,       [x] (2) Or for diagnostic study;     AND     [x](2) The inpatient psychiatric services are provided while the individual is under the care of a physician and are included in the individualized plan of care.    Estimated length of stay/service 4-7 days    Plan for post-hospital care home with outpatient [facility] f/u    Electronically signed by RAN ZUNIGA MD on 5/21/2025 at 12:00 PM      
    [facility] Internal Medicine  Juno Ford MD; Delvis Connell MD,, Minoo Dsouza MD, Dr Ashwin Mosquera MD   ; Lindsay Ojeda MD    [facility] Internal Medicine   IN-PATIENT SERVICE   [facility]    Progress Note            Date:   2025  Patient name:  Yani Nowak  Date of admission:  2025  4:34 PM  MRN:   [id number]  Account:  [id number]  YOB: 1980  PCP:    [place]as APRN - NP  Room:   [facility]0129-01  Code Status:    Full Code      Chief Complaint:     Suicidal /Ac Psychosis    History Obtained From:     Patient/EMR/bedside RN     History of Present Illness:   Patient is 44-year-old female past medical history of anxiety bipolar, schizoaffective disorder, polysubstance abuse type 2 diabetes mellitus has been admitted to [facility] for further management of depression and suicidal ideation.  Labs reviewed from [facility] and    U-Tox positive benzodiazepines cocaine and cannabis  Urinalysis negative for infection      Patient history of diabetes, hypertension  Was not taking any medication for at least last few months  UDS positive for cocaine  Repeat HbA1c is 5.1  Past Medical History:     Past Medical History:   Diagnosis Date    Anxiety     Bipolar 1 disorder (HCC)     Depression     Gestational diabetes 2016    OCD (obsessive compulsive disorder)     PTSD (post-traumatic stress disorder)     Rapid rate of speech     Schizoaffective disorder (HCC)         Past Surgical History:     Past Surgical History:   Procedure Laterality Date    ABDOMEN SURGERY       SECTION      LAPAROSCOPY          Medications Prior to Admission:     Prior to Admission medications    Medication Sig Start Date End Date Taking? Authorizing Provider   buPROPion (WELLBUTRIN XL) 150 MG extended release tablet Take 1 tablet by mouth every morning   Yes Provider, MD Julius   busPIRone (BUSPAR) 10 MG tablet Take 1 tablet by mouth 3 times daily   Yes 
    [facility] Internal Medicine  Juno Ford MD; Delvis Connell MD,, Minoo Dsouza MD, Dr Ashwin Mosquera MD   ; Lindsay Ojeda MD    [facility] Internal Medicine   IN-PATIENT SERVICE   [facility]    Progress Note            Date:   2025  Patient name:  Yani Nowak  Date of admission:  2025  4:34 PM  MRN:   [id number]  Account:  [id number]  YOB: 1980  PCP:    New Salas APRN - NP  Room:   [facility]0129-01  Code Status:    Full Code      Chief Complaint:     Suicidal /Ac Psychosis    History Obtained From:     Patient/EMR/bedside RN     History of Present Illness:   Patient is 44-year-old female past medical history of anxiety bipolar, schizoaffective disorder, polysubstance abuse type 2 diabetes mellitus has been admitted to [facility] for further management of depression and suicidal ideation.  Labs reviewed from [facility] and    U-Tox positive benzodiazepines cocaine and cannabis  Urinalysis negative for infection      Patient history of diabetes, hypertension  Was not taking any medication for at least last few months  UDS positive for cocaine  Repeat HbA1c is 5.1  Past Medical History:     Past Medical History:   Diagnosis Date    Anxiety     Bipolar 1 disorder (HCC)     Depression     Gestational diabetes 2016    OCD (obsessive compulsive disorder)     PTSD (post-traumatic stress disorder)     Rapid rate of speech     Schizoaffective disorder (HCC)         Past Surgical History:     Past Surgical History:   Procedure Laterality Date    ABDOMEN SURGERY       SECTION      LAPAROSCOPY          Medications Prior to Admission:     Prior to Admission medications    Medication Sig Start Date End Date Taking? Authorizing Provider   buPROPion (WELLBUTRIN XL) 150 MG extended release tablet Take 1 tablet by mouth every morning   Yes Provider, MD Julius   busPIRone (BUSPAR) 10 MG tablet Take 1 tablet by mouth 3 times daily   Yes 
    [facility] Internal Medicine  Juno Ford MD; Delvis Connell MD,, Minoo Dsouza MD, Dr Ashwin Mosquera MD   ; Lindsay Ojeda MD    [facility] Internal Medicine   IN-PATIENT SERVICE   [facility]    Progress Note            Date:   2025  Patient name:  Yani Nowak  Date of admission:  2025  4:34 PM  MRN:   [id number]  Account:  [id number]  YOB: 1980  PCP:    New Salas APRN - NP  Room:   [address] [place] [address]  Code Status:    Full Code      Chief Complaint:     Suicidal /Ac Psychosis    History Obtained From:     Patient/EMR/bedside RN     History of Present Illness:   Patient is 44-year-old female past medical history of anxiety bipolar, schizoaffective disorder, polysubstance abuse type 2 diabetes mellitus has been admitted to [facility] for further management of depression and suicidal ideation.  Labs reviewed from [facility] and    U-Tox positive benzodiazepines cocaine and cannabis  Urinalysis negative for infection      Patient history of diabetes, hypertension  Was not taking any medication for at least last few months  UDS positive for cocaine  Repeat HbA1c is 5.1  Past Medical History:     Past Medical History:   Diagnosis Date    Anxiety     Bipolar 1 disorder (HCC)     Depression     Gestational diabetes 2016    OCD (obsessive compulsive disorder)     PTSD (post-traumatic stress disorder)     Rapid rate of speech     Schizoaffective disorder (HCC)         Past Surgical History:     Past Surgical History:   Procedure Laterality Date    ABDOMEN SURGERY       SECTION      LAPAROSCOPY          Medications Prior to Admission:     Prior to Admission medications    Medication Sig Start Date End Date Taking? Authorizing Provider   buPROPion (WELLBUTRIN XL) 150 MG extended release tablet Take 1 tablet by mouth every morning   Yes Provider, MD Julius   busPIRone (BUSPAR) 10 MG tablet Take 1 tablet by mouth 3 times daily   Yes 
    [facility] Internal Medicine  Juno Ford MD; Devlis Connell MD,, Minoo Dsouza MD, Dr Ashwin Mosquera MD   ; Lindsay Ojeda MD    [facility] Internal Medicine   IN-PATIENT SERVICE   [facility]     HISTORY AND PHYSICAL EXAMINATION            Date:   2025  Patient name:  Yani Nowak  Date of admission:  2025  4:34 PM  MRN:   [id number]  Account:  [id number]  YOB: 1980  PCP:    New Salas APRN - NP  Room:   [address] [place], [place] [address]-  Code Status:    Full Code      Chief Complaint:     Suicidal /Ac Psychosis    History Obtained From:     Patient/EMR/bedside RN     History of Present Illness:   Patient is 44-year-old female past medical history of anxiety bipolar, schizoaffective disorder, polysubstance abuse type 2 diabetes mellitus has been [facility] for further management of depression and suicidal ideation.  Labs reviewed from [facility] and    U-Tox positive benzodiazepines cocaine and cannabis  Urinalysis negative for infection      Patient history of diabetes, hypertension  Was not taking any medication for at least last few months  UDS positive for cocaine  Repeat HbA1c is 5.1  Past Medical History:     Past Medical History:   Diagnosis Date    Anxiety     Bipolar 1 disorder (HCC)     Depression     Gestational diabetes 2016    OCD (obsessive compulsive disorder)     PTSD (post-traumatic stress disorder)     Rapid rate of speech     Schizoaffective disorder (HCC)         Past Surgical History:     Past Surgical History:   Procedure Laterality Date    ABDOMEN SURGERY       SECTION  2007    LAPAROSCOPY          Medications Prior to Admission:     Prior to Admission medications    Medication Sig Start Date End Date Taking? Authorizing Provider   buPROPion (WELLBUTRIN XL) 150 MG extended release tablet Take 1 tablet by mouth every morning   Yes Provider, MD Julius   busPIRone (BUSPAR) 10 MG tablet Take 1 tablet by mouth 3 
   05/27/25 1425   Encounter Summary   Encounter Overview/Reason Behavioral Health   Service Provided For Patient   Last Encounter  05/27/25   Behavioral Health    Type  Spirituality Group   Assessment/Intervention/Outcome   Assessment Complicated grieving;Compromised coping;Desire for reconciliation   Intervention Active listening;Discussed belief system/Pentecostalism practices/bishop;Discussed illness injury and it’s impact;Discussed meaning/purpose;Discussed relationship with God;Empowerment;Explored/Affirmed feelings, thoughts, concerns;Explored Coping Skills/Resources;Life review/Legacy;Nurtured Hope;Prayer (assurance of)/[place];Sustaining Presence/Ministry of presence   Outcome Encouraged;Engaged in conversation;Expressed feelings, needs, and concerns;Expressed Gratitude;Receptive       
  Daily Progress Note  5/24/2025    Patient Name: Yani Nowak    CHIEF COMPLAINT: Psychosis         SUBJECTIVE:    Patient is seen today for a follow up assessment.  She is found walking the milieu. She is agreeable to engage in discussion. She requests to conduct interview standing in the hallway, denies need for privacy. Speech is rapid and interruptive. She presents with overabundance of ideas. She describes mood as \"okay\". She endorses rapid thoughts, restlessness, and paranoia. She endorses improvement of auditory hallucinations. She denies experiencing other perceptual disturbances. She does not present as preoccupied by internal stimuli. She repo she deniesrts improvement in sleep quality.  She denies disturbance of appetite.  She expresses satisfaction with discontinuation of Risperdal due to concerns for sexual dysfunction. She is compliant taking Haldol, BuSpar, prazosin, and lithium.  She is restarted on lithium this morning.  No adverse effects reported.  She has been without behavioral disturbances or need for emergency medications in the past 24 hours.  She is seen by internal medicine and started on Flagyl for ob/gyn infection.  She has been isolative but does not attend groups.  At this time, patient's symptoms are showing modest improvement but remained unstable.  She requires continued inpatient hospitalization for safety and stabilization.    Appetite:  [x] Adequate/Unchanged  [] Increased  [] Decreased      Sleep:       [] Adequate/Unchanged  [] Fair  [x] Improving      Group Attendance on Unit:   [x] Yes   [] Selectively    [] No    Compliant with scheduled medications: [x] Yes  [] No    Received emergency medications in past 24 hrs: [] Yes   [x] No    Medication Side Effects: Denies         Mental Status Exam  Level of consciousness: Alert and awake   Appearance: Appropriate attire for setting, standing, with fair  grooming and hygiene   Behavior/Motor: Approachable, engages with 
  Daily Progress Note  5/25/2025    Patient Name: Yani Nowak    CHIEF COMPLAINT: Psychosis         SUBJECTIVE:    Patient is seen today for a follow up assessment.  She agrees to conduct interview in private room with door open. Speech is rambling and less pressured. She describes mood as getting better\". She would like BuSpar discontinued reporting tiredness. She has refused multiple doses of Buspar. She reports adequate sleep last night and returning to bed briefly after breakfast this morning. She reports having some intrusive thoughts. She endorses improvement of auditory hallucinations.  She denies suicidal or homicidal ideation.  She is compliant taking Haldol, lithium, and prazosin.  She has not required emergency medications in the past 24 hours.  Will discontinue the BuSpar.    Appetite:  [x] Adequate/Unchanged  [] Increased  [] Decreased      Sleep:       [] Adequate/Unchanged  [] Fair  [x] Improving      Group Attendance on Unit:   [x] Yes   [] Selectively    [] No    Compliant with scheduled medications: [] Yes  [x] No    Received emergency medications in past 24 hrs: [] Yes   [x] No    Medication Side Effects: Denies         Mental Status Exam  Level of consciousness: Alert and awake   Appearance: Appropriate attire for setting, resting in bed with fair  grooming and hygiene   Behavior/Motor: Approachable, engages with interviewer, some psychomotor restlessness  Attitude toward examiner: Cooperative, attentive, good eye contact  Speech: Rambling, less pressured  Mood: \"Getting better\"  Affect: Anxious  Thought processes: Coherent and mostly linear  Thought content:  Denies homicidal ideation  Suicidal Ideation: Denies suicidal ideations, contracts for safety on the unit.   Delusions: No evidence of delusions.  Patient endorses some improvement of paranoia.  Perceptual Disturbance: Endorses improvement of auditory hallucinations.  Patient does not appear to be responding to internal stimuli. 
  Daily Progress Note  5/26/2025    Patient Name: Yani Nowak    CHIEF COMPLAINT:  Acute psychosis           SUBJECTIVE:    Patient is seen today for a follow up assessment. She has been compliant with scheduled medications and has been behaviorally in control. She has not required any emergency medications for agitation in the past 24 hours. She is seated on bed during assessment. Thought process somewhat tangential. Speech is less pressured but rambling present. She states that she is \"good\" and \"back on track.\" She reports improvement in depression, suicidal ideation, and anxiety. She denies homicidal ideation and hallucinations. She discusses her discharge plan, reporting that she will either stay with her sister or an acquaintance who is willing to rent a room to her. She is unsure if this acquaintance is trustworthy, requesting that social work or her guardian look into this. She emphasizes that she will be compliant with her medications upon discharge but does express concern about sexual dysfunction. She was educated on informing her healthcare providers if this side effect occurs. She gets tearful during assessment when discussing her ex- and his son, fearing that one of them may have been deported. While she is showing some improvement, her symptoms remain unstable for discharge and she is likely to decompensate in the community without proper discharge arrangement. She requires continued inpatient hospitalization for safety and stabilization until appropriate discharge is arranged.        Appetite:  [x] Adequate/Unchanged  [] Increased  [] Decreased      Sleep:       [] Adequate/Unchanged  [x] Fair  [] Poor      Group Attendance on Unit:   [x] Yes   [] Selectively    [] No    Compliant with scheduled medications: [x] Yes  [] No    Received emergency medications in past 24 hrs: [] Yes   [x] No    Medication Side Effects: Denies         Mental Status Exam  Level of consciousness: Alert and 
Daily Progress Note  Ryder Vizcaino MD  5/23/2025  CHIEF COMPLAINT: Psychosis    Reviewed patient's current plan of care and vital signs with nursing staff.  Sleep:  several hours last night  Attending groups: Yes    SUBJECTIVE:    Patient reports working with  on placement.  She is refusing Risperdal due to past side effects.  No current side effects reported but after discussion of risk benefits and alternatives she is agreeable to take Haldol.  Denying safety concerns on the unit but not able to contract for safety outside of the hospital.  Still has racing thoughts, paranoia some hallucinations.  Spent time in supportive psychotherapy discussing living situation    Mental Status Exam  Level of consciousness:  Within normal limits  Appearance: Hospital attire, seated in chair, with good grooming and hygiene   Behavior/Motor: No abnormalities noted  Attitude toward examiner:  Cooperative, attentive, good eye contact  Speech:  spontaneous, normal rate, normal volume and well articulated  Mood: Depressed  Affect: Congruent  Thought processes:  linear, goal directed and coherent  Thought content:  denies homicidal ideation  Suicidal Ideation: Endorses suicidal ideation  Delusions:  no evidence of delusions  Perceptual Disturbance: Endorses auditory hallucinations cognition:  Oriented to self, location, time, and situation  Memory: age appropriate  Insight & Judgement: improving  Medication side effects:  denies       Data   height is 1.753 m (5' 9\"). Her temporal temperature is 97 °F (36.1 °C). Her blood pressure is 139/92 (abnormal) and her pulse is 97. Her respiration is 14 and oxygen saturation is 98%.   Labs:   Admission on 05/20/2025   Component Date Value Ref Range Status    Lithium Lvl 05/21/2025 <0.1  mmol/L Final    Lithium Dose Amount 05/21/2025 NONE GIVEN   Final    Lithium Date Last Dose 05/21/2025 NONE GIVEN   Final    Lithium Dose Time 05/21/2025 NONE GIVEN   Final    Hemoglobin A1C 
Pharmacy Medication History Note      List of current medications patient is taking is complete.    Source of information: [facility] (Spoke with THELMA Hu)      Other notes (ex. Recent course of antibiotics, Coumadin dosing):    Per THELMA Hu at [facility] - patient last received Invega Sustenna 156 mg injection in the right upper quadrant on 04/11/2025. Patient is on an every 4 week schedule and would have been due for next injection on 04/09/2025. Patient is currently overdue for long acting injection.      Please let me know if you have any questions about this encounter. Thank you!    Electronically signed by Ayesha Weiss RP on 5/21/2025 at 10:07 AM    
Pharmacy Medication History Note      List of current medications patient is taking is incomplete.    Source of information: Lazarus Jenkins, [facility] Team ([phone])    Changes made to medication list:  Medications removed (include reason, ex. therapy complete or physician discontinued, noncompliance):  none    Medications flagged for provider review:  Acetaminophen 500 mg tablets - flagged for review/removal, patient should have completed this RX  Librium 300 mg capsule - flagged for review/removal - record of last filled in 2024    Medications added/doses adjusted:  Bupropion HCl  mg 1 tab PO QD (added)  Buspar HCl 10 mg 1 tab PO QD (added)  Melatonin 5 mg 1 cap PO QHS (added)  Prazosin 1 mg 1 cap PO QHS (added)  Seroquel 100 mg 1 tab Po BID (added)  Januvia 100 mg 1 tab PO QD (added)  Invega Sustenna 117 mg injection (added)    Other notes (ex. Recent course of antibiotics, Coumadin dosing):  Patient had a prescription for Metronidazole 500 mg 1 tab PO BID x 7 and Doxycycline 100 mg PO BID x 1 days prescribed on 05/11/2025 for STD infection    Patient had recent prescription of Keflex 500 mg 1 tab PO BID x 7  days prescribed on 05/08/2025 for a UTI    Invega Sustenna 117 mg injection dispensed on 05/08/2025, called and LVM x 1 for [facility] ACT team for confirmation of last date/dose of injection, awaiting on callback      Lazarus report negative       Current Home Medication List at Time of Admission:  Prior to Admission medications    Medication Sig   buPROPion (WELLBUTRIN XL) 150 MG extended release tablet Take 1 tablet by mouth every morning   busPIRone (BUSPAR) 10 MG tablet Take 1 tablet by mouth 3 times daily   Melatonin 5 MG CAPS Take 5 mg by mouth daily   metoprolol succinate (TOPROL XL) 25 MG extended release tablet Take 1 tablet by mouth daily   prazosin (MINIPRESS) 1 MG capsule Take 1 capsule by mouth nightly   QUEtiapine (SEROQUEL) 100 MG tablet Take 1 tablet by mouth 2 times daily 
RT ASSESSMENT TREATMENT GOALS    [x]Pt Goal:  Pt will identify 1-2 positive coping skills by time of discharge.    []Pt Goal:  Pt will identify 1-2 positive aspects of self by time of discharge.    []Pt Goal:  Pt will remain on task/topic for 15-30 minutes during group by time of discharge.    []Pt Goal:  Pt will identify 1-2 aspects of relapse prevention plan by time of discharge.    [x]Pt Goal:  Pt will join in conversation with peers 1-2 times per group by time of discharge.    []Pt Goal:  Pt will identify 1-2 new leisure interests by time of discharge.    [x]Pt Goal:  Pt will not voice any delusional content by time of discharge.    
Provider, MD Julius   Melatonin 5 MG CAPS Take 5 mg by mouth daily   Yes Julius Harris MD   metoprolol succinate (TOPROL XL) 25 MG extended release tablet Take 1 tablet by mouth daily   Yes Julius Harris MD   prazosin (MINIPRESS) 1 MG capsule Take 1 capsule by mouth nightly   Yes Julius Harris MD   QUEtiapine (SEROQUEL) 100 MG tablet Take 1 tablet by mouth 2 times daily   Yes Julius Harris MD   SITagliptin (JANUVIA) 100 MG tablet Take 1 tablet by mouth daily   Yes Julius Harris MD   paliperidone palmitate ER (INVEGA SUSTENNA) 156 MG/ML ASHOK IM injection Inject 156 mg into the muscle every 28 days   Yes Julius Harris MD   acetaminophen (TYLENOL) 500 MG tablet Take 1 tablet by mouth 4 times daily as needed for Pain 5/12/25 5/17/25  Josue Chowdhury MD   lithium 300 MG capsule Take 1 capsule by mouth 2 times daily 1/3/24   Ryder Vizcaino MD   nicotine (NICODERM CQ) 14 MG/24HR Place 1 patch onto the skin daily 1/4/24   Ryder Vizciano MD        Allergies:     Lamictal [lamotrigine]    Social History:     Tobacco:    reports that she has been smoking cigarettes. She has never used smokeless tobacco.  Alcohol:      reports no history of alcohol use.  Drug Use:  reports that she does not currently use drugs after having used the following drugs: Marijuana (Weed).    Family History:     Family History   Problem Relation Age of Onset    Diabetes Sister     No Known Problems Mother     No Known Problems Father        Review of Systems:     Positive and Negative as described in HPI.    CONSTITUTIONAL:  negative for fevers, chills, sweats, fatigue, weight loss  HEENT:  negative for vision, hearing changes, runny nose, throat pain  RESPIRATORY:  negative for shortness of breath, cough, congestion, wheezing.  CARDIOVASCULAR:  negative for chest pain, palpitations.  GASTROINTESTINAL:  negative for nausea, vomiting, diarrhea, constipation, change in bowel habits, abdominal 
attentive, fair eye contact  Speech: Rapid, pressured, normal volume  Mood: Anxious  Affect: Congruent  Thought processes: Rapid, tangential, somewhat disorganized  Thought content: Active suicidal ideations, with a  current plan or intent, unable to contract for safety off unit.               Denies homicidal ideations               Denies hallucinations              Denies delusions              Endorses improving paranoia  Cognition:  Oriented to self, location, time, situation  Concentration: Clinically adequate  Memory: Intact  Insight &Judgment: Poor    Data   height is 1.753 m (5' 9\"). Her temporal temperature is 97.2 °F (36.2 °C). Her blood pressure is 125/99 (abnormal) and her pulse is 65. Her respiration is 16 and oxygen saturation is 99%.   Labs:   Admission on 05/20/2025   Component Date Value Ref Range Status    Lithium Lvl 05/21/2025 <0.1  mmol/L Final    Lithium Dose Amount 05/21/2025 NONE GIVEN   Final    Lithium Date Last Dose 05/21/2025 NONE GIVEN   Final    Lithium Dose Time 05/21/2025 NONE GIVEN   Final    Hemoglobin A1C 05/22/2025 5.1  4.0 - 6.0 % Final    Estimated Avg Glucose 05/22/2025 100  mg/dL Final    Comment: The ADA and AACC recommend providing the estimated average glucose result to permit better   patient understanding of their HBA1c result.           Reviewed patient's current plan of care and vital signs with nursing staff.    Labs reviewed: [x] Yes    Vitals reviewed: [x] Yes      Medications  Current Facility-Administered Medications: [Held by provider] lithium capsule 300 mg, 300 mg, Oral, BID  busPIRone (BUSPAR) tablet 10 mg, 10 mg, Oral, TID  metoprolol succinate (TOPROL XL) extended release tablet 25 mg, 25 mg, Oral, Daily  nicotine (NICODERM CQ) 14 MG/24HR 1 patch, 1 patch, TransDERmal, Daily  prazosin (MINIPRESS) capsule 1 mg, 1 mg, Oral, Nightly  alogliptin (NESINA) tablet 25 mg, 25 mg, Oral, Daily  risperiDONE (RISPERDAL M-TABS) disintegrating tablet 2 mg, 2 mg, Oral,

## 2025-05-27 NOTE — GROUP NOTE
Wrap-Up Group Note        Date: May 26, 2025 Start Time: 2000  End Time: 2025      Number of Participants in Group & Unit Census:  4/14      Goal of Group: Discuss prompts related to moods, coping methods, feelings and thoughts.       Comments:     Patient did not participate in wrap-up group, despite staff encouragement and explanation of benefits.  Q15 minute safety checks maintained for patient safety and will continue to encourage patient to attend unit programming.

## 2025-05-27 NOTE — DISCHARGE SUMMARY
Provider Discharge Summary     Patient ID:  Yani Nowak  [id number]  44 y.o.  1980    Admit date: 5/20/2025    Discharge date and time: 5/27/2025  6:14 PM     Admitting Physician: Ryder Vizcaino MD     Discharge Physician: Ryder Vizcaino MD    Admission Diagnoses: Bipolar 1 disorder (HCC) [F31.9]  Schizoaffective disorder (HCC) [F25.9]    Discharge Diagnoses:      Schizoaffective disorder, bipolar type (HCC)     Patient Active Problem List   Diagnosis Code    Pregnancy Z34.90    Hx CS x1 (G2-twins,36wks) Z98.891    H/O miscarriage X2 O09.299    Insufficient prenatal care O09.30    AMA O09.529    Smoker F17.200    OCD F42.9    Cannabis abuse F12.10    Candida albicans infection B37.9    Twin gestation, 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    Cocaine abuse (HCC) F14.10    Fetal cardiac anomaly complicating pregnancy, antepartum O35.BXX0    Chromosomal 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, affecting management of mother, antepartum O35.10X0    Poor fetal growth, affecting management of mother, antepartum condition or complication O36.5990    Umbilical cord complication O69.9XX0    Tobacco use disorder complicating pregnancy, childbirth, or puerperium, antepartum O99.330    Twin B-IUGR O36.5920    Twin B-Fetal cardiomegaly, pericardial effusion, VSD O35.BXX0    Polyhydramnios (twin A-8.6cm and twin B-8.7cm) O40.2XX0    Depression F32.A    Twin B-MCAs wnl, absent UADs O69.9XX0    Abnl Quad (1:50 T21), elev msAFP O35.10X0    Hx D&C (G3-8wks, G4-8wks) Z98.890    Elev 1hr gtt, normal 3hr R73.09    GBS bacteriuria R82.71    Chlamydia infection (SPENSER neg) O98.811, A74.9    FHx DM  Z83.3    Fetal pericardial effusion affecting management of mother O36.8990    Polyhydramnios, antepartum complication O40.9XX0    Polyhydramnios, antepartum complication O40.9XX0    IUGR (intrauterine growth restriction) affecting care of mother O36.5990    Umbilical cord complication O69.9XX0    Suspected damage to fetus from other disease in mother,

## 2025-05-27 NOTE — CARE COORDINATION
[facility] as no calls have been returned in previous attempts.

## 2025-05-27 NOTE — PLAN OF CARE
Problem: Pain  Goal: Verbalizes/displays adequate comfort level or baseline comfort level  Outcome: Progressing  Note: Patient denies pain this shift. Patient continues to utilize lidocaine patches and prn medications. Will continue to monitor.      Problem: Self Harm/Suicidality  Goal: Will have no self-injury during hospital stay  Description: INTERVENTIONS:1.  Ensure constant observer at bedside with Q15M safety checks2.  Maintain a safe environment3.  Secure patient belongings4.  Ensure family/visitors adhere to safety recommendations5.  Ensure safety tray has been added to patient's diet order6.  Every shift and PRN: Re-assess suicidal risk via Frequent Screener  Outcome: Progressing  Note: Patient states symptoms improving and rates depression at 2. . Patient has been isolative to bed and self, despite encouragement to attend to ADL's and participate in unit programming. Will continue to monitor and offer support for safety.

## 2025-05-27 NOTE — BH NOTE
[facility]  Discharge Note    Pt discharged with followings belongings:   Dental Appliances: None  Vision - Corrective Lenses: None  Hearing Aid: None  Jewelry: None  Body Piercings Removed: N/A  Clothing: Footwear, Pants, Shirt, Slippers, Socks  Other Valuables: Lighter/Matches, Money, Other (Comment) (Ceramic frog, lip gloss x 2 , black markers)   Valuables sent home with patient. Patient educated on aftercare instructions: Yes  [facility]  at 2:29 PM .Patient verbalize understanding of AVS:  Yes.    Status EXAM upon discharge:  Mental Status and Behavioral Exam  Normal: No  Level of Assistance: Independent/Self  Facial Expression: Brightened  Affect: Appropriate  Level of Consciousness: Alert  Frequency of Checks: 4 times per hour, close  Mood:Normal: No  Mood: Anxious  Motor Activity:Normal: Yes  Motor Activity: Increased  Eye Contact: Good  Observed Behavior: Friendly, Cooperative  Sexual Misconduct History: Current - no  Preception: [place] to person, [place] to time, [place] to place, [place] to situation  Attention:Normal: Yes  Attention: Distractible  Thought Processes: Unremarkable  Thought Content:Normal: Yes  Thought Content: Preoccupations  Depression Symptoms: Loss of interest  Anxiety Symptoms: Generalized  [place] Symptoms: No problems reported or observed.  Hallucinations: None  Delusions: Yes  Delusions: Somatic  Memory:Normal: Yes  Memory: Poor recent  Insight and Judgment: No  Insight and Judgment: Poor insight, Unmotivated    Tobacco Screening:  Practical Counseling, on admission, nati X, if applicable and completed (first 3 are required if patient doesn't refuse):            ( ) Recognizing danger situations (included triggers and roadblocks)                    ( ) Coping skills (new ways to manage stress,relaxation techniques, changing routine, distraction)                                                           ( ) Basic information about quitting (benefits

## 2025-05-27 NOTE — GROUP NOTE
Group Therapy Note    Date: 5/27/2025    Group Start Time: 1100  Group End Time: 1140  Group Topic: Cognitive Skills    [facility]    Emily Isbell [facility]S        Group Therapy Note    Attendees: 10/16     Patient's Goal: To increase socialization, practice deductive reasoning and problem solving skills,    and communication skills. .       Notes: Pt was pleasant and cooperative and participated fully in Group . Pt was able to practice   deductive reasoning and problem solving skills, and communication skills. .  independently.        Status After Intervention:  Improved      Participation Level:  Attentive, sharing , supportive     Participation Quality: Attentive, sharing , supportive       Speech:  Normal     Thought Process/Content: Logical, linear.      Affective Functioning: Congruent      Mood:  Euthymic      Level of consciousness:  Attentive and Alert      Response to Learning: Able to verbalize/acknowledge current knowledge,  Able to verbalize/acknowledge   new learning , and Progressing to goal      Endings: None Reported     Modes of Intervention: Education, Support, Exploration, Clarifying, and Problem solving      Discipline Responsible: Psychoeducational Specialist  Signature:  JUANCARLOS CHRISTIAN

## 2025-05-31 ENCOUNTER — HOSPITAL ENCOUNTER (EMERGENCY)
Age: 45
Discharge: HOME | DRG: 885 | End: 2025-05-31
Attending: EMERGENCY MEDICINE
Payer: MEDICARE

## 2025-05-31 ENCOUNTER — HOSPITAL ENCOUNTER (EMERGENCY)
Age: 45
Discharge: LEFT BEFORE DISCHARGE | DRG: 885 | End: 2025-05-31
Attending: EMERGENCY MEDICINE
Payer: MEDICARE

## 2025-05-31 VITALS
RESPIRATION RATE: 16 BRPM | TEMPERATURE: 98.2 F | OXYGEN SATURATION: 97 % | HEART RATE: 94 BPM | DIASTOLIC BLOOD PRESSURE: 88 MMHG | SYSTOLIC BLOOD PRESSURE: 117 MMHG

## 2025-05-31 VITALS
RESPIRATION RATE: 16 BRPM | BODY MASS INDEX: 23.71 KG/M2 | SYSTOLIC BLOOD PRESSURE: 122 MMHG | TEMPERATURE: 98.3 F | OXYGEN SATURATION: 100 % | DIASTOLIC BLOOD PRESSURE: 88 MMHG | HEIGHT: 69 IN | WEIGHT: 160.05 LBS | HEART RATE: 81 BPM

## 2025-05-31 DIAGNOSIS — Z53.21 ELOPED FROM EMERGENCY DEPARTMENT: Primary | ICD-10-CM

## 2025-05-31 DIAGNOSIS — Z00.8 ENCOUNTER FOR MEDICAL ASSESSMENT: Primary | ICD-10-CM

## 2025-05-31 PROCEDURE — 99282 EMERGENCY DEPT VISIT SF MDM: CPT

## 2025-05-31 PROCEDURE — 99283 EMERGENCY DEPT VISIT LOW MDM: CPT

## 2025-05-31 ASSESSMENT — ENCOUNTER SYMPTOMS
NAUSEA: 0
VOMITING: 0
SHORTNESS OF BREATH: 0
ABDOMINAL PAIN: 0

## 2025-05-31 ASSESSMENT — PAIN - FUNCTIONAL ASSESSMENT: PAIN_FUNCTIONAL_ASSESSMENT: NONE - DENIES PAIN

## 2025-05-31 NOTE — ED PROVIDER NOTES
[facility] EMERGENCY DEPARTMENT  Emergency Department Encounter  Emergency Medicine Resident     Pt Name:Yani Nowak  MRN: [id number]  Birthdate 1980  Date of evaluation: 25  PCP:  New Salas APRN - NP  Note Started: 1:34 AM EDT      CHIEF COMPLAINT       Chief Complaint   Patient presents with    Pregnancy Test       HISTORY OF PRESENT ILLNESS  (Location/Symptom, Timing/Onset, Context/Setting, Quality, Duration, Modifying Factors, Severity.)      Yani Nowak is a 44 y.o. female with past medical history of bipolar 1 disorder, OCD, schizoaffective disorder who presents with desire for pregnancy test.  Patient reports that she had sex with 1 man recently and did not use a condom.  She states that she is concerned that she may be pregnant even though she believes she is too old to get pregnant.  No unusual vaginal discharge or bleeding.  No abdominal pain, nausea, vomiting.  No suicidal or homicidal ideation.  No other complaints expressed at this time.    PAST MEDICAL / SURGICAL / SOCIAL / FAMILY HISTORY      has a past medical history of Anxiety, Bipolar 1 disorder (HCC), Depression, Gestational diabetes, OCD (obsessive compulsive disorder), PTSD (post-traumatic stress disorder), Rapid rate of speech, and Schizoaffective disorder (HCC).       has a past surgical history that includes Abdomen surgery;  section (); and laparoscopy.      Social History     Socioeconomic History    Marital status: Legally      Spouse name: Not on file    Number of children: Not on file    Years of education: Not on file    Highest education level: Not on file   Occupational History    Not on file   Tobacco Use    Smoking status: Every Day     Current packs/day: 1.00     Types: Cigarettes    Smokeless tobacco: Never   Vaping Use    Vaping status: Former    Substances: Always   Substance and Sexual Activity    Alcohol use: No     Alcohol/week: 0.0 standard drinks of alcohol

## 2025-05-31 NOTE — ED PROVIDER NOTES
[facility]     Emergency Department     Faculty Note/ Attestation      Pt Name: Yani Nowak                                       MRN: [id number]  Birthdate 1980  Date of evaluation: 5/31/2025  Note Started: 1:29 AM EDT    Patients PCP:    New Salas, MAURICE - NP    Attestation  I performed a history and physical examination of the patient and discussed management with the resident. I reviewed the resident’s note and agree with the documented findings and plan of care. Any areas of disagreement are noted on the chart. I was personally present for the key portions of any procedures. I have documented in the chart those procedures where I was not present during the key portions. I have reviewed the emergency nurses triage note. I agree with the chief complaint, past medical history, past surgical history, allergies, medications, social and family history as documented unless otherwise noted below.    For Physician Assistant/ Nurse Practitioner cases/documentation I have personally evaluated this patient and have completed at least one if not all key elements of the E/M (history, physical exam, and MDM). Additional findings are as noted.    Initial Screens:             Vitals:  There were no vitals filed for this visit.    CHIEF COMPLAINT     No chief complaint on file.    The pt is a 45 YO who arrives with nausea concerned she is pregnant.  The pt notes no pain just nausea.  Patient has an elevated mood and affect patient is not suicidal homicidal does not note any thoughts of hurting herself or others does admit to sexual activity and is concerned she could be pregnant      EMERGENCY DEPARTMENT COURSE:     -------------------------   ,  ,  ,    Physical Exam  Constitutional:       Appearance: She is well-developed. She is not diaphoretic.   HENT:      Head: Normocephalic and atraumatic.      Right Ear: External ear normal.      Left Ear: External ear normal.   Eyes:

## 2025-05-31 NOTE — ED NOTES
Patient eloped. Dr. Bullock notified.  Patient took a belonging bag with her with blanket, suction canister, food tray. Saying \"Yey I'm discharge. I can smoke now\"

## 2025-05-31 NOTE — ED PROVIDER NOTES
FACULTY SIGN-OUT  ADDENDUM       Patient: Yani Nowak   MRN: [id number]  PCP:  New Salas APRN - NP  Note Started: 5/31/25, 2:09 AM EDT  Attestation  I was available and discussed any additional care issues that arose and coordinated the management plans with the resident(s) caring for the patient during my duty period. Any areas of disagreement with resident's documentation of care or procedures are noted on the chart. I was personally present for the key portions of any/all procedures during my duty period. I have documented in the chart those procedures where I was not present during the key portions.   The patient's initial evaluation and plan have been discussed with the prior provider who initially evaluated the patient.      Pertinent Comments:  The patient is a 44 y.o. female taken in signout with known psychiatric disease including bipolar disease and schizoaffective disorder currently with likely manic episode however no homicidal or suicidal ideation.   Is concerned regarding pregnancy apparently as well but no abdominal pain  We are awaiting pregnancy test as well as further treatment reevaluation after    ED COURSE      The patient was given the following medications:  No orders of the defined types were placed in this encounter.      RECENT VITALS:   BP: 117/88  Pulse: 94  Respirations: 16  Temp: 98.2 °F (36.8 °C) SpO2: 97 %    (Please note that portions of this note were completed with a voice recognition program.  Efforts were made to edit the dictations but occasionally words are mis-transcribed.)    Juice Estrada MD [facility]  Attending Emergency Medicine Physician       Juice Estrada MD  05/31/25 0215

## 2025-05-31 NOTE — ED NOTES
Patient is 44/F came in wanted to be tested for pregnancy. Patient appears to be anxious acting all over the place. Patient verbalizes she is intoxicated with alcohol because she drink vodka and mix it with milk and \"It taste good\" Denies medical complaint at this time. Refused blood work and was in the bathroom 2x already with no urine sample. VS taken and recorded. Dr. Bullock at bedside for consult and evaluation. Patient is resting comfortably, VSS, Call light in reach. Plan of care on going.

## 2025-06-01 NOTE — DISCHARGE INSTRUCTIONS
You are seen in the ED for evaluation.  You are medically stable for discharge.    Please follow-up with the PCP in the outpatient setting.  Information for setting outpatient ointment has been provided.    Please turn to the ED if experience any worsening of chest pain, shortness of breath, headache or no improvement symptoms for

## 2025-06-01 NOTE — ED NOTES
The patient is a 44 year old female that has a history of Schizoaffective Disorder Bipolar Type. Patient presents to the ED with [place] Police due to patient defecating in public. Patient presents with rapid speech and disorganized thinking. The patient is redirectable and A & O x3. The patient denied any suicidal or homicidal thoughts or command hallucinations. The patient does not pose an immanent risk of danger to herself or other people at this time. Discussed with patient options of mental health treatment tonight. Patient does not meet involuntary criteria and did not want a voluntary admission to hospital or Crisis Care. Patient is agreeable to follow up with Jessica on Monday. Patient is also on the [facility] ACT team and can reach out for help at any time.

## 2025-06-01 NOTE — ED PROVIDER NOTES
[facility] EMERGENCY DEPARTMENT  Emergency Department Encounter  Emergency Medicine Resident     Pt Name:Yani Nowak  MRN: [id number]  Birthdate 1980  Date of evaluation: 25  PCP:  New Salas APRN - NP  Note Started: 10:04 PM EDT      CHIEF COMPLAINT       Chief Complaint   Patient presents with    Psychiatric Evaluation       HISTORY OF PRESENT ILLNESS  (Location/Symptom, Timing/Onset, Context/Setting, Quality, Duration, Modifying Factors, Severity.)      Yani Nowak is a 44 y.o. female who was brought in by [employer] after being found defecating in front of a business.  [employer] was concerned for her wellbeing and thus brought her to the ED for further evaluation.  Patient otherwise denies any medical complaints at this time such as suicidal ideation or homicidal ideation.    PAST MEDICAL / SURGICAL / SOCIAL / FAMILY HISTORY      has a past medical history of Anxiety, Bipolar 1 disorder (HCC), Depression, Gestational diabetes, OCD (obsessive compulsive disorder), PTSD (post-traumatic stress disorder), Rapid rate of speech, and Schizoaffective disorder (HCC).       has a past surgical history that includes Abdomen surgery;  section (); and laparoscopy.      Social History     Socioeconomic History    Marital status: Legally      Spouse name: Not on file    Number of children: Not on file    Years of education: Not on file    Highest education level: Not on file   Occupational History    Not on file   Tobacco Use    Smoking status: Every Day     Current packs/day: 1.00     Types: Cigarettes    Smokeless tobacco: Never   Vaping Use    Vaping status: Former    Substances: Always   Substance and Sexual Activity    Alcohol use: No     Alcohol/week: 0.0 standard drinks of alcohol    Drug use: Not Currently     Types: Marijuana (Weed)     Comment: did ice and smoked something    Sexual activity: Yes     Partners: Male   Other Topics Concern    Not on file   Social 
discharged at this time      Medical Decision Making  Problems Addressed:  Encounter for medical assessment: acute illness or injury            (Please note that portions of this note were completed with a voice recognition program.  Efforts were made to edit the dictations but occasionally words are mis-transcribed.)    Yosvany Wheatley MD, FACEP  Attending Emergency Medicine Physician         Yosvany Wheatley MD  05/31/25 2012

## 2025-06-01 NOTE — ED NOTES
Patient to ED via EMS for a psychiatric evaluation. Per EMS, patient was found defecating in a business and the police were called. Patient presents with flight of ideas but denies SI/HI. Patient is A&O x4. Patient was ambulatory to stretcher. Call light within reach.

## 2025-06-02 ENCOUNTER — HOSPITAL ENCOUNTER (INPATIENT)
Age: 45
LOS: 2 days | Discharge: PSYCHIATRIC OR SUBSTANCE USE FACILITY | DRG: 885 | End: 2025-06-04
Attending: EMERGENCY MEDICINE | Admitting: INTERNAL MEDICINE
Payer: MEDICARE

## 2025-06-02 DIAGNOSIS — R46.89 COMBATIVE BEHAVIOR: ICD-10-CM

## 2025-06-02 DIAGNOSIS — F23 ACUTE PSYCHOSIS (HCC): Primary | ICD-10-CM

## 2025-06-02 DIAGNOSIS — R46.89 AGGRESSION: ICD-10-CM

## 2025-06-02 LAB
ALBUMIN SERPL-MCNC: 3.9 G/DL (ref 3.5–5.2)
ALP SERPL-CCNC: 89 U/L (ref 35–104)
ALT SERPL-CCNC: 16 U/L (ref 10–35)
ANION GAP SERPL CALCULATED.3IONS-SCNC: 10 MMOL/L (ref 9–16)
AST SERPL-CCNC: 29 U/L (ref 10–35)
BASOPHILS # BLD: 0.06 K/UL (ref 0–0.2)
BASOPHILS NFR BLD: 1 % (ref 0–2)
BILIRUB SERPL-MCNC: <0.2 MG/DL (ref 0–1.2)
BUN SERPL-MCNC: 10 MG/DL (ref 6–20)
CALCIUM SERPL-MCNC: 9.2 MG/DL (ref 8.6–10.4)
CHLORIDE SERPL-SCNC: 107 MMOL/L (ref 98–107)
CO2 SERPL-SCNC: 23 MMOL/L (ref 20–31)
CREAT SERPL-MCNC: 0.6 MG/DL (ref 0.7–1.2)
EOSINOPHIL # BLD: 0.33 K/UL (ref 0–0.44)
EOSINOPHILS RELATIVE PERCENT: 3 % (ref 0–4)
ERYTHROCYTE [DISTWIDTH] IN BLOOD BY AUTOMATED COUNT: 13.8 % (ref 11.5–14.9)
GFR, ESTIMATED: >90 ML/MIN/1.73M2
GLUCOSE SERPL-MCNC: 106 MG/DL (ref 74–99)
HCT VFR BLD AUTO: 38.7 % (ref 36–46)
HGB BLD-MCNC: 12.8 G/DL (ref 12–16)
IMM GRANULOCYTES # BLD AUTO: 0.03 K/UL (ref 0–0.3)
IMM GRANULOCYTES NFR BLD: 0 %
LYMPHOCYTES NFR BLD: 3.06 K/UL (ref 1.1–3.7)
LYMPHOCYTES RELATIVE PERCENT: 27 % (ref 24–44)
MCH RBC QN AUTO: 27.9 PG (ref 26–34)
MCHC RBC AUTO-ENTMCNC: 33.1 G/DL (ref 31–37)
MCV RBC AUTO: 84.5 FL (ref 80–100)
MONOCYTES NFR BLD: 0.66 K/UL (ref 0.1–1.2)
MONOCYTES NFR BLD: 6 % (ref 3–12)
NEUTROPHILS NFR BLD: 63 % (ref 36–66)
NEUTS SEG NFR BLD: 7.42 K/UL (ref 1.5–8.1)
NRBC BLD-RTO: 0 PER 100 WBC
PLATELET # BLD AUTO: 242 K/UL (ref 150–450)
PMV BLD AUTO: 10.5 FL (ref 8–13.5)
POTASSIUM SERPL-SCNC: 3.7 MMOL/L (ref 3.7–5.3)
PROT SERPL-MCNC: 6.1 G/DL (ref 6.6–8.7)
RBC # BLD AUTO: 4.58 M/UL (ref 3.95–5.11)
SODIUM SERPL-SCNC: 140 MMOL/L (ref 136–145)
WBC OTHER # BLD: 11.6 K/UL (ref 3.5–11)

## 2025-06-02 PROCEDURE — 36415 COLL VENOUS BLD VENIPUNCTURE: CPT

## 2025-06-02 PROCEDURE — 96374 THER/PROPH/DIAG INJ IV PUSH: CPT

## 2025-06-02 PROCEDURE — 96372 THER/PROPH/DIAG INJ SC/IM: CPT

## 2025-06-02 PROCEDURE — 2500000003 HC RX 250 WO HCPCS

## 2025-06-02 PROCEDURE — 2500000003 HC RX 250 WO HCPCS: Performed by: EMERGENCY MEDICINE

## 2025-06-02 PROCEDURE — 99285 EMERGENCY DEPT VISIT HI MDM: CPT

## 2025-06-02 PROCEDURE — 6370000000 HC RX 637 (ALT 250 FOR IP)

## 2025-06-02 PROCEDURE — 85025 COMPLETE CBC W/AUTO DIFF WBC: CPT

## 2025-06-02 PROCEDURE — 2060000000 HC ICU INTERMEDIATE R&B

## 2025-06-02 PROCEDURE — 80053 COMPREHEN METABOLIC PANEL: CPT

## 2025-06-02 PROCEDURE — 6360000002 HC RX W HCPCS: Performed by: EMERGENCY MEDICINE

## 2025-06-02 RX ORDER — MIDAZOLAM HYDROCHLORIDE 2 MG/2ML
2 INJECTION, SOLUTION INTRAMUSCULAR; INTRAVENOUS ONCE
Status: COMPLETED | OUTPATIENT
Start: 2025-06-02 | End: 2025-06-02

## 2025-06-02 RX ORDER — HALOPERIDOL 5 MG/1
5 TABLET ORAL 2 TIMES DAILY
Status: DISCONTINUED | OUTPATIENT
Start: 2025-06-02 | End: 2025-06-03

## 2025-06-02 RX ORDER — HALOPERIDOL 5 MG/ML
5 INJECTION INTRAMUSCULAR ONCE
Status: COMPLETED | OUTPATIENT
Start: 2025-06-02 | End: 2025-06-02

## 2025-06-02 RX ORDER — DIPHENHYDRAMINE HYDROCHLORIDE 50 MG/ML
50 INJECTION, SOLUTION INTRAMUSCULAR; INTRAVENOUS ONCE
Status: COMPLETED | OUTPATIENT
Start: 2025-06-02 | End: 2025-06-02

## 2025-06-02 RX ORDER — LITHIUM CARBONATE 300 MG/1
300 CAPSULE ORAL 2 TIMES DAILY
Status: DISCONTINUED | OUTPATIENT
Start: 2025-06-02 | End: 2025-06-04 | Stop reason: HOSPADM

## 2025-06-02 RX ORDER — DEXMEDETOMIDINE HYDROCHLORIDE 4 UG/ML
.1-1.5 INJECTION, SOLUTION INTRAVENOUS CONTINUOUS
Status: DISCONTINUED | OUTPATIENT
Start: 2025-06-02 | End: 2025-06-04

## 2025-06-02 RX ORDER — TRAZODONE HYDROCHLORIDE 50 MG/1
50 TABLET ORAL NIGHTLY PRN
Status: DISCONTINUED | OUTPATIENT
Start: 2025-06-02 | End: 2025-06-04 | Stop reason: HOSPADM

## 2025-06-02 RX ORDER — AMLODIPINE BESYLATE 5 MG/1
5 TABLET ORAL DAILY
Status: DISCONTINUED | OUTPATIENT
Start: 2025-06-02 | End: 2025-06-04 | Stop reason: HOSPADM

## 2025-06-02 RX ORDER — ACETAMINOPHEN 325 MG/1
650 TABLET ORAL EVERY 6 HOURS PRN
Status: DISCONTINUED | OUTPATIENT
Start: 2025-06-02 | End: 2025-06-04 | Stop reason: HOSPADM

## 2025-06-02 RX ORDER — SODIUM CHLORIDE 0.9 % (FLUSH) 0.9 %
10 SYRINGE (ML) INJECTION PRN
Status: DISCONTINUED | OUTPATIENT
Start: 2025-06-02 | End: 2025-06-04 | Stop reason: HOSPADM

## 2025-06-02 RX ORDER — SODIUM CHLORIDE 0.9 % (FLUSH) 0.9 %
5-40 SYRINGE (ML) INJECTION EVERY 12 HOURS SCHEDULED
Status: DISCONTINUED | OUTPATIENT
Start: 2025-06-02 | End: 2025-06-04 | Stop reason: HOSPADM

## 2025-06-02 RX ORDER — ENOXAPARIN SODIUM 100 MG/ML
40 INJECTION SUBCUTANEOUS DAILY
Status: DISCONTINUED | OUTPATIENT
Start: 2025-06-02 | End: 2025-06-04 | Stop reason: HOSPADM

## 2025-06-02 RX ORDER — PRAZOSIN HYDROCHLORIDE 1 MG/1
1 CAPSULE ORAL NIGHTLY
Status: DISCONTINUED | OUTPATIENT
Start: 2025-06-02 | End: 2025-06-04 | Stop reason: HOSPADM

## 2025-06-02 RX ORDER — POLYETHYLENE GLYCOL 3350 17 G/17G
17 POWDER, FOR SOLUTION ORAL DAILY PRN
Status: DISCONTINUED | OUTPATIENT
Start: 2025-06-02 | End: 2025-06-04 | Stop reason: HOSPADM

## 2025-06-02 RX ORDER — ACETAMINOPHEN 650 MG/1
650 SUPPOSITORY RECTAL EVERY 6 HOURS PRN
Status: DISCONTINUED | OUTPATIENT
Start: 2025-06-02 | End: 2025-06-04 | Stop reason: HOSPADM

## 2025-06-02 RX ORDER — ONDANSETRON 4 MG/1
4 TABLET, ORALLY DISINTEGRATING ORAL EVERY 8 HOURS PRN
Status: DISCONTINUED | OUTPATIENT
Start: 2025-06-02 | End: 2025-06-04 | Stop reason: HOSPADM

## 2025-06-02 RX ORDER — SODIUM CHLORIDE 9 MG/ML
INJECTION, SOLUTION INTRAVENOUS PRN
Status: DISCONTINUED | OUTPATIENT
Start: 2025-06-02 | End: 2025-06-04 | Stop reason: HOSPADM

## 2025-06-02 RX ORDER — BISACODYL 10 MG
10 SUPPOSITORY, RECTAL RECTAL DAILY PRN
Status: DISCONTINUED | OUTPATIENT
Start: 2025-06-02 | End: 2025-06-04 | Stop reason: HOSPADM

## 2025-06-02 RX ORDER — ONDANSETRON 2 MG/ML
4 INJECTION INTRAMUSCULAR; INTRAVENOUS EVERY 6 HOURS PRN
Status: DISCONTINUED | OUTPATIENT
Start: 2025-06-02 | End: 2025-06-04 | Stop reason: HOSPADM

## 2025-06-02 RX ORDER — POTASSIUM CHLORIDE 1500 MG/1
40 TABLET, EXTENDED RELEASE ORAL PRN
Status: DISCONTINUED | OUTPATIENT
Start: 2025-06-02 | End: 2025-06-04 | Stop reason: HOSPADM

## 2025-06-02 RX ORDER — HYDROXYZINE HYDROCHLORIDE 25 MG/1
50 TABLET, FILM COATED ORAL 3 TIMES DAILY PRN
Status: DISCONTINUED | OUTPATIENT
Start: 2025-06-02 | End: 2025-06-04 | Stop reason: HOSPADM

## 2025-06-02 RX ORDER — MAGNESIUM SULFATE HEPTAHYDRATE 40 MG/ML
2000 INJECTION, SOLUTION INTRAVENOUS PRN
Status: DISCONTINUED | OUTPATIENT
Start: 2025-06-02 | End: 2025-06-04 | Stop reason: HOSPADM

## 2025-06-02 RX ORDER — MICONAZOLE NITRATE 20 MG/G
CREAM TOPICAL 2 TIMES DAILY
Status: DISCONTINUED | OUTPATIENT
Start: 2025-06-02 | End: 2025-06-04 | Stop reason: HOSPADM

## 2025-06-02 RX ORDER — POTASSIUM CHLORIDE 7.45 MG/ML
10 INJECTION INTRAVENOUS PRN
Status: DISCONTINUED | OUTPATIENT
Start: 2025-06-02 | End: 2025-06-04 | Stop reason: HOSPADM

## 2025-06-02 RX ORDER — MIDAZOLAM HYDROCHLORIDE 5 MG/ML
5 INJECTION, SOLUTION INTRAMUSCULAR; INTRAVENOUS ONCE
Status: COMPLETED | OUTPATIENT
Start: 2025-06-02 | End: 2025-06-02

## 2025-06-02 RX ADMIN — HALOPERIDOL LACTATE 5 MG: 5 INJECTION, SOLUTION INTRAMUSCULAR at 14:55

## 2025-06-02 RX ADMIN — SODIUM CHLORIDE, PRESERVATIVE FREE 10 ML: 5 INJECTION INTRAVENOUS at 20:41

## 2025-06-02 RX ADMIN — ANTIFUNGAL: 2 CREAM TOPICAL at 20:41

## 2025-06-02 RX ADMIN — MIDAZOLAM 5 MG: 5 INJECTION INTRAMUSCULAR; INTRAVENOUS at 17:28

## 2025-06-02 RX ADMIN — DIPHENHYDRAMINE HYDROCHLORIDE 50 MG: 50 INJECTION INTRAMUSCULAR; INTRAVENOUS at 14:56

## 2025-06-02 RX ADMIN — DEXMEDETOMIDINE HYDROCHLORIDE 0.5 MCG/KG/HR: 400 INJECTION INTRAVENOUS at 17:04

## 2025-06-02 RX ADMIN — MIDAZOLAM HYDROCHLORIDE 2 MG: 1 INJECTION, SOLUTION INTRAMUSCULAR; INTRAVENOUS at 14:56

## 2025-06-02 ASSESSMENT — LIFESTYLE VARIABLES
HOW OFTEN DO YOU HAVE A DRINK CONTAINING ALCOHOL: PATIENT UNABLE TO ANSWER
HOW MANY STANDARD DRINKS CONTAINING ALCOHOL DO YOU HAVE ON A TYPICAL DAY: PATIENT UNABLE TO ANSWER

## 2025-06-02 ASSESSMENT — PAIN SCALES - GENERAL: PAINLEVEL_OUTOF10: 0

## 2025-06-02 NOTE — PROGRESS NOTES
Patient transported to ICU 2008 via 2 Rns and ED tech in violent wrist restraints. Violent restraints removed, bilateral soft wrist restraints applied at this time. 1:1 at bedside. VS stable, precedex infusing at 0.5mcg/kg/hr

## 2025-06-02 NOTE — ED PROVIDER NOTES
standard drinks of alcohol    Drug use: Not Currently     Types: Marijuana (Weed)     Comment: did ice and smoked something     PHYSICAL EXAM     INITIAL VITALS: /76   Pulse 50   Temp 97.8 °F (36.6 °C) (Axillary)   Resp 13   SpO2 95%    Physical Exam  Vitals and nursing note reviewed.   HENT:      Head: Normocephalic.      Right Ear: External ear normal.      Left Ear: External ear normal.      Mouth/Throat:      Mouth: Mucous membranes are moist.   Eyes:      Extraocular Movements: Extraocular movements intact.   Cardiovascular:      Rate and Rhythm: Normal rate and regular rhythm.      Pulses:           Radial pulses are 2+ on the right side and 2+ on the left side.        Dorsalis pedis pulses are 2+ on the right side and 2+ on the left side.        Posterior tibial pulses are 2+ on the right side and 2+ on the left side.      Heart sounds: Normal heart sounds.   Pulmonary:      Effort: Pulmonary effort is normal.   Abdominal:      General: There is no distension.      Palpations: Abdomen is soft.   Musculoskeletal:         General: Normal range of motion.   Skin:     General: Skin is warm.   Neurological:      GCS: GCS eye subscore is 4. GCS verbal subscore is 5. GCS motor subscore is 6.   Psychiatric:         Mood and Affect: Affect is labile.         Speech: Speech is rapid and pressured.         Behavior: Behavior is aggressive and combative.         Thought Content: Thought content is paranoid and delusional.         Judgment: Judgment is inappropriate.         MEDICAL DECISION MAKING / ED COURSE:         1)  Number and Complexity of Problems Addressed at this Encounter  Problem List This Visit: Mental health evaluation    Patient is presenting for mental health evaluation.  She has a history of anxiety, bipolar 1 disorder, depression, schizoaffective disorder.  Patient was in police custody and when she was discharged they brought her here for further evaluation.  Patient is not making any

## 2025-06-02 NOTE — ED NOTES
Patient kicked the vitals machine and tried to kick myself and the . Patient called both of us a \"bitch and that we are stupid\".   
Precedex leaking d/t rip in bag. Pharmacy informed of need of new medication.  
Pt is pacing around the room smacking the walls and spitting on the walls. She was taking her gown off and pants. Yelling at staff and trying to spit on staff.   
Pt placed on cardiac monitor.  
piece of hospital bed and hit it against wall. Restraints initiated at this time (1435). Patient then proceeded to call writer a \"fucking dumb bitch\", attempted to grab writers pants, and spit on writer.     Writer not able to obtain full assessment due to mental state.       Level of Care Disposition:    Writer consulted with Dr. Brenner, psychiatrist who recommends inpatient psychiatric admission for safety and stabilization. Patient admitted via pink slip.     Writer explained the form Rights of an Involuntarily Detained Person to patient.  Patient refused to sign Rights of an Involuntarily Detained Person form. Writer and other staff member present for refusal and form completed and placed in medical records box.       Patient to be admitted medical due to escalating threatening behaviors to staff.

## 2025-06-02 NOTE — PROGRESS NOTES
[facility] Internal Medicine  Juno Ford MD; Delvis Connell MD; Lindsay Ojeda MD;  Minoo Dsouza MD; Efrain Mosquera MD  [facility] Internal Medicine   IN-PATIENT SERVICE  [facility]                 Date:   6/2/2025  Patientname:  Yani Nowak  Date of admission:  6/2/2025  2:10 PM  MRN:   [id number]  Account:  [id number]  YOB: 1980  PCP:    New Salas APRN - NP  Room:   01/01  Code Status:    Prior      Chief Complaint:     Chief Complaint   Patient presents with    Mental Health Problem     [facility]       History of Present Illness:     Yani Nowak is a 44 y.o. Non- / non  female with a history of anxiety, bipolar 1, depression, gestational diabetes, OCD, PTSD, schizoaffective disorder, and hypertension who presents with Mental Health Problem ([facility])   and is admitted to the hospital for the management of Acute psychosis (HCC).    According to the EMR the patient was released from [facility] for medical evaluation due to pressured speech, disorganized thought process, lability, and manic behavior.  The patient was uncooperative in the ER removing clothing, spitting on the wall, throwing dentures at staff, ripping toilet paper and paper towels.  She then proceeded to attempt to kick the vitals machine and staff.  The patient was restrained. Symptoms are associated with noncompliance with medication regimen.    Chart review reveals that the patient seen at the [facility] emergency department for similar symptoms.  At the time she did not meet criteria for involuntary admission.  The plan was for her to follow-up with Jessica on the following Monday.  She did denied any suicidal or homicidal ideation at the time.    Upon presentation to the ER the patient was manic.  Leukocytosis noted with a white blood cell count of 11.6.  Precedex initiated,

## 2025-06-03 LAB
AMPHET UR QL SCN: NEGATIVE
ANION GAP SERPL CALCULATED.3IONS-SCNC: 11 MMOL/L (ref 9–16)
BACTERIA URNS QL MICRO: ABNORMAL
BARBITURATES UR QL SCN: NEGATIVE
BASOPHILS # BLD: 0.03 K/UL (ref 0–0.2)
BASOPHILS NFR BLD: 0 % (ref 0–2)
BENZODIAZ UR QL: POSITIVE
BILIRUB UR QL STRIP: NEGATIVE
BUN SERPL-MCNC: 9 MG/DL (ref 6–20)
CALCIUM SERPL-MCNC: 9 MG/DL (ref 8.6–10.4)
CANNABINOIDS UR QL SCN: NEGATIVE
CASTS #/AREA URNS LPF: ABNORMAL /LPF
CHLORIDE SERPL-SCNC: 106 MMOL/L (ref 98–107)
CLARITY UR: ABNORMAL
CO2 SERPL-SCNC: 21 MMOL/L (ref 20–31)
COCAINE UR QL SCN: POSITIVE
COLOR UR: YELLOW
CREAT SERPL-MCNC: 0.5 MG/DL (ref 0.7–1.2)
EOSINOPHIL # BLD: 0.31 K/UL (ref 0–0.44)
EOSINOPHILS RELATIVE PERCENT: 4 % (ref 0–4)
EPI CELLS #/AREA URNS HPF: ABNORMAL /HPF
ERYTHROCYTE [DISTWIDTH] IN BLOOD BY AUTOMATED COUNT: 13.6 % (ref 11.5–14.9)
FENTANYL UR QL: NEGATIVE
GFR, ESTIMATED: >90 ML/MIN/1.73M2
GLUCOSE SERPL-MCNC: 118 MG/DL (ref 74–99)
GLUCOSE UR STRIP-MCNC: NEGATIVE MG/DL
HCT VFR BLD AUTO: 38.6 % (ref 36–46)
HGB BLD-MCNC: 12.7 G/DL (ref 12–16)
HGB UR QL STRIP.AUTO: ABNORMAL
IMM GRANULOCYTES # BLD AUTO: <0.03 K/UL (ref 0–0.3)
IMM GRANULOCYTES NFR BLD: 0 %
KETONES UR STRIP-MCNC: NEGATIVE MG/DL
LEUKOCYTE ESTERASE UR QL STRIP: ABNORMAL
LYMPHOCYTES NFR BLD: 1.73 K/UL (ref 1.1–3.7)
LYMPHOCYTES RELATIVE PERCENT: 20 % (ref 24–44)
MAGNESIUM SERPL-MCNC: 2 MG/DL (ref 1.6–2.6)
MCH RBC QN AUTO: 27.3 PG (ref 26–34)
MCHC RBC AUTO-ENTMCNC: 32.9 G/DL (ref 31–37)
MCV RBC AUTO: 83 FL (ref 80–100)
METHADONE UR QL: NEGATIVE
MONOCYTES NFR BLD: 0.51 K/UL (ref 0.1–1.2)
MONOCYTES NFR BLD: 6 % (ref 3–12)
NEUTROPHILS NFR BLD: 70 % (ref 36–66)
NEUTS SEG NFR BLD: 5.88 K/UL (ref 1.5–8.1)
NITRITE UR QL STRIP: NEGATIVE
NRBC BLD-RTO: 0 PER 100 WBC
OPIATES UR QL SCN: NEGATIVE
OXYCODONE UR QL SCN: NEGATIVE
PCP UR QL SCN: NEGATIVE
PH UR STRIP: 7 [PH] (ref 5–8)
PLATELET # BLD AUTO: 204 K/UL (ref 150–450)
PMV BLD AUTO: 10.9 FL (ref 8–13.5)
POTASSIUM SERPL-SCNC: 3.5 MMOL/L (ref 3.7–5.3)
PROT UR STRIP-MCNC: NEGATIVE MG/DL
RBC # BLD AUTO: 4.65 M/UL (ref 3.95–5.11)
RBC #/AREA URNS HPF: ABNORMAL /HPF
SODIUM SERPL-SCNC: 138 MMOL/L (ref 136–145)
SP GR UR STRIP: 1 (ref 1–1.03)
TEST INFORMATION: ABNORMAL
UROBILINOGEN UR STRIP-ACNC: NORMAL EU/DL (ref 0–1)
WBC #/AREA URNS HPF: ABNORMAL /HPF
WBC OTHER # BLD: 8.5 K/UL (ref 3.5–11)

## 2025-06-03 PROCEDURE — 86403 PARTICLE AGGLUT ANTBDY SCRN: CPT

## 2025-06-03 PROCEDURE — 80048 BASIC METABOLIC PNL TOTAL CA: CPT

## 2025-06-03 PROCEDURE — 6370000000 HC RX 637 (ALT 250 FOR IP)

## 2025-06-03 PROCEDURE — 87086 URINE CULTURE/COLONY COUNT: CPT

## 2025-06-03 PROCEDURE — 90792 PSYCH DIAG EVAL W/MED SRVCS: CPT | Performed by: PSYCHIATRY & NEUROLOGY

## 2025-06-03 PROCEDURE — 2500000003 HC RX 250 WO HCPCS

## 2025-06-03 PROCEDURE — 6370000000 HC RX 637 (ALT 250 FOR IP): Performed by: PSYCHIATRY & NEUROLOGY

## 2025-06-03 PROCEDURE — 99223 1ST HOSP IP/OBS HIGH 75: CPT | Performed by: INTERNAL MEDICINE

## 2025-06-03 PROCEDURE — 6360000002 HC RX W HCPCS: Performed by: INTERNAL MEDICINE

## 2025-06-03 PROCEDURE — 2500000003 HC RX 250 WO HCPCS: Performed by: PSYCHIATRY & NEUROLOGY

## 2025-06-03 PROCEDURE — 2580000003 HC RX 258: Performed by: PSYCHIATRY & NEUROLOGY

## 2025-06-03 PROCEDURE — 83735 ASSAY OF MAGNESIUM: CPT

## 2025-06-03 PROCEDURE — 2060000000 HC ICU INTERMEDIATE R&B

## 2025-06-03 PROCEDURE — 87186 SC STD MICRODIL/AGAR DIL: CPT

## 2025-06-03 PROCEDURE — 36415 COLL VENOUS BLD VENIPUNCTURE: CPT

## 2025-06-03 PROCEDURE — 81001 URINALYSIS AUTO W/SCOPE: CPT

## 2025-06-03 PROCEDURE — 2580000003 HC RX 258

## 2025-06-03 PROCEDURE — 87088 URINE BACTERIA CULTURE: CPT

## 2025-06-03 PROCEDURE — 85025 COMPLETE CBC W/AUTO DIFF WBC: CPT

## 2025-06-03 PROCEDURE — 80307 DRUG TEST PRSMV CHEM ANLYZR: CPT

## 2025-06-03 PROCEDURE — 6360000002 HC RX W HCPCS

## 2025-06-03 PROCEDURE — 6370000000 HC RX 637 (ALT 250 FOR IP): Performed by: INTERNAL MEDICINE

## 2025-06-03 RX ORDER — HYDRALAZINE HYDROCHLORIDE 20 MG/ML
5 INJECTION INTRAMUSCULAR; INTRAVENOUS EVERY 6 HOURS PRN
Status: DISCONTINUED | OUTPATIENT
Start: 2025-06-03 | End: 2025-06-04 | Stop reason: HOSPADM

## 2025-06-03 RX ORDER — LORAZEPAM 2 MG/ML
2 INJECTION INTRAMUSCULAR EVERY 6 HOURS PRN
Status: DISCONTINUED | OUTPATIENT
Start: 2025-06-03 | End: 2025-06-04 | Stop reason: HOSPADM

## 2025-06-03 RX ORDER — DIPHENHYDRAMINE HYDROCHLORIDE 50 MG/ML
25 INJECTION, SOLUTION INTRAMUSCULAR; INTRAVENOUS EVERY 6 HOURS PRN
Status: DISCONTINUED | OUTPATIENT
Start: 2025-06-03 | End: 2025-06-04 | Stop reason: HOSPADM

## 2025-06-03 RX ORDER — LIDOCAINE 4 G/G
1 PATCH TOPICAL DAILY
Status: DISCONTINUED | OUTPATIENT
Start: 2025-06-03 | End: 2025-06-04 | Stop reason: HOSPADM

## 2025-06-03 RX ORDER — POLYETHYLENE GLYCOL 3350 17 G
2 POWDER IN PACKET (EA) ORAL EVERY 4 HOURS PRN
Status: DISCONTINUED | OUTPATIENT
Start: 2025-06-03 | End: 2025-06-03

## 2025-06-03 RX ORDER — ZIPRASIDONE HYDROCHLORIDE 40 MG/1
40 CAPSULE ORAL 2 TIMES DAILY WITH MEALS
Status: DISCONTINUED | OUTPATIENT
Start: 2025-06-03 | End: 2025-06-04 | Stop reason: HOSPADM

## 2025-06-03 RX ORDER — POTASSIUM CHLORIDE 1500 MG/1
40 TABLET, EXTENDED RELEASE ORAL ONCE
Status: CANCELLED | OUTPATIENT
Start: 2025-06-03 | End: 2025-06-03

## 2025-06-03 RX ORDER — POTASSIUM CHLORIDE 7.45 MG/ML
10 INJECTION INTRAVENOUS
Status: DISCONTINUED | OUTPATIENT
Start: 2025-06-03 | End: 2025-06-03

## 2025-06-03 RX ORDER — NICOTINE 21 MG/24HR
1 PATCH, TRANSDERMAL 24 HOURS TRANSDERMAL DAILY
Status: DISCONTINUED | OUTPATIENT
Start: 2025-06-03 | End: 2025-06-04 | Stop reason: HOSPADM

## 2025-06-03 RX ORDER — AMLODIPINE BESYLATE 5 MG/1
5 TABLET ORAL DAILY
Status: CANCELLED | OUTPATIENT
Start: 2025-06-04

## 2025-06-03 RX ORDER — HALOPERIDOL 5 MG/ML
5 INJECTION INTRAMUSCULAR EVERY 6 HOURS PRN
Status: DISCONTINUED | OUTPATIENT
Start: 2025-06-03 | End: 2025-06-04 | Stop reason: HOSPADM

## 2025-06-03 RX ADMIN — NICOTINE POLACRILEX 2 MG: 2 LOZENGE ORAL at 10:56

## 2025-06-03 RX ADMIN — ANTIFUNGAL: 2 CREAM TOPICAL at 10:03

## 2025-06-03 RX ADMIN — SODIUM CHLORIDE: 0.9 INJECTION, SOLUTION INTRAVENOUS at 10:52

## 2025-06-03 RX ADMIN — ZIPRASIDONE HYDROCHLORIDE 40 MG: 40 CAPSULE ORAL at 16:07

## 2025-06-03 RX ADMIN — DEXMEDETOMIDINE HYDROCHLORIDE 0.9 MCG/KG/HR: 400 INJECTION INTRAVENOUS at 05:57

## 2025-06-03 RX ADMIN — DEXMEDETOMIDINE HYDROCHLORIDE 1.5 MCG/KG/HR: 400 INJECTION INTRAVENOUS at 20:50

## 2025-06-03 RX ADMIN — DEXMEDETOMIDINE HYDROCHLORIDE 0.9 MCG/KG/HR: 400 INJECTION INTRAVENOUS at 12:45

## 2025-06-03 RX ADMIN — SODIUM CHLORIDE, PRESERVATIVE FREE 10 ML: 5 INJECTION INTRAVENOUS at 08:22

## 2025-06-03 RX ADMIN — ACETAMINOPHEN 650 MG: 325 TABLET ORAL at 11:15

## 2025-06-03 RX ADMIN — SODIUM CHLORIDE, PRESERVATIVE FREE 10 ML: 5 INJECTION INTRAVENOUS at 20:51

## 2025-06-03 RX ADMIN — HYDRALAZINE HYDROCHLORIDE 5 MG: 20 INJECTION INTRAMUSCULAR; INTRAVENOUS at 22:23

## 2025-06-03 RX ADMIN — ENOXAPARIN SODIUM 40 MG: 100 INJECTION SUBCUTANEOUS at 08:22

## 2025-06-03 RX ADMIN — DEXMEDETOMIDINE HYDROCHLORIDE 0.9 MCG/KG/HR: 400 INJECTION INTRAVENOUS at 01:05

## 2025-06-03 RX ADMIN — POTASSIUM CHLORIDE 40 MEQ: 1500 TABLET, EXTENDED RELEASE ORAL at 12:36

## 2025-06-03 RX ADMIN — DEXMEDETOMIDINE HYDROCHLORIDE 1.5 MCG/KG/HR: 400 INJECTION INTRAVENOUS at 18:04

## 2025-06-03 RX ADMIN — Medication 3 MG: at 22:22

## 2025-06-03 RX ADMIN — POTASSIUM CHLORIDE 10 MEQ: 7.46 INJECTION, SOLUTION INTRAVENOUS at 10:55

## 2025-06-03 RX ADMIN — HYDROXYZINE HYDROCHLORIDE 50 MG: 25 TABLET, FILM COATED ORAL at 18:04

## 2025-06-03 RX ADMIN — PRAZOSIN HYDROCHLORIDE 1 MG: 1 CAPSULE ORAL at 22:22

## 2025-06-03 RX ADMIN — SODIUM CHLORIDE 500 MG: 9 INJECTION, SOLUTION INTRAVENOUS at 16:06

## 2025-06-03 RX ADMIN — ANTIFUNGAL: 2 CREAM TOPICAL at 22:22

## 2025-06-03 ASSESSMENT — PAIN DESCRIPTION - LOCATION: LOCATION: BACK;ARM;NECK

## 2025-06-03 ASSESSMENT — PAIN SCALES - GENERAL
PAINLEVEL_OUTOF10: 0
PAINLEVEL_OUTOF10: 9

## 2025-06-03 ASSESSMENT — PAIN DESCRIPTION - ORIENTATION: ORIENTATION: LEFT;LOWER

## 2025-06-03 NOTE — H&P
[facility]   IN-PATIENT SERVICE   [facility]    HISTORY AND PHYSICAL EXAMINATION            Date:   6/3/2025  Patient name:  Yani Nowak  Date of admission:  2025  2:10 PM  MRN:   [id number]  Account:  [id number]  YOB: 1980  PCP:    New Salas APRN - NP  Room:     Code Status:    Full Code    Chief Complaint:     Chief Complaint   Patient presents with    Mental Health Problem     [facility]       History Obtained From:     patient, electronic medical record    History of Present Illness:     The patient is a 44 y.o.  Non- / non  female who presents with Mental Health Problem ([facility])   and she is admitted to the hospital for the management of extreme agitation  Patient, has past medical history of multiple medical problems include OCD, major depression, bipolar disorder, hypertension, polysubstance abuse  Patient was brought to emergency room by [facility], due to disorganized thoughts and manic behavior  Patient was very uncooperative in emergency room  Required restraints and Precedex drip  Patient, on presentation had elevated WBC count,  Patient at the time my evaluation, is less combative complaining of only back pain  Patient had a CT scan of lumbar spine done in July of last year suggestive of DJD at multiple joints  Psychiatry consulted, sitter at bedside      Past Medical History:     Past Medical History:   Diagnosis Date    Anxiety     Bipolar 1 disorder (HCC)     Depression     Gestational diabetes 2016    OCD (obsessive compulsive disorder)     PTSD (post-traumatic stress disorder)     Rapid rate of speech     Schizoaffective disorder (HCC)         Past Surgical History:     Past Surgical History:   Procedure Laterality Date    ABDOMEN SURGERY       SECTION  2007    LAPAROSCOPY          Medications Prior to Admission:     Prior

## 2025-06-03 NOTE — CARE COORDINATION
Writer attempted to contact Lula and Liliya, patient's sisters listed as emergency contact to verify admission/discharge planning. Writer was unable to speak with either sister. Voice mail not set up with identifiers. Unable to confirm living situation.     Electronically signed by Sanjana Vines RN on 6/3/2025 at 12:55 PM       
Writer received a call back from Lorena, patient's sister. She states patient is currently homeless. As far as she knows, patient stays from place to place, motels, streets, shelters. She does confirm drug abuse and patient follows at [facility]. Lorena states she used to be patient's payee, but now she thinks [facility] is her legal payee. Will need to verify court orders.    Electronically signed by Sanjana Vines RN on 6/3/2025 at 1:40 PM   
you think of anything that we could have done to help you after you left the hospital the first time, so that you might not have needed to return so soon?: Other (Comment) (no. unable to provide feedback.)   Advance Directives:      Code Status: Full Code   Patient's Primary Decision Maker is: Legal Next of Kin      Discharge Planning:    Patient lives with: Friends Type of Home: Apartment  Primary Care Giver: Self  Patient Support Systems include: Family Members   Current Financial resources: Medicare, Medicaid  Current community resources: (S) Psychiatric Treatment ([facility] [place])  Current services prior to admission: (S) Other (Comment) ([facility] [place])            Current DME:              Type of Home Care services:  None    ADLS  Prior functional level: Independent in ADLs/IADLs  Current functional level: Independent in ADLs/IADLs    PT AM-PAC:   /24  OT AM-PAC:   /24    Family can provide assistance at DC: No  Would you like Case Management to discuss the discharge plan with any other family members/significant others, and if so, who? Yes (Liliya-sister)  Plans to Return to Present Housing: Unknown at present  Other Identified Issues/Barriers to RETURNING to current housing: unable to confirm current housing.  Potential Assistance needed at discharge: Extended Care Facility            Potential DME:  none  Patient expects to discharge to: Shelter  Plan for transportation at discharge: [facility]    Financial    Payor: MEDICARE / Plan: MEDICARE PART A AND B / Product Type: *No Product type* /     Does insurance require precert for SNF: No    Potential assistance Purchasing Medications: No  Meds-to-Beds request: No (Other pharmacy chosen upon admission 6/3/25 eligible [id number] if pt chooses)no      [facility] [place] [facility] [place] - [address] - [place], [place] - [address] - P [phone] - F [phone]  [address] [address]  Phone: [phone] Fax: [phone]    [facility], [place]

## 2025-06-03 NOTE — CONSULTS
[facility] PULMONARY & CRITICAL CARE SPECIALISTS  Freddy Johnson MD/Desmond RICHARDS AGASANDIEP-BC, NP-C      Minnie RICHARDS NP-C      Forest RICHARDS NP-C     Pulmonary and Critical Care CONSULT NOTE:      DATE OF CONSULT 6/3/2025    REASON FOR CONSULTATION:  Psychosis      PCP New Salas, MAURICE - NP     CHIEF COMPLAINT: Agitation    HISTORY OF PRESENT ILLNESS:   This is a 44-year-old female with history of what I believe is bipolar 1 disorder.  She carries multiple other psychiatric diagnoses but I am not sure how accurate they are.  From what I am being told the patient was brought in from [facility] due to psychiatric behavioral issues.  In the ER she was manic, combative and violent with the staff grabbing at staff members as well as kicking.  She required multiple doses of sedation and was ultimately transitioned to Precedex to keep her calm.  She was admitted to the ICU due to the Precedex drip..  Psychiatry was consulted but has not yet seen the patient.  She tells me that she [facility]    When I entered the room she was very pressured speech and having obvious prince.  She knew the names of her doctors and the fact that she goes to [facility] and wanted me to call.  She had flight of ideas.  She did not appear violent for me but was in constant motion highly consistent with prince.  I believe the patient is supposed to be on a long-acting antipsychotic and lithium.  I am not sure if she was getting her psychiatric medications while she was incarcerated or why she was incarcerated.  In any event she is now here in the ICU awaiting input from psychiatry    No other true pulmonary or critical care needs that I can identify based on what I am seeing.  Patient also has history of polysubstance abuse making the matters even more difficult.      ALLERGIES:  Allergies   Allergen Reactions    Lamictal [Lamotrigine] Hives       HOME 
Labile  Affect: Labile  Thought processes:  Flight of ideas, disorganized    Thought content: Unable to assess presence of suicidal ideations, homicidal ideations, hallucinations, delusions  Cognition:  Oriented to self, situation, location, date  Concentration clinically adequate  Memory age appropriate  Insight & Judgment:  fair    DSM-5 DIAGNOSIS:        Schizoaffective Disorder Bipolar Type  Polysubstance abuse, toxicology screen positive for benzodiazepines and cocaine    Stressors     Severity of stressors is unknown  Source of stressors include:  Other psychosocial and environmental stressors    Plan:    Defer to attending physician.     Thank you very much for allowing us to participate in the care of this patient.      Electronically signed by RIMA RUDOLPH on 6/3/25 at 1:39 PM EDT    Please note that this chart was generated using voice recognition Dragon dictation software.  Although every effort was made to ensure the accuracy of this automated transcription, some errors in transcription may have occurred.   I independently saw and evaluated the patient.  I reviewed the  documentation above    .  Any additional comments or changes to the   documentation are stated below otherwise agree with assessment.          Patient was seen face-to-face.  She is known to me from previous admission.  She is currently in the ICU.  She has pressured speech and flight of ideas as well as euphoric mood.  She has high level of irritability and has been threatening staff.  The patient complains of galacturia from antipsychotic medication.  She has previously been on Haldol.  She has not been on her medication recently.  She will be started on Geodon 40 Mg twice daily and her prior to admission dose of Depakote     PLAN  Medications as noted above  Attempt to develop insight  Psycho-education conducted.  Supportive Therapy conducted.  Follow-up daily while on inpatient unit    Electronically signed by BEREKET SAAVEDRA MD on

## 2025-06-03 NOTE — PROGRESS NOTES
PT: yelling at sitter ( writer) pulled off brief said \" I'm not f**** baby\" PT kicking feet saying she's restless.

## 2025-06-03 NOTE — PLAN OF CARE
Problem: Safety - Medical Restraint  Goal: Remains free of injury from restraints (Restraint for Interference with Medical Device)  Description: INTERVENTIONS:1. Determine that other, less restrictive measures have been tried or would not be effective before applying the restraint2. Evaluate the patient's condition at the time of restraint application3. Inform patient/family regarding the reason for restraint4. Q2H: Monitor safety, psychosocial status, comfort, nutrition and hydration  Outcome: Progressing     Problem: Chronic Conditions and Co-morbidities  Goal: Patient's chronic conditions and co-morbidity symptoms are monitored and maintained or improved  Outcome: Progressing     Problem: Discharge Planning  Goal: Discharge to home or other facility with appropriate resources  Outcome: Progressing

## 2025-06-03 NOTE — SIGNIFICANT EVENT
Follow-up for further evaluation with primary physician.  Ice to areas of pain.  Ibuprofen first.  Norco only as needed.  Rest, gradual increase in activity as long as asymptomatic.  Return if new or worse symptoms.   Medical Hold Decisional Capacity to Leave Assessment for [place] ONLY      The patient is currently experiencing the following medical or mental health condition(s): Hallucinations and/or delusions      I have evaluated the patient for the capacity necessary to make an informed decision to leave the hospital Against Medical Advice (AMA) on their own and currently the patient is:  Unable to understand their medical or mental health condition(s)   Unable to understand their recommended care and safety plan    Unable to understand the alternative(s) to this care/plan    Unable to understand the risks of leaving the hospital on their own  Unable to explain their decision or desire to leave the hospital on their own       Because of their medical or mental health condition(s), the patient does not have the capacity necessary to make an informed decision to leave the hospital AMA on their own at this time.        Patient's disposition was as follows: Because of the patient's medical or mental health condition(s) and based upon the good-bishop exercise of my professional judgment according to appropriate standards of professional practice, the patient's departure from the hospital AMA on their own at this time threatens the safety of the patient or other person(s).  Patient will be kept in the hospital on a Medical Hold and patient's capacity and threat will be reassessed at least every 24 hours.  Once the patient regains/obtains this decisional capacity, the patient will be allowed to leave the hospital AMA on their own if they still desire.         Electronically signed by MAURICE Osborne CNP on 6/3/2025 at 5:42 PM

## 2025-06-03 NOTE — PROGRESS NOTES
PT: sitting in bed yelling at sitter \" I'm going to hurt you \" telling sitter \" if I wanted to hurt you I would but I'm not going to hurt you\"   PT: Spitting on the floor at this time .

## 2025-06-03 NOTE — PROGRESS NOTES
Pharmacy Medication History Note      List of current medications patient is taking is complete.    Patient was discharged from [facility] on 05/27/2025.  No change to medication list from [facility].    Current Home Medication List at Time of Admission:  Prior to Admission medications    Medication Sig   amLODIPine (NORVASC) 5 MG tablet Take 1 tablet by mouth daily   Calamine 8-8 % LOTN lotion Apply topically 2 times daily   haloperidol (HALDOL) 5 MG tablet Take 1 tablet by mouth 2 times daily   hydrOXYzine HCl (ATARAX) 50 MG tablet Take 1 tablet by mouth 3 times daily as needed for Anxiety   ibuprofen (ADVIL;MOTRIN) 800 MG tablet Take 1 tablet by mouth every 8 hours as needed for Pain   lidocaine 4 % external patch Place 1 patch onto the skin daily   miconazole (MICOTIN) 2 % cream Apply topically 2 times daily.   traZODone (DESYREL) 50 MG tablet Take 1 tablet by mouth nightly as needed for Sleep   prazosin (MINIPRESS) 1 MG capsule Take 1 capsule by mouth nightly   lithium 300 MG capsule Take 1 capsule by mouth 2 times daily   nicotine (NICODERM CQ) 14 MG/24HR Place 1 patch onto the skin daily   acetaminophen (TYLENOL) 500 MG tablet Take 1 tablet by mouth 4 times daily as needed for Pain       Please let me know if you have any questions about this encounter. Thank you!    Electronically signed by Ayesha Weiss RPH on 6/3/2025 at 8:52 AM

## 2025-06-03 NOTE — SIGNIFICANT EVENT
Writer called to bedside along with hospital security due to patient agitation and aggression.    Upon arrival to the room patient was in the bed restrained and hyperverbal.  Patient had made aggressive statements to the sitter and bedside staff stating that she would hurt them if they did not give her what she wanted.    Verbal attempts were made to de-escalate the situation.  Patient was not receptive to any conversation and continued to state that she wanted to \"get her way or else \".     Patient is very hyperverbal with flight of ideas.  Patient's mood is very labile moving from irritated and aggressive to calm and compliant within 2 midnights.  With security staff and writer in room with patient was not presenting with any verbal or physical aggression, and was sitting with her legs crossed in the bed.  Patient did verbalized understanding she cannot kick or hit any of the staff.  Patient was very focused on wanting a Pepsi, writer discussed with patient that she would only be able to have the Pepsi if she continued to follow directives and was not verbally or physically aggressive with staff.    Patient agreed to being compliant with staff directives, and to keep her heart rate monitor on and not continually remove it.    Patient's Precedex titrated appropriately, Atarax added as needed.  As needed chemical sedation of 5mg Haldol, 2mg Ativan, and 25mg of benadryl order placed.  Plan was discussed with staff to continue verbal de-escalation as much as humanly possible.  Utilization of Precedex drip to maintain appropriate sedation.  Chemical IM sedation available if patient becomes a harm to herself and staff.    No pink slip able to be found in patient record, patient placed on a medical hold.  Orders and documentation placed.    Electronically signed by MAURICE Osborne CNP on 6/3/2025 at 6:23 PM

## 2025-06-04 ENCOUNTER — HOSPITAL ENCOUNTER (INPATIENT)
Age: 45
LOS: 12 days | Discharge: HOME | DRG: 885 | End: 2025-06-16
Attending: PSYCHIATRY & NEUROLOGY | Admitting: PSYCHIATRY & NEUROLOGY
Payer: MEDICARE

## 2025-06-04 VITALS
RESPIRATION RATE: 20 BRPM | HEIGHT: 69 IN | HEART RATE: 81 BPM | TEMPERATURE: 99.1 F | OXYGEN SATURATION: 98 % | SYSTOLIC BLOOD PRESSURE: 128 MMHG | DIASTOLIC BLOOD PRESSURE: 67 MMHG | BODY MASS INDEX: 27.4 KG/M2 | WEIGHT: 184.97 LBS

## 2025-06-04 LAB
ANION GAP SERPL CALCULATED.3IONS-SCNC: 10 MMOL/L (ref 9–16)
BASOPHILS # BLD: 0.03 K/UL (ref 0–0.2)
BASOPHILS NFR BLD: 0 % (ref 0–2)
BUN SERPL-MCNC: 6 MG/DL (ref 6–20)
CALCIUM SERPL-MCNC: 9.2 MG/DL (ref 8.6–10.4)
CHLORIDE SERPL-SCNC: 108 MMOL/L (ref 98–107)
CO2 SERPL-SCNC: 25 MMOL/L (ref 20–31)
CREAT SERPL-MCNC: 0.7 MG/DL (ref 0.7–1.2)
EOSINOPHIL # BLD: 0.23 K/UL (ref 0–0.44)
EOSINOPHILS RELATIVE PERCENT: 3 % (ref 0–4)
ERYTHROCYTE [DISTWIDTH] IN BLOOD BY AUTOMATED COUNT: 13.4 % (ref 11.5–14.9)
GFR, ESTIMATED: >90 ML/MIN/1.73M2
GLUCOSE SERPL-MCNC: 136 MG/DL (ref 74–99)
HCT VFR BLD AUTO: 40.9 % (ref 36–46)
HGB BLD-MCNC: 13.2 G/DL (ref 12–16)
IMM GRANULOCYTES # BLD AUTO: <0.03 K/UL (ref 0–0.3)
IMM GRANULOCYTES NFR BLD: 0 %
LYMPHOCYTES NFR BLD: 2.12 K/UL (ref 1.1–3.7)
LYMPHOCYTES RELATIVE PERCENT: 25 % (ref 24–44)
MCH RBC QN AUTO: 26.8 PG (ref 26–34)
MCHC RBC AUTO-ENTMCNC: 32.3 G/DL (ref 31–37)
MCV RBC AUTO: 83 FL (ref 80–100)
MONOCYTES NFR BLD: 0.65 K/UL (ref 0.1–1.2)
MONOCYTES NFR BLD: 8 % (ref 3–12)
NEUTROPHILS NFR BLD: 64 % (ref 36–66)
NEUTS SEG NFR BLD: 5.37 K/UL (ref 1.5–8.1)
NRBC BLD-RTO: 0 PER 100 WBC
PLATELET # BLD AUTO: 212 K/UL (ref 150–450)
PMV BLD AUTO: 10.9 FL (ref 8–13.5)
POTASSIUM SERPL-SCNC: 3.7 MMOL/L (ref 3.7–5.3)
RBC # BLD AUTO: 4.93 M/UL (ref 3.95–5.11)
SODIUM SERPL-SCNC: 143 MMOL/L (ref 136–145)
WBC OTHER # BLD: 8.4 K/UL (ref 3.5–11)

## 2025-06-04 PROCEDURE — 99232 SBSQ HOSP IP/OBS MODERATE 35: CPT | Performed by: PSYCHIATRY & NEUROLOGY

## 2025-06-04 PROCEDURE — 2500000003 HC RX 250 WO HCPCS

## 2025-06-04 PROCEDURE — 6360000002 HC RX W HCPCS

## 2025-06-04 PROCEDURE — 6370000000 HC RX 637 (ALT 250 FOR IP)

## 2025-06-04 PROCEDURE — 2040000000 HC PSYCH ICU R&B

## 2025-06-04 PROCEDURE — 6370000000 HC RX 637 (ALT 250 FOR IP): Performed by: PSYCHIATRY & NEUROLOGY

## 2025-06-04 PROCEDURE — 93005 ELECTROCARDIOGRAM TRACING: CPT

## 2025-06-04 PROCEDURE — 80048 BASIC METABOLIC PNL TOTAL CA: CPT

## 2025-06-04 PROCEDURE — GZHZZZZ GROUP PSYCHOTHERAPY: ICD-10-PCS | Performed by: PSYCHIATRY & NEUROLOGY

## 2025-06-04 PROCEDURE — 2500000003 HC RX 250 WO HCPCS: Performed by: PSYCHIATRY & NEUROLOGY

## 2025-06-04 PROCEDURE — 6370000000 HC RX 637 (ALT 250 FOR IP): Performed by: INTERNAL MEDICINE

## 2025-06-04 PROCEDURE — 6360000002 HC RX W HCPCS: Performed by: PSYCHIATRY & NEUROLOGY

## 2025-06-04 PROCEDURE — 99239 HOSP IP/OBS DSCHRG MGMT >30: CPT | Performed by: INTERNAL MEDICINE

## 2025-06-04 PROCEDURE — 85025 COMPLETE CBC W/AUTO DIFF WBC: CPT

## 2025-06-04 PROCEDURE — 36415 COLL VENOUS BLD VENIPUNCTURE: CPT

## 2025-06-04 RX ORDER — ZIPRASIDONE HYDROCHLORIDE 40 MG/1
40 CAPSULE ORAL 2 TIMES DAILY WITH MEALS
Status: CANCELLED | OUTPATIENT
Start: 2025-06-04

## 2025-06-04 RX ORDER — LITHIUM CARBONATE 300 MG/1
300 CAPSULE ORAL 2 TIMES DAILY
Status: CANCELLED | OUTPATIENT
Start: 2025-06-04

## 2025-06-04 RX ORDER — LORAZEPAM 1 MG/1
2 TABLET ORAL EVERY 6 HOURS PRN
Status: DISCONTINUED | OUTPATIENT
Start: 2025-06-04 | End: 2025-06-16 | Stop reason: HOSPADM

## 2025-06-04 RX ORDER — DIPHENHYDRAMINE HYDROCHLORIDE 50 MG/ML
25 INJECTION, SOLUTION INTRAMUSCULAR; INTRAVENOUS EVERY 6 HOURS PRN
Status: CANCELLED | OUTPATIENT
Start: 2025-06-04

## 2025-06-04 RX ORDER — LORAZEPAM 2 MG/ML
2 INJECTION INTRAMUSCULAR EVERY 6 HOURS PRN
Status: DISCONTINUED | OUTPATIENT
Start: 2025-06-04 | End: 2025-06-16 | Stop reason: HOSPADM

## 2025-06-04 RX ORDER — POLYETHYLENE GLYCOL 3350 17 G/17G
17 POWDER, FOR SOLUTION ORAL DAILY PRN
Status: CANCELLED | OUTPATIENT
Start: 2025-06-04

## 2025-06-04 RX ORDER — AMLODIPINE BESYLATE 5 MG/1
5 TABLET ORAL DAILY
Status: CANCELLED | OUTPATIENT
Start: 2025-06-05

## 2025-06-04 RX ORDER — TRAZODONE HYDROCHLORIDE 50 MG/1
50 TABLET ORAL NIGHTLY PRN
Status: CANCELLED | OUTPATIENT
Start: 2025-06-04

## 2025-06-04 RX ORDER — CEPHALEXIN 500 MG/1
500 CAPSULE ORAL 2 TIMES DAILY
Status: DISCONTINUED | OUTPATIENT
Start: 2025-06-04 | End: 2025-06-04 | Stop reason: HOSPADM

## 2025-06-04 RX ORDER — DIPHENHYDRAMINE HYDROCHLORIDE 50 MG/ML
50 INJECTION, SOLUTION INTRAMUSCULAR; INTRAVENOUS EVERY 6 HOURS PRN
Status: DISCONTINUED | OUTPATIENT
Start: 2025-06-04 | End: 2025-06-16 | Stop reason: HOSPADM

## 2025-06-04 RX ORDER — TRAZODONE HYDROCHLORIDE 50 MG/1
50 TABLET ORAL NIGHTLY PRN
Status: DISCONTINUED | OUTPATIENT
Start: 2025-06-04 | End: 2025-06-05

## 2025-06-04 RX ORDER — MICONAZOLE NITRATE 20 MG/G
CREAM TOPICAL 2 TIMES DAILY
Status: CANCELLED | OUTPATIENT
Start: 2025-06-04

## 2025-06-04 RX ORDER — HYDROXYZINE HYDROCHLORIDE 50 MG/1
50 TABLET, FILM COATED ORAL 3 TIMES DAILY PRN
Status: DISCONTINUED | OUTPATIENT
Start: 2025-06-04 | End: 2025-06-05

## 2025-06-04 RX ORDER — HALOPERIDOL 5 MG/ML
10 INJECTION INTRAMUSCULAR EVERY 6 HOURS PRN
Status: DISCONTINUED | OUTPATIENT
Start: 2025-06-04 | End: 2025-06-16 | Stop reason: HOSPADM

## 2025-06-04 RX ORDER — IBUPROFEN 400 MG/1
400 TABLET, FILM COATED ORAL EVERY 6 HOURS PRN
Status: DISCONTINUED | OUTPATIENT
Start: 2025-06-04 | End: 2025-06-16 | Stop reason: HOSPADM

## 2025-06-04 RX ORDER — NICOTINE 21 MG/24HR
1 PATCH, TRANSDERMAL 24 HOURS TRANSDERMAL DAILY
Status: CANCELLED | OUTPATIENT
Start: 2025-06-05

## 2025-06-04 RX ORDER — HYDROXYZINE HYDROCHLORIDE 25 MG/1
50 TABLET, FILM COATED ORAL 3 TIMES DAILY PRN
Status: CANCELLED | OUTPATIENT
Start: 2025-06-04

## 2025-06-04 RX ORDER — CEPHALEXIN 500 MG/1
500 CAPSULE ORAL 2 TIMES DAILY
Status: CANCELLED | OUTPATIENT
Start: 2025-06-04 | End: 2025-06-09

## 2025-06-04 RX ORDER — POLYETHYLENE GLYCOL 3350 17 G/17G
17 POWDER, FOR SOLUTION ORAL DAILY PRN
Status: DISCONTINUED | OUTPATIENT
Start: 2025-06-04 | End: 2025-06-05

## 2025-06-04 RX ORDER — HALOPERIDOL 10 MG/1
10 TABLET ORAL EVERY 6 HOURS PRN
Status: DISCONTINUED | OUTPATIENT
Start: 2025-06-04 | End: 2025-06-16 | Stop reason: HOSPADM

## 2025-06-04 RX ORDER — PRAZOSIN HYDROCHLORIDE 1 MG/1
1 CAPSULE ORAL NIGHTLY
Status: CANCELLED | OUTPATIENT
Start: 2025-06-04

## 2025-06-04 RX ORDER — LORAZEPAM 2 MG/ML
2 INJECTION INTRAMUSCULAR EVERY 6 HOURS PRN
Status: CANCELLED | OUTPATIENT
Start: 2025-06-04

## 2025-06-04 RX ORDER — HALOPERIDOL 5 MG/ML
5 INJECTION INTRAMUSCULAR EVERY 6 HOURS PRN
Status: CANCELLED | OUTPATIENT
Start: 2025-06-04

## 2025-06-04 RX ORDER — POLYETHYLENE GLYCOL 3350 17 G
2 POWDER IN PACKET (EA) ORAL
Status: DISCONTINUED | OUTPATIENT
Start: 2025-06-04 | End: 2025-06-16 | Stop reason: HOSPADM

## 2025-06-04 RX ORDER — ACETAMINOPHEN 325 MG/1
650 TABLET ORAL EVERY 6 HOURS PRN
Status: DISCONTINUED | OUTPATIENT
Start: 2025-06-04 | End: 2025-06-16 | Stop reason: HOSPADM

## 2025-06-04 RX ORDER — MAGNESIUM HYDROXIDE/ALUMINUM HYDROXICE/SIMETHICONE 120; 1200; 1200 MG/30ML; MG/30ML; MG/30ML
30 SUSPENSION ORAL EVERY 6 HOURS PRN
Status: DISCONTINUED | OUTPATIENT
Start: 2025-06-04 | End: 2025-06-16 | Stop reason: HOSPADM

## 2025-06-04 RX ORDER — LIDOCAINE 4 G/G
1 PATCH TOPICAL DAILY
Status: CANCELLED | OUTPATIENT
Start: 2025-06-05

## 2025-06-04 RX ADMIN — DEXMEDETOMIDINE HYDROCHLORIDE 1.5 MCG/KG/HR: 400 INJECTION INTRAVENOUS at 00:42

## 2025-06-04 RX ADMIN — ZIPRASIDONE MESYLATE 20 MG: 20 INJECTION, POWDER, LYOPHILIZED, FOR SOLUTION INTRAMUSCULAR at 11:13

## 2025-06-04 RX ADMIN — HALOPERIDOL LACTATE 10 MG: 5 INJECTION, SOLUTION INTRAMUSCULAR at 16:35

## 2025-06-04 RX ADMIN — HYDROXYZINE HYDROCHLORIDE 50 MG: 50 TABLET, FILM COATED ORAL at 21:40

## 2025-06-04 RX ADMIN — DEXMEDETOMIDINE HYDROCHLORIDE 1.5 MCG/KG/HR: 400 INJECTION INTRAVENOUS at 09:00

## 2025-06-04 RX ADMIN — CEPHALEXIN 250 MG: 250 CAPSULE ORAL at 23:32

## 2025-06-04 RX ADMIN — CEPHALEXIN 500 MG: 500 CAPSULE ORAL at 11:13

## 2025-06-04 RX ADMIN — SODIUM CHLORIDE, PRESERVATIVE FREE 10 ML: 5 INJECTION INTRAVENOUS at 07:46

## 2025-06-04 RX ADMIN — TRAZODONE HYDROCHLORIDE 50 MG: 50 TABLET ORAL at 21:40

## 2025-06-04 RX ADMIN — DIPHENHYDRAMINE HYDROCHLORIDE 50 MG: 50 INJECTION INTRAMUSCULAR; INTRAVENOUS at 16:35

## 2025-06-04 RX ADMIN — AMLODIPINE BESYLATE 5 MG: 5 TABLET ORAL at 07:40

## 2025-06-04 RX ADMIN — HYDROXYZINE HYDROCHLORIDE 50 MG: 50 TABLET, FILM COATED ORAL at 16:17

## 2025-06-04 RX ADMIN — HYDROXYZINE HYDROCHLORIDE 50 MG: 25 TABLET, FILM COATED ORAL at 00:34

## 2025-06-04 RX ADMIN — ZIPRASIDONE HYDROCHLORIDE 40 MG: 40 CAPSULE ORAL at 07:41

## 2025-06-04 RX ADMIN — DIVALPROEX SODIUM 750 MG: 500 TABLET, EXTENDED RELEASE ORAL at 07:41

## 2025-06-04 RX ADMIN — ACETAMINOPHEN 650 MG: 325 TABLET ORAL at 00:33

## 2025-06-04 RX ADMIN — ENOXAPARIN SODIUM 40 MG: 100 INJECTION SUBCUTANEOUS at 07:40

## 2025-06-04 RX ADMIN — LORAZEPAM 2 MG: 2 INJECTION INTRAMUSCULAR; INTRAVENOUS at 16:35

## 2025-06-04 RX ADMIN — DEXMEDETOMIDINE HYDROCHLORIDE 1.5 MCG/KG/HR: 400 INJECTION INTRAVENOUS at 03:51

## 2025-06-04 RX ADMIN — DIPHENHYDRAMINE HYDROCHLORIDE 25 MG: 50 INJECTION INTRAMUSCULAR; INTRAVENOUS at 03:37

## 2025-06-04 RX ADMIN — HALOPERIDOL LACTATE 5 MG: 5 INJECTION, SOLUTION INTRAMUSCULAR at 03:37

## 2025-06-04 RX ADMIN — CEPHALEXIN 250 MG: 250 CAPSULE ORAL at 17:31

## 2025-06-04 RX ADMIN — LORAZEPAM 2 MG: 2 INJECTION INTRAMUSCULAR; INTRAVENOUS at 03:36

## 2025-06-04 ASSESSMENT — PATIENT HEALTH QUESTIONNAIRE - PHQ9
SUM OF ALL RESPONSES TO PHQ QUESTIONS 1-9: 2
1. LITTLE INTEREST OR PLEASURE IN DOING THINGS: SEVERAL DAYS
SUM OF ALL RESPONSES TO PHQ QUESTIONS 1-9: 2
2. FEELING DOWN, DEPRESSED OR HOPELESS: SEVERAL DAYS
SUM OF ALL RESPONSES TO PHQ QUESTIONS 1-9: 2
SUM OF ALL RESPONSES TO PHQ QUESTIONS 1-9: 2

## 2025-06-04 ASSESSMENT — LIFESTYLE VARIABLES
HOW OFTEN DO YOU HAVE A DRINK CONTAINING ALCOHOL: PATIENT UNABLE TO ANSWER
HOW OFTEN DO YOU HAVE A DRINK CONTAINING ALCOHOL: PATIENT UNABLE TO ANSWER
HOW MANY STANDARD DRINKS CONTAINING ALCOHOL DO YOU HAVE ON A TYPICAL DAY: PATIENT UNABLE TO ANSWER
HOW MANY STANDARD DRINKS CONTAINING ALCOHOL DO YOU HAVE ON A TYPICAL DAY: PATIENT UNABLE TO ANSWER

## 2025-06-04 ASSESSMENT — PAIN DESCRIPTION - DESCRIPTORS: DESCRIPTORS: ACHING

## 2025-06-04 ASSESSMENT — SLEEP AND FATIGUE QUESTIONNAIRES
SLEEP PATTERN: DIFFICULTY FALLING ASLEEP;RESTLESSNESS;INSOMNIA
DO YOU USE A SLEEP AID: NO
AVERAGE NUMBER OF SLEEP HOURS: 0
DO YOU HAVE DIFFICULTY SLEEPING: YES

## 2025-06-04 ASSESSMENT — PAIN DESCRIPTION - LOCATION: LOCATION: BACK

## 2025-06-04 NOTE — BH NOTE
[facility]  Admission Note     Admission Type:   Admission Type: Involuntary    Reason for admission:  Reason for Admission: Patient was brought to emergency room by [facility], due to disorganized thoughts and manic behavior  Patient was very uncooperative in emergency room  Required restraints and Precedex drip on medical.      Addictive Behavior:   Addictive Behavior  In the Past 3 Months, Have You Felt or Has Someone Told You That You Have a Problem With  : None    Medical Problems:   Past Medical History:   Diagnosis Date    Anxiety     Bipolar 1 disorder ([facility]) 1999    Depression     Gestational diabetes 7/22/2016    OCD (obsessive compulsive disorder) 1999    PTSD (post-traumatic stress disorder)     Rapid rate of speech     Schizoaffective disorder ([facility])        Status EXAM:  Mental Status and Behavioral Exam  Normal: No  Level of Assistance: Independent/Self  Facial Expression: Exaggerated  Affect: Unstable  Level of Consciousness: Alert  Frequency of Checks: 4 times per hour, close  Mood:Normal: No  Mood: Anxious  Motor Activity:Normal: No  Motor Activity: Increased  Eye Contact: Fair  Observed Behavior: Preoccupied, Withdrawn  Sexual Misconduct History: Current - no  Preception: [place] to person, [place] to time, [place] to place  Attention:Normal: No  Attention: Distractible, Unable to concentrate  Thought Processes: Flight of ideas  Thought Content:Normal: No  Thought Content: Preoccupations, Delusions  Depression Symptoms: Impaired concentration  Anxiety Symptoms: Generalized  [place] Symptoms: Flight of ideas  Hallucinations: None  Delusions: Yes  Delusions: Paranoid  Memory:Normal: No  Memory: Poor recent, Poor remote  Insight and Judgment: No  Insight and Judgment: Poor judgment, Poor insight, Unmotivated, Unrealistic    Tobacco Screening:  Practical Counseling, on admission, nati X, if applicable and completed (first 3 are required if patient doesn't refuse):

## 2025-06-04 NOTE — PROGRESS NOTES
[facility] PULMONARY,CRITICAL CARE & SLEEP   Freddyshalonda Johnson MD/Desmond Jeff MD/Nahid RICHARDS AGAP-BC, NP-C      Minnie RICHARDS NP-C    Forest RICHARDS NP-C                                         Pulmonary Progress Note    Patient - Yani Nowak   Age - 44 y.o.   - 1980  MRN - [id number]  [facility] # - [id number]  Date of Admission - 2025  2:10 PM    Consulting Service/Physician:       Primary Care Physician: New Salas APRN - NP    SUBJECTIVE:     Chief Complaint:   Chief Complaint   Patient presents with    Mental Health Problem     [facility] corrections     Subjective:    Patient resting in bed  She is fatigued today, has been given a dose of 20 mg of Geodon IM  Alert and oriented  Off pressors  Has plans for BHI today which I am not opposed to    VITALS  BP (!) 153/88   Pulse 70   Temp 99.2 °F (37.3 °C) (Axillary)   Resp (!) 32   Ht 1.753 m (5' 9\")   Wt 83.9 kg (184 lb 15.5 oz)   SpO2 92%   BMI 27.31 kg/m²   Wt Readings from Last 3 Encounters:   25 83.9 kg (184 lb 15.5 oz)   25 72.6 kg (160 lb 0.9 oz)   25 72.6 kg (160 lb)     I/O (24 Hours)    Intake/Output Summary (Last 24 hours) at 2025 1144  Last data filed at 2025 1119  Gross per 24 hour   Intake 1513.54 ml   Output 400 ml   Net 1113.54 ml     Ventilator:      Exam:   Physical Exam   Constitutional: In no acute distress, room air  HENT: Unremarkable  Head: Normocephalic and atraumatic.   Eyes: PERRL  Cardiovascular: Regular rate and rhythm, no abnormal heart, no JVD  Pulmonary/Chest: Posteriorly clear and diminished, even and unlabored respirations  Abdominal: Soft. Bowel sounds are normal. There is no tenderness.   Musculoskeletal: Normal range of motion.  Neurological:patient is alert and oriented to person, place, and time.   Skin: Skin is warm and dry. No rash noted.   Extremities: No edema or discoloration  Infusions:

## 2025-06-04 NOTE — PLAN OF CARE
Problem: Neurosensory - Adult  Goal: Achieves maximal functionality and self care  Outcome: Not Progressing     Problem: Coping  Goal: Pt/Family able to verbalize concerns and demonstrate effective coping strategies  Description: INTERVENTIONS:1. Assist patient/family to identify coping skills, available support systems and cultural and spiritual values2. Provide emotional support, including active listening and acknowledgement of concerns of patient and caregivers3. Reduce environmental stimuli, as able4. Instruct patient/family in relaxation techniques, as appropriate5. Assess for spiritual pain/suffering and initiate Spiritual Care, Psychosocial Clinical Specialist consults as needed  Outcome: Not Progressing     Problem: Behavior  Goal: Pt/Family maintain appropriate behavior and adhere to behavioral management agreement, if implemented  Description: INTERVENTIONS:1. Assess patient/family's coping skills and  non-compliant behavior (including use of illegal substances)2. Notify security of behavior or suspected illegal substances which indicate the need for search of the family and/or belongings3. Encourage verbalization of thoughts and concerns in a socially appropriate manner4. Utilize positive, consistent limit setting strategies supporting safety of patient, staff and others5. Encourage participation in the decision making process about the behavioral management agreement6. If a visitor's behavior poses a threat to safety call refer to organization policy.7. Initiate consult with , Psychosocial CNS, Spiritual Care as appropriate  Outcome: Not Progressing  Flowsheets (Taken 6/4/2025 6291)  Patient/family maintains appropriate behavior and adheres to behavioral management agreement, if implemented:   Notify security of behavior or suspected illegal substances which indicate the need for search of the patient and/or belongings   Encourage verbalization of thoughts and concerns in a socially

## 2025-06-04 NOTE — BH NOTE
Patient given tobacco quitline number 24782528977 at this time, refusing to call at this time, states \" I just dont want to quit now\"- patient given information as to the dangers of long term tobacco use. Continue to reinforce the importance of tobacco cessation.

## 2025-06-04 NOTE — CARE COORDINATION
BHI Biopsychosocial Assessment     Current Level of Psychosocial Functioning      Independent   Dependent  x  Minimal Assist      Comments:  [facility] [phone]  Psychosocial High Risk Factors (check all that apply)     Unable to obtain meds   Chronic illness/pain    Substance abuse X   Lack of Family Support X  Financial stress   Isolation   Inadequate Community Resources  Suicide attempt(s)  Not taking medications X  Victim of crime   Developmental Delay  Unable to manage personal needs  X  Age 65 or older   Homeless  No transportation   Readmission within 30 days X  Unemployment  Traumatic Event       Psychiatric Advanced Directives: none reported      Family to Involve in Treatment: rLack of family support      Sexual Orientation:  n/a     Patient Strengths: has legal guardian receives social security income/has payee, linked with [employer] team      Patient Barriers: history of previous psychiatric hospitalizations, reports recent cocaine use, recently admitted to medical unit ICU for severe agitation         Opiate Education Provided:  Patient denies any current Heroin or Opiate use/ abuse. Patient's drug screen on 6/3/2025 was positive for Benzodiazepines. Patient's drug screen on 5/20/2025 was positive for Cocaine, Cannabis, and Benzodiazepines. Patient reports a history of Crack/Cocaine use.        CMHC/mental health history: Legal Guardian GSMITUL Bishop [phone], Linked with [employer] Team, Last [facility] hospitalization from 5/20-5/27/2025, was discharged to friend's home in [facility] with guardians permission, patient was readmitted on 6/2 to medical dept for extreme agitation   Plan of Care   medication management, group/individual therapies, family meetings, psycho -education, treatment team meetings to assist with stabilization     Initial Discharge Plan:  To Be Determined, Patient's legal guardian will be contacted      Clinical

## 2025-06-04 NOTE — FLOWSHEET NOTE
Pt discharged to [facility], nurse at bedside with PCT, pt alert orient, in wheelchair, VS stable

## 2025-06-04 NOTE — BH NOTE
[facility] notified of admission, April gave consent to treat patient and admit, reported she would sign and get paperwork back as soon as possible. She also informed writer that her Guardian is Radha Bishop is patients guardian and can be reached at [phone] fax # [phone]

## 2025-06-04 NOTE — GROUP NOTE
Group Therapy Note    Date: 6/4/2025    Group Start Time: 1430  Group End Time: 1530  Group Topic: Healthy Living/Wellness    [facility]    Jacquelin Nunez        Group Therapy Note    Attendees: 5/6       Patient's Goal:  List 3 things I like about myself    Notes:  \"I'm nice, independent and artistic    Status After Intervention:  Unchanged    Participation Level: Active Listener    Participation Quality: Appropriate      Speech:  normal      Thought Process/Content: Logical      Affective Functioning: Congruent      Mood: anxious      Level of consciousness:  Alert      Response to Learning: Progressing to goal      Endings: None Reported    Modes of Intervention: Education, Support, and Socialization      Discipline Responsible: Behavorial Health Tech      Signature:  Jacquelin Nunez

## 2025-06-04 NOTE — PROGRESS NOTES
Pt very agitated, pulled self down to end of bed despite bilat wrist restraints in place, pt repositioned and medicated with atarax, precedex gtt continues

## 2025-06-04 NOTE — PROGRESS NOTES
BEHAVIORAL HEALTH FOLLOW-UP NOTE     6/4/2025     Patient was seen and examined in person, Chart reviewed   Patient's case discussed with staff/team    Chief Complaint: Acute Psychosis     Interim History:     Yani Nowak is a 44 y.o. female with significant psychiatric history of polysubstance abuse, schizoaffective disorder bipolar type, anxiety, and depression who is admitted medically for acute psychosis. Per EMR, patient was in custody of [facility] and when she was discharged was brought to [facility] for further evaluation due to extreme agitation. Per nursing notes, patient continued to remain agitated overnight and was given injections of Haldol, Ativan, and Benadryl. Today, patient was medically cleared to be admitted to [facility] for further monitoring. One to one sitter is present with soft restraints.     On evaluation, vital signs are stable. Patient sleeping but easily arousable. She is cooperative but continues to have flight of ideas and pressured speech with labile mood. She appears drowsy and is not violent or combative. She reports feeling worse than yesterday but cannot specify why she is worse. She is unable to describe her mood. She states that she wants to go home and does not understand why she is still here because she \"will not hurt anyone or herself\".     BP (!) 153/88   Pulse 86   Temp 99.2 °F (37.3 °C) (Axillary)   Resp 19   Ht 1.753 m (5' 9\")   Wt 83.9 kg (184 lb 15.5 oz)   SpO2 92%   BMI 27.31 kg/m²   Appetite:   [x] Normal/Unchanged  [] Increased  [] Decreased      Sleep:       [] Normal/Unchanged  [] Fair       [x] Poor              Energy:    [] Normal/Unchanged  [x] Increased  [] Decreased        Aggression:  [] yes  [x] no    Patient is [] able  [] unable to CONTRACT FOR SAFETY ON THE UNIT    PAST MEDICAL/PSYCHIATRIC HISTORY:   Past Medical History:   Diagnosis Date    Anxiety     Bipolar 1 disorder (HCC) 1999    Depression     Gestational diabetes

## 2025-06-04 NOTE — PLAN OF CARE
Problem: Safety - Medical Restraint  Goal: Remains free of injury from restraints (Restraint for Interference with Medical Device)  Description: INTERVENTIONS:1. Determine that other, less restrictive measures have been tried or would not be effective before applying the restraint2. Evaluate the patient's condition at the time of restraint application3. Inform patient/family regarding the reason for restraint4. Q2H: Monitor safety, psychosocial status, comfort, nutrition and hydration  6/4/2025 1127 by Isabella Ruelas RN  Outcome: Completed  6/4/2025 0917 by Isabella Ruelas RN  Outcome: Progressing  Flowsheets (Taken 6/4/2025 0800)  Remains free of injury from restraints (restraint for interference with medical device):   Determine that other, less restrictive measures have been tried or would not be effective before applying the restraint   Evaluate the patient's condition at the time of restraint application   Inform patient/family regarding the reason for restraint   Every 2 hours: Monitor safety, psychosocial status, comfort, nutrition and hydration  6/4/2025 0214 by Camille Torrez RN  Outcome: Progressing  Flowsheets (Taken 6/4/2025 0214)  Remains free of injury from restraints (restraint for interference with medical device):   Determine that other, less restrictive measures have been tried or would not be effective before applying the restraint   Evaluate the patient's condition at the time of restraint application   Every 2 hours: Monitor safety, psychosocial status, comfort, nutrition and hydration  Note: Bilat soft wrist restraints continue for safety     Problem: Risk for Elopement  Goal: Patient will not exit the unit/facility without proper excort  Recent Flowsheet Documentation  Taken 6/4/2025 1000 by Isabella Ruelas RN  Nursing Interventions for Elopement Risk:   Assist with personal care needs such as toileting, eating, dressing, as needed to reduce the risk of wandering   Communicate/escalate to

## 2025-06-04 NOTE — PROGRESS NOTES
[facility]   IN-PATIENT SERVICE   [facility]    HISTORY AND PHYSICAL EXAMINATION            Date:   6/4/2025  Patient name:  Yani Nowak  Date of admission:  6/2/2025  2:10 PM  MRN:   [id number]  Account:  [id number]  YOB: 1980  PCP:    New Salas APRN - NP  Room:   2008/2008-01  Code Status:    Full Code    Chief Complaint:     Chief Complaint   Patient presents with    Mental Health Problem     [facility]       History Obtained From:     patient, electronic medical record    History of Present Illness:     The patient is a 44 y.o.  Non- / non  female who presents with Mental Health Problem ([facility])   and she is admitted to the hospital for the management of extreme agitation  Patient, has past medical history of multiple medical problems include OCD, major depression, bipolar disorder, hypertension, polysubstance abuse  Patient was brought to emergency room by [facility], due to disorganized thoughts and manic behavior  Patient was very uncooperative in emergency room  Required restraints and Precedex drip  Patient, on presentation had elevated WBC count,  Patient at the time my evaluation, is less combative complaining of only back pain  Patient had a CT scan of lumbar spine done in July of last year suggestive of DJD at multiple joints  Psychiatry consulted, sitter at bedside  6/4   Patient, still in restraints, on Precedex drip  UDS positive cocaine  UA positive for possible infection  Still very agitated per nursing report,  Eating drinking okay    Past Medical History:     Past Medical History:   Diagnosis Date    Anxiety     Bipolar 1 disorder (HCC) 1999    Depression     Gestational diabetes 7/22/2016    OCD (obsessive compulsive disorder) 1999    PTSD (post-traumatic stress disorder)     Rapid rate of speech     Schizoaffective disorder (HCC)         Past Surgical

## 2025-06-04 NOTE — BH NOTE
Emergency Medication Follow-Up Note:    PRN medication of Haldol 10 mg IM, Ativan 2 mg IM, Benadryl 50 mg IM was effective as evidence by Patient regain behavioral control, and is resting in her room. Patient denies medication side effects. Will continue to monitor and provide support as needed.

## 2025-06-04 NOTE — BH NOTE
Emergency PRN Medication Administration Note:      Patient is Agitated, Disruptive, Destructive, and Dangerous as evidence by up at desk constantly, non redirectable, throwing cup of coffee in the nurses desk, destroying the entire day room, calling staff names. Staff attempted to find and relieve the distress by Talking to patient, Refocusing on new activity, Offering suggestions, Checking for undiagnosed pain, and Administer PRN medications Patient is currently continuing to escalate or in behavioral control, accepted PRN medications. Medication Administered as prescribed: Haldol 10mg IM, Benadryl 50mg IM, Ativan 2mg IM. Patient Tolerated medication administration. Notified Legal guardian Radha via Voicemail.  Will continue to monitor, offer support, and reassess.

## 2025-06-04 NOTE — PLAN OF CARE
Problem: Safety - Medical Restraint  Goal: Remains free of injury from restraints (Restraint for Interference with Medical Device)  Description: INTERVENTIONS:1. Determine that other, less restrictive measures have been tried or would not be effective before applying the restraint2. Evaluate the patient's condition at the time of restraint application3. Inform patient/family regarding the reason for restraint4. Q2H: Monitor safety, psychosocial status, comfort, nutrition and hydration  Outcome: Progressing  Flowsheets (Taken 6/4/2025 0214)  Remains free of injury from restraints (restraint for interference with medical device):   Determine that other, less restrictive measures have been tried or would not be effective before applying the restraint   Evaluate the patient's condition at the time of restraint application   Every 2 hours: Monitor safety, psychosocial status, comfort, nutrition and hydration  Note: Bilat soft wrist restraints continue for safety     Problem: Chronic Conditions and Co-morbidities  Goal: Patient's chronic conditions and co-morbidity symptoms are monitored and maintained or improved  Outcome: Progressing  Flowsheets (Taken 6/4/2025 0214)  Care Plan - Patient's Chronic Conditions and Co-Morbidity Symptoms are Monitored and Maintained or Improved:   Monitor and assess patient's chronic conditions and comorbid symptoms for stability, deterioration, or improvement   Collaborate with multidisciplinary team to address chronic and comorbid conditions and prevent exacerbation or deterioration   Update acute care plan with appropriate goals if chronic or comorbid symptoms are exacerbated and prevent overall improvement and discharge  Note: Having experiencing periods of cooperative behavior followed by episodes of yelling at staff, attempting to get out of bed, precedex gtt continues at max dose, atarax continues as ordered     Problem: Discharge Planning  Goal: Discharge to home or other facility

## 2025-06-04 NOTE — PROGRESS NOTES
Pt with extremely agitated behavior, states\" if you touch me, I will kick you.\" Also fixated on her cell phone, she believes staff stole her phone from her, unable to calm pt, security called, Benadryl, Ativan, Haldol combination given as ordered, bilat soft wrist restraints continue, sitter remains at bedside for pt safety

## 2025-06-05 LAB
EKG ATRIAL RATE: 73 BPM
EKG P AXIS: 9 DEGREES
EKG P-R INTERVAL: 148 MS
EKG Q-T INTERVAL: 392 MS
EKG QRS DURATION: 98 MS
EKG QTC CALCULATION (BAZETT): 431 MS
EKG R AXIS: 15 DEGREES
EKG T AXIS: -2 DEGREES
EKG VENTRICULAR RATE: 73 BPM
MICROORGANISM SPEC CULT: ABNORMAL
MICROORGANISM SPEC CULT: ABNORMAL
SPECIMEN DESCRIPTION: ABNORMAL

## 2025-06-05 PROCEDURE — APPSS60 APP SPLIT SHARED TIME 46-60 MINUTES

## 2025-06-05 PROCEDURE — 6370000000 HC RX 637 (ALT 250 FOR IP): Performed by: PSYCHIATRY & NEUROLOGY

## 2025-06-05 PROCEDURE — 99222 1ST HOSP IP/OBS MODERATE 55: CPT | Performed by: INTERNAL MEDICINE

## 2025-06-05 PROCEDURE — 2040000000 HC PSYCH ICU R&B

## 2025-06-05 PROCEDURE — 6370000000 HC RX 637 (ALT 250 FOR IP): Performed by: INTERNAL MEDICINE

## 2025-06-05 PROCEDURE — 99222 1ST HOSP IP/OBS MODERATE 55: CPT | Performed by: PSYCHIATRY & NEUROLOGY

## 2025-06-05 RX ORDER — PRAZOSIN HYDROCHLORIDE 1 MG/1
1 CAPSULE ORAL NIGHTLY
Status: DISCONTINUED | OUTPATIENT
Start: 2025-06-05 | End: 2025-06-16 | Stop reason: HOSPADM

## 2025-06-05 RX ORDER — POLYETHYLENE GLYCOL 3350 17 G/17G
17 POWDER, FOR SOLUTION ORAL DAILY PRN
Status: DISCONTINUED | OUTPATIENT
Start: 2025-06-05 | End: 2025-06-16 | Stop reason: HOSPADM

## 2025-06-05 RX ORDER — LITHIUM CARBONATE 300 MG/1
300 CAPSULE ORAL 2 TIMES DAILY
Status: DISCONTINUED | OUTPATIENT
Start: 2025-06-05 | End: 2025-06-12

## 2025-06-05 RX ORDER — CEPHALEXIN 500 MG/1
500 CAPSULE ORAL 2 TIMES DAILY
Status: DISCONTINUED | OUTPATIENT
Start: 2025-06-05 | End: 2025-06-05

## 2025-06-05 RX ORDER — LIDOCAINE 4 G/G
1 PATCH TOPICAL DAILY
Status: DISCONTINUED | OUTPATIENT
Start: 2025-06-05 | End: 2025-06-16 | Stop reason: HOSPADM

## 2025-06-05 RX ORDER — HYDROXYZINE HYDROCHLORIDE 50 MG/1
50 TABLET, FILM COATED ORAL 3 TIMES DAILY PRN
Status: DISCONTINUED | OUTPATIENT
Start: 2025-06-05 | End: 2025-06-16 | Stop reason: HOSPADM

## 2025-06-05 RX ORDER — TRAZODONE HYDROCHLORIDE 50 MG/1
50 TABLET ORAL NIGHTLY PRN
Status: DISCONTINUED | OUTPATIENT
Start: 2025-06-05 | End: 2025-06-16 | Stop reason: HOSPADM

## 2025-06-05 RX ORDER — ZIPRASIDONE HYDROCHLORIDE 40 MG/1
40 CAPSULE ORAL 2 TIMES DAILY WITH MEALS
Status: DISCONTINUED | OUTPATIENT
Start: 2025-06-05 | End: 2025-06-09

## 2025-06-05 RX ORDER — AMLODIPINE BESYLATE 5 MG/1
5 TABLET ORAL DAILY
Status: DISCONTINUED | OUTPATIENT
Start: 2025-06-05 | End: 2025-06-16 | Stop reason: HOSPADM

## 2025-06-05 RX ORDER — LORAZEPAM 2 MG/ML
2 INJECTION INTRAMUSCULAR EVERY 6 HOURS PRN
Status: DISCONTINUED | OUTPATIENT
Start: 2025-06-05 | End: 2025-06-05

## 2025-06-05 RX ORDER — HALOPERIDOL 5 MG/ML
5 INJECTION INTRAMUSCULAR EVERY 6 HOURS PRN
Status: DISCONTINUED | OUTPATIENT
Start: 2025-06-05 | End: 2025-06-05

## 2025-06-05 RX ORDER — DIPHENHYDRAMINE HYDROCHLORIDE 50 MG/ML
25 INJECTION, SOLUTION INTRAMUSCULAR; INTRAVENOUS EVERY 6 HOURS PRN
Status: DISCONTINUED | OUTPATIENT
Start: 2025-06-05 | End: 2025-06-05

## 2025-06-05 RX ORDER — MICONAZOLE NITRATE 2 G/100G
CREAM TOPICAL 2 TIMES DAILY
Status: DISCONTINUED | OUTPATIENT
Start: 2025-06-05 | End: 2025-06-16 | Stop reason: HOSPADM

## 2025-06-05 RX ORDER — NICOTINE 21 MG/24HR
1 PATCH, TRANSDERMAL 24 HOURS TRANSDERMAL DAILY
Status: DISCONTINUED | OUTPATIENT
Start: 2025-06-05 | End: 2025-06-05

## 2025-06-05 RX ADMIN — HALOPERIDOL 10 MG: 10 TABLET ORAL at 23:25

## 2025-06-05 RX ADMIN — LORAZEPAM 2 MG: 1 TABLET ORAL at 23:25

## 2025-06-05 RX ADMIN — NICOTINE POLACRILEX 2 MG: 2 LOZENGE ORAL at 16:08

## 2025-06-05 RX ADMIN — CEPHALEXIN 250 MG: 250 CAPSULE ORAL at 23:25

## 2025-06-05 RX ADMIN — ACETAMINOPHEN 650 MG: 325 TABLET ORAL at 16:58

## 2025-06-05 RX ADMIN — HALOPERIDOL 10 MG: 10 TABLET ORAL at 02:40

## 2025-06-05 RX ADMIN — ANTIFUNGAL: 2 CREAM TOPICAL at 20:31

## 2025-06-05 RX ADMIN — POLYETHYLENE GLYCOL 3350 17 G: 17 POWDER, FOR SOLUTION ORAL at 10:27

## 2025-06-05 RX ADMIN — ZIPRASIDONE HYDROCHLORIDE 40 MG: 40 CAPSULE ORAL at 16:58

## 2025-06-05 RX ADMIN — LITHIUM CARBONATE 300 MG: 300 CAPSULE, GELATIN COATED ORAL at 20:32

## 2025-06-05 RX ADMIN — CEPHALEXIN 250 MG: 250 CAPSULE ORAL at 16:58

## 2025-06-05 RX ADMIN — LORAZEPAM 2 MG: 1 TABLET ORAL at 02:40

## 2025-06-05 RX ADMIN — CEPHALEXIN 250 MG: 250 CAPSULE ORAL at 06:33

## 2025-06-05 RX ADMIN — Medication 3 MG: at 20:32

## 2025-06-05 RX ADMIN — IBUPROFEN 400 MG: 400 TABLET, FILM COATED ORAL at 08:54

## 2025-06-05 RX ADMIN — HYDROXYZINE HYDROCHLORIDE 50 MG: 50 TABLET ORAL at 20:35

## 2025-06-05 RX ADMIN — ANTIFUNGAL: 2 CREAM TOPICAL at 09:58

## 2025-06-05 RX ADMIN — ACETAMINOPHEN 650 MG: 325 TABLET ORAL at 02:24

## 2025-06-05 RX ADMIN — CEPHALEXIN 250 MG: 250 CAPSULE ORAL at 11:48

## 2025-06-05 RX ADMIN — NICOTINE POLACRILEX 2 MG: 2 LOZENGE ORAL at 17:48

## 2025-06-05 RX ADMIN — ZIPRASIDONE HYDROCHLORIDE 40 MG: 40 CAPSULE ORAL at 09:59

## 2025-06-05 RX ADMIN — IBUPROFEN 400 MG: 400 TABLET, FILM COATED ORAL at 00:16

## 2025-06-05 RX ADMIN — AMLODIPINE BESYLATE 5 MG: 5 TABLET ORAL at 09:59

## 2025-06-05 RX ADMIN — LITHIUM CARBONATE 300 MG: 300 CAPSULE, GELATIN COATED ORAL at 09:59

## 2025-06-05 RX ADMIN — ACETAMINOPHEN 650 MG: 325 TABLET ORAL at 23:24

## 2025-06-05 RX ADMIN — NICOTINE POLACRILEX 2 MG: 2 LOZENGE ORAL at 19:06

## 2025-06-05 RX ADMIN — PRAZOSIN HYDROCHLORIDE 1 MG: 1 CAPSULE ORAL at 20:32

## 2025-06-05 ASSESSMENT — PAIN DESCRIPTION - LOCATION
LOCATION: BACK
LOCATION: BACK
LOCATION: NECK
LOCATION: NECK

## 2025-06-05 ASSESSMENT — PAIN SCALES - GENERAL
PAINLEVEL_OUTOF10: 2
PAINLEVEL_OUTOF10: 7
PAINLEVEL_OUTOF10: 5
PAINLEVEL_OUTOF10: 0
PAINLEVEL_OUTOF10: 6
PAINLEVEL_OUTOF10: 0

## 2025-06-05 ASSESSMENT — PAIN DESCRIPTION - DESCRIPTORS
DESCRIPTORS: ACHING
DESCRIPTORS: ACHING

## 2025-06-05 ASSESSMENT — PAIN DESCRIPTION - ORIENTATION: ORIENTATION: LOWER

## 2025-06-05 NOTE — GROUP NOTE
Group Therapy Note    Date: 6/5/2025    Group Start Time: 0800  Group End Time: 0815  Group Topic: Orientation Group    [facility]    Jacquelin Nunez        Group Therapy Note    Attendees: 5/7     Morning Orientation  Group Note        Date: June 5, 2025 Start Time: 0800  End Time:  0815      Number of Participants in Group & Unit Census:  5/7    Topic: Morning Orientation    Goal of Group:Review staff, rules and schedule      Comments:     Patient did not participate in  Morning Orientation  group, despite staff encouragement and explanation of benefits.  Patient remain seclusive to self.  Q15 minute safety checks maintained for patient safety and will continue to encourage patient to attend unit programming.

## 2025-06-05 NOTE — FLOWSHEET NOTE
06/05/25 0842   Vital Signs   Temp 97.2 °F (36.2 °C)   Temp Source Axillary   Pulse (!) 125   Heart Rate Source Monitor   Respirations 16   BP (!) 124/90   MAP (Calculated) 101   MAP (mmHg) 102   BP Location Left upper arm   Patient Position Sitting   Pain Assessment   Pain Assessment 0-10   Pain Level 6   Pain Location Neck   Pain Descriptors Aching   Oxygen Therapy   SpO2 96 %   O2 Device None (Room air)     Dr. Dsouza (consulting IM) and Dr. Brenner (Attending Psychiatrist) messaged via [employer] message asking to have patients medications resumed from medical (currently only her keflex has been resumed).

## 2025-06-05 NOTE — BH NOTE
Dr. Dsouza messaged the following message at 10:57:08    \"Patient has tow orders for Keflex:Pharmacy is asking what order to continue with. Are we continuing with Keflex 250mg PO 4 times a day\"    Dr. Dsouza sent back the following message at 11:00:21 \"Yes\"    MAR updated.

## 2025-06-05 NOTE — GROUP NOTE
Group Therapy Note    Date: 6/5/2025    Group Start Time: 1330  Group End Time: 1415  Group Topic: Healthy Living/Wellness    [facility]    Jacquelin Nunez        Group Therapy Note    Attendees: 3/7       Patient's Goal:  relaxation time, puzzle    Notes:      Status After Intervention:  Unchanged    Participation Level: Active Listener    Participation Quality: Appropriate      Speech:  normal      Thought Process/Content: Logical      Affective Functioning: Congruent      Mood: anxious      Level of consciousness:  Alert      Response to Learning: Able to verbalize current knowledge/experience and Progressing to goal      Endings: None Reported    Modes of Intervention: Education, Support, and Socialization      Discipline Responsible: Behavorial Health Tech      Signature:  Jacquelin Nunez

## 2025-06-05 NOTE — BH NOTE
Lidocaine patch applied on at 0959 to lumbar back region. Patient at 1020 came up to nurses station and stated \"its too sticky and cold\" - hand back lidocaine patch. MAR UPDATED.

## 2025-06-05 NOTE — GROUP NOTE
Group Therapy Note    Date: 6/5/2025    Group Start Time: 1100  Group End Time: 1145  Group Topic: Cognitive Skills    [facility]    Jacquelin Nunez        Group Therapy Note    Attendees: 4/7       Patient's Goal:  work on concentration by working on puzzle    Notes:      Status After Intervention:  Unchanged    Participation Level: Active Listener    Participation Quality: Appropriate      Speech:  normal      Thought Process/Content: Logical      Affective Functioning: Congruent      Mood: anxious      Level of consciousness:  Alert      Response to Learning: Able to verbalize current knowledge/experience and Progressing to goal      Endings: None Reported    Modes of Intervention: Education, Support, and Socialization      Discipline Responsible: Behavorial Health Tech      Signature:  Jacquelin Nunez

## 2025-06-05 NOTE — CARE COORDINATION
SOCIAL SERVICE NOTE:  [facility] [phone].  left a message letting her know that patient is here and asked for return call to discuss patient's plan for discharge.

## 2025-06-05 NOTE — GROUP NOTE
Nursing Group Note        Date: June 5, 2025 Start Time: 9am  End Time: 0910      Number of Participants in Group & Unit Census:  3/7    Topic: Goals Group    Goal of Group:Identify goal for today       Comments:     Patient did not participate in Nursing group, despite staff encouragement and explanation of benefits.  Patient remain seclusive to self.  Q15 minute safety checks maintained for patient safety and will continue to encourage patient to attend unit programming.

## 2025-06-05 NOTE — DISCHARGE SUMMARY
IN-PATIENT SERVICE   [facility] Internal Medicine    Discharge Summary     Patient ID: Yani Nowak  :  1980   MRN: [id number]     ACCOUNT:  [id number]   Patient's PCP: New Salas APRN - NP  Admit Date: 2025   Discharge Date: 2025    Length of Stay: 2  Code Status:  Full Code  Admitting Physician: Delvis Connell MD  Discharge Physician: Delvis Connell MD     Active Discharge Diagnoses:     Primary Problem  Acute psychosis (HCC)      Hospital Problems  Active Hospital Problems    Diagnosis Date Noted    Acute psychosis (HCC) [F23] 2022     Priority: Medium       Admission Condition: poor     Discharged Condition: fair    Hospital Stay:     Hospital Course:  Yani Nowak is a 44 y.o. female who was admitted for the management of Acute psychosis (HCC) , presented to ER with Mental Health Problem ([place] slip, [facility])  Patient, has past medical history of multiple medical problems include OCD, major depression, bipolar disorder, hypertension, polysubstance abuse  Patient was brought to emergency room by [facility], due to disorganized thoughts and manic behavior  Patient was very uncooperative in emergency room  Patient, on presentation had elevated WBC count,  She, required ICU hospitalization with Precedex drip, evaluated by psychiatrist, got transferred to [facility] unit    Significant therapeutic interventions:     Significant Diagnostic Studies:   Labs / Micro:        Radiology:    XR ELBOW LEFT (MIN 3 VIEWS)  Result Date: 2025  EXAMINATION: THREE XRAY VIEWS OF THE LEFT ELBOW 2025 8:30 am COMPARISON: None. HISTORY: ORDERING SYSTEM PROVIDED HISTORY: pt with TECHNOLOGIST PROVIDED HISTORY: pt with FINDINGS: Three views of the left elbow demonstrates no evidence of acute fracture dislocation.  No joint effusion.  The joint spaces are maintained.  No osseous destructive lesion.     No acute fracture     CT HEAD WO

## 2025-06-05 NOTE — BH NOTE
Patient is agitated AEB increased restlessness, pacing in room, pressing call light on and off several times without reason. Patient stated \"I can't get comfortable, I'm going to freak out\" and requested medication to help. Staff attempted to find and relieve the distress by talking to pt, refocusing and offering suggestions. PRN PO medications: Haldol 10 mg and Ativan 2 mg administered as ordered at this time. Patient accepting of medications and tolerated well. Will continue to monitor.

## 2025-06-05 NOTE — H&P
Department of Psychiatry  Attending Physician Psychiatric Assessment   Patient: Yani Nowak  MRN: [id number]  Reason for Admission to Psychiatric Unit:  Acute disordered/bizarre behavior or psychomotor agitation or retardation;interferes with ADLs so that patient cannot function at a less intensive care level of care during evaluation and treatment   A mental disorder causing major disability in social, interpersonal, occupational, and/or educational functioning that is leading to dangerous or life-threatening functioning, and that can only be addressed in an acute inpatient setting   Failure of outpatient psychiatry treatment so that the beneficiary requires 24 hour professional observation and care  Concerns about patient's safety in the community  Past Mental Health Diagnosis: a history of  Bipolar Disorder, Schizoaffective Disorder, Anxiety Disorder, Prior suicide attempt, and Alcohol and/or Drug Use Disorder  Triggering event or precipitating factor: Housing instability, Financial instability, Alcohol/Drug Relapse, and Psych Treatment Noncompliance  Length of time/duration of triggering event: Weeks  Legal Status: Involuntary    CHIEF COMPLAINT:  Acute Psychosis     History obtained from: Patient, electronic medical record          HISTORY OF PRESENT ILLNESS:    Yani Nowak is a 44 y.o. female who has a past medical history of diabetes, OCD, polysubstance abuse, schizoaffective disorder, and anxiety.  Patient presented to the ED via [facility] on a pink slip after being released from [facility] presenting with nonsensical, disorganized, and pressured speech.  Per ED documentation, patient has a history of anxiety, bipolar 1 disorder, depression, schizoaffective disorder.  Patient was in police custody and when she was discharged they brought her here for further evaluation.  Patient is not making any coherent statements.  She said that she is not taking her medications.  She has a pressured speech and 
habits, abdominal pain   GENITOURINARY:  negative for difficulty of urination, burning with urination, frequency   INTEGUMENT:  negative for rash, skin lesions, easy bruising   HEMATOLOGIC/LYMPHATIC:  negative for swelling/edema   ALLERGIC/IMMUNOLOGIC:  negative for urticaria , itching  ENDOCRINE:  negative increase in drinking, increase in urination, hot or cold intolerance  MUSCULOSKELETAL:  negative joint pains, muscle aches, swelling of joints  NEUROLOGICAL:  negative for headaches, dizziness, lightheadedness, numbness, pain, tingling extremities      Physical Exam:     BP (!) 125/90   Pulse (!) 111   Temp 97.2 °F (36.2 °C) (Axillary)   Resp 16   Ht 1.753 m (5' 9.02\")   Wt 83.9 kg (184 lb 15.5 oz)   SpO2 96%   BMI 27.30 kg/m²   Temp (24hrs), Av.2 °F (36.8 °C), Min:97.2 °F (36.2 °C), Max:99.1 °F (37.3 °C)    No results for input(s): \"POCGLU\" in the last 72 hours.  No intake or output data in the 24 hours ending 25 1150    General Appearance:  alert, well appearing, and in no acute distress  Mental status: oriented to person, place, and time   Head:  normocephalic, atraumatic.  Neck: supple, no carotid bruits, thyroid not palpable  Lungs: Bilateral equal air entry, clear to ausculation, no wheezing, rales or rhonchi, normal effort  Cardiovascular: normal rate, sinus tachycardia  Abdomen: Soft, nontender, nondistended, normal bowel sounds, no hepatomegaly or splenomegaly  Neurologic: CN II-XII intact , DTR normal, no new focal motor or sensory deficits, moving all extremities spontaneously.   Skin: No gross lesions, rashes, bruising or bleeding on exposed skin area  Extremities:  peripheral pulses palpable, no pedal edema or calf pain with palpation    Investigations:      Laboratory Testing:  No results found for this or any previous visit (from the past 24 hours).    Imaging/Diagonstics:  No results found.    Assessment :      Hospital Problems           Last Modified POA    * (Principal)

## 2025-06-05 NOTE — GROUP NOTE
Nursing Group Note        Date: June 5, 2025 Start Time: 1430  End Time: 1445      Number of Participants in Group & Unit Census:  2/7    Topic: Walking Group    Goal of Group:To discuss coping skills and use walking as a type of coping skill.      Comments:     Patient did not participate in Nursing group, despite staff encouragement and explanation of benefits.  Patient remain seclusive to self.  Q15 minute safety checks maintained for patient safety and will continue to encourage patient to attend unit programming.

## 2025-06-05 NOTE — GROUP NOTE
Group Therapy Note    Date: 6/5/2025    Group Start Time: 1000  Group End Time: 1030  Group Topic: Psychotherapy    [facility]        Group Therapy Note    Attendees: 6/7       Patient's Goal:  Expression of feeling    Notes:  Therapeutic worksheet provided and discussed.    Status After Intervention:  Unchanged    Participation Level: Interactive    Participation Quality: Sharing      Speech:  normal      Thought Process/Content: Logical      Affective Functioning: Congruent      Mood: euthymic      Level of consciousness:  Alert and Oriented x4      Response to Learning: Progressing to goal      Endings: None Reported    Modes of Intervention: Education and Support      Discipline Responsible: /Counselor      Signature:  GABY Torres, RHONDA

## 2025-06-06 PROCEDURE — 6370000000 HC RX 637 (ALT 250 FOR IP): Performed by: PSYCHIATRY & NEUROLOGY

## 2025-06-06 PROCEDURE — 99232 SBSQ HOSP IP/OBS MODERATE 35: CPT | Performed by: PSYCHIATRY & NEUROLOGY

## 2025-06-06 PROCEDURE — 6370000000 HC RX 637 (ALT 250 FOR IP): Performed by: INTERNAL MEDICINE

## 2025-06-06 PROCEDURE — APPSS30 APP SPLIT SHARED TIME 16-30 MINUTES: Performed by: NURSE PRACTITIONER

## 2025-06-06 PROCEDURE — 2040000000 HC PSYCH ICU R&B

## 2025-06-06 PROCEDURE — 99232 SBSQ HOSP IP/OBS MODERATE 35: CPT | Performed by: INTERNAL MEDICINE

## 2025-06-06 RX ADMIN — CEPHALEXIN 250 MG: 250 CAPSULE ORAL at 22:44

## 2025-06-06 RX ADMIN — TRAZODONE HYDROCHLORIDE 50 MG: 50 TABLET ORAL at 20:35

## 2025-06-06 RX ADMIN — Medication 3 MG: at 20:35

## 2025-06-06 RX ADMIN — ZIPRASIDONE HYDROCHLORIDE 40 MG: 40 CAPSULE ORAL at 09:19

## 2025-06-06 RX ADMIN — ANTIFUNGAL: 2 CREAM TOPICAL at 20:36

## 2025-06-06 RX ADMIN — AMLODIPINE BESYLATE 5 MG: 5 TABLET ORAL at 09:18

## 2025-06-06 RX ADMIN — NICOTINE POLACRILEX 2 MG: 2 LOZENGE ORAL at 10:29

## 2025-06-06 RX ADMIN — ACETAMINOPHEN 650 MG: 325 TABLET ORAL at 20:35

## 2025-06-06 RX ADMIN — IBUPROFEN 400 MG: 400 TABLET, FILM COATED ORAL at 17:11

## 2025-06-06 RX ADMIN — CEPHALEXIN 250 MG: 250 CAPSULE ORAL at 12:35

## 2025-06-06 RX ADMIN — LITHIUM CARBONATE 300 MG: 300 CAPSULE, GELATIN COATED ORAL at 09:18

## 2025-06-06 RX ADMIN — LITHIUM CARBONATE 300 MG: 300 CAPSULE, GELATIN COATED ORAL at 20:35

## 2025-06-06 RX ADMIN — ZIPRASIDONE HYDROCHLORIDE 40 MG: 40 CAPSULE ORAL at 17:11

## 2025-06-06 RX ADMIN — PRAZOSIN HYDROCHLORIDE 1 MG: 1 CAPSULE ORAL at 20:35

## 2025-06-06 RX ADMIN — NICOTINE POLACRILEX 2 MG: 2 LOZENGE ORAL at 17:37

## 2025-06-06 RX ADMIN — NICOTINE POLACRILEX 2 MG: 2 LOZENGE ORAL at 19:33

## 2025-06-06 RX ADMIN — NICOTINE POLACRILEX 2 MG: 2 LOZENGE ORAL at 13:43

## 2025-06-06 RX ADMIN — NICOTINE POLACRILEX 2 MG: 2 LOZENGE ORAL at 22:44

## 2025-06-06 RX ADMIN — ANTIFUNGAL: 2 CREAM TOPICAL at 09:19

## 2025-06-06 RX ADMIN — HYDROXYZINE HYDROCHLORIDE 50 MG: 50 TABLET ORAL at 13:30

## 2025-06-06 RX ADMIN — CEPHALEXIN 250 MG: 250 CAPSULE ORAL at 17:11

## 2025-06-06 RX ADMIN — CEPHALEXIN 250 MG: 250 CAPSULE ORAL at 09:20

## 2025-06-06 RX ADMIN — HYDROXYZINE HYDROCHLORIDE 50 MG: 50 TABLET ORAL at 20:35

## 2025-06-06 RX ADMIN — NICOTINE POLACRILEX 2 MG: 2 LOZENGE ORAL at 15:10

## 2025-06-06 ASSESSMENT — PAIN DESCRIPTION - DESCRIPTORS
DESCRIPTORS: THROBBING
DESCRIPTORS: ACHING

## 2025-06-06 ASSESSMENT — PAIN SCALES - GENERAL
PAINLEVEL_OUTOF10: 4
PAINLEVEL_OUTOF10: 0
PAINLEVEL_OUTOF10: 7

## 2025-06-06 ASSESSMENT — PAIN DESCRIPTION - LOCATION
LOCATION: BACK
LOCATION: BACK
LOCATION: HEAD

## 2025-06-06 NOTE — GROUP NOTE
Group Therapy Note    Date: 6/5/2025    Group Start Time: 2000  Group End Time: 2030  Group Topic: Wrap-Up    [facility]    [place], [place]        Group Therapy Note    Attendees: 5/6           Status After Intervention:  Unchanged    Participation Level: Minimal    Participation Quality: Lethargic      Speech:  mute      Thought Process/Content: Logical      Affective Functioning: Flat      Mood: anxious      Level of consciousness:  Alert, Oriented x4, and Preoccupied      Response to Learning: Progressing to goal      Endings: None Reported    Modes of Intervention: Problem-solving      Discipline Responsible: Behavorial Health Tech      Signature:  [place] Sheppard

## 2025-06-06 NOTE — GROUP NOTE
Group Therapy Note    Date: 6/6/2025    Group Start Time: 0900  Group End Time: 0910  Group Topic: Group Documentation    [facility]    Kenya Ascencio LPN        Group Therapy Note         Patient did not participate in Goals  group, despite staff encouragement and explanation of benefits.  Patient remain seclusive to self.  Q15 minute safety checks maintained for patient safety and will continue to encourage patient to attend unit programming.      Signature:  Kenya Ascencio LPN

## 2025-06-06 NOTE — BH NOTE
PRN NOTE:    Patient was given Haldol 5 mg and Ativan 2 mg PO for a increase in agitation as evidence by patient verbalizing that she was going to \"Go Off\" Patient was difficult to redirect and attempting to go into other patients rooms. Patient was offered distractions, 1 to 1 talk time and choices. Interventions were not therapeutic. Patient was accepting of PRN medications.

## 2025-06-07 PROCEDURE — 6370000000 HC RX 637 (ALT 250 FOR IP): Performed by: PSYCHIATRY & NEUROLOGY

## 2025-06-07 PROCEDURE — 6370000000 HC RX 637 (ALT 250 FOR IP): Performed by: INTERNAL MEDICINE

## 2025-06-07 PROCEDURE — 2040000000 HC PSYCH ICU R&B

## 2025-06-07 PROCEDURE — 99232 SBSQ HOSP IP/OBS MODERATE 35: CPT

## 2025-06-07 RX ADMIN — NICOTINE POLACRILEX 2 MG: 2 LOZENGE ORAL at 20:38

## 2025-06-07 RX ADMIN — DIVALPROEX SODIUM 750 MG: 500 TABLET, EXTENDED RELEASE ORAL at 08:29

## 2025-06-07 RX ADMIN — Medication 3 MG: at 20:56

## 2025-06-07 RX ADMIN — IBUPROFEN 400 MG: 400 TABLET, FILM COATED ORAL at 15:20

## 2025-06-07 RX ADMIN — ACETAMINOPHEN 650 MG: 325 TABLET ORAL at 22:47

## 2025-06-07 RX ADMIN — HALOPERIDOL 10 MG: 10 TABLET ORAL at 06:35

## 2025-06-07 RX ADMIN — IBUPROFEN 400 MG: 400 TABLET, FILM COATED ORAL at 05:54

## 2025-06-07 RX ADMIN — CEPHALEXIN 250 MG: 250 CAPSULE ORAL at 11:55

## 2025-06-07 RX ADMIN — LITHIUM CARBONATE 300 MG: 300 CAPSULE, GELATIN COATED ORAL at 20:56

## 2025-06-07 RX ADMIN — TRAZODONE HYDROCHLORIDE 50 MG: 50 TABLET ORAL at 20:56

## 2025-06-07 RX ADMIN — NICOTINE POLACRILEX 2 MG: 2 LOZENGE ORAL at 06:03

## 2025-06-07 RX ADMIN — ZIPRASIDONE HYDROCHLORIDE 40 MG: 40 CAPSULE ORAL at 08:30

## 2025-06-07 RX ADMIN — CEPHALEXIN 250 MG: 250 CAPSULE ORAL at 17:46

## 2025-06-07 RX ADMIN — NICOTINE POLACRILEX 2 MG: 2 LOZENGE ORAL at 13:19

## 2025-06-07 RX ADMIN — NICOTINE POLACRILEX 2 MG: 2 LOZENGE ORAL at 15:19

## 2025-06-07 RX ADMIN — AMLODIPINE BESYLATE 5 MG: 5 TABLET ORAL at 08:30

## 2025-06-07 RX ADMIN — NICOTINE POLACRILEX 2 MG: 2 LOZENGE ORAL at 17:46

## 2025-06-07 RX ADMIN — NICOTINE POLACRILEX 2 MG: 2 LOZENGE ORAL at 22:46

## 2025-06-07 RX ADMIN — LITHIUM CARBONATE 300 MG: 300 CAPSULE, GELATIN COATED ORAL at 08:30

## 2025-06-07 RX ADMIN — NICOTINE POLACRILEX 2 MG: 2 LOZENGE ORAL at 11:20

## 2025-06-07 RX ADMIN — HYDROXYZINE HYDROCHLORIDE 50 MG: 50 TABLET ORAL at 18:10

## 2025-06-07 RX ADMIN — LORAZEPAM 2 MG: 1 TABLET ORAL at 06:34

## 2025-06-07 RX ADMIN — ANTIFUNGAL: 2 CREAM TOPICAL at 20:55

## 2025-06-07 RX ADMIN — ZIPRASIDONE HYDROCHLORIDE 40 MG: 40 CAPSULE ORAL at 16:52

## 2025-06-07 RX ADMIN — CEPHALEXIN 250 MG: 250 CAPSULE ORAL at 05:54

## 2025-06-07 RX ADMIN — PRAZOSIN HYDROCHLORIDE 1 MG: 1 CAPSULE ORAL at 20:56

## 2025-06-07 ASSESSMENT — PAIN SCALES - GENERAL
PAINLEVEL_OUTOF10: 2
PAINLEVEL_OUTOF10: 4
PAINLEVEL_OUTOF10: 7
PAINLEVEL_OUTOF10: 7
PAINLEVEL_OUTOF10: 2

## 2025-06-07 ASSESSMENT — PAIN DESCRIPTION - LOCATION
LOCATION: BACK;NECK
LOCATION: BACK
LOCATION: BACK
LOCATION: NECK;BACK

## 2025-06-07 ASSESSMENT — LIFESTYLE VARIABLES
HOW OFTEN DO YOU HAVE A DRINK CONTAINING ALCOHOL: PATIENT DECLINED
HOW MANY STANDARD DRINKS CONTAINING ALCOHOL DO YOU HAVE ON A TYPICAL DAY: PATIENT DECLINED

## 2025-06-07 ASSESSMENT — PAIN DESCRIPTION - DESCRIPTORS
DESCRIPTORS: ACHING
DESCRIPTORS: ACHING

## 2025-06-07 ASSESSMENT — PAIN DESCRIPTION - ORIENTATION: ORIENTATION: LOWER

## 2025-06-07 NOTE — BH NOTE
Pt does not participate in morning orientation group. Pt in Rm resting. 15 min safety checks in place.

## 2025-06-07 NOTE — BH NOTE
[facility]  Day 3 Interdisciplinary Treatment Plan NOTE    Review Date & Time: 6/7/25 0900    Admission Type:   Admission Type: Involuntary    Reason for admission:  Reason for Admission: Patient was brought to emergency room by [facility], due to disorganized thoughts and manic behavior  Patient was very uncooperative in emergency room  Required restraints and Precedex drip on medical.  Estimated Length of Stay: 5-7 days  Estimated Discharge Date Update: to be determined by physician    PATIENT STRENGTHS:  Patient Strengths    Patient Strengths and Limitations:Limitations: Difficulty problem solving/relies on others to help solve problems, Inappropriate/potentially harmful leisure interests, Multiple barriers to leisure interests, Apathetic / unmotivated, Demonstrates discomfort with /lack of social skills  Addictive Behavior:Addictive Behavior  In the Past 3 Months, Have You Felt or Has Someone Told You That You Have a Problem With  : None  Medical Problems:  Past Medical History:   Diagnosis Date    Anxiety     Bipolar 1 disorder (HCC) 1999    Depression     Gestational diabetes 7/22/2016    OCD (obsessive compulsive disorder) 1999    PTSD (post-traumatic stress disorder)     Rapid rate of speech     Schizoaffective disorder (HCC)        Risk:  Fall Risk   Jairo Scale Jairo Scale Score: 22  BVC    Change in scores no Changes to plan of Care no    Status EXAM:   Mental Status and Behavioral Exam  Normal: No  Level of Assistance: Independent/Self  Facial Expression: Exaggerated  Affect: Unstable  Level of Consciousness: Alert  Frequency of Checks: 4 times per hour, close  Mood:Normal: No  Mood: Anxious  Motor Activity:Normal: No  Motor Activity: Increased  Eye Contact: Fair  Observed Behavior: Preoccupied  Sexual Misconduct History: Current - no  Preception: [place] to person, [place] to time, [place] to place  Attention:Normal: No  Attention: Unable to concentrate  Thought

## 2025-06-07 NOTE — GROUP NOTE
Group Therapy Note    Date: 6/7/2025    Group Start Time: 0930  Group End Time: 0945  Group Topic: Daily Inventory Group    [facility]    Marlen Russell LPN        Group Therapy Note    Attendees: 4/6   Patient did not participate in goals group, despite staff encouragement and explanation of benefits.  Patient remain seclusive to self.  Q15 minute safety checks maintained for patient safety and will continue to encourage patient to attend unit programming.      Discipline Responsible: Licensed Practical Nurse      Signature:  Marlen Russell LPN

## 2025-06-07 NOTE — BH NOTE
PRN NOTE:    Patient was given PRN Haldol 5 mg and Ativan 2 mg PO for increase in agitation as evidence by patient verbalizing that she was going to \"Freak out!\" And states she was going to \"LOSE IT!\" If I did not get her medication. Patient was yelling at writer and had aggressive posture. Patient was offered distractions, 1 to 1 talk time, and offered choices. Interventions were not therapeutic. Patient was accepting of PRN medications.

## 2025-06-08 PROCEDURE — 6370000000 HC RX 637 (ALT 250 FOR IP)

## 2025-06-08 PROCEDURE — 99232 SBSQ HOSP IP/OBS MODERATE 35: CPT

## 2025-06-08 PROCEDURE — 6370000000 HC RX 637 (ALT 250 FOR IP): Performed by: INTERNAL MEDICINE

## 2025-06-08 PROCEDURE — 6370000000 HC RX 637 (ALT 250 FOR IP): Performed by: PSYCHIATRY & NEUROLOGY

## 2025-06-08 PROCEDURE — 2040000000 HC PSYCH ICU R&B

## 2025-06-08 RX ORDER — NEOMYCIN SULFATE, POLYMYXIN B SULFATE AND HYDROCORTISONE 10; 3.5; 1 MG/ML; MG/ML; [USP'U]/ML
3 SUSPENSION/ DROPS AURICULAR (OTIC) EVERY 8 HOURS SCHEDULED
Status: DISPENSED | OUTPATIENT
Start: 2025-06-08 | End: 2025-06-11

## 2025-06-08 RX ADMIN — NICOTINE POLACRILEX 2 MG: 2 LOZENGE ORAL at 18:07

## 2025-06-08 RX ADMIN — HALOPERIDOL 10 MG: 10 TABLET ORAL at 16:18

## 2025-06-08 RX ADMIN — ZIPRASIDONE HYDROCHLORIDE 40 MG: 40 CAPSULE ORAL at 09:23

## 2025-06-08 RX ADMIN — CEPHALEXIN 250 MG: 250 CAPSULE ORAL at 23:44

## 2025-06-08 RX ADMIN — HALOPERIDOL 10 MG: 10 TABLET ORAL at 04:08

## 2025-06-08 RX ADMIN — NICOTINE POLACRILEX 2 MG: 2 LOZENGE ORAL at 16:18

## 2025-06-08 RX ADMIN — ACETAMINOPHEN 650 MG: 325 TABLET ORAL at 09:41

## 2025-06-08 RX ADMIN — DIVALPROEX SODIUM 750 MG: 500 TABLET, EXTENDED RELEASE ORAL at 09:23

## 2025-06-08 RX ADMIN — CEPHALEXIN 250 MG: 250 CAPSULE ORAL at 06:47

## 2025-06-08 RX ADMIN — LITHIUM CARBONATE 300 MG: 300 CAPSULE, GELATIN COATED ORAL at 20:47

## 2025-06-08 RX ADMIN — CEPHALEXIN 250 MG: 250 CAPSULE ORAL at 12:58

## 2025-06-08 RX ADMIN — IBUPROFEN 400 MG: 400 TABLET, FILM COATED ORAL at 04:08

## 2025-06-08 RX ADMIN — ANTIFUNGAL: 2 CREAM TOPICAL at 20:47

## 2025-06-08 RX ADMIN — NICOTINE POLACRILEX 2 MG: 2 LOZENGE ORAL at 11:30

## 2025-06-08 RX ADMIN — NEOMYCIN SULFATE, POLYMYXIN B SULFATE AND HYDROCORTISONE 3 DROP: 10; 3.5; 1 SUSPENSION/ DROPS AURICULAR (OTIC) at 20:47

## 2025-06-08 RX ADMIN — ACETAMINOPHEN 650 MG: 325 TABLET ORAL at 15:27

## 2025-06-08 RX ADMIN — HYDROXYZINE HYDROCHLORIDE 50 MG: 50 TABLET ORAL at 12:58

## 2025-06-08 RX ADMIN — NICOTINE POLACRILEX 2 MG: 2 LOZENGE ORAL at 09:41

## 2025-06-08 RX ADMIN — ZIPRASIDONE HYDROCHLORIDE 40 MG: 40 CAPSULE ORAL at 18:14

## 2025-06-08 RX ADMIN — PRAZOSIN HYDROCHLORIDE 1 MG: 1 CAPSULE ORAL at 20:47

## 2025-06-08 RX ADMIN — HYDROXYZINE HYDROCHLORIDE 50 MG: 50 TABLET ORAL at 20:47

## 2025-06-08 RX ADMIN — NICOTINE POLACRILEX 2 MG: 2 LOZENGE ORAL at 12:59

## 2025-06-08 RX ADMIN — NICOTINE POLACRILEX 2 MG: 2 LOZENGE ORAL at 14:57

## 2025-06-08 RX ADMIN — NICOTINE POLACRILEX 2 MG: 2 LOZENGE ORAL at 06:47

## 2025-06-08 RX ADMIN — NEOMYCIN SULFATE, POLYMYXIN B SULFATE AND HYDROCORTISONE 3 DROP: 10; 3.5; 1 SUSPENSION/ DROPS AURICULAR (OTIC) at 18:14

## 2025-06-08 RX ADMIN — IBUPROFEN 400 MG: 400 TABLET, FILM COATED ORAL at 20:47

## 2025-06-08 RX ADMIN — TRAZODONE HYDROCHLORIDE 50 MG: 50 TABLET ORAL at 20:47

## 2025-06-08 RX ADMIN — CEPHALEXIN 250 MG: 250 CAPSULE ORAL at 01:16

## 2025-06-08 RX ADMIN — AMLODIPINE BESYLATE 5 MG: 5 TABLET ORAL at 09:23

## 2025-06-08 RX ADMIN — LORAZEPAM 2 MG: 1 TABLET ORAL at 04:08

## 2025-06-08 RX ADMIN — CEPHALEXIN 250 MG: 250 CAPSULE ORAL at 18:14

## 2025-06-08 RX ADMIN — LORAZEPAM 2 MG: 1 TABLET ORAL at 16:18

## 2025-06-08 RX ADMIN — LITHIUM CARBONATE 300 MG: 300 CAPSULE, GELATIN COATED ORAL at 09:23

## 2025-06-08 ASSESSMENT — PAIN DESCRIPTION - DESCRIPTORS
DESCRIPTORS: ACHING

## 2025-06-08 ASSESSMENT — PAIN SCALES - GENERAL
PAINLEVEL_OUTOF10: 8
PAINLEVEL_OUTOF10: 6
PAINLEVEL_OUTOF10: 7
PAINLEVEL_OUTOF10: 5
PAINLEVEL_OUTOF10: 2
PAINLEVEL_OUTOF10: 5

## 2025-06-08 ASSESSMENT — PAIN DESCRIPTION - LOCATION
LOCATION: BACK
LOCATION: HEAD
LOCATION: GENERALIZED
LOCATION: BACK
LOCATION: BACK

## 2025-06-08 NOTE — GROUP NOTE
Group Therapy Note    Date: 6/8/2025    Group Start Time: 1030  Group End Time: 1130  Group Topic: Focus Group    [facility]    Mitzy Murillo LPN        Group Therapy Note    Attendees: 5/6       Patient's Goal:  Get a home and finish school    Notes:  Patient excited about trying to find home and be in a stable environment    Status After Intervention:  Improved    Participation Level: Active Listener    Participation Quality: Appropriate      Speech:  normal      Thought Process/Content: Logical      Affective Functioning: Congruent      Mood: euthymic      Level of consciousness:  Oriented x4      Response to Learning: Able to verbalize current knowledge/experience and Able to verbalize/acknowledge new learning      Endings: None Reported    Modes of Intervention: Socialization and Clarifying      Discipline Responsible: Licensed Practical Nurse      Signature:  Mitzy Murillo LPN

## 2025-06-08 NOTE — BH NOTE
Emergency Medication Follow-Up Note:    PRN medication of Haldol 5 mg, Ativan 2 mg PO  was effective as evidence by patient resting in room. Patient denies medication side effects. Will continue to monitor and provide support as needed.

## 2025-06-08 NOTE — GROUP NOTE
Group Therapy Note    Date: 6/8/2025    Group Start Time: 1400  Group End Time: 1430  Group Topic: Nursing    [facility]    Marlen Russell LPN        Group Therapy Note    Attendees: 4/6     Patient did not participate in nursing/social skills group, despite staff encouragement and explanation of benefits.  Patient remain seclusive to self.  Q15 minute safety checks maintained for patient safety and will continue to encourage patient to attend unit programming.      Discipline Responsible: Licensed Practical Nurse      Signature:  Marlen Russell LPN     Pleasant, optimistic patient who just moved to this area about two weeks ago. Used to live in [place] but she and her  moved here to live with their son and daughter-in-law due to health issues.    Just established with a doctor at the [facility] in [place] today - doctor referred her to the hospital.    Has a CPAP machine from a DME company back in [place]. Has not had a chance to get established with a DME company here yet.      Care Transition Team Assessment    Information Source  Orientation : Oriented x 4  Information Given By: Patient  Informant's Name: Mae Shahid  Who is responsible for making decisions for patient? : Patient    Readmission Evaluation  Is this a readmission?: No    Elopement Risk  Legal Hold: No  Ambulatory or Self Mobile in Wheelchair: No-Not an Elopement Risk  Elopement Risk: Not at Risk for Elopement    Interdisciplinary Discharge Planning  Does Admitting Nurse Feel This Could be a Complex Discharge?: No  Primary Care Physician: [facility] in [place] ([phone])  Lives with - Patient's Self Care Capacity: Spouse, Adult Children  Support Systems: Family Member(s), Friends / Neighbors  Housing / Facility: [address] [place] [address]  Do You Take your Prescribed Medications Regularly: Yes (Uses pharmacies at [facility] in [place].)  Able to Return to Previous ADL's: Yes  Mobility Issues: No  Prior Services: None  Patient Expects to be Discharged to:: Home.  Assistance Needed: No  Durable Medical Equipment: Other - Specify (CPAP from DME company in [place].)    Discharge Preparedness  What is your plan after discharge?: Home with help  What are your discharge supports?: Child, Spouse  Prior Functional Level: Ambulatory, Drives Self, Independent with Activities of Daily Living  Difficulity with ADLs: None  Difficulity with IADLs: None    Functional Assesment  Prior Functional Level: Ambulatory, Drives Self, Independent with Activities of Daily  Living    Finances  Financial Barriers to Discharge: No (Social Security and 's Social Security.)  Prescription Coverage: Yes    Vision / Hearing Impairment  Vision Impairment : No  Hearing Impairment : No    Values / Beliefs / Concerns  Values / Beliefs Concerns : No    Advance Directive  Advance Directive?: None  Advance Directive offered?: AD Booklet given    Domestic Abuse  Have you ever been the victim of abuse or violence?: No  Physical Abuse or Sexual Abuse: No  Verbal Abuse or Emotional Abuse: No  Possible Abuse Reported to:: Not Applicable    Psychological Assessment  History of Substance Abuse: None  History of Psychiatric Problems: No  Non-compliant with Treatment: No  Newly Diagnosed Illness: No    Discharge Risks or Barriers  Discharge risks or barriers?: No    Anticipated Discharge Information  Anticipated discharge disposition: Home  Discharge Address: [address] [place] [address] [place] [address]  Discharge Contact Phone Number: [phone] (mobile)

## 2025-06-08 NOTE — GROUP NOTE
Group Therapy Note    Date: 6/7/2025    Group Start Time: 2000  Group End Time: 2030  Group Topic: Wrap-Up    [facility]    [place], [place]        Group Therapy Note    Attendees: 4/7     patient refused to attend wrap-up group at 2000 after encouragement from staff.  1:1 talk time provided as alternative to group session         Signature:  Dutch Sheppard

## 2025-06-08 NOTE — BH NOTE
Patient participated in Safety Checks. No contrabands found. Food and multiple cups scattered in room. Informed patient to clean room. Patient agreed.

## 2025-06-08 NOTE — BH NOTE
PT agitated AEB: flight or ideas, increase in tone, pacing, unable to sit still. Staff tried talking with pt, offered distractions and suggestions, checked for pain offered PO or IM medications. PT accepting of PO haldol 5mg and ativan 2mg will continue to monitor.

## 2025-06-08 NOTE — BH NOTE
PO PRN effective pt currently sitting on bed states she is feeling better will continue to monitor.

## 2025-06-08 NOTE — BH NOTE
Emergency PRN Medication Administration Note:    Patient is Agitated and Disruptive as evidence by hyper-verbal, tearful, patient stating she is \"agitated,\" does not give specify further content, is restless and not able to sit still. Staff attempted to find and relieve the distress by Talking to patient, Refocusing on new activity, and Offering suggestions Patient is currently escalating, accepted PRN medications. Medication Administered as prescribed: Haldol 5 mg, Ativan 2 mg PO. Patient Tolerated medication administration. Will continue to monitor, offer support, and reassess.

## 2025-06-09 PROCEDURE — 6370000000 HC RX 637 (ALT 250 FOR IP): Performed by: PSYCHIATRY & NEUROLOGY

## 2025-06-09 PROCEDURE — 6370000000 HC RX 637 (ALT 250 FOR IP): Performed by: INTERNAL MEDICINE

## 2025-06-09 PROCEDURE — 99232 SBSQ HOSP IP/OBS MODERATE 35: CPT | Performed by: PSYCHIATRY & NEUROLOGY

## 2025-06-09 PROCEDURE — 2040000000 HC PSYCH ICU R&B

## 2025-06-09 RX ORDER — ZIPRASIDONE HYDROCHLORIDE 60 MG/1
60 CAPSULE ORAL 2 TIMES DAILY WITH MEALS
Status: DISCONTINUED | OUTPATIENT
Start: 2025-06-09 | End: 2025-06-16 | Stop reason: HOSPADM

## 2025-06-09 RX ADMIN — IBUPROFEN 400 MG: 400 TABLET, FILM COATED ORAL at 23:27

## 2025-06-09 RX ADMIN — LORAZEPAM 2 MG: 1 TABLET ORAL at 14:09

## 2025-06-09 RX ADMIN — CEPHALEXIN 250 MG: 250 CAPSULE ORAL at 06:41

## 2025-06-09 RX ADMIN — PRAZOSIN HYDROCHLORIDE 1 MG: 1 CAPSULE ORAL at 23:15

## 2025-06-09 RX ADMIN — HYDROXYZINE HYDROCHLORIDE 50 MG: 50 TABLET ORAL at 13:39

## 2025-06-09 RX ADMIN — NICOTINE POLACRILEX 2 MG: 2 LOZENGE ORAL at 12:46

## 2025-06-09 RX ADMIN — NICOTINE POLACRILEX 2 MG: 2 LOZENGE ORAL at 08:41

## 2025-06-09 RX ADMIN — ZIPRASIDONE HYDROCHLORIDE 40 MG: 40 CAPSULE ORAL at 08:33

## 2025-06-09 RX ADMIN — Medication 3 MG: at 23:14

## 2025-06-09 RX ADMIN — NICOTINE POLACRILEX 2 MG: 2 LOZENGE ORAL at 17:04

## 2025-06-09 RX ADMIN — CEPHALEXIN 250 MG: 250 CAPSULE ORAL at 12:46

## 2025-06-09 RX ADMIN — ANTIFUNGAL: 2 CREAM TOPICAL at 23:14

## 2025-06-09 RX ADMIN — LITHIUM CARBONATE 300 MG: 300 CAPSULE, GELATIN COATED ORAL at 08:33

## 2025-06-09 RX ADMIN — NICOTINE POLACRILEX 2 MG: 2 LOZENGE ORAL at 06:49

## 2025-06-09 RX ADMIN — NICOTINE POLACRILEX 2 MG: 2 LOZENGE ORAL at 19:20

## 2025-06-09 RX ADMIN — ZIPRASIDONE HYDROCHLORIDE 60 MG: 60 CAPSULE ORAL at 17:19

## 2025-06-09 RX ADMIN — NICOTINE POLACRILEX 2 MG: 2 LOZENGE ORAL at 10:44

## 2025-06-09 RX ADMIN — ALUMINUM HYDROXIDE, MAGNESIUM HYDROXIDE, AND SIMETHICONE 30 ML: 200; 200; 20 SUSPENSION ORAL at 23:39

## 2025-06-09 RX ADMIN — HYDROXYZINE HYDROCHLORIDE 50 MG: 50 TABLET ORAL at 23:15

## 2025-06-09 RX ADMIN — NEOMYCIN SULFATE, POLYMYXIN B SULFATE AND HYDROCORTISONE 3 DROP: 10; 3.5; 1 SUSPENSION/ DROPS AURICULAR (OTIC) at 06:50

## 2025-06-09 RX ADMIN — AMLODIPINE BESYLATE 5 MG: 5 TABLET ORAL at 08:33

## 2025-06-09 RX ADMIN — NEOMYCIN SULFATE, POLYMYXIN B SULFATE AND HYDROCORTISONE 3 DROP: 10; 3.5; 1 SUSPENSION/ DROPS AURICULAR (OTIC) at 23:14

## 2025-06-09 RX ADMIN — LITHIUM CARBONATE 300 MG: 300 CAPSULE, GELATIN COATED ORAL at 23:14

## 2025-06-09 RX ADMIN — ACETAMINOPHEN 650 MG: 325 TABLET ORAL at 12:47

## 2025-06-09 RX ADMIN — DIVALPROEX SODIUM 750 MG: 500 TABLET, EXTENDED RELEASE ORAL at 08:33

## 2025-06-09 RX ADMIN — TRAZODONE HYDROCHLORIDE 50 MG: 50 TABLET ORAL at 23:15

## 2025-06-09 RX ADMIN — HALOPERIDOL 10 MG: 10 TABLET ORAL at 14:09

## 2025-06-09 RX ADMIN — NICOTINE POLACRILEX 2 MG: 2 LOZENGE ORAL at 15:03

## 2025-06-09 ASSESSMENT — PAIN SCALES - GENERAL
PAINLEVEL_OUTOF10: 6
PAINLEVEL_OUTOF10: 2
PAINLEVEL_OUTOF10: 2

## 2025-06-09 ASSESSMENT — PAIN DESCRIPTION - DESCRIPTORS: DESCRIPTORS: ACHING

## 2025-06-09 ASSESSMENT — PAIN DESCRIPTION - LOCATION
LOCATION: ABDOMEN
LOCATION: BACK
LOCATION: GENERALIZED

## 2025-06-09 NOTE — GROUP NOTE
Group Therapy Note    Date: 6/9/2025    Group Start Time: 1400  Group End Time: 1430  Group Topic: Nursing    [facility]    Marlen Russell LPN        Group Therapy Note    Attendees: 4/6       Patient's Goal:  Patient participates in wellness group    Notes:      Status After Intervention:  Improved    Participation Level: Active Listener and Interactive    Participation Quality: Appropriate      Speech:  normal      Thought Process/Content: Linear      Affective Functioning: Congruent      Mood: euthymic      Level of consciousness:  Alert      Response to Learning: Progressing to goal      Endings: None Reported    Modes of Intervention: Support and Socialization      Discipline Responsible: Licensed Practical Nurse      Signature:  Marlen Russell LPN

## 2025-06-09 NOTE — GROUP NOTE
Group Therapy Note    Date: 6/9/2025    Group Start Time: 1100  Group End Time: 1145  Group Topic: Music Therapy    [facility]        Group Therapy Note    Attendees: 7/7       Patient's Goal:  Patients shared preferred  music analyzing lyrics for themes, and sharing advice based on themes within music. Patient goals to increase self-expression; Engage in peer support; Increase sense of community; Increase rapport with staff    Notes:  Patinet attended and participated in group having positive interactions with peers and staff throughout. Patient shared music and advice appropriately, engaging in conversations with peers and staff, and supportive of peers sharing and music.    Status After Intervention:  Improved    Participation Level: Active Listener and Interactive    Participation Quality: Appropriate, Attentive, and Sharing      Speech:  normal      Thought Process/Content: Logical  Linear      Affective Functioning: Congruent      Mood: euthymic      Level of consciousness:  Alert and Attentive      Response to Learning: Able to verbalize current knowledge/experience and Progressing to goal      Endings: None Reported    Modes of Intervention: Support, Socialization, Exploration, Activity, Media, and Reality-testing      Discipline Responsible: Psychoeducational Specialist      Signature:  Sunday Quiles

## 2025-06-09 NOTE — BH NOTE
Patient has release of information from sister-Liliya Cunningham. Legal Guardian notified [phone], verbal consent given and signature in chart.     LILIYA CUNNINGHAM CELL [phone]  [phone]

## 2025-06-09 NOTE — BH NOTE
Patient is observed in the day area restless, constantly at desk numerous times asking if she is discharged. Patient already spoke with SW today, and SW stated she will notify patient once she hears from appropriate person. Patient continues to be disruptive and intrusive of staff when staff is talking to health care professionals about other patients' on unit at the nurses station, and lacks boundaries.DR Brenner notified via [employer], ok for hourly requests. Patient notified.

## 2025-06-09 NOTE — CARE COORDINATION
SOCIAL SERVICE NOTE:  met with patient on this date. Patient reported to physician that she was planning on going to the [facility] once she is dicharged from the hospital.  explained to patient that this is an unsafe discharge at this time, because [facility] is unable to guarantee a bed for her upon assessment. Patient continues to try to state different places that she feels that she could be discharged to. Patient reports that she can stay with her sister at discharge. Patient signed a release of information for her sister  left a message for patient's sister at [phone] asking if patient is ablke to stay with her and also requesting a call back.  has not received return phone call from patient's sister.  contacts patient's legal guardian Radha Kelly at her cell number [phone]. Radha reports that patient is unable to stay with her sister. Radha reports that patient continues to have multiple hospitalizations and does not have a stable place to live and she continues to return back to the hospital Radha reports that she would like  to discuss patient being placed in a locked down behavioral unit at a nursing facility.  explained that patient would have to qualify for a nursing home placement through a [facility] and that it is unknown if patient would qualify.  let Radha know that she will doscuss this with patient physician and get his thoughts. Radha continues to report that patient does not have a stable place to be discharged to at this time.     speaks with patient's  Anitha through the [employer] [facility] and she reports that due to patient's continuing hospitalizations and her non-compliance with treatment and medications when she is discharged that it would be unlikely for them to find a group home for patient at this time.  will continue to monitor the situation.

## 2025-06-09 NOTE — BH NOTE
Emergency Medication Follow-Up Note:    PRN medication of Haldol 5 mg, Ativan 2 mg PO was effective as evidence by patient sitting quietly in day room, regaining behavioral control. Patient denies medication side effects. Will continue to monitor and provide support as needed.

## 2025-06-09 NOTE — BH NOTE
Emergency PRN Medication Administration Note:    Patient is Agitated and Disruptive as evidence by patient hyper-verbal in the day area, is discharged focused, using profanities. Patient is observed throwing water in room, tearing paper up in day area, and slamming room door. Staff attempted to find and relieve the distress by Talking to patient, Refocusing on new activity, and Offering suggestions Patient is currently escalating, unable to be redirected, accepted PRN medications. Haldol 5 mg, Ativan 2 mg PO given to patient. Patient Tolerated medication administration. Will continue to monitor, offer support, and reassess.

## 2025-06-09 NOTE — GROUP NOTE
Group Therapy Note    Date: 6/8/2025    Group Start Time: 1930  Group End Time: 1950  Group Topic: Discharge Planning    [facility] Kelsi Robles, RN        Group Therapy Note    Attendees: 4/6         patient refused to attend discharged planning group at 1930 after encouragement from staff.  1:1 talk time provided as alternative to group session

## 2025-06-10 PROCEDURE — 2040000000 HC PSYCH ICU R&B

## 2025-06-10 PROCEDURE — 6370000000 HC RX 637 (ALT 250 FOR IP): Performed by: PSYCHIATRY & NEUROLOGY

## 2025-06-10 PROCEDURE — 6370000000 HC RX 637 (ALT 250 FOR IP): Performed by: INTERNAL MEDICINE

## 2025-06-10 PROCEDURE — APPSS30 APP SPLIT SHARED TIME 16-30 MINUTES

## 2025-06-10 PROCEDURE — 99232 SBSQ HOSP IP/OBS MODERATE 35: CPT | Performed by: PSYCHIATRY & NEUROLOGY

## 2025-06-10 RX ADMIN — LITHIUM CARBONATE 300 MG: 300 CAPSULE, GELATIN COATED ORAL at 22:01

## 2025-06-10 RX ADMIN — LORAZEPAM 2 MG: 1 TABLET ORAL at 16:32

## 2025-06-10 RX ADMIN — TRAZODONE HYDROCHLORIDE 50 MG: 50 TABLET ORAL at 22:02

## 2025-06-10 RX ADMIN — HYDROXYZINE HYDROCHLORIDE 50 MG: 50 TABLET ORAL at 11:11

## 2025-06-10 RX ADMIN — NICOTINE POLACRILEX 2 MG: 2 LOZENGE ORAL at 08:10

## 2025-06-10 RX ADMIN — NEOMYCIN SULFATE, POLYMYXIN B SULFATE AND HYDROCORTISONE 3 DROP: 10; 3.5; 1 SUSPENSION/ DROPS AURICULAR (OTIC) at 22:00

## 2025-06-10 RX ADMIN — IBUPROFEN 400 MG: 400 TABLET, FILM COATED ORAL at 11:10

## 2025-06-10 RX ADMIN — AMLODIPINE BESYLATE 5 MG: 5 TABLET ORAL at 08:10

## 2025-06-10 RX ADMIN — LITHIUM CARBONATE 300 MG: 300 CAPSULE, GELATIN COATED ORAL at 08:10

## 2025-06-10 RX ADMIN — NICOTINE POLACRILEX 2 MG: 2 LOZENGE ORAL at 14:45

## 2025-06-10 RX ADMIN — NICOTINE POLACRILEX 2 MG: 2 LOZENGE ORAL at 15:47

## 2025-06-10 RX ADMIN — ANTIFUNGAL: 2 CREAM TOPICAL at 22:00

## 2025-06-10 RX ADMIN — ZIPRASIDONE HYDROCHLORIDE 60 MG: 60 CAPSULE ORAL at 18:04

## 2025-06-10 RX ADMIN — HYDROXYZINE HYDROCHLORIDE 50 MG: 50 TABLET ORAL at 22:01

## 2025-06-10 RX ADMIN — DIVALPROEX SODIUM 750 MG: 500 TABLET, EXTENDED RELEASE ORAL at 08:08

## 2025-06-10 RX ADMIN — ZIPRASIDONE HYDROCHLORIDE 60 MG: 60 CAPSULE ORAL at 08:09

## 2025-06-10 RX ADMIN — ACETAMINOPHEN 650 MG: 325 TABLET ORAL at 08:09

## 2025-06-10 RX ADMIN — PRAZOSIN HYDROCHLORIDE 1 MG: 1 CAPSULE ORAL at 22:01

## 2025-06-10 RX ADMIN — NICOTINE POLACRILEX 2 MG: 2 LOZENGE ORAL at 10:32

## 2025-06-10 RX ADMIN — NICOTINE POLACRILEX 2 MG: 2 LOZENGE ORAL at 12:40

## 2025-06-10 RX ADMIN — Medication 3 MG: at 22:01

## 2025-06-10 RX ADMIN — IBUPROFEN 400 MG: 400 TABLET, FILM COATED ORAL at 22:00

## 2025-06-10 RX ADMIN — DIVALPROEX SODIUM 750 MG: 500 TABLET, EXTENDED RELEASE ORAL at 22:01

## 2025-06-10 RX ADMIN — HALOPERIDOL 10 MG: 10 TABLET ORAL at 16:32

## 2025-06-10 RX ADMIN — NICOTINE POLACRILEX 2 MG: 2 LOZENGE ORAL at 19:54

## 2025-06-10 ASSESSMENT — PAIN DESCRIPTION - DESCRIPTORS
DESCRIPTORS: ACHING
DESCRIPTORS: ACHING

## 2025-06-10 ASSESSMENT — PAIN SCALES - GENERAL
PAINLEVEL_OUTOF10: 2
PAINLEVEL_OUTOF10: 5

## 2025-06-10 ASSESSMENT — PAIN DESCRIPTION - LOCATION
LOCATION: GENERALIZED
LOCATION: BACK
LOCATION: GENERALIZED

## 2025-06-10 NOTE — GROUP NOTE
Group Therapy Note    Date: 6/10/2025    Group Start Time: 1100  Group End Time: 1125  Group Topic: Nursing    [facility] Stella Hays, THELMA        Group Therapy Note    Attendees: 4/7       Patient's Goal:  better coping    Notes:  listen to music and wait for a second before responding    Status After Intervention:  Improved    Participation Level: Active Listener    Participation Quality: Appropriate      Speech:  normal      Thought Process/Content: Logical      Affective Functioning: Congruent      Mood:  calm      Level of consciousness:  Alert      Response to Learning: Able to verbalize current knowledge/experience      Endings: None Reported    Modes of Intervention: Support      Discipline Responsible: Registered Nurse      Signature:  Stella Alvarado RN

## 2025-06-10 NOTE — BH NOTE
Writer in patient's room assessing patient. Writer observed \"JARREDAMA [address] MACK\" written on walls in patient's room, in pencil. Patient reports \"Yea I wrote that, because I was pissed.\" Informed patient to not write on walls. Charge Nurse notified.

## 2025-06-10 NOTE — BH NOTE
Emergency PRN Medication Administration Note:    Patient is Agitated and Disruptive as evidence by patient hyper-verbal, focused on discharge, stated \"I am about to write on my fucking walls again, give me something for anxiety.\" Staff attempted to find and relieve the distress by Talking to patient, Refocusing on new activity, and Offering suggestions Patient is currently escalating, unable to be redirected, accepted PRN medications. Haldol 5 mg, Ativan 2 mg PO given to patient. Patient Tolerated medication administration. Will continue to monitor, offer support, and reassess.

## 2025-06-10 NOTE — GROUP NOTE
Group Therapy Note    Date: 6/10/2025    Group Start Time: 0900  Group End Time: 0930  Group Topic: Focus Group    [facility] Jacqueline Mckinnon RN        Group Therapy Note    Attendees: 4/7           Notes:  Patient was unable to attend group.      Signature:  Jacqueline Jiménez RN

## 2025-06-10 NOTE — GROUP NOTE
Group Therapy Note    Date: 6/10/2025    Group Start Time: 1330  Group End Time: 1400  Group Topic: Recreational    [facility]annon [facility]        Group Therapy Note    Attendees: 2/7       Patient's Goal:  pt will demonstrate improved coping skills and improved interpersonal relationships    Notes:   pt was pleasant but sat on periphery of group. pt was unable to tolerate duration of gorup     Status After Intervention:  Improved    Participation Level: minimal    Participation Quality: minimal      Speech:  mumbles      Thought Process/Content: flight of ideas      Affective Functioning: Flat      Mood: depressed, anxious      Level of consciousness:  Alert      Response to Learning: Able to verbalize current knowledge/experience and Progressing to goal      Endings: None Reported    Modes of Intervention: Support, Socialization, and Activity      Discipline Responsible: Psychoeducational Specialist      Signature:  JUANCARLOS WONG

## 2025-06-10 NOTE — CARE COORDINATION
Social Work attempted to contact patient's sister Liliya (DARIAN in chart) at [phone]; did not answer and voicemail is full.    Social Work attempted to contact at other provided number [phone]; this number goes to her place of work, voicemail not able to be left

## 2025-06-10 NOTE — BH NOTE
Morning Orientation  Patient did not participate in morning orientation patient is currently sleeping.

## 2025-06-11 PROCEDURE — 6370000000 HC RX 637 (ALT 250 FOR IP): Performed by: PSYCHIATRY & NEUROLOGY

## 2025-06-11 PROCEDURE — 2040000000 HC PSYCH ICU R&B

## 2025-06-11 PROCEDURE — APPSS30 APP SPLIT SHARED TIME 16-30 MINUTES: Performed by: NURSE PRACTITIONER

## 2025-06-11 PROCEDURE — 6370000000 HC RX 637 (ALT 250 FOR IP): Performed by: INTERNAL MEDICINE

## 2025-06-11 PROCEDURE — 99232 SBSQ HOSP IP/OBS MODERATE 35: CPT | Performed by: PSYCHIATRY & NEUROLOGY

## 2025-06-11 RX ADMIN — ZIPRASIDONE HYDROCHLORIDE 60 MG: 60 CAPSULE ORAL at 10:08

## 2025-06-11 RX ADMIN — TRAZODONE HYDROCHLORIDE 50 MG: 50 TABLET ORAL at 20:18

## 2025-06-11 RX ADMIN — HYDROXYZINE HYDROCHLORIDE 50 MG: 50 TABLET ORAL at 18:06

## 2025-06-11 RX ADMIN — AMLODIPINE BESYLATE 5 MG: 5 TABLET ORAL at 10:04

## 2025-06-11 RX ADMIN — NICOTINE POLACRILEX 2 MG: 2 LOZENGE ORAL at 20:18

## 2025-06-11 RX ADMIN — NICOTINE POLACRILEX 2 MG: 2 LOZENGE ORAL at 14:28

## 2025-06-11 RX ADMIN — ZIPRASIDONE HYDROCHLORIDE 60 MG: 60 CAPSULE ORAL at 18:06

## 2025-06-11 RX ADMIN — IBUPROFEN 400 MG: 400 TABLET, FILM COATED ORAL at 18:44

## 2025-06-11 RX ADMIN — LITHIUM CARBONATE 300 MG: 300 CAPSULE, GELATIN COATED ORAL at 10:04

## 2025-06-11 RX ADMIN — ACETAMINOPHEN 650 MG: 325 TABLET ORAL at 20:18

## 2025-06-11 RX ADMIN — ACETAMINOPHEN 650 MG: 325 TABLET ORAL at 10:08

## 2025-06-11 RX ADMIN — Medication 3 MG: at 20:18

## 2025-06-11 RX ADMIN — PRAZOSIN HYDROCHLORIDE 1 MG: 1 CAPSULE ORAL at 20:18

## 2025-06-11 RX ADMIN — NICOTINE POLACRILEX 2 MG: 2 LOZENGE ORAL at 18:44

## 2025-06-11 RX ADMIN — NICOTINE POLACRILEX 2 MG: 2 LOZENGE ORAL at 10:08

## 2025-06-11 RX ADMIN — DIVALPROEX SODIUM 750 MG: 500 TABLET, EXTENDED RELEASE ORAL at 20:18

## 2025-06-11 RX ADMIN — DIVALPROEX SODIUM 750 MG: 500 TABLET, EXTENDED RELEASE ORAL at 10:04

## 2025-06-11 RX ADMIN — LITHIUM CARBONATE 300 MG: 300 CAPSULE, GELATIN COATED ORAL at 20:18

## 2025-06-11 ASSESSMENT — PAIN SCALES - GENERAL: PAINLEVEL_OUTOF10: 3

## 2025-06-11 ASSESSMENT — PAIN DESCRIPTION - LOCATION: LOCATION: GENERALIZED

## 2025-06-11 NOTE — GROUP NOTE
Group Therapy Note    Date: 6/11/2025    Group Start Time: 1330  Group End Time: 1420  Group Topic: Music Therapy    [facility]        Group Therapy Note    Attendees: 4/5       Patient's Goal:  Patients shared songs that remind them of improtant people in their life, answering questions about these people/relationships asked by this writer relating to their music/sharing. Goals to increase self-expression; Reflect on relatiosnhips; Increase rapport with staff; Increase socialization; Demonstrate positive use of time;    Notes:  Patient attended and participated in group having positive interactions with peers and staff throughout. Patient shared music and was pleasant and engaging, and supportive of peers music and sharing.    Status After Intervention:  Improved    Participation Level: Active Listener and Interactive    Participation Quality: Appropriate, Attentive, Sharing, and Supportive      Speech:  normal      Thought Process/Content: Logical  Linear      Affective Functioning: Congruent      Mood: euthymic      Level of consciousness:  Alert and Attentive      Response to Learning: Able to verbalize current knowledge/experience and Progressing to goal      Endings: None Reported    Modes of Intervention: Support, Socialization, Exploration, Activity, Media, and Reality-testing      Discipline Responsible: Psychoeducational Specialist      Signature:  Sunday Quiles

## 2025-06-11 NOTE — GROUP NOTE
Group Therapy Note    Date: 6/11/2025    Group Start Time: 0900  Group End Time: 0915  Group Topic: Discharge Planning    [facility] Jacqueline Mckinnon RN        Group Therapy Note    Attendees: 2/7         Notes:  Patient did not attend group despite staff encouragement.        Signature:  Jacqueline Jiménez RN

## 2025-06-12 PROCEDURE — 2040000000 HC PSYCH ICU R&B

## 2025-06-12 PROCEDURE — APPSS30 APP SPLIT SHARED TIME 16-30 MINUTES: Performed by: NURSE PRACTITIONER

## 2025-06-12 PROCEDURE — 6370000000 HC RX 637 (ALT 250 FOR IP): Performed by: PSYCHIATRY & NEUROLOGY

## 2025-06-12 PROCEDURE — 6370000000 HC RX 637 (ALT 250 FOR IP): Performed by: INTERNAL MEDICINE

## 2025-06-12 PROCEDURE — 99232 SBSQ HOSP IP/OBS MODERATE 35: CPT | Performed by: PSYCHIATRY & NEUROLOGY

## 2025-06-12 RX ADMIN — NICOTINE POLACRILEX 2 MG: 2 LOZENGE ORAL at 20:10

## 2025-06-12 RX ADMIN — ANTIFUNGAL: 2 CREAM TOPICAL at 21:13

## 2025-06-12 RX ADMIN — DIVALPROEX SODIUM 750 MG: 500 TABLET, EXTENDED RELEASE ORAL at 09:28

## 2025-06-12 RX ADMIN — ZIPRASIDONE HYDROCHLORIDE 60 MG: 60 CAPSULE ORAL at 17:30

## 2025-06-12 RX ADMIN — HYDROXYZINE HYDROCHLORIDE 50 MG: 50 TABLET ORAL at 09:27

## 2025-06-12 RX ADMIN — PRAZOSIN HYDROCHLORIDE 1 MG: 1 CAPSULE ORAL at 21:12

## 2025-06-12 RX ADMIN — NICOTINE POLACRILEX 2 MG: 2 LOZENGE ORAL at 14:57

## 2025-06-12 RX ADMIN — AMLODIPINE BESYLATE 5 MG: 5 TABLET ORAL at 09:27

## 2025-06-12 RX ADMIN — HYDROXYZINE HYDROCHLORIDE 50 MG: 50 TABLET ORAL at 01:40

## 2025-06-12 RX ADMIN — HYDROXYZINE HYDROCHLORIDE 50 MG: 50 TABLET ORAL at 15:26

## 2025-06-12 RX ADMIN — NICOTINE POLACRILEX 2 MG: 2 LOZENGE ORAL at 09:40

## 2025-06-12 RX ADMIN — DIVALPROEX SODIUM 750 MG: 500 TABLET, EXTENDED RELEASE ORAL at 21:13

## 2025-06-12 RX ADMIN — IBUPROFEN 400 MG: 400 TABLET, FILM COATED ORAL at 01:40

## 2025-06-12 RX ADMIN — TRAZODONE HYDROCHLORIDE 50 MG: 50 TABLET ORAL at 21:13

## 2025-06-12 RX ADMIN — LORAZEPAM 2 MG: 1 TABLET ORAL at 19:35

## 2025-06-12 RX ADMIN — ZIPRASIDONE HYDROCHLORIDE 60 MG: 60 CAPSULE ORAL at 09:27

## 2025-06-12 RX ADMIN — Medication 3 MG: at 21:12

## 2025-06-12 RX ADMIN — IBUPROFEN 400 MG: 400 TABLET, FILM COATED ORAL at 21:13

## 2025-06-12 RX ADMIN — NICOTINE POLACRILEX 2 MG: 2 LOZENGE ORAL at 11:48

## 2025-06-12 RX ADMIN — HALOPERIDOL 10 MG: 10 TABLET ORAL at 19:35

## 2025-06-12 RX ADMIN — LITHIUM CARBONATE 300 MG: 300 CAPSULE, GELATIN COATED ORAL at 09:27

## 2025-06-12 RX ADMIN — IBUPROFEN 400 MG: 400 TABLET, FILM COATED ORAL at 09:27

## 2025-06-12 ASSESSMENT — PAIN DESCRIPTION - LOCATION
LOCATION: BACK
LOCATION: BACK

## 2025-06-12 ASSESSMENT — PAIN DESCRIPTION - DESCRIPTORS: DESCRIPTORS: ACHING

## 2025-06-12 ASSESSMENT — PAIN SCALES - GENERAL
PAINLEVEL_OUTOF10: 5
PAINLEVEL_OUTOF10: 3
PAINLEVEL_OUTOF10: 5

## 2025-06-12 NOTE — GROUP NOTE
Group Therapy Note    Date: 6/12/2025    Group Start Time: 1430  Group End Time: 1530  Group Topic: Healthy Living/Wellness    [facility] Jacquelin Valdez        Group Therapy Note    Attendees: 3/5         Patient's Goal:  list positive coping skills    Notes:  music    Status After Intervention:  Unchanged    Participation Level: Active Listener    Participation Quality: Appropriate      Speech:  normal      Thought Process/Content: Logical      Affective Functioning: Blunted      Mood: irritable      Level of consciousness:  Alert      Response to Learning: Progressing to goal      Endings: None Reported    Modes of Intervention: Education, Support, and Socialization      Discipline Responsible: Behavorial Health Tech      Signature:  Jacquelin Nunez

## 2025-06-12 NOTE — GROUP NOTE
Group Therapy Note    Date: 6/12/2025    Group Start Time: 1330  Group End Time: 1430  Group Topic: Music Therapy    [facility]    Sunday Quiles    Music Therapy Group Note        Date: 6/12/2025   Start Time: 1330  End Time: 1430      Number of Participants in Group & Unit Census:  3/5    Topic: Patients shared preferred music analyzing lyrics for themes, and sharing advice based on shared music.     Goal of Group: Goals to increase self-expression; Engage in peer support; Increase rapport with staff; Increase socialization; Demonstrate positive use of time;      Comments:     Patient did not participate in Music Therapy group, despite staff encouragement and explanation of benefits.  Patient remain seclusive to self.  Q15 minute safety checks maintained for patient safety and will continue to encourage patient to attend unit programming.

## 2025-06-12 NOTE — CARE COORDINATION
Social Work attempted to contact patient's sister Liliya (DARIAN in chart) at [phone]; left voicemail     Social Work spoke to Radha Edna to discuss discharge plans. She states that she is hoping doctor will order PT/OT evaluation and that PT/OT notes come back recommending nursing facility because she and Anitha from [facility] spoke to Sruthi Perez and they have beds available. She states that they are also looking into group homes but it is difficult due to patient's history of non-adherence to medications outside of hospital and having a history of \"tearing apart\" her living spaces.    Radha states she will call with any updates on potential group-home placement

## 2025-06-12 NOTE — GROUP NOTE
Group Therapy Note    Date: 6/12/2025    Group Start Time: 0815  Group End Time: 0830  Group Topic: Community Meeting    [facility]    Jacquelin Nunez        Group Therapy Note    Attendees: 4/6     Community Meeting Group Note        Date: June 12, 2025 Start Time:  0815   End Time:  0830      Number of Participants in Group & Unit Census:  4/6    Topic: Goal Settings    Goal of Group: Set short term goal for the day      Comments:     Patient did not participate in Community Meeting group, despite staff encouragement and explanation of benefits.  Patient remain seclusive to self.  Q15 minute safety checks maintained for patient safety and will continue to encourage patient to attend unit programming.

## 2025-06-13 PROCEDURE — 6370000000 HC RX 637 (ALT 250 FOR IP): Performed by: PSYCHIATRY & NEUROLOGY

## 2025-06-13 PROCEDURE — 2040000000 HC PSYCH ICU R&B

## 2025-06-13 PROCEDURE — 6370000000 HC RX 637 (ALT 250 FOR IP): Performed by: INTERNAL MEDICINE

## 2025-06-13 PROCEDURE — 99232 SBSQ HOSP IP/OBS MODERATE 35: CPT | Performed by: PSYCHIATRY & NEUROLOGY

## 2025-06-13 PROCEDURE — APPSS30 APP SPLIT SHARED TIME 16-30 MINUTES: Performed by: NURSE PRACTITIONER

## 2025-06-13 PROCEDURE — 99232 SBSQ HOSP IP/OBS MODERATE 35: CPT | Performed by: INTERNAL MEDICINE

## 2025-06-13 RX ADMIN — PRAZOSIN HYDROCHLORIDE 1 MG: 1 CAPSULE ORAL at 19:52

## 2025-06-13 RX ADMIN — NICOTINE POLACRILEX 2 MG: 2 LOZENGE ORAL at 09:23

## 2025-06-13 RX ADMIN — ZIPRASIDONE HYDROCHLORIDE 60 MG: 60 CAPSULE ORAL at 09:10

## 2025-06-13 RX ADMIN — Medication 3 MG: at 19:52

## 2025-06-13 RX ADMIN — IBUPROFEN 400 MG: 400 TABLET, FILM COATED ORAL at 16:53

## 2025-06-13 RX ADMIN — DIVALPROEX SODIUM 750 MG: 500 TABLET, EXTENDED RELEASE ORAL at 09:09

## 2025-06-13 RX ADMIN — ZIPRASIDONE HYDROCHLORIDE 60 MG: 60 CAPSULE ORAL at 16:00

## 2025-06-13 RX ADMIN — NICOTINE POLACRILEX 2 MG: 2 LOZENGE ORAL at 15:59

## 2025-06-13 RX ADMIN — ACETAMINOPHEN 650 MG: 325 TABLET ORAL at 19:52

## 2025-06-13 RX ADMIN — DIVALPROEX SODIUM 750 MG: 500 TABLET, EXTENDED RELEASE ORAL at 19:51

## 2025-06-13 RX ADMIN — AMLODIPINE BESYLATE 5 MG: 5 TABLET ORAL at 09:10

## 2025-06-13 RX ADMIN — TRAZODONE HYDROCHLORIDE 50 MG: 50 TABLET ORAL at 19:52

## 2025-06-13 RX ADMIN — HYDROXYZINE HYDROCHLORIDE 50 MG: 50 TABLET ORAL at 18:21

## 2025-06-13 ASSESSMENT — PAIN SCALES - GENERAL
PAINLEVEL_OUTOF10: 6
PAINLEVEL_OUTOF10: 0

## 2025-06-13 NOTE — GROUP NOTE
Group Therapy Note    Date: 6/13/2025    Group Start Time: 1100  Group End Time: 1150  Group Topic: Music Therapy    [facility] Sunday Jovel        Group Therapy Note    Attendees: 4/7       Patient's Goal:  Patients asked to share songs that reminded them of positive qualities about themself/their life. Goals to increase self-esteem; Increase self-expression; Increase sense of community; Demonstrate positive use of time.    Notes:  Patient attended and participated in group having positive interactions with peers and staff throughout. Patient was pleasant and engaging, sharing music and positive qualities of herself. Supportive of peers music and sharing    Status After Intervention:  Improved    Participation Level: Active Listener and Interactive    Participation Quality: Appropriate, Attentive, Sharing, and Supportive      Speech:  normal      Thought Process/Content: Logical  Linear      Affective Functioning: Congruent      Mood: euthymic      Level of consciousness:  Alert and Attentive      Response to Learning: Able to verbalize current knowledge/experience, Able to change behavior, and Progressing to goal      Endings: None Reported    Modes of Intervention: Support, Socialization, Exploration, Activity, Media, and Reality-testing      Discipline Responsible: Psychoeducational Specialist      Signature:  Sunday Quiles

## 2025-06-13 NOTE — GROUP NOTE
Group Therapy Note    Date: 6/13/2025    Group Start Time: 0900  Group End Time: 0905  Group Topic: Group Documentation    [facility]    Kenya Ascencio LPN        Group Therapy Note         Patient did not participate in Goals  group, despite staff encouragement and explanation of benefits.  Patient remain seclusive to self.  Q15 minute safety checks maintained for patient safety and will continue to encourage patient to attend unit programming.      Signature:  Kenya Ascencio LPN

## 2025-06-13 NOTE — CARE COORDINATION
Social Work contacted Anitha Griffin ([phone]) at [facility] to discuss discharge plans; Anitha states that right now the patient does not have Medicaid and that she has been kicked out of all the local shelters. Anitha states that she is on the waitlist for an [facility]PI home and waiver to be able to pursue group home in the near future.  Anitha states that [facility] Board is out of funding for this efrain year.  Anitha is asking for a few more days to see if she can get off of the waiting list and is hoping to know by Monday, 6/16.    MD updated via [employer]

## 2025-06-13 NOTE — BH NOTE
PRN NOTE:    Patient was given PRN Haldol 10 mg, and ativan 2 mg PO for increase in agitation as evidence by patient having racing thoughts, pressured speech and aggressive language with writer. Patient states that she is going to go off and staff. Patient was offered distractions, 1 to 1 talk talk time and offered choices. Interventions were not therapeutic. Patient was accepting of PRN medications.

## 2025-06-13 NOTE — BH NOTE
Patient refused lidocaine patch stated \"its to cold to put on right now\". This writer educated patient on the importance of medication compliance, patient still refused for patch to be applied at this time.

## 2025-06-13 NOTE — GROUP NOTE
Group Therapy Note    Date: 6/13/2025    Group Start Time: 1330  Group End Time: 1345  Group Topic: Nursing    [facility]    Marlen Russell LPN        Group Therapy Note    Attendees: 1/5   Patient did not participate in nursing/coping skills group, despite staff encouragement and explanation of benefits.  Patient remain seclusive to self.  Q15 minute safety checks maintained for patient safety and will continue to encourage patient to attend unit programming.         Discipline Responsible: Licensed Practical Nurse      Signature:  Marlen Russell LPN

## 2025-06-13 NOTE — BH NOTE
Patient did not participate in morning orientation group, despite staff encouragement and explanation of benefits.  Patient remain seclusive to self.  Q15 minute safety checks maintained for patient safety and will continue to encourage patient to attend unit programming.

## 2025-06-14 PROCEDURE — 6370000000 HC RX 637 (ALT 250 FOR IP): Performed by: PSYCHIATRY & NEUROLOGY

## 2025-06-14 PROCEDURE — 36415 COLL VENOUS BLD VENIPUNCTURE: CPT

## 2025-06-14 PROCEDURE — 99232 SBSQ HOSP IP/OBS MODERATE 35: CPT | Performed by: PSYCHIATRY & NEUROLOGY

## 2025-06-14 PROCEDURE — 6370000000 HC RX 637 (ALT 250 FOR IP): Performed by: INTERNAL MEDICINE

## 2025-06-14 PROCEDURE — 2040000000 HC PSYCH ICU R&B

## 2025-06-14 PROCEDURE — 80178 ASSAY OF LITHIUM: CPT

## 2025-06-14 RX ADMIN — HYDROXYZINE HYDROCHLORIDE 50 MG: 50 TABLET ORAL at 03:06

## 2025-06-14 RX ADMIN — ZIPRASIDONE HYDROCHLORIDE 60 MG: 60 CAPSULE ORAL at 17:03

## 2025-06-14 RX ADMIN — DIVALPROEX SODIUM 750 MG: 500 TABLET, EXTENDED RELEASE ORAL at 09:08

## 2025-06-14 RX ADMIN — HYDROXYZINE HYDROCHLORIDE 50 MG: 50 TABLET ORAL at 12:21

## 2025-06-14 RX ADMIN — NICOTINE POLACRILEX 2 MG: 2 LOZENGE ORAL at 10:43

## 2025-06-14 RX ADMIN — PRAZOSIN HYDROCHLORIDE 1 MG: 1 CAPSULE ORAL at 20:31

## 2025-06-14 RX ADMIN — HALOPERIDOL 10 MG: 10 TABLET ORAL at 21:06

## 2025-06-14 RX ADMIN — TRAZODONE HYDROCHLORIDE 50 MG: 50 TABLET ORAL at 20:31

## 2025-06-14 RX ADMIN — NICOTINE POLACRILEX 2 MG: 2 LOZENGE ORAL at 15:57

## 2025-06-14 RX ADMIN — IBUPROFEN 400 MG: 400 TABLET, FILM COATED ORAL at 03:06

## 2025-06-14 RX ADMIN — HYDROXYZINE HYDROCHLORIDE 50 MG: 50 TABLET ORAL at 19:15

## 2025-06-14 RX ADMIN — AMLODIPINE BESYLATE 5 MG: 5 TABLET ORAL at 09:08

## 2025-06-14 RX ADMIN — NICOTINE POLACRILEX 2 MG: 2 LOZENGE ORAL at 13:31

## 2025-06-14 RX ADMIN — DIVALPROEX SODIUM 750 MG: 500 TABLET, EXTENDED RELEASE ORAL at 20:30

## 2025-06-14 RX ADMIN — ACETAMINOPHEN 650 MG: 325 TABLET ORAL at 16:23

## 2025-06-14 RX ADMIN — LORAZEPAM 2 MG: 1 TABLET ORAL at 21:06

## 2025-06-14 RX ADMIN — ZIPRASIDONE HYDROCHLORIDE 60 MG: 60 CAPSULE ORAL at 09:08

## 2025-06-14 ASSESSMENT — LIFESTYLE VARIABLES: HOW OFTEN DO YOU HAVE A DRINK CONTAINING ALCOHOL: NEVER

## 2025-06-14 ASSESSMENT — PAIN DESCRIPTION - LOCATION: LOCATION: HEAD

## 2025-06-14 ASSESSMENT — PAIN SCALES - GENERAL: PAINLEVEL_OUTOF10: 2

## 2025-06-14 NOTE — GROUP NOTE
Group Therapy Note    Date: 6/13/2025    Group Start Time: 2030  Group End Time: 2100  Group Topic: Relaxation    [facility] Corazon Duran    HS Relaxation Group Note    Attendees: 3/6    Group Topic: HS Relaxation      Patient's Goal: Utilize activities and socialization as coping skills.         Comments:     Patient did not participate in HS Relaxation, despite staff encouragement and explanation of benefits.  Patient remains seclusive to self.  Q15 minute safety checks maintained for patient safety and will continue to encourage patient to attend unit programming.

## 2025-06-14 NOTE — GROUP NOTE
Group Therapy Note    Date: 6/13/2025    Group Start Time: 2000  Group End Time: 2230  Group Topic: Wrap-Up    [facility] Cor[facility]on Verma    Group Therapy Note    Attendees: 3/6       Wrap-Up Group       Patient's Goal: Patient will verbalize today's goals as well as for post discharge. Patient will also offer supportive listening and interact with peers to clarify and understand clearly set goals.           Comments:     Patient did not participate in HS Goals Group, despite staff encouragement and explanation of benefits.  Patient remains seclusive to self.  Q15 minute safety checks maintained for patient safety and will continue to encourage patient to attend unit programming.

## 2025-06-14 NOTE — GROUP NOTE
Group Therapy Note    Date: 6/14/2025    Group Start Time: 0900  Group End Time: 0915  Group Topic: Community Meeting    [facility]    Jacquelin Nunez        Group Therapy Note    Attendees: 3/6     Community Meeting Group Note        Date: June 14, 2025 Start Time: 9am  End Time: 0915      Number of Participants in Group & Unit Census:  3/6    Topic: goal setting    Goal of Group: Set short term goal for the day      Comments:     Patient did not participate in Community Meeting group, despite staff encouragement and explanation of benefits.  Patient remain seclusive to self.  Q15 minute safety checks maintained for patient safety and will continue to encourage patient to attend unit programming.

## 2025-06-14 NOTE — GROUP NOTE
Group Therapy Note    Date: 6/14/2025    Group Start Time: 1400  Group End Time: 1440  Group Topic: Cognitive Skills    [facility] Jacquelin Valdez        Group Therapy Note    Attendees: 1/5       Patient's Goal:  work on focus and concentration    Notes:  played cards for few minutes with staff    Status After Intervention:  Unchanged    Participation Level: Active Listener    Participation Quality: Appropriate      Speech:  normal      Thought Process/Content: Logical      Affective Functioning: Congruent      Mood: anxious      Level of consciousness:  Alert      Response to Learning: Progressing to goal      Endings: None Reported    Modes of Intervention: Education, Support, and Socialization      Discipline Responsible: Behavorial Health Tech      Signature:  Jacquelin Nunez

## 2025-06-14 NOTE — GROUP NOTE
Group Therapy Note    Date: 6/14/2025    Group Start Time: 0800  Group End Time: 0815  Group Topic: Orientation Group    [facility] Jacquelin Valdez        Group Therapy Note    Attendees: 4/6     orientation Group Note        Date: June 14, 2025 Start Time: 0800  End Time: 0815      Number of Participants in Group & Unit Census:  4/6    Topic: Morning orientation    Goal of Group:review of staff, rules and schedule      Comments:     Patient did not participate in orientation group, despite staff encouragement and explanation of benefits.  Patient remain seclusive to self.  Q15 minute safety checks maintained for patient safety and will continue to encourage patient to attend unit programming.

## 2025-06-15 LAB
DATE LAST DOSE: NORMAL
TME LAST DOSE: 2030 H
VALPROATE SERPL-MCNC: 86 UG/ML (ref 50–125)
VANCOMYCIN DOSE: 750 MG

## 2025-06-15 PROCEDURE — 6370000000 HC RX 637 (ALT 250 FOR IP): Performed by: PSYCHIATRY & NEUROLOGY

## 2025-06-15 PROCEDURE — 2040000000 HC PSYCH ICU R&B

## 2025-06-15 PROCEDURE — 6370000000 HC RX 637 (ALT 250 FOR IP): Performed by: INTERNAL MEDICINE

## 2025-06-15 PROCEDURE — 80164 ASSAY DIPROPYLACETIC ACD TOT: CPT

## 2025-06-15 PROCEDURE — 99232 SBSQ HOSP IP/OBS MODERATE 35: CPT | Performed by: PSYCHIATRY & NEUROLOGY

## 2025-06-15 PROCEDURE — 36415 COLL VENOUS BLD VENIPUNCTURE: CPT

## 2025-06-15 RX ADMIN — ZIPRASIDONE HYDROCHLORIDE 60 MG: 60 CAPSULE ORAL at 07:59

## 2025-06-15 RX ADMIN — ZIPRASIDONE HYDROCHLORIDE 60 MG: 60 CAPSULE ORAL at 17:47

## 2025-06-15 RX ADMIN — DIVALPROEX SODIUM 750 MG: 500 TABLET, EXTENDED RELEASE ORAL at 07:59

## 2025-06-15 RX ADMIN — PRAZOSIN HYDROCHLORIDE 1 MG: 1 CAPSULE ORAL at 21:55

## 2025-06-15 RX ADMIN — NICOTINE POLACRILEX 2 MG: 2 LOZENGE ORAL at 14:25

## 2025-06-15 RX ADMIN — TRAZODONE HYDROCHLORIDE 50 MG: 50 TABLET ORAL at 21:55

## 2025-06-15 RX ADMIN — HYDROXYZINE HYDROCHLORIDE 50 MG: 50 TABLET ORAL at 18:31

## 2025-06-15 RX ADMIN — NICOTINE POLACRILEX 2 MG: 2 LOZENGE ORAL at 18:24

## 2025-06-15 RX ADMIN — DIVALPROEX SODIUM 750 MG: 500 TABLET, EXTENDED RELEASE ORAL at 21:55

## 2025-06-15 RX ADMIN — AMLODIPINE BESYLATE 5 MG: 5 TABLET ORAL at 08:00

## 2025-06-15 ASSESSMENT — LIFESTYLE VARIABLES: HOW OFTEN DO YOU HAVE A DRINK CONTAINING ALCOHOL: NEVER

## 2025-06-15 NOTE — BH NOTE
Emergency PRN Medication Administration Note:      Patient is Agitated as evidence by racing thoughts, unable to self calm, requesting medications to help with high levels of anxiety after trying other PRN medications without success. Staff attempted to find and relieve the distress by Talking to patient, Refocusing on new activity, Offering suggestions, and Checking for undiagnosed pain Patient is currently accepted PRN medications. Medication Administered as prescribed: Haldol 10mg and Ativan 2mg tablet. Patient Tolerated medication administration.   Will continue to monitor, offer support, and reassess.

## 2025-06-15 NOTE — GROUP NOTE
Group Therapy Note    Date: 6/15/2025    Group Start Time: 0900  Group End Time: 0915  Group Topic: Community Meeting    [facility] Jacquelin Valdez        Group Therapy Note    Attendees: 3/7     Community Meeting Group Note        Date: Huma 15, 2025 Start Time: 9am  End Time: 0915      Number of Participants in Group & Unit Census:  3/7    Topic: goal setting    Goal of Group: set short term goal for the day      Comments:     Patient did not participate in Community Meeting group, despite staff encouragement and explanation of benefits.  Patient remain seclusive to self.  Q15 minute safety checks maintained for patient safety and will continue to encourage patient to attend unit programming.

## 2025-06-15 NOTE — GROUP NOTE
Psych-Ed/Relapse Prevention Group Note        Date: Huma 15, 2025 Start Time: 10am  End Time: 10:45am      Number of Participants in Group & Unit Census:  3/7    Topic: Coping skills    Goal of Group:Patient will identify benefits of music and creative expression for coping and stress management      Comments:     Patient did not participate in Psych-Ed/Relapse Prevention group, despite staff encouragement and explanation of benefits.  Patient remain seclusive to self.  Q15 minute safety checks maintained for patient safety and will continue to encourage patient to attend unit programming.         Signature:  Jadyn Proctor, CTRS

## 2025-06-15 NOTE — GROUP NOTE
Group Therapy Note    Date: 6/15/2025    Group Start Time: 0800  Group End Time: 0815  Group Topic: Orientation Group    [facility] Jacquelin Valdez        Group Therapy Note    Attendees: 5/7     Morning Orientation Group Note        Date: Huma 15, 2025 Start Time: 0800  End Time: 0815      Number of Participants in Group & Unit Census:  5/7    Topic: Morning Orientations    Goal of Group: review of staff, rules and schedule      Comments:     Patient did not participate in Morning Orientation group, despite staff encouragement and explanation of benefits.  Patient remain seclusive to self.  Q15 minute safety checks maintained for patient safety and will continue to encourage patient to attend unit programming.

## 2025-06-15 NOTE — BH NOTE
Emergency Medication Follow-Up Note:    PRN medication of Haldol 10mg and Ativan 2mg tablet was effective as evidence by patient regain behavioral control, absence of behavior warranting emergency medication. Patient denies medication side effects. Will continue to monitor and provide support as needed.

## 2025-06-16 VITALS
HEIGHT: 69 IN | WEIGHT: 184.97 LBS | DIASTOLIC BLOOD PRESSURE: 97 MMHG | BODY MASS INDEX: 27.4 KG/M2 | SYSTOLIC BLOOD PRESSURE: 113 MMHG | TEMPERATURE: 97.3 F | RESPIRATION RATE: 16 BRPM | OXYGEN SATURATION: 97 % | HEART RATE: 106 BPM

## 2025-06-16 PROCEDURE — 99239 HOSP IP/OBS DSCHRG MGMT >30: CPT | Performed by: PSYCHIATRY & NEUROLOGY

## 2025-06-16 PROCEDURE — 6370000000 HC RX 637 (ALT 250 FOR IP): Performed by: INTERNAL MEDICINE

## 2025-06-16 PROCEDURE — 6370000000 HC RX 637 (ALT 250 FOR IP): Performed by: PSYCHIATRY & NEUROLOGY

## 2025-06-16 RX ORDER — LIDOCAINE 4 G/G
1 PATCH TOPICAL DAILY
Qty: 15 PATCH | Refills: 0 | Status: SHIPPED | OUTPATIENT
Start: 2025-06-17

## 2025-06-16 RX ORDER — HYDROXYZINE HYDROCHLORIDE 50 MG/1
50 TABLET, FILM COATED ORAL 3 TIMES DAILY PRN
Qty: 30 TABLET | Refills: 0 | Status: SHIPPED | OUTPATIENT
Start: 2025-06-16 | End: 2025-06-26

## 2025-06-16 RX ORDER — MICONAZOLE NITRATE 2 G/100G
CREAM TOPICAL
Qty: 1 EACH | Refills: 1 | Status: SHIPPED | OUTPATIENT
Start: 2025-06-16

## 2025-06-16 RX ORDER — PRAZOSIN HYDROCHLORIDE 1 MG/1
1 CAPSULE ORAL NIGHTLY
Qty: 30 CAPSULE | Refills: 0 | Status: SHIPPED | OUTPATIENT
Start: 2025-06-16

## 2025-06-16 RX ORDER — TRAZODONE HYDROCHLORIDE 50 MG/1
50 TABLET ORAL NIGHTLY PRN
Qty: 30 TABLET | Refills: 0 | Status: SHIPPED | OUTPATIENT
Start: 2025-06-16

## 2025-06-16 RX ORDER — AMLODIPINE BESYLATE 5 MG/1
5 TABLET ORAL DAILY
Qty: 30 TABLET | Refills: 0 | Status: SHIPPED | OUTPATIENT
Start: 2025-06-16

## 2025-06-16 RX ORDER — DIVALPROEX SODIUM 250 MG/1
750 TABLET, FILM COATED, EXTENDED RELEASE ORAL 2 TIMES DAILY
Qty: 180 TABLET | Refills: 0 | Status: SHIPPED | OUTPATIENT
Start: 2025-06-16

## 2025-06-16 RX ADMIN — HYDROXYZINE HYDROCHLORIDE 50 MG: 50 TABLET ORAL at 04:13

## 2025-06-16 RX ADMIN — AMLODIPINE BESYLATE 5 MG: 5 TABLET ORAL at 08:51

## 2025-06-16 RX ADMIN — NICOTINE POLACRILEX 2 MG: 2 LOZENGE ORAL at 12:55

## 2025-06-16 RX ADMIN — NICOTINE POLACRILEX 2 MG: 2 LOZENGE ORAL at 10:03

## 2025-06-16 RX ADMIN — DIVALPROEX SODIUM 750 MG: 500 TABLET, EXTENDED RELEASE ORAL at 08:50

## 2025-06-16 RX ADMIN — ZIPRASIDONE HYDROCHLORIDE 60 MG: 60 CAPSULE ORAL at 08:51

## 2025-06-16 NOTE — BH NOTE
Patient given tobacco quitline number 10843866950 at this time, refusing to call at this time, states \" I just dont want to quit now\"- patient given information as to the dangers of long term tobacco use. Continue to reinforce the importance of tobacco cessation

## 2025-06-16 NOTE — GROUP NOTE
Group Therapy Note    Date: 6/16/2025    Group Start Time: 0900  Group End Time: 0920  Group Topic: Community Meeting    Hal Paz        Group Therapy Note    Attendees: 4       Patient's Goal:  to speak to the Dr about discharge    Status After Intervention:  Improved    Participation Level: Active Listener    Participation Quality: Appropriate      Speech:  normal      Thought Process/Content: Logical      Affective Functioning: Congruent      Mood: euthymic      Level of consciousness:  Alert and Oriented x4      Response to Learning: Able to verbalize current knowledge/experience and Progressing to goal      Endings: None Reported    Modes of Intervention: Education      Discipline Responsible: Behavorial Health Tech      Signature:  Hal Rendon

## 2025-06-16 NOTE — GROUP NOTE
Group Therapy Note    Date: 6/16/2025    Group Start Time: 1000  Group End Time: 1040  Group Topic: Psychoeducation    [facility] Sunday [facility]vel        Group Therapy Note    Attendees: 5/7       Patient's Goal:   Patients engaged in \"Agree or Disagree\" style  conversation group: presented with statements such as \"breakfast is the most important meal of the day\" \"it is never okay to lie\" \"the customer is always right\" and more. Patients stated whether they agreed or disagreed with the statement and why. Following group patient discussed effective communication skills and qualities that can positively or negatively impact conversation. Goals to improve communication skills; Increase sense of community; Increase socialization; Demonstrate positive use of time.     Notes:  Patient attended and participated in group having positive interactions with peers and staff. Patinet contributed to group convesations appropriately and engaged in reflect on effective communication skills.    Status After Intervention:  Improved    Participation Level: Active Listener and Interactive    Participation Quality: Appropriate, Attentive, and Sharing      Speech:  normal      Thought Process/Content: Logical  Linear      Affective Functioning: Congruent      Mood: euthymic      Level of consciousness:  Alert and Attentive      Response to Learning: Able to verbalize current knowledge/experience and Progressing to goal      Endings: None Reported    Modes of Intervention: Education, Support, Socialization, Exploration, Activity, and Reality-testing      Discipline Responsible: Psychoeducational Specialist      Signature:  Sunday Quiles

## 2025-06-16 NOTE — GROUP NOTE
Group Therapy Note    Date: 6/16/2025    Group Start Time: 1335  Group End Time: 1430  Group Topic: Music Therapy    [facility] Sunday Jovel        Group Therapy Note    Attendees: 5/6       Patient's Goal:   Patients worked together to create a play list of relaxing music, that gradually became more calm as group progressed. Patinet also provided art, games, activities to engage in while listening to group play list. Goals to increase relaxation; Increase sense of community; Demonstrate positive use of time; Increase self-expression     Notes:  Patient attended and participated in group having positive interactions with peers and staff. Patient contributed to group playlist and engaged in activities and conversation with peers and staff.    Status After Intervention:  Improved    Participation Level: Active Listener and Interactive    Participation Quality: Appropriate, Attentive, and Sharing      Speech:  normal      Thought Process/Content: Logical  Linear      Affective Functioning: Congruent      Mood: euthymic      Level of consciousness:  Alert and Attentive      Response to Learning: Able to verbalize current knowledge/experience and Progressing to goal      Endings: None Reported    Modes of Intervention: Support, Socialization, Exploration, Activity, Media, and Reality-testing      Discipline Responsible: Psychoeducational Specialist      Signature:  Sunday Quiles

## 2025-06-16 NOTE — PROGRESS NOTES
Behavioral Services                                              Medicare Re-Certification    I certify that the inpatient psychiatric hospital services furnished since the previous certification/re-certification were, and continue to be, medically necessary for;    [x] (1) Treatment which could reasonably be expected to improve the patient's condition,    [x] (2) Or for diagnostic study.    Estimated length of stay/service 2-9 days    Plan for post-hospital care -outpatient care    This patient continues to need, on a daily basis, active treatment furnished directly by or requiring the supervision of inpatient psychiatric personnel.    Electronically signed by BEREKET SAAVEDRA MD on 6/11/2025 at 7:19 PM   
      Behavioral Services  Medicare Certification Upon Admission    I certify that this patient's inpatient psychiatric hospital admission is medically necessary for:    [x] (1) Treatment which could reasonably be expected to improve this patient's condition,       [x] (2) Or for diagnostic study;     AND     [x](2) The inpatient psychiatric services are provided while the individual is under the care of a physician and are included in the individualized plan of care.    Estimated length of stay/service 2-9 days    Plan for post-hospital care -outpatient care    Electronically signed by BEREKET SAAVEDRA MD on 6/5/2025 at 5:17 PM      
    [facility] Internal Medicine  Juno Ford MD; Delvis Connell MD,, Minoo Dsouza MD, Dr Ashwin Mosquera MD   ; Lindsay Ojeda MD    [facility] Internal Medicine   IN-PATIENT SERVICE   [facility]    Progress note            Date:   2025  Patient name:  Yani Nowak  Date of admission:  2025  2:39 PM  MRN:   [id number]  Account:  [id number]  YOB: 1980  PCP:    New Salas APRN - NP  Room:   [facility]0117-01  Code Status:    Full Code      Chief Complaint:     Suicidal /Ac Psychosis    History Obtained From:     Patient/EMR/bedside RN     History of Present Illness:   Patient is 44-year-old female past medical history of anxiety, bipolar depression, hypertension,currently admitted at [facility] for further management of acute psychosis  Patient was initially admitted on the medical floor with worsening agitation, needed Precedex drip, also found to have UTI, urine culture growing E. coli and strep, sensitive to Keflex,  Patient currently tachycardic, very anxious on my encounter, multiple      Past Medical History:     Past Medical History:   Diagnosis Date    Anxiety     Bipolar 1 disorder (HCC)     Depression     Gestational diabetes 2016    OCD (obsessive compulsive disorder)     PTSD (post-traumatic stress disorder)     Rapid rate of speech     Schizoaffective disorder (HCC)         Past Surgical History:     Past Surgical History:   Procedure Laterality Date    ABDOMEN SURGERY       SECTION      LAPAROSCOPY          Medications Prior to Admission:     Prior to Admission medications    Medication Sig Start Date End Date Taking? Authorizing Provider   amLODIPine (NORVASC) 5 MG tablet Take 1 tablet by mouth daily 25   Ryder Vizcaino MD   Calamine 8-8 % LOTN lotion Apply topically 2 times daily 25   Ryder Vizcaino MD   haloperidol (HALDOL) 5 MG tablet Take 1 tablet by mouth 2 times daily 25   Ryder Vizcaino 
    [facility] Internal Medicine  Juno Ford MD; Delvis Connell MD,, Minoo Dsouza MD, Dr Ashwin Mosquera MD   ; Lindsay Ojeda MD    [facility] Internal Medicine   IN-PATIENT SERVICE   [facility]    Progress note            Date:   2025  Patient name:  Yani Nowak  Date of admission:  2025  2:39 PM  MRN:   [id number]  Account:  [id number]  YOB: 1980  PCP:    New Salas APRN - NP  Room:   [facility]0117-01  Code Status:    Full Code      Chief Complaint:     Suicidal /Ac Psychosis    History Obtained From:     Patient/EMR/bedside RN     History of Present Illness:   Patient is 44-year-old female past medical history of anxiety, bipolar depression, hypertension,currently admitted at [facility] for further management of acute psychosis  Patient was initially admitted on the medical floor with worsening agitation, needed Precedex drip, also found to have UTI, urine culture growing E. coli and strep, sensitive to Keflex,  Patient currently tachycardic, very anxious on my encounter, multiple      Past Medical History:     Past Medical History:   Diagnosis Date    Anxiety     Bipolar 1 disorder (HCC)     Depression     Gestational diabetes 2016    OCD (obsessive compulsive disorder)     PTSD (post-traumatic stress disorder)     Rapid rate of speech     Schizoaffective disorder (HCC)         Past Surgical History:     Past Surgical History:   Procedure Laterality Date    ABDOMEN SURGERY       SECTION      LAPAROSCOPY          Medications Prior to Admission:     Prior to Admission medications    Medication Sig Start Date End Date Taking? Authorizing Provider   amLODIPine (NORVASC) 5 MG tablet Take 1 tablet by mouth daily 25   Ryder iVzcaino MD   Calamine 8-8 % LOTN lotion Apply topically 2 times daily 25   Ryder Vizcaino MD   haloperidol (HALDOL) 5 MG tablet Take 1 tablet by mouth 2 times daily 25   Ryder Vizcaino 
    [facility] Internal Medicine  Juno Ford MD; Delvis Connell MD,, Minoo Dsouza MD, Dr Ashwin Mosquera MD   ; Lindsay Ojeda MD    [facility] Internal Medicine   IN-PATIENT SERVICE   [facility]    Progress note            Date:   2025  Patient name:  Yani Nowak  Date of admission:  2025  2:39 PM  MRN:   [id number]  Account:  [id number]  YOB: 1980  PCP:    New Salas APRN - NP  Room:   [facility]0117-01  Code Status:    Full Code      Chief Complaint:     Suicidal /Ac Psychosis    History Obtained From:     Patient/EMR/bedside RN     History of Present Illness:   Patient is 44-year-old female past medical history of anxiety, bipolar depression, hypertension,currently admitted at [facility] for further management of acute psychosis  Patient was initially admitted on the medical floor with worsening agitation, needed Precedex drip, also found to have UTI, urine culture growing E. coli and strep, sensitive to Keflex,  Patient currently tachycardic, very anxious on my encounter, multiple      Past Medical History:     Past Medical History:   Diagnosis Date    Anxiety     Bipolar 1 disorder (HCC)     Depression     Gestational diabetes 2016    OCD (obsessive compulsive disorder)     PTSD (post-traumatic stress disorder)     Rapid rate of speech     Schizoaffective disorder (HCC)         Past Surgical History:     Past Surgical History:   Procedure Laterality Date    ABDOMEN SURGERY       SECTION      LAPAROSCOPY          Medications Prior to Admission:     Prior to Admission medications    Medication Sig Start Date End Date Taking? Authorizing Provider   amLODIPine (NORVASC) 5 MG tablet Take 1 tablet by mouth daily 25   Ryder Vizcaino MD   Calamine 8-8 % LOTN lotion Apply topically 2 times daily 25   Ryder Vizcaino MD   haloperidol (HALDOL) 5 MG tablet Take 1 tablet by mouth 2 times daily 25   Ryder Vizcaino 
  Daily Progress Note  6/10/2025    Patient Name: Yani Nowak    CHIEF COMPLAINT:  Acute psychosis           SUBJECTIVE:      Patient is seen today for a follow up assessment.  Per nursing staff report, patient did get emergency medications overnight due to agitation.  She was slamming doors and yelling.  She is discharged focused.  She wants to live with her sister.  However, her guardian wants her to live in an ECF.  She has been compliant with scheduled medications.  She continues to refuse Depakote medication.  She was observed interacting with a peer upon approach.  She was agreeable to assessment in the day area today.  She reports her mood as \"good\".  She expresses her intentions on wanting to live with her sister and being discharged.  She denies suicidal ideations, homicidal ideations, auditory or visual hallucinations, paranoia, or delusional thoughts.  She denies any issues with sleep or appetite.  She has been attending group therapies.  Group therapy continually encouraged by writer.    Patient lacks insight and judgment into her illness.  She will likely continue to decompensate in the community.  She requires inpatient hospitalization for safety and stabilization.      Appetite:  [x] Normal/Adequate/Unchanged  [] Increased  [] Decreased      Sleep:       [x] Normal/Adequate/Unchanged  [] Fair  [] Poor      Group Attendance on Unit:   [x] Yes  [] Selectively    [] No    Medication Side Effects:  Patient denies any medication side effects at the time of assessment.         Mental Status Exam  Level of consciousness: Alert and awake.   Appearance: Appropriate attire for setting, seated in chair, with fair  grooming and hygiene.   Behavior/Motor: Approachable, friendly, no psychomotor abnormalities.   Attitude toward examiner: Cooperative, attentive, fair eye contact.  Speech: Normal rate, normal volume, normal tone.  Mood:  Patient reports \"good\".   Affect: euthymic  Thought processes: Linear 
  Daily Progress Note  6/14/2025    Patient Name: Yani Nowak    CHIEF COMPLAINT:  Acute psychosis           SUBJECTIVE:    Patient was seen for follow-up assessment today.  She has been medication compliant and behaviorally in control today.  She has not required any emergency medications for agitation in the past 24 hours.  Nursing staff report patient has been isolative to her room for much of the day today.  She has been friendly and cooperative.  She has been showing some signs of difficulty with concentration.  She reported today that she would like to discharge to her sister's home.  Patient is resting in bed on approach and sleeping soundly.  She does respond to verbal stimuli and opens her eyes to talk to writer.  She attempted to be cooperative but stated that she is \"too tired to function today\".  She believes it may be a side effect of medication.  She is more focused on discharge today and expresses that she \"feels ready to go\".  She denies that she is feeling sad or depressed but does note some low motivation to get up and socialize.  Patient stated \"I just want to go home\".  She denied that her thoughts are racing.  Speech was not rapid or pressured today and mood was subdued.  She is denying any auditory and visual hallucinations.  She is denying suicidal and homicidal ideation.  Patient feels she will be ready for discharge soon.    Appetite:  [x] Normal/Adequate/Unchanged  [] Increased  [] Decreased      Sleep:       [x] Normal/Adequate/Unchanged  [] Fair  [] Poor      Group Attendance on Unit:   [x] Yes  [] Selectively    [] No    Medication Side Effects: Denies       Mental Status Exam  Level of consciousness: Alert and awake.   Appearance: Appropriate attire for setting, resting in bed, with fair  grooming and hygiene.   Behavior/Motor: Approachable, friendly, no psychomotor abnormalities.   Attitude toward examiner: Cooperative, easily distracted, fair eye contact.  Speech: Decreased 
  Daily Progress Note  6/15/2025    Patient Name: Yani Nowak    CHIEF COMPLAINT:  Acute psychosis           SUBJECTIVE:    Patient was seen for follow-up assessment today.  She has been medication compliant and behaviorally in control today.  She has not required any emergency medications for agitation in the past 24 hours.  The patient is pleasant on approach.  She is maintaining behavioral control.  She is discharged focused.  Her thought process seems to be normal in rate and organized.  Her insight is partial.  She reports no side effect from the medication.  She is hopeful of being discharged tomorrow.    Appetite:  [x] Normal/Adequate/Unchanged  [] Increased  [] Decreased      Sleep:       [x] Normal/Adequate/Unchanged  [] Fair  [] Poor      Group Attendance on Unit:   [x] Yes  [] Selectively    [] No    Medication Side Effects: Denies       Mental Status Exam  Level of consciousness: Alert and awake.   Appearance: Appropriate attire for setting, resting in bed, with fair  grooming and hygiene.   Behavior/Motor: Approachable, friendly, no psychomotor abnormalities.   Attitude toward examiner: Cooperative, easily distracted, fair eye contact.  Speech: Decreased volume, normal rate, normal tone, minimal output  Mood: constricted  Affect: blunted  Thought processes: Linear and coherent.  Thought content: Denies homicidal ideation.  Suicidal Ideation: Denies suicidal ideations  Delusions: No evidence of delusions. Denies paranoia.  Perceptual Disturbance: Patient does not appear to be responding to internal stimuli. Denies auditory hallucinations. Denies visual hallucinations.   Cognition: Oriented to self, location, date, situation  Memory: Intact.  Insight & Judgement: Poor.     Data   height is 1.753 m (5' 9.02\") and weight is 83.9 kg (184 lb 15.5 oz). Her temporal temperature is 97.8 °F (36.6 °C). Her blood pressure is 125/80 and her pulse is 84. Her respiration is 16 and oxygen saturation is 97%. 
  Daily Progress Note  6/7/2025    Patient Name: Yani Nowak    CHIEF COMPLAINT: Acute psychosis         SUBJECTIVE:    Per nursing notes, patient required PRN Haldol 5 mg and Ativan 2 mg at 0635 today for agitation as she was going to \"freak out\" and \"lose it.\" Patient was posturing. She tolerated PRN administration.     Patient was seen for follow-up assessment today. She has been compliant with her scheduled medications today, having taken her first dose of Depakote ER this morning. She is resting in bed upon approach. She tells writer that she is \"okay\" but \"tired.\" She denies depression but reports some anxiety. She denies suicidal ideation and homicidal ideation. She denies hallucinations and is not observed responding to internal stimuli. We discussed PRN administration from this morning. She tells writer \"I couldn't sleep\" which caused her to yell and posture at staff. She tells writer \"I feel better now.\" At this time, patient remains acutely psychotic and does not yet demonstrate stability. She would likely decompensate in the community. She requires continued inpatient hospitalization for safety and stabilization.     Appetite:  [x] Adequate/Unchanged  [] Increased  [] Decreased      Sleep:       [] Adequate/Unchanged  [x] Fair  [] Poor      Group Attendance on Unit:   [] Yes   [x] Selectively    [] No    Compliant with scheduled medications: [x] Yes  [] No    Received emergency medications in past 24 hrs: [x] Yes   [] No  6/7/2025: PO PRN Haldol 5 mg and PO PRN Ativan 2 mg 0635    Medication Side Effects: Denies         Mental Status Exam  Level of consciousness: Alert and awake   Appearance: Appropriate attire for setting, resting in bed, with fair grooming and hygiene   Behavior/Motor: Guarded, engages with interviewer, no psychomotor abnormalities   Attitude toward examiner: Cooperative, attentive, fair eye contact  Speech: spontaneous, normal rate, and normal volume   Mood:  \"Okay\"  Affect: 
  Daily Progress Note  6/8/2025    Patient Name: Yani Nowak    CHIEF COMPLAINT: Acute psychosis         SUBJECTIVE:    Per nursing notes, patient required PRN Haldol 5 mg and Ativan 2 mg at 0408 today for agitation as she was displaying flight of ideas, restlessness, and was pacing. PO PRN was effective.    Patient was seen for follow-up assessment today. She has been compliant with her scheduled medications. She is seated on bed upon approach. She tells writer that she is \"okay\" but \"tired.\" She denies depression and anxiety. She denies suicidal ideation and homicidal ideation. She denies hallucinations and is not observed responding to internal stimuli. We discussed PRN administration from this morning. She tells writer that they gave it to her as she could not sleep. She tells writer that there was \"no problem.\" She discusses her medications with writer, expressing frustration at being on Depakote. She tells writer that she will discontinue Depakote upon discharge. At this time, patient remains acutely psychotic and lacks insight into her illness. She would likely decompensate in the community. She requires continued inpatient hospitalization for safety and stabilization.     Appetite:  [x] Adequate/Unchanged  [] Increased  [] Decreased      Sleep:       [] Adequate/Unchanged  [x] Fair  [] Poor      Group Attendance on Unit:   [] Yes   [x] Selectively    [] No    Compliant with scheduled medications: [x] Yes  [] No    Received emergency medications in past 24 hrs: [x] Yes   [] No  6/8/2025: PO PRN Haldol 5 mg and PO PRN Ativan 2 mg 0408    Medication Side Effects: Denies         Mental Status Exam  Level of consciousness: Alert and awake   Appearance: Appropriate attire for setting, seated on bed, with fair grooming and hygiene   Behavior/Motor: Approachable, engages with interviewer, no psychomotor abnormalities   Attitude toward examiner: Cooperative, attentive, fair eye contact  Speech: spontaneous, 
  Daily Progress Note  6/9/2025    Patient Name: Yani Nowak    CHIEF COMPLAINT: Acute psychosis         SUBJECTIVE:      Patient was seen for follow-up assessment today. Per nursing note, patient did become agitated and tearful yesterday evening and was not able to be redirected. She required Haldol 5 mg and Ativan 2 mg PO at that time. She did not participate in group meetings this morning. She has been compliant with medications. On evaluation, patient is resting comfortably, pleasant, discussing her plans for discharge and appears euphoric. She states that she has goals to live in her own home, with her own car, and be completely independent. She wants to prove to her guardian that she can \"handle herself\". She would like to be placed at the [facility] so that she has her own room and her son can see her. She is adamant that \"beer is bad\" and the combination of alcohol and cocaine use is what led her to become psychotic and admitted to [facility] in the first place. She would like to quit using alcohol. She appears to be discharge focused and states that she has to go to court on 06/11/2025 due to a warrant for \"disordered conduct\". Denies depression, anxiety, suicidal ideations, homicidal ideations, hallucinations.     Appetite:  [x] Adequate/Unchanged  [] Increased  [] Decreased      Sleep:       [] Adequate/Unchanged  [x] Fair  [] Poor      Group Attendance on Unit:   [] Yes   [x] Selectively    [] No    Compliant with scheduled medications: [x] Yes  [] No    Received emergency medications in past 24 hrs: [x] Yes   [] No  Haldol 5 mg and Ativan 2 mg PO given on 06/08/2025 at 5:30 PM    Medication Side Effects: Denies         Mental Status Exam  Level of consciousness: Alert and awake   Appearance: Appropriate attire for setting, seated on bed, with fair grooming and hygiene   Behavior/Motor: Approachable, engages with interviewer, no psychomotor abnormalities   Attitude toward examiner: Cooperative, attentive, 
CLINICAL PHARMACY NOTE: MEDS TO BEDS    Total # of Prescriptions Filled: 3   The following medications were delivered to the patient:  Divalproex ER 250mg  Hydroxyzine HCL 50mg  Prazosin 1mg    Additional Documentation: 6/16/25 12:55pm kbg delivered to BHYAN[facility] Ashley/Ya     -Trazodone too soon to fill on file at [facility] [phone] [address]  -Norvasc too soon on file  -Miconazole Over The Counter OTC not covered  -Melatonin OTC not cov  -Lidocaine patches OTC not cov  
RT ASSESSMENT TREATMENT GOALS    [x]Pt Goal:  Pt will identify 1-2 positive coping skills by time of discharge.    []Pt Goal:  Pt will identify 1-2 positive aspects of self by time of discharge.    []Pt Goal:  Pt will remain on task/topic for 15-30 minutes during group by time of discharge.    [x]Pt Goal:  Pt will identify 1-2 aspects of relapse prevention plan by time of discharge.    []Pt Goal:  Pt will join in conversation with peers 1-2 times per group by time of discharge.    []Pt Goal:  Pt will identify 1-2 new leisure interests by time of discharge.    [x]Pt Goal:  Pt will not voice any delusional content by time of discharge.    
Valproic Acid Level Monitoring  Date Time Result Dose Comments   06/15 0711 86 Divalproex  bid Therapeutic       Madonna Victor, PharmD  PGY-2 Psychiatric Pharmacy Resident    6/15/2025   8:31 AM    
Final    Total Protein 06/02/2025 6.1 (L)  6.6 - 8.7 g/dL Final    Albumin 06/02/2025 3.9  3.5 - 5.2 g/dL Final    Total Bilirubin 06/02/2025 <0.2  0.0 - 1.2 mg/dL Final    Alkaline Phosphatase 06/02/2025 89  35 - 104 U/L Final    ALT 06/02/2025 16  10 - 35 U/L Final    AST 06/02/2025 29  10 - 35 U/L Final    WBC 06/02/2025 11.6 (H)  3.5 - 11.0 k/uL Final    RBC 06/02/2025 4.58  3.95 - 5.11 m/uL Final    Hemoglobin 06/02/2025 12.8  12.0 - 16.0 g/dL Final    Hematocrit 06/02/2025 38.7  36.0 - 46.0 % Final    MCV 06/02/2025 84.5  80.0 - 100.0 fL Final    MCH 06/02/2025 27.9  26.0 - 34.0 pg Final    MCHC 06/02/2025 33.1  31.0 - 37.0 g/dL Final    RDW 06/02/2025 13.8  11.5 - 14.9 % Final    Platelets 06/02/2025 242  150 - 450 k/uL Final    MPV 06/02/2025 10.5  8.0 - 13.5 fL Final    NRBC Automated 06/02/2025 0.0  0 per 100 WBC Final    Neutrophils % 06/02/2025 63  36 - 66 % Final    Lymphocytes % 06/02/2025 27  24 - 44 % Final    Monocytes % 06/02/2025 6  3 - 12 % Final    Eosinophils % 06/02/2025 3  0 - 4 % Final    Basophils % 06/02/2025 1  0 - 2 % Final    Immature Granulocytes % 06/02/2025 0  0 % Final    Neutrophils Absolute 06/02/2025 7.42  1.50 - 8.10 k/uL Final    Lymphocytes Absolute 06/02/2025 3.06  1.10 - 3.70 k/uL Final    Monocytes Absolute 06/02/2025 0.66  0.10 - 1.20 k/uL Final    Eosinophils Absolute 06/02/2025 0.33  0.00 - 0.44 k/uL Final    Basophils Absolute 06/02/2025 0.06  0.00 - 0.20 k/uL Final    Immature Granulocytes Absolute 06/02/2025 0.03  0.00 - 0.30 k/uL Final    Sodium 06/03/2025 138  136 - 145 mmol/L Final    Potassium 06/03/2025 3.5 (L)  3.7 - 5.3 mmol/L Final    Chloride 06/03/2025 106  98 - 107 mmol/L Final    CO2 06/03/2025 21  20 - 31 mmol/L Final    Anion Gap 06/03/2025 11  9 - 16 mmol/L Final    Glucose 06/03/2025 118 (H)  74 - 99 mg/dL Final    BUN 06/03/2025 9  6 - 20 mg/dL Final    Creatinine 06/03/2025 0.5 (L)  0.7 - 1.2 mg/dL Final    Est, Glom Filt Rate 06/03/2025 >90  >60 
BID  miconazole (MICOTIN) 2 % cream, , Topical, BID  prazosin (MINIPRESS) capsule 1 mg, 1 mg, Oral, Nightly  traZODone (DESYREL) tablet 50 mg, 50 mg, Oral, Nightly PRN  polyethylene glycol (GLYCOLAX) packet 17 g, 17 g, Oral, Daily PRN  lidocaine 4 % external patch 1 patch, 1 patch, TransDERmal, Daily  ziprasidone (GEODON) 20 mg in sterile water 1 mL injection, 20 mg, IntraMUSCular, Q12H PRN  melatonin tablet 3 mg, 3 mg, Oral, Nightly PRN  acetaminophen (TYLENOL) tablet 650 mg, 650 mg, Oral, Q6H PRN  ibuprofen (ADVIL;MOTRIN) tablet 400 mg, 400 mg, Oral, Q6H PRN  aluminum & magnesium hydroxide-simethicone (MAALOX PLUS) 200-200-20 MG/5ML suspension 30 mL, 30 mL, Oral, Q6H PRN  haloperidol lactate (HALDOL) injection 10 mg, 10 mg, IntraMUSCular, Q6H PRN **AND** LORazepam (ATIVAN) injection 2 mg, 2 mg, IntraMUSCular, Q6H PRN **AND** diphenhydrAMINE (BENADRYL) injection 50 mg, 50 mg, IntraMUSCular, Q6H PRN  haloperidol (HALDOL) tablet 10 mg, 10 mg, Oral, Q6H PRN **AND** LORazepam (ATIVAN) tablet 2 mg, 2 mg, Oral, Q6H PRN  nicotine polacrilex (COMMIT) lozenge 2 mg, 2 mg, Oral, Q2H PRN    ASSESSMENT  Schizoaffective disorder, bipolar type (HCC)         HANDOFF  Patient symptoms are: Modestly Improving.  Medications are determined per attending physician.   Monitor need and frequency of PRN medications.  Encourage participation in groups and milieu.  Probable discharge is to be determined by MD.     Electronically signed by MAURICE Chakraborty CNP on 6/11/2025 at 4:06 PM    **This report has been created using voice recognition software. It may contain minor errors which are inherent in voice recognition technology.**    I independently saw and evaluated the patient.  I reviewed the  documentation above    .  Any additional comments or changes to the   documentation are stated below otherwise agree with assessment.          The patient was seen face-to-face.  She remains somewhat elevated in mood and has rapid speech.  She 
BID  miconazole (MICOTIN) 2 % cream, , Topical, BID  prazosin (MINIPRESS) capsule 1 mg, 1 mg, Oral, Nightly  traZODone (DESYREL) tablet 50 mg, 50 mg, Oral, Nightly PRN  polyethylene glycol (GLYCOLAX) packet 17 g, 17 g, Oral, Daily PRN  lidocaine 4 % external patch 1 patch, 1 patch, TransDERmal, Daily  ziprasidone (GEODON) capsule 40 mg, 40 mg, Oral, BID WC  divalproex (DEPAKOTE ER) extended release tablet 750 mg, 750 mg, Oral, Daily  ziprasidone (GEODON) 20 mg in sterile water 1 mL injection, 20 mg, IntraMUSCular, Q12H PRN  melatonin tablet 3 mg, 3 mg, Oral, Nightly PRN  acetaminophen (TYLENOL) tablet 650 mg, 650 mg, Oral, Q6H PRN  ibuprofen (ADVIL;MOTRIN) tablet 400 mg, 400 mg, Oral, Q6H PRN  aluminum & magnesium hydroxide-simethicone (MAALOX PLUS) 200-200-20 MG/5ML suspension 30 mL, 30 mL, Oral, Q6H PRN  haloperidol lactate (HALDOL) injection 10 mg, 10 mg, IntraMUSCular, Q6H PRN **AND** LORazepam (ATIVAN) injection 2 mg, 2 mg, IntraMUSCular, Q6H PRN **AND** diphenhydrAMINE (BENADRYL) injection 50 mg, 50 mg, IntraMUSCular, Q6H PRN  haloperidol (HALDOL) tablet 10 mg, 10 mg, Oral, Q6H PRN **AND** LORazepam (ATIVAN) tablet 2 mg, 2 mg, Oral, Q6H PRN  nicotine polacrilex (COMMIT) lozenge 2 mg, 2 mg, Oral, Q2H PRN  cephALEXin (KEFLEX) capsule 250 mg, 250 mg, Oral, 4 times per day    ASSESSMENT  Schizoaffective disorder, bipolar type (HCC)         PATIENT HANDOFF  Patient symptoms are: Modestly improving but unstable for discharge  BP elevated 144/109; internal medicine following  Monitor need and frequency of PRN medications.  Encourage participation in groups and milieu.  Medication changes and discharge planning per attending  Follow-up daily while inpatient.     Patient continues to be monitored in the inpatient psychiatric facility at [facility] for safety and stabilization. Patient continues to need, on a daily basis, active treatment furnished directly by or requiring the supervision of inpatient psychiatric 
today     PLAN  Medications as noted above  Attempt to develop insight  Psycho-education conducted.  Supportive Therapy conducted.  Follow-up daily while on inpatient unit    Electronically signed by BEREKET SAAVEDRA MD on 6/12/25 at 4:29 PM EDT

## 2025-06-16 NOTE — TRANSITION OF CARE
Behavioral Health Transition Record    Patient Name: Yani Nowak  YOB: 1980   Medical Record Number: [id number]  Date of Admission: 6/4/2025  2:39 PM   Date of Discharge: 6/16/25    Attending Provider: Pierre Saavedra MD   Discharging Provider: DR SAAVEDRA   To contact this individual call [phone] and ask the  to page.  If unavailable, ask to be transferred to Behavioral Health Provider on call.  A Behavioral Health Provider will be available on call 24/7 and during holidays.    Primary Care Provider: New Salas APRN - NP    Allergies   Allergen Reactions    Lamictal [Lamotrigine] Hives       Reason for Admission: Reason for Admission to Psychiatric Unit:  Acute disordered/bizarre behavior or psychomotor agitation or retardation;interferes with ADLs so that patient cannot function at a less intensive care level of care during evaluation and treatment   A mental disorder causing major disability in social, interpersonal, occupational, and/or educational functioning that is leading to dangerous or life-threatening functioning, and that can only be addressed in an acute inpatient setting   Failure of outpatient psychiatry treatment so that the beneficiary requires 24 hour professional observation and care  Concerns about patient's safety in the community  Past Mental Health Diagnosis: a history of  Bipolar Disorder, Schizoaffective Disorder, Anxiety Disorder, Prior suicide attempt, and Alcohol and/or Drug Use Disorder  Triggering event or precipitating factor: Housing instability, Financial instability, Alcohol/Drug Relapse, and Psych Treatment Noncompliance  Length of time/duration of triggering event: Weeks  Legal Status: Involuntary    Admission Diagnosis: Schizoaffective disorder, bipolar type (HCC) [F25.0]    * No surgery found *    Results for orders placed or performed during the hospital encounter of 06/04/25   Lithium Level   Result Value Ref Range    Lithium Lvl <0.1 mmol/L

## 2025-06-16 NOTE — DISCHARGE INSTR - OTHER ORDERS
Health Maintenance Summary     Topic Due On Due Status Completed On Postpone Until Reason    MAMMOGRAM - BREAST CANCER SCREENING Oct 10, 2014 Postponed Oct 10, 2012 Abran 15, 2018 Patient Refused    Colorectal Cancer Screening - Colonoscopy Oct 10, 2017 Postponed Oct 10, 2007 Abran 15, 2018 Patient Refused    PAP SMEAR - CERVICAL CANCER SCREENING Oct 19, 2018 Not Due Oct 19, 2015      Immunization - TDAP Pregnancy  Hidden       IMMUNIZATION - DTaP/Tdap/Td Oct 2, 1973 Postponed  Abran 15, 2018 Patient Refused    Immunization-Influenza Sep 1, 2018 Not Due       Hepatitis C Screening Oct 2, 2005 Postponed  Abran 15, 2018 Patient Refused          Patient is due for topics as listed above, she wishes to decline at this time .             Make sure to take all medications prescribed by your DR. Do not double up on doses. If you miss or skip a dose make sure to take the next scheduled dose.  Make sure to attend all follow up appointments.  Make sure to not to any illicit drugs or alcohol.

## 2025-06-16 NOTE — CARE COORDINATION
Discharge Arrangements:   telephones patient's Legal Guardian Radha Bishop at [phone]  spoke with Guardian several times as well as ACT Team  Anitha and Gio.      assisted patient in contacting [place] [address], they reported that there was a witing list for [facility], but took her contact information for the emergency shelter.     contacted [facility] [place] and spoke with Radha- Manager on call who reported that patient was unable to go back to [facility] before having an MST meeting, he scheduled patient's MST meeting for Tuesday June 17th at 10:00am,  informed patient's legal guardian of this meeting.     Patient's legal guardian contacted patient's friend Charlie at [phone], patient's legal guardian reports that she verified that patient is allowed to stay with Charlie and she gave permission on this date for patient to be discharged to her friend Charlie castro at [address] [place] [address].     Guardian notified: yes    Discharge destination/address: [address] [place] [address]    Transported by:  cab     Patient was not accepting of referral.  Follow up appointment is scheduled for   [place] [address] [place] [address]  [phone]  fax [phone]   You have a scheduled mental health appointment on Tuesday June 24th at 10:00am with Dr Desai at the [facility] office   *JEAN CLAUDE resources were offered to patient throughout admission and at time of discharge. This list of [facility]AN CLAUDE providers was provided to patient:     [facility] in [place] [facility]  [address] [facility]. [facility] [address]   [address] [facility] [address]  Phone: [phone]     Phone: [phone]    [facility]  [place]   [address] [facility] [address]   [address] [facility] [address]  Phone: [phone]     Phone: [phone]    Here's [facility],

## 2025-06-16 NOTE — DISCHARGE INSTR - DIET

## 2025-06-16 NOTE — BH NOTE
[facility]  Discharge Note    Pt discharged with followings belongings:   Dental Appliances: Uppers  Vision - Corrective Lenses: None  Hearing Aid: None  Jewelry: Ring  Body Piercings Removed: N/A  Clothing: Footwear, Shorts, Shirt, Socks  Other Valuables: Other (Comment) (NONE on admission)   Valuables sent home with patient or returned to patient. Patient educated on aftercare instructions: Yes  Information faxed to Hakeem by RN  at 5:24 PM .Patient verbalize understanding of AVS:  Yes.    Status EXAM upon discharge:  Mental Status and Behavioral Exam  Normal: No  Level of Assistance: Independent/Self  Facial Expression: Flat, Expressionless  Affect: Blunt  Level of Consciousness: Alert  Frequency of Checks: 4 times per hour, close  Mood:Normal: No  Mood: Anxious  Motor Activity:Normal: No  Motor Activity: Decreased  Eye Contact: Good  Observed Behavior: Preoccupied  Sexual Misconduct History: Current - no  Preception: [place] to person, [place] to time, [place] to place, [place] to situation  Attention:Normal: No  Attention: Distractible, Unable to concentrate  Thought Processes: Circumstantial  Thought Content:Normal: No  Thought Content: Preoccupations  Depression Symptoms: Impaired concentration  Anxiety Symptoms: Generalized, Unexplained fears  Micaela Symptoms: No problems reported or observed.  Hallucinations: None  Delusions: No  Delusions: Paranoid  Memory:Normal: Yes  Memory: Poor recent  Insight and Judgment: No  Insight and Judgment: Poor insight, Poor judgment    Tobacco Screening:  Practical Counseling, on admission, nati X, if applicable and completed (first 3 are required if patient doesn't refuse):            ( ) Recognizing danger situations (included triggers and roadblocks)                    ( ) Coping skills (new ways to manage stress,relaxation techniques, changing routine, distraction)                                                           ( ) Basic information about

## 2025-06-16 NOTE — DISCHARGE INSTR - PHARMACY
Medications sent to [facility] to be filled

## 2025-06-16 NOTE — BH NOTE
Group Therapy Note     Date: 6/16/2025     Group Start Time: 1100  Group End Time: 1130  Group Topic: Psych Education      [facility] P[facility] [place] RN            Group Therapy Note     Attendees: 4/7     Psych Education           Date: June 16, 2025   Start Time: 1100                     End Time: 1130        Number of Participants in Group & Unit Census:  4/7     Topic: Psych Education      Goal of Group: Patient will identify coping skills (A-Z)        Comments:      Patient did not participate in Psych Education group, despite staff encouragement and explanation of benefits.  Patient remain seclusive to self.  Q15 minute safety checks maintained for patient safety and will continue to encourage patient to attend unit programming.

## 2025-06-16 NOTE — PLAN OF CARE
Problem: Psychosis  Goal: Will report no hallucinations or delusions  Description: INTERVENTIONS:1. Administer medication as  ordered2. Assist with reality testing to support increasing orientation3. Assess if patient's hallucinations or delusions are encouraging self harm or harm to others and intervene as appropriate  6/10/2025 2312 by Juice Maravilla, RN  Outcome: Progressing    Patient denies any audio or visual hallucinations. Patient does not appear to be responding to internal stimuli.      Problem: Behavior  Goal: Pt/Family maintain appropriate behavior and adhere to behavioral management agreement, if implemented  Description: INTERVENTIONS:1. Assess patient/family's coping skills and  non-compliant behavior (including use of illegal substances)2. Notify security of behavior or suspected illegal substances which indicate the need for search of the family and/or belongings3. Encourage verbalization of thoughts and concerns in a socially appropriate manner4. Utilize positive, consistent limit setting strategies supporting safety of patient, staff and others5. Encourage participation in the decision making process about the behavioral management agreement6. If a visitor's behavior poses a threat to safety call refer to organization policy.7. Initiate consult with , Psychosocial CNS, Spiritual Care as appropriate  6/10/2025 2312 by Juice Maravilla, RN  Outcome: Progressing    Patient has some issues with impulse control and needs constant redirection from the staff, but patient is able to maintain appropriate behaviors while on the unit for the majority of the time.            
  Problem: Psychosis  Goal: Will report no hallucinations or delusions  Description: INTERVENTIONS:1. Administer medication as  ordered2. Assist with reality testing to support increasing orientation3. Assess if patient's hallucinations or delusions are encouraging self harm or harm to others and intervene as appropriate  6/12/2025 2139 by Juice Maravilla, RN  Outcome: Progressing    Patient denies any audio or visual hallucinations. Patient does not appear to be responding to internal stimuli.      Problem: Behavior  Goal: Pt/Family maintain appropriate behavior and adhere to behavioral management agreement, if implemented  Description: INTERVENTIONS:1. Assess patient/family's coping skills and  non-compliant behavior (including use of illegal substances)2. Notify security of behavior or suspected illegal substances which indicate the need for search of the family and/or belongings3. Encourage verbalization of thoughts and concerns in a socially appropriate manner4. Utilize positive, consistent limit setting strategies supporting safety of patient, staff and others5. Encourage participation in the decision making process about the behavioral management agreement6. If a visitor's behavior poses a threat to safety call refer to organization policy.7. Initiate consult with , Psychosocial CNS, Spiritual Care as appropriate  6/12/2025 2139 by Juice Maravilla, RN  Outcome: Progressing    Patient has some issues with impulse control and needs constant redirection from the staff, but patient is able to maintain appropriate behaviors while on the unit for the majority of the time.      
  Problem: Psychosis  Goal: Will report no hallucinations or delusions  Description: INTERVENTIONS:1. Administer medication as  ordered2. Assist with reality testing to support increasing orientation3. Assess if patient's hallucinations or delusions are encouraging self harm or harm to others and intervene as appropriate  6/15/2025 0901 by Wanda Donaldson LPN  Outcome: Progressing  Note: Patient denies any current suicidal thoughts and agrees to seek out staff if thoughts arise. Depressed and anxious. Minimal and guarded during talk time. Focused on discharge. Denies hallucinations. Isolative to self and out for needs. PAtient compliant with medications and staff.      
  Problem: Psychosis  Goal: Will report no hallucinations or delusions  Description: INTERVENTIONS:1. Administer medication as  ordered2. Assist with reality testing to support increasing orientation3. Assess if patient's hallucinations or delusions are encouraging self harm or harm to others and intervene as appropriate  6/5/2025 2135 by Juice Maravilla, RN  Outcome: Progressing    Patient denies any audio or visual hallucinations. Patient does not appear to be responding to internal stimuli.      Problem: Behavior  Goal: Pt/Family maintain appropriate behavior and adhere to behavioral management agreement, if implemented  Description: INTERVENTIONS:1. Assess patient/family's coping skills and  non-compliant behavior (including use of illegal substances)2. Notify security of behavior or suspected illegal substances which indicate the need for search of the family and/or belongings3. Encourage verbalization of thoughts and concerns in a socially appropriate manner4. Utilize positive, consistent limit setting strategies supporting safety of patient, staff and others5. Encourage participation in the decision making process about the behavioral management agreement6. If a visitor's behavior poses a threat to safety call refer to organization policy.7. Initiate consult with , Psychosocial CNS, Spiritual Care as appropriate  6/5/2025 2135 by Juice Maravilla, RN  Outcome: Progressing    Patient has some issues with impulse control and needs constant redirection from the staff, but patient is able to maintain appropriate behaviors while on the unit for the majority of the time.      
  Problem: Psychosis  Goal: Will report no hallucinations or delusions  Description: INTERVENTIONS:1. Administer medication as  ordered2. Assist with reality testing to support increasing orientation3. Assess if patient's hallucinations or delusions are encouraging self harm or harm to others and intervene as appropriate  6/9/2025 2236 by Juice Maravilla, RN  Outcome: Progressing    Patient denies any audio or visual hallucinations. Patient does not appear to be responding to internal stimuli.      Problem: Behavior  Goal: Pt/Family maintain appropriate behavior and adhere to behavioral management agreement, if implemented  Description: INTERVENTIONS:1. Assess patient/family's coping skills and  non-compliant behavior (including use of illegal substances)2. Notify security of behavior or suspected illegal substances which indicate the need for search of the family and/or belongings3. Encourage verbalization of thoughts and concerns in a socially appropriate manner4. Utilize positive, consistent limit setting strategies supporting safety of patient, staff and others5. Encourage participation in the decision making process about the behavioral management agreement6. If a visitor's behavior poses a threat to safety call refer to organization policy.7. Initiate consult with , Psychosocial CNS, Spiritual Care as appropriate  6/9/2025 2236 by Juice Maravilla, RN  Outcome: Progressing   Patient has some issues with impulse control and needs constant redirection from the staff, but patient is able to maintain appropriate behaviors while on the unit for the majority of the time.         
  Problem: Psychosis  Goal: Will report no hallucinations or delusions  Description: INTERVENTIONS:1. Administer medication as  ordered2. Assist with reality testing to support increasing orientation3. Assess if patient's hallucinations or delusions are encouraging self harm or harm to others and intervene as appropriate  Outcome: Progressing    Patient denies any audio or visual hallucinations. Patient does not appear to be responding to internal stimuli.      Problem: Behavior  Goal: Pt/Family maintain appropriate behavior and adhere to behavioral management agreement, if implemented  Description: INTERVENTIONS:1. Assess patient/family's coping skills and  non-compliant behavior (including use of illegal substances)2. Notify security of behavior or suspected illegal substances which indicate the need for search of the family and/or belongings3. Encourage verbalization of thoughts and concerns in a socially appropriate manner4. Utilize positive, consistent limit setting strategies supporting safety of patient, staff and others5. Encourage participation in the decision making process about the behavioral management agreement6. If a visitor's behavior poses a threat to safety call refer to organization policy.7. Initiate consult with , Psychosocial CNS, Spiritual Care as appropriate  Outcome: Progressing    Patient has some issues with impulse control and needs constant redirection from the staff, but patient is able to maintain appropriate behaviors while on the unit for the majority of the time.         
  Problem: Psychosis  Goal: Will report no hallucinations or delusions  Description: INTERVENTIONS:1. Administer medication as  ordered2. Assist with reality testing to support increasing orientation3. Assess if patient's hallucinations or delusions are encouraging self harm or harm to others and intervene as appropriate  Outcome: Progressing  Note: Patient denies any current suicidal thoughts and agrees to seek out staff if thoughts arise. Appears depressed and anxious. Denies any hallucinations.  Isolative to self. Guarded and preoccupied during talk time. Focused on discharge and moving units. Patient compliant with medications and staff.      
  Problem: Risk for Elopement  Goal: Patient will not exit the unit/facility without proper excort  6/11/2025 2153 by Peter Arriaga, RN  Outcome: Progressing  she makes no attempt to leave    Problem: Psychosis  Goal: Will report no hallucinations or delusions  Description: INTERVENTIONS:1. Administer medication as  ordered2. Assist with reality testing to support increasing orientation3. Assess if patient's hallucinations or delusions are encouraging self harm or harm to others and intervene as appropriate  6/11/2025 2153 by Peter Arriaga, RN  Outcome: Progressing   Thoughts are reality based at this time  Problem: Behavior  Goal: Pt/Family maintain appropriate behavior and adhere to behavioral management agreement, if implemented  Description: INTERVENTIONS:1. Assess patient/family's coping skills and  non-compliant behavior (including use of illegal substances)2. Notify security of behavior or suspected illegal substances which indicate the need for search of the family and/or belongings3. Encourage verbalization of thoughts and concerns in a socially appropriate manner4. Utilize positive, consistent limit setting strategies supporting safety of patient, staff and others5. Encourage participation in the decision making process about the behavioral management agreement6. If a visitor's behavior poses a threat to safety call refer to organization policy.7. Initiate consult with , Psychosocial CNS, Spiritual Care as appropriate  Outcome: Progressing   She is out in dayroom selectively social with peers. She eats well at snack and accepts all medication even thought she does not think her medication is right. She agrees to discuss this with her md. She is upset that she is still in the hospital hopeful that she will be ready to go soon.   
  Problem: Risk for Elopement  Goal: Patient will not exit the unit/facility without proper excort  6/13/2025 2052 by Yamilex Villalobos LPN  Outcome: Progressing  Note: Patient is free of exit seeking behaviors. Patient remains free from all doors and exits. Staff ensure safety by providing safety checks on the unit intermittently and every 15 minutes.       Problem: Psychosis  Goal: Will report no hallucinations or delusions  Description: INTERVENTIONS:1. Administer medication as  ordered2. Assist with reality testing to support increasing orientation3. Assess if patient's hallucinations or delusions are encouraging self harm or harm to others and intervene as appropriate  6/13/2025 2052 by Yamilex Villalobos LPN  Outcome: Progressing  Note: Patient does not appear to be experiencing delusions and hallucinations at this time. Staff ensure safety by providing safety checks on the unit intermittently and every 15 minutes. Patient is encouraged to notify staff if delusions or hallucinations occurs.      Problem: Behavior  Goal: Pt/Family maintain appropriate behavior and adhere to behavioral management agreement, if implemented  Description: INTERVENTIONS:1. Assess patient/family's coping skills and  non-compliant behavior (including use of illegal substances)2. Notify security of behavior or suspected illegal substances which indicate the need for search of the family and/or belongings3. Encourage verbalization of thoughts and concerns in a socially appropriate manner4. Utilize positive, consistent limit setting strategies supporting safety of patient, staff and others5. Encourage participation in the decision making process about the behavioral management agreement6. If a visitor's behavior poses a threat to safety call refer to organization policy.7. Initiate consult with , Psychosocial CNS, Spiritual Care as appropriate  6/13/2025 2052 by Yamilex Villalobos LPN  Outcome: Progressing  Note: Patient maintains 
  Problem: Risk for Elopement  Goal: Patient will not exit the unit/facility without proper excort  Note: PT has not attempted to leave unit and has not yet talked about discharge.      Problem: Psychosis  Goal: Will report no hallucinations or delusions  Description: INTERVENTIONS:1. Administer medication as  ordered2. Assist with reality testing to support increasing orientation3. Assess if patient's hallucinations or delusions are encouraging self harm or harm to others and intervene as appropriate  Note: PT currently denies any hallucinations. PT appears preoccupied and suspicous. PT continues to be hyperverable with flight of ideas. PT more redirectable this evening. PT is taking ordered medications.      
  Problem: Risk for Elopement  Goal: Patient will not exit the unit/facility without proper excort  Note: PT has not attempted to leave unit. PT remains disheveled.      Problem: Psychosis  Goal: Will report no hallucinations or delusions  Description: INTERVENTIONS:1. Administer medication as  ordered2. Assist with reality testing to support increasing orientation3. Assess if patient's hallucinations or delusions are encouraging self harm or harm to others and intervene as appropriate  Note: PT denies any current hallucinations. PT has flight of ideas, pressured speech remains restless. PT is accepting of medications. PT reports sleep is broken only resting for short intervals. PT is aloof to self on unit.      Problem: Safety - Adult  Goal: Free from fall injury  Note: Pt remains free of falls and verbalizes understanding of individual fall risks.  Pt wearing non skid footwear and encouraged to seek out staff for any assistance needed.         
  Problem: Risk for Elopement  Goal: Patient will not exit the unit/facility without proper excort  Outcome: Progressing     Problem: Psychosis  Goal: Will report no hallucinations or delusions  Description: INTERVENTIONS:1. Administer medication as  ordered2. Assist with reality testing to support increasing orientation3. Assess if patient's hallucinations or delusions are encouraging self harm or harm to others and intervene as appropriate  Outcome: Progressing     Problem: Behavior  Goal: Pt/Family maintain appropriate behavior and adhere to behavioral management agreement, if implemented  Description: INTERVENTIONS:1. Assess patient/family's coping skills and  non-compliant behavior (including use of illegal substances)2. Notify security of behavior or suspected illegal substances which indicate the need for search of the family and/or belongings3. Encourage verbalization of thoughts and concerns in a socially appropriate manner4. Utilize positive, consistent limit setting strategies supporting safety of patient, staff and others5. Encourage participation in the decision making process about the behavioral management agreement6. If a visitor's behavior poses a threat to safety call refer to organization policy.7. Initiate consult with , Psychosocial CNS, Spiritual Care as appropriate  Outcome: Progressing   Patient currently denies thoughts of self harm or suicidal ideation. Denies any hallucinations or delusions. Patient has slept well . Is eating well patient attends no groups. Mood is isolative, cooperative. Safety checks continue every 15 minutes. Patient has remained safe. Will continue to support and monitor.      
  Problem: Risk for Elopement  Goal: Patient will not exit the unit/facility without proper excort  Outcome: Progressing  Flowsheets (Taken 6/6/2025 1951 by Juice Maravilla, RN)  Nursing Interventions for Elopement Risk:   Assist with personal care needs such as toileting, eating, dressing, as needed to reduce the risk of wandering   Communicate/escalate to charge nurse the risk of elopement   Communicate/escalate to /other team member the risk of elopement   Make sure patient has all necessary personal care items   Place patient in room far away from exits and stairways   Reduce environmental triggers   Shoes and clothing collected and placed in gown attire     Problem: Psychosis  Goal: Will report no hallucinations or delusions  Description: INTERVENTIONS:1. Administer medication as  ordered2. Assist with reality testing to support increasing orientation3. Assess if patient's hallucinations or delusions are encouraging self harm or harm to others and intervene as appropriate  6/7/2025 0905 by César Diehl LPN  Outcome: Progressing     Problem: Behavior  Goal: Pt/Family maintain appropriate behavior and adhere to behavioral management agreement, if implemented  Description: INTERVENTIONS:1. Assess patient/family's coping skills and  non-compliant behavior (including use of illegal substances)2. Notify security of behavior or suspected illegal substances which indicate the need for search of the family and/or belongings3. Encourage verbalization of thoughts and concerns in a socially appropriate manner4. Utilize positive, consistent limit setting strategies supporting safety of patient, staff and others5. Encourage participation in the decision making process about the behavioral management agreement6. If a visitor's behavior poses a threat to safety call refer to organization policy.7. Initiate consult with , Psychosocial CNS, Spiritual Care as appropriate  6/7/2025 0905 
  Problem: Risk for Elopement  Goal: Patient will not exit the unit/facility without proper excort  Outcome: Progressing  Note: Patient is discharge focused      Problem: Psychosis  Goal: Will report no hallucinations or delusions  Description: INTERVENTIONS:1. Administer medication as  ordered2. Assist with reality testing to support increasing orientation3. Assess if patient's hallucinations or delusions are encouraging self harm or harm to others and intervene as appropriate  Outcome: Progressing  Note: Patient denies hallucinations      
  Problem: Risk for Elopement  Goal: Patient will not exit the unit/facility without proper excort  Outcome: Progressing  Note: Patient is not currently exit seeking.      Problem: Pain  Goal: Verbalizes/displays adequate comfort level or baseline comfort level  Outcome: Progressing  Note: Patient denies pain.      
[facility]  Day 3 Interdisciplinary Treatment Plan NOTE    Review Date & Time: 6/12/25 0900     Admission Type:   Admission Type: Involuntary    Reason for admission:  Reason for Admission: Patient was brought to emergency room by [facility], due to disorganized thoughts and manic behavior  Patient was very uncooperative in emergency room  Required restraints and Precedex drip on medical.  Estimated Length of Stay:  5-7 days  Estimated Discharge Date Update:   to be determined by physician    PATIENT STRENGTHS:  Patient Strengths:   Patient Strengths and Limitations:Limitations: Difficulty problem solving/relies on others to help solve problems, Inappropriate/potentially harmful leisure interests, Multiple barriers to leisure interests, Apathetic / unmotivated, Demonstrates discomfort with /lack of social skills  Addictive Behavior:Addictive Behavior  In the Past 3 Months, Have You Felt or Has Someone Told You That You Have a Problem With  : None  Medical Problems:  Past Medical History:   Diagnosis Date    Anxiety     Bipolar 1 disorder (HCC) 1999    Depression     Gestational diabetes 7/22/2016    OCD (obsessive compulsive disorder) 1999    PTSD (post-traumatic stress disorder)     Rapid rate of speech     Schizoaffective disorder (HCC)        Risk:  Fall Risk   Jairo Scale Jairo Scale Score: 22  BVC    Change in scores:  No Changes to plan of Care:  No    Status EXAM:   Mental Status and Behavioral Exam  Normal: No (anxious)  Level of Assistance: Independent/Self  Facial Expression: Flat, Worried  Affect: Blunt  Level of Consciousness: Alert  Frequency of Checks: 4 times per hour, close  Mood:Normal: No  Mood: Anxious  Motor Activity:Normal: No  Motor Activity: Decreased  Eye Contact: Good  Observed Behavior: Withdrawn, Cooperative, Guarded, Preoccupied  Sexual Misconduct History: Current - no  Preception: [place] to person, [place] to time, [place] to place, [place] to 
[facility]  Initial Interdisciplinary Treatment Plan NO      Original treatment plan Date & Time: 6/5/2025   0930    Admission Type:  Admission Type: Involuntary    Reason for admission:   Reason for Admission: Patient was brought to emergency room by [facility], due to disorganized thoughts and manic behavior  Patient was very uncooperative in emergency room  Required restraints and Precedex drip on medical.    Estimated Length of Stay:  5-7days  Estimated Discharge Date: to be determined by physician    PATIENT STRENGTHS:  Patient Strengths:   Patient Strengths and Limitations:Limitations: Difficulty problem solving/relies on others to help solve problems, Inappropriate/potentially harmful leisure interests, Multiple barriers to leisure interests, Apathetic / unmotivated, Demonstrates discomfort with /lack of social skills  Addictive Behavior: Addictive Behavior  In the Past 3 Months, Have You Felt or Has Someone Told You That You Have a Problem With  : None  Medical Problems:  Past Medical History:   Diagnosis Date    Anxiety     Bipolar 1 disorder (HCC) 1999    Depression     Gestational diabetes 7/22/2016    OCD (obsessive compulsive disorder) 1999    PTSD (post-traumatic stress disorder)     Rapid rate of speech     Schizoaffective disorder (HCC)      Status EXAM:Mental Status and Behavioral Exam  Normal: No  Level of Assistance: Independent/Self  Facial Expression: Flat, Exaggerated  Affect: Unstable  Level of Consciousness: Alert  Frequency of Checks: 4 times per hour, close  Mood:Normal: No  Mood: Anxious, Irritable, Labile  Motor Activity:Normal: No  Motor Activity: Increased  Eye Contact: Fair  Observed Behavior: Preoccupied, Withdrawn  Sexual Misconduct History: Current - no  Preception: [place] to person, [place] to time, [place] to place  Attention:Normal: No  Attention: Unable to concentrate, Distractible  Thought Processes: Flight of ideas  Thought Content:Normal: 
Problem: Psychosis  Goal: Will report no hallucinations or delusions  Description: INTERVENTIONS:1. Administer medication as  ordered2. Assist with reality testing to support increasing orientation3. Assess if patient's hallucinations or delusions are encouraging self harm or harm to others and intervene as appropriate  6/14/2025 2045 by Mimi Keene RN  Outcome: Progressing     Problem: Behavior  Goal: Pt/Family maintain appropriate behavior and adhere to behavioral management agreement, if implemented  Description: INTERVENTIONS:1. Assess patient/family's coping skills and  non-compliant behavior (including use of illegal substances)2. Notify security of behavior or suspected illegal substances which indicate the need for search of the family and/or belongings3. Encourage verbalization of thoughts and concerns in a socially appropriate manner4. Utilize positive, consistent limit setting strategies supporting safety of patient, staff and others5. Encourage participation in the decision making process about the behavioral management agreement6. If a visitor's behavior poses a threat to safety call refer to organization policy.7. Initiate consult with , Psychosocial CNS, Spiritual Care as appropriate  Outcome: Progressing     Patient alert and oriented. Patient remains free from self harm throughout this shift. Patient agrees to seek out staff if thoughts of self harm arise. Patient denies suicidal and homicidal ideation at this time. Patient is mostly isolative to room this evening coming out for needs only. Patient is cooperative with staff but remains guarded and withdrawn. Patient is medication compliant and behavior remains controlled at this time.  Safe environment provided. Q15 minute checks maintained.   
Problem: Risk for Elopement  Goal: Patient will not exit the unit/facility without proper excort    Patient made no attempt to exit the unit this shift.        Problem: Psychosis  Goal: Will report no hallucinations or delusions  Description: INTERVENTIONS:1. Administer medication as  ordered2. Assist with reality testing to support increasing orientation3. Assess if patient's hallucinations or delusions are encouraging self harm or harm to others and intervene as appropriate  Outcome: Progressing     Problem: Behavior  Goal: Pt/Family maintain appropriate behavior and adhere to behavioral management agreement, if implemented  Description: INTERVENTIONS:1. Assess patient/family's coping skills and  non-compliant behavior (including use of illegal substances)2. Notify security of behavior or suspected illegal substances which indicate the need for search of the family and/or belongings3. Encourage verbalization of thoughts and concerns in a socially appropriate manner4. Utilize positive, consistent limit setting strategies supporting safety of patient, staff and others5. Encourage participation in the decision making process about the behavioral management agreement6. If a visitor's behavior poses a threat to safety call refer to organization policy.7. Initiate consult with , Psychosocial CNS, Spiritual Care as appropriate  Outcome: Progressing     Patient denies thoughts to harm self/others and denies depression. Patient endorses anxiety. Patient denies auditory/visual hallucinations. No self talk observed. Patient does appear preoccupied and paranoid. Patient reports no change in appetite. Patient exhibits poor sleep. Patient is restless and irritable at times. Patient is compliant with medications. Q15M checks continue per policy and safety maintained.        
Problem: Risk for Elopement  Goal: Patient will not exit the unit/facility without proper excort  6/8/2025 1029 by Hue Armenta, RN  Outcome: Progressing  Patient has not made any elopement attempts this shift.     Problem: Psychosis  Goal: Will report no hallucinations or delusions  Description: INTERVENTIONS:1. Administer medication as  ordered2. Assist with reality testing to support increasing orientation3. Assess if patient's hallucinations or delusions are encouraging self harm or harm to others and intervene as appropriate  6/8/2025 1029 by Hue Armenta, RN  Outcome: Progressing  Patient reports not having any tactile, gustatory, auditory, olfactory, or visual hallucinations.      Problem: Behavior  Goal: Pt/Family maintain appropriate behavior and adhere to behavioral management agreement, if implemented  Description: INTERVENTIONS:1. Assess patient/family's coping skills and  non-compliant behavior (including use of illegal substances)2. Notify security of behavior or suspected illegal substances which indicate the need for search of the family and/or belongings3. Encourage verbalization of thoughts and concerns in a socially appropriate manner4. Utilize positive, consistent limit setting strategies supporting safety of patient, staff and others5. Encourage participation in the decision making process about the behavioral management agreement6. If a visitor's behavior poses a threat to safety call refer to organization policy.7. Initiate consult with , Psychosocial CNS, Spiritual Care as appropriate  Outcome: Progressing  Patient is alert, observed in room. Patient is flat on approach. Patient is currently denying thoughts of wanting to harm self or others. Patient reports not having any tactile, gustatory, auditory, olfactory, or visual hallucinations. Patient reports anxiety and depression has decreased since admission. Patient has been isolative to room, only coming out of room 
Problem: Risk for Elopement  Goal: Patient will not exit the unit/facility without proper excort  6/9/2025 1203 by Hue Armenta, RN  Outcome: Progressing  Patient has not made any elopement attempts this shift.     Problem: Psychosis  Goal: Will report no hallucinations or delusions  Description: INTERVENTIONS:1. Administer medication as  ordered2. Assist with reality testing to support increasing orientation3. Assess if patient's hallucinations or delusions are encouraging self harm or harm to others and intervene as appropriate  6/9/2025 1203 by Hue Armenta, RN  Outcome: Progressing  Patient reports not having any tactile, gustatory, auditory, olfactory, or visual hallucinations.      Problem: Behavior  Goal: Pt/Family maintain appropriate behavior and adhere to behavioral management agreement, if implemented  Description: INTERVENTIONS:1. Assess patient/family's coping skills and  non-compliant behavior (including use of illegal substances)2. Notify security of behavior or suspected illegal substances which indicate the need for search of the family and/or belongings3. Encourage verbalization of thoughts and concerns in a socially appropriate manner4. Utilize positive, consistent limit setting strategies supporting safety of patient, staff and others5. Encourage participation in the decision making process about the behavioral management agreement6. If a visitor's behavior poses a threat to safety call refer to organization policy.7. Initiate consult with , Psychosocial CNS, Spiritual Care as appropriate  Outcome: Progressing  Patient is alert, observed in day area. Patient is anxious on approach. Patient is currently denying thoughts of wanting to harm self or others. Patient reports not having any tactile, gustatory, auditory, olfactory, or visual hallucinations. Patient reports feelings anxious and depressed due to being on unit, is discharge focused. Patient has been visible on unit, 
Problem: Risk for Elopement  Goal: Patient will not exit the unit/facility without proper excort  Outcome: Progressing     Problem: Psychosis  Goal: Will report no hallucinations or delusions  Description: INTERVENTIONS:1. Administer medication as  ordered2. Assist with reality testing to support increasing orientation3. Assess if patient's hallucinations or delusions are encouraging self harm or harm to others and intervene as appropriate  6/15/2025 0901 by Wanda Donaldson LPN  Outcome: Progressing    Patient remains free from self harm throughout this shift. Patient agrees to seek out staff if thoughts of self harm arise.  Patient denies suicidal and homicidal ideation at this time.  Patient denies any hallucinations or delusions and has not been seen responding to any internal stimuli.  Patient has not attempted to exit the unit without the proper escort. Patient's body language and behavior shows no signs of risk for elopement at this time. Patient isolative to room this evening coming out for needs only. Patient is medication compliant and behavior remains controlled at this time.  Safe environment provided. Q15 minute checks maintained.   
Problem: Risk for Elopement  Goal: Patient will not exit the unit/facility without proper excort  Outcome: Progressing     Problem: Psychosis  Goal: Will report no hallucinations or delusions  Description: INTERVENTIONS:1. Administer medication as  ordered2. Assist with reality testing to support increasing orientation3. Assess if patient's hallucinations or delusions are encouraging self harm or harm to others and intervene as appropriate  Outcome: Progressing     Problem: Behavior  Goal: Pt/Family maintain appropriate behavior and adhere to behavioral management agreement, if implemented  Description: INTERVENTIONS:1. Assess patient/family's coping skills and  non-compliant behavior (including use of illegal substances)2. Notify security of behavior or suspected illegal substances which indicate the need for search of the family and/or belongings3. Encourage verbalization of thoughts and concerns in a socially appropriate manner4. Utilize positive, consistent limit setting strategies supporting safety of patient, staff and others5. Encourage participation in the decision making process about the behavioral management agreement6. If a visitor's behavior poses a threat to safety call refer to organization policy.7. Initiate consult with , Psychosocial CNS, Spiritual Care as appropriate  Outcome: Progressing  Patient currently denies any suicidal and homicidal ideations. Patient denies any hallucinations and delusions. Patient stated she slept well through out the night and has been eating meals adequately. Patient is sad and isolative patient stated \" I can't get in touch with her sister so I can be discharged, I am worried about having to wait for group home placement. Patient has attended some groups through out shift and spent some time isolative in the milieu.Safety checks continue every 15 minutes. Patient has remained safe. Will continue to support and monitor.                         
Problem: Risk for Elopement  Goal: Patient will not exit the unit/facility without proper excort  Outcome: Progressing   Patient has not attempted to elope from unit, but remains preoccupied in wanting to be discharge.     Problem: Psychosis  Goal: Will report no hallucinations or delusions  Description: INTERVENTIONS:1. Administer medication as  ordered2. Assist with reality testing to support increasing orientation3. Assess if patient's hallucinations or delusions are encouraging self harm or harm to others and intervene as appropriate  6/5/2025 1143 by Chacha Schulte, RN  Outcome: Progressing   Patient denies hallucinations, patient is manic behaviors. Suspicious of other     Problem: Behavior  Goal: Pt/Family maintain appropriate behavior and adhere to behavioral management agreement, if implemented  Description: INTERVENTIONS:1. Assess patient/family's coping skills and  non-compliant behavior (including use of illegal substances)2. Notify security of behavior or suspected illegal substances which indicate the need for search of the family and/or belongings3. Encourage verbalization of thoughts and concerns in a socially appropriate manner4. Utilize positive, consistent limit setting strategies supporting safety of patient, staff and others5. Encourage participation in the decision making process about the behavioral management agreement6. If a visitor's behavior poses a threat to safety call refer to organization policy.7. Initiate consult with , Psychosocial CNS, Spiritual Care as appropriate  6/5/2025 1143 by Chacha Schulte, RN  Outcome: Progressing   Patient states that yesterday was \"a bad dream\". Stated \"I don't want to be like what I was yesterday\".     Problem: Pain  Goal: Verbalizes/displays adequate comfort level or baseline comfort level  Outcome: Progressing   Patient advocates for self for as needed pain medications, takes Tylenol, Motrin and had a lidocaine patch.   
and reports adequate sleep. Patient is medication compliant denies having any side effects. Patient's hygiene is appropriate, took shower today. Patient encouraged to maintain behavioral control while on unit. No further concerns voiced. Will continue to monitor and ensure safety.

## 2025-06-16 NOTE — DISCHARGE INSTRUCTIONS
Information:  Medications:   Medication summary provided   I understand that I should take only the medications on my list.     -why and when I need to take each medicine.     -which side effects to watch for.     -that I should carry my medication information at all times in case of     Emergency situations.    I will take all of my medicines to follow up appointments.     -check with my physician or pharmacist before taking any new    Medication, over the counter product or drink alcohol.    -Ask about food, drug or dietary supplement interactions.    -discard old lists and update records with medication providers.    Notify Physician:  Notify physician if you notice:   Always call 911 if you feel your life is in danger  In case of an emergency call 911 immediately!  If 911 is not available call your local emergency medical system for help    Behavioral Health Follow Up:  Original Referral Source: Involuntary Medical   Discharge Diagnosis: Schizoaffective disorder, bipolar type (HCC) [F25.0]  Recommendations for Level of Care: Follow up with [facility]   Patient status at discharge: RHONDA Pierre  My hospital  was: [facility]   Aftercare plan faxed: Yes   -faxed by: RN   -date: 6/16/25   -time: 1200  Prescriptions: Medications sent to [facility] to be filled     Smoking: Quit Smoking.   Call the [employer]'s smoking quitline at 2-258-17L-QUIT  Know the signs of a heart attack   If you have any of the following symptoms call 911 immediately, do not wait more    Than five minutes.    1. Pressure, fullness and/ or squeezing in the center of the chest spreading to    The jaw, neck or shoulder.    2. Chest discomfort with light headedness, fainting, sweating, nausea or    Shortness of breath.   3. Upper abdominal pressure or discomfort.   4. Lower chest pain, back pain, unusual fatigue, shortness of breath, nausea   Or dizziness.     General Information:   Questions regarding your bill: Call HELP program

## 2025-06-16 NOTE — DISCHARGE SUMMARY
DISCHARGE SUMMARY      Patient ID:  Yani Nowak  [id number]  44 y.o.  1980    Admit date: 6/4/2025    Discharge date and time: 6/16/2025    Disposition: Home     Admitting Physician: Pierre Brenner MD     Discharge Physician: Dr JOSE Brenner MD    Admission Diagnoses: Schizoaffective disorder, bipolar type (HCC) [F25.0]    Admission Condition: poor    Discharged Condition: stable    Admission Circumstance: Yani Nowak is a 44 y.o. female who has a past medical history of diabetes, OCD, polysubstance abuse, schizoaffective disorder, and anxiety.  Patient presented to the ED via TD on a pink slip after being released from detention presenting with nonsensical, disorganized, and pressured speech.  Per ED documentation, patient has a history of anxiety, bipolar 1 disorder, depression, schizoaffective disorder.  Patient was in police custody and when she was discharged they brought her here for further evaluation.  Patient is not making any coherent statements.  She said that she is not taking her medications.  She has a pressured speech and disorganized and illogical thoughts.  She has a very labile mood and is ripping off her close and she started to spit at staff.     The history is provided by the patient.      Patient is well known to the hospital with multiple previous admissions.  She was last at [facility] 5/20/25-5/27/25.  She was stabilized on haldol, atarax, trazodone, and minipress.  She was linked with [facility].      Per nursing staff report, patient did require emergency medications in the ED and also on the unit overnight.  However, she has been better today.  She is refusing to take her Depakote medication.  However, she states that she will take all other medications.     Patient was sitting on her bed upon approach.  She was agreeable to assessment in her room today.  When asked about events leading to hospitalization, patient is a poor historian due to her history of schizoaffective disorder and

## 2025-06-17 ENCOUNTER — HOSPITAL ENCOUNTER (EMERGENCY)
Age: 45
Discharge: HOME | End: 2025-06-17
Attending: STUDENT IN AN ORGANIZED HEALTH CARE EDUCATION/TRAINING PROGRAM
Payer: MEDICARE

## 2025-06-17 VITALS
DIASTOLIC BLOOD PRESSURE: 123 MMHG | SYSTOLIC BLOOD PRESSURE: 136 MMHG | OXYGEN SATURATION: 98 % | TEMPERATURE: 98.1 F | RESPIRATION RATE: 16 BRPM | HEART RATE: 109 BPM

## 2025-06-17 DIAGNOSIS — G89.29 CHRONIC RIGHT-SIDED LOW BACK PAIN WITHOUT SCIATICA: Primary | ICD-10-CM

## 2025-06-17 DIAGNOSIS — B35.1 ONYCHOMYCOSIS: ICD-10-CM

## 2025-06-17 DIAGNOSIS — S90.426A BLISTER OF FOURTH TOE: ICD-10-CM

## 2025-06-17 DIAGNOSIS — M54.50 CHRONIC RIGHT-SIDED LOW BACK PAIN WITHOUT SCIATICA: Primary | ICD-10-CM

## 2025-06-17 PROCEDURE — 6370000000 HC RX 637 (ALT 250 FOR IP)

## 2025-06-17 PROCEDURE — 99283 EMERGENCY DEPT VISIT LOW MDM: CPT

## 2025-06-17 RX ORDER — IBUPROFEN 400 MG/1
600 TABLET, FILM COATED ORAL ONCE
Status: COMPLETED | OUTPATIENT
Start: 2025-06-17 | End: 2025-06-17

## 2025-06-17 RX ORDER — CICLOPIROX 80 MG/ML
SOLUTION TOPICAL
Qty: 6 ML | Refills: 1 | Status: SHIPPED | OUTPATIENT
Start: 2025-06-17

## 2025-06-17 RX ORDER — IBUPROFEN 600 MG/1
600 TABLET, FILM COATED ORAL 3 TIMES DAILY PRN
Qty: 30 TABLET | Refills: 0 | Status: SHIPPED | OUTPATIENT
Start: 2025-06-17

## 2025-06-17 RX ADMIN — IBUPROFEN 600 MG: 400 TABLET ORAL at 02:01

## 2025-06-17 ASSESSMENT — PAIN SCALES - GENERAL: PAINLEVEL_OUTOF10: 6

## 2025-06-17 NOTE — ED PROVIDER NOTES
[facility] EMERGENCY DEPARTMENT     Emergency Department Attestation    I performed a history and physical examination of the patient and discussed management with the resident. I reviewed the resident’s note and agree with the documented findings and plan of care. Any areas of disagreement are noted on the chart. I was personally present for the key portions of any procedures. I have documented in the chart those procedures where I was not present during the key portions. I have reviewed the emergency nurses triage note. I agree with the chief complaint, past medical history, past surgical history, allergies, medications, social and family history as documented unless otherwise noted below. For Physician Assistant/ Nurse Practitioner cases/documentation I have personally evaluated this patient and have completed at least one if not all key elements of the E/M (history, physical exam, and MDM). Additional findings are as noted.        Chief Complaint   Patient presents with    Back Pain    Medication Refill     Motrin 800       INITIAL VITALS:  temperature is 98.1 °F (36.7 °C). Her blood pressure is 136/123 (abnormal) and her pulse is 109 (abnormal). Her respiration is 16 and oxygen saturation is 98%.       DIAGNOSTIC RESULTS       RADIOLOGY:   Non-plain film images such as CT, Ultrasound and MRI are read by the radiologist. Plain radiographic images are visualized and the radiologist interpretations are reviewed as follows:     No orders to display         LABS:  No results found for this visit on 06/17/25.    EKG:  None    EMERGENCY DEPARTMENT COURSE:   Vitals:    Vitals:    06/17/25 0140   BP: (!) 136/123   Pulse: (!) 109   Resp: 16   Temp: 98.1 °F (36.7 °C)   SpO2: 98%     -------------------------  BP: (!) 136/123, Temp: 98.1 °F (36.7 °C), Pulse: (!) 109, Respirations: 16      Medical Decision Making  Patient is a 44-year-old female who presents with need for refill of some Motrin.  States she has some

## 2025-06-17 NOTE — ED NOTES
Pt arriving to ed requesting prescription for motrin due to chronic back pain  Denies any other co at this time  Pt is resting on stretcher with call light within reach.  Breathing is non labored and no acute distress is noted.   Will continue to follow plan of care

## 2025-06-17 NOTE — DISCHARGE INSTRUCTIONS
Call today or tomorrow to follow up with New Salas APRN - NP  in 7 days.    Use an ice pack or bag filled with ice and apply to the injured area 3 - 4 times a day for 15 - 20 minutes each time.  If the injury is older than 3 days, then use a heating pad to help relax the muscles in your back.    Use ibuprofen or Tylenol (unless prescribed medications that have Tylenol in it) for pain.  You can take over the counter Ibuprofen (advil) tablets (600mg every 6 hours)

## 2025-06-17 NOTE — ED PROVIDER NOTES
[facility] EMERGENCY DEPARTMENT  Emergency Department Encounter  Emergency Medicine Resident     Pt Name:Yani Nowak  MRN: [id number]  Birthdate 1980  Date of evaluation: 25  PCP:  New Salas APRN - NP  Note Started: 1:38 AM EDT      CHIEF COMPLAINT       Chief Complaint   Patient presents with    Back Pain    Medication Refill     Motrin 800       HISTORY OF PRESENT ILLNESS  (Location/Symptom, Timing/Onset, Context/Setting, Quality, Duration, Modifying Factors, Severity.)      Yani Nowak is a 44 y.o. female who presents with concerns of blisters on her feet, chronic back pain, fungal infection of the bilateral great toes.  Patient denies fever, chills, chest pain, shortness of breath, abdominal pain.  Requesting antifungal for her toenails, Motrin for her chronic back pain.  Patient reports her pain has been unchanged, denies numbness, weakness.  Patient also requesting bandages for her blisters.    PAST MEDICAL / SURGICAL / SOCIAL / FAMILY HISTORY      has a past medical history of Anxiety, Bipolar 1 disorder (HCC), Depression, Gestational diabetes, OCD (obsessive compulsive disorder), PTSD (post-traumatic stress disorder), Rapid rate of speech, and Schizoaffective disorder (HCC).     has a past surgical history that includes Abdomen surgery;  section (); and laparoscopy.    Social History     Socioeconomic History    Marital status: Legally      Spouse name: Not on file    Number of children: Not on file    Years of education: Not on file    Highest education level: Not on file   Occupational History    Not on file   Tobacco Use    Smoking status: Every Day     Current packs/day: 1.00     Types: Cigarettes    Smokeless tobacco: Never   Vaping Use    Vaping status: Former    Substances: Always   Substance and Sexual Activity    Alcohol use: No     Alcohol/week: 0.0 standard drinks of alcohol    Drug use: Not Currently     Types: Marijuana (Weed)

## 2025-06-21 ENCOUNTER — HOSPITAL ENCOUNTER (EMERGENCY)
Age: 45
Discharge: LEFT BEFORE DISCHARGE | End: 2025-06-22

## 2025-06-22 NOTE — ED NOTES
Writer went to tell patient she had a room ready. Pt no where to be found outside.   Pt eloped prior to being seen.

## 2025-06-22 NOTE — ED NOTES
Pt came through the main entrance doors once prior to checking in. First time, pt stated she didn't want to be seen and left out the ED main entrance.   Pt then came back through the main entrance side, initially checked in with a starry that she said she found outside because she was thirsty, when tried to check patient in and asked clarifying questions, pt became verbally aggressive with writer and stormed out the ED doors. Pt then came back in after stating she was leaving and wanted to be seen again. Pt checked back in and then immediatly went outside to smoke.

## 2025-06-24 ENCOUNTER — HOSPITAL ENCOUNTER (EMERGENCY)
Age: 45
Discharge: LEFT BEFORE DISCHARGE | End: 2025-06-24
Attending: EMERGENCY MEDICINE

## 2025-06-24 VITALS
TEMPERATURE: 98.4 F | HEART RATE: 99 BPM | SYSTOLIC BLOOD PRESSURE: 109 MMHG | OXYGEN SATURATION: 97 % | RESPIRATION RATE: 16 BRPM | DIASTOLIC BLOOD PRESSURE: 84 MMHG

## 2025-06-24 DIAGNOSIS — R69 DIAGNOSIS UNKNOWN: Primary | ICD-10-CM

## 2025-06-24 NOTE — PLAN OF CARE
Patient eloped before being seen by myself or my attending.    Rosette Bui, DO  Emergency Medicine PGY-1

## 2025-06-24 NOTE — ED TRIAGE NOTES
Pt ambulated to room 06 from triage and begins to push around chairs in the room and throw her paperwork. Pt is mumbling and yelling about an unknown person and her money being in a savings account. Pt's rambling incoherent thoughts. Writer repeatedly asks pt to be seated and to explain her medical needs to which she states she does not want to go through being seen tonight and that she is leaving and exits the ED.

## 2025-06-27 ENCOUNTER — HOSPITAL ENCOUNTER (EMERGENCY)
Age: 45
Discharge: LEFT BEFORE DISCHARGE | End: 2025-06-27
Attending: STUDENT IN AN ORGANIZED HEALTH CARE EDUCATION/TRAINING PROGRAM
Payer: MEDICARE

## 2025-06-27 VITALS
DIASTOLIC BLOOD PRESSURE: 86 MMHG | OXYGEN SATURATION: 100 % | SYSTOLIC BLOOD PRESSURE: 123 MMHG | TEMPERATURE: 98.6 F | HEART RATE: 85 BPM | RESPIRATION RATE: 18 BRPM

## 2025-06-27 DIAGNOSIS — S39.012A STRAIN OF LUMBAR REGION, INITIAL ENCOUNTER: Primary | ICD-10-CM

## 2025-06-27 PROCEDURE — 6370000000 HC RX 637 (ALT 250 FOR IP)

## 2025-06-27 PROCEDURE — 99284 EMERGENCY DEPT VISIT MOD MDM: CPT

## 2025-06-27 PROCEDURE — 96372 THER/PROPH/DIAG INJ SC/IM: CPT

## 2025-06-27 PROCEDURE — 6360000002 HC RX W HCPCS

## 2025-06-27 RX ORDER — KETOROLAC TROMETHAMINE 30 MG/ML
30 INJECTION, SOLUTION INTRAMUSCULAR; INTRAVENOUS ONCE
Status: COMPLETED | OUTPATIENT
Start: 2025-06-27 | End: 2025-06-27

## 2025-06-27 RX ORDER — CYCLOBENZAPRINE HCL 10 MG
5 TABLET ORAL ONCE
Status: COMPLETED | OUTPATIENT
Start: 2025-06-27 | End: 2025-06-27

## 2025-06-27 RX ORDER — ACETAMINOPHEN 500 MG
1000 TABLET ORAL
Status: COMPLETED | OUTPATIENT
Start: 2025-06-27 | End: 2025-06-27

## 2025-06-27 RX ORDER — LIDOCAINE 4 G/G
1 PATCH TOPICAL ONCE
Status: DISCONTINUED | OUTPATIENT
Start: 2025-06-27 | End: 2025-06-27 | Stop reason: HOSPADM

## 2025-06-27 RX ADMIN — ACETAMINOPHEN 1000 MG: 500 TABLET ORAL at 08:38

## 2025-06-27 RX ADMIN — CYCLOBENZAPRINE 5 MG: 10 TABLET, FILM COATED ORAL at 08:38

## 2025-06-27 RX ADMIN — KETOROLAC TROMETHAMINE 30 MG: 30 INJECTION, SOLUTION INTRAMUSCULAR at 08:38

## 2025-06-27 ASSESSMENT — ENCOUNTER SYMPTOMS
SHORTNESS OF BREATH: 0
BACK PAIN: 1
ABDOMINAL PAIN: 0
COUGH: 0
DIARRHEA: 0
NAUSEA: 0
VOMITING: 0

## 2025-06-27 ASSESSMENT — PAIN SCALES - GENERAL: PAINLEVEL_OUTOF10: 10

## 2025-06-27 ASSESSMENT — PAIN - FUNCTIONAL ASSESSMENT: PAIN_FUNCTIONAL_ASSESSMENT: 0-10

## 2025-06-27 NOTE — ED PROVIDER NOTES
[facility] EMERGENCY DEPARTMENT  Emergency Department Encounter  Emergency Medicine Resident     Pt Name:Yani Nowak  MRN: [id number]  Birthdate 1980  Date of evaluation: 25  PCP:  New Salas APRN - NP  Note Started: 8:32 AM EDT      CHIEF COMPLAINT       Chief Complaint   Patient presents with    Back Pain       HISTORY OF PRESENT ILLNESS  (Location/Symptom, Timing/Onset, Context/Setting, Quality, Duration, Modifying Factors, Severity.)      Yani Nowak is a 44 y.o. female who presents with history of anxiety, bipolar 1 disorder, PTSD, substance abuse coming in for evaluation of back pain, feet pain, dysuria.  Patient reports that she has had low back pain for the past several years.  Denies any new injury or trauma.  No fevers or chills.  No bowel or bladder incontinence.  No saddle anesthesia.  No weakness in her arms or legs.  Patient also complaining of pain in her feet pointing out to the small blisters under her feet.  Patient does report burning with urination over the past several days.  Otherwise review of systems negative.  Patient does states she used cocaine and alcohol yesterday.  Denies use today.  Additionally is a daily smoker.  Patient repetitively asking for a turkey sandwich.    PAST MEDICAL / SURGICAL / SOCIAL / FAMILY HISTORY      has a past medical history of Anxiety, Bipolar 1 disorder (HCC), Depression, Gestational diabetes, OCD (obsessive compulsive disorder), PTSD (post-traumatic stress disorder), Rapid rate of speech, and Schizoaffective disorder (HCC).       has a past surgical history that includes Abdomen surgery;  section (); and laparoscopy.      Social History     Socioeconomic History    Marital status: Legally      Spouse name: Not on file    Number of children: Not on file    Years of education: Not on file    Highest education level: Not on file   Occupational History    Not on file   Tobacco Use    Smoking status:

## 2025-06-27 NOTE — ED PROVIDER NOTES
[facility]     Emergency Department     Faculty Attestation    I performed a history and physical examination of the patient and discussed management with the resident. I have reviewed and agree with the resident’s findings including all diagnostic interpretations, and treatment plans as written at the time of my review. Any areas of disagreement are noted on the chart. I was personally present for the key portions of any procedures. I have documented in the chart those procedures where I was not present during the key portions. For Physician Assistant/ Nurse Practitioner cases/documentation I have personally evaluated this patient and have completed at least one if not all key elements of the E/M (history, physical exam, and MDM). Additional findings are as noted.    PtName: Yani Nowak  MRN: [id number]  Birthdate 1980  Date of evaluation: 6/27/25  Note Started: 8:43 AM EDT    Primary Care Physician: New Salas APRN - NP    Brief HPI:  44-year-old female presents emergency department with lower back pain.  Symptoms over the past few days.  Denies trauma.  Denies urinary or fecal incontinence, saddle anesthesia, IV drug use, or lower extremity weakness.  Also complaining of dysuria.    Pertinent Physical Exam Findings:  Vitals:    06/27/25 0829   BP:    Pulse: 85   Resp:    Temp:    SpO2:    Appears well, resting comfortably, no acute distress, generalized tenderness across the bilateral lumbar soft tissues.  Normal strength and sensation in bilateral lower extremities.    Medical Decision Making: Patient is a 44 y.o. female presenting to the emergency department with back pain. The chart was reviewed for pertinent history relating to the chief complaint.  The patient appears well at this time in no acute distress, vitals are stable.  The patient has no history of trauma or red flag symptoms.  Grossly normal lower extremity neurologic exam.  Plan to

## 2025-06-27 NOTE — ED NOTES
Cetirizine (By mouth)   Cetirizine ([id number])  Treats hay fever and allergy symptoms, hives, and itching. Brand Name(s): All Day Allergy, Children's All Day Allergy, Children's Cetirizine Hydrochloride, Children's Wal-Zyr All Day Allergy, Children's ZyrTEC, Children's ZyrTEC Allergy, Equate Allergy Relief, Equate Children's Allergy Relief, [facility] All Day Allergy, [facility] Children's All Day Allergy, Good Sense All Day Allergy, Good Sense Children's All Day Allergy, Leader Children's All Day Allergy, [place] Cetirizine Hydrochloride, [facility] Aid Allergy Relief   There may be other brand names for this medicine. When This Medicine Should Not Be Used: You should not use this medicine if you have had an allergic reaction to cetirizine or hydroxyzine (Atarax®, Vistaril®). Do not give any over-the-counter (OTC) cough and cold medicine to a baby or child under 3years old. Using these medicines in very young children might cause serious or possibly life-threatening side effects. How to Use This Medicine:   Tablet, Chewable Tablet, Capsule, Liquid  · Your doctor will tell you how much medicine to use. Do not use more than directed. · You may take this medicine with or without food. · Measure the oral liquid medicine with a marked measuring spoon, oral syringe, or medicine cup. · Chew the chewable tablet thoroughly before you swallow it. You may also let the chewable tablet melt slowly in your mouth. · Swallow the tablet whole with a full glass of water. Do not chew, crush, or break it. If a dose is missed:   · Take a dose as soon as you remember. If it is almost time for your next dose, wait until then and take a regular dose. Do not take extra medicine to make up for a missed dose. How to Store and Dispose of This Medicine:   · Store the medicine in a closed container at room temperature, away from heat, moisture, and direct light.   · The liquid medicine may be kept in the Pt walked out of treatment area.   Pt asked for a second box lunch, writer denied pt request and she left.    refrigerator. Do not freeze. · Ask your pharmacist, doctor, or health caregiver about the best way to dispose of any outdated medicine or medicine no longer needed. · Keep all medicine out of the reach of children. Never share your medicine with anyone. Drugs and Foods to Avoid:   Ask your doctor or pharmacist before using any other medicine, including over-the-counter medicines, vitamins, and herbal products. · Make sure your doctor knows if you are also using theophylline. · Avoid drinking alcohol while using this medicine. · Make sure your doctor knows if you are also using other medicines that might make you sleepy such as cold or allergy medicines, muscle relaxants, tranquilizers, sleeping pills, or strong pain killers. Warnings While Using This Medicine:   · Talk with your doctor before taking this medicine if you are pregnant or breast feeding. · Check with your doctor before using this medicine if you have kidney disease or liver disease. · This medicine may make you dizzy or drowsy. Avoid driving, using machines, or doing anything else that could be dangerous if you are not alert. Possible Side Effects While Using This Medicine:   Call your doctor right away if you notice any of these side effects:  · Allergic reaction: Itching or hives, swelling in your face or hands, swelling or tingling in your mouth or throat, chest tightness, trouble breathing  · Skin or whites of your eyes turn yellow. If you notice these less serious side effects, talk with your doctor:   · Drowsiness or dizziness. · Dry mouth. If you notice other side effects that you think are caused by this medicine, tell your doctor. Call your doctor for medical advice about side effects. You may report side effects to FDA at 1-843-FDA-3442  © 2017 [facility] Information is for End User's use only and may not be sold, redistributed or otherwise used for commercial purposes.   The above information is an educational aid only. It is not intended as medical advice for individual conditions or treatments. Talk to your doctor, nurse or pharmacist before following any medical regimen to see if it is safe and effective for you. Omeprazole (By mouth)   Omeprazole (dz-[id number]-gz-[id number])  Treats heartburn, a damaged esophagus, stomach ulcers, and gastroesophageal reflux disease (GERD). This medicine is a proton pump inhibitor (PPI). Brand Name(s): First-Omeprazole, [facility] Omeprazole, Good Sense Omeprazole, Leader Omeprazole, Omeclamox-Frank, PrevidolRx Analgesic Frank, PriLOSEC, PriLOSEC OTC, Quality Choice Omeprazole Magnesium, [facility] Omeprazole, Sunmark Omeprazole, TopCare Omeprazole   There may be other brand names for this medicine. When This Medicine Should Not Be Used: This medicine is not right for everyone. Do not use it if you had an allergic reaction to omeprazole or similar medicines. How to Use This Medicine:   Delayed Release Capsule, Packet, Powder for Suspension, Delayed Release Tablet  · Your doctor will tell you how much medicine to use. Do not use more than directed. · This medicine should come with a Medication Guide. Ask your pharmacist for a copy if you do not have one. · Follow the instructions on the medicine label if you are using this medicine without a prescription. If you are using the over-the-counter medicine, do not take it for more than 14 days or use the treatment more often than every 4 months unless your doctor tells you to. · Take this medicine at least 1 hour before a meal and for the full time of treatment, even if you begin to feel better after a few days. · Capsule or tablet:   ¨ Swallow whole. Do not crush or chew it. ¨ If you cannot swallow the capsule, you may open it and pour the medicine into a small amount of applesauce. Stir this mixture well and swallow it without chewing. To help make sure you get the full dose, drink a full glass of water.   · Oral packet:   ¨ Mix the contents of a 2.5-milligram (mg) packet with 5 milliliters (mL) of water or mix the contents of a 10-mg packet with 15 mL of water. Do not use other liquids or food. ¨ Stir well. Let the mixture thicken for 2 to 3 minutes. ¨ Stir again and drink the medicine within 30 minutes. ¨ If there is any medicine left, add more water, stir, and drink immediately. § To prepare the oral packet for a feeding tube:   § Add 5 mL of water to a catheter-tipped syringe. Then add the contents of a 2.5-mg packet (or use 15 mL of water for the 10-mg packet). § Shake the syringe right away. Let the mixture thicken for 2 to 3 minutes. § Shake the syringe once more. Give the medicine through the tube within 30 minutes. § Refill the syringe with an equal amount of water and shake it. Use the water to flush the tube to make sure all the medicine is given. · Missed dose: Take a dose as soon as you remember. If it is almost time for your next dose, wait until then and take a regular dose. Do not take extra medicine to make up for a missed dose. · Store the medicine in a closed container at room temperature, away from heat, moisture, and direct light. Drugs and Foods to Avoid:   Ask your doctor or pharmacist before using any other medicine, including over-the-counter medicines, vitamins, and herbal products. · Do not use omeprazole if you are also using medicines that contain rilpivirine. · Some foods and medicines can affect how omeprazole works.  Tell your doctor if you are using any of the following:  ¨ Amoxicillin, atazanavir, cilostazol, citalopram, clarithromycin, clopidogrel, cyclosporine, dasatinib, digoxin, disulfiram, erlotinib, itraconazole, ketoconazole, methotrexate, mycophenolate mofetil, nelfinavir, nilotinib, phenytoin, rifampin, saquinavir, Sabiha's wort, tacrolimus, voriconazole  ¨ Benzodiazepine medicine (including diazepam)  ¨ Blood thinner (including warfarin)  ¨ Diuretic (water pill)  ¨ Iron supplements  Warnings While Using This Medicine:   · Tell your doctor if you are pregnant or breastfeeding, or if you have liver disease, lupus, or osteoporosis. · This medicine may cause the following problems:   ¨ Kidney problems  ¨ Low vitamin B12 or magnesium levels in the blood  ¨ Increased risk of broken bones in the hip, wrist, or spine  · This medicine can cause diarrhea. Call your doctor if the diarrhea becomes severe, does not stop, or is bloody. Do not take any medicine to stop diarrhea until you have talked to your doctor. Diarrhea can occur 2 months or more after you stop taking this medicine. · Tell any doctor or dentist who treats you that you are using this medicine. This medicine may affect certain medical test results. · Your doctor will check your progress and the effects of this medicine at regular visits. Keep all appointments. · Keep all medicine out of the reach of children. Never share your medicine with anyone. Possible Side Effects While Using This Medicine:   Call your doctor right away if you notice any of these side effects:  · Allergic reaction: Itching or hives, swelling in your face or hands, swelling or tingling in your mouth or throat, chest tightness, trouble breathing  · Blistering, peeling, red skin rash  · Fever, joint pain, swelling in the body, unusual weight gain, change in how much or how often you urinate  · Joint pain, rash on your cheeks or arms that gets worse in the sun  · Seizures, dizziness, uneven heartbeat, muscle cramps or twitching  · Severe diarrhea, stomach cramps, fever  · Stomach pain, nausea, vomiting, weight loss  If you notice these less serious side effects, talk with your doctor:   · Headache  · Mild diarrhea or stomach pain  If you notice other side effects that you think are caused by this medicine, tell your doctor. Call your doctor for medical advice about side effects.  You may report side effects to FDA at 1-933-FDA-8683  © 2017 [facility] [employer] is for End User's use only and may not be sold, redistributed or otherwise used for commercial purposes. The above information is an  only. It is not intended as medical advice for individual conditions or treatments. Talk to your doctor, nurse or pharmacist before following any medical regimen to see if it is safe and effective for you. Hypoglycemia: Care Instructions  Your Care Instructions    Hypoglycemia means that your blood sugar is low and your body is not getting enough fuel. Some people get low blood sugar from not eating often enough. Some medicines to treat diabetes can cause low blood sugar. People who have had surgery on their stomachs or intestines may get hypoglycemia. Problems with the pancreas, kidneys, or liver also can cause low blood sugar. A snack or drink with sugar in it will raise your blood sugar and should ease your symptoms right away. Your doctor may recommend that you change or stop your medicines until you can get your blood sugar levels under control. In the long run, you may need to change your diet and eating habits so that you get enough fuel for your body throughout the day. Follow-up care is a key part of your treatment and safety. Be sure to make and go to all appointments, and call your doctor if you are having problems. It's also a good idea to know your test results and keep a list of the medicines you take. How can you care for yourself at home? · Learn to recognize the early signs of low blood sugar. Signs include:  ? Nausea. ? Hunger. ? Feeling nervous, irritable, or shaky. ? Cold, clammy, wet skin. ? Sweating (when you are not exercising). ? A fast heartbeat.  ? Numbness or tingling of the fingertips or lips. · If you feel an episode of low blood sugar coming on, drink fruit juice or sugared (not diet) soda, or eat sugar in the form of candy, cubes, or tablets. [employer] are another [employer].   · Eat small, frequent meals so that you do not get too hungry between meals. · Balance extra exercise with eating more. · Keep a written record of your low blood sugar episodes, including when you last ate and what you ate, so that you can learn what causes your blood sugar to drop. · Make sure your family, friends, and coworkers know the symptoms of low blood sugar and know what to do to get your sugar level up. · Wear medical alert jewelry that lists your condition. You can buy this at most drugstores. When should you call for help? Call 911 anytime you think you may need emergency care. For example, call if:    · You passed out (lost consciousness).     · You are confused or cannot think clearly.     · Your blood sugar is very high or very low.    Watch closely for changes in your health, and be sure to contact your doctor if:    · Your blood sugar stays outside the level your doctor set for you.     · You have any problems. Where can you learn more? Go to http://laura-joey.info/. Enter X085 in the search box to learn more about \"Hypoglycemia: Care Instructions. \"  Current as of: July 25, 2018  Content Version: 11.9  © 4797-5925 [employer]. Care instructions adapted under license by [employer] (which disclaims liability or warranty for this information). If you have questions about a medical condition or this instruction, always ask your healthcare professional. [address] any warranty or liability for your use of this information.

## 2025-06-27 NOTE — ED NOTES
Pt out to nurses station. Pt states she is ready to go, she has an appointment with geetha at 1300. Pt ambulatory with steady gait.   Dr. Alcaraz notified, at bedside to re-evaluate pt.

## 2025-06-27 NOTE — DISCHARGE INSTRUCTIONS
You have been evaluated in the Emergency Department at [facility] 6/27/2025.    Instructions & Next Steps:  -Findings: Lumbar back strain, Unable to determine if you have a urinary tract infection without providing urine sample which you expressed understanding  -Medications:  Continue to take your other home meds/previously prescribed medications unless directed otherwise.  -Follow-up:  Schedule an appointment with your primary care provider (PCP) or establish care with a provider if you do not have one.  -Additional Recommendations: Rest, ICE, and as below if included.    When to Seek Immediate Medical Attention:  Return to the Emergency Department if you experience:    -Return if you have worsening back pain, develop fevers, new weakness in arms or legs, bowel or bladder incontinence  -Worsening or changing symptoms  -Inability to follow up with your PCP  -Any other urgent health concerns  -When in Doubt: If you feel worse or have new concerning symptoms, return to the ER immediately.    For questions about your care or further treatment, call the [facility] Emergency Department at [phone].
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
## 2025-06-28 ENCOUNTER — HOSPITAL ENCOUNTER (EMERGENCY)
Age: 45
Discharge: HOME | End: 2025-06-28

## 2025-06-28 ENCOUNTER — HOSPITAL ENCOUNTER (EMERGENCY)
Age: 45
Discharge: HOME | End: 2025-06-28
Attending: EMERGENCY MEDICINE
Payer: MEDICARE

## 2025-06-28 VITALS
DIASTOLIC BLOOD PRESSURE: 73 MMHG | SYSTOLIC BLOOD PRESSURE: 136 MMHG | OXYGEN SATURATION: 98 % | RESPIRATION RATE: 16 BRPM | TEMPERATURE: 97.7 F | HEART RATE: 84 BPM

## 2025-06-28 DIAGNOSIS — G89.29 CHRONIC BILATERAL LOW BACK PAIN WITHOUT SCIATICA: Primary | ICD-10-CM

## 2025-06-28 DIAGNOSIS — M54.50 CHRONIC BILATERAL LOW BACK PAIN WITHOUT SCIATICA: Primary | ICD-10-CM

## 2025-06-28 PROCEDURE — 6370000000 HC RX 637 (ALT 250 FOR IP)

## 2025-06-28 PROCEDURE — 99283 EMERGENCY DEPT VISIT LOW MDM: CPT

## 2025-06-28 RX ORDER — LIDOCAINE 4 G/G
1 PATCH TOPICAL ONCE
Status: DISCONTINUED | OUTPATIENT
Start: 2025-06-28 | End: 2025-06-28 | Stop reason: HOSPADM

## 2025-06-28 RX ORDER — ACETAMINOPHEN 500 MG
1000 TABLET ORAL ONCE
Status: COMPLETED | OUTPATIENT
Start: 2025-06-28 | End: 2025-06-28

## 2025-06-28 RX ADMIN — ACETAMINOPHEN 1000 MG: 500 TABLET ORAL at 05:42

## 2025-06-28 NOTE — DISCHARGE INSTRUCTIONS
You were seen in the emergency department today for back pain and calluses to the bottom of your feet.  You were treated with Tylenol.  Follow-up with your primary care provider.    Return to the emergency department if you have any bowel or bladder incontinence, are unable to urinate, have numbness in between your legs, fever, chills.

## 2025-06-28 NOTE — ED NOTES
Pt. Presents to the ED for chronic back and feet pain. Pt denies any injuries. Pt a&ox4 with even and non labored resp. Pt vss. Resident at the bedside for evaluation. Will continue with plan of care

## 2025-06-28 NOTE — ED PROVIDER NOTES
[facility]     Emergency Department     Faculty Attestation    I performed a history and physical examination of the patient and discussed management with the resident. I have reviewed and agree with the resident’s findings including all diagnostic interpretations, and treatment plans as written. Any areas of disagreement are noted on the chart. I was personally present for the key portions of any procedures. I have documented in the chart those procedures where I was not present during the key portions. I have reviewed the emergency nurses triage note. I agree with the chief complaint, past medical history, past surgical history, allergies, medications, social and family history as documented unless otherwise noted below. Documentation of the HPI, Physical Exam and Medical Decision Making performed by jose de jesus is based on my personal performance of the HPI, PE and MDM. For Physician Assistant/ Nurse Practitioner cases/documentation I have personally evaluated this patient and have completed at least one if not all key elements of the E/M (history, physical exam, and MDM). Additional findings are as noted.    Note Started: 5:30 AM EDT     43 yo F low back pain, no injury, no fever, no iv drug use, c/o bilateral foot pain,   PE vss gcs 15 Jairo RENEE present, Fu tenderness bilateral to L5, skin is intact, low back atraumatic, no midline tenderness crepitus or deformity,   Dorsiflexion strong =  D pedal pulse = , sensation intact bilateral lower extremities  Callus formation dorsal bilateral feet, no indication of burn  --- No red flag, ambulatory, given clean socks /outpatient resources in place  EKG Interpretation    Interpreted by me      CRITICAL CARE: There was a high probability of clinically significant/life threatening deterioration in this patient's condition which required my urgent intervention.  Total critical care time was 0 minutes.  This excludes 
     [facility] EMERGENCY DEPARTMENT  Emergency Department Encounter  Emergency Medicine Resident     Pt Name:Yani Nowak  MRN: [id number]  Birthdate 1980  Date of evaluation: 25  PCP:  New Salas APRN - NP  Note Started: 5:25 AM EDT      CHIEF COMPLAINT       Chief Complaint   Patient presents with    Back Pain    Foot Burn       HISTORY OF PRESENT ILLNESS  (Location/Symptom, Timing/Onset, Context/Setting, Quality, Duration, Modifying Factors, Severity.)      Yani Nowak is a 44 y.o. female past medical history of schizoaffective disorder, polysubstance abuse who presents with chronic back pain and calluses to her feet.  Patient is well-known to the emergency department with frequent visits for chronic back pain.  Patient denies any new injury to her back.  Denies bowel or bladder incontinence, denies urinary retention, denies perianal numbness or saddle anesthesia.  No fever or chills.  She denies IV drug use.  Patient also reports calluses and blisters to her feet.  These have been present for a long time.  She states that she just wants socks from the hospital.    PAST MEDICAL / SURGICAL / SOCIAL / FAMILY HISTORY      has a past medical history of Anxiety, Bipolar 1 disorder (HCC), Depression, Gestational diabetes, OCD (obsessive compulsive disorder), PTSD (post-traumatic stress disorder), Rapid rate of speech, and Schizoaffective disorder (HCC).       has a past surgical history that includes Abdomen surgery;  section (); and laparoscopy.      Social History     Socioeconomic History    Marital status: Legally      Spouse name: Not on file    Number of children: Not on file    Years of education: Not on file    Highest education level: Not on file   Occupational History    Not on file   Tobacco Use    Smoking status: Every Day     Current packs/day: 1.00     Types: Cigarettes    Smokeless tobacco: Never   Vaping Use    Vaping status: Former    Substances: 
Improved

## 2025-07-26 ENCOUNTER — HOSPITAL ENCOUNTER (EMERGENCY)
Age: 45
Discharge: HOME | End: 2025-07-26
Attending: STUDENT IN AN ORGANIZED HEALTH CARE EDUCATION/TRAINING PROGRAM
Payer: MEDICARE

## 2025-07-26 VITALS
HEART RATE: 65 BPM | DIASTOLIC BLOOD PRESSURE: 71 MMHG | OXYGEN SATURATION: 100 % | TEMPERATURE: 98.2 F | SYSTOLIC BLOOD PRESSURE: 114 MMHG | RESPIRATION RATE: 16 BRPM

## 2025-07-26 DIAGNOSIS — M54.50 ACUTE EXACERBATION OF CHRONIC LOW BACK PAIN: Primary | ICD-10-CM

## 2025-07-26 DIAGNOSIS — G89.29 ACUTE EXACERBATION OF CHRONIC LOW BACK PAIN: Primary | ICD-10-CM

## 2025-07-26 PROCEDURE — 6360000002 HC RX W HCPCS

## 2025-07-26 PROCEDURE — 96372 THER/PROPH/DIAG INJ SC/IM: CPT

## 2025-07-26 PROCEDURE — 6370000000 HC RX 637 (ALT 250 FOR IP)

## 2025-07-26 PROCEDURE — 99284 EMERGENCY DEPT VISIT MOD MDM: CPT

## 2025-07-26 RX ORDER — METHOCARBAMOL 750 MG/1
750 TABLET, FILM COATED ORAL 3 TIMES DAILY PRN
Qty: 20 TABLET | Refills: 0 | Status: SHIPPED | OUTPATIENT
Start: 2025-07-26 | End: 2025-08-05

## 2025-07-26 RX ORDER — KETOROLAC TROMETHAMINE 15 MG/ML
15 INJECTION, SOLUTION INTRAMUSCULAR; INTRAVENOUS ONCE
Status: COMPLETED | OUTPATIENT
Start: 2025-07-26 | End: 2025-07-26

## 2025-07-26 RX ORDER — KETOROLAC TROMETHAMINE 15 MG/ML
15 INJECTION, SOLUTION INTRAMUSCULAR; INTRAVENOUS ONCE
Status: DISCONTINUED | OUTPATIENT
Start: 2025-07-26 | End: 2025-07-26

## 2025-07-26 RX ORDER — LIDOCAINE 4 G/G
1 PATCH TOPICAL DAILY
Qty: 10 EACH | Refills: 0 | Status: SHIPPED | OUTPATIENT
Start: 2025-07-26 | End: 2025-08-05

## 2025-07-26 RX ORDER — NAPROXEN 500 MG/1
500 TABLET ORAL 2 TIMES DAILY WITH MEALS
Qty: 10 TABLET | Refills: 0 | Status: SHIPPED | OUTPATIENT
Start: 2025-07-26

## 2025-07-26 RX ORDER — METHOCARBAMOL 500 MG/1
750 TABLET, FILM COATED ORAL ONCE
Status: COMPLETED | OUTPATIENT
Start: 2025-07-26 | End: 2025-07-26

## 2025-07-26 RX ORDER — LIDOCAINE 4 G/G
1 PATCH TOPICAL ONCE
Status: DISCONTINUED | OUTPATIENT
Start: 2025-07-26 | End: 2025-07-26 | Stop reason: HOSPADM

## 2025-07-26 RX ADMIN — KETOROLAC TROMETHAMINE 15 MG: 15 INJECTION, SOLUTION INTRAMUSCULAR; INTRAVENOUS at 06:08

## 2025-07-26 RX ADMIN — METHOCARBAMOL 750 MG: 500 TABLET ORAL at 06:06

## 2025-07-26 ASSESSMENT — ENCOUNTER SYMPTOMS
ABDOMINAL PAIN: 0
VOMITING: 0
SHORTNESS OF BREATH: 0
BACK PAIN: 1
DIARRHEA: 0

## 2025-07-26 ASSESSMENT — PAIN - FUNCTIONAL ASSESSMENT: PAIN_FUNCTIONAL_ASSESSMENT: 0-10

## 2025-07-26 ASSESSMENT — PAIN SCALES - GENERAL: PAINLEVEL_OUTOF10: 8

## 2025-07-26 NOTE — DISCHARGE INSTRUCTIONS
You were evaluated in the ER for your low back pain.  You were given Toradol (an anti-inflammatory medication), Robaxin (a muscle relaxant), lidocaine patch (topical anesthetic).  You can continue to take Tylenol, naproxen, Robaxin, and use lidocaine patches as needed for pain.  You can do exercises to help improve mobility and relieve pain.  Follow-up with your primary care provider on Monday for reevaluation.  If you do not have a primary care provider, you can follow-up with [facility] to establish 1.    Return to the ER immediately if you develop a fever, incontinence, weakness or numbness of your legs, or any other concerning symptoms.

## 2025-07-26 NOTE — ED PROVIDER NOTES
[facility] EMERGENCY DEPARTMENT  Emergency Department Encounter  Emergency Medicine Resident     Pt Name:Yani Nowak  MRN: [id number]  Birthdate 1980  Date of evaluation: 25  PCP:  New Salas APRN - NP  Note Started: 6:54 AM EDT      CHIEF COMPLAINT       Chief Complaint   Patient presents with    Back Pain       HISTORY OF PRESENT ILLNESS  (Location/Symptom, Timing/Onset, Context/Setting, Quality, Duration, Modifying Factors, Severity.)      Yani Nowak is a 45 y.o. female who presents to the ED with c/o acute on chronic low back pain that does not radiate.  Patient states she developed low back pain yesterday but is unsure of what she was doing at that time.  She denies any trauma.  The pain is worse with twisting/moving, she has not taken anything to make it better.  Patient denies any fever, chest pain, shortness of breath, weakness, numbness, saddle anesthesia, incontinence.  She has seen chiropractors in the past with relief.    PAST MEDICAL / SURGICAL / SOCIAL / FAMILY HISTORY      has a past medical history of Anxiety, Bipolar 1 disorder (HCC), Depression, Gestational diabetes, OCD (obsessive compulsive disorder), PTSD (post-traumatic stress disorder), Rapid rate of speech, and Schizoaffective disorder (HCC).     has a past surgical history that includes Abdomen surgery;  section (); and laparoscopy.    Social History     Socioeconomic History    Marital status: Legally      Spouse name: Not on file    Number of children: Not on file    Years of education: Not on file    Highest education level: Not on file   Occupational History    Not on file   Tobacco Use    Smoking status: Every Day     Current packs/day: 1.00     Types: Cigarettes    Smokeless tobacco: Never   Vaping Use    Vaping status: Former    Substances: Always   Substance and Sexual Activity    Alcohol use: No     Alcohol/week: 0.0 standard drinks of alcohol    Drug use: Not Currently

## 2025-07-26 NOTE — ED PROVIDER NOTES
[facility]     Emergency Department     Faculty Attestation    I performed a history and physical examination of the patient and discussed management with the resident. I have reviewed and agree with the resident’s findings including all diagnostic interpretations, and treatment plans as written at the time of my review. Any areas of disagreement are noted on the chart. I was personally present for the key portions of any procedures. I have documented in the chart those procedures where I was not present during the key portions. For Physician Assistant/ Nurse Practitioner cases/documentation I have personally evaluated this patient and have completed at least one if not all key elements of the E/M (history, physical exam, and MDM). Additional findings are as noted.    PtName: Yani Nowak  MRN: [id number]  Birthdate 1980  Date of evaluation: 7/26/25  Note Started: 6:02 AM EDT    Primary Care Physician: New Salas APRN - NP    Brief HPI:  45-year-old female presents emergency department back pain.  Symptoms started 2 days ago.  The patient denies trauma.  Denies urinary fecal incontinence, saddle anesthesia, lower extremity weakness, history of IV drug use.     Pertinent Physical Exam Findings:  Vitals:    07/26/25 0553   BP: 114/71   Pulse: 65   Resp: 16   Temp: 98.2 °F (36.8 °C)   SpO2: 100%   Appears well, no acute distress, tenderness to the bilateral lumbar and sacral soft tissues, gait is intact, grossly normal strength and sensation in lower extremities.    Medical Decision Making: Patient is a 45 y.o. female presenting to the emergency department with back pain. The chart was reviewed for pertinent history relating to the chief complaint.  The patient appears well, no acute distress, vitals are stable, the patient has no history of trauma or red flag symptoms.  Plan to treat symptomatically and discharge.     All results, including labs (if

## 2025-07-26 NOTE — ED NOTES
Patient to ED via triage with complaints of lower back pain. Patient states she has hx of sciatica pain. Patient denies radiating pain to legs. Patient denies taking any medications PTA. Patient is A&O x4 and RR even and unlabored. Patient ambulatory to stretcher. Call light within reach.

## 2025-07-30 ENCOUNTER — HOSPITAL ENCOUNTER (EMERGENCY)
Age: 45
Discharge: LEFT BEFORE DISCHARGE | End: 2025-07-30
Attending: EMERGENCY MEDICINE
Payer: MEDICARE

## 2025-07-30 VITALS
SYSTOLIC BLOOD PRESSURE: 117 MMHG | HEART RATE: 96 BPM | OXYGEN SATURATION: 95 % | RESPIRATION RATE: 16 BRPM | DIASTOLIC BLOOD PRESSURE: 86 MMHG | TEMPERATURE: 97.5 F

## 2025-07-30 DIAGNOSIS — Z53.21 ELOPED FROM EMERGENCY DEPARTMENT: Primary | ICD-10-CM

## 2025-07-30 PROCEDURE — 99281 EMR DPT VST MAYX REQ PHY/QHP: CPT

## 2025-07-30 NOTE — ED PROVIDER NOTES
[facility] EMERGENCY DEPARTMENT  Emergency Department Encounter  Emergency Medicine Resident     Pt Name:Yani Nowak  MRN: [id number]  Birthdate 1980  Date of evaluation: 25  PCP:  New Salas APRN - NP  Note Started: 12:22 AM EDT      CHIEF COMPLAINT       Chief Complaint   Patient presents with    Back Pain       HISTORY OF PRESENT ILLNESS  (Location/Symptom, Timing/Onset, Context/Setting, Quality, Duration, Modifying Factors, Severity.)      Yani Nowak is a 45 y.o. female who presents with chronic back pain.  States she has had back pain for several months and recently ran out of her Motrin prescription.  She denies recent traumatic injury or fall.  There is no reported fever, chills, myalgias or night sweats.  She denies urinary retention, loss of bowel or bladder function, numbness or tingling of the groin or genitals.  She was able to ambulate without difficulty.    She also expresses concern for possible UTI, stating she has had frequent urination. She denies burning with urination or blood in urine.     PAST MEDICAL / SURGICAL / SOCIAL / FAMILY HISTORY      has a past medical history of Anxiety, Bipolar 1 disorder (HCC), Depression, Gestational diabetes, OCD (obsessive compulsive disorder), PTSD (post-traumatic stress disorder), Rapid rate of speech, and Schizoaffective disorder (HCC).       has a past surgical history that includes Abdomen surgery;  section (); and laparoscopy.      Social History     Socioeconomic History    Marital status: Legally      Spouse name: Not on file    Number of children: Not on file    Years of education: Not on file    Highest education level: Not on file   Occupational History    Not on file   Tobacco Use    Smoking status: Every Day     Current packs/day: 1.00     Types: Cigarettes    Smokeless tobacco: Never   Vaping Use    Vaping status: Former    Substances: Always   Substance and Sexual Activity    Alcohol 
reportable procedures.       Gregg Olguin DO  07/30/25 0030       Gregg George DO  07/30/25 0233

## 2025-07-30 NOTE — ED NOTES
Pt arrived to ED through triage with c/o of back pain  Pt stated she has been having urinary frequency with decreased urine output  Pt stated it hurts to pee  Pt denies any odor or discharge  Pt stated her urinary symptoms have been going on for a few days  Pt VS charted below

## 2025-08-01 ENCOUNTER — HOSPITAL ENCOUNTER (EMERGENCY)
Age: 45
Discharge: LEFT BEFORE DISCHARGE | End: 2025-08-01
Attending: EMERGENCY MEDICINE
Payer: MEDICARE

## 2025-08-01 VITALS
HEART RATE: 101 BPM | SYSTOLIC BLOOD PRESSURE: 143 MMHG | OXYGEN SATURATION: 98 % | RESPIRATION RATE: 18 BRPM | TEMPERATURE: 98.1 F | DIASTOLIC BLOOD PRESSURE: 94 MMHG

## 2025-08-01 DIAGNOSIS — F31.12 BIPOLAR AFFECTIVE DISORDER, CURRENTLY MANIC, MODERATE (HCC): ICD-10-CM

## 2025-08-01 DIAGNOSIS — M54.2 NECK PAIN: ICD-10-CM

## 2025-08-01 DIAGNOSIS — Z53.21 ELOPED FROM EMERGENCY DEPARTMENT: Primary | ICD-10-CM

## 2025-08-01 DIAGNOSIS — R30.0 DYSURIA: ICD-10-CM

## 2025-08-01 LAB
BACTERIA URNS QL MICRO: NORMAL
BILIRUB UR QL STRIP: NEGATIVE
CASTS #/AREA URNS LPF: NORMAL /LPF (ref 0–8)
CLARITY UR: CLEAR
COLOR UR: YELLOW
EPI CELLS #/AREA URNS HPF: NORMAL /HPF (ref 0–5)
GLUCOSE UR STRIP-MCNC: NEGATIVE MG/DL
HGB UR QL STRIP.AUTO: ABNORMAL
KETONES UR STRIP-MCNC: NEGATIVE MG/DL
LEUKOCYTE ESTERASE UR QL STRIP: ABNORMAL
MICROORGANISM SPEC CULT: ABNORMAL
NITRITE UR QL STRIP: NEGATIVE
PH UR STRIP: 6 [PH] (ref 5–8)
PROT UR STRIP-MCNC: NEGATIVE MG/DL
RBC #/AREA URNS HPF: NORMAL /HPF (ref 0–4)
SP GR UR STRIP: 1.02 (ref 1–1.03)
SPECIMEN DESCRIPTION: ABNORMAL
UROBILINOGEN UR STRIP-ACNC: NORMAL EU/DL (ref 0–1)
WBC #/AREA URNS HPF: NORMAL /HPF (ref 0–5)

## 2025-08-01 PROCEDURE — 87086 URINE CULTURE/COLONY COUNT: CPT

## 2025-08-01 PROCEDURE — 86403 PARTICLE AGGLUT ANTBDY SCRN: CPT

## 2025-08-01 PROCEDURE — 81001 URINALYSIS AUTO W/SCOPE: CPT

## 2025-08-01 PROCEDURE — 99283 EMERGENCY DEPT VISIT LOW MDM: CPT

## 2025-08-01 RX ORDER — CYCLOBENZAPRINE HCL 10 MG
10 TABLET ORAL ONCE
Status: DISCONTINUED | OUTPATIENT
Start: 2025-08-01 | End: 2025-08-01 | Stop reason: HOSPADM

## 2025-08-01 NOTE — ED NOTES
Pt arrived to ED to be checked for UTI, an abscess on the back of her neck   Writer asked what sx's pt has for UTI, she states \"I know I have one\"   Pt also states she has abscess on the back of her neck, states that it is no longer there because she \"rubbed it away\"  Pt also states she needs a script for  mg for her pain all over   No other complaints at this time

## 2025-08-01 NOTE — ED PROVIDER NOTES
[facility] EMERGENCY DEPARTMENT  Emergency Department Encounter  Emergency Medicine Resident     Pt Name:Yani Nowak  MRN: [id number]  Birthdate 1980  Date of evaluation: 25  PCP:  New Salas APRN - NP  Note Started: 1:07 AM EDT      CHIEF COMPLAINT       No chief complaint on file.      HISTORY OF PRESENT ILLNESS  (Location/Symptom, Timing/Onset, Context/Setting, Quality, Duration, Modifying Factors, Severity.)      Yani Nowak is a 45 y.o. female who presents with [facility] police after being found outside walking around with with pressured speech stating her neck hurts.  Significant psychiatric history, unknown compliance with prescribed occasions.  States her neck hurts, denies fall or traumatic injury.  No reported headache or vision changes also stating she has burning with urination, denies blood in urine.    PAST MEDICAL / SURGICAL / SOCIAL / FAMILY HISTORY      has a past medical history of Anxiety, Bipolar 1 disorder (HCC), Depression, Gestational diabetes, OCD (obsessive compulsive disorder), PTSD (post-traumatic stress disorder), Rapid rate of speech, and Schizoaffective disorder (HCC).       has a past surgical history that includes Abdomen surgery;  section (); and laparoscopy.      Social History     Socioeconomic History    Marital status: Legally      Spouse name: Not on file    Number of children: Not on file    Years of education: Not on file    Highest education level: Not on file   Occupational History    Not on file   Tobacco Use    Smoking status: Every Day     Current packs/day: 1.00     Types: Cigarettes    Smokeless tobacco: Never   Vaping Use    Vaping status: Former    Substances: Always   Substance and Sexual Activity    Alcohol use: No     Alcohol/week: 0.0 standard drinks of alcohol    Drug use: Not Currently     Types: Marijuana (Weed), Cocaine    Sexual activity: Yes     Partners: Male   Other Topics Concern    Not on file 
Whenever words are used in this note in any gender, they shall be construed as though they were used in the gender appropriate to the circumstances; and whenever words are used in this note in the singular or plural form, they shall be construed as though they were used in the form appropriate to the circumstances.)    Juice Estrada MD [facility]  Attending Emergency Medicine Physician           Juice Estrada MD  08/01/25 0104       Juice Estrada MD  08/01/25 0123

## 2025-08-04 ENCOUNTER — HOSPITAL ENCOUNTER (EMERGENCY)
Age: 45
Discharge: HOME | End: 2025-08-04

## 2025-08-04 ENCOUNTER — HOSPITAL ENCOUNTER (EMERGENCY)
Age: 45
Discharge: ANOTHER HOSPITAL | DRG: 885 | End: 2025-08-05
Attending: EMERGENCY MEDICINE
Payer: MEDICARE

## 2025-08-04 DIAGNOSIS — F23 ACUTE PSYCHOSIS (HCC): Primary | ICD-10-CM

## 2025-08-04 LAB
ALBUMIN SERPL-MCNC: 4.1 G/DL (ref 3.5–5.2)
ALBUMIN/GLOB SERPL: 1.9 {RATIO} (ref 1–2.5)
ALP SERPL-CCNC: 92 U/L (ref 35–104)
ALT SERPL-CCNC: 17 U/L (ref 10–35)
AMPHET UR QL SCN: NEGATIVE
ANION GAP SERPL CALCULATED.3IONS-SCNC: 12 MMOL/L (ref 9–16)
APAP SERPL-MCNC: <5 UG/ML (ref 10–30)
AST SERPL-CCNC: 24 U/L (ref 10–35)
BACTERIA URNS QL MICRO: ABNORMAL
BARBITURATES UR QL SCN: NEGATIVE
BASOPHILS # BLD: 0.05 K/UL (ref 0–0.2)
BASOPHILS NFR BLD: 1 % (ref 0–2)
BENZODIAZ UR QL: POSITIVE
BILIRUB SERPL-MCNC: 0.2 MG/DL (ref 0–1.2)
BILIRUB UR QL STRIP: NEGATIVE
BUN SERPL-MCNC: 9 MG/DL (ref 6–20)
CALCIUM SERPL-MCNC: 9.6 MG/DL (ref 8.6–10.4)
CANNABINOIDS UR QL SCN: POSITIVE
CASTS #/AREA URNS LPF: ABNORMAL /LPF (ref 0–8)
CHLORIDE SERPL-SCNC: 109 MMOL/L (ref 98–107)
CLARITY UR: CLEAR
CO2 SERPL-SCNC: 22 MMOL/L (ref 20–31)
COCAINE UR QL SCN: POSITIVE
COLOR UR: YELLOW
CREAT SERPL-MCNC: 0.7 MG/DL (ref 0.6–0.9)
EOSINOPHIL # BLD: 0.29 K/UL (ref 0–0.44)
EOSINOPHILS RELATIVE PERCENT: 5 % (ref 1–4)
EPI CELLS #/AREA URNS HPF: ABNORMAL /HPF (ref 0–5)
ERYTHROCYTE [DISTWIDTH] IN BLOOD BY AUTOMATED COUNT: 14.2 % (ref 11.8–14.4)
ETHANOL PERCENT: <0.01 %
ETHANOLAMINE SERPL-MCNC: <10 MG/DL (ref 0–0.08)
FENTANYL UR QL: NEGATIVE
GFR, ESTIMATED: >90 ML/MIN/1.73M2
GLUCOSE SERPL-MCNC: 105 MG/DL (ref 74–99)
GLUCOSE UR STRIP-MCNC: NEGATIVE MG/DL
HCG UR QL: NEGATIVE
HCT VFR BLD AUTO: 39.4 % (ref 36.3–47.1)
HGB BLD-MCNC: 12.5 G/DL (ref 11.9–15.1)
HGB UR QL STRIP.AUTO: NEGATIVE
IMM GRANULOCYTES # BLD AUTO: <0.03 K/UL (ref 0–0.3)
IMM GRANULOCYTES NFR BLD: 0 %
KETONES UR STRIP-MCNC: ABNORMAL MG/DL
LEUKOCYTE ESTERASE UR QL STRIP: ABNORMAL
LYMPHOCYTES NFR BLD: 1.92 K/UL (ref 1.1–3.7)
LYMPHOCYTES RELATIVE PERCENT: 31 % (ref 24–43)
MAGNESIUM SERPL-MCNC: 2 MG/DL (ref 1.6–2.6)
MCH RBC QN AUTO: 27.4 PG (ref 25.2–33.5)
MCHC RBC AUTO-ENTMCNC: 31.7 G/DL (ref 28.4–34.8)
MCV RBC AUTO: 86.4 FL (ref 82.6–102.9)
METHADONE UR QL: NEGATIVE
MONOCYTES NFR BLD: 0.59 K/UL (ref 0.1–1.2)
MONOCYTES NFR BLD: 10 % (ref 3–12)
NEUTROPHILS NFR BLD: 53 % (ref 36–65)
NEUTS SEG NFR BLD: 3.27 K/UL (ref 1.5–8.1)
NITRITE UR QL STRIP: NEGATIVE
NRBC BLD-RTO: 0 PER 100 WBC
OPIATES UR QL SCN: NEGATIVE
OXYCODONE UR QL SCN: NEGATIVE
PCP UR QL SCN: NEGATIVE
PH UR STRIP: 6 [PH] (ref 5–8)
PHOSPHATE SERPL-MCNC: 3.2 MG/DL (ref 2.5–4.5)
PLATELET # BLD AUTO: 231 K/UL (ref 138–453)
PMV BLD AUTO: 11.8 FL (ref 8.1–13.5)
POTASSIUM SERPL-SCNC: 3.2 MMOL/L (ref 3.7–5.3)
PROT SERPL-MCNC: 6.3 G/DL (ref 6.6–8.7)
PROT UR STRIP-MCNC: NEGATIVE MG/DL
RBC # BLD AUTO: 4.56 M/UL (ref 3.95–5.11)
RBC #/AREA URNS HPF: ABNORMAL /HPF (ref 0–4)
SALICYLATES SERPL-MCNC: <0.5 MG/DL (ref 0–10)
SODIUM SERPL-SCNC: 143 MMOL/L (ref 136–145)
SP GR UR STRIP: 1.01 (ref 1–1.03)
TEST INFORMATION: ABNORMAL
UROBILINOGEN UR STRIP-ACNC: NORMAL EU/DL (ref 0–1)
WBC #/AREA URNS HPF: ABNORMAL /HPF (ref 0–5)
WBC OTHER # BLD: 6.1 K/UL (ref 3.5–11.3)

## 2025-08-04 PROCEDURE — 6360000002 HC RX W HCPCS

## 2025-08-04 PROCEDURE — 80179 DRUG ASSAY SALICYLATE: CPT

## 2025-08-04 PROCEDURE — 85025 COMPLETE CBC W/AUTO DIFF WBC: CPT

## 2025-08-04 PROCEDURE — 2500000003 HC RX 250 WO HCPCS: Performed by: STUDENT IN AN ORGANIZED HEALTH CARE EDUCATION/TRAINING PROGRAM

## 2025-08-04 PROCEDURE — G0480 DRUG TEST DEF 1-7 CLASSES: HCPCS

## 2025-08-04 PROCEDURE — 81001 URINALYSIS AUTO W/SCOPE: CPT

## 2025-08-04 PROCEDURE — 6360000002 HC RX W HCPCS: Performed by: STUDENT IN AN ORGANIZED HEALTH CARE EDUCATION/TRAINING PROGRAM

## 2025-08-04 PROCEDURE — 80143 DRUG ASSAY ACETAMINOPHEN: CPT

## 2025-08-04 PROCEDURE — 93005 ELECTROCARDIOGRAM TRACING: CPT

## 2025-08-04 PROCEDURE — 84100 ASSAY OF PHOSPHORUS: CPT

## 2025-08-04 PROCEDURE — 81025 URINE PREGNANCY TEST: CPT

## 2025-08-04 PROCEDURE — 80307 DRUG TEST PRSMV CHEM ANLYZR: CPT

## 2025-08-04 PROCEDURE — 80053 COMPREHEN METABOLIC PANEL: CPT

## 2025-08-04 PROCEDURE — 83735 ASSAY OF MAGNESIUM: CPT

## 2025-08-04 PROCEDURE — 93005 ELECTROCARDIOGRAM TRACING: CPT | Performed by: EMERGENCY MEDICINE

## 2025-08-04 PROCEDURE — 6370000000 HC RX 637 (ALT 250 FOR IP)

## 2025-08-04 RX ORDER — MIDAZOLAM HYDROCHLORIDE 2 MG/2ML
2 INJECTION, SOLUTION INTRAMUSCULAR; INTRAVENOUS ONCE
Status: COMPLETED | OUTPATIENT
Start: 2025-08-04 | End: 2025-08-04

## 2025-08-04 RX ORDER — POTASSIUM CHLORIDE 7.45 MG/ML
10 INJECTION INTRAVENOUS ONCE
Status: COMPLETED | OUTPATIENT
Start: 2025-08-04 | End: 2025-08-04

## 2025-08-04 RX ORDER — RISPERIDONE 1 MG/1
1 TABLET, ORALLY DISINTEGRATING ORAL ONCE
Status: COMPLETED | OUTPATIENT
Start: 2025-08-04 | End: 2025-08-04

## 2025-08-04 RX ADMIN — MIDAZOLAM HYDROCHLORIDE 2 MG: 1 INJECTION, SOLUTION INTRAMUSCULAR; INTRAVENOUS at 11:54

## 2025-08-04 RX ADMIN — RISPERIDONE 1 MG: 1 TABLET, ORALLY DISINTEGRATING ORAL at 20:53

## 2025-08-04 RX ADMIN — MIDAZOLAM HYDROCHLORIDE 2 MG: 1 INJECTION, SOLUTION INTRAMUSCULAR; INTRAVENOUS at 20:00

## 2025-08-04 RX ADMIN — MIDAZOLAM HYDROCHLORIDE 2 MG: 1 INJECTION, SOLUTION INTRAMUSCULAR; INTRAVENOUS at 15:33

## 2025-08-04 RX ADMIN — POTASSIUM CHLORIDE 10 MEQ: 7.46 INJECTION, SOLUTION INTRAVENOUS at 17:47

## 2025-08-04 RX ADMIN — OLANZAPINE 20 MG: 10 INJECTION, POWDER, FOR SOLUTION INTRAMUSCULAR at 11:57

## 2025-08-04 RX ADMIN — WATER 10 MG: 1 INJECTION INTRAMUSCULAR; INTRAVENOUS; SUBCUTANEOUS at 15:37

## 2025-08-04 RX ADMIN — MIDAZOLAM HYDROCHLORIDE 2 MG: 1 INJECTION, SOLUTION INTRAMUSCULAR; INTRAVENOUS at 18:02

## 2025-08-05 ENCOUNTER — HOSPITAL ENCOUNTER (INPATIENT)
Age: 45
LOS: 9 days | Discharge: HOME | DRG: 885 | End: 2025-08-14
Attending: PSYCHIATRY & NEUROLOGY | Admitting: PSYCHIATRY & NEUROLOGY
Payer: MEDICARE

## 2025-08-05 VITALS
DIASTOLIC BLOOD PRESSURE: 72 MMHG | HEART RATE: 69 BPM | TEMPERATURE: 99.3 F | OXYGEN SATURATION: 92 % | SYSTOLIC BLOOD PRESSURE: 125 MMHG | RESPIRATION RATE: 18 BRPM

## 2025-08-05 LAB
EKG ATRIAL RATE: 68 BPM
EKG P AXIS: 74 DEGREES
EKG P-R INTERVAL: 152 MS
EKG Q-T INTERVAL: 410 MS
EKG QRS DURATION: 86 MS
EKG QTC CALCULATION (BAZETT): 435 MS
EKG R AXIS: 66 DEGREES
EKG T AXIS: 72 DEGREES
EKG VENTRICULAR RATE: 68 BPM

## 2025-08-05 PROCEDURE — 6360000002 HC RX W HCPCS: Performed by: PSYCHIATRY & NEUROLOGY

## 2025-08-05 PROCEDURE — 6370000000 HC RX 637 (ALT 250 FOR IP): Performed by: PSYCHIATRY & NEUROLOGY

## 2025-08-05 PROCEDURE — 2040000000 HC PSYCH ICU R&B

## 2025-08-05 PROCEDURE — APPSS60 APP SPLIT SHARED TIME 46-60 MINUTES: Performed by: NURSE PRACTITIONER

## 2025-08-05 PROCEDURE — 99222 1ST HOSP IP/OBS MODERATE 55: CPT | Performed by: INTERNAL MEDICINE

## 2025-08-05 PROCEDURE — 93010 ELECTROCARDIOGRAM REPORT: CPT | Performed by: INTERNAL MEDICINE

## 2025-08-05 PROCEDURE — 99222 1ST HOSP IP/OBS MODERATE 55: CPT | Performed by: PSYCHIATRY & NEUROLOGY

## 2025-08-05 PROCEDURE — 6360000002 HC RX W HCPCS

## 2025-08-05 RX ORDER — LORAZEPAM 1 MG/1
2 TABLET ORAL EVERY 4 HOURS PRN
Status: DISCONTINUED | OUTPATIENT
Start: 2025-08-05 | End: 2025-08-07

## 2025-08-05 RX ORDER — DIPHENHYDRAMINE HYDROCHLORIDE 50 MG/ML
INJECTION, SOLUTION INTRAMUSCULAR; INTRAVENOUS
Status: COMPLETED
Start: 2025-08-05 | End: 2025-08-05

## 2025-08-05 RX ORDER — IBUPROFEN 400 MG/1
400 TABLET, FILM COATED ORAL EVERY 6 HOURS PRN
Status: DISCONTINUED | OUTPATIENT
Start: 2025-08-05 | End: 2025-08-14 | Stop reason: HOSPADM

## 2025-08-05 RX ORDER — ACETAMINOPHEN 325 MG/1
650 TABLET ORAL EVERY 4 HOURS PRN
Status: DISCONTINUED | OUTPATIENT
Start: 2025-08-05 | End: 2025-08-14 | Stop reason: HOSPADM

## 2025-08-05 RX ORDER — HALOPERIDOL 5 MG/1
5 TABLET ORAL 2 TIMES DAILY
Status: ON HOLD | COMMUNITY
End: 2025-08-13 | Stop reason: HOSPADM

## 2025-08-05 RX ORDER — HALOPERIDOL 5 MG/1
5 TABLET ORAL 2 TIMES DAILY
Status: DISCONTINUED | OUTPATIENT
Start: 2025-08-05 | End: 2025-08-06

## 2025-08-05 RX ORDER — NICOTINE 21 MG/24HR
1 PATCH, TRANSDERMAL 24 HOURS TRANSDERMAL DAILY
Status: DISCONTINUED | OUTPATIENT
Start: 2025-08-05 | End: 2025-08-09

## 2025-08-05 RX ORDER — HALOPERIDOL 5 MG/1
5 TABLET ORAL EVERY 4 HOURS PRN
Status: DISCONTINUED | OUTPATIENT
Start: 2025-08-05 | End: 2025-08-14 | Stop reason: HOSPADM

## 2025-08-05 RX ORDER — MAGNESIUM HYDROXIDE/ALUMINUM HYDROXICE/SIMETHICONE 120; 1200; 1200 MG/30ML; MG/30ML; MG/30ML
30 SUSPENSION ORAL EVERY 6 HOURS PRN
Status: DISCONTINUED | OUTPATIENT
Start: 2025-08-05 | End: 2025-08-14 | Stop reason: HOSPADM

## 2025-08-05 RX ORDER — HYDROXYZINE HYDROCHLORIDE 50 MG/1
50 TABLET, FILM COATED ORAL 3 TIMES DAILY PRN
Status: DISCONTINUED | OUTPATIENT
Start: 2025-08-05 | End: 2025-08-14 | Stop reason: HOSPADM

## 2025-08-05 RX ORDER — PRAZOSIN HYDROCHLORIDE 1 MG/1
1 CAPSULE ORAL NIGHTLY
Status: DISCONTINUED | OUTPATIENT
Start: 2025-08-05 | End: 2025-08-14 | Stop reason: HOSPADM

## 2025-08-05 RX ORDER — POLYETHYLENE GLYCOL 3350 17 G/17G
17 POWDER, FOR SOLUTION ORAL DAILY PRN
Status: DISCONTINUED | OUTPATIENT
Start: 2025-08-05 | End: 2025-08-14 | Stop reason: HOSPADM

## 2025-08-05 RX ORDER — METOPROLOL SUCCINATE 25 MG/1
25 TABLET, EXTENDED RELEASE ORAL DAILY
Status: DISCONTINUED | OUTPATIENT
Start: 2025-08-05 | End: 2025-08-05

## 2025-08-05 RX ORDER — MIDAZOLAM HYDROCHLORIDE 2 MG/2ML
2 INJECTION, SOLUTION INTRAMUSCULAR; INTRAVENOUS ONCE
Status: COMPLETED | OUTPATIENT
Start: 2025-08-05 | End: 2025-08-05

## 2025-08-05 RX ORDER — METOPROLOL SUCCINATE 25 MG/1
25 TABLET, EXTENDED RELEASE ORAL DAILY
Status: ON HOLD | COMMUNITY
End: 2025-08-13 | Stop reason: HOSPADM

## 2025-08-05 RX ORDER — LORAZEPAM 2 MG/ML
2 INJECTION INTRAMUSCULAR EVERY 4 HOURS PRN
Status: DISCONTINUED | OUTPATIENT
Start: 2025-08-05 | End: 2025-08-07

## 2025-08-05 RX ORDER — LORAZEPAM 2 MG/ML
INJECTION INTRAMUSCULAR
Status: COMPLETED
Start: 2025-08-05 | End: 2025-08-05

## 2025-08-05 RX ORDER — TRAZODONE HYDROCHLORIDE 50 MG/1
50 TABLET ORAL NIGHTLY PRN
Status: DISCONTINUED | OUTPATIENT
Start: 2025-08-05 | End: 2025-08-13

## 2025-08-05 RX ORDER — AMLODIPINE BESYLATE 5 MG/1
5 TABLET ORAL DAILY
Status: DISCONTINUED | OUTPATIENT
Start: 2025-08-05 | End: 2025-08-05

## 2025-08-05 RX ORDER — LORAZEPAM 2 MG/ML
2 INJECTION INTRAMUSCULAR ONCE
Status: COMPLETED | OUTPATIENT
Start: 2025-08-05 | End: 2025-08-05

## 2025-08-05 RX ORDER — HALOPERIDOL 5 MG/ML
5 INJECTION INTRAMUSCULAR ONCE
Status: COMPLETED | OUTPATIENT
Start: 2025-08-05 | End: 2025-08-05

## 2025-08-05 RX ORDER — BUPROPION HYDROCHLORIDE 150 MG/1
150 TABLET ORAL EVERY MORNING
Status: ON HOLD | COMMUNITY
End: 2025-08-13 | Stop reason: HOSPADM

## 2025-08-05 RX ORDER — AMLODIPINE BESYLATE 5 MG/1
5 TABLET ORAL DAILY
Status: DISCONTINUED | OUTPATIENT
Start: 2025-08-05 | End: 2025-08-06

## 2025-08-05 RX ORDER — HALOPERIDOL 5 MG/ML
5 INJECTION INTRAMUSCULAR EVERY 4 HOURS PRN
Status: DISCONTINUED | OUTPATIENT
Start: 2025-08-05 | End: 2025-08-14 | Stop reason: HOSPADM

## 2025-08-05 RX ORDER — DIPHENHYDRAMINE HYDROCHLORIDE 50 MG/ML
50 INJECTION, SOLUTION INTRAMUSCULAR; INTRAVENOUS ONCE
Status: COMPLETED | OUTPATIENT
Start: 2025-08-05 | End: 2025-08-05

## 2025-08-05 RX ORDER — DIPHENHYDRAMINE HYDROCHLORIDE 50 MG/ML
50 INJECTION, SOLUTION INTRAMUSCULAR; INTRAVENOUS EVERY 4 HOURS PRN
Status: DISCONTINUED | OUTPATIENT
Start: 2025-08-05 | End: 2025-08-14 | Stop reason: HOSPADM

## 2025-08-05 RX ORDER — HALOPERIDOL 5 MG/ML
INJECTION INTRAMUSCULAR
Status: COMPLETED
Start: 2025-08-05 | End: 2025-08-05

## 2025-08-05 RX ADMIN — ACETAMINOPHEN 650 MG: 325 TABLET ORAL at 11:02

## 2025-08-05 RX ADMIN — LORAZEPAM 2 MG: 2 INJECTION INTRAMUSCULAR; INTRAVENOUS at 02:35

## 2025-08-05 RX ADMIN — MIDAZOLAM 2 MG: 1 INJECTION INTRAMUSCULAR; INTRAVENOUS at 02:46

## 2025-08-05 RX ADMIN — DIVALPROEX SODIUM 750 MG: 500 TABLET, FILM COATED, EXTENDED RELEASE ORAL at 21:56

## 2025-08-05 RX ADMIN — HALOPERIDOL LACTATE 5 MG: 5 INJECTION, SOLUTION INTRAMUSCULAR at 12:08

## 2025-08-05 RX ADMIN — PRAZOSIN HYDROCHLORIDE 1 MG: 1 CAPSULE ORAL at 21:57

## 2025-08-05 RX ADMIN — DIPHENHYDRAMINE HYDROCHLORIDE 50 MG: 50 INJECTION, SOLUTION INTRAMUSCULAR; INTRAVENOUS at 12:08

## 2025-08-05 RX ADMIN — AMLODIPINE BESYLATE 5 MG: 5 TABLET ORAL at 17:12

## 2025-08-05 RX ADMIN — DIPHENHYDRAMINE HYDROCHLORIDE 50 MG: 50 INJECTION, SOLUTION INTRAMUSCULAR; INTRAVENOUS at 02:35

## 2025-08-05 RX ADMIN — HALOPERIDOL 5 MG: 5 INJECTION INTRAMUSCULAR at 02:35

## 2025-08-05 RX ADMIN — LORAZEPAM 2 MG: 2 INJECTION INTRAMUSCULAR at 02:35

## 2025-08-05 RX ADMIN — HALOPERIDOL LACTATE 5 MG: 5 INJECTION, SOLUTION INTRAMUSCULAR at 02:35

## 2025-08-05 RX ADMIN — DIVALPROEX SODIUM 750 MG: 500 TABLET, FILM COATED, EXTENDED RELEASE ORAL at 17:12

## 2025-08-05 RX ADMIN — HYDROXYZINE HYDROCHLORIDE 50 MG: 50 TABLET ORAL at 21:56

## 2025-08-05 RX ADMIN — HALOPERIDOL 5 MG: 5 TABLET ORAL at 17:13

## 2025-08-05 RX ADMIN — Medication 2 MG: at 12:08

## 2025-08-05 RX ADMIN — TRAZODONE HYDROCHLORIDE 50 MG: 50 TABLET ORAL at 21:57

## 2025-08-05 RX ADMIN — HALOPERIDOL 5 MG: 5 TABLET ORAL at 21:56

## 2025-08-05 ASSESSMENT — PAIN DESCRIPTION - LOCATION: LOCATION: OTHER (COMMENT)

## 2025-08-05 ASSESSMENT — PAIN DESCRIPTION - DESCRIPTORS: DESCRIPTORS: ACHING

## 2025-08-05 ASSESSMENT — PAIN SCALES - GENERAL
PAINLEVEL_OUTOF10: 0
PAINLEVEL_OUTOF10: 9
PAINLEVEL_OUTOF10: 5

## 2025-08-05 ASSESSMENT — PAIN SCALES - WONG BAKER: WONGBAKER_NUMERICALRESPONSE: NO HURT

## 2025-08-05 ASSESSMENT — LIFESTYLE VARIABLES
HOW MANY STANDARD DRINKS CONTAINING ALCOHOL DO YOU HAVE ON A TYPICAL DAY: PATIENT DECLINED
HOW MANY STANDARD DRINKS CONTAINING ALCOHOL DO YOU HAVE ON A TYPICAL DAY: PATIENT UNABLE TO ANSWER
HOW OFTEN DO YOU HAVE A DRINK CONTAINING ALCOHOL: PATIENT DECLINED
HOW OFTEN DO YOU HAVE A DRINK CONTAINING ALCOHOL: PATIENT UNABLE TO ANSWER

## 2025-08-06 LAB
CHOLEST SERPL-MCNC: 111 MG/DL (ref 0–199)
CHOLESTEROL/HDL RATIO: 2.2
EKG ATRIAL RATE: 62 BPM
EKG P AXIS: 33 DEGREES
EKG P-R INTERVAL: 152 MS
EKG Q-T INTERVAL: 392 MS
EKG QRS DURATION: 100 MS
EKG QTC CALCULATION (BAZETT): 397 MS
EKG R AXIS: 39 DEGREES
EKG T AXIS: 61 DEGREES
EKG VENTRICULAR RATE: 62 BPM
HDLC SERPL-MCNC: 50 MG/DL
LDLC SERPL CALC-MCNC: 43 MG/DL (ref 0–100)
TRIGL SERPL-MCNC: 92 MG/DL (ref 0–149)

## 2025-08-06 PROCEDURE — 93010 ELECTROCARDIOGRAM REPORT: CPT | Performed by: INTERNAL MEDICINE

## 2025-08-06 PROCEDURE — 36415 COLL VENOUS BLD VENIPUNCTURE: CPT

## 2025-08-06 PROCEDURE — APPSS30 APP SPLIT SHARED TIME 16-30 MINUTES: Performed by: NURSE PRACTITIONER

## 2025-08-06 PROCEDURE — 6360000002 HC RX W HCPCS: Performed by: PSYCHIATRY & NEUROLOGY

## 2025-08-06 PROCEDURE — 6370000000 HC RX 637 (ALT 250 FOR IP): Performed by: PSYCHIATRY & NEUROLOGY

## 2025-08-06 PROCEDURE — 99232 SBSQ HOSP IP/OBS MODERATE 35: CPT | Performed by: PSYCHIATRY & NEUROLOGY

## 2025-08-06 PROCEDURE — 2040000000 HC PSYCH ICU R&B

## 2025-08-06 PROCEDURE — 80061 LIPID PANEL: CPT

## 2025-08-06 PROCEDURE — 99232 SBSQ HOSP IP/OBS MODERATE 35: CPT | Performed by: INTERNAL MEDICINE

## 2025-08-06 PROCEDURE — 6370000000 HC RX 637 (ALT 250 FOR IP): Performed by: INTERNAL MEDICINE

## 2025-08-06 RX ORDER — AMLODIPINE BESYLATE 10 MG/1
10 TABLET ORAL DAILY
Status: DISCONTINUED | OUTPATIENT
Start: 2025-08-07 | End: 2025-08-14 | Stop reason: HOSPADM

## 2025-08-06 RX ORDER — AMLODIPINE BESYLATE 5 MG/1
5 TABLET ORAL ONCE
Status: COMPLETED | OUTPATIENT
Start: 2025-08-06 | End: 2025-08-06

## 2025-08-06 RX ORDER — HALOPERIDOL 5 MG/1
7.5 TABLET ORAL 2 TIMES DAILY
Status: DISCONTINUED | OUTPATIENT
Start: 2025-08-06 | End: 2025-08-07

## 2025-08-06 RX ADMIN — IBUPROFEN 400 MG: 400 TABLET ORAL at 05:38

## 2025-08-06 RX ADMIN — AMLODIPINE BESYLATE 5 MG: 5 TABLET ORAL at 07:45

## 2025-08-06 RX ADMIN — HALOPERIDOL 5 MG: 5 TABLET ORAL at 07:46

## 2025-08-06 RX ADMIN — HALOPERIDOL 5 MG: 5 TABLET ORAL at 15:25

## 2025-08-06 RX ADMIN — DIPHENHYDRAMINE HYDROCHLORIDE 50 MG: 50 INJECTION, SOLUTION INTRAMUSCULAR; INTRAVENOUS at 08:20

## 2025-08-06 RX ADMIN — LORAZEPAM 2 MG: 1 TABLET ORAL at 15:25

## 2025-08-06 RX ADMIN — HALOPERIDOL 5 MG: 5 TABLET ORAL at 23:54

## 2025-08-06 RX ADMIN — LORAZEPAM 2 MG: 1 TABLET ORAL at 23:55

## 2025-08-06 RX ADMIN — DIVALPROEX SODIUM 750 MG: 500 TABLET, FILM COATED, EXTENDED RELEASE ORAL at 22:32

## 2025-08-06 RX ADMIN — HYDROXYZINE HYDROCHLORIDE 50 MG: 50 TABLET ORAL at 15:25

## 2025-08-06 RX ADMIN — HALOPERIDOL LACTATE 5 MG: 5 INJECTION, SOLUTION INTRAMUSCULAR at 08:20

## 2025-08-06 RX ADMIN — DIVALPROEX SODIUM 750 MG: 500 TABLET, FILM COATED, EXTENDED RELEASE ORAL at 07:46

## 2025-08-06 RX ADMIN — Medication 2 MG: at 08:20

## 2025-08-06 RX ADMIN — AMLODIPINE BESYLATE 5 MG: 5 TABLET ORAL at 11:00

## 2025-08-06 RX ADMIN — PRAZOSIN HYDROCHLORIDE 1 MG: 1 CAPSULE ORAL at 23:15

## 2025-08-06 RX ADMIN — HALOPERIDOL 7.5 MG: 5 TABLET ORAL at 22:32

## 2025-08-06 ASSESSMENT — PAIN DESCRIPTION - DESCRIPTORS: DESCRIPTORS: ACHING;DISCOMFORT

## 2025-08-06 ASSESSMENT — PAIN SCALES - GENERAL
PAINLEVEL_OUTOF10: 0
PAINLEVEL_OUTOF10: 6

## 2025-08-06 ASSESSMENT — PAIN DESCRIPTION - LOCATION: LOCATION: GENERALIZED

## 2025-08-06 ASSESSMENT — PAIN - FUNCTIONAL ASSESSMENT: PAIN_FUNCTIONAL_ASSESSMENT: ACTIVITIES ARE NOT PREVENTED

## 2025-08-07 PROCEDURE — 6370000000 HC RX 637 (ALT 250 FOR IP): Performed by: PSYCHIATRY & NEUROLOGY

## 2025-08-07 PROCEDURE — 6360000002 HC RX W HCPCS: Performed by: PSYCHIATRY & NEUROLOGY

## 2025-08-07 PROCEDURE — 99232 SBSQ HOSP IP/OBS MODERATE 35: CPT | Performed by: PSYCHIATRY & NEUROLOGY

## 2025-08-07 PROCEDURE — 2040000000 HC PSYCH ICU R&B

## 2025-08-07 PROCEDURE — 6370000000 HC RX 637 (ALT 250 FOR IP): Performed by: INTERNAL MEDICINE

## 2025-08-07 PROCEDURE — 99231 SBSQ HOSP IP/OBS SF/LOW 25: CPT | Performed by: INTERNAL MEDICINE

## 2025-08-07 PROCEDURE — APPSS30 APP SPLIT SHARED TIME 16-30 MINUTES

## 2025-08-07 RX ORDER — RISPERIDONE 1 MG/1
1 TABLET ORAL 2 TIMES DAILY
Status: DISCONTINUED | OUTPATIENT
Start: 2025-08-07 | End: 2025-08-10

## 2025-08-07 RX ADMIN — PRAZOSIN HYDROCHLORIDE 1 MG: 1 CAPSULE ORAL at 22:44

## 2025-08-07 RX ADMIN — RISPERIDONE 250 MG: 250 INJECTION, SUSPENSION, EXTENDED RELEASE SUBCUTANEOUS at 17:37

## 2025-08-07 RX ADMIN — DIVALPROEX SODIUM 750 MG: 500 TABLET, FILM COATED, EXTENDED RELEASE ORAL at 08:55

## 2025-08-07 RX ADMIN — HALOPERIDOL 7.5 MG: 5 TABLET ORAL at 08:54

## 2025-08-07 RX ADMIN — ACETAMINOPHEN 650 MG: 325 TABLET ORAL at 22:53

## 2025-08-07 RX ADMIN — HYDROXYZINE HYDROCHLORIDE 50 MG: 50 TABLET ORAL at 22:45

## 2025-08-07 RX ADMIN — HALOPERIDOL 5 MG: 5 TABLET ORAL at 23:28

## 2025-08-07 RX ADMIN — AMLODIPINE BESYLATE 10 MG: 10 TABLET ORAL at 08:55

## 2025-08-07 RX ADMIN — TRAZODONE HYDROCHLORIDE 50 MG: 50 TABLET ORAL at 22:45

## 2025-08-07 RX ADMIN — DIVALPROEX SODIUM 750 MG: 500 TABLET, FILM COATED, EXTENDED RELEASE ORAL at 22:44

## 2025-08-07 RX ADMIN — RISPERIDONE 1 MG: 1 TABLET, FILM COATED ORAL at 22:45

## 2025-08-07 ASSESSMENT — PAIN SCALES - GENERAL
PAINLEVEL_OUTOF10: 3
PAINLEVEL_OUTOF10: 0

## 2025-08-07 ASSESSMENT — PAIN DESCRIPTION - LOCATION: LOCATION: BACK

## 2025-08-08 PROCEDURE — 6370000000 HC RX 637 (ALT 250 FOR IP): Performed by: INTERNAL MEDICINE

## 2025-08-08 PROCEDURE — 2040000000 HC PSYCH ICU R&B

## 2025-08-08 PROCEDURE — APPSS30 APP SPLIT SHARED TIME 16-30 MINUTES

## 2025-08-08 PROCEDURE — 6370000000 HC RX 637 (ALT 250 FOR IP): Performed by: PSYCHIATRY & NEUROLOGY

## 2025-08-08 PROCEDURE — 99231 SBSQ HOSP IP/OBS SF/LOW 25: CPT | Performed by: INTERNAL MEDICINE

## 2025-08-08 PROCEDURE — 99232 SBSQ HOSP IP/OBS MODERATE 35: CPT | Performed by: PSYCHIATRY & NEUROLOGY

## 2025-08-08 RX ADMIN — AMLODIPINE BESYLATE 10 MG: 10 TABLET ORAL at 08:45

## 2025-08-08 RX ADMIN — DIVALPROEX SODIUM 750 MG: 500 TABLET, FILM COATED, EXTENDED RELEASE ORAL at 20:36

## 2025-08-08 RX ADMIN — RISPERIDONE 1 MG: 1 TABLET, FILM COATED ORAL at 08:45

## 2025-08-08 RX ADMIN — RISPERIDONE 1 MG: 1 TABLET, FILM COATED ORAL at 20:36

## 2025-08-08 RX ADMIN — TRAZODONE HYDROCHLORIDE 50 MG: 50 TABLET ORAL at 20:37

## 2025-08-08 RX ADMIN — PRAZOSIN HYDROCHLORIDE 1 MG: 1 CAPSULE ORAL at 20:37

## 2025-08-08 RX ADMIN — DIVALPROEX SODIUM 750 MG: 500 TABLET, FILM COATED, EXTENDED RELEASE ORAL at 08:44

## 2025-08-08 RX ADMIN — IBUPROFEN 400 MG: 400 TABLET ORAL at 08:44

## 2025-08-08 RX ADMIN — HYDROXYZINE HYDROCHLORIDE 50 MG: 50 TABLET ORAL at 20:37

## 2025-08-09 ENCOUNTER — APPOINTMENT (OUTPATIENT)
Dept: CT IMAGING | Age: 45
DRG: 885 | End: 2025-08-09
Attending: PSYCHIATRY & NEUROLOGY
Payer: MEDICARE

## 2025-08-09 LAB
ALBUMIN SERPL-MCNC: 4.1 G/DL (ref 3.5–5.2)
ALBUMIN SERPL-MCNC: 4.1 G/DL (ref 3.5–5.2)
ALP SERPL-CCNC: 88 U/L (ref 35–104)
ALP SERPL-CCNC: 88 U/L (ref 35–104)
ALT SERPL-CCNC: 10 U/L (ref 10–35)
ALT SERPL-CCNC: 11 U/L (ref 10–35)
ANION GAP SERPL CALCULATED.3IONS-SCNC: 14 MMOL/L (ref 9–16)
ANION GAP SERPL CALCULATED.3IONS-SCNC: 15 MMOL/L (ref 9–16)
AST SERPL-CCNC: 17 U/L (ref 10–35)
AST SERPL-CCNC: 19 U/L (ref 10–35)
BASOPHILS # BLD: 0.04 K/UL (ref 0–0.2)
BASOPHILS # BLD: <0.03 K/UL (ref 0–0.2)
BASOPHILS NFR BLD: 0 % (ref 0–2)
BASOPHILS NFR BLD: 1 % (ref 0–2)
BILIRUB SERPL-MCNC: <0.2 MG/DL (ref 0–1.2)
BILIRUB SERPL-MCNC: <0.2 MG/DL (ref 0–1.2)
BUN SERPL-MCNC: 18 MG/DL (ref 6–20)
BUN SERPL-MCNC: 18 MG/DL (ref 6–20)
CALCIUM SERPL-MCNC: 9.8 MG/DL (ref 8.6–10.4)
CALCIUM SERPL-MCNC: 9.9 MG/DL (ref 8.6–10.4)
CHLORIDE SERPL-SCNC: 104 MMOL/L (ref 98–107)
CHLORIDE SERPL-SCNC: 105 MMOL/L (ref 98–107)
CO2 SERPL-SCNC: 23 MMOL/L (ref 20–31)
CO2 SERPL-SCNC: 24 MMOL/L (ref 20–31)
CREAT SERPL-MCNC: 0.6 MG/DL (ref 0.7–1.2)
CREAT SERPL-MCNC: 0.7 MG/DL (ref 0.7–1.2)
DATE LAST DOSE: NORMAL
EOSINOPHIL # BLD: 0.32 K/UL (ref 0–0.44)
EOSINOPHIL # BLD: 0.33 K/UL (ref 0–0.44)
EOSINOPHILS RELATIVE PERCENT: 6 % (ref 0–4)
EOSINOPHILS RELATIVE PERCENT: 6 % (ref 0–4)
ERYTHROCYTE [DISTWIDTH] IN BLOOD BY AUTOMATED COUNT: 13.9 % (ref 11.5–14.9)
ERYTHROCYTE [DISTWIDTH] IN BLOOD BY AUTOMATED COUNT: 14 % (ref 11.5–14.9)
GFR, ESTIMATED: >90 ML/MIN/1.73M2
GFR, ESTIMATED: >90 ML/MIN/1.73M2
GLUCOSE SERPL-MCNC: 110 MG/DL (ref 74–99)
GLUCOSE SERPL-MCNC: 92 MG/DL (ref 74–99)
HCG UR QL: NEGATIVE
HCT VFR BLD AUTO: 43 % (ref 36–46)
HCT VFR BLD AUTO: 43.9 % (ref 36–46)
HGB BLD-MCNC: 13.7 G/DL (ref 12–16)
HGB BLD-MCNC: 13.9 G/DL (ref 12–16)
IMM GRANULOCYTES # BLD AUTO: <0.03 K/UL (ref 0–0.3)
IMM GRANULOCYTES # BLD AUTO: <0.03 K/UL (ref 0–0.3)
IMM GRANULOCYTES NFR BLD: 0 %
IMM GRANULOCYTES NFR BLD: 0 %
LYMPHOCYTES NFR BLD: 2.6 K/UL (ref 1.1–3.7)
LYMPHOCYTES NFR BLD: 2.97 K/UL (ref 1.1–3.7)
LYMPHOCYTES RELATIVE PERCENT: 47 % (ref 24–44)
LYMPHOCYTES RELATIVE PERCENT: 50 % (ref 24–44)
MCH RBC QN AUTO: 26.8 PG (ref 26–34)
MCH RBC QN AUTO: 27.7 PG (ref 26–34)
MCHC RBC AUTO-ENTMCNC: 31.2 G/DL (ref 31–37)
MCHC RBC AUTO-ENTMCNC: 32.3 G/DL (ref 31–37)
MCV RBC AUTO: 85.7 FL (ref 80–100)
MCV RBC AUTO: 85.7 FL (ref 80–100)
MONOCYTES NFR BLD: 0.35 K/UL (ref 0.1–1.2)
MONOCYTES NFR BLD: 0.39 K/UL (ref 0.1–1.2)
MONOCYTES NFR BLD: 6 % (ref 3–12)
MONOCYTES NFR BLD: 7 % (ref 3–12)
NEUTROPHILS NFR BLD: 37 % (ref 36–66)
NEUTROPHILS NFR BLD: 40 % (ref 36–66)
NEUTS SEG NFR BLD: 2.2 K/UL (ref 1.5–8.1)
NEUTS SEG NFR BLD: 2.25 K/UL (ref 1.5–8.1)
NRBC BLD-RTO: 0 PER 100 WBC
NRBC BLD-RTO: 0 PER 100 WBC
PLATELET # BLD AUTO: 260 K/UL (ref 150–450)
PLATELET # BLD AUTO: 273 K/UL (ref 150–450)
PMV BLD AUTO: 10.9 FL (ref 8–13.5)
PMV BLD AUTO: 11.2 FL (ref 8–13.5)
POTASSIUM SERPL-SCNC: 4.1 MMOL/L (ref 3.7–5.3)
POTASSIUM SERPL-SCNC: 4.1 MMOL/L (ref 3.7–5.3)
PROT SERPL-MCNC: 6.4 G/DL (ref 6.6–8.7)
PROT SERPL-MCNC: 6.5 G/DL (ref 6.6–8.7)
RBC # BLD AUTO: 5.02 M/UL (ref 3.95–5.11)
RBC # BLD AUTO: 5.12 M/UL (ref 3.95–5.11)
SODIUM SERPL-SCNC: 142 MMOL/L (ref 136–145)
SODIUM SERPL-SCNC: 143 MMOL/L (ref 136–145)
TME LAST DOSE: NORMAL H
VALPROATE SERPL-MCNC: 66 UG/ML (ref 50–125)
VANCOMYCIN DOSE: NORMAL MG
WBC OTHER # BLD: 5.6 K/UL (ref 3.5–11)
WBC OTHER # BLD: 5.9 K/UL (ref 3.5–11)

## 2025-08-09 PROCEDURE — 80053 COMPREHEN METABOLIC PANEL: CPT

## 2025-08-09 PROCEDURE — 6370000000 HC RX 637 (ALT 250 FOR IP): Performed by: PSYCHIATRY & NEUROLOGY

## 2025-08-09 PROCEDURE — 85025 COMPLETE CBC W/AUTO DIFF WBC: CPT

## 2025-08-09 PROCEDURE — 81025 URINE PREGNANCY TEST: CPT

## 2025-08-09 PROCEDURE — 70450 CT HEAD/BRAIN W/O DYE: CPT

## 2025-08-09 PROCEDURE — 2040000000 HC PSYCH ICU R&B

## 2025-08-09 PROCEDURE — 6370000000 HC RX 637 (ALT 250 FOR IP): Performed by: INTERNAL MEDICINE

## 2025-08-09 PROCEDURE — 99233 SBSQ HOSP IP/OBS HIGH 50: CPT | Performed by: INTERNAL MEDICINE

## 2025-08-09 PROCEDURE — 99232 SBSQ HOSP IP/OBS MODERATE 35: CPT

## 2025-08-09 PROCEDURE — 80164 ASSAY DIPROPYLACETIC ACD TOT: CPT

## 2025-08-09 PROCEDURE — 36415 COLL VENOUS BLD VENIPUNCTURE: CPT

## 2025-08-09 RX ORDER — POLYETHYLENE GLYCOL 3350 17 G
2 POWDER IN PACKET (EA) ORAL
Status: DISCONTINUED | OUTPATIENT
Start: 2025-08-09 | End: 2025-08-14 | Stop reason: HOSPADM

## 2025-08-09 RX ADMIN — DIVALPROEX SODIUM 750 MG: 500 TABLET, FILM COATED, EXTENDED RELEASE ORAL at 09:52

## 2025-08-09 RX ADMIN — NICOTINE POLACRILEX 2 MG: 2 LOZENGE ORAL at 13:05

## 2025-08-09 RX ADMIN — IBUPROFEN 400 MG: 400 TABLET ORAL at 04:39

## 2025-08-09 RX ADMIN — HALOPERIDOL 5 MG: 5 TABLET ORAL at 23:01

## 2025-08-09 RX ADMIN — IBUPROFEN 400 MG: 400 TABLET ORAL at 18:21

## 2025-08-09 RX ADMIN — IBUPROFEN 400 MG: 400 TABLET ORAL at 12:16

## 2025-08-09 RX ADMIN — AMLODIPINE BESYLATE 10 MG: 10 TABLET ORAL at 10:00

## 2025-08-09 RX ADMIN — HYDROXYZINE HYDROCHLORIDE 50 MG: 50 TABLET ORAL at 20:33

## 2025-08-09 RX ADMIN — ACETAMINOPHEN 650 MG: 325 TABLET ORAL at 09:52

## 2025-08-09 RX ADMIN — HYDROXYZINE HYDROCHLORIDE 50 MG: 50 TABLET ORAL at 12:16

## 2025-08-09 RX ADMIN — HALOPERIDOL 5 MG: 5 TABLET ORAL at 13:29

## 2025-08-09 RX ADMIN — PRAZOSIN HYDROCHLORIDE 1 MG: 1 CAPSULE ORAL at 20:34

## 2025-08-09 RX ADMIN — DIVALPROEX SODIUM 750 MG: 500 TABLET, FILM COATED, EXTENDED RELEASE ORAL at 20:34

## 2025-08-09 RX ADMIN — RISPERIDONE 1 MG: 1 TABLET, FILM COATED ORAL at 20:34

## 2025-08-09 RX ADMIN — TRAZODONE HYDROCHLORIDE 50 MG: 50 TABLET ORAL at 20:34

## 2025-08-09 RX ADMIN — RISPERIDONE 1 MG: 1 TABLET, FILM COATED ORAL at 09:52

## 2025-08-09 ASSESSMENT — PAIN SCALES - GENERAL
PAINLEVEL_OUTOF10: 5
PAINLEVEL_OUTOF10: 3
PAINLEVEL_OUTOF10: 8
PAINLEVEL_OUTOF10: 8

## 2025-08-09 ASSESSMENT — PAIN DESCRIPTION - DESCRIPTORS
DESCRIPTORS: ACHING
DESCRIPTORS: ACHING;TIGHTNESS

## 2025-08-09 ASSESSMENT — PAIN DESCRIPTION - LOCATION
LOCATION: HEAD
LOCATION: BACK

## 2025-08-09 ASSESSMENT — PAIN - FUNCTIONAL ASSESSMENT
PAIN_FUNCTIONAL_ASSESSMENT: 0-10
PAIN_FUNCTIONAL_ASSESSMENT: 0-10
PAIN_FUNCTIONAL_ASSESSMENT: ACTIVITIES ARE NOT PREVENTED
PAIN_FUNCTIONAL_ASSESSMENT: 0-10

## 2025-08-09 ASSESSMENT — PAIN DESCRIPTION - ORIENTATION
ORIENTATION: LOWER
ORIENTATION: RIGHT

## 2025-08-10 PROCEDURE — 99232 SBSQ HOSP IP/OBS MODERATE 35: CPT

## 2025-08-10 PROCEDURE — 6370000000 HC RX 637 (ALT 250 FOR IP): Performed by: PSYCHIATRY & NEUROLOGY

## 2025-08-10 PROCEDURE — 99231 SBSQ HOSP IP/OBS SF/LOW 25: CPT | Performed by: INTERNAL MEDICINE

## 2025-08-10 PROCEDURE — 6370000000 HC RX 637 (ALT 250 FOR IP)

## 2025-08-10 PROCEDURE — 6370000000 HC RX 637 (ALT 250 FOR IP): Performed by: INTERNAL MEDICINE

## 2025-08-10 PROCEDURE — 2040000000 HC PSYCH ICU R&B

## 2025-08-10 RX ORDER — PROPRANOLOL HCL 20 MG
10 TABLET ORAL 2 TIMES DAILY
Status: DISCONTINUED | OUTPATIENT
Start: 2025-08-10 | End: 2025-08-12

## 2025-08-10 RX ADMIN — DIVALPROEX SODIUM 750 MG: 500 TABLET, FILM COATED, EXTENDED RELEASE ORAL at 20:51

## 2025-08-10 RX ADMIN — HYDROXYZINE HYDROCHLORIDE 50 MG: 50 TABLET ORAL at 21:54

## 2025-08-10 RX ADMIN — NICOTINE POLACRILEX 2 MG: 2 LOZENGE ORAL at 20:27

## 2025-08-10 RX ADMIN — POLYETHYLENE GLYCOL 3350 17 G: 17 POWDER, FOR SOLUTION ORAL at 18:37

## 2025-08-10 RX ADMIN — NICOTINE POLACRILEX 2 MG: 2 LOZENGE ORAL at 16:40

## 2025-08-10 RX ADMIN — AMLODIPINE BESYLATE 10 MG: 10 TABLET ORAL at 10:03

## 2025-08-10 RX ADMIN — IBUPROFEN 400 MG: 400 TABLET ORAL at 20:51

## 2025-08-10 RX ADMIN — PROPRANOLOL HYDROCHLORIDE 10 MG: 20 TABLET ORAL at 11:21

## 2025-08-10 RX ADMIN — NICOTINE POLACRILEX 2 MG: 2 LOZENGE ORAL at 10:30

## 2025-08-10 RX ADMIN — TRAZODONE HYDROCHLORIDE 50 MG: 50 TABLET ORAL at 20:52

## 2025-08-10 RX ADMIN — RISPERIDONE 1 MG: 1 TABLET, FILM COATED ORAL at 10:03

## 2025-08-10 RX ADMIN — PROPRANOLOL HYDROCHLORIDE 10 MG: 20 TABLET ORAL at 20:51

## 2025-08-10 RX ADMIN — ACETAMINOPHEN 650 MG: 325 TABLET ORAL at 10:03

## 2025-08-10 RX ADMIN — DIVALPROEX SODIUM 750 MG: 500 TABLET, FILM COATED, EXTENDED RELEASE ORAL at 10:03

## 2025-08-10 RX ADMIN — PRAZOSIN HYDROCHLORIDE 1 MG: 1 CAPSULE ORAL at 20:52

## 2025-08-10 RX ADMIN — HYDROXYZINE HYDROCHLORIDE 50 MG: 50 TABLET ORAL at 17:13

## 2025-08-10 ASSESSMENT — PAIN DESCRIPTION - LOCATION
LOCATION: GENERALIZED;BACK;NECK
LOCATION: BACK

## 2025-08-10 ASSESSMENT — PAIN DESCRIPTION - ORIENTATION
ORIENTATION: MID;POSTERIOR;LOWER
ORIENTATION: LOWER;MID

## 2025-08-10 ASSESSMENT — PAIN DESCRIPTION - DESCRIPTORS: DESCRIPTORS: ACHING;DISCOMFORT

## 2025-08-10 ASSESSMENT — PAIN SCALES - GENERAL
PAINLEVEL_OUTOF10: 6
PAINLEVEL_OUTOF10: 0
PAINLEVEL_OUTOF10: 7
PAINLEVEL_OUTOF10: 3

## 2025-08-11 PROCEDURE — 6370000000 HC RX 637 (ALT 250 FOR IP)

## 2025-08-11 PROCEDURE — 6370000000 HC RX 637 (ALT 250 FOR IP): Performed by: INTERNAL MEDICINE

## 2025-08-11 PROCEDURE — 99231 SBSQ HOSP IP/OBS SF/LOW 25: CPT | Performed by: INTERNAL MEDICINE

## 2025-08-11 PROCEDURE — 6370000000 HC RX 637 (ALT 250 FOR IP): Performed by: PSYCHIATRY & NEUROLOGY

## 2025-08-11 PROCEDURE — 99232 SBSQ HOSP IP/OBS MODERATE 35: CPT | Performed by: PSYCHIATRY & NEUROLOGY

## 2025-08-11 PROCEDURE — 2040000000 HC PSYCH ICU R&B

## 2025-08-11 RX ADMIN — DIVALPROEX SODIUM 750 MG: 500 TABLET, FILM COATED, EXTENDED RELEASE ORAL at 08:10

## 2025-08-11 RX ADMIN — DIVALPROEX SODIUM 750 MG: 500 TABLET, FILM COATED, EXTENDED RELEASE ORAL at 20:38

## 2025-08-11 RX ADMIN — NICOTINE POLACRILEX 2 MG: 2 LOZENGE ORAL at 19:25

## 2025-08-11 RX ADMIN — PROPRANOLOL HYDROCHLORIDE 10 MG: 20 TABLET ORAL at 08:10

## 2025-08-11 RX ADMIN — HYDROXYZINE HYDROCHLORIDE 50 MG: 50 TABLET ORAL at 12:26

## 2025-08-11 RX ADMIN — HALOPERIDOL 5 MG: 5 TABLET ORAL at 22:00

## 2025-08-11 RX ADMIN — HYDROXYZINE HYDROCHLORIDE 50 MG: 50 TABLET ORAL at 20:38

## 2025-08-11 RX ADMIN — PROPRANOLOL HYDROCHLORIDE 10 MG: 20 TABLET ORAL at 20:39

## 2025-08-11 RX ADMIN — AMLODIPINE BESYLATE 10 MG: 10 TABLET ORAL at 08:10

## 2025-08-11 RX ADMIN — NICOTINE POLACRILEX 2 MG: 2 LOZENGE ORAL at 11:31

## 2025-08-11 RX ADMIN — PRAZOSIN HYDROCHLORIDE 1 MG: 1 CAPSULE ORAL at 20:39

## 2025-08-11 RX ADMIN — NICOTINE POLACRILEX 2 MG: 2 LOZENGE ORAL at 14:19

## 2025-08-11 RX ADMIN — TRAZODONE HYDROCHLORIDE 50 MG: 50 TABLET ORAL at 20:39

## 2025-08-11 RX ADMIN — IBUPROFEN 400 MG: 400 TABLET ORAL at 15:02

## 2025-08-11 ASSESSMENT — PAIN DESCRIPTION - LOCATION: LOCATION: HEAD

## 2025-08-11 ASSESSMENT — PAIN SCALES - GENERAL: PAINLEVEL_OUTOF10: 8

## 2025-08-11 ASSESSMENT — PAIN DESCRIPTION - DESCRIPTORS: DESCRIPTORS: ACHING

## 2025-08-11 ASSESSMENT — PAIN - FUNCTIONAL ASSESSMENT: PAIN_FUNCTIONAL_ASSESSMENT: 0-10

## 2025-08-12 PROCEDURE — 6370000000 HC RX 637 (ALT 250 FOR IP): Performed by: PSYCHIATRY & NEUROLOGY

## 2025-08-12 PROCEDURE — 99232 SBSQ HOSP IP/OBS MODERATE 35: CPT | Performed by: PSYCHIATRY & NEUROLOGY

## 2025-08-12 PROCEDURE — 99232 SBSQ HOSP IP/OBS MODERATE 35: CPT | Performed by: INTERNAL MEDICINE

## 2025-08-12 PROCEDURE — 6370000000 HC RX 637 (ALT 250 FOR IP): Performed by: INTERNAL MEDICINE

## 2025-08-12 PROCEDURE — 1240000000 HC EMOTIONAL WELLNESS R&B

## 2025-08-12 PROCEDURE — 6370000000 HC RX 637 (ALT 250 FOR IP)

## 2025-08-12 RX ORDER — PROPRANOLOL HCL 20 MG
10 TABLET ORAL 3 TIMES DAILY
Status: DISCONTINUED | OUTPATIENT
Start: 2025-08-12 | End: 2025-08-14 | Stop reason: HOSPADM

## 2025-08-12 RX ADMIN — PROPRANOLOL HYDROCHLORIDE 10 MG: 20 TABLET ORAL at 08:06

## 2025-08-12 RX ADMIN — PRAZOSIN HYDROCHLORIDE 1 MG: 1 CAPSULE ORAL at 20:44

## 2025-08-12 RX ADMIN — PROPRANOLOL HYDROCHLORIDE 10 MG: 20 TABLET ORAL at 20:44

## 2025-08-12 RX ADMIN — ALUMINUM HYDROXIDE, MAGNESIUM HYDROXIDE, AND SIMETHICONE 30 ML: 200; 200; 20 SUSPENSION ORAL at 09:37

## 2025-08-12 RX ADMIN — TRAZODONE HYDROCHLORIDE 50 MG: 50 TABLET ORAL at 20:44

## 2025-08-12 RX ADMIN — HYDROXYZINE HYDROCHLORIDE 50 MG: 50 TABLET ORAL at 20:44

## 2025-08-12 RX ADMIN — HALOPERIDOL 5 MG: 5 TABLET ORAL at 23:15

## 2025-08-12 RX ADMIN — ACETAMINOPHEN 650 MG: 325 TABLET ORAL at 20:44

## 2025-08-12 RX ADMIN — DIVALPROEX SODIUM 750 MG: 500 TABLET, FILM COATED, EXTENDED RELEASE ORAL at 08:06

## 2025-08-12 RX ADMIN — NICOTINE POLACRILEX 2 MG: 2 LOZENGE ORAL at 13:47

## 2025-08-12 RX ADMIN — IBUPROFEN 400 MG: 400 TABLET ORAL at 07:03

## 2025-08-12 RX ADMIN — IBUPROFEN 400 MG: 400 TABLET ORAL at 13:47

## 2025-08-12 RX ADMIN — POLYETHYLENE GLYCOL 3350 17 G: 17 POWDER, FOR SOLUTION ORAL at 17:00

## 2025-08-12 RX ADMIN — DIVALPROEX SODIUM 750 MG: 500 TABLET, FILM COATED, EXTENDED RELEASE ORAL at 20:44

## 2025-08-12 RX ADMIN — IBUPROFEN 400 MG: 400 TABLET ORAL at 22:21

## 2025-08-12 RX ADMIN — AMLODIPINE BESYLATE 10 MG: 10 TABLET ORAL at 08:06

## 2025-08-12 ASSESSMENT — PAIN - FUNCTIONAL ASSESSMENT
PAIN_FUNCTIONAL_ASSESSMENT: 0-10

## 2025-08-12 ASSESSMENT — PAIN SCALES - GENERAL
PAINLEVEL_OUTOF10: 3
PAINLEVEL_OUTOF10: 5

## 2025-08-12 ASSESSMENT — PAIN DESCRIPTION - LOCATION
LOCATION: BACK
LOCATION: NECK

## 2025-08-12 ASSESSMENT — PAIN DESCRIPTION - DESCRIPTORS: DESCRIPTORS: ACHING

## 2025-08-13 LAB
HAV IGM SERPL QL IA: NONREACTIVE
HBV CORE IGM SERPL QL IA: NONREACTIVE
HBV SURFACE AG SERPL QL IA: NONREACTIVE
HCV AB SERPL QL IA: NONREACTIVE
HIV 1+2 AB+HIV1 P24 AG SERPL QL IA: NONREACTIVE

## 2025-08-13 PROCEDURE — 87389 HIV-1 AG W/HIV-1&-2 AB AG IA: CPT

## 2025-08-13 PROCEDURE — 6370000000 HC RX 637 (ALT 250 FOR IP): Performed by: PSYCHIATRY & NEUROLOGY

## 2025-08-13 PROCEDURE — 80074 ACUTE HEPATITIS PANEL: CPT

## 2025-08-13 PROCEDURE — 99232 SBSQ HOSP IP/OBS MODERATE 35: CPT | Performed by: PSYCHIATRY & NEUROLOGY

## 2025-08-13 PROCEDURE — 99231 SBSQ HOSP IP/OBS SF/LOW 25: CPT | Performed by: INTERNAL MEDICINE

## 2025-08-13 PROCEDURE — 1240000000 HC EMOTIONAL WELLNESS R&B

## 2025-08-13 PROCEDURE — 36415 COLL VENOUS BLD VENIPUNCTURE: CPT

## 2025-08-13 PROCEDURE — 6370000000 HC RX 637 (ALT 250 FOR IP): Performed by: INTERNAL MEDICINE

## 2025-08-13 RX ORDER — AMLODIPINE BESYLATE 10 MG/1
10 TABLET ORAL DAILY
Qty: 30 TABLET | Refills: 3 | Status: ON HOLD | OUTPATIENT
Start: 2025-08-14

## 2025-08-13 RX ORDER — PRAZOSIN HYDROCHLORIDE 1 MG/1
1 CAPSULE ORAL NIGHTLY
Qty: 30 CAPSULE | Refills: 0 | Status: ON HOLD | OUTPATIENT
Start: 2025-08-13

## 2025-08-13 RX ORDER — DIVALPROEX SODIUM 250 MG/1
750 TABLET, FILM COATED, EXTENDED RELEASE ORAL 2 TIMES DAILY
Qty: 180 TABLET | Refills: 3 | Status: ON HOLD | OUTPATIENT
Start: 2025-08-13

## 2025-08-13 RX ADMIN — ALUMINUM HYDROXIDE, MAGNESIUM HYDROXIDE, AND SIMETHICONE 30 ML: 200; 200; 20 SUSPENSION ORAL at 21:33

## 2025-08-13 RX ADMIN — HYDROXYZINE HYDROCHLORIDE 50 MG: 50 TABLET ORAL at 09:01

## 2025-08-13 RX ADMIN — PROPRANOLOL HYDROCHLORIDE 10 MG: 20 TABLET ORAL at 08:58

## 2025-08-13 RX ADMIN — HYDROXYZINE HYDROCHLORIDE 50 MG: 50 TABLET ORAL at 20:37

## 2025-08-13 RX ADMIN — ALUMINUM HYDROXIDE, MAGNESIUM HYDROXIDE, AND SIMETHICONE 30 ML: 200; 200; 20 SUSPENSION ORAL at 13:03

## 2025-08-13 RX ADMIN — ACETAMINOPHEN 650 MG: 325 TABLET ORAL at 04:11

## 2025-08-13 RX ADMIN — PRAZOSIN HYDROCHLORIDE 1 MG: 1 CAPSULE ORAL at 20:38

## 2025-08-13 RX ADMIN — PROPRANOLOL HYDROCHLORIDE 10 MG: 20 TABLET ORAL at 14:43

## 2025-08-13 RX ADMIN — NICOTINE POLACRILEX 2 MG: 2 LOZENGE ORAL at 14:26

## 2025-08-13 RX ADMIN — AMLODIPINE BESYLATE 10 MG: 10 TABLET ORAL at 08:59

## 2025-08-13 RX ADMIN — DIVALPROEX SODIUM 750 MG: 500 TABLET, FILM COATED, EXTENDED RELEASE ORAL at 09:00

## 2025-08-13 RX ADMIN — IBUPROFEN 400 MG: 400 TABLET ORAL at 20:38

## 2025-08-13 RX ADMIN — DIVALPROEX SODIUM 750 MG: 500 TABLET, FILM COATED, EXTENDED RELEASE ORAL at 20:37

## 2025-08-13 RX ADMIN — PROPRANOLOL HYDROCHLORIDE 10 MG: 20 TABLET ORAL at 20:38

## 2025-08-13 RX ADMIN — ACETAMINOPHEN 650 MG: 325 TABLET ORAL at 23:14

## 2025-08-13 ASSESSMENT — PAIN SCALES - GENERAL
PAINLEVEL_OUTOF10: 4
PAINLEVEL_OUTOF10: 5
PAINLEVEL_OUTOF10: 0
PAINLEVEL_OUTOF10: 0
PAINLEVEL_OUTOF10: 7

## 2025-08-13 ASSESSMENT — PAIN - FUNCTIONAL ASSESSMENT
PAIN_FUNCTIONAL_ASSESSMENT: 0-10
PAIN_FUNCTIONAL_ASSESSMENT: ACTIVITIES ARE NOT PREVENTED
PAIN_FUNCTIONAL_ASSESSMENT: ACTIVITIES ARE NOT PREVENTED
PAIN_FUNCTIONAL_ASSESSMENT: 0-10
PAIN_FUNCTIONAL_ASSESSMENT: 0-10
PAIN_FUNCTIONAL_ASSESSMENT: ACTIVITIES ARE NOT PREVENTED

## 2025-08-13 ASSESSMENT — PAIN DESCRIPTION - LOCATION
LOCATION: GENERALIZED
LOCATION: GENERALIZED

## 2025-08-13 ASSESSMENT — PAIN DESCRIPTION - DESCRIPTORS: DESCRIPTORS: ACHING;DISCOMFORT

## 2025-08-14 VITALS
BODY MASS INDEX: 27.25 KG/M2 | SYSTOLIC BLOOD PRESSURE: 138 MMHG | DIASTOLIC BLOOD PRESSURE: 99 MMHG | RESPIRATION RATE: 14 BRPM | WEIGHT: 184 LBS | TEMPERATURE: 98.5 F | HEART RATE: 89 BPM | HEIGHT: 69 IN | OXYGEN SATURATION: 99 %

## 2025-08-14 PROCEDURE — 6370000000 HC RX 637 (ALT 250 FOR IP): Performed by: INTERNAL MEDICINE

## 2025-08-14 PROCEDURE — 6370000000 HC RX 637 (ALT 250 FOR IP): Performed by: PSYCHIATRY & NEUROLOGY

## 2025-08-14 PROCEDURE — 99239 HOSP IP/OBS DSCHRG MGMT >30: CPT | Performed by: PSYCHIATRY & NEUROLOGY

## 2025-08-14 RX ADMIN — AMLODIPINE BESYLATE 10 MG: 10 TABLET ORAL at 08:01

## 2025-08-14 RX ADMIN — PROPRANOLOL HYDROCHLORIDE 10 MG: 20 TABLET ORAL at 08:01

## 2025-08-14 RX ADMIN — DIVALPROEX SODIUM 750 MG: 500 TABLET, FILM COATED, EXTENDED RELEASE ORAL at 08:01

## 2025-08-16 ENCOUNTER — HOSPITAL ENCOUNTER (INPATIENT)
Age: 45
LOS: 6 days | Discharge: PSYCHIATRIC OR SUBSTANCE USE FACILITY | DRG: 885 | End: 2025-08-22
Attending: EMERGENCY MEDICINE | Admitting: PSYCHIATRY & NEUROLOGY
Payer: MEDICARE

## 2025-08-16 ENCOUNTER — HOSPITAL ENCOUNTER (EMERGENCY)
Age: 45
Discharge: HOME | End: 2025-08-16
Attending: EMERGENCY MEDICINE
Payer: MEDICARE

## 2025-08-16 VITALS
OXYGEN SATURATION: 95 % | HEART RATE: 65 BPM | RESPIRATION RATE: 20 BRPM | SYSTOLIC BLOOD PRESSURE: 131 MMHG | DIASTOLIC BLOOD PRESSURE: 95 MMHG | TEMPERATURE: 98.3 F

## 2025-08-16 DIAGNOSIS — F29 PSYCHOSIS, UNSPECIFIED PSYCHOSIS TYPE (HCC): Primary | ICD-10-CM

## 2025-08-16 DIAGNOSIS — Z13.9 ENCOUNTER FOR MEDICAL SCREENING EXAMINATION: Primary | ICD-10-CM

## 2025-08-16 PROCEDURE — 2040000000 HC PSYCH ICU R&B

## 2025-08-16 PROCEDURE — 99285 EMERGENCY DEPT VISIT HI MDM: CPT

## 2025-08-16 PROCEDURE — 99282 EMERGENCY DEPT VISIT SF MDM: CPT

## 2025-08-16 PROCEDURE — 6370000000 HC RX 637 (ALT 250 FOR IP): Performed by: EMERGENCY MEDICINE

## 2025-08-16 PROCEDURE — 6370000000 HC RX 637 (ALT 250 FOR IP): Performed by: PSYCHIATRY & NEUROLOGY

## 2025-08-16 RX ORDER — ACETAMINOPHEN 325 MG/1
650 TABLET ORAL EVERY 6 HOURS PRN
Status: DISCONTINUED | OUTPATIENT
Start: 2025-08-16 | End: 2025-08-22 | Stop reason: HOSPADM

## 2025-08-16 RX ORDER — OLANZAPINE 10 MG/1
5 TABLET, ORALLY DISINTEGRATING ORAL ONCE
Status: COMPLETED | OUTPATIENT
Start: 2025-08-16 | End: 2025-08-16

## 2025-08-16 RX ORDER — HYDROXYZINE HYDROCHLORIDE 50 MG/1
50 TABLET, FILM COATED ORAL 3 TIMES DAILY PRN
Status: DISCONTINUED | OUTPATIENT
Start: 2025-08-16 | End: 2025-08-22 | Stop reason: HOSPADM

## 2025-08-16 RX ORDER — LORAZEPAM 1 MG/1
2 TABLET ORAL EVERY 6 HOURS PRN
Status: DISCONTINUED | OUTPATIENT
Start: 2025-08-16 | End: 2025-08-18

## 2025-08-16 RX ORDER — LORAZEPAM 1 MG/1
2 TABLET ORAL ONCE
Status: COMPLETED | OUTPATIENT
Start: 2025-08-16 | End: 2025-08-16

## 2025-08-16 RX ORDER — POLYETHYLENE GLYCOL 3350 17 G
2 POWDER IN PACKET (EA) ORAL
Status: DISCONTINUED | OUTPATIENT
Start: 2025-08-16 | End: 2025-08-22 | Stop reason: HOSPADM

## 2025-08-16 RX ORDER — TRAZODONE HYDROCHLORIDE 50 MG/1
50 TABLET ORAL NIGHTLY PRN
Status: DISCONTINUED | OUTPATIENT
Start: 2025-08-16 | End: 2025-08-20

## 2025-08-16 RX ORDER — HALOPERIDOL 5 MG/1
5 TABLET ORAL EVERY 6 HOURS PRN
Status: DISCONTINUED | OUTPATIENT
Start: 2025-08-16 | End: 2025-08-22 | Stop reason: HOSPADM

## 2025-08-16 RX ORDER — DIPHENHYDRAMINE HYDROCHLORIDE 50 MG/ML
50 INJECTION, SOLUTION INTRAMUSCULAR; INTRAVENOUS EVERY 6 HOURS PRN
Status: DISCONTINUED | OUTPATIENT
Start: 2025-08-16 | End: 2025-08-22 | Stop reason: HOSPADM

## 2025-08-16 RX ORDER — IBUPROFEN 400 MG/1
400 TABLET, FILM COATED ORAL EVERY 6 HOURS PRN
Status: DISCONTINUED | OUTPATIENT
Start: 2025-08-16 | End: 2025-08-22 | Stop reason: HOSPADM

## 2025-08-16 RX ORDER — POLYETHYLENE GLYCOL 3350 17 G/17G
17 POWDER, FOR SOLUTION ORAL DAILY PRN
Status: DISCONTINUED | OUTPATIENT
Start: 2025-08-16 | End: 2025-08-22 | Stop reason: HOSPADM

## 2025-08-16 RX ORDER — MAGNESIUM HYDROXIDE/ALUMINUM HYDROXICE/SIMETHICONE 120; 1200; 1200 MG/30ML; MG/30ML; MG/30ML
30 SUSPENSION ORAL EVERY 6 HOURS PRN
Status: DISCONTINUED | OUTPATIENT
Start: 2025-08-16 | End: 2025-08-22 | Stop reason: HOSPADM

## 2025-08-16 RX ORDER — IBUPROFEN 400 MG/1
400 TABLET, FILM COATED ORAL ONCE
Status: COMPLETED | OUTPATIENT
Start: 2025-08-16 | End: 2025-08-16

## 2025-08-16 RX ORDER — HALOPERIDOL 5 MG/ML
5 INJECTION INTRAMUSCULAR EVERY 6 HOURS PRN
Status: DISCONTINUED | OUTPATIENT
Start: 2025-08-16 | End: 2025-08-22 | Stop reason: HOSPADM

## 2025-08-16 RX ORDER — LORAZEPAM 2 MG/ML
2 INJECTION INTRAMUSCULAR EVERY 6 HOURS PRN
Status: DISCONTINUED | OUTPATIENT
Start: 2025-08-16 | End: 2025-08-18

## 2025-08-16 RX ADMIN — OLANZAPINE 5 MG: 10 TABLET, ORALLY DISINTEGRATING ORAL at 18:15

## 2025-08-16 RX ADMIN — IBUPROFEN 400 MG: 400 TABLET ORAL at 18:14

## 2025-08-16 RX ADMIN — LORAZEPAM 2 MG: 1 TABLET ORAL at 19:22

## 2025-08-16 RX ADMIN — LORAZEPAM 2 MG: 1 TABLET ORAL at 18:03

## 2025-08-16 RX ADMIN — TRAZODONE HYDROCHLORIDE 50 MG: 50 TABLET ORAL at 20:20

## 2025-08-16 RX ADMIN — HALOPERIDOL 5 MG: 5 TABLET ORAL at 19:22

## 2025-08-16 RX ADMIN — HYDROXYZINE HYDROCHLORIDE 50 MG: 50 TABLET ORAL at 20:20

## 2025-08-16 ASSESSMENT — PAIN SCALES - GENERAL
PAINLEVEL_OUTOF10: 0
PAINLEVEL_OUTOF10: 10
PAINLEVEL_OUTOF10: 10

## 2025-08-16 ASSESSMENT — PATIENT HEALTH QUESTIONNAIRE - PHQ9
1. LITTLE INTEREST OR PLEASURE IN DOING THINGS: NOT AT ALL
SUM OF ALL RESPONSES TO PHQ QUESTIONS 1-9: 0
2. FEELING DOWN, DEPRESSED OR HOPELESS: NOT AT ALL

## 2025-08-16 ASSESSMENT — PAIN DESCRIPTION - ORIENTATION
ORIENTATION: RIGHT;LEFT
ORIENTATION: RIGHT;LEFT

## 2025-08-16 ASSESSMENT — SLEEP AND FATIGUE QUESTIONNAIRES
AVERAGE NUMBER OF SLEEP HOURS: 0
DO YOU HAVE DIFFICULTY SLEEPING: YES
SLEEP PATTERN: DIFFICULTY FALLING ASLEEP;DISTURBED/INTERRUPTED SLEEP;INSOMNIA
DO YOU USE A SLEEP AID: NO

## 2025-08-16 ASSESSMENT — PAIN DESCRIPTION - DESCRIPTORS
DESCRIPTORS: ACHING
DESCRIPTORS: ACHING

## 2025-08-16 ASSESSMENT — PAIN - FUNCTIONAL ASSESSMENT: PAIN_FUNCTIONAL_ASSESSMENT: 0-10

## 2025-08-16 ASSESSMENT — PAIN DESCRIPTION - LOCATION
LOCATION: FOOT
LOCATION: FOOT

## 2025-08-17 PROCEDURE — 99222 1ST HOSP IP/OBS MODERATE 55: CPT | Performed by: PSYCHIATRY & NEUROLOGY

## 2025-08-17 PROCEDURE — 2040000000 HC PSYCH ICU R&B

## 2025-08-17 PROCEDURE — 99222 1ST HOSP IP/OBS MODERATE 55: CPT | Performed by: INTERNAL MEDICINE

## 2025-08-17 PROCEDURE — 6360000002 HC RX W HCPCS: Performed by: PSYCHIATRY & NEUROLOGY

## 2025-08-17 PROCEDURE — 2500000003 HC RX 250 WO HCPCS: Performed by: PSYCHIATRY & NEUROLOGY

## 2025-08-17 PROCEDURE — 6370000000 HC RX 637 (ALT 250 FOR IP): Performed by: PSYCHIATRY & NEUROLOGY

## 2025-08-17 RX ORDER — PRAZOSIN HYDROCHLORIDE 1 MG/1
1 CAPSULE ORAL NIGHTLY
Status: DISCONTINUED | OUTPATIENT
Start: 2025-08-17 | End: 2025-08-22 | Stop reason: HOSPADM

## 2025-08-17 RX ORDER — AMLODIPINE BESYLATE 10 MG/1
10 TABLET ORAL DAILY
Status: DISCONTINUED | OUTPATIENT
Start: 2025-08-17 | End: 2025-08-22 | Stop reason: HOSPADM

## 2025-08-17 RX ADMIN — AMLODIPINE BESYLATE 10 MG: 10 TABLET ORAL at 12:37

## 2025-08-17 RX ADMIN — LORAZEPAM 2 MG: 1 TABLET ORAL at 09:17

## 2025-08-17 RX ADMIN — ZIPRASIDONE MESYLATE 20 MG: 20 INJECTION, POWDER, LYOPHILIZED, FOR SOLUTION INTRAMUSCULAR at 16:10

## 2025-08-17 RX ADMIN — HALOPERIDOL 5 MG: 5 TABLET ORAL at 15:15

## 2025-08-17 RX ADMIN — HYDROXYZINE HYDROCHLORIDE 50 MG: 50 TABLET ORAL at 15:15

## 2025-08-17 RX ADMIN — HALOPERIDOL 5 MG: 5 TABLET ORAL at 09:17

## 2025-08-17 RX ADMIN — DIVALPROEX SODIUM 750 MG: 500 TABLET, FILM COATED, EXTENDED RELEASE ORAL at 12:37

## 2025-08-17 RX ADMIN — LORAZEPAM 2 MG: 1 TABLET ORAL at 15:15

## 2025-08-17 RX ADMIN — HYDROXYZINE HYDROCHLORIDE 50 MG: 50 TABLET ORAL at 09:17

## 2025-08-17 ASSESSMENT — PAIN SCALES - GENERAL: PAINLEVEL_OUTOF10: 0

## 2025-08-18 PROCEDURE — 99232 SBSQ HOSP IP/OBS MODERATE 35: CPT

## 2025-08-18 PROCEDURE — 6370000000 HC RX 637 (ALT 250 FOR IP): Performed by: PSYCHIATRY & NEUROLOGY

## 2025-08-18 PROCEDURE — 2040000000 HC PSYCH ICU R&B

## 2025-08-18 PROCEDURE — 99232 SBSQ HOSP IP/OBS MODERATE 35: CPT | Performed by: PSYCHIATRY & NEUROLOGY

## 2025-08-18 RX ADMIN — NICOTINE POLACRILEX 2 MG: 2 LOZENGE ORAL at 13:15

## 2025-08-18 RX ADMIN — DIVALPROEX SODIUM 750 MG: 500 TABLET, FILM COATED, EXTENDED RELEASE ORAL at 08:58

## 2025-08-18 RX ADMIN — HYDROXYZINE HYDROCHLORIDE 50 MG: 50 TABLET ORAL at 18:21

## 2025-08-18 RX ADMIN — IBUPROFEN 400 MG: 400 TABLET ORAL at 14:01

## 2025-08-18 RX ADMIN — DIVALPROEX SODIUM 750 MG: 500 TABLET, FILM COATED, EXTENDED RELEASE ORAL at 20:44

## 2025-08-18 RX ADMIN — PRAZOSIN HYDROCHLORIDE 1 MG: 1 CAPSULE ORAL at 20:44

## 2025-08-18 RX ADMIN — AMLODIPINE BESYLATE 10 MG: 10 TABLET ORAL at 08:58

## 2025-08-18 RX ADMIN — ACETAMINOPHEN 650 MG: 325 TABLET ORAL at 20:44

## 2025-08-18 ASSESSMENT — PAIN DESCRIPTION - LOCATION: LOCATION: FOOT

## 2025-08-18 ASSESSMENT — PAIN DESCRIPTION - ORIENTATION: ORIENTATION: RIGHT;LEFT

## 2025-08-18 ASSESSMENT — PAIN SCALES - GENERAL
PAINLEVEL_OUTOF10: 6
PAINLEVEL_OUTOF10: 2
PAINLEVEL_OUTOF10: 10

## 2025-08-18 ASSESSMENT — PAIN DESCRIPTION - DESCRIPTORS: DESCRIPTORS: ACHING

## 2025-08-18 ASSESSMENT — PAIN - FUNCTIONAL ASSESSMENT
PAIN_FUNCTIONAL_ASSESSMENT: 0-10
PAIN_FUNCTIONAL_ASSESSMENT: 0-10

## 2025-08-19 LAB
BACTERIA URNS QL MICRO: ABNORMAL
BILIRUB UR QL STRIP: NEGATIVE
CANDIDA SPECIES: NEGATIVE
CASTS #/AREA URNS LPF: ABNORMAL /LPF
CLARITY UR: ABNORMAL
COLOR UR: YELLOW
EPI CELLS #/AREA URNS HPF: ABNORMAL /HPF
GARDNERELLA VAGINALIS: POSITIVE
GLUCOSE UR STRIP-MCNC: NEGATIVE MG/DL
HGB UR QL STRIP.AUTO: NEGATIVE
KETONES UR STRIP-MCNC: ABNORMAL MG/DL
LEUKOCYTE ESTERASE UR QL STRIP: NEGATIVE
NITRITE UR QL STRIP: NEGATIVE
PH UR STRIP: 7.5 [PH] (ref 5–8)
PROT UR STRIP-MCNC: NEGATIVE MG/DL
RBC #/AREA URNS HPF: ABNORMAL /HPF
SOURCE: ABNORMAL
SP GR UR STRIP: 1.01 (ref 1–1.03)
TRICHOMONAS: NEGATIVE
UROBILINOGEN UR STRIP-ACNC: NORMAL EU/DL (ref 0–1)
WBC #/AREA URNS HPF: ABNORMAL /HPF

## 2025-08-19 PROCEDURE — 87480 CANDIDA DNA DIR PROBE: CPT

## 2025-08-19 PROCEDURE — 6370000000 HC RX 637 (ALT 250 FOR IP): Performed by: PSYCHIATRY & NEUROLOGY

## 2025-08-19 PROCEDURE — 87491 CHLMYD TRACH DNA AMP PROBE: CPT

## 2025-08-19 PROCEDURE — 99232 SBSQ HOSP IP/OBS MODERATE 35: CPT

## 2025-08-19 PROCEDURE — 87591 N.GONORRHOEAE DNA AMP PROB: CPT

## 2025-08-19 PROCEDURE — 81001 URINALYSIS AUTO W/SCOPE: CPT

## 2025-08-19 PROCEDURE — 1240000000 HC EMOTIONAL WELLNESS R&B

## 2025-08-19 PROCEDURE — 87510 GARDNER VAG DNA DIR PROBE: CPT

## 2025-08-19 PROCEDURE — 87660 TRICHOMONAS VAGIN DIR PROBE: CPT

## 2025-08-19 PROCEDURE — 99232 SBSQ HOSP IP/OBS MODERATE 35: CPT | Performed by: PSYCHIATRY & NEUROLOGY

## 2025-08-19 RX ADMIN — PRAZOSIN HYDROCHLORIDE 1 MG: 1 CAPSULE ORAL at 20:34

## 2025-08-19 RX ADMIN — POLYETHYLENE GLYCOL 3350 17 G: 17 POWDER, FOR SOLUTION ORAL at 14:36

## 2025-08-19 RX ADMIN — IBUPROFEN 400 MG: 400 TABLET ORAL at 11:20

## 2025-08-19 RX ADMIN — AMLODIPINE BESYLATE 10 MG: 10 TABLET ORAL at 08:19

## 2025-08-19 RX ADMIN — DIVALPROEX SODIUM 750 MG: 500 TABLET, FILM COATED, EXTENDED RELEASE ORAL at 20:34

## 2025-08-19 RX ADMIN — HYDROXYZINE HYDROCHLORIDE 50 MG: 50 TABLET ORAL at 09:50

## 2025-08-19 RX ADMIN — NICOTINE POLACRILEX 2 MG: 2 LOZENGE ORAL at 11:21

## 2025-08-19 RX ADMIN — TRAZODONE HYDROCHLORIDE 50 MG: 50 TABLET ORAL at 01:35

## 2025-08-19 RX ADMIN — HYDROXYZINE HYDROCHLORIDE 50 MG: 50 TABLET ORAL at 18:22

## 2025-08-19 RX ADMIN — ACETAMINOPHEN 650 MG: 325 TABLET ORAL at 08:19

## 2025-08-19 RX ADMIN — DIVALPROEX SODIUM 750 MG: 500 TABLET, FILM COATED, EXTENDED RELEASE ORAL at 08:19

## 2025-08-19 RX ADMIN — ACETAMINOPHEN 650 MG: 325 TABLET ORAL at 14:29

## 2025-08-19 RX ADMIN — NICOTINE POLACRILEX 2 MG: 2 LOZENGE ORAL at 18:34

## 2025-08-19 ASSESSMENT — PAIN SCALES - GENERAL
PAINLEVEL_OUTOF10: 0
PAINLEVEL_OUTOF10: 8
PAINLEVEL_OUTOF10: 7
PAINLEVEL_OUTOF10: 8

## 2025-08-19 ASSESSMENT — PAIN - FUNCTIONAL ASSESSMENT
PAIN_FUNCTIONAL_ASSESSMENT: 0-10

## 2025-08-19 ASSESSMENT — PAIN DESCRIPTION - LOCATION
LOCATION: HEAD
LOCATION: FOOT

## 2025-08-19 ASSESSMENT — PAIN SCALES - WONG BAKER: WONGBAKER_NUMERICALRESPONSE: NO HURT

## 2025-08-20 PROCEDURE — 99232 SBSQ HOSP IP/OBS MODERATE 35: CPT

## 2025-08-20 PROCEDURE — 6370000000 HC RX 637 (ALT 250 FOR IP): Performed by: NURSE PRACTITIONER

## 2025-08-20 PROCEDURE — 1240000000 HC EMOTIONAL WELLNESS R&B

## 2025-08-20 PROCEDURE — 6370000000 HC RX 637 (ALT 250 FOR IP)

## 2025-08-20 PROCEDURE — 99232 SBSQ HOSP IP/OBS MODERATE 35: CPT | Performed by: PSYCHIATRY & NEUROLOGY

## 2025-08-20 PROCEDURE — 6370000000 HC RX 637 (ALT 250 FOR IP): Performed by: PSYCHIATRY & NEUROLOGY

## 2025-08-20 RX ORDER — METRONIDAZOLE 500 MG/1
500 TABLET ORAL EVERY 12 HOURS SCHEDULED
Status: DISCONTINUED | OUTPATIENT
Start: 2025-08-20 | End: 2025-08-22 | Stop reason: HOSPADM

## 2025-08-20 RX ADMIN — HYDROXYZINE HYDROCHLORIDE 50 MG: 50 TABLET ORAL at 09:43

## 2025-08-20 RX ADMIN — DIVALPROEX SODIUM 750 MG: 500 TABLET, FILM COATED, EXTENDED RELEASE ORAL at 21:11

## 2025-08-20 RX ADMIN — HYDROXYZINE HYDROCHLORIDE 50 MG: 50 TABLET ORAL at 21:11

## 2025-08-20 RX ADMIN — ACETAMINOPHEN 650 MG: 325 TABLET ORAL at 19:29

## 2025-08-20 RX ADMIN — METRONIDAZOLE 500 MG: 500 TABLET ORAL at 21:11

## 2025-08-20 RX ADMIN — HYDROXYZINE HYDROCHLORIDE 50 MG: 50 TABLET ORAL at 01:29

## 2025-08-20 RX ADMIN — AMLODIPINE BESYLATE 10 MG: 10 TABLET ORAL at 09:43

## 2025-08-20 RX ADMIN — NICOTINE POLACRILEX 2 MG: 2 LOZENGE ORAL at 19:29

## 2025-08-20 RX ADMIN — NICOTINE POLACRILEX 2 MG: 2 LOZENGE ORAL at 16:03

## 2025-08-20 RX ADMIN — IBUPROFEN 400 MG: 400 TABLET ORAL at 17:09

## 2025-08-20 RX ADMIN — DIVALPROEX SODIUM 750 MG: 500 TABLET, FILM COATED, EXTENDED RELEASE ORAL at 09:42

## 2025-08-20 RX ADMIN — METRONIDAZOLE 500 MG: 500 TABLET ORAL at 09:43

## 2025-08-20 RX ADMIN — HALOPERIDOL 5 MG: 5 TABLET ORAL at 13:38

## 2025-08-20 RX ADMIN — ACETAMINOPHEN 650 MG: 325 TABLET ORAL at 09:42

## 2025-08-20 RX ADMIN — TRAZODONE HYDROCHLORIDE 50 MG: 50 TABLET ORAL at 02:16

## 2025-08-20 RX ADMIN — HALOPERIDOL 5 MG: 5 TABLET ORAL at 21:11

## 2025-08-20 RX ADMIN — Medication 3 MG: at 21:11

## 2025-08-20 RX ADMIN — POLYETHYLENE GLYCOL 3350 17 G: 17 POWDER, FOR SOLUTION ORAL at 09:58

## 2025-08-20 RX ADMIN — PRAZOSIN HYDROCHLORIDE 1 MG: 1 CAPSULE ORAL at 21:11

## 2025-08-20 ASSESSMENT — PAIN SCALES - GENERAL
PAINLEVEL_OUTOF10: 2
PAINLEVEL_OUTOF10: 10
PAINLEVEL_OUTOF10: 3
PAINLEVEL_OUTOF10: 0
PAINLEVEL_OUTOF10: 10

## 2025-08-20 ASSESSMENT — PAIN DESCRIPTION - LOCATION: LOCATION: FOOT

## 2025-08-20 ASSESSMENT — PAIN - FUNCTIONAL ASSESSMENT
PAIN_FUNCTIONAL_ASSESSMENT: ACTIVITIES ARE NOT PREVENTED
PAIN_FUNCTIONAL_ASSESSMENT: 0-10
PAIN_FUNCTIONAL_ASSESSMENT: ACTIVITIES ARE NOT PREVENTED
PAIN_FUNCTIONAL_ASSESSMENT: 0-10
PAIN_FUNCTIONAL_ASSESSMENT: 0-10

## 2025-08-21 PROCEDURE — 1240000000 HC EMOTIONAL WELLNESS R&B

## 2025-08-21 PROCEDURE — 6360000002 HC RX W HCPCS: Performed by: PSYCHIATRY & NEUROLOGY

## 2025-08-21 PROCEDURE — 6370000000 HC RX 637 (ALT 250 FOR IP): Performed by: NURSE PRACTITIONER

## 2025-08-21 PROCEDURE — 6370000000 HC RX 637 (ALT 250 FOR IP): Performed by: PSYCHIATRY & NEUROLOGY

## 2025-08-21 PROCEDURE — 99239 HOSP IP/OBS DSCHRG MGMT >30: CPT | Performed by: PSYCHIATRY & NEUROLOGY

## 2025-08-21 PROCEDURE — 6370000000 HC RX 637 (ALT 250 FOR IP)

## 2025-08-21 RX ORDER — DIVALPROEX SODIUM 250 MG/1
750 TABLET, FILM COATED, EXTENDED RELEASE ORAL 2 TIMES DAILY
Qty: 30 TABLET | Refills: 3 | Status: SHIPPED | OUTPATIENT
Start: 2025-08-21

## 2025-08-21 RX ORDER — METRONIDAZOLE 500 MG/1
500 TABLET ORAL EVERY 12 HOURS SCHEDULED
Qty: 11 TABLET | Refills: 0 | Status: SHIPPED | OUTPATIENT
Start: 2025-08-21 | End: 2025-08-27

## 2025-08-21 RX ADMIN — IBUPROFEN 400 MG: 400 TABLET ORAL at 07:16

## 2025-08-21 RX ADMIN — DIPHENHYDRAMINE HYDROCHLORIDE 50 MG: 50 INJECTION INTRAMUSCULAR; INTRAVENOUS at 16:02

## 2025-08-21 RX ADMIN — HYDROXYZINE HYDROCHLORIDE 50 MG: 50 TABLET ORAL at 21:11

## 2025-08-21 RX ADMIN — NICOTINE POLACRILEX 2 MG: 2 LOZENGE ORAL at 16:29

## 2025-08-21 RX ADMIN — NICOTINE POLACRILEX 2 MG: 2 LOZENGE ORAL at 14:01

## 2025-08-21 RX ADMIN — NICOTINE POLACRILEX 2 MG: 2 LOZENGE ORAL at 08:31

## 2025-08-21 RX ADMIN — HALOPERIDOL LACTATE 5 MG: 5 INJECTION, SOLUTION INTRAMUSCULAR at 16:02

## 2025-08-21 RX ADMIN — ACETAMINOPHEN 650 MG: 325 TABLET ORAL at 02:28

## 2025-08-21 RX ADMIN — DIVALPROEX SODIUM 750 MG: 500 TABLET, FILM COATED, EXTENDED RELEASE ORAL at 08:31

## 2025-08-21 RX ADMIN — METRONIDAZOLE 500 MG: 500 TABLET ORAL at 21:11

## 2025-08-21 RX ADMIN — HYDROXYZINE HYDROCHLORIDE 50 MG: 50 TABLET ORAL at 15:07

## 2025-08-21 RX ADMIN — DIVALPROEX SODIUM 750 MG: 500 TABLET, FILM COATED, EXTENDED RELEASE ORAL at 21:11

## 2025-08-21 RX ADMIN — IBUPROFEN 400 MG: 400 TABLET ORAL at 16:29

## 2025-08-21 RX ADMIN — HALOPERIDOL 5 MG: 5 TABLET ORAL at 03:27

## 2025-08-21 RX ADMIN — Medication 3 MG: at 21:12

## 2025-08-21 RX ADMIN — HYDROXYZINE HYDROCHLORIDE 50 MG: 50 TABLET ORAL at 03:27

## 2025-08-21 RX ADMIN — AMLODIPINE BESYLATE 10 MG: 10 TABLET ORAL at 08:31

## 2025-08-21 RX ADMIN — NICOTINE POLACRILEX 2 MG: 2 LOZENGE ORAL at 21:11

## 2025-08-21 RX ADMIN — METRONIDAZOLE 500 MG: 500 TABLET ORAL at 08:31

## 2025-08-21 RX ADMIN — PRAZOSIN HYDROCHLORIDE 1 MG: 1 CAPSULE ORAL at 21:12

## 2025-08-21 RX ADMIN — POLYETHYLENE GLYCOL 3350 17 G: 17 POWDER, FOR SOLUTION ORAL at 09:24

## 2025-08-21 ASSESSMENT — PAIN SCALES - GENERAL
PAINLEVEL_OUTOF10: 4
PAINLEVEL_OUTOF10: 0
PAINLEVEL_OUTOF10: 3
PAINLEVEL_OUTOF10: 4

## 2025-08-21 ASSESSMENT — PAIN DESCRIPTION - LOCATION
LOCATION: HEAD;FOOT
LOCATION: HEAD

## 2025-08-22 VITALS
OXYGEN SATURATION: 100 % | TEMPERATURE: 98.2 F | RESPIRATION RATE: 16 BRPM | DIASTOLIC BLOOD PRESSURE: 74 MMHG | SYSTOLIC BLOOD PRESSURE: 120 MMHG | HEIGHT: 69 IN | HEART RATE: 87 BPM | BODY MASS INDEX: 25.92 KG/M2 | WEIGHT: 175 LBS

## 2025-08-22 PROCEDURE — 6370000000 HC RX 637 (ALT 250 FOR IP)

## 2025-08-22 PROCEDURE — 99239 HOSP IP/OBS DSCHRG MGMT >30: CPT | Performed by: PSYCHIATRY & NEUROLOGY

## 2025-08-22 PROCEDURE — 6370000000 HC RX 637 (ALT 250 FOR IP): Performed by: PSYCHIATRY & NEUROLOGY

## 2025-08-22 RX ADMIN — ACETAMINOPHEN 650 MG: 325 TABLET ORAL at 08:42

## 2025-08-22 RX ADMIN — METRONIDAZOLE 500 MG: 500 TABLET ORAL at 08:39

## 2025-08-22 RX ADMIN — AMLODIPINE BESYLATE 10 MG: 10 TABLET ORAL at 08:39

## 2025-08-22 RX ADMIN — DIVALPROEX SODIUM 750 MG: 500 TABLET, FILM COATED, EXTENDED RELEASE ORAL at 08:39

## 2025-08-22 RX ADMIN — IBUPROFEN 400 MG: 400 TABLET ORAL at 04:16

## 2025-08-22 RX ADMIN — HYDROXYZINE HYDROCHLORIDE 50 MG: 50 TABLET ORAL at 13:21

## 2025-08-22 RX ADMIN — NICOTINE POLACRILEX 2 MG: 2 LOZENGE ORAL at 08:02

## 2025-08-22 ASSESSMENT — PAIN - FUNCTIONAL ASSESSMENT
PAIN_FUNCTIONAL_ASSESSMENT: 0-10
PAIN_FUNCTIONAL_ASSESSMENT: 0-10

## 2025-08-22 ASSESSMENT — PAIN SCALES - GENERAL
PAINLEVEL_OUTOF10: 5
PAINLEVEL_OUTOF10: 8

## 2025-08-22 ASSESSMENT — PAIN DESCRIPTION - DESCRIPTORS: DESCRIPTORS: ACHING;DISCOMFORT

## 2025-08-22 ASSESSMENT — PAIN DESCRIPTION - LOCATION: LOCATION: FOOT

## 2025-08-22 ASSESSMENT — PAIN DESCRIPTION - ORIENTATION: ORIENTATION: RIGHT;LEFT
